# Patient Record
Sex: FEMALE | Race: WHITE | NOT HISPANIC OR LATINO | Employment: OTHER | URBAN - METROPOLITAN AREA
[De-identification: names, ages, dates, MRNs, and addresses within clinical notes are randomized per-mention and may not be internally consistent; named-entity substitution may affect disease eponyms.]

---

## 2021-03-11 PROBLEM — C50.912 INVASIVE DUCTAL CARCINOMA OF LEFT BREAST (HCC): Status: ACTIVE | Noted: 2021-03-11

## 2021-03-12 ENCOUNTER — RADIATION ONCOLOGY CONSULT (OUTPATIENT)
Dept: RADIATION ONCOLOGY | Facility: HOSPITAL | Age: 71
End: 2021-03-12
Attending: RADIOLOGY
Payer: COMMERCIAL

## 2021-03-12 VITALS
DIASTOLIC BLOOD PRESSURE: 90 MMHG | TEMPERATURE: 97.8 F | HEART RATE: 103 BPM | SYSTOLIC BLOOD PRESSURE: 148 MMHG | WEIGHT: 159 LBS | OXYGEN SATURATION: 98 % | RESPIRATION RATE: 18 BRPM

## 2021-03-12 DIAGNOSIS — C50.912 INVASIVE DUCTAL CARCINOMA OF LEFT BREAST (HCC): Primary | ICD-10-CM

## 2021-03-12 PROCEDURE — 99211 OFF/OP EST MAY X REQ PHY/QHP: CPT | Performed by: RADIOLOGY

## 2021-03-12 RX ORDER — ROPINIROLE 0.5 MG/1
TABLET, FILM COATED ORAL
COMMUNITY
Start: 2021-01-25 | End: 2021-07-26

## 2021-03-12 RX ORDER — LOSARTAN POTASSIUM 25 MG/1
50 TABLET ORAL DAILY
COMMUNITY
Start: 2021-03-04 | End: 2022-07-06

## 2021-03-12 RX ORDER — OMEPRAZOLE 40 MG/1
40 CAPSULE, DELAYED RELEASE ORAL DAILY
COMMUNITY
Start: 2020-12-30 | End: 2022-05-11

## 2021-03-12 RX ORDER — MELOXICAM 15 MG/1
TABLET ORAL
COMMUNITY
Start: 2021-02-14 | End: 2021-07-26 | Stop reason: ALTCHOICE

## 2021-03-12 NOTE — PROGRESS NOTES
Blossom Cruz 1950 is a 79 y o  female    Oncology History Overview Note   Patient presents as a self referral to discuss radiation therapy for Stage IB invasive ductal left breast cancer, triple negative  She is s/p left breast lumpectomy and adjuvant chemotherapy at [facility]  79year old female palpated a mass in her left breast  Mammogram on 7/1/20 revealed left breast mass within upper outer quadrant, US showed irregular indistinct border within the left breast, 2:00, 8 cm from nipple measuring 1 8 x 1 5 x 1 5 cm  Benign lymph nodes present within left axilla  She was referred to a surgeon, Dr Danielle Matson in [address] who performed left breast wire localized lumpectomy on 8/5/20 8/5/20  Breast, Left, Left Breast Mass, Wire Localized Lumpectomy ([facility], Dr Adrienne Lao)  - Invasive breast carcinoma of no special type (ductal NST/invasive ductal carcinoma)  * Size: 15 mm   * [place] grade 3 of 3 (total score: 9 of 9)    -- tubule formation, score 3    -- nuclear grade, score 3    -- mitoses, score 3    * Breast tumor prognostic profile (per report)    -- ER: Negative 0%    -- AL: Negative 0%    -- Her2: Negative 0   * Ductal carcinoma in situ (DCIS): Not identified  * Margins: Negative for carcinoma, less than 1 mm from the closest lateral margin  * Pathologic stage (AJCC 8th ed ): pT1c     9/2/20 [facility], [facility], Dr Danielle Matson   Left breast re-excision lumpectomy with SLNB  No residual carcinoma  Two negative lymph nodes    3/1/21 [facility], Dr Yaw Loya  Pt is s/p 4 cycles of AC, now receiving Paclitaxel, cycle 10 today  She has lymphedema in left arm, follows with Physical therapy  She feels well, overall good tolerance       3/15/21 Last cycle of chemo, Taxol cycle 12     Invasive ductal carcinoma of left breast ([address] )   2020 Initial Diagnosis    Invasive ductal carcinoma of left breast ([address] )     8/5/2020 Surgery    Breast, Left, Left Breast Mass, Wire Localized Lumpectomy (1 slides [id number] [facility], collected 8/5/2020):  Dr Chuy Arreguin, Surgeon    - Invasive breast carcinoma of no special type (ductal NST/invasive ductal carcinoma)  * Size: 15 mm   * [place] grade 3 of 3 (total score: 9 of 9)    -- tubule formation, score 3    -- nuclear grade, score 3    -- mitoses, score 3    * Breast tumor prognostic profile (per report)    -- ER: Negative 0%    -- ME: Negative 0%    -- Her2: Negative 0   * Ductal carcinoma in situ (DCIS): Not identified  * Margins: Negative for carcinoma, less than 1 mm from the closest lateral margin  * Pathologic stage (AJCC 8th ed ): pT1c         9/2/2020 Surgery    Left breast re-excision lumpectomy with SLNB  ([address], [facility], Dr Adi Bunch)     A  Left breast lumpectomy:  - benign breast tissue with previous lumpectomy cavity showing marked tissue reaction including foreign body giant cell reaction, granulation tissue and fat necrosis  - No residual carcinoma seen  B  Left axillary lymph node:  - two negative lymph nodes          10/2020 - 3/15/2021 Chemotherapy    Adjuvant chemotherapy [facility])  AC (Adriamycin, Cytoxan) x 4 cycles  Paclitaxel x 12 cycles     Dr Elizabeth Nava, [facility]          Clinical Trial: no      Health Maintenance   Topic Date Due    Hepatitis C Screening  1950    Medicare Annual Wellness Visit (AWV)  1950    MAMMOGRAM  1950    Depression Screening PHQ  08/09/1962    COVID-19 Vaccine (1 of 2) 08/09/1966    BMI: Adult  08/09/1968    DTaP,Tdap,and Td Vaccines (1 - Tdap) 08/09/1971    Colorectal Cancer Screening  08/09/2000    Fall Risk  08/09/2015    Pneumococcal Vaccine: 65+ Years (1 of 1 - PPSV23) 08/09/2015    Influenza Vaccine (1) 09/01/2020    HIB Vaccine  Aged Out    Hepatitis B Vaccine  Aged Out    IPV Vaccine  Aged Out    Hepatitis A Vaccine  Aged Out    Meningococcal ACWY Vaccine Aged Out    HPV Vaccine  Aged Out       Past Medical History:   Diagnosis Date    [facility] cancer [facility] ) 2020    Rheumatoid arthritis ([facility] [address] )        Past Surgical History:   Procedure Laterality Date    ANKLE SURGERY Right 2016    2 plates and screws    BREAST BIOPSY      BREAST LUMPECTOMY Right 2020    ELBOW FRACTURE REPAIR Right 1996    HYSTERECTOMY  2018       Family History   Problem Relation Age of Onset    Breast cancer Mother     Lung cancer Father     Breast cancer Paternal Grandmother        Social History     Tobacco Use    Smoking status: Never Smoker    Smokeless tobacco: Never Used   Substance Use Topics    Alcohol use: Not on file    Drug use: Not on file          Current Outpatient Medications:     fluticasone-salmeterol (ADVAIR, WIXELA) 250-50 mcg/dose inhaler, Inhale 1 puff 2 (two) times a day Rinse mouth after use , Disp: , Rfl:     losartan (COZAAR) 25 mg tablet, , Disp: , Rfl:     meloxicam (MOBIC) 15 mg tablet, , Disp: , Rfl:     omeprazole (PriLOSEC) 40 MG capsule, , Disp: , Rfl:     rOPINIRole (REQUIP) 0 5 mg tablet, , Disp: , Rfl:     Allergies   Allergen Reactions    Avelox [Moxifloxacin] Itching        Review of Systems:  Review of Systems   Constitutional: Positive for fatigue  HENT: Negative  Eyes: Negative  Respiratory: Negative  Cardiovascular: Negative  Gastrointestinal: Negative  Endocrine: Negative  Genitourinary: Negative  Musculoskeletal: Positive for arthralgias (hx rheumatoid arthritis) and neck pain  Skin: Negative  Allergic/Immunologic: Positive for food allergies  Neurological: Positive for numbness (neuropathy in hands)  Hematological: Positive for adenopathy (left arm lymphedema, she was evaluated by PT waiting for compression sleeve)  Psychiatric/Behavioral: Negative          Vitals:    03/12/21 1042   BP: 148/90   BP Location: Right arm   Pulse: 103   Resp: 18   Temp: 97 8 °F (36 6 °C)   TempSrc: Temporal   SpO2: 98% Weight: 72 1 kg (159 lb)            OB/GYN History:  The patient underwent menarche at 8 years  Menopause Status Pre, Louise, Post, Unknown and No Answer  No LMP recorded  Menopause at 62 years  Menopause Reason natural   Hormone replacement therapy: yes  Years used 2 months   9   Para 4   Age at first delivery being 25 years  Nursing: not applicable  Birth control pills: yes  Years used 2-3 years  Pregnancy test needed:  no    PFT n/a    Imaging:No images are attached to the encounter       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

## 2021-03-12 NOTE — PROGRESS NOTES
Consultation - Radiation Oncology      YZX:[id number] : 1950  Encounter: [id number]  Patient Information: Jenae Nj      CHIEF COMPLAINT  Chief Complaint   Patient presents with    Consult     Radiation Oncology      Cancer Staging  Invasive ductal carcinoma of left breast [facility])  Staging form: Breast, AJCC 8th Edition  - Pathologic: Stage IB (pT1c, pN0, cM0, G3, ER-, IA-, HER2-) - Signed by Petra Nj MD on 3/12/2021  Multigene prognostic tests performed: None  Histologic grading system: 3 grade system           History of Present Illness   Jenae Nj is a 79y o  year old female who presents with  Recently diagnosed triple negative invasive mammary carcinoma the left breast status post lumpectomy, re-excision, and sentinel node biopsy with negative margins  She presents today to discuss adjuvant radiation therapy  She is currently nearing completion of adjuvant systemic therapy  All care to date was performed at an outside facility  Workup to date as below:    Patient presents as a self referral to discuss radiation therapy for Stage IB invasive ductal left breast cancer, triple negative  She is s/p left breast lumpectomy and adjuvant chemotherapy at [facility]  79year old female palpated a mass in her left breast  Mammogram on 20 revealed left breast mass within upper outer quadrant, US showed irregular indistinct border within the left breast, 2:00, 8 cm from nipple measuring 1 8 x 1 5 x 1 5 cm  Benign lymph nodes present within left axilla  She was referred to a surgeon, Dr Leigh Ann Barajas in [address] who performed left breast wire localized lumpectomy on 20  Breast, Left, Left Breast Mass, Wire Localized Lumpectomy ([place], Dr Abram Marie)  - Invasive breast carcinoma of no special type (ductal NST/invasive ductal carcinoma)     * Size: 15 mm   * Kar[place] grade 3 of 3 (total score: 9 of 9)    -- tubule formation, score 3    -- nuclear grade, score 3    -- mitoses, score 3    * Breast tumor prognostic profile (per report)    -- ER: Negative 0%    -- IA: Negative 0%    -- Her2: Negative 0   * Ductal carcinoma in situ (DCIS): Not identified  * Margins: Negative for carcinoma, less than 1 mm from the closest lateral margin  * Pathologic stage (AJCC 8th ed ): pT1c     9/2/20 [address] [facility], Dr Tremaine Miles   Left breast re-excision lumpectomy with SLNB  No residual carcinoma  Two negative lymph nodes    3/1/21 [facility], Dr Shruthi Rios  Pt is s/p 4 cycles of AC, now receiving Paclitaxel, cycle 10 today  She has lymphedema in left arm, follows with Physical therapy  She feels well, overall good tolerance  3/15/21 Last cycle of chemo, Taxol cycle 12        Historical Information   Oncology History   Invasive ductal carcinoma of left breast [address] [address] )   2020 Initial Diagnosis    Invasive ductal carcinoma of left breast ([facility] [address] )     8/5/2020 Surgery    Breast, Left, Left Breast Mass, Wire Localized Lumpectomy (1 slides [id number] [facility], collected 8/5/2020):  Dr Danette Cervantes, Surgeon    - Invasive breast carcinoma of no special type (ductal NST/invasive ductal carcinoma)  * Size: 15 mm   * Kar[place] grade 3 of 3 (total score: 9 of 9)    -- tubule formation, score 3    -- nuclear grade, score 3    -- mitoses, score 3    * Breast tumor prognostic profile (per report)    -- ER: Negative 0%    -- IA: Negative 0%    -- Her2: Negative 0   * Ductal carcinoma in situ (DCIS): Not identified  * Margins: Negative for carcinoma, less than 1 mm from the closest lateral margin  * Pathologic stage (AJCC 8th ed ): pT1c         9/2/2020 Surgery    Left breast re-excision lumpectomy with SLNB  ([address] [facility], Dr Tremaine Miles)     A   Left breast lumpectomy:  - benign breast tissue with previous lumpectomy cavity showing marked tissue reaction including foreign body giant cell reaction, granulation tissue and fat necrosis  - No residual carcinoma seen  B  Left axillary lymph node:  - two negative lymph nodes          10/2020 - 3/15/2021 Chemotherapy    Adjuvant chemotherapy [facility])  AC (Adriamycin, Cytoxan) x 4 cycles  Paclitaxel x 12 cycles     Dr Danielle Grewal, [facility] On[facility]      3/12/2021 -  Cancer Staged    Staging form: Breast, AJCC 8th Edition  - Pathologic: Stage IB (pT1c, pN0, cM0, G3, ER-, MO-, HER2-) - Signed by Brian Hess MD on 3/12/2021  Multigene prognostic tests performed: None  Histologic grading system: 3 grade system             Past Medical History:   Diagnosis Date    Breast cancer [facility] [address] ) 2020    Left  breast, triple negative    Rheumatoid arthritis ([facility] [address] )      Past Surgical History:   Procedure Laterality Date    ANKLE SURGERY Right 2016    2 plates and screws    BREAST BIOPSY Left 07/02/2020    BREAST LUMPECTOMY Left 08/12/2020    left breast wire localized lumpectomy    BREAST MASS EXCISION Left 09/02/2020    short superior and long lateral left breast resection, left axillary sentinel lymph node biopsy    ELBOW FRACTURE REPAIR Right 1996    HYSTERECTOMY  2018       Family History   Problem Relation Age of Onset    Breast cancer Mother     Lung cancer Father     Breast cancer Paternal Grandmother        Social History   Social History     Substance and Sexual Activity   Alcohol Use None     Social History     Substance and Sexual Activity   Drug Use Never     Social History     Tobacco Use   Smoking Status Never Smoker   Smokeless Tobacco Never Used         Meds/Allergies     Current Outpatient Medications:     fluticasone-salmeterol (Bennye Situ) 250-50 mcg/dose inhaler, Inhale 1 puff 2 (two) times a day Rinse mouth after use , Disp: , Rfl:     losartan (COZAAR) 25 mg tablet, , Disp: , Rfl:     meloxicam (MOBIC) 15 mg tablet, , Disp: , Rfl:     omeprazole (PriLOSEC) 40 MG capsule, , Disp: , Rfl:     rOPINIRole (REQUIP) 0 5 mg tablet, , Disp: , Rfl: Allergies   Allergen Reactions    Avelox [Moxifloxacin] Itching    Fruit Extracts Itching     Cantelope melon     Nuts Itching         Review of Systems   Review of Systems   Constitutional: Positive for fatigue  HENT: Negative  Eyes: Negative  Respiratory: Negative  Cardiovascular: Negative  Gastrointestinal: Negative  Endocrine: Negative  Genitourinary: Negative  Musculoskeletal: Positive for arthralgias (hx rheumatoid arthritis) and neck pain  Skin: Negative  Allergic/Immunologic: Positive for food allergies  Neurological: Positive for numbness (neuropathy in hands)  Hematological: Positive for adenopathy (left arm lymphedema, she was evaluated by PT waiting for compression sleeve)  Psychiatric/Behavioral: Negative  The patient underwent menarche at 8 years  Menopause Status Pre, Louise, Post, Unknown and No Answer  No LMP recorded  Menopause at 62 years  Menopause Reason natural   Hormone replacement therapy: yes  Years used 2 months   9   Para 4   Age at first delivery being 25 years  Nursing: not applicable  Birth control pills: yes  Years used 2-3 years  Pregnancy test needed:  no        OBJECTIVE:   /90 (BP Location: Right arm)   Pulse 103   Temp 97 8 °F (36 6 °C) (Temporal)   Resp 18   Wt 72 1 kg (159 lb)   SpO2 98%   Pain Assessment:  0  Performance Status: Karnofsky: 90 - Able to carry on normal activity; minor signs or symptoms of disease     Physical Exam     The patient presents today no apparent distress  Sclera anicteric  No palpable cervical or supraclavicular lymphadenopathy  Normal respiratory effort  The right breast within normal limits  The left breast is soft, nontender, with a well-healed upper-outer quadrant lumpectomy scar  No visible or palpable suspicious findings  No axillary lymphadenopathy  Slight lymphedema of the left upper extremity        RESULTS  Lab Results    Chemistry    No results found for: NA, K, CL, CO2, BUN, CREATININE No results found for: CALCIUM, ALKPHOS, AST, ALT, BILITOT         No results found for: WBC, HGB, HCT, MCV, PLT        ASSESSMENT  1  Invasive ductal carcinoma of left breast (HCC)       Cancer Staging  Invasive ductal carcinoma of left breast (HCC)  Staging form: Breast, AJCC 8th Edition  - Pathologic: Stage IB (pT1c, pN0, cM0, G3, ER-, W[place]-, HER2-) - Signed by Lorraine Arreguin MD on 3/12/2021  Multigene prognostic tests performed: None  Histologic grading system: 3 grade system        PLAN/DISCUSSION  No orders of the defined types were placed in this encounter  Helen Land is a 79y o  year old female with   Recently diagnosed triple negative invasive mammary carcinoma of the left breast status post lumpectomy and sentinel node biopsy with negative margins, Stage IB (pT1c, pN0, cM0, G3, ER-, WV-, HER2-)  She is currently receiving adjuvant AC plus T, with her 12th and final cycle of Taxol scheduled for this upcoming Monday  We have recommended a course of adjuvant radiation therapy directed at the entire left breast, in order to reduce her risk of local regional recurrence  Treatment would begin approximately 4 weeks status post completion of chemotherapy  Treatment would likely be delivered over the course of approximately 4 weeks  The associated risks and toxicities of treatment were discussed with the patient in detail, including, but not limited to, fatigue, erythema, hyperpigmentation, desquamation, subcutaneous fibrosis, rib fracture, pneumonitis, lymphedema, and secondary malignancy  She will return to our department in approximately 2 weeks for CT planning  Lorraine Arreguin MD  3/12/2021,12:22 PM      Portions of the record may have been created with voice recognition software  Occasional wrong word or "sound a like" substitutions may have occurred due to the inherent limitations of voice recognition software    Read the chart carefully and recognize, using context, where substitutions have occurred

## 2021-03-15 ENCOUNTER — PATIENT OUTREACH (OUTPATIENT)
Dept: CASE MANAGEMENT | Facility: HOSPITAL | Age: 71
End: 2021-03-15

## 2021-03-15 NOTE — PROGRESS NOTES
LSW received DT and problem list via referral process  PT self scored 5/10 and noted concerns with physical issues, no emotional      LSW contacted pt via telephone to discuss DT results  Pt shared that she was just having a bad day at her appointment and she is currently doing well, no concerns  LSW provided contact information and encouraged pt to reach out if needs arise, pt agreed

## 2021-03-22 ENCOUNTER — APPOINTMENT (OUTPATIENT)
Dept: RADIATION ONCOLOGY | Facility: HOSPITAL | Age: 71
End: 2021-03-22
Attending: RADIOLOGY
Payer: COMMERCIAL

## 2021-03-22 PROCEDURE — 77332 RADIATION TREATMENT AID(S): CPT | Performed by: RADIOLOGY

## 2021-03-22 PROCEDURE — 77290 THER RAD SIMULAJ FIELD CPLX: CPT | Performed by: RADIOLOGY

## 2021-03-24 PROCEDURE — 77295 3-D RADIOTHERAPY PLAN: CPT | Performed by: RADIOLOGY

## 2021-03-24 PROCEDURE — 77334 RADIATION TREATMENT AID(S): CPT | Performed by: RADIOLOGY

## 2021-03-24 PROCEDURE — 77300 RADIATION THERAPY DOSE PLAN: CPT | Performed by: RADIOLOGY

## 2021-03-30 ENCOUNTER — APPOINTMENT (OUTPATIENT)
Dept: RADIATION ONCOLOGY | Facility: HOSPITAL | Age: 71
End: 2021-03-30
Attending: RADIOLOGY
Payer: COMMERCIAL

## 2021-03-30 PROCEDURE — 77280 THER RAD SIMULAJ FIELD SMPL: CPT | Performed by: RADIOLOGY

## 2021-03-31 ENCOUNTER — APPOINTMENT (OUTPATIENT)
Dept: RADIATION ONCOLOGY | Facility: HOSPITAL | Age: 71
End: 2021-03-31
Attending: RADIOLOGY
Payer: COMMERCIAL

## 2021-04-01 ENCOUNTER — APPOINTMENT (OUTPATIENT)
Dept: RADIATION ONCOLOGY | Facility: HOSPITAL | Age: 71
End: 2021-04-01
Attending: RADIOLOGY
Payer: COMMERCIAL

## 2021-04-02 ENCOUNTER — APPOINTMENT (OUTPATIENT)
Dept: RADIATION ONCOLOGY | Facility: HOSPITAL | Age: 71
End: 2021-04-02
Attending: RADIOLOGY
Payer: COMMERCIAL

## 2021-04-05 ENCOUNTER — APPOINTMENT (OUTPATIENT)
Dept: RADIATION ONCOLOGY | Facility: HOSPITAL | Age: 71
End: 2021-04-05
Attending: RADIOLOGY
Payer: COMMERCIAL

## 2021-04-05 ENCOUNTER — APPOINTMENT (OUTPATIENT)
Dept: RADIATION ONCOLOGY | Facility: HOSPITAL | Age: 71
End: 2021-04-05
Payer: COMMERCIAL

## 2021-04-06 ENCOUNTER — APPOINTMENT (OUTPATIENT)
Dept: RADIATION ONCOLOGY | Facility: HOSPITAL | Age: 71
End: 2021-04-06
Attending: RADIOLOGY
Payer: COMMERCIAL

## 2021-04-07 ENCOUNTER — APPOINTMENT (OUTPATIENT)
Dept: RADIATION ONCOLOGY | Facility: HOSPITAL | Age: 71
End: 2021-04-07
Attending: RADIOLOGY
Payer: COMMERCIAL

## 2021-04-08 ENCOUNTER — APPOINTMENT (OUTPATIENT)
Dept: RADIATION ONCOLOGY | Facility: HOSPITAL | Age: 71
End: 2021-04-08
Attending: RADIOLOGY
Payer: COMMERCIAL

## 2021-04-09 ENCOUNTER — APPOINTMENT (OUTPATIENT)
Dept: RADIATION ONCOLOGY | Facility: HOSPITAL | Age: 71
End: 2021-04-09
Attending: RADIOLOGY
Payer: COMMERCIAL

## 2021-04-09 DIAGNOSIS — C50.912 INVASIVE DUCTAL CARCINOMA OF LEFT BREAST (HCC): Primary | ICD-10-CM

## 2021-04-12 ENCOUNTER — APPOINTMENT (OUTPATIENT)
Dept: RADIATION ONCOLOGY | Facility: HOSPITAL | Age: 71
End: 2021-04-12
Attending: RADIOLOGY
Payer: COMMERCIAL

## 2021-04-12 ENCOUNTER — APPOINTMENT (OUTPATIENT)
Dept: RADIATION ONCOLOGY | Facility: HOSPITAL | Age: 71
End: 2021-04-12
Payer: COMMERCIAL

## 2021-04-12 PROCEDURE — 77331 SPECIAL RADIATION DOSIMETRY: CPT | Performed by: RADIOLOGY

## 2021-04-12 PROCEDURE — 77387 GUIDANCE FOR RADJ TX DLVR: CPT | Performed by: RADIOLOGY

## 2021-04-12 PROCEDURE — 77412 RADIATION TX DELIVERY LVL 3: CPT | Performed by: RADIOLOGY

## 2021-04-13 ENCOUNTER — APPOINTMENT (OUTPATIENT)
Dept: RADIATION ONCOLOGY | Facility: HOSPITAL | Age: 71
End: 2021-04-13
Attending: RADIOLOGY
Payer: COMMERCIAL

## 2021-04-13 ENCOUNTER — APPOINTMENT (OUTPATIENT)
Dept: RADIATION ONCOLOGY | Facility: HOSPITAL | Age: 71
End: 2021-04-13
Payer: COMMERCIAL

## 2021-04-13 LAB
BASOPHILS # BLD AUTO: 0.1 X10E3/UL (ref 0–0.2)
BASOPHILS NFR BLD AUTO: 1 %
EOSINOPHIL # BLD AUTO: 0.3 X10E3/UL (ref 0–0.4)
EOSINOPHIL NFR BLD AUTO: 4 %
ERYTHROCYTE [DISTWIDTH] IN BLOOD BY AUTOMATED COUNT: 14 % (ref 11.7–15.4)
HCT VFR BLD AUTO: 37.5 % (ref 34–46.6)
HGB BLD-MCNC: 12.2 G/DL (ref 11.1–15.9)
IMM GRANULOCYTES # BLD: 0 X10E3/UL (ref 0–0.1)
IMM GRANULOCYTES NFR BLD: 0 %
LYMPHOCYTES # BLD AUTO: 1.5 X10E3/UL (ref 0.7–3.1)
LYMPHOCYTES NFR BLD AUTO: 21 %
MCH RBC QN AUTO: 30 PG (ref 26.6–33)
MCHC RBC AUTO-ENTMCNC: 32.5 G/DL (ref 31.5–35.7)
MCV RBC AUTO: 92 FL (ref 79–97)
MONOCYTES # BLD AUTO: 0.8 X10E3/UL (ref 0.1–0.9)
MONOCYTES NFR BLD AUTO: 11 %
NEUTROPHILS # BLD AUTO: 4.5 X10E3/UL (ref 1.4–7)
NEUTROPHILS NFR BLD AUTO: 63 %
PLATELET # BLD AUTO: 249 X10E3/UL (ref 150–450)
RBC # BLD AUTO: 4.07 X10E6/UL (ref 3.77–5.28)
WBC # BLD AUTO: 7.1 X10E3/UL (ref 3.4–10.8)

## 2021-04-13 PROCEDURE — 77387 GUIDANCE FOR RADJ TX DLVR: CPT | Performed by: RADIOLOGY

## 2021-04-13 PROCEDURE — 77412 RADIATION TX DELIVERY LVL 3: CPT | Performed by: RADIOLOGY

## 2021-04-14 ENCOUNTER — APPOINTMENT (OUTPATIENT)
Dept: RADIATION ONCOLOGY | Facility: HOSPITAL | Age: 71
End: 2021-04-14
Attending: RADIOLOGY
Payer: COMMERCIAL

## 2021-04-14 ENCOUNTER — APPOINTMENT (OUTPATIENT)
Dept: RADIATION ONCOLOGY | Facility: HOSPITAL | Age: 71
End: 2021-04-14
Payer: COMMERCIAL

## 2021-04-14 PROCEDURE — 77412 RADIATION TX DELIVERY LVL 3: CPT | Performed by: RADIOLOGY

## 2021-04-14 PROCEDURE — 77387 GUIDANCE FOR RADJ TX DLVR: CPT | Performed by: RADIOLOGY

## 2021-04-15 ENCOUNTER — APPOINTMENT (OUTPATIENT)
Dept: RADIATION ONCOLOGY | Facility: HOSPITAL | Age: 71
End: 2021-04-15
Payer: COMMERCIAL

## 2021-04-15 ENCOUNTER — APPOINTMENT (OUTPATIENT)
Dept: RADIATION ONCOLOGY | Facility: HOSPITAL | Age: 71
End: 2021-04-15
Attending: RADIOLOGY
Payer: COMMERCIAL

## 2021-04-15 PROCEDURE — 77412 RADIATION TX DELIVERY LVL 3: CPT | Performed by: RADIOLOGY

## 2021-04-15 PROCEDURE — 77387 GUIDANCE FOR RADJ TX DLVR: CPT | Performed by: RADIOLOGY

## 2021-04-16 ENCOUNTER — APPOINTMENT (OUTPATIENT)
Dept: RADIATION ONCOLOGY | Facility: HOSPITAL | Age: 71
End: 2021-04-16
Payer: COMMERCIAL

## 2021-04-16 ENCOUNTER — APPOINTMENT (OUTPATIENT)
Dept: RADIATION ONCOLOGY | Facility: HOSPITAL | Age: 71
End: 2021-04-16
Attending: RADIOLOGY
Payer: COMMERCIAL

## 2021-04-16 PROCEDURE — 77412 RADIATION TX DELIVERY LVL 3: CPT | Performed by: RADIOLOGY

## 2021-04-16 PROCEDURE — 77387 GUIDANCE FOR RADJ TX DLVR: CPT | Performed by: RADIOLOGY

## 2021-04-16 PROCEDURE — 77336 RADIATION PHYSICS CONSULT: CPT | Performed by: RADIOLOGY

## 2021-04-19 ENCOUNTER — APPOINTMENT (OUTPATIENT)
Dept: RADIATION ONCOLOGY | Facility: HOSPITAL | Age: 71
End: 2021-04-19
Attending: RADIOLOGY
Payer: COMMERCIAL

## 2021-04-19 ENCOUNTER — APPOINTMENT (OUTPATIENT)
Dept: RADIATION ONCOLOGY | Facility: HOSPITAL | Age: 71
End: 2021-04-19
Payer: COMMERCIAL

## 2021-04-19 PROCEDURE — 77412 RADIATION TX DELIVERY LVL 3: CPT | Performed by: RADIOLOGY

## 2021-04-19 PROCEDURE — 77387 GUIDANCE FOR RADJ TX DLVR: CPT | Performed by: RADIOLOGY

## 2021-04-19 PROCEDURE — 77417 THER RADIOLOGY PORT IMAGE(S): CPT | Performed by: RADIOLOGY

## 2021-04-20 ENCOUNTER — APPOINTMENT (OUTPATIENT)
Dept: RADIATION ONCOLOGY | Facility: HOSPITAL | Age: 71
End: 2021-04-20
Payer: COMMERCIAL

## 2021-04-20 ENCOUNTER — APPOINTMENT (OUTPATIENT)
Dept: RADIATION ONCOLOGY | Facility: HOSPITAL | Age: 71
End: 2021-04-20
Attending: RADIOLOGY
Payer: COMMERCIAL

## 2021-04-20 PROCEDURE — 77387 GUIDANCE FOR RADJ TX DLVR: CPT | Performed by: RADIOLOGY

## 2021-04-20 PROCEDURE — 77412 RADIATION TX DELIVERY LVL 3: CPT | Performed by: RADIOLOGY

## 2021-04-21 ENCOUNTER — APPOINTMENT (OUTPATIENT)
Dept: RADIATION ONCOLOGY | Facility: HOSPITAL | Age: 71
End: 2021-04-21
Payer: COMMERCIAL

## 2021-04-21 ENCOUNTER — APPOINTMENT (OUTPATIENT)
Dept: RADIATION ONCOLOGY | Facility: HOSPITAL | Age: 71
End: 2021-04-21
Attending: RADIOLOGY
Payer: COMMERCIAL

## 2021-04-21 PROCEDURE — 77412 RADIATION TX DELIVERY LVL 3: CPT | Performed by: RADIOLOGY

## 2021-04-21 PROCEDURE — 77387 GUIDANCE FOR RADJ TX DLVR: CPT | Performed by: RADIOLOGY

## 2021-04-22 ENCOUNTER — APPOINTMENT (OUTPATIENT)
Dept: RADIATION ONCOLOGY | Facility: HOSPITAL | Age: 71
End: 2021-04-22
Payer: COMMERCIAL

## 2021-04-22 ENCOUNTER — APPOINTMENT (OUTPATIENT)
Dept: RADIATION ONCOLOGY | Facility: HOSPITAL | Age: 71
End: 2021-04-22
Attending: RADIOLOGY
Payer: COMMERCIAL

## 2021-04-22 PROCEDURE — 77387 GUIDANCE FOR RADJ TX DLVR: CPT | Performed by: RADIOLOGY

## 2021-04-22 PROCEDURE — 77412 RADIATION TX DELIVERY LVL 3: CPT | Performed by: RADIOLOGY

## 2021-04-23 ENCOUNTER — APPOINTMENT (OUTPATIENT)
Dept: RADIATION ONCOLOGY | Facility: HOSPITAL | Age: 71
End: 2021-04-23
Attending: RADIOLOGY
Payer: COMMERCIAL

## 2021-04-23 ENCOUNTER — APPOINTMENT (OUTPATIENT)
Dept: RADIATION ONCOLOGY | Facility: HOSPITAL | Age: 71
End: 2021-04-23
Payer: COMMERCIAL

## 2021-04-23 PROCEDURE — 77387 GUIDANCE FOR RADJ TX DLVR: CPT | Performed by: RADIOLOGY

## 2021-04-23 PROCEDURE — 77336 RADIATION PHYSICS CONSULT: CPT | Performed by: RADIOLOGY

## 2021-04-23 PROCEDURE — 77290 THER RAD SIMULAJ FIELD CPLX: CPT | Performed by: RADIOLOGY

## 2021-04-23 PROCEDURE — 77412 RADIATION TX DELIVERY LVL 3: CPT | Performed by: RADIOLOGY

## 2021-04-23 PROCEDURE — 77334 RADIATION TREATMENT AID(S): CPT | Performed by: RADIOLOGY

## 2021-04-26 ENCOUNTER — APPOINTMENT (OUTPATIENT)
Dept: RADIATION ONCOLOGY | Facility: HOSPITAL | Age: 71
End: 2021-04-26
Attending: RADIOLOGY
Payer: COMMERCIAL

## 2021-04-26 ENCOUNTER — APPOINTMENT (OUTPATIENT)
Dept: RADIATION ONCOLOGY | Facility: HOSPITAL | Age: 71
End: 2021-04-26
Payer: COMMERCIAL

## 2021-04-26 PROCEDURE — 77417 THER RADIOLOGY PORT IMAGE(S): CPT | Performed by: RADIOLOGY

## 2021-04-26 PROCEDURE — 77412 RADIATION TX DELIVERY LVL 3: CPT | Performed by: RADIOLOGY

## 2021-04-26 PROCEDURE — 77387 GUIDANCE FOR RADJ TX DLVR: CPT | Performed by: RADIOLOGY

## 2021-04-27 ENCOUNTER — APPOINTMENT (OUTPATIENT)
Dept: RADIATION ONCOLOGY | Facility: HOSPITAL | Age: 71
End: 2021-04-27
Attending: RADIOLOGY
Payer: COMMERCIAL

## 2021-04-27 ENCOUNTER — APPOINTMENT (OUTPATIENT)
Dept: RADIATION ONCOLOGY | Facility: HOSPITAL | Age: 71
End: 2021-04-27
Payer: COMMERCIAL

## 2021-04-27 PROCEDURE — 77412 RADIATION TX DELIVERY LVL 3: CPT | Performed by: RADIOLOGY

## 2021-04-27 PROCEDURE — 77387 GUIDANCE FOR RADJ TX DLVR: CPT | Performed by: RADIOLOGY

## 2021-04-28 ENCOUNTER — APPOINTMENT (OUTPATIENT)
Dept: RADIATION ONCOLOGY | Facility: HOSPITAL | Age: 71
End: 2021-04-28
Attending: RADIOLOGY
Payer: COMMERCIAL

## 2021-04-28 ENCOUNTER — APPOINTMENT (OUTPATIENT)
Dept: RADIATION ONCOLOGY | Facility: HOSPITAL | Age: 71
End: 2021-04-28
Payer: COMMERCIAL

## 2021-04-28 PROCEDURE — 77300 RADIATION THERAPY DOSE PLAN: CPT | Performed by: RADIOLOGY

## 2021-04-28 PROCEDURE — 77334 RADIATION TREATMENT AID(S): CPT | Performed by: RADIOLOGY

## 2021-04-28 PROCEDURE — 77387 GUIDANCE FOR RADJ TX DLVR: CPT | Performed by: RADIOLOGY

## 2021-04-28 PROCEDURE — 77412 RADIATION TX DELIVERY LVL 3: CPT | Performed by: RADIOLOGY

## 2021-04-28 PROCEDURE — 77295 3-D RADIOTHERAPY PLAN: CPT | Performed by: RADIOLOGY

## 2021-04-29 ENCOUNTER — APPOINTMENT (OUTPATIENT)
Dept: RADIATION ONCOLOGY | Facility: HOSPITAL | Age: 71
End: 2021-04-29
Attending: RADIOLOGY
Payer: COMMERCIAL

## 2021-04-29 PROCEDURE — 77412 RADIATION TX DELIVERY LVL 3: CPT | Performed by: RADIOLOGY

## 2021-04-29 PROCEDURE — 77387 GUIDANCE FOR RADJ TX DLVR: CPT | Performed by: RADIOLOGY

## 2021-04-30 ENCOUNTER — APPOINTMENT (OUTPATIENT)
Dept: RADIATION ONCOLOGY | Facility: HOSPITAL | Age: 71
End: 2021-04-30
Attending: RADIOLOGY
Payer: COMMERCIAL

## 2021-04-30 PROCEDURE — 77387 GUIDANCE FOR RADJ TX DLVR: CPT | Performed by: RADIOLOGY

## 2021-04-30 PROCEDURE — 77336 RADIATION PHYSICS CONSULT: CPT | Performed by: RADIOLOGY

## 2021-04-30 PROCEDURE — 77412 RADIATION TX DELIVERY LVL 3: CPT | Performed by: RADIOLOGY

## 2021-05-03 ENCOUNTER — APPOINTMENT (OUTPATIENT)
Dept: RADIATION ONCOLOGY | Facility: HOSPITAL | Age: 71
End: 2021-05-03
Attending: RADIOLOGY
Payer: COMMERCIAL

## 2021-05-03 PROCEDURE — 77412 RADIATION TX DELIVERY LVL 3: CPT | Performed by: RADIOLOGY

## 2021-05-03 PROCEDURE — 77387 GUIDANCE FOR RADJ TX DLVR: CPT | Performed by: RADIOLOGY

## 2021-05-04 ENCOUNTER — APPOINTMENT (OUTPATIENT)
Dept: RADIATION ONCOLOGY | Facility: HOSPITAL | Age: 71
End: 2021-05-04
Attending: RADIOLOGY
Payer: COMMERCIAL

## 2021-05-04 PROCEDURE — 77331 SPECIAL RADIATION DOSIMETRY: CPT | Performed by: RADIOLOGY

## 2021-05-04 PROCEDURE — 77280 THER RAD SIMULAJ FIELD SMPL: CPT | Performed by: RADIOLOGY

## 2021-05-04 PROCEDURE — 77412 RADIATION TX DELIVERY LVL 3: CPT | Performed by: RADIOLOGY

## 2021-05-05 ENCOUNTER — APPOINTMENT (OUTPATIENT)
Dept: RADIATION ONCOLOGY | Facility: HOSPITAL | Age: 71
End: 2021-05-05
Attending: RADIOLOGY
Payer: COMMERCIAL

## 2021-05-05 PROCEDURE — 77412 RADIATION TX DELIVERY LVL 3: CPT | Performed by: RADIOLOGY

## 2021-05-06 ENCOUNTER — APPOINTMENT (OUTPATIENT)
Dept: RADIATION ONCOLOGY | Facility: HOSPITAL | Age: 71
End: 2021-05-06
Attending: RADIOLOGY
Payer: COMMERCIAL

## 2021-05-06 PROCEDURE — 77412 RADIATION TX DELIVERY LVL 3: CPT | Performed by: RADIOLOGY

## 2021-05-07 ENCOUNTER — APPOINTMENT (OUTPATIENT)
Dept: RADIATION ONCOLOGY | Facility: HOSPITAL | Age: 71
End: 2021-05-07
Attending: RADIOLOGY
Payer: COMMERCIAL

## 2021-05-07 PROCEDURE — 77336 RADIATION PHYSICS CONSULT: CPT | Performed by: RADIOLOGY

## 2021-05-07 PROCEDURE — 77412 RADIATION TX DELIVERY LVL 3: CPT | Performed by: RADIOLOGY

## 2021-05-10 ENCOUNTER — APPOINTMENT (OUTPATIENT)
Dept: RADIATION ONCOLOGY | Facility: HOSPITAL | Age: 71
End: 2021-05-10
Attending: RADIOLOGY
Payer: COMMERCIAL

## 2021-05-10 PROCEDURE — 77412 RADIATION TX DELIVERY LVL 3: CPT | Performed by: RADIOLOGY

## 2021-06-10 ENCOUNTER — TELEMEDICINE (OUTPATIENT)
Dept: RADIATION ONCOLOGY | Facility: CLINIC | Age: 71
End: 2021-06-10
Attending: RADIOLOGY

## 2021-06-10 DIAGNOSIS — C50.912 INVASIVE DUCTAL CARCINOMA OF LEFT BREAST (HCC): Primary | ICD-10-CM

## 2021-06-10 NOTE — PROGRESS NOTES
Virtual Brief Visit    Assessment/Plan: Ms Mauricio Nicole has done well in follow-up and is recovering as expected from her recent course of adjuvant breast radiation  We have encouraged her to continue moisturizing and massaging the breast on a daily basis  She will return to our department 6 months for routine follow-up and we will arrange for her 1st post treatment surveillance mammography around the same time  Problem List Items Addressed This Visit        Other    Invasive ductal carcinoma of left breast ([address] ) - Primary    Relevant Orders    Mammo diagnostic bilateral w cad                Reason for visit is No chief complaint on file  Encounter provider Lori Westfall MD    Provider located at [facility] [address] [address]    Recent Visits  No visits were found meeting these conditions  Showing recent visits within past 7 days and meeting all other requirements     Future Appointments  No visits were found meeting these conditions  Showing future appointments within next 150 days and meeting all other requirements        After connecting through telephone, the patient was identified by name and date of birth  Saúl Wilcox was informed that this is a telemedicine visit and that the visit is being conducted through telephone  My office door was closed  No one else was in the room  She acknowledged consent and understanding of privacy and security of the platform  The patient has agreed to participate and understands she can discontinue the visit at any time  Patient is aware this is a billable service  Subjective    Saúl Wilcox returns today for routine scheduled follow-up visit approximately 1 month status post completion of adjuvant left breast radiation therapy  Overall she feels well  She is essentially without complaints  She states that her skin has essentially returned to baseline    She denies any lymphedema  She is scheduled for a hip replacement next week  HPI   Oncology History   Invasive ductal carcinoma of left breast ([facility] [address] )   2020 Initial Diagnosis    Invasive ductal carcinoma of left breast ([facility] [address] )     8/5/2020 Surgery    Breast, Left, Left Breast Mass, Wire Localized Lumpectomy (1 slides [id number] [facility], collected 8/5/2020):  Dr Mayito Dodson, Surgeon    - Invasive breast carcinoma of no special type (ductal NST/invasive ductal carcinoma)  * Size: 15 mm   * [place] grade 3 of 3 (total score: 9 of 9)    -- tubule formation, score 3    -- nuclear grade, score 3    -- mitoses, score 3    * Breast tumor prognostic profile (per report)    -- ER: Negative 0%    -- MS: Negative 0%    -- Her2: Negative 0   * Ductal carcinoma in situ (DCIS): Not identified  * Margins: Negative for carcinoma, less than 1 mm from the closest lateral margin  * Pathologic stage (AJCC 8th ed ): pT1c         9/2/2020 Surgery    Left breast re-excision lumpectomy with SLNB  ([facility] [address], [facility], Dr Nic Cabrera)     A  Left breast lumpectomy:  - benign breast tissue with previous lumpectomy cavity showing marked tissue reaction including foreign body giant cell reaction, granulation tissue and fat necrosis  - No residual carcinoma seen  B  Left axillary lymph node:  - two negative lymph nodes          10/2020 - 3/15/2021 Chemotherapy    Adjuvant chemotherapy [facility])  AC (Adriamycin, Cytoxan) x 4 cycles  Paclitaxel x 12 cycles     Dr Joann Espinosa, [facility] Oncology      3/12/2021 -  Cancer Staged    Staging form: Breast, AJCC 8th Edition  - Pathologic: Stage IB (pT1c, pN0, cM0, G3, ER-, MS-, HER2-) - Signed by Paul Vail MD on 3/12/2021  Multigene prognostic tests performed: None  Histologic grading system: 3 grade system       4/12/2021 - 5/10/2021 Radiation    4256 cGy in 16 fractions to the entire left breast followed by  additional 1000 cGy in 5 fractions to the left lumpectomy cavity  Priscilla Lowe MD           Past Medical History:   Diagnosis Date    Breast cancer [facility]) 2020    Left  breast, triple negative    Rheumatoid arthritis ([facility] [address] )        Past Surgical History:   Procedure Laterality Date    ANKLE SURGERY Right 2016    2 plates and screws    BREAST BIOPSY Left 07/02/2020    BREAST LUMPECTOMY Left 08/12/2020    left breast wire localized lumpectomy    BREAST MASS EXCISION Left 09/02/2020    short superior and long lateral left breast resection, left axillary sentinel lymph node biopsy    ELBOW FRACTURE REPAIR Right 1996    HYSTERECTOMY  2018       Current Outpatient Medications   Medication Sig Dispense Refill    fluticasone-salmeterol (ADVAIR, WIXELA) 250-50 mcg/dose inhaler Inhale 1 puff 2 (two) times a day Rinse mouth after use   losartan (COZAAR) 25 mg tablet       meloxicam (MOBIC) 15 mg tablet       omeprazole (PriLOSEC) 40 MG capsule       rOPINIRole (REQUIP) 0 5 mg tablet        No current facility-administered medications for this visit  Allergies   Allergen Reactions    Avelox [Moxifloxacin] Itching    Fruit Extracts Itching     Cantelope melon     Nuts - Food Allergy Itching           VIRTUAL VISIT DISCLAIMER    Larry Roger acknowledges that she has consented to an online visit or consultation  She understands that the online visit is based solely on information provided by her, and that, in the absence of a face-to-face physical evaluation by the physician, the diagnosis she receives is both limited and provisional in terms of accuracy and completeness  This is not intended to replace a full medical face-to-face evaluation by the physician  Larry Duran understands and accepts these terms

## 2021-07-08 ENCOUNTER — EVALUATION (OUTPATIENT)
Dept: PHYSICAL THERAPY | Facility: CLINIC | Age: 71
End: 2021-07-08
Payer: COMMERCIAL

## 2021-07-08 DIAGNOSIS — Z96.641 STATUS POST TOTAL HIP REPLACEMENT, RIGHT: ICD-10-CM

## 2021-07-08 DIAGNOSIS — M25.551 RIGHT HIP PAIN: Primary | ICD-10-CM

## 2021-07-08 PROCEDURE — 97161 PT EVAL LOW COMPLEX 20 MIN: CPT

## 2021-07-08 NOTE — PROGRESS NOTES
PT Evaluation     Today's date: 2021  Patient name: Rich Dennison  : 1950  MRN: [id number]  Referring provider: ALEXIS Rutherford  Dx:   Encounter Diagnosis     ICD-10-CM    1  Right hip pain  M25 551    2  Status post total hip replacement, right  Z96 641        Start Time: 0845  Stop Time: 930  Total time in clinic (min): 45 minutes    Assessment  Assessment details: Rich Dennison is a 79y o  year old female reports to physical therapy with symptoms consistent with referring diagnosis of: Right hip pain  (primary encounter diagnosis), Status post total hip replacement, right on 2021  Patient is ambulating with a quad cane at this time  She denies significant pain with functional activities throughout the course of her day  She demonstrates swelling of R LE at this time  Stockings were worn to appointment  Patient demonstrates limitations in R hip ROM, strength, and functional mobility  Patient is limited in the following functional activities: walking and standing greater than 15 minutes, ascending/descending stairs reciprocally, and performing self ADLs  Patient requires skilled physical therapy to restore prior level of function, address functional limitations, and progress towards independence with home exercise program  Patient was educated on HEP as noted on flow sheet, precautions associated with posterior R CARLOS, and DVT/infection prevention  Patient verbalized and demonstrated understanding of HEP and plan of care  Symptom irritability: low  Goals  STGs to be achieved in 4 weeks  -STG 1: Patient will be independent with HEP    -STG 2: Patient will have 0/10 R hip pain at rest    -STG 3: Patient will increase R hip ROM to within normal limits  -STG 4: Patient will report 40% functional improvement  -STG 5: Patient will demonstrate 1/2 grade MMT improvement in R hip  -STG 6: Patient will be able to walk for greater than 15 minutes with no pain       LTGs to be achieved in 8 weeks    -LTG 1: Patient will demonstrate independence with maintenance program    -LTG 2: Patient will perform all functional activities with less than 2/10 R hip pain  -LTG 3: Patient will improve FOTO score by 10 points  -LTG 4: Patient will report 80% functional improvement  -LTG 5: Patient will be able to walk for greater than 30 minutes with no pain  -LTG 6: Patient will demonstrate 1 grade MMT improvement in R hip  Plan  Patient would benefit from: PT eval and skilled physical therapy  Planned modality interventions: TENS, thermotherapy: hydrocollator packs, traction, ultrasound, cryotherapy and electrical stimulation/Russian stimulation  Planned therapy interventions: manual therapy, massage, ADL retraining, ADL training, balance, activity modification, neuromuscular re-education, patient education, postural training, strengthening, stretching, therapeutic activities, therapeutic exercise, flexibility, functional ROM exercises, gait training, graded activity, graded exercise, graded motor and home exercise program  Frequency: 3x week  Duration in weeks: 8  Treatment plan discussed with: patient        Subjective Evaluation    History of Present Illness  Date of surgery: 6/18/2021  Mechanism of injury: surgery  Mechanism of injury: Patient reports to skilled PT intervention s/p R CARLOS on 6/18/2021  She had a posterior hip replacement, with hip precautions for the next several weeks  She had home health 6 days/week for 2 weeks  She is using a reacher for grabbing objects  Functionally, she reports difficulties with ascending/descending the stairs reciprocally, walking and standing greater than 15 minutes, and sleeping through the night due to pain  She is currently wearing a stocking on her R LE  She currently has a tape on the incision, which was glued exteriorly  She is ambulating with a SPC  Patient has a history of R trimalleolar in 2016, and history of breast cancer     Pain  Current pain ratin  At best pain ratin  At worst pain ratin  Quality: dull ache  Relieving factors: relaxation, medications, rest and ice  Aggravating factors: stair climbing, walking and standing  Progression: no change    Social Support  Steps to enter house: yes  Stairs in house: yes   Lives in: multiple-level home  Lives with: spouse    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  )  Treatments  Previous treatment: physical therapy  Current treatment: medication  Discharged from (in last 30 days): home health care  Patient Goals  Patient goals for therapy: independence with ADLs/IADLs, improved balance and decreased pain  Patient goal: Gardening and cleaning the house  Objective     Observations     Right Hip  Positive for incision  Additional Observation Details  Incision covered with mesh dressing  No signs of drainage of infection noted  Palpation   Left   No palpable tenderness to the gluteus chet, gluteus medius, iliopsoas and piriformis  Right   No palpable tenderness to the gluteus chet, gluteus medius, iliopsoas and piriformis  Neurological Testing     Sensation     Hip   Left Hip   Intact: light touch    Right Hip   Intact: light touch    Active Range of Motion   Left Hip   Normal active range of motion    Right Hip   Flexion: 75 degrees   Abduction: 70 degrees     Passive Range of Motion   Left Hip   Normal passive range of motion    Right Hip   Flexion: 75 degrees   Abduction: 70 degrees     Strength/Myotome Testing     Left Hip   Normal muscle strength    Right Hip   Planes of Motion   Flexion: 3+  Abduction: 3+  Adduction: 3+  External rotation: 3+  Internal rotation: 3+    Isolated Muscles   Iliopsoas: 3+    Tests     Additional Tests Details  No special tests performed, as ortho note reviewed prior to initial evaluation                Precautions: S/P R CARLOS 2021 (POSTERIOR HIP PRECAUTIONS), Hx R trimalleolar fx , hx breast cancer       Manuals 7/8/21       R hip PROM                                Neuro Re-Ed        Glute sets  10       Quad sets         Clamshells 20 RTB       Bridging                                 Ther Ex        NuStep warmup  5' L1        Hip flex, abd, ext in standing  10        Side stepping  4x10'        LAQ         SAQ         Heel slides 20 with SOS       Mini squats  10       Step taps  20 total        Pro stretch  5x10s hold                        Gait Training        Stair training                 Modalities        Ice post  5'

## 2021-07-08 NOTE — LETTER
2021    [address]    Patient: Coco Turk   YOB: 1950   Date of Visit: 2021     Encounter Diagnosis     ICD-10-CM    1  Right hip pain  M25 551    2  Status post total hip replacement, right  Z96 [id number]        Dear Dr Kalyn Montano: Thank you for your recent referral of Coco Turk  Please review the attached evaluation summary from [facility] recent visit  Please verify that you agree with the plan of care by signing the attached order  If you have any questions or concerns, please do not hesitate to call  I sincerely appreciate the opportunity to share in the care of one of your patients and hope to have another opportunity to work with you in the near future  Sincerely,    Una Waldron, PT      Referring Provider:      I certify that I have read the below Plan of Care and certify the need for these services furnished under this plan of treatment while under my care  Luisa Alfaro MD  [address] [place] [address]  Via Fax: [phone]          PT Evaluation     Today's date: 2021  Patient name: Coco Turk  : 1950  MRN: [id number]  Referring provider: ALEXIS Knutson  Dx:   Encounter Diagnosis     ICD-10-CM    1  Right hip pain  M25 551    2  Status post total hip replacement, right  Z96 641        Start Time: 0845  Stop Time: 09  Total time in clinic (min): 45 minutes    Assessment  Assessment details: Coco Turk is a 79y o  year old female reports to physical therapy with symptoms consistent with referring diagnosis of: Right hip pain  (primary encounter diagnosis), Status post total hip replacement, right on 2021  Patient is ambulating with a quad cane at this time  She denies significant pain with functional activities throughout the course of her day  She demonstrates swelling of R LE at this time   Stockings were worn to appointment  Patient demonstrates limitations in R hip ROM, strength, and functional mobility  Patient is limited in the following functional activities: walking and standing greater than 15 minutes, ascending/descending stairs reciprocally, and performing self ADLs  Patient requires skilled physical therapy to restore prior level of function, address functional limitations, and progress towards independence with home exercise program  Patient was educated on HEP as noted on flow sheet, precautions associated with posterior R CARLOS, and DVT/infection prevention  Patient verbalized and demonstrated understanding of HEP and plan of care  Symptom irritability: low  Goals  STGs to be achieved in 4 weeks  -STG 1: Patient will be independent with HEP    -STG 2: Patient will have 0/10 R hip pain at rest    -STG 3: Patient will increase R hip ROM to within normal limits  -STG 4: Patient will report 40% functional improvement  -STG 5: Patient will demonstrate 1/2 grade MMT improvement in R hip  -STG 6: Patient will be able to walk for greater than 15 minutes with no pain  LTGs to be achieved in 8 weeks  -LTG 1: Patient will demonstrate independence with maintenance program    -LTG 2: Patient will perform all functional activities with less than 2/10 R hip pain  -LTG 3: Patient will improve FOTO score by 10 points  -LTG 4: Patient will report 80% functional improvement  -LTG 5: Patient will be able to walk for greater than 30 minutes with no pain  -LTG 6: Patient will demonstrate 1 grade MMT improvement in R hip         Plan  Patient would benefit from: PT eval and skilled physical therapy  Planned modality interventions: TENS, thermotherapy: hydrocollator packs, traction, ultrasound, cryotherapy and electrical stimulation/Russian stimulation  Planned therapy interventions: manual therapy, massage, ADL retraining, ADL training, balance, activity modification, neuromuscular re-education, patient education, postural training, strengthening, stretching, therapeutic activities, therapeutic exercise, flexibility, functional ROM exercises, gait training, graded activity, graded exercise, graded motor and home exercise program  Frequency: 3x week  Duration in weeks: 8  Treatment plan discussed with: patient        Subjective Evaluation    History of Present Illness  Date of surgery: 2021  Mechanism of injury: surgery  Mechanism of injury: Patient reports to skilled PT intervention s/p R CARLOS on 2021  She had a posterior hip replacement, with hip precautions for the next several weeks  She had home health 6 days/week for 2 weeks  She is using a reacher for grabbing objects  Functionally, she reports difficulties with ascending/descending the stairs reciprocally, walking and standing greater than 15 minutes, and sleeping through the night due to pain  She is currently wearing a stocking on her R LE  She currently has a tape on the incision, which was glued exteriorly  She is ambulating with a SPC  Patient has a history of R trimalleolar in 2016, and history of breast cancer  Pain  Current pain ratin  At best pain ratin  At worst pain ratin  Quality: dull ache  Relieving factors: relaxation, medications, rest and ice  Aggravating factors: stair climbing, walking and standing  Progression: no change    Social Support  Steps to enter house: yes  Stairs in house: yes   Lives in: multiple-level home  Lives with: spouse    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  )  Treatments  Previous treatment: physical therapy  Current treatment: medication  Discharged from (in last 30 days): home health care  Patient Goals  Patient goals for therapy: independence with ADLs/IADLs, improved balance and decreased pain  Patient goal: Gardening and cleaning the house  Objective     Observations     Right Hip  Positive for incision       Additional Observation Details  Incision covered with mesh dressing  No signs of drainage of infection noted  Palpation   Left   No palpable tenderness to the gluteus chet, gluteus medius, iliopsoas and piriformis  Right   No palpable tenderness to the gluteus chet, gluteus medius, iliopsoas and piriformis  Neurological Testing     Sensation     Hip   Left Hip   Intact: light touch    Right Hip   Intact: light touch    Active Range of Motion   Left Hip   Normal active range of motion    Right Hip   Flexion: 75 degrees   Abduction: 70 degrees     Passive Range of Motion   Left Hip   Normal passive range of motion    Right Hip   Flexion: 75 degrees   Abduction: 70 degrees     Strength/Myotome Testing     Left Hip   Normal muscle strength    Right Hip   Planes of Motion   Flexion: 3+  Abduction: 3+  Adduction: 3+  External rotation: 3+  Internal rotation: 3+    Isolated Muscles   Iliopsoas: 3+    Tests     Additional Tests Details  No special tests performed, as ortho note reviewed prior to initial evaluation                Precautions: S/P R CARLOS 6/18/2021 (POSTERIOR HIP PRECAUTIONS), Hx R trimalleolar fx 2016, hx breast cancer 2020      Manuals 7/8/21       R hip PROM                                Neuro Re-Ed        Glute sets  10       Quad sets         Clamshells 20 RTB       Bridging                                 Ther Ex        NuStep warmup  5' L1        Hip flex, abd, ext in standing  10        Side stepping  4x10'        LAQ         SAQ         Heel slides 20 with SOS       Mini squats  10       Step taps  20 total        Pro stretch  5x10s hold                        Gait Training        Stair training                 Modalities        Ice post  5'

## 2021-07-13 ENCOUNTER — OFFICE VISIT (OUTPATIENT)
Dept: PHYSICAL THERAPY | Facility: CLINIC | Age: 71
End: 2021-07-13
Payer: COMMERCIAL

## 2021-07-13 DIAGNOSIS — Z96.641 STATUS POST TOTAL HIP REPLACEMENT, RIGHT: ICD-10-CM

## 2021-07-13 DIAGNOSIS — M25.551 RIGHT HIP PAIN: Primary | ICD-10-CM

## 2021-07-13 PROCEDURE — 97112 NEUROMUSCULAR REEDUCATION: CPT

## 2021-07-13 PROCEDURE — 97140 MANUAL THERAPY 1/> REGIONS: CPT

## 2021-07-13 PROCEDURE — 97110 THERAPEUTIC EXERCISES: CPT

## 2021-07-13 NOTE — PROGRESS NOTES
Daily Note     Today's date: 2021  Patient name: Caterina Queen  : 1950  MRN: [id number]  Referring provider: No ref  provider found  Dx:   Encounter Diagnosis     ICD-10-CM    1  Right hip pain  M25 551    2  Status post total hip replacement, right  Z96 641        Start Time: 08  Stop Time: 0930  Total time in clinic (min): 45 minutes    Subjective: Pt reports no pain prior to session, stiffness about R quad  Objective: See treatment diary below      Assessment: Tolerated treatment well  Patient exhibited good technique with therapeutic exercises and would benefit from continued PT  Pt demo no gait deviations during ambulation with no AD, she tolerated exercises well and able to progress her exercises  Plan: Continue per plan of care   As per primary PT     Precautions: S/P R CARLOS 2021 (POSTERIOR HIP PRECAUTIONS), Hx R trimalleolar fx , hx breast cancer       Manuals 21      R hip PROM        STM  R quad roller 5'      Mobility  HF/quad str S/L 3x30s ea              Neuro Re-Ed        Glute sets  10 10"10x      Quad sets         Clamshells 20 RTB 20 RTB      Bridging   5"x10                              Ther Ex        NuStep warmup  5' L1  6' L1      Hip flex, abd, ext in standing  10  15x ea      Side stepping  4x10'  6x10'      LAQ   15      SAQ         Heel slides 20 with SOS 20 w/ SOS      Mini squats  10 15      Step taps  20 total  20x      Pro stretch  5x10s hold  5x10s                       Gait Training        Stair training         Gait training  At bar no AD 3 laps      Modalities        Ice post  5' 5' post

## 2021-07-15 ENCOUNTER — OFFICE VISIT (OUTPATIENT)
Dept: PHYSICAL THERAPY | Facility: CLINIC | Age: 71
End: 2021-07-15
Payer: COMMERCIAL

## 2021-07-15 DIAGNOSIS — M25.551 RIGHT HIP PAIN: Primary | ICD-10-CM

## 2021-07-15 DIAGNOSIS — Z96.641 STATUS POST TOTAL HIP REPLACEMENT, RIGHT: ICD-10-CM

## 2021-07-15 PROCEDURE — 97112 NEUROMUSCULAR REEDUCATION: CPT

## 2021-07-15 PROCEDURE — 97110 THERAPEUTIC EXERCISES: CPT

## 2021-07-15 NOTE — PROGRESS NOTES
Daily Note     Today's date: 7/15/2021  Patient name: Bill Geiger  : 1950  MRN: [id number]  Referring provider: ALEXIS Smallwood  Dx:   Encounter Diagnosis     ICD-10-CM    1  Right hip pain  M25 551    2  Status post total hip replacement, right  Z96 641        Start Time: 0840  Stop Time: 925  Total time in clinic (min): 45 minutes    Subjective: Patient denies pain in R hip today  She states that she had extra soreness in her R hip after last treatment session  She denies this soreness today  Anterior R hip tightness present today  Objective: See treatment diary below      Assessment: Tolerated treatment well  Patient able to ambulate without gait deviation or increased pain in R hip today  Step ups initiated today, with no difficulties bearing weight through R LE  Provided patient with R hip flexor stretch, as she complains of increased anterior R hip tightness  Able to see improvements in mobility after stretching and STM provided  No pain noted post tx  Discussed stocking usage  Advised to call MD regarding stockings  Patient would benefit from continued PT to normalize gait and maximize function for independence in community  Plan: Continue per plan of care        Precautions: S/P R CARLOS 2021 (POSTERIOR HIP PRECAUTIONS), Hx R trimalleolar fx , hx breast cancer 2020      Manuals 7/8/21 7/13/21 7/15/21     R hip PROM        STM  R quad roller 5' R quad roller 5'     Mobility  HF/quad str S/L 3x30s ea HF/quad str S/L 3x30s ea             Neuro Re-Ed        Glute sets  10 10"10x 3sx20     Quad sets         Clamshells 20 RTB 20 RTB 2x10 RTB     Bridging   5"x10 10     Hip add squeeze    20                     Ther Ex        NuStep warmup  5' L1  6' L1 5' L1     Hip flex, abd, ext in standing  10  15x ea 15x each      Side stepping  4x10'  6x10' 6x10'      LAQ   15 20     SAQ    2x10     Heel slides 20 with SOS 20 w/ SOS 2x10 with peanut     Mini squats  10 15 15     Step taps 20 total  20x 10 step ups 6"      Pro stretch  5x10s hold  5x10s  5x10s      HR   20     Hip flexor stretch at step   5x10s             Gait Training        Stair training         Gait training  At bar no AD 3 laps At bar no AD 3 laps     Modalities        Ice post  5' 5' post

## 2021-07-20 ENCOUNTER — OFFICE VISIT (OUTPATIENT)
Dept: PHYSICAL THERAPY | Facility: CLINIC | Age: 71
End: 2021-07-20
Payer: COMMERCIAL

## 2021-07-20 DIAGNOSIS — Z96.641 STATUS POST TOTAL HIP REPLACEMENT, RIGHT: ICD-10-CM

## 2021-07-20 DIAGNOSIS — M25.551 RIGHT HIP PAIN: Primary | ICD-10-CM

## 2021-07-20 PROCEDURE — 97112 NEUROMUSCULAR REEDUCATION: CPT

## 2021-07-20 PROCEDURE — 97110 THERAPEUTIC EXERCISES: CPT

## 2021-07-20 NOTE — PROGRESS NOTES
Daily Note     Today's date: 2021  Patient name: Saray Cruz  : 1950  MRN: [id number]  Referring provider: ALEXIS Albarran  Dx:   Encounter Diagnosis     ICD-10-CM    1  Right hip pain  M25 551    2  Status post total hip replacement, right  Z96 641        Start Time: 0845  Stop Time: 930  Total time in clinic (min): 45 minutes    Subjective: Patient states that she walked around her house without the cane on , and notes that she had to use the cane on Monday due to increased anterior R hip tightness and soreness  She has a follow up with MD on 21  She reports difficulties with walking down her driveway and in her yard due to balance issues  She feels "unsteady" when walking around the property  Objective: See treatment diary below      Assessment: Tolerated treatment well  Added balance activities, as patient complains of increased difficulties with SLB at home  Increased sway laterally noted with SLB on uneven surface  Able to ascend/descend stairs reciprocally with no UE support  Patient would benefit from continued PT to normalize gait and maximize function for independence in community  Plan: Continue per plan of care        Precautions: S/P R CARLOS 2021 (POSTERIOR HIP PRECAUTIONS), Hx R trimalleolar fx , hx breast cancer 2020      Manuals 7/8/21 7/13/21 7/15/21 7/20/21    R hip PROM        STM  R quad roller 5' R quad roller 5' R quad roller 5'    Mobility  HF/quad str S/L 3x30s ea HF/quad str S/L 3x30s ea             Neuro Re-Ed        Glute sets  10 10"10x 3sx20     Quad sets         Clamshells 20 RTB 20 RTB 2x10 RTB 2x10 RTB    Bridging   5"x10 10 10    Hip add squeeze    20 20    SLB     On foam 5x10s    Tandem walking on foam    6x10'            Ther Ex        NuStep warmup  5' L1  6' L1 5' L1 10' L1    Hip flex, abd, ext in standing  10  15x ea 15x each  15x each    Side stepping  4x10'  6x10' 6x10'  6x10'     LAQ   15 20 2x10 2#     SAQ    2x10 Heel slides 20 with SOS 20 w/ SOS 2x10 with peanut 2x10 with peanut    Mini squats  10 15 15 15    Step taps  20 total  20x 10 step ups 6"  2x10 step ups 6" for, lat    Pro stretch  5x10s hold  5x10s  5x10s  5x10s     HR   20 20    Hip flexor stretch at step   5x10s 5x10s    SLR flex    2x8            Gait Training        Stair training         Gait training  At bar no AD 3 laps At bar no AD 3 laps At bar no AD 3 laps    Modalities        Ice post  5' 5' post

## 2021-07-22 ENCOUNTER — OFFICE VISIT (OUTPATIENT)
Dept: PHYSICAL THERAPY | Facility: CLINIC | Age: 71
End: 2021-07-22
Payer: COMMERCIAL

## 2021-07-22 DIAGNOSIS — M25.551 RIGHT HIP PAIN: Primary | ICD-10-CM

## 2021-07-22 DIAGNOSIS — Z96.641 STATUS POST TOTAL HIP REPLACEMENT, RIGHT: ICD-10-CM

## 2021-07-22 PROCEDURE — 97110 THERAPEUTIC EXERCISES: CPT

## 2021-07-22 PROCEDURE — 97112 NEUROMUSCULAR REEDUCATION: CPT

## 2021-07-22 NOTE — PROGRESS NOTES
Daily Note     Today's date: 2021  Patient name: Bill Geiger  : 1950  MRN: [id number]  Referring provider: ALEXIS Smallwood  Dx:   Encounter Diagnosis     ICD-10-CM    1  Right hip pain  M25 551    2  Status post total hip replacement, right  Z96 [id number]        Start Time: 0845  Stop Time: 0930  Total time in clinic (min): 45 minutes    Subjective: Patient reports no complaints prior to session  General muscle soreness      Objective: See treatment diary below      Assessment: Tolerated treatment well  Pt able to inc her reps with certain exercises with no complaints, fatigue noted  Patient would benefit from continued PT to normalize gait and maximize function for independence in community  Plan: Continue per plan of care        Precautions: S/P R CARLOS 2021 (POSTERIOR HIP PRECAUTIONS), Hx R trimalleolar fx , hx breast cancer       Manuals 7/8/21 7/13/21 7/15/21 7/20/21 7/22/21   R hip PROM        STM  R quad roller 5' R quad roller 5' R quad roller 5' R quad roller 5'   Mobility  HF/quad str S/L 3x30s ea HF/quad str S/L 3x30s ea             Neuro Re-Ed        Glute sets  10 10"10x 3sx20     Quad sets         Clamshells 20 RTB 20 RTB 2x10 RTB 2x10 RTB 2x10 RTB   Bridging   5"x10 10 10 15   Hip add squeeze    20 20 20   SLB     On foam 5x10s Foam 5x10s   Tandem walking on foam    6x10' 6x           Ther Ex        NuStep warmup  5' L1  6' L1 5' L1 10' L1 10' L1   Hip flex, abd, ext in standing  10  15x ea 15x each  15x each 20x ea   Side stepping  4x10'  6x10' 6x10'  6x10'  6x10'   LAQ   15 20 2x10 2#  2x10 2#   SAQ    2x10     Heel slides 20 with SOS 20 w/ SOS 2x10 with peanut 2x10 with peanut 2x10 peanut   Mini squats  10 15 15 15 20   Step taps  20 total  20x 10 step ups 6"  2x10 step ups 6" for, lat 2x10 step ups 6" fwd/lat   Pro stretch  5x10s hold  5x10s  5x10s  5x10s  5x10s   HR   20 20 30   Hip flexor stretch at step   5x10s 5x10s 5x10s   SLR flex    2x8 2x           Gait Training        Stair training         Gait training  At bar no AD 3 laps At bar no AD 3 laps At bar no AD 3 laps    Modalities        Ice post  5' 5' post

## 2021-07-26 ENCOUNTER — OFFICE VISIT (OUTPATIENT)
Dept: FAMILY MEDICINE CLINIC | Facility: CLINIC | Age: 71
End: 2021-07-26
Payer: COMMERCIAL

## 2021-07-26 VITALS
HEART RATE: 84 BPM | HEIGHT: 63 IN | BODY MASS INDEX: 27.29 KG/M2 | SYSTOLIC BLOOD PRESSURE: 110 MMHG | DIASTOLIC BLOOD PRESSURE: 60 MMHG | TEMPERATURE: 97 F | RESPIRATION RATE: 18 BRPM | WEIGHT: 154 LBS | OXYGEN SATURATION: 99 %

## 2021-07-26 DIAGNOSIS — M06.9 RHEUMATOID ARTHRITIS, INVOLVING UNSPECIFIED SITE, UNSPECIFIED WHETHER RHEUMATOID FACTOR PRESENT (HCC): ICD-10-CM

## 2021-07-26 DIAGNOSIS — Z00.00 MEDICARE ANNUAL WELLNESS VISIT, SUBSEQUENT: Primary | ICD-10-CM

## 2021-07-26 DIAGNOSIS — Z13.83 SCREENING FOR CARDIOVASCULAR, RESPIRATORY, AND GENITOURINARY DISEASES: ICD-10-CM

## 2021-07-26 DIAGNOSIS — Z11.59 ENCOUNTER FOR HEPATITIS C SCREENING TEST FOR LOW RISK PATIENT: ICD-10-CM

## 2021-07-26 DIAGNOSIS — J01.90 ACUTE SINUSITIS, RECURRENCE NOT SPECIFIED, UNSPECIFIED LOCATION: ICD-10-CM

## 2021-07-26 DIAGNOSIS — Z13.89 SCREENING FOR CARDIOVASCULAR, RESPIRATORY, AND GENITOURINARY DISEASES: ICD-10-CM

## 2021-07-26 DIAGNOSIS — Z13.6 SCREENING FOR CARDIOVASCULAR, RESPIRATORY, AND GENITOURINARY DISEASES: ICD-10-CM

## 2021-07-26 PROCEDURE — 1101F PT FALLS ASSESS-DOCD LE1/YR: CPT | Performed by: FAMILY MEDICINE

## 2021-07-26 PROCEDURE — 99203 OFFICE O/P NEW LOW 30 MIN: CPT | Performed by: FAMILY MEDICINE

## 2021-07-26 PROCEDURE — 1125F AMNT PAIN NOTED PAIN PRSNT: CPT | Performed by: FAMILY MEDICINE

## 2021-07-26 PROCEDURE — 3288F FALL RISK ASSESSMENT DOCD: CPT | Performed by: FAMILY MEDICINE

## 2021-07-26 PROCEDURE — 3725F SCREEN DEPRESSION PERFORMED: CPT | Performed by: FAMILY MEDICINE

## 2021-07-26 PROCEDURE — 1170F FXNL STATUS ASSESSED: CPT | Performed by: FAMILY MEDICINE

## 2021-07-26 PROCEDURE — G0439 PPPS, SUBSEQ VISIT: HCPCS | Performed by: FAMILY MEDICINE

## 2021-07-26 RX ORDER — AMOXICILLIN 875 MG/1
875 TABLET, COATED ORAL 2 TIMES DAILY
Qty: 14 TABLET | Refills: 0 | Status: SHIPPED | OUTPATIENT
Start: 2021-07-26 | End: 2021-08-02

## 2021-07-26 NOTE — PATIENT INSTRUCTIONS
Medicare Preventive Visit Patient Instructions  Thank you for completing your Welcome to Medicare Visit or Medicare Annual Wellness Visit today  Your next wellness visit will be due in one year (7/27/2022)  The screening/preventive services that you may require over the next 5-10 years are detailed below  Some tests may not apply to you based off risk factors and/or age  Screening tests ordered at today's visit but not completed yet may show as past due  Also, please note that scanned in results may not display below  Preventive Screenings:  Service Recommendations Previous Testing/Comments   Colorectal Cancer Screening  * Colonoscopy    * Fecal Occult Blood Test (FOBT)/Fecal Immunochemical Test (FIT)  * Fecal DNA/Cologuard Test  * Flexible Sigmoidoscopy Age: 54-65 years old   Colonoscopy: every 10 years (may be performed more frequently if at higher risk)  OR  FOBT/FIT: every 1 year  OR  Cologuard: every 3 years  OR  Sigmoidoscopy: every 5 years  Screening may be recommended earlier than age 48 if at higher risk for colorectal cancer  Also, an individualized decision between you and your healthcare provider will decide whether screening between the ages of 74-80 would be appropriate  Colonoscopy: Not on file  FOBT/FIT: Not on file  Cologuard: Not on file  Sigmoidoscopy: Not on file          Breast Cancer Screening Age: 36 years old  Frequency: every 1-2 years  Not required if history of left and right mastectomy Mammogram: Not on file    History Breast Cancer   Cervical Cancer Screening Between the ages of 21-29, pap smear recommended once every 3 years  Between the ages of 33-67, can perform pap smear with HPV co-testing every 5 years     Recommendations may differ for women with a history of total hysterectomy, cervical cancer, or abnormal pap smears in past  Pap Smear: Not on file    Screening Not Indicated   Hepatitis C Screening Once for adults born between 1945 and 1965  More frequently in patients at high risk for Hepatitis C Hep C Antibody: Not on file        Diabetes Screening 1-2 times per year if you're at risk for diabetes or have pre-diabetes Fasting glucose: No results in last 5 years   A1C: No results in last 5 years        Cholesterol Screening Once every 5 years if you don't have a lipid disorder  May order more often based on risk factors  Lipid panel: Not on file          Other Preventive Screenings Covered by Medicare:  1  Abdominal Aortic Aneurysm (AAA) Screening: covered once if your at risk  You're considered to be at risk if you have a family history of AAA  2  Lung Cancer Screening: covers low dose CT scan once per year if you meet all of the following conditions: (1) Age 50-69; (2) No signs or symptoms of lung cancer; (3) Current smoker or have quit smoking within the last 15 years; (4) You have a tobacco smoking history of at least 30 pack years (packs per day multiplied by number of years you smoked); (5) You get a written order from a healthcare provider  3  Glaucoma Screening: covered annually if you're considered high risk: (1) You have diabetes OR (2) Family history of glaucoma OR (3)  aged 48 and older OR (3)  American aged 72 and older  3  Osteoporosis Screening: covered every 2 years if you meet one of the following conditions: (1) You're estrogen deficient and at risk for osteoporosis based off medical history and other findings; (2) Have a vertebral abnormality; (3) On glucocorticoid therapy for more than 3 months; (4) Have primary hyperparathyroidism; (5) On osteoporosis medications and need to assess response to drug therapy  · Last bone density test (DXA Scan): Not on file  5  HIV Screening: covered annually if you're between the age of 12-76  Also covered annually if you are younger than 13 and older than 72 with risk factors for HIV infection  For pregnant patients, it is covered up to 3 times per pregnancy      Immunizations:  Immunization Recommendations   Influenza Vaccine Annual influenza vaccination during flu season is recommended for all persons aged >= 6 months who do not have contraindications   Pneumococcal Vaccine (Prevnar and Pneumovax)  * Prevnar = PCV13  * Pneumovax = PPSV23   Adults 25-60 years old: 1-3 doses may be recommended based on certain risk factors  Adults 72 years old: Prevnar (PCV13) vaccine recommended followed by Pneumovax (PPSV23) vaccine  If already received PPSV23 since turning 65, then PCV13 recommended at least one year after PPSV23 dose  Hepatitis B Vaccine 3 dose series if at intermediate or high risk (ex: diabetes, end stage renal disease, liver disease)   Tetanus (Td) Vaccine - COST NOT COVERED BY MEDICARE PART B Following completion of primary series, a booster dose should be given every 10 years to maintain immunity against tetanus  Td may also be given as tetanus wound prophylaxis  Tdap Vaccine - COST NOT COVERED BY MEDICARE PART B Recommended at least once for all adults  For pregnant patients, recommended with each pregnancy  Shingles Vaccine (Shingrix) - COST NOT COVERED BY MEDICARE PART B  2 shot series recommended in those aged 48 and above     Health Maintenance Due:      Topic Date Due    Hepatitis C Screening  Never done    Breast Cancer Screening: Mammogram  Never done    Colorectal Cancer Screening  Never done     Immunizations Due:      Topic Date Due    DTaP,Tdap,and Td Vaccines (1 - Tdap) Never done    Pneumococcal Vaccine: 65+ Years (1 of 1 - PPSV23) Never done    Influenza Vaccine (1) 09/01/2021     Advance Directives   What are advance directives? Advance directives are legal documents that state your wishes and plans for medical care  These plans are made ahead of time in case you lose your ability to make decisions for yourself  Advance directives can apply to any medical decision, such as the treatments you want, and if you want to donate organs     What are the types of advance directives? There are many types of advance directives, and each state has rules about how to use them  You may choose a combination of any of the following:  · Living will: This is a written record of the treatment you want  You can also choose which treatments you do not want, which to limit, and which to stop at a certain time  This includes surgery, medicine, IV fluid, and tube feedings  · Durable power of  for healthcare [facility]): This is a written record that states who you want to make healthcare choices for you when you are unable to make them for yourself  This person, called a proxy, is usually a family member or a friend  You may choose more than 1 proxy  · Do not resuscitate (DNR) order:  A DNR order is used in case your heart stops beating or you stop breathing  It is a request not to have certain forms of treatment, such as CPR  A DNR order may be included in other types of advance directives  · Medical directive: This covers the care that you want if you are in a coma, near death, or unable to make decisions for yourself  You can list the treatments you want for each condition  Treatment may include pain medicine, surgery, blood transfusions, dialysis, IV or tube feedings, and a ventilator (breathing machine)  · Values history: This document has questions about your views, beliefs, and how you feel and think about life  This information can help others choose the care that you would choose  Why are advance directives important? An advance directive helps you control your care  Although spoken wishes may be used, it is better to have your wishes written down  Spoken wishes can be misunderstood, or not followed  Treatments may be given even if you do not want them  An advance directive may make it easier for your family to make difficult choices about your care  Urinary Incontinence   Urinary incontinence (UI)  is when you lose control of your bladder   UI develops because your bladder cannot store or empty urine properly  The 3 most common types of UI are stress incontinence, urge incontinence, or both  Medicines:   · May be given to help strengthen your bladder control  Report any side effects of medication to your healthcare provider  Do pelvic muscle exercises often:  Your pelvic muscles help you stop urinating  Squeeze these muscles tight for 5 seconds, then relax for 5 seconds  Gradually work up to squeezing for 10 seconds  Do 3 sets of 15 repetitions a day, or as directed  This will help strengthen your pelvic muscles and improve bladder control  Train your bladder:  Go to the bathroom at set times, such as every 2 hours, even if you do not feel the urge to go  You can also try to hold your urine when you feel the urge to go  For example, hold your urine for 5 minutes when you feel the urge to go  As that becomes easier, hold your urine for 10 minutes  Self-care:   · Keep a UI record  Write down how often you leak urine and how much you leak  Make a note of what you were doing when you leaked urine  · Drink liquids as directed  You may need to limit the amount of liquid you drink to help control your urine leakage  Do not drink any liquid right before you go to bed  Limit or do not have drinks that contain caffeine or alcohol  · Prevent constipation  Eat a variety of high-fiber foods  Good examples are high-fiber cereals, beans, vegetables, and whole-grain breads  Walking is the best way to trigger your intestines to have a bowel movement  · Exercise regularly and maintain a healthy weight  Weight loss and exercise will decrease pressure on your bladder and help you control your leakage  · Use a catheter as directed  to help empty your bladder  A catheter is a tiny, plastic tube that is put into your bladder to drain your urine  · Go to behavior therapy as directed  Behavior therapy may be used to help you learn to control your urge to urinate      Weight Management Why it is important to manage your weight:  Being overweight increases your risk of health conditions such as heart disease, high blood pressure, type 2 diabetes, and certain types of cancer  It can also increase your risk for osteoarthritis, sleep apnea, and other respiratory problems  Aim for a slow, steady weight loss  Even a small amount of weight loss can lower your risk of health problems  How to lose weight safely:  A safe and healthy way to lose weight is to eat fewer calories and get regular exercise  You can lose up about 1 pound a week by decreasing the number of calories you eat by 500 calories each day  Healthy meal plan for weight management:  A healthy meal plan includes a variety of foods, contains fewer calories, and helps you stay healthy  A healthy meal plan includes the following:  · Eat whole-grain foods more often  A healthy meal plan should contain fiber  Fiber is the part of grains, fruits, and vegetables that is not broken down by your body  Whole-grain foods are healthy and provide extra fiber in your diet  Some examples of whole-grain foods are whole-wheat breads and pastas, oatmeal, brown rice, and bulgur  · Eat a variety of vegetables every day  Include dark, leafy greens such as spinach, kale, radha greens, and mustard greens  Eat yellow and orange vegetables such as carrots, sweet potatoes, and winter squash  · Eat a variety of fruits every day  Choose fresh or canned fruit (canned in its own juice or light syrup) instead of juice  Fruit juice has very little or no fiber  · Eat low-fat dairy foods  Drink fat-free (skim) milk or 1% milk  Eat fat-free yogurt and low-fat cottage cheese  Try low-fat cheeses such as mozzarella and other reduced-fat cheeses  · Choose meat and other protein foods that are low in fat  Choose beans or other legumes such as split peas or lentils  Choose fish, skinless poultry (chicken or turkey), or lean cuts of red meat (beef or pork)   Before you cook meat or poultry, cut off any visible fat  · Use less fat and oil  Try baking foods instead of frying them  Add less fat, such as margarine, sour cream, regular salad dressing and mayonnaise to foods  Eat fewer high-fat foods  Some examples of high-fat foods include french fries, doughnuts, ice cream, and cakes  · Eat fewer sweets  Limit foods and drinks that are high in sugar  This includes candy, cookies, regular soda, and sweetened drinks  Exercise:  Exercise at least 30 minutes per day on most days of the week  Some examples of exercise include walking, biking, dancing, and swimming  You can also fit in more physical activity by taking the stairs instead of the elevator or parking farther away from stores  Ask your healthcare provider about the best exercise plan for you  © Copyright [employer] 2018 Information is for End User's use only and may not be sold, redistributed or otherwise used for commercial purposes   All illustrations and images included in CareNotes® are the copyrighted property of [employer] M [employer]  or [address] [place] [address]

## 2021-07-26 NOTE — PROGRESS NOTES
Assessment and Plan:     Problem List Items Addressed This Visit        Musculoskeletal and Integument    Rheumatoid arthritis, involving unspecified site, unspecified whether rheumatoid factor present ([place] [address] )      Other Visit Diagnoses     Medicare annual wellness visit, subsequent    -  Primary    Encounter for hepatitis C screening test for low risk patient        Relevant Orders    Hepatitis C antibody    Screening for cardiovascular, respiratory, and genitourinary diseases        Relevant Orders    Lipid Panel with Direct LDL reflex    Acute sinusitis, recurrence not specified, unspecified location        Relevant Medications    amoxicillin (AMOXIL) 875 mg tablet        BMI Counseling: Body mass index is 27 28 kg/m²  The BMI is above normal  Nutrition recommendations include decreasing portion sizes, encouraging healthy choices of fruits and vegetables, decreasing fast food intake, consuming healthier snacks and limiting drinks that contain sugar  Exercise recommendations include moderate physical activity 150 minutes/week  Preventive health issues were discussed with patient, and age appropriate screening tests were ordered as noted in patient's After Visit Summary  Personalized health advice and appropriate referrals for health education or preventive services given if needed, as noted in patient's After Visit Summary       History of Present Illness:     Patient presents for Medicare Annual Wellness visit    Patient Care Team:  Amira Scott MD (Radiation Oncology)     Problem List:     Patient Active Problem List   Diagnosis    Invasive ductal carcinoma of left breast ([place] [address] )    Rheumatoid arthritis, involving unspecified site, unspecified whether rheumatoid factor present [facility])      Past Medical and Surgical History:     Past Medical History:   Diagnosis Date    Breast cancer ([place] [address] ) 2020    Left  breast, triple negative    Rheumatoid arthritis ([place] [address] )      Past Surgical History:   Procedure Laterality Date    ANKLE SURGERY Right 2016    2 plates and screws    BREAST BIOPSY Left 07/02/2020    BREAST LUMPECTOMY Left 08/12/2020    left breast wire localized lumpectomy    BREAST MASS EXCISION Left 09/02/2020    short superior and long lateral left breast resection, left axillary sentinel lymph node biopsy    ELBOW FRACTURE REPAIR Right 1996    HYSTERECTOMY  2018      Family History:     Family History   Problem Relation Age of Onset    Breast cancer Mother     Lung cancer Father     Breast cancer Paternal Grandmother       Social History:     Social History     Socioeconomic History    Marital status: /Civil Union     Spouse name: None    Number of children: None    Years of education: None    Highest education level: None   Occupational History    None   Tobacco Use    Smoking status: Never Smoker    Smokeless tobacco: Never Used   Vaping Use    Vaping Use: Never used   Substance and Sexual Activity    Alcohol use: Not Currently    Drug use: Never    Sexual activity: None   Other Topics Concern    None   Social History Narrative    None     Social Determinants of Health     Financial Resource Strain:     Difficulty of Paying Living Expenses:    Food Insecurity:     Worried About Running Out of Food in the Last Year:     Ran Out of Food in the Last Year:    Transportation Needs:     Lack of Transportation (Medical):      Lack of Transportation (Non-Medical):    Physical Activity:     Days of Exercise per Week:     Minutes of Exercise per Session:    Stress:     Feeling of Stress :    Social Connections:     Frequency of Communication with Friends and Family:     Frequency of Social Gatherings with Friends and Family:     Attends Worship Services:     Active Member of Clubs or Organizations:     Attends Club or Organization Meetings:     Marital Status:    Intimate Partner Violence:     Fear of Current or Ex-Partner:     Emotionally Abused:     Physically Abused:     Sexually Abused:       Medications and Allergies:     Current Outpatient Medications   Medication Sig Dispense Refill    losartan (COZAAR) 25 mg tablet Take 25 mg by mouth daily       omeprazole (PriLOSEC) 40 MG capsule Take 40 mg by mouth daily       amoxicillin (AMOXIL) 875 mg tablet Take 1 tablet (875 mg total) by mouth 2 (two) times a day for 7 days 14 tablet 0     No current facility-administered medications for this visit  Allergies   Allergen Reactions    Avelox [Moxifloxacin] Itching    Fruit Extracts Itching     Cantelope melon     Nuts - Food Allergy Itching      Immunizations:     Immunization History   Administered Date(s) Administered    SARS-CoV-2 / COVID-19 mRNA IM (Moderna) 02/13/2021, 03/13/2021      Health Maintenance:         Topic Date Due    Hepatitis C Screening  Never done    Colorectal Cancer Screening  Never done    Breast Cancer Screening: Mammogram  10/16/2018         Topic Date Due    DTaP,Tdap,and Td Vaccines (1 - Tdap) Never done    Pneumococcal Vaccine: 65+ Years (1 of 1 - PPSV23) Never done    Influenza Vaccine (1) 09/01/2021      Medicare Health Risk Assessment:     /60   Pulse 84   Temp (!) 97 °F (36 1 °C)   Resp 18   Ht 5' 3" (1 6 m)   Wt 69 9 kg (154 lb)   SpO2 99%   BMI 27 28 kg/m²      Caroline Pitts is here for her Subsequent Wellness visit  Health Risk Assessment:   Patient rates overall health as very good  Patient feels that their physical health rating is slightly better  Patient is very satisfied with their life  Eyesight was rated as same  Hearing was rated as same  Patient feels that their emotional and mental health rating is same  Patients states they are never, rarely angry  Patient states they are sometimes unusually tired/fatigued  Pain experienced in the last 7 days has been none  Patient states that she has experienced no weight loss or gain in last 6 months       Depression Screening:   PHQ-2 Score: 0      Fall Risk Screening: In the past year, patient has experienced: no history of falling in past year      Urinary Incontinence Screening:   Patient has leaked urine accidently in the last six months  Home Safety:  Patient does not have trouble with stairs inside or outside of their home  Patient has working smoke alarms and has working carbon monoxide detector  Home safety hazards include: none  Nutrition:   Current diet is Regular  Medications:   Patient is not currently taking any over-the-counter supplements  Patient is able to manage medications  Activities of Daily Living (ADLs)/Instrumental Activities of Daily Living (IADLs):   Walk and transfer into and out of bed and chair?: Yes  Dress and groom yourself?: Yes    Bathe or shower yourself?: Yes    Feed yourself?  Yes  Do your laundry/housekeeping?: Yes  Manage your money, pay your bills and track your expenses?: Yes  Make your own meals?: Yes    Do your own shopping?: Yes    Previous Hospitalizations:   Any hospitalizations or ED visits within the last 12 months?: Yes    How many hospitalizations have you had in the last year?: 1-2    Advance Care Planning:   Living will: No    Durable POA for healthcare: No    Advanced directive: No      PREVENTIVE SCREENINGS      Cardiovascular Screening:    General: Risks and Benefits Discussed      Diabetes Screening:     General: Screening Current      Colorectal Cancer Screening:     General: Screening Current      Breast Cancer Screening:     General: History Breast Cancer      Cervical Cancer Screening:    General: Screening Not Indicated      Osteoporosis Screening:    General: Screening Current and Risks and Benefits Discussed      Abdominal Aortic Aneurysm (AAA) Screening:        General: Screening Not Indicated      Lung Cancer Screening:     General: Screening Not Indicated      Hepatitis C Screening:    General: Risks and Benefits Discussed    Hep C Screening Accepted: Yes      Screening, Brief Intervention, and Referral to Treatment (SBIRT)    Screening  Typical number of drinks in a day: 0  Typical number of drinks in a week: 0  Interpretation: Low risk drinking behavior  Single Item Drug Screening:  How often have you used an illegal drug (including marijuana) or a prescription medication for non-medical reasons in the past year? never    Single Item Drug Screen Score: 0  Interpretation: Negative screen for possible drug use disorder    Brief Intervention  Alcohol & drug use screenings were reviewed  No concerns regarding substance use disorder identified         Luis Felpie Ruggiero MD

## 2021-07-26 NOTE — PROGRESS NOTES
Assessment/Plan:       Diagnoses and all orders for this visit:    Rheumatoid arthritis, involving unspecified site, unspecified whether rheumatoid factor present ([facility] )  Comments:  managed by rheumatologist    Acute sinusitis, recurrence not specified, unspecified location  Tonsil stone  -     amoxicillin (AMOXIL) 875 mg tablet; Take 1 tablet (875 mg total) by mouth 2 (two) times a day for 7 days  - Advise use of antihistamine    - Follow up with ENT if symptoms worsen or fail to improve  Subjective:      Patient ID: Jazmyn Luis is a 79 y o  female  Sinus Problem  This is a new problem  The current episode started in the past 7 days  The problem has been gradually worsening since onset  There has been no fever  Associated symptoms include chills, coughing, ear pain (left), neck pain, sinus pressure, a sore throat and swollen glands  Pertinent negatives include no congestion, diaphoresis, headaches, hoarse voice, shortness of breath or sneezing  The following portions of the patient's history were reviewed and updated as appropriate: allergies, current medications, past family history, past medical history, past social history, past surgical history and problem list     Review of Systems   Constitutional: Positive for chills  Negative for diaphoresis  HENT: Positive for ear pain (left), sinus pressure and sore throat  Negative for congestion, hoarse voice and sneezing  Eyes: Negative  Respiratory: Positive for cough  Negative for shortness of breath  Cardiovascular: Negative  Gastrointestinal: Negative  Endocrine: Negative  Genitourinary: Negative  Musculoskeletal: Positive for arthralgias and neck pain  Skin: Negative  Allergic/Immunologic: Negative  Neurological: Negative  Negative for headaches  Hematological: Negative  Psychiatric/Behavioral: Negative            Objective:      /60   Pulse 84   Temp (!) 97 °F (36 1 °C)   Resp 18   Ht 5' 3" (1 6 m) Wt 69 9 kg (154 lb)   SpO2 99%   BMI 27 28 kg/m²          Physical Exam  Constitutional:       General: She is not in acute distress  Appearance: She is well-developed  She is not diaphoretic  HENT:      Head: Normocephalic and atraumatic  Right Ear: Tympanic membrane, ear canal and external ear normal       Left Ear: Tympanic membrane, ear canal and external ear normal       Nose: Nose normal       Mouth/Throat:      Mouth: Mucous membranes are moist    Eyes:      General: No scleral icterus  Right eye: No discharge  Left eye: No discharge  Cardiovascular:      Rate and Rhythm: Normal rate and regular rhythm  Heart sounds: Normal heart sounds  No murmur heard  No friction rub  No gallop  Pulmonary:      Effort: Pulmonary effort is normal  No respiratory distress  Breath sounds: Normal breath sounds  No wheezing or rales  Chest:      Chest wall: No tenderness  Musculoskeletal:         General: No deformity  Normal range of motion  Cervical back: Normal range of motion and neck supple  Skin:     General: Skin is warm and dry  Neurological:      Mental Status: She is alert and oriented to person, place, and time  Psychiatric:         Behavior: Behavior normal          Thought Content:  Thought content normal          Judgment: Judgment normal

## 2021-07-27 ENCOUNTER — OFFICE VISIT (OUTPATIENT)
Dept: PHYSICAL THERAPY | Facility: CLINIC | Age: 71
End: 2021-07-27
Payer: COMMERCIAL

## 2021-07-27 DIAGNOSIS — Z96.641 STATUS POST TOTAL HIP REPLACEMENT, RIGHT: ICD-10-CM

## 2021-07-27 DIAGNOSIS — M25.551 RIGHT HIP PAIN: Primary | ICD-10-CM

## 2021-07-27 PROCEDURE — 97112 NEUROMUSCULAR REEDUCATION: CPT

## 2021-07-27 PROCEDURE — 97110 THERAPEUTIC EXERCISES: CPT

## 2021-07-27 NOTE — PROGRESS NOTES
Daily Note     Today's date: 2021  Patient name: Ma Schirmer  : 1950  MRN: [id number]  Referring provider: Valentina Babinski, A*  Dx:   Encounter Diagnosis     ICD-10-CM    1  Right hip pain  M25 551    2  Status post total hip replacement, right  Z96 [id number]        Start Time: 0845  Stop Time: 930  Total time in clinic (min): 45 minutes    Subjective: Patient states that she is having a difficult time picking objects off the floor due to tightness of her R anterior hip musculature  After sleeping last night, she has posterior R hip pain today 4/10  She is able to ambulate around the house without her cane, but notes that when she is tired, she has to use the cane again  Objective: See treatment diary below      Assessment: Tolerated treatment well  Due to lumbar spine pain today, patient provided with lumbar flexion stretch to help decrease pain  Patient denies pain post tx  Fatigues quickly due to R iliopsoas weakness  R quad lag present during SLR  Focus on R quadriceps activation next visit to prevent further quad lag  Patient would benefit from continued PT to normalize gait and maximize function for independence in community  Plan: Continue per plan of care        Precautions: S/P R CARLOS 2021 (POSTERIOR HIP PRECAUTIONS), Hx R trimalleolar fx , hx breast cancer 2020      Manuals 7/13/21 7/15/21 7/20/21 7/22/21 7/27/21   R hip PROM        STM R quad roller 5' R quad roller 5' R quad roller 5' R quad roller 5' R quad roller 5'   Mobility HF/quad str S/L 3x30s ea HF/quad str S/L 3x30s ea              Neuro Re-Ed        Glute sets  10"10x 3sx20      Quad sets         Clamshells 20 RTB 2x10 RTB 2x10 RTB 2x10 RTB 20 GTB   Bridging  5"x10 10 10 15 15   Hip add squeeze   20 20 20 20   SLB    On foam 5x10s Foam 5x10s Foam 5x10s   Tandem walking on foam   6x10' 6x 6x10'           Ther Ex        NuStep warmup  6' L1 5' L1 10' L1 10' L1 10' L1   Hip flex, abd, ext in standing  15x ea 15x each  15x each 20x ea 20x ea   Side stepping  6x10' 6x10'  6x10'  6x10' 6x10'   LAQ  15 20 2x10 2#  2x10 2# 2x10 2#   SAQ   2x10      Heel slides 20 w/ SOS 2x10 with peanut 2x10 with peanut 2x10 peanut 2x10 peanut   Mini squats  15 15 15 20 20   Step taps  20x 10 step ups 6"  2x10 step ups 6" for, lat 2x10 step ups 6" fwd/lat 2x10 step ups 6" fwd/lat   Pro stretch  5x10s  5x10s  5x10s  5x10s 5x10s   HR  20 20 30 30   Hip flexor stretch at step  5x10s 5x10s 5x10s 5x10s   SLR flex   2x8 2x8    Lumbar flexion with pball     3sx10   Gait Training        Stair training         Gait training At bar no AD 3 laps At bar no AD 3 laps At bar no AD 3 laps     Modalities        Ice post  5' post

## 2021-07-29 ENCOUNTER — TELEPHONE (OUTPATIENT)
Dept: ADMINISTRATIVE | Facility: OTHER | Age: 71
End: 2021-07-29

## 2021-07-29 ENCOUNTER — OFFICE VISIT (OUTPATIENT)
Dept: PHYSICAL THERAPY | Facility: CLINIC | Age: 71
End: 2021-07-29
Payer: COMMERCIAL

## 2021-07-29 DIAGNOSIS — Z96.641 STATUS POST TOTAL HIP REPLACEMENT, RIGHT: ICD-10-CM

## 2021-07-29 DIAGNOSIS — M25.551 RIGHT HIP PAIN: Primary | ICD-10-CM

## 2021-07-29 PROCEDURE — 97110 THERAPEUTIC EXERCISES: CPT

## 2021-07-29 NOTE — LETTER
Procedure Request Form: Colonoscopy       Date Requested: 21  Patient: Sosa Sanchez  Patient : 1950   Referring Provider: No primary care provider on file  - performing provider Dr Mariam Melton        Date of Procedure ______________________________       The above patient has informed us that they have completed their   most recent Colonoscopy at your facility  Please complete   this form and attach all corresponding procedure reports/results  Comments __________________________________________________________  ____________________________________________________________________  ____________________________________________________________________  ____________________________________________________________________    Facility Completing Procedure _________________________________________    Form Completed By (print name) _______________________________________      Signature __________________________________________________________      These reports are needed for  compliance    Please fax this completed form and a copy of the procedure report to our office located at [place] [address] as soon as possible to [phone] attention Sherel Cooks: Phone [phone]    We thank you for your assistance in treating our mutual patient

## 2021-07-29 NOTE — LETTER
Procedure Request Form: Colonoscopy      Date Requested: 21  Patient: Rich Dennison  Patient : 1950   Referring Provider: No primary care provider on file  Date of Procedure ______________________________       The above patient has informed us that they have completed their   most recent Colonoscopy at your facility  Please complete   this form and attach all corresponding procedure reports/results  Comments __________________________________________________________  ____________________________________________________________________  ____________________________________________________________________  ____________________________________________________________________    Facility Completing Procedure _________________________________________    Form Completed By (print name) _______________________________________      Signature __________________________________________________________      These reports are needed for  compliance    Please fax this completed form and a copy of the procedure report to our office located at [place] [address] as soon as possible to [phone] attention Darrall Patches: Phone [phone]    We thank you for your assistance in treating our mutual patient

## 2021-07-29 NOTE — TELEPHONE ENCOUNTER
Upon review of the In Basket request and the patient's chart, initial outreach has been made via fax, please see Contacts section for details       Thank you  Amaya Benitez MA

## 2021-07-29 NOTE — TELEPHONE ENCOUNTER
----- Message from Janette Garcia MD sent at 7/26/2021  4:10 PM EDT -----  07/26/21 4:10 PM    Hello, our patient Paul Conde has had CRC: Colonoscopy completed/performed  Please assist in updating the patient chart by making an External outreach to Dr Good Kerr at [facility] located in [place], [place]  The date of service is approx 2018      Thank you,  Janette Garcia MD  [facility]

## 2021-07-29 NOTE — PROGRESS NOTES
Daily Note     Today's date: 2021  Patient name: Caterina Queen  : 1950  MRN: [id number]  Referring provider: ALEXIS Nichole  Dx:   Encounter Diagnosis     ICD-10-CM    1  Right hip pain  M25 551    2  Status post total hip replacement, right  Z96 [id number]        Start Time: 845  Stop Time: 930  Total time in clinic (min): 45 minutes    Subjective: Patient denies pain in her R hip today  She reports her soreness is much improved since starting PT  Objective: See treatment diary below      Assessment: Tolerated treatment well  Patient demonstrates improvements in R hip strength and overall function  Patient is fatigued post tx, but is able to ambulate without AD throughout clinic  Provided R gluteus medius strengthening at home to maximize strength of musculature  Patient to see MD tomorrow for 6 week follow up  Progressing well  Patient would benefit from continued PT to maximize strength for independence in community  Plan: Continue per plan of care        Precautions: S/P R CARLOS 2021 (POSTERIOR HIP PRECAUTIONS), Hx R trimalleolar fx , hx breast cancer       Manuals 7/15/21 7/20/21 7/22/21 7/27/21 7/29/21   R hip PROM        STM R quad roller 5' R quad roller 5' R quad roller 5' R quad roller 5' R quad roller 5'   Mobility HF/quad str S/L 3x30s ea               Neuro Re-Ed        [employer]         Clamshells 2x10 RTB 2x10 RTB 2x10 RTB 20 GTB 20 GTB   Bridging  10 10 15 15 15   Hip add squeeze  20 20 20 20 20   SLB   On foam 5x10s Foam 5x10s Foam 5x10s Foam 5x10s   Tandem walking on foam  6x10' 6x 6x10' 6x10'           Ther Ex        NuStep warmup  5' L1 10' L1 10' L1 10' L1 10' L1   Hip flex, abd, ext in standing  15x each  15x each 20x ea 20x ea 20 each    Side stepping  6x10'  6x10'  6x10' 6x10' 6x10'   LAQ  20 2x10 2#  2x10 2# 2x10 2# 2x10 2 5#   SAQ  2x10    2x10   Heel slides 2x10 with peanut 2x10 with peanut 2x10 peanut 2x10 peanut 2x10 peanut Mini squats  15 15 20 20 20   Step taps  10 step ups 6"  2x10 step ups 6" for, lat 2x10 step ups 6" fwd/lat 2x10 step ups 6" fwd/lat 2x10 step ups 6" fwd/lat   Pro stretch  5x10s  5x10s  5x10s 5x10s 5x10s   HR 20 20 30 30 30   Hip flexor stretch at step 5x10s 5x10s 5x10s 5x10s 5x10s   SLR flex  2x8 2x8     Lumbar flexion with pball    3sx10    Gait Training        Stair training         Gait training At bar no AD 3 laps At bar no AD 3 laps   2x100'    Modalities        Ice post

## 2021-08-03 ENCOUNTER — OFFICE VISIT (OUTPATIENT)
Dept: PHYSICAL THERAPY | Facility: CLINIC | Age: 71
End: 2021-08-03
Payer: COMMERCIAL

## 2021-08-03 DIAGNOSIS — Z96.641 STATUS POST TOTAL HIP REPLACEMENT, RIGHT: ICD-10-CM

## 2021-08-03 DIAGNOSIS — M25.551 RIGHT HIP PAIN: Primary | ICD-10-CM

## 2021-08-03 PROCEDURE — 97110 THERAPEUTIC EXERCISES: CPT

## 2021-08-03 NOTE — PROGRESS NOTES
Daily Note     Today's date: 8/3/2021  Patient name: Phillip Page  : 1950  MRN: [id number]  Referring provider: ALEXIS Herrera  Dx:   Encounter Diagnosis     ICD-10-CM    1  Right hip pain  M25 551    2  Status post total hip replacement, right  Z96 641        Start Time: 1015  Stop Time: 1100  Total time in clinic (min): 45 minutes    Subjective: Patient reports increased soreness of her R hip today [place]  She reports she would be able to walk without the cane if she did not have the dizziness  Objective: See treatment diary below      Assessment: Tolerated treatment well  Anterior R hip pain and weakness noted post tx  She had VOR screen performed by Mary Menjivar, PT, DPT due tp increased dizziness  Provided with VOR exercises to help dissipate dizziness  No pain noted post tx  Patient would benefit from continued PT to normalize gait and maximize function for independence in community  Plan: Continue per plan of care        Precautions: S/P R CARLOS 2021 (POSTERIOR HIP PRECAUTIONS), Hx R trimalleolar fx , hx breast cancer 2020      Manuals 7/20/21 7/22/21 7/27/21 7/29/21 8/3/21   R hip PROM        STM R quad roller 5' R quad roller 5' R quad roller 5' R quad roller 5'    Mobility                Neuro Re-Ed        [employer] sets         Clamshells 2x10 RTB 2x10 RTB 20 GTB 20 GTB 20 GTB   Bridging  10 15 15 15 15   Hip add squeeze  20 20 20 20 20   SLB  On foam 5x10s Foam 5x10s Foam 5x10s Foam 5x10s Foam 5x10s   Tandem walking on foam 6x10' 6x 6x10' 6x10' 6x10'           Ther Ex        NuStep warmup  10' L1 10' L1 10' L1 10' L1 10' L1   Hip flex, abd, ext in standing  15x each 20x ea 20x ea 20 each     Side stepping  6x10'  6x10' 6x10' 6x10' 6x10'   LAQ  2x10 2#  2x10 2# 2x10 2# 2x10 2 5#    SAQ     2x10 2x10   Heel slides 2x10 with peanut 2x10 peanut 2x10 peanut 2x10 peanut 2x10 peanut   Mini squats  15 20 20 20 20   Step taps  2x10 step ups 6" for, lat 2x10 step ups 6" fwd/lat 2x10 step ups 6" fwd/lat 2x10 step ups 6" fwd/lat 2x10 step ups 6" fwd/lat   Pro stretch  5x10s  5x10s 5x10s 5x10s 5x10s   HR 20 30 30 30 30   Hip flexor stretch at step 5x10s 5x10s 5x10s 5x10s 5x10s   SLR flex 2x8 2x8      Lumbar flexion with pball   3sx10     Gait Training        Stair training         Gait training At bar no AD 3 laps   2x100'     Modalities        Ice post

## 2021-08-05 ENCOUNTER — EVALUATION (OUTPATIENT)
Dept: PHYSICAL THERAPY | Facility: CLINIC | Age: 71
End: 2021-08-05
Payer: COMMERCIAL

## 2021-08-05 DIAGNOSIS — M25.551 RIGHT HIP PAIN: Primary | ICD-10-CM

## 2021-08-05 DIAGNOSIS — Z96.641 STATUS POST TOTAL HIP REPLACEMENT, RIGHT: ICD-10-CM

## 2021-08-05 PROCEDURE — 97110 THERAPEUTIC EXERCISES: CPT

## 2021-08-05 PROCEDURE — 97112 NEUROMUSCULAR REEDUCATION: CPT

## 2021-08-05 NOTE — LETTER
2021    Hussein Westfall, [address]    Patient: Laquita Zuulaga   YOB: 1950   Date of Visit: 2021     Encounter Diagnosis     ICD-10-CM    1  Right hip pain  M25 551    2  Status post total hip replacement, right  Z96 641        Dear Dr Drew Jackson: Thank you for your recent referral of Laquita Zuluaga  Please review the attached evaluation summary from [facility] recent visit  Please verify that you agree with the plan of care by signing the attached order  If you have any questions or concerns, please do not hesitate to call  I sincerely appreciate the opportunity to share in the care of one of your patients and hope to have another opportunity to work with you in the near future  Sincerely,    Wang Eastman, PT      Referring Provider:      I certify that I have read the below Plan of Care and certify the need for these services furnished under this plan of treatment while under my care  Hussein Westfall MD  [address] [place] [address]  Via Fax: [phone]          PT Re-Evaluation     Today's date: 2021  Patient name: Laquita Zuluaga  : 1950  MRN: [id number]  Referring provider: ALEXIS Alfaro  Dx:   Encounter Diagnosis     ICD-10-CM    1  Right hip pain  M25 551    2  Status post total hip replacement, right  Z96 641        Start Time: 930  Stop Time: 1015  Total time in clinic (min): 45 minutes    Assessment  Assessment details: Laquita Zuluaga is a 79y o  year old female reports to physical therapy with symptoms consistent with referring diagnosis of: Right hip pain  (primary encounter diagnosis), Status post total hip replacement, right on 2021  Patient demonstrates significant improvements in her R hip ROM, strength, and overall function since starting PT intervention   She continues to ambulate with Trendelenburg gait pattern due to weakness of R gluteus medius musculature  She is ambulating around with a quad cane at this time for balance confidence  Patient demonstrates limitations in R hip ROM, strength, and functional mobility  Patient is limited in the following functional activities: walking and standing greater than 30 minutes, ascending/descending stairs reciprocally, lifting objects off the floor, performing house chores, and performing self ADLs  Patient requires skilled physical therapy to restore prior level of function, address functional limitations, and progress towards independence with home exercise program  Patient was educated on HEP as noted on flow sheet, precautions associated with posterior R CARLOS, and DVT/infection prevention  Patient verbalized and demonstrated understanding of HEP and plan of care  Symptom irritability: low  Goals  STGs to be achieved in 4 weeks  -STG 1: Patient will be independent with HEP  -MET  -STG 2: Patient will have 0/10 R hip pain at rest  -MET  -STG 3: Patient will increase R hip ROM to within normal limits  -MET  -STG 4: Patient will report 40% functional improvement  -MET  -STG 5: Patient will demonstrate 1/2 grade MMT improvement in R hip  -MET   -STG 6: Patient will be able to walk for greater than 15 minutes with no pain  -MET    LTGs to be achieved in 8 weeks  -LTG 1: Patient will demonstrate independence with maintenance program  -NOT MET  -LTG 2: Patient will perform all functional activities with less than 2/10 R hip pain  -NOT MET  -LTG 3: Patient will improve FOTO score by 10 points  -NOT MET  -LTG 4: Patient will report 80% functional improvement  -NOT MET  -LTG 5: Patient will be able to walk for greater than 30 minutes with no pain  -NOT MET  -LTG 6: Patient will demonstrate 1 grade MMT improvement in R hip   -NOT MET      Plan  Patient would benefit from: PT eval and skilled physical therapy  Planned modality interventions: TENS, thermotherapy: hydrocollator packs, traction, ultrasound, cryotherapy and electrical stimulation/Russian stimulation  Planned therapy interventions: manual therapy, massage, ADL retraining, ADL training, balance, activity modification, neuromuscular re-education, patient education, postural training, strengthening, stretching, therapeutic activities, therapeutic exercise, flexibility, functional ROM exercises, gait training, graded activity, graded exercise, graded motor and home exercise program  Frequency: 3x week  Duration in weeks: 8  Treatment plan discussed with: patient        Subjective Evaluation    History of Present Illness  Date of surgery: 2021  Mechanism of injury: surgery  Mechanism of injury: Patient reports to skilled PT intervention s/p R CARLOS on 2021  She had a posterior hip replacement, with hip precautions for the next several weeks  She had home health 6 days/week for 2 weeks  She is using a reacher for grabbing objects  Functionally, she reports difficulties with ascending/descending the stairs reciprocally, walking and standing greater than 15 minutes, and sleeping through the night due to pain  She is currently wearing a stocking on her R LE  She currently has a tape on the incision, which was glued exteriorly  She is ambulating with a SPC  Patient has a history of R trimalleolar in 2016, and history of breast cancer  21: Patient denies pain in her R hip today  She notes increased soreness of the posterior lateral aspect of her R hip [place]  Functionally, she notes difficulties with ambulating greater than 30 minutes, ascending/descending stairs reciprocally, performing car transfers, and standing to meal prep  She reports 75% improvement in function since starting skilled PT intervention     Pain  Current pain ratin  At best pain ratin  At worst pain ratin  Quality: dull ache  Relieving factors: relaxation, medications, rest and ice  Aggravating factors: stair climbing, walking and standing  Progression: no change    Social Support  Steps to enter house: yes  Stairs in house: yes   Lives in: multiple-level home  Lives with: spouse    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  )  Treatments  Previous treatment: physical therapy  Current treatment: medication  Discharged from (in last 30 days): home health care  Patient Goals  Patient goals for therapy: independence with ADLs/IADLs, improved balance and decreased pain  Patient goal: Gardening and cleaning the house  Objective     Observations     Right Hip  Positive for incision  Additional Observation Details  Incision healing well with no signs of drainage or infection  Palpation   Left   No palpable tenderness to the gluteus chet, gluteus medius, iliopsoas and piriformis  Right   No palpable tenderness to the gluteus chet, gluteus medius, iliopsoas and piriformis  Neurological Testing     Sensation     Hip   Left Hip   Intact: light touch    Right Hip   Intact: light touch    Active Range of Motion   Left Hip   Normal active range of motion    Right Hip   Flexion: 102 degrees   Abduction: 40 degrees     Passive Range of Motion   Left Hip   Normal passive range of motion    Right Hip   Flexion: 102 degrees   Abduction: 40 degrees     Strength/Myotome Testing     Left Hip   Normal muscle strength    Right Hip   Planes of Motion   Flexion: 4-  Abduction: 4-  Adduction: 4-  External rotation: 4-  Internal rotation: 4-    Isolated Muscles   Iliopsoas: 4-    Tests     Additional Tests Details  No special tests performed, as ortho note reviewed prior to initial evaluation       FOTO goal: 70%    IE: 49%    7/27: 52%    8/5: 63%   Flowsheet Rows      Most Recent Value   PT/OT G-Codes   Current Score  63   Projected Score  70           Precautions: S/P R CARLOS 6/18/2021 (POSTERIOR HIP PRECAUTIONS), Hx R trimalleolar fx 2016, hx breast cancer 2020      Manuals 7/22/21 7/27/21 7/29/21 8/3/21 8/5/21   R hip PROM        STM R quad roller 5' R quad roller 5' R quad roller 5'     Mobility                Neuro Re-Ed        Clamshells 2x10 RTB 20 GTB 20 GTB 20 GTB 20 GTB   Bridging  15 15 15 15 15   Hip add squeeze  20 20 20 20 20   SLB  Foam 5x10s Foam 5x10s Foam 5x10s Foam 5x10s    Tandem walking on foam 6x 6x10' 6x10' 6x10' 6x10'           Ther Ex        NuStep warmup  10' L1 10' L1 10' L1 10' L1 10' L1   Hip flex, abd, ext in standing  20x ea 20x ea 20 each   20 each    Side stepping  6x10' 6x10' 6x10' 6x10' 6x10'   LAQ  2x10 2# 2x10 2# 2x10 2 5#  2x10 3#   SAQ    2x10 2x10    Heel slides 2x10 peanut 2x10 peanut 2x10 peanut 2x10 peanut 2x10 peanut   Mini squats  20 20 20 20 20   Step taps  2x10 step ups 6" fwd/lat 2x10 step ups 6" fwd/lat 2x10 step ups 6" fwd/lat 2x10 step ups 6" fwd/lat 2x10 step ups 6" fwd/lat   Pro stretch  5x10s 5x10s 5x10s 5x10s 5x10s   HR 30 30 30 30 30   Hip flexor stretch at step 5x10s 5x10s 5x10s 5x10s 5x10s   SLR flex 2x8    2x10   Lumbar flexion with pball  3sx10      Gait Training        Stair training         Gait training   2x100'      Modalities        Ice post

## 2021-08-05 NOTE — PROGRESS NOTES
PT Re-Evaluation     Today's date: 2021  Patient name: Marjie Closs  : 1950  MRN: [id number]  Referring provider: ALEXIS Canela  Dx:   Encounter Diagnosis     ICD-10-CM    1  Right hip pain  M25 551    2  Status post total hip replacement, right  [id number]        Start Time: 930  Stop Time: 1015  Total time in clinic (min): 45 minutes    Assessment  Assessment details: Marjie Closs is a 79y o  year old female reports to physical therapy with symptoms consistent with referring diagnosis of: Right hip pain  (primary encounter diagnosis), Status post total hip replacement, right on 2021  Patient demonstrates significant improvements in her R hip ROM, strength, and overall function since starting PT intervention  She continues to ambulate with Trendelenburg gait pattern due to weakness of R gluteus medius musculature  She is ambulating around with a quad cane at this time for balance confidence  Patient demonstrates limitations in R hip ROM, strength, and functional mobility  Patient is limited in the following functional activities: walking and standing greater than 30 minutes, ascending/descending stairs reciprocally, lifting objects off the floor, performing house chores, and performing self ADLs  Patient requires skilled physical therapy to restore prior level of function, address functional limitations, and progress towards independence with home exercise program  Patient was educated on HEP as noted on flow sheet, precautions associated with posterior R CARLOS, and DVT/infection prevention  Patient verbalized and demonstrated understanding of HEP and plan of care  Symptom irritability: low  Goals  STGs to be achieved in 4 weeks  -STG 1: Patient will be independent with HEP  -MET  -STG 2: Patient will have 0/10 R hip pain at rest  -MET  -STG 3: Patient will increase R hip ROM to within normal limits  -MET  -STG 4: Patient will report 40% functional improvement  -MET  -STG 5: Patient will demonstrate 1/2 grade MMT improvement in R hip  -MET   -STG 6: Patient will be able to walk for greater than 15 minutes with no pain  -MET    LTGs to be achieved in 8 weeks  -LTG 1: Patient will demonstrate independence with maintenance program  -NOT MET  -LTG 2: Patient will perform all functional activities with less than 2/10 R hip pain  -NOT MET  -LTG 3: Patient will improve FOTO score by 10 points  -NOT MET  -LTG 4: Patient will report 80% functional improvement  -NOT MET  -LTG 5: Patient will be able to walk for greater than 30 minutes with no pain  -NOT MET  -LTG 6: Patient will demonstrate 1 grade MMT improvement in R hip  -NOT MET      Plan  Patient would benefit from: PT eval and skilled physical therapy  Planned modality interventions: TENS, thermotherapy: hydrocollator packs, traction, ultrasound, cryotherapy and electrical stimulation/Russian stimulation  Planned therapy interventions: manual therapy, massage, ADL retraining, ADL training, balance, activity modification, neuromuscular re-education, patient education, postural training, strengthening, stretching, therapeutic activities, therapeutic exercise, flexibility, functional ROM exercises, gait training, graded activity, graded exercise, graded motor and home exercise program  Frequency: 3x week  Duration in weeks: 8  Treatment plan discussed with: patient        Subjective Evaluation    History of Present Illness  Date of surgery: 6/18/2021  Mechanism of injury: surgery  Mechanism of injury: Patient reports to skilled PT intervention s/p R CARLOS on 6/18/2021  She had a posterior hip replacement, with hip precautions for the next several weeks  She had home health 6 days/week for 2 weeks  She is using a reacher for grabbing objects  Functionally, she reports difficulties with ascending/descending the stairs reciprocally, walking and standing greater than 15 minutes, and sleeping through the night due to pain   She is currently wearing a stocking on her R LE  She currently has a tape on the incision, which was glued exteriorly  She is ambulating with a SPC  Patient has a history of R trimalleolar in 2016, and history of breast cancer  21: Patient denies pain in her R hip today  She notes increased soreness of the posterior lateral aspect of her R hip [place]  Functionally, she notes difficulties with ambulating greater than 30 minutes, ascending/descending stairs reciprocally, performing car transfers, and standing to meal prep  She reports 75% improvement in function since starting skilled PT intervention  Pain  Current pain ratin  At best pain ratin  At worst pain ratin  Quality: dull ache  Relieving factors: relaxation, medications, rest and ice  Aggravating factors: stair climbing, walking and standing  Progression: no change    Social Support  Steps to enter house: yes  Stairs in house: yes   Lives in: multiple-level home  Lives with: spouse    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  )  Treatments  Previous treatment: physical therapy  Current treatment: medication  Discharged from (in last 30 days): home health care  Patient Goals  Patient goals for therapy: independence with ADLs/IADLs, improved balance and decreased pain  Patient goal: Gardening and cleaning the house  Objective     Observations     Right Hip  Positive for incision  Additional Observation Details  Incision healing well with no signs of drainage or infection  Palpation   Left   No palpable tenderness to the gluteus chet, gluteus medius, iliopsoas and piriformis  Right   No palpable tenderness to the gluteus chet, gluteus medius, iliopsoas and piriformis       Neurological Testing     Sensation     Hip   Left Hip   Intact: light touch    Right Hip   Intact: light touch    Active Range of Motion   Left Hip   Normal active range of motion    Right Hip   Flexion: 102 degrees   Abduction: 40 degrees     Passive Range of Motion   Left Hip   Normal passive range of motion    Right Hip   Flexion: 102 degrees   Abduction: 40 degrees     Strength/Myotome Testing     Left Hip   Normal muscle strength    Right Hip   Planes of Motion   Flexion: 4-  Abduction: 4-  Adduction: 4-  External rotation: 4-  Internal rotation: 4-    Isolated Muscles   Iliopsoas: 4-    Tests     Additional Tests Details  No special tests performed, as ortho note reviewed prior to initial evaluation       FOTO goal: 70%    IE: 49%    7/27: 52%    8/5: 63%   Flowsheet Rows      Most Recent Value   PT/OT G-Codes   Current Score  63   Projected Score  70           Precautions: S/P R CARLOS 6/18/2021 (POSTERIOR HIP PRECAUTIONS), Hx R trimalleolar fx 2016, hx breast cancer 2020      Manuals 7/22/21 7/27/21 7/29/21 8/3/21 8/5/21   R hip PROM        STM R quad roller 5' R quad roller 5' R quad roller 5'     Mobility                Neuro Re-Ed        Clamshells 2x10 RTB 20 GTB 20 GTB 20 GTB 20 GTB   Bridging  15 15 15 15 15   Hip add squeeze  20 20 20 20 20   SLB  Foam 5x10s Foam 5x10s Foam 5x10s Foam 5x10s    Tandem walking on foam 6x 6x10' 6x10' 6x10' 6x10'           Ther Ex        NuStep warmup  10' L1 10' L1 10' L1 10' L1 10' L1   Hip flex, abd, ext in standing  20x ea 20x ea 20 each   20 each    Side stepping  6x10' 6x10' 6x10' 6x10' 6x10'   LAQ  2x10 2# 2x10 2# 2x10 2 5#  2x10 3#   SAQ    2x10 2x10    Heel slides 2x10 peanut 2x10 peanut 2x10 peanut 2x10 peanut 2x10 peanut   Mini squats  20 20 20 20 20   Step taps  2x10 step ups 6" fwd/lat 2x10 step ups 6" fwd/lat 2x10 step ups 6" fwd/lat 2x10 step ups 6" fwd/lat 2x10 step ups 6" fwd/lat   Pro stretch  5x10s 5x10s 5x10s 5x10s 5x10s   HR 30 30 30 30 30   Hip flexor stretch at step 5x10s 5x10s 5x10s 5x10s 5x10s   SLR flex 2x8    2x10   Lumbar flexion with pball  3sx10      Gait Training        Stair training         Gait training   2x100'      Modalities        Ice post

## 2021-08-06 ENCOUNTER — OFFICE VISIT (OUTPATIENT)
Dept: FAMILY MEDICINE CLINIC | Facility: CLINIC | Age: 71
End: 2021-08-06
Payer: COMMERCIAL

## 2021-08-06 VITALS
RESPIRATION RATE: 16 BRPM | DIASTOLIC BLOOD PRESSURE: 84 MMHG | TEMPERATURE: 97.4 F | BODY MASS INDEX: 27.29 KG/M2 | HEIGHT: 63 IN | SYSTOLIC BLOOD PRESSURE: 146 MMHG | OXYGEN SATURATION: 99 % | WEIGHT: 154 LBS | HEART RATE: 84 BPM

## 2021-08-06 DIAGNOSIS — R42 VERTIGO: Primary | ICD-10-CM

## 2021-08-06 PROCEDURE — 1160F RVW MEDS BY RX/DR IN RCRD: CPT | Performed by: FAMILY MEDICINE

## 2021-08-06 PROCEDURE — 3008F BODY MASS INDEX DOCD: CPT | Performed by: FAMILY MEDICINE

## 2021-08-06 PROCEDURE — 99213 OFFICE O/P EST LOW 20 MIN: CPT | Performed by: FAMILY MEDICINE

## 2021-08-06 RX ORDER — MECLIZINE HCL 12.5 MG/1
12.5 TABLET ORAL 3 TIMES DAILY PRN
Qty: 30 TABLET | Refills: 0 | Status: SHIPPED | OUTPATIENT
Start: 2021-08-06 | End: 2022-04-29 | Stop reason: ALTCHOICE

## 2021-08-06 RX ORDER — ALBUTEROL SULFATE 90 UG/1
AEROSOL, METERED RESPIRATORY (INHALATION) AS NEEDED
COMMUNITY
Start: 2021-06-09

## 2021-08-06 NOTE — PROGRESS NOTES
Assessment/Plan:       Diagnoses and all orders for this visit:    Vertigo  -     meclizine (ANTIVERT) 12 5 MG tablet; Take 1 tablet (12 5 mg total) by mouth 3 (three) times a day as needed for dizziness  -     Ambulatory referral to Physical Therapy; Future      Subjective:      Patient ID: Mic Hawkins is a 79 y o  female  HPI   [employer] presents today for evaluation of dizziness  She reports dizziness on position changes  Takes a couple of hours to completely resolve  Worse when she turns her head or lays down  Her right ear feels weird and the right side of her nose feels clogged  Completed antibiotic treatment for sinus infection 3 days ago  Denies nausea, vomiting  She is worried about falling/her balance  She goes to PT for joint issue, but was evaluated by the therapists at [facility] center one day and was told she may have vertigo  The following portions of the patient's history were reviewed and updated as appropriate: allergies, current medications, past family history, past medical history, past social history, past surgical history and problem list     Review of Systems   Constitutional: Negative  HENT: Negative  Eyes: Negative  Respiratory: Negative  Cardiovascular: Negative  Gastrointestinal: Negative  Endocrine: Negative  Genitourinary: Negative  Musculoskeletal: Negative  Skin: Negative  Allergic/Immunologic: Negative  Neurological: Positive for dizziness  Hematological: Negative  Psychiatric/Behavioral: Negative  Objective:      /84   Pulse 84   Temp (!) 97 4 °F (36 3 °C)   Resp 16   Ht 5' 3" (1 6 m)   Wt 69 9 kg (154 lb)   SpO2 99%   BMI 27 28 kg/m²          Physical Exam  Constitutional:       General: She is not in acute distress  Appearance: She is well-developed  She is not diaphoretic  HENT:      Head: Normocephalic and atraumatic        Right Ear: Tympanic membrane, ear canal and external ear normal  There is no impacted cerumen  Left Ear: Tympanic membrane, ear canal and external ear normal  There is no impacted cerumen  Cardiovascular:      Rate and Rhythm: Normal rate and regular rhythm  Heart sounds: Normal heart sounds  No murmur heard  No friction rub  No gallop  Pulmonary:      Effort: Pulmonary effort is normal  No respiratory distress  Breath sounds: Normal breath sounds  No wheezing or rales  Chest:      Chest wall: No tenderness  Musculoskeletal:         General: No deformity  Normal range of motion  Cervical back: Normal range of motion and neck supple  Skin:     General: Skin is warm and dry  Neurological:      Mental Status: She is alert and oriented to person, place, and time  Psychiatric:         Behavior: Behavior normal          Thought Content:  Thought content normal          Judgment: Judgment normal

## 2021-08-09 ENCOUNTER — EVALUATION (OUTPATIENT)
Dept: PHYSICAL THERAPY | Facility: CLINIC | Age: 71
End: 2021-08-09
Payer: COMMERCIAL

## 2021-08-09 DIAGNOSIS — H81.12 BENIGN PAROXYSMAL POSITIONAL VERTIGO OF LEFT EAR: Primary | ICD-10-CM

## 2021-08-09 DIAGNOSIS — R42 VERTIGO: ICD-10-CM

## 2021-08-09 PROCEDURE — 95992 CANALITH REPOSITIONING PROC: CPT

## 2021-08-09 PROCEDURE — 97162 PT EVAL MOD COMPLEX 30 MIN: CPT

## 2021-08-09 NOTE — LETTER
2021    Cherry Duverney, [address]    Patient: Ma Schirmer   YOB: 1950   Date of Visit: 2021     Encounter Diagnosis     ICD-10-CM    1  Benign paroxysmal positional vertigo of left ear  H81 12    2  Vertigo  R42 Ambulatory referral to Physical Therapy       Dear Dr Nghia Garcia:    Thank you for your recent referral of Ma Schirmer  Please review the attached evaluation summary from [facility] recent visit  Please verify that you agree with the plan of care by signing the attached order  If you have any questions or concerns, please do not hesitate to call  I sincerely appreciate the opportunity to share in the care of one of your patients and hope to have another opportunity to work with you in the near future  Sincerely,    Orestes Pacheco Adjutant, PT      Referring Provider:      I certify that I have read the below Plan of Care and certify the need for these services furnished under this plan of treatment while under my care  Cherry Duverney, MD  [address] [place] [address]  [facility] [facility]          PT Evaluation     Today's date: 2021  Patient name: Ma Schirmer  : 1950  MRN: [id number]  Referring provider: Prudy Harada, MD  Dx:   Encounter Diagnosis     ICD-10-CM    1  Vertigo  R42 Ambulatory referral to Physical Therapy                  Assessment  Assessment details: Possible Left posterior canalithiasis BPPV causing episodic vertigo and potential imbalance  Recommend skilled PT to alleviate positional dizziness  Any imbalance will be addressed with orthopedic PT as patient progresses with right THR care  Other impairment: Vertigo    Goals  ST: Independent with HEP    LT  (-) DHP and Roll Test B/L to improve positional dizziness and alleviate BPPV           2   No positional dizziness reported with ADL's and function    Plan  Plan details: As above, anticipate several visits to address BPPV, then orthopedic PT will address balance as patient progresses with THR rehabilitation  Planned therapy interventions: canalith repositioning, neuromuscular re-education, therapeutic exercise and therapeutic activities  Frequency: 2x week  Duration in weeks: 4        Subjective Evaluation    History of Present Illness  Date of onset: 7/26/2021  Mechanism of injury: 72y o  female known to [facility] PT as currently going through orthopedic PT s/p right THR in 6/21  Her current complaint is dizziness starting 2 weeks ago while laying in bed experienced a "drunk like" sensation occurring lasting minutes  She denies numbness, tingling, headache or weakness  She reports undergoing lumpectomy and chemo/ radiation treatment for breast cancer in March  She reports dizziness episodes occurring about 5 times in the past 2 weeks  She is also being treated for a sinus infection via Abx and antihistamines  She denies falls  Her goal of PT is to alleviate her dizziness  Vitals sit /80, HR 76 O2 sats 99%           Recurrent probem    Pain  No pain reported          Objective    Meds: Meclizine prn, Albuterol, Losartan, Prilosec  PMHx: CARLOS right 6/21    Denies tinnitus, aural pain or pressure  Vision- Full field tracking  CN- II-XII intact  Motor- normal tone, no clonus or drift, strength 5/5 except right hip 3+/5, knee 4-/5  Sensation- Denies numbness/ tingling t/o  Vestibular- (-) Head Impulse, (-) HSN, (+) symptoms with left DHP without nystagmus lasting 30 seconds, (-) right DHP, (-) Roll Test B/L  Performed Left CRT x1- increased symptoms with roll to right mimicking primary complaint  Gait- Independent with SPC, guarded gait s/p recent right THR    Balance- deferred due to recent THR       Precautions: Right THR precautions- posterior approach      Manuals                                                                 Neuro Re-Ed             Left CRT Ther Ex                                                                                                                     Ther Activity                                       Gait Training                                       Modalities

## 2021-08-09 NOTE — PROGRESS NOTES
PT Evaluation     Today's date: 2021  Patient name: Ma Schirmer  : 1950  MRN: [id number]  Referring provider: Prudy Harada, MD  Dx:   Encounter Diagnosis     ICD-10-CM    1  Vertigo  R42 Ambulatory referral to Physical Therapy                  Assessment  Assessment details: Possible Left posterior canalithiasis BPPV causing episodic vertigo and potential imbalance  Recommend skilled PT to alleviate positional dizziness  Any imbalance will be addressed with orthopedic PT as patient progresses with right THR care  Other impairment: Vertigo    Goals  ST: Independent with HEP    LT  (-) DHP and Roll Test B/L to improve positional dizziness and alleviate BPPV           2  No positional dizziness reported with ADL's and function    Plan  Plan details: As above, anticipate several visits to address BPPV, then orthopedic PT will address balance as patient progresses with THR rehabilitation  Planned therapy interventions: canalith repositioning, neuromuscular re-education, therapeutic exercise and therapeutic activities  Frequency: 2x week  Duration in weeks: 4        Subjective Evaluation    History of Present Illness  Date of onset: 2021  Mechanism of injury: 72y o  female known to [facility] PT as currently going through orthopedic PT s/p right THR in   Her current complaint is dizziness starting 2 weeks ago while laying in bed experienced a "drunk like" sensation occurring lasting minutes  She denies numbness, tingling, headache or weakness  She reports undergoing lumpectomy and chemo/ radiation treatment for breast cancer in March  She reports dizziness episodes occurring about 5 times in the past 2 weeks  She is also being treated for a sinus infection via Abx and antihistamines  She denies falls  Her goal of PT is to alleviate her dizziness       Vitals sit /80, HR 76 O2 sats 99%           Recurrent probem    Pain  No pain reported          Objective    Meds: Meclizine prn, Albuterol, Losartan, Prilosec  PMHx: CARLOS right 6/21    Denies tinnitus, aural pain or pressure  Vision- Full field tracking  CN- II-XII intact  Motor- normal tone, no clonus or drift, strength 5/5 except right hip 3+/5, knee 4-/5  Sensation- Denies numbness/ tingling t/o  Vestibular- (-) Head Impulse, (-) HSN, (+) symptoms with left DHP without nystagmus lasting 30 seconds, (-) right DHP, (-) Roll Test B/L  Performed Left CRT x1- increased symptoms with roll to right mimicking primary complaint  Gait- Independent with SPC, guarded gait s/p recent right THR    Balance- deferred due to recent THR       Precautions: Right THR precautions- posterior approach      Manuals                                                                 Neuro Re-Ed             Left CRT                                                                                           Ther Ex                                                                                                                     Ther Activity                                       Gait Training                                       Modalities

## 2021-08-10 ENCOUNTER — OFFICE VISIT (OUTPATIENT)
Dept: PHYSICAL THERAPY | Facility: CLINIC | Age: 71
End: 2021-08-10
Payer: COMMERCIAL

## 2021-08-10 DIAGNOSIS — Z96.641 STATUS POST TOTAL HIP REPLACEMENT, RIGHT: ICD-10-CM

## 2021-08-10 DIAGNOSIS — M25.551 RIGHT HIP PAIN: Primary | ICD-10-CM

## 2021-08-10 PROCEDURE — 97112 NEUROMUSCULAR REEDUCATION: CPT

## 2021-08-10 PROCEDURE — 97110 THERAPEUTIC EXERCISES: CPT

## 2021-08-10 NOTE — PROGRESS NOTES
Daily Note     Today's date: 8/10/2021  Patient name: Michelle Echols  : 1950  MRN: [id number]  Referring provider: ALEXIS Kirkland  Dx:   Encounter Diagnosis     ICD-10-CM    1  Right hip pain  M25 551    2  Status post total hip replacement, right  Z96 [id number]                   Subjective: pt reports she is a little sore today after having a very busy last 3 days where she had company over and went to the fair, she was on her feet more than usual      Objective: See treatment diary below      Assessment: Tolerated treatment well  Pt had decreased activity tolerance today due to increased soreness in lateral hip wrapping around the per post hip that was reported at start of session  Decreased WB activity today  Pt educated to take it easy over the next couple days and ice due to her increased soreness most likely from her several days of increased activity  patient would benefit from continued PT      Plan: Continue per plan of care        Precautions: Right THR precautions- posterior approach        Manuals 8/10/21       R hip PROM        STM        Mobility                Neuro Re-Ed        Clamshells 2x10 no TB 3s hold at end range        Bridging  Painful today        Hip add squeeze  3s 20       SLB on foam         Tandem walking on foam                Ther Ex        NuStep warmup  10' L2       Hip flex, abd, ext in standing  20x ea       Side stepping         LAQ  2x10 3#        SAQ         Heel slides 2x10 peanut       Mini squats  20       Step taps  6"        Pro stretch         HR 30       Hip flexor stretch at step Supine over edge of table 20"x3        SLR flex 2x10        Lumbar flexion with pball 3s x10       Gait Training        Stair training         Gait training        Modalities        Ice post  5' in supine

## 2021-08-12 ENCOUNTER — OFFICE VISIT (OUTPATIENT)
Dept: PHYSICAL THERAPY | Facility: CLINIC | Age: 71
End: 2021-08-12
Payer: COMMERCIAL

## 2021-08-12 DIAGNOSIS — R42 VERTIGO: ICD-10-CM

## 2021-08-12 DIAGNOSIS — Z96.641 STATUS POST TOTAL HIP REPLACEMENT, RIGHT: ICD-10-CM

## 2021-08-12 DIAGNOSIS — M25.551 RIGHT HIP PAIN: Primary | ICD-10-CM

## 2021-08-12 DIAGNOSIS — H81.12 BENIGN PAROXYSMAL POSITIONAL VERTIGO OF LEFT EAR: Primary | ICD-10-CM

## 2021-08-12 PROCEDURE — 97110 THERAPEUTIC EXERCISES: CPT

## 2021-08-12 PROCEDURE — 95992 CANALITH REPOSITIONING PROC: CPT

## 2021-08-12 NOTE — PROGRESS NOTES
Daily Note     Today's date: 2021  Patient name: Stephanie Grewal  : 1950  MRN: [id number]  Referring provider: ALEXIS Villa  Dx:   Encounter Diagnosis     ICD-10-CM    1  Right hip pain  M25 551    2  Status post total hip replacement, right  [id number]        Start Time: 930  Stop Time: 1015  Total time in clinic (min): 45 minutes    Subjective: Patient reports increased R posterior hip pain today, wrapping around the anterolateral aspect of her R upper thigh to her knee  Objective: See treatment diary below      Assessment: Tolerated treatment well  Patient provided with heel lift in L shoe, as she demonstrates significant Trendelenburg gait pattern  Patient also provided with repeated lumbar flexion exercises to help dissipate pain  Lateral R hip pain abolished with repeated lumbar flexion in seated  Patient is to be placed on 2 week hold, as she is going on vacation to [place]  She has appointments scheduled when she returns  Advised to perform lumbar flexion exercise at home over the course of the next several weeks to maximize function  Verbalized understanding  Patient would benefit from continued PT      Plan: Continue per plan of care        Precautions: Right THR precautions- posterior approach        Manuals 8/10/21 8/12/21      R hip PROM        STM        Mobility                Neuro Re-Ed        Clamshells 2x10 no TB 3s hold at end range        Bridging  Painful today        Hip add squeeze  3s 20       SLB on foam         Tandem walking on foam                Ther Ex        NuStep warmup  10' L2 10' L2      Hip flex, abd, ext in standing  20x ea 20x      Side stepping         LAQ  2x10 3#  2x10 3#       SAQ         Heel slides 2x10 peanut 2x10 peanut      Mini squats  20 20      Step taps  6"  Step ups 6" x20 for, lat      Pro stretch         HR 30 30      Hip flexor stretch at step Supine over edge of table 20"x3  At Step 5x10s       SLR flex 2x10  2x10       Lumbar flexion with pball 3s x10 In seated x10      Gait Training        Stair training         Gait training        Modalities        Ice post  5' in supine

## 2021-08-12 NOTE — PROGRESS NOTES
Daily Note     Today's date: 2021  Patient name: Michelle Echols  : 1950  MRN: [id number]  Referring provider: Chase Simon MD  Dx:   Encounter Diagnosis     ICD-10-CM    1  Benign paroxysmal positional vertigo of left ear  H81 12    2  Vertigo  R42                   Subjective: Patient seen in PT  Reports feeling "90%" better after last session  Reports slight spin with rolling in bed several times in the past few days, but overall "much better"  Denies dizziness currently  Objective: Demonstrated (-) Right [place]-Hallpike and B/L Roll Test  Slight symptoms with Left [place]ho[place]-Hallpike for 10 seconds, no nystagmus noted  Performed Left CRT x 2  No symptoms on seconds attempt  Patient instructed to wait 15 minutes after session prior to driving  Voiced understanding  Assessment: Tolerated treatment well  Left BPPV appears improved  Responded well to last treatment  Patient going on vacation x 2 weeks  Will reassess upon her return  Patient would benefit from continued PT      Plan: Continue per plan of care        Precautions: Right THR precautions- posterior approach        Manuals 8/10/21 8/12/21      R hip PROM        STM        Mobility                Neuro Re-Ed        Clamshells 2x10 no TB 3s hold at end range        Bridging  Painful today        Hip add squeeze  3s 20       SLB on foam         Tandem walking on foam                Ther Ex        NuStep warmup  10' L2 10' L2      Hip flex, abd, ext in standing  20x ea 20x      Side stepping         LAQ  2x10 3#  2x10 3#       SAQ         Heel slides 2x10 peanut 2x10 peanut      Mini squats  20 20      Step taps  6"  Step ups 6" x20 for, lat      Pro stretch         HR 30 30      Hip flexor stretch at step Supine over edge of table 20"x3  At Step 5x10s       SLR flex 2x10  2x10       Lumbar flexion with pball 3s x10 In seated x10      Gait Training        Stair training         Gait training        Modalities        Ice post  5' in supine

## 2021-08-17 NOTE — TELEPHONE ENCOUNTER
Upon review of the In Basket request and the patient's chart, initial outreach has been made via fax, please see Contacts section for details       Thank you  Jose Locke MA     New fax number

## 2021-08-18 NOTE — TELEPHONE ENCOUNTER
Upon review of the In Basket request we were able to locate, review, and update the patient chart as requested for CRC: Colonoscopy  Any additional questions or concerns should be emailed to the Practice Liaisons via [email or web address] email, please do not reply via In Basket      Thank you  Roz Carlin MA

## 2021-08-31 ENCOUNTER — OFFICE VISIT (OUTPATIENT)
Dept: PHYSICAL THERAPY | Facility: CLINIC | Age: 71
End: 2021-08-31
Payer: COMMERCIAL

## 2021-08-31 DIAGNOSIS — M25.551 RIGHT HIP PAIN: Primary | ICD-10-CM

## 2021-08-31 DIAGNOSIS — Z96.641 STATUS POST TOTAL HIP REPLACEMENT, RIGHT: ICD-10-CM

## 2021-08-31 DIAGNOSIS — R42 VERTIGO: ICD-10-CM

## 2021-08-31 DIAGNOSIS — H81.12 BENIGN PAROXYSMAL POSITIONAL VERTIGO OF LEFT EAR: Primary | ICD-10-CM

## 2021-08-31 PROCEDURE — 97112 NEUROMUSCULAR REEDUCATION: CPT | Performed by: PHYSICAL THERAPIST

## 2021-08-31 PROCEDURE — 97110 THERAPEUTIC EXERCISES: CPT

## 2021-08-31 NOTE — PROGRESS NOTES
Daily Note     Today's date: 2021  Patient name: Caterina Queen  : 1950  MRN: [id number]  Referring provider: Elizabeth Edwards MD  Dx:   Encounter Diagnosis     ICD-10-CM    1  Benign paroxysmal positional vertigo of left ear  H81 12    2  Vertigo  R42                   Subjective: Denies dizziness currently  Objective:   Perry-hallpike:  R: negative x 2   L: negative x 2    Roll test  R: negative x 2  L: negative x 2         Assessment: Patient has denied dizziness since last treatment session  Patient had negative finding for all positions this date         Plan: Discharged      Precautions: Right THR precautions- posterior approach

## 2021-08-31 NOTE — PROGRESS NOTES
Daily Note     Today's date: 2021  Patient name: Bert Newton  : 1950  MRN: [id number]  Referring provider: ALEXIS Hernandez  Dx:   Encounter Diagnosis     ICD-10-CM    1  Right hip pain  M25 551    2  Status post total hip replacement, right  [id number]        Start Time: 1100  Stop Time: 1130  Total time in clinic (min): 30 minutes    Subjective: Patient reports R hip and R LE pain today at 3/10  She notes that she was having a difficult time with performing stairs when she was on vacation  Objective: See treatment diary below      Assessment: Tolerated treatment well  Patient responded well to lateral shift correction and repeated R side glides at the wall  Visible lateral deformity present  Provided patient with lateral flexion at the wall to decrease pain  Patient would benefit from continued PT to decrease pain and maximize function for independence with community activities  Plan: Continue per plan of care        Precautions: Right THR precautions- posterior approach        Manuals 8/10/21 8/12/21 8/31/21     R hip PROM        STM        Mobility   Manual lateral shift correction, overcorrect to the L - AR             Neuro Re-Ed        Clamshells 2x10 no TB 3s hold at end range        Bridging  Painful today        Hip add squeeze  3s 20       SLB on foam         Tandem walking on foam                Ther Ex        NuStep warmup  10' L2 10' L2      Hip flex, abd, ext in standing  20x ea 20x      Side stepping         LAQ  2x10 3#  2x10 3#       SAQ         Heel slides 2x10 peanut 2x10 peanut      Mini squats  20 20      Step taps  6"  Step ups 6" x20 for, lat      Pro stretch         HR 30 30      Hip flexor stretch at step Supine over edge of table 20"x3  At Step 5x10s       SLR flex 2x10  2x10       Lumbar flexion with pball 3s x10 In seated x10 In seated for, right 3sx10 each      Gait Training        Stair training    Sleep training with pillow under R hip x2'     Gait training        Modalities        Ice post  5' in supine

## 2021-09-07 ENCOUNTER — OFFICE VISIT (OUTPATIENT)
Dept: PHYSICAL THERAPY | Facility: CLINIC | Age: 71
End: 2021-09-07
Payer: COMMERCIAL

## 2021-09-07 DIAGNOSIS — M25.551 RIGHT HIP PAIN: Primary | ICD-10-CM

## 2021-09-07 DIAGNOSIS — Z96.641 STATUS POST TOTAL HIP REPLACEMENT, RIGHT: ICD-10-CM

## 2021-09-07 PROCEDURE — 97110 THERAPEUTIC EXERCISES: CPT

## 2021-09-07 PROCEDURE — 97112 NEUROMUSCULAR REEDUCATION: CPT

## 2021-09-07 NOTE — PROGRESS NOTES
Daily Note     Today's date: 2021  Patient name: Rebecca Stevens  : 1950  MRN: [id number]  Referring provider: ALEXIS Aguilera  Dx:   Encounter Diagnosis     ICD-10-CM    1  Right hip pain  M25 551    2  Status post total hip replacement, right  [id number]        Start Time: 930  Stop Time: 1015  Total time in clinic (min): 45 minutes    Subjective: Patient reports R sided lumbar spine and R knee pain today  She states that after she does the exercises, she has some relief of symptoms in her R LE and lumbar spine  She likes the heel lift in her L shoe is slipping  She is conscious of her posture, attempting to fix it throughout the course of her day  Objective: See treatment diary below      Assessment: Tolerated treatment well  Patient able to self correct posture with mirror therapy and lat pull down stretch for paraspinals  Encouraged to attempt stretching at home for improved posture and limited pain levels  Heel lift provided to patient's L shoe to help correct posture  Self reporting improvements with gait with heel lift  Leukotape applied to R foot to help improved R hip and knee pain  Patient states she normally has inserts in her shoes, but lost her inserts in a recent move  She wishes to see podiatrist for inserts for ease with gait  Patient exhibited good technique with therapeutic exercises and would benefit from continued PT to maximize function for independence with community activities  Plan: Continue per plan of care        Precautions: Right THR precautions- posterior approach      Manuals 8/10/21 8/12/21 8/31/21 9/7    R hip PROM        STM        Mobility   Manual lateral shift correction, overcorrect to the L - AR Manual lateral shift correction, overcorrect to the L - AR        Leukotape navicular sling R foot only -AR    Neuro Re-Ed        Clamshells 2x10 no TB 3s hold at end range        Bridging  Painful today        Hip add squeeze  3s 20       SLB on foam Tandem walking on foam                Ther Ex        NuStep warmup  10' L2 10' L2  10' L2    Hip flex, abd, ext in standing  20x ea 20x      Lat pull down hang for paraspinal stretch     3x3' hang with manual overcorrection from PT    LAQ  2x10 3#  2x10 3#       SAQ         Heel slides 2x10 peanut 2x10 peanut      Mini squats  20 20      Step taps  6"  Step ups 6" x20 for, lat      HR 30 30      Hip flexor stretch at step Supine over edge of table 20"x3  At Step 5x10s       SLR flex 2x10  2x10       Lumbar flexion with pball 3s x10 In seated x10 In seated for, right 3sx10 each  R SGIS at wall 2x10    Gait Training        Stair training    Sleep training with pillow under R hip x2'     Gait training        Modalities        Ice post  5' in supine

## 2021-09-09 ENCOUNTER — APPOINTMENT (OUTPATIENT)
Dept: PHYSICAL THERAPY | Facility: CLINIC | Age: 71
End: 2021-09-09
Payer: COMMERCIAL

## 2021-09-17 ENCOUNTER — OFFICE VISIT (OUTPATIENT)
Dept: PHYSICAL THERAPY | Facility: CLINIC | Age: 71
End: 2021-09-17
Payer: COMMERCIAL

## 2021-09-17 ENCOUNTER — OFFICE VISIT (OUTPATIENT)
Dept: PODIATRY | Facility: CLINIC | Age: 71
End: 2021-09-17
Payer: COMMERCIAL

## 2021-09-17 VITALS
SYSTOLIC BLOOD PRESSURE: 149 MMHG | HEART RATE: 67 BPM | HEIGHT: 63 IN | DIASTOLIC BLOOD PRESSURE: 77 MMHG | BODY MASS INDEX: 27.29 KG/M2 | WEIGHT: 154 LBS

## 2021-09-17 DIAGNOSIS — M21.961 ACQUIRED DEFORMITY OF RIGHT FOOT: Primary | ICD-10-CM

## 2021-09-17 DIAGNOSIS — M21.962 ACQUIRED DEFORMITY OF LEFT FOOT: ICD-10-CM

## 2021-09-17 DIAGNOSIS — M21.70 ACQUIRED UNEQUAL LIMB LENGTH: ICD-10-CM

## 2021-09-17 DIAGNOSIS — M79.672 PAIN IN BOTH FEET: ICD-10-CM

## 2021-09-17 DIAGNOSIS — M79.671 PAIN IN BOTH FEET: ICD-10-CM

## 2021-09-17 DIAGNOSIS — Z96.641 STATUS POST TOTAL HIP REPLACEMENT, RIGHT: ICD-10-CM

## 2021-09-17 DIAGNOSIS — M25.551 RIGHT HIP PAIN: Primary | ICD-10-CM

## 2021-09-17 PROCEDURE — 99202 OFFICE O/P NEW SF 15 MIN: CPT | Performed by: PODIATRIST

## 2021-09-17 PROCEDURE — 97112 NEUROMUSCULAR REEDUCATION: CPT | Performed by: PHYSICAL THERAPIST

## 2021-09-17 PROCEDURE — 97110 THERAPEUTIC EXERCISES: CPT | Performed by: PHYSICAL THERAPIST

## 2021-09-17 NOTE — PROGRESS NOTES
Assessment/Plan: pain upon ambulation secondary to limb length discrepancy as well as acquired deformity of foot  Acquired pes planus  Plan  Foot exam performed  Patient educated on condition  We will order scanogram to rule out exact difference between limb length  Patient will benefit from heel lift  This will be incorporated into orthotic  Patient advised on this  There are no diagnoses linked to this encounter  Subjective:   Patient is seen on referral from physical therapy  Patient has history of hip replacement  She has gait instability  She knows 1 leg is longer than the other  She has back pain  No history of trauma    Allergies   Allergen Reactions    Avelox [Moxifloxacin] Itching    Fruit Extracts Itching     Cantelope melon     Nuts - Food Allergy Itching         Current Outpatient Medications:     albuterol (PROVENTIL HFA,VENTOLIN HFA) 90 mcg/act inhaler, , Disp: , Rfl:     losartan (COZAAR) 25 mg tablet, Take 25 mg by mouth daily , Disp: , Rfl:     meclizine (ANTIVERT) 12 5 MG tablet, Take 1 tablet (12 5 mg total) by mouth 3 (three) times a day as needed for dizziness, Disp: 30 tablet, Rfl: 0    omeprazole (PriLOSEC) 40 MG capsule, Take 40 mg by mouth daily , Disp: , Rfl:     Patient Active Problem List   Diagnosis    Invasive ductal carcinoma of left breast (HCC)    Rheumatoid arthritis, involving unspecified site, unspecified whether rheumatoid factor present [facility])          Patient ID: Joselito Vail is a 70 y o  female  HPI    The following portions of the patient's history were reviewed and updated as appropriate:     family history includes Breast cancer in her mother and paternal grandmother; Lung cancer in her father  reports that she has never smoked  She has never used smokeless tobacco  She reports previous alcohol use  She reports that she does not use drugs      Vitals:    09/17/21 0904   BP: 149/77   Pulse: 67       Review of Systems Objective:  Patient's shoes and socks removed  Foot Exam    General  General Appearance: appears stated age and healthy   Orientation: alert and oriented to person, place, and time   Affect: appropriate   Gait: antalgic       Right Foot/Ankle     Inspection and Palpation  Tenderness: metatarsals   Swelling: dorsum   Arch: pes planus  Hallux limitus: yes    Neurovascular  Dorsalis pedis: 3+  Posterior tibial: 3+      Left Foot/Ankle      Inspection and Palpation  Tenderness: metatarsals   Swelling: dorsum   Arch: pes planus  Hallux limitus: yes    Neurovascular  Dorsalis pedis: 3+  Posterior tibial: 3+        Physical Exam  Vitals and nursing note reviewed  Constitutional:       Appearance: Normal appearance  Cardiovascular:      Rate and Rhythm: Normal rate and regular rhythm  Pulses:           Dorsalis pedis pulses are 3+ on the right side and 3+ on the left side  Posterior tibial pulses are 3+ on the right side and 3+ on the left side  Musculoskeletal:      Comments:  Patient has a pelvic tilt in both stance and gait  Left leg appears shorter than right  This will be checked with scanogram   She is maximally pronated in stance and gait  She has collapse of the medial arch bilateral   There is  Range of motion of subtalar joint   Skin:     Capillary Refill: Capillary refill takes less than 2 seconds  Neurological:      Mental Status: She is alert  Psychiatric:         Mood and Affect: Mood normal          Thought Content:  Thought content normal          Judgment: Judgment normal

## 2021-09-17 NOTE — PROGRESS NOTES
Daily Note     Today's date: 2021  Patient name: Phillip Page  : 1950  MRN: [id number]  Referring provider: ALEXIS Herrera  Dx:   Encounter Diagnosis     ICD-10-CM    1  Right hip pain  M25 551    2  Status post total hip replacement, right  [id number] [id number]        Start Time: 1015  Stop Time: 1100  Total time in clinic (min): 45 minutes    Subjective: Patient reports 2/10 R sided lumbar spine and R knee"  today  "it is better today" Pt ambulating without AD into PT clinic with less lateral shift noted as per primary PT as compared to last visit  She states that after she does the exercises at home, she has relief of symptoms in her R LE and lumbar spine  She likes the heel lift in her L shoe, "it is really helpful"  Pt to have xray for leg length discrepancy as ordered by Dr Dan  Pt does not have date for xray yet as this was just ordered this morning by MD     Objective: See treatment diary below    Assessment: Tolerated treatment well  Re-incorporated LE strengthening therex and core stab therex  Emphasis on core stab with all mobility of LEs  Manual and VCs for abd bracing with all therex  Patient exhibited good technique with therapeutic exercises and would benefit from continued PT to maximize function for independence with community activities  Primary PT performed manual lateral shift correction with pt  Plan: Continue per plan of care  Progress as per primary PT   Pt to f/u with PT in 2 weeks for re-eval       Precautions: Right THR precautions- posterior approach      Manuals 8/10/21 8/12/21 8/31/21 9/7 9/17   R hip PROM        STM        Mobility   Manual lateral shift correction, overcorrect to the L - AR Manual lateral shift correction, overcorrect to the L - AR Manual lateral shift correction, overcorrect the L - AR       Leukotape navicular sling R foot only -AR -   Neuro Re-Ed        Clamshells 2x10 no TB 3s hold at end range        Bridging  Painful today        Hip add squeeze 3s 20       SLB on foam         Tandem walking on foam                Ther Ex        NuStep warmup  10' L2 10' L2  10' L2 10' L2  42miles   Hip flex, abd, ext in standing  20x ea 20x   x10 ea ext, abd,    Lat pull down hang for paraspinal stretch     3x3' hang with manual overcorrection from PT 5x10 sec   LAQ  2x10 3#  2x10 3#    Hip add iso with bridge 2x10 hold 5   SAQ         Heel slides 2x10 peanut 2x10 peanut   x10 with peanut   Mini squats  20 20      Step taps  6"  Step ups 6" x20 for, lat      HR 30 30      Hip flexor stretch at step Supine over edge of table 20"x3  At Step 5x10s       SLR flex 2x10  2x10       Lumbar flexion with pball 3s x10 In seated x10 In seated for, right 3sx10 each  R SGIS at wall 2x10    Gait Training        Stair training    Sleep training with pillow under R hip x2'     Gait training        Modalities        Ice post  5' in supine

## 2021-09-19 ENCOUNTER — OFFICE VISIT (OUTPATIENT)
Dept: URGENT CARE | Facility: CLINIC | Age: 71
End: 2021-09-19
Payer: COMMERCIAL

## 2021-09-19 VITALS
SYSTOLIC BLOOD PRESSURE: 174 MMHG | WEIGHT: 153 LBS | BODY MASS INDEX: 27.11 KG/M2 | DIASTOLIC BLOOD PRESSURE: 82 MMHG | HEART RATE: 80 BPM | RESPIRATION RATE: 18 BRPM | HEIGHT: 63 IN | OXYGEN SATURATION: 95 % | TEMPERATURE: 97.8 F

## 2021-09-19 DIAGNOSIS — J01.00 ACUTE NON-RECURRENT MAXILLARY SINUSITIS: Primary | ICD-10-CM

## 2021-09-19 PROCEDURE — 3008F BODY MASS INDEX DOCD: CPT | Performed by: FAMILY MEDICINE

## 2021-09-19 PROCEDURE — 99213 OFFICE O/P EST LOW 20 MIN: CPT | Performed by: PHYSICIAN ASSISTANT

## 2021-09-19 RX ORDER — AZITHROMYCIN 250 MG/1
TABLET, FILM COATED ORAL
Qty: 6 TABLET | Refills: 0 | Status: SHIPPED | OUTPATIENT
Start: 2021-09-19 | End: 2021-09-23

## 2021-09-19 RX ORDER — TOCILIZUMAB 180 MG/ML
INJECTION, SOLUTION SUBCUTANEOUS WEEKLY
COMMUNITY
Start: 2021-08-31

## 2021-09-19 RX ORDER — TOCILIZUMAB 180 MG/ML
INJECTION, SOLUTION SUBCUTANEOUS
COMMUNITY
Start: 2021-08-12 | End: 2021-10-25 | Stop reason: ALTCHOICE

## 2021-09-19 RX ORDER — PROCHLORPERAZINE MALEATE 10 MG
10 TABLET ORAL EVERY 6 HOURS PRN
COMMUNITY
End: 2021-10-25 | Stop reason: ALTCHOICE

## 2021-09-19 NOTE — PATIENT INSTRUCTIONS
Sinusitis   WHAT YOU NEED TO KNOW:   Sinusitis is inflammation or infection of your sinuses  Sinusitis is most often caused by a virus  Acute sinusitis may last up to 12 weeks  Chronic sinusitis lasts longer than 12 weeks  Recurrent sinusitis means you have 4 or more infections in 1 year  DISCHARGE INSTRUCTIONS:   Return to the emergency department if:   · You have trouble breathing or wheezing that is getting worse  · You have a stiff neck, a fever, or a bad headache  · You cannot open your eye  · Your eyeball bulges out or you cannot move your eye  · You are more sleepy than normal, or you notice changes in your ability to think, move, or talk  · You have swelling of your forehead or scalp  Call your doctor if:   · You have vision changes, such as double vision  · Your eye and eyelid are red, swollen, and painful  · Your symptoms do not improve or go away after 10 days  · You have nausea and are vomiting  · Your nose is bleeding  · You have questions or concerns about your condition or care  Medicines: Your symptoms may go away on their own  Your healthcare provider may recommend watchful waiting for up to 10 days before starting antibiotics  You may need any of the following:  · Acetaminophen  decreases pain and fever  It is available without a doctor's order  Ask how much to take and how often to take it  Follow directions  Read the labels of all other medicines you are using to see if they also contain acetaminophen, or ask your doctor or pharmacist  Acetaminophen can cause liver damage if not taken correctly  Do not use more than 4 grams (4,000 milligrams) total of acetaminophen in one day  · NSAIDs , such as ibuprofen, help decrease swelling, pain, and fever  This medicine is available with or without a doctor's order  NSAIDs can cause stomach bleeding or kidney problems in certain people   If you take blood thinner medicine, always ask your healthcare provider if NSAIDs are safe for you  Always read the medicine label and follow directions  · Nasal steroid sprays  may help decrease inflammation in your nose and sinuses  · Decongestants  help reduce swelling and drain mucus in the nose and sinuses  They may help you breathe easier  · Antihistamines  help dry mucus in the nose and relieve sneezing  · Antibiotics  help treat or prevent a bacterial infection  · Take your medicine as directed  Contact your healthcare provider if you think your medicine is not helping or if you have side effects  Tell him or her if you are allergic to any medicine  Keep a list of the medicines, vitamins, and herbs you take  Include the amounts, and when and why you take them  Bring the list or the pill bottles to follow-up visits  Carry your medicine list with you in case of an emergency  Self-care:   · Rinse your sinuses as directed  Use a sinus rinse device to rinse your nasal passages with a saline (salt water) solution or distilled water  Do not use tap water  This will help thin the mucus in your nose and rinse away pollen and dirt  It will also help reduce swelling so you can breathe normally  · Use a humidifier  to increase air moisture in your home  This may make it easier for you to breathe and help decrease your cough  · Sleep with your head elevated  Place an extra pillow under your head before you go to sleep to help your sinuses drain  · Drink liquids as directed  Ask your healthcare provider how much liquid to drink each day and which liquids are best for you  Liquids will thin the mucus in your nose and help it drain  Avoid drinks that contain alcohol or caffeine  · Do not smoke, and avoid secondhand smoke  Nicotine and other chemicals in cigarettes and cigars can make your symptoms worse  Ask your healthcare provider for information if you currently smoke and need help to quit  E-cigarettes or smokeless tobacco still contain nicotine   Talk to your healthcare provider before you use these products  Prevent the spread of germs:   · Wash your hands often with soap and water  Wash your hands after you use the bathroom, change a child's diaper, or sneeze  Wash your hands before you prepare or eat food  · Stay away from people who are sick  Some germs spread easily and quickly through contact  Follow up with your doctor as directed: You may be referred to an ear, nose, and throat specialist  Write down your questions so you remember to ask them during your visits  © Copyright [employer] 2021 Information is for End User's use only and may not be sold, redistributed or otherwise used for commercial purposes  All illustrations and images included in CareNotes® are the copyrighted property of [employer]  or Tristan Jiménez  The above information is an  only  It is not intended as medical advice for individual conditions or treatments  Talk to your doctor, nurse or pharmacist before following any medical regimen to see if it is safe and effective for you

## 2021-09-24 ENCOUNTER — HOSPITAL ENCOUNTER (OUTPATIENT)
Dept: RADIOLOGY | Facility: HOSPITAL | Age: 71
Discharge: HOME | End: 2021-09-24
Attending: PODIATRIST
Payer: COMMERCIAL

## 2021-09-24 DIAGNOSIS — M21.70 ACQUIRED UNEQUAL LIMB LENGTH: ICD-10-CM

## 2021-09-24 PROCEDURE — 77073 BONE LENGTH STUDIES: CPT

## 2021-09-24 NOTE — PROGRESS NOTES
[facility]        NAME: Maci Barrett is a 70 y o  female  : 1950    MRN: [id number]  DATE: 2021  TIME: 9:03 AM    Assessment and Plan   Acute non-recurrent maxillary sinusitis [J01 00]  1  Acute non-recurrent maxillary sinusitis  azithromycin (ZITHROMAX) 250 mg tablet         Patient Instructions     Patient Instructions     Sinusitis   WHAT YOU NEED TO KNOW:   Sinusitis is inflammation or infection of your sinuses  Sinusitis is most often caused by a virus  Acute sinusitis may last up to 12 weeks  Chronic sinusitis lasts longer than 12 weeks  Recurrent sinusitis means you have 4 or more infections in 1 year  DISCHARGE INSTRUCTIONS:   Return to the emergency department if:   · You have trouble breathing or wheezing that is getting worse  · You have a stiff neck, a fever, or a bad headache  · You cannot open your eye  · Your eyeball bulges out or you cannot move your eye  · You are more sleepy than normal, or you notice changes in your ability to think, move, or talk  · You have swelling of your forehead or scalp  Call your doctor if:   · You have vision changes, such as double vision  · Your eye and eyelid are red, swollen, and painful  · Your symptoms do not improve or go away after 10 days  · You have nausea and are vomiting  · Your nose is bleeding  · You have questions or concerns about your condition or care  Medicines: Your symptoms may go away on their own  Your healthcare provider may recommend watchful waiting for up to 10 days before starting antibiotics  You may need any of the following:  · Acetaminophen  decreases pain and fever  It is available without a doctor's order  Ask how much to take and how often to take it  Follow directions  Read the labels of all other medicines you are using to see if they also contain acetaminophen, or ask your doctor or pharmacist  Acetaminophen can cause liver damage if not taken correctly   Do not use more than 4 grams (4,000 milligrams) total of acetaminophen in one day  · NSAIDs , such as ibuprofen, help decrease swelling, pain, and fever  This medicine is available with or without a doctor's order  NSAIDs can cause stomach bleeding or kidney problems in certain people  If you take blood thinner medicine, always ask your healthcare provider if NSAIDs are safe for you  Always read the medicine label and follow directions  · Nasal steroid sprays  may help decrease inflammation in your nose and sinuses  · Decongestants  help reduce swelling and drain mucus in the nose and sinuses  They may help you breathe easier  · Antihistamines  help dry mucus in the nose and relieve sneezing  · Antibiotics  help treat or prevent a bacterial infection  · Take your medicine as directed  Contact your healthcare provider if you think your medicine is not helping or if you have side effects  Tell him or her if you are allergic to any medicine  Keep a list of the medicines, vitamins, and herbs you take  Include the amounts, and when and why you take them  Bring the list or the pill bottles to follow-up visits  Carry your medicine list with you in case of an emergency  Self-care:   · Rinse your sinuses as directed  Use a sinus rinse device to rinse your nasal passages with a saline (salt water) solution or distilled water  Do not use tap water  This will help thin the mucus in your nose and rinse away pollen and dirt  It will also help reduce swelling so you can breathe normally  · Use a humidifier  to increase air moisture in your home  This may make it easier for you to breathe and help decrease your cough  · Sleep with your head elevated  Place an extra pillow under your head before you go to sleep to help your sinuses drain  · Drink liquids as directed  Ask your healthcare provider how much liquid to drink each day and which liquids are best for you   Liquids will thin the mucus in your nose and help it drain  Avoid drinks that contain alcohol or caffeine  · Do not smoke, and avoid secondhand smoke  Nicotine and other chemicals in cigarettes and cigars can make your symptoms worse  Ask your healthcare provider for information if you currently smoke and need help to quit  E-cigarettes or smokeless tobacco still contain nicotine  Talk to your healthcare provider before you use these products  Prevent the spread of germs:   · Wash your hands often with soap and water  Wash your hands after you use the bathroom, change a child's diaper, or sneeze  Wash your hands before you prepare or eat food  · Stay away from people who are sick  Some germs spread easily and quickly through contact  Follow up with your doctor as directed: You may be referred to an ear, nose, and throat specialist  Write down your questions so you remember to ask them during your visits  © Copyright [employer] 2021 Information is for End User's use only and may not be sold, redistributed or otherwise used for commercial purposes  All illustrations and images included in CareNotes® are the copyrighted property of A D A M , Inc  or [address] [place] [address]  The above information is an  only  It is not intended as medical advice for individual conditions or treatments  Talk to your doctor, nurse or pharmacist before following any medical regimen to see if it is safe and effective for you  Follow up with PCP in 3-5 days  Proceed to  ER if symptoms worsen  Chief Complaint     Chief Complaint   Patient presents with    Cough     Pt reports worsening cough, congestion with sinus pain and pressure  History of Present Illness       69 y/o female presents with a cough, sore throat, headache, facial pain and burning with the cough  Patient notes she typically gets a "bronchitis" worse sinus infection during the change of seasons    She was placed on amoxicillin about 2-3 weeks ago she completed the does but notes her symptoms never completely cleared  Pt takes advair daily for asthma and xyzol for seasonal allergies but has not been taking it yet  Denies any fever, body aches, SOB, CP or N/V/D  No know sick contacts or COVID contacts  Review of Systems   Review of Systems   Constitutional: Negative for chills and fever  HENT: Positive for congestion, sinus pressure and sore throat  Negative for ear pain  Respiratory: Positive for chest tightness  Negative for shortness of breath  Cardiovascular: Negative for chest pain and palpitations  Gastrointestinal: Negative for abdominal pain, diarrhea, nausea and vomiting  Musculoskeletal: Negative for arthralgias, back pain and myalgias  Neurological: Positive for headaches  Negative for weakness  All other systems reviewed and are negative          Current Medications       Current Outpatient Medications:     Tocilizumab (Actemra) 162 MG/0 9ML SOSY, , Disp: , Rfl:     Actemra 162 MG/0 9ML SOSY, , Disp: , Rfl:     albuterol (PROVENTIL HFA,VENTOLIN HFA) 90 mcg/act inhaler, , Disp: , Rfl:     Cholecalciferol 25 MCG (1000 UT) capsule, Take 1,000 Units by mouth daily, Disp: , Rfl:     losartan (COZAAR) 25 mg tablet, Take 25 mg by mouth daily , Disp: , Rfl:     meclizine (ANTIVERT) 12 5 MG tablet, Take 1 tablet (12 5 mg total) by mouth 3 (three) times a day as needed for dizziness, Disp: 30 tablet, Rfl: 0    omeprazole (PriLOSEC) 40 MG capsule, Take 40 mg by mouth daily , Disp: , Rfl:     prochlorperazine (COMPAZINE) 10 mg tablet, Take 10 mg by mouth every 6 (six) hours as needed, Disp: , Rfl:     Current Allergies     Allergies as of 09/19/2021 - Reviewed 09/19/2021   Allergen Reaction Noted    Avelox [moxifloxacin] Itching 03/12/2021    Fruit extracts Itching 03/12/2021    Nuts - food allergy Itching 03/12/2021            The following portions of the patient's history were reviewed and updated as appropriate: allergies, current medications, past family history, past medical history, past social history, past surgical history and problem list      Past Medical History:   Diagnosis Date    Breast cancer ([facility] ) 2020    Left  breast, triple negative    Rheumatoid arthritis ([facility] )        Past Surgical History:   Procedure Laterality Date    ANKLE SURGERY Right 2016    2 plates and screws    BREAST BIOPSY Left 07/02/2020    BREAST LUMPECTOMY Left 08/12/2020    left breast wire localized lumpectomy    BREAST MASS EXCISION Left 09/02/2020    short superior and long lateral left breast resection, left axillary sentinel lymph node biopsy    ELBOW FRACTURE REPAIR Right 1996    HYSTERECTOMY  2018       Family History   Problem Relation Age of Onset    Breast cancer Mother     Lung cancer Father     Breast cancer Paternal Grandmother          Medications have been verified  Objective   BP (!) 174/82   Pulse 80   Temp 97 8 °F (36 6 °C)   Resp 18   Ht 5' 3" (1 6 m)   Wt 69 4 kg (153 lb)   SpO2 95%   BMI 27 10 kg/m²        Physical Exam     Physical Exam  Constitutional:       Appearance: Normal appearance  HENT:      Head: Normocephalic and atraumatic  Right Ear: Tympanic membrane normal       Left Ear: Tympanic membrane normal       Nose: No congestion or rhinorrhea  Right Turbinates: Swollen  Left Turbinates: Swollen  Right Sinus: Maxillary sinus tenderness present  No frontal sinus tenderness  Left Sinus: Maxillary sinus tenderness present  No frontal sinus tenderness  Mouth/Throat:      Mouth: Mucous membranes are moist       Pharynx: Oropharynx is clear  No oropharyngeal exudate or posterior oropharyngeal erythema  Eyes:      Extraocular Movements: Extraocular movements intact  Conjunctiva/sclera: Conjunctivae normal       Pupils: Pupils are equal, round, and reactive to light  Cardiovascular:      Rate and Rhythm: Normal rate and regular rhythm  Pulses: Normal pulses  Heart sounds: Normal heart sounds  No murmur heard  Pulmonary:      Effort: Pulmonary effort is normal  No respiratory distress  Breath sounds: Normal breath sounds  No wheezing  Abdominal:      Palpations: Abdomen is soft  Tenderness: There is no abdominal tenderness  Musculoskeletal:         General: Normal range of motion  Cervical back: Normal range of motion  No rigidity  Skin:     General: Skin is warm and dry  Neurological:      Mental Status: She is alert and oriented to person, place, and time     Psychiatric:         Behavior: Behavior normal

## 2021-09-28 ENCOUNTER — OFFICE VISIT (OUTPATIENT)
Dept: PHYSICAL THERAPY | Facility: CLINIC | Age: 71
End: 2021-09-28
Payer: COMMERCIAL

## 2021-09-28 DIAGNOSIS — Z96.641 STATUS POST TOTAL HIP REPLACEMENT, RIGHT: ICD-10-CM

## 2021-09-28 DIAGNOSIS — M25.551 RIGHT HIP PAIN: Primary | ICD-10-CM

## 2021-09-28 PROCEDURE — 97110 THERAPEUTIC EXERCISES: CPT

## 2021-09-28 NOTE — PROGRESS NOTES
PT Discharge    Today's date: 2021  Patient name: Dex Posey  : 1950  MRN: [id number]  Referring provider: ALEXIS Loyola  Dx:   Encounter Diagnosis     ICD-10-CM    1  Right hip pain  M25 551    2  Status post total hip replacement, right  [id number]        Start Time: 0845  Stop Time: 0915  Total time in clinic (min): 30 minutes    Assessment  Assessment details: Dex Posey is a 79y o  year old female reports to physical therapy with symptoms consistent with referring diagnosis of: Right hip pain  (primary encounter diagnosis), Status post total hip replacement, right on 2021  Patient demonstrates significant improvements in her R hip ROM, strength, and overall function since starting PT intervention  After correction of lumbar spine lateral shift, patient is able to ambulate with ease and perform household tasks without increased pain  Podiatrist discovered slight LLD, with correction to be performed with orthotics in the near future  Patient has met all goals at this time  She is to be discharged from formal PT intervention with comprehensive home exercise program  Patient verbalized and demonstrated understanding of HEP and plan of care  Symptom irritability: low  Goals  STGs to be achieved in 4 weeks  -STG 1: Patient will be independent with HEP  -MET  -STG 2: Patient will have 0/10 R hip pain at rest  -MET  -STG 3: Patient will increase R hip ROM to within normal limits  -MET  -STG 4: Patient will report 40% functional improvement  -MET  -STG 5: Patient will demonstrate 1/2 grade MMT improvement in R hip  -MET   -STG 6: Patient will be able to walk for greater than 15 minutes with no pain  -MET    LTGs to be achieved in 8 weeks  -LTG 1: Patient will demonstrate independence with maintenance program  - MET  -LTG 2: Patient will perform all functional activities with less than 2/10 R hip pain  - MET  -LTG 3: Patient will improve FOTO score by 10 points   - MET  -LTG 4: Patient will report 80% functional improvement  -MET  -LTG 5: Patient will be able to walk for greater than 30 minutes with no pain  - MET  -LTG 6: Patient will demonstrate 1 grade MMT improvement in R hip  - MET      Plan  Patient would benefit from: PT eval and skilled physical therapy  Planned modality interventions: TENS, thermotherapy: hydrocollator packs, traction, ultrasound, cryotherapy and electrical stimulation/Russian stimulation  Planned therapy interventions: manual therapy, massage, ADL retraining, ADL training, balance, activity modification, neuromuscular re-education, patient education, postural training, strengthening, stretching, therapeutic activities, therapeutic exercise, flexibility, functional ROM exercises, gait training, graded activity, graded exercise, graded motor and home exercise program  Frequency: 3x week  Duration in weeks: 8  Treatment plan discussed with: patient        Subjective Evaluation    History of Present Illness  Date of surgery: 6/18/2021  Mechanism of injury: surgery  Mechanism of injury: Patient reports to skilled PT intervention s/p R CARLOS on 6/18/2021  She had a posterior hip replacement, with hip precautions for the next several weeks  She had home health 6 days/week for 2 weeks  She is using a reacher for grabbing objects  Functionally, she reports difficulties with ascending/descending the stairs reciprocally, walking and standing greater than 15 minutes, and sleeping through the night due to pain  She is currently wearing a stocking on her R LE  She currently has a tape on the incision, which was glued exteriorly  She is ambulating with a SPC  Patient has a history of R trimalleolar in 2016, and history of breast cancer  8/5/21: Patient denies pain in her R hip today  She notes increased soreness of the posterior lateral aspect of her R hip [place] her [place]   Functionally, she notes difficulties with ambulating greater than 30 minutes, ascending/descending stairs reciprocally, performing car transfers, and standing to meal prep  She reports 75% improvement in function since starting skilled PT intervention  21: Patient reports slight increased R hip pain today  She had follow up with podiatrist, who believes she has a slight LLD  She is to have orthotics fitted for correction of LLD  Patient feels as though she is able to perform all her exercises at home  She wishes to be discharged at this time  Functionally, she reports difficulties with performing floor transfers, lifting objects of weight, and walking greater than a mile  Pain  Current pain ratin  At best pain ratin  At worst pain ratin  Quality: dull ache  Relieving factors: relaxation, medications, rest and ice  Aggravating factors: stair climbing, walking and standing  Progression: no change    Social Support  Steps to enter house: yes  Stairs in house: yes   Lives in: multiple-level home  Lives with: spouse    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  )  Treatments  Previous treatment: physical therapy  Current treatment: medication  Discharged from (in last 30 days): home health care  Patient Goals  Patient goals for therapy: independence with ADLs/IADLs, improved balance and decreased pain  Patient goal: Gardening and cleaning the house  Objective     Observations     Right Hip  Positive for incision  Additional Observation Details  Incision healing well with no signs of drainage or infection  Palpation   Left   No palpable tenderness to the gluteus chet, gluteus medius, iliopsoas and piriformis  Right   No palpable tenderness to the gluteus chet, gluteus medius, iliopsoas and piriformis       Neurological Testing     Sensation     Hip   Left Hip   Intact: light touch    Right Hip   Intact: light touch    Active Range of Motion   Left Hip   Normal active range of motion    Right Hip   Flexion: 110 degrees   Abduction: 50 degrees     Passive Range of Motion   Left Hip   Normal passive range of motion    Right Hip   Flexion: 110 degrees   Abduction: 50 degrees     Strength/Myotome Testing     Left Hip   Normal muscle strength    Right Hip   Planes of Motion   Flexion: 4  Abduction: 4  Adduction: 4  External rotation: 4  Internal rotation: 4    Isolated Muscles   Iliopsoas: 4-    Tests     Additional Tests Details  No special tests performed, as ortho note reviewed prior to initial evaluation       FOTO goal: 70%    IE: 49%    7/27: 52%    8/5: 63%        Precautions: S/P R CARLOS 6/18/2021 (POSTERIOR HIP PRECAUTIONS), Hx R trimalleolar fx 2016, hx breast cancer 2020      Manuals 9/28/21 8/12/21 8/31/21 9/7 9/17   R hip PROM        STM        Mobility   Manual lateral shift correction, overcorrect to the L - AR Manual lateral shift correction, overcorrect to the L - AR Manual lateral shift correction, overcorrect the L - AR       Leukotape navicular sling R foot only -AR -   Neuro Re-Ed        Clamshells 2x10 no TB 3s hold at end range        Bridging  Painful today        Hip add squeeze  3s 20       SLB on foam         Tandem walking on foam                Ther Ex        NuStep warmup   10' L2  10' L2 10' L2  42miles   Hip flex, abd, ext in standing  20x ea RTB 20x   x10 ea ext, abd,    Lat pull down hang for paraspinal stretch     3x3' hang with manual overcorrection from PT 5x10 sec   LAQ   2x10 3#    Hip add iso with bridge 2x10 hold 5   SAQ         Heel slides  2x10 peanut   x10 with peanut   Mini squats  TG with TB 20 20      Step taps   Step ups 6" x20 for, lat      HR  30      Hip flexor stretch at step Supine over edge of table 20"x3  At Step 5x10s       SLR flex 2x10  2x10       Lumbar flexion with pball 3s x10 In seated x10 In seated for, right 3sx10 each  R SGIS at wall 2x10    Gait Training        Stair training  Sleep training with pillow under R hip REV, HEP rev  Sleep training with pillow under R hip x2'     Gait training        Modalities        Ice post

## 2021-10-25 ENCOUNTER — OFFICE VISIT (OUTPATIENT)
Dept: OTOLARYNGOLOGY | Facility: CLINIC | Age: 71
End: 2021-10-25
Payer: COMMERCIAL

## 2021-10-25 VITALS — BODY MASS INDEX: 27.46 KG/M2 | TEMPERATURE: 97.2 F | WEIGHT: 155 LBS | HEIGHT: 63 IN

## 2021-10-25 DIAGNOSIS — T17.208A FOREIGN BODY IN PHARYNX, INITIAL ENCOUNTER: Primary | ICD-10-CM

## 2021-10-25 DIAGNOSIS — K21.9 GASTROESOPHAGEAL REFLUX DISEASE WITHOUT ESOPHAGITIS: ICD-10-CM

## 2021-10-25 DIAGNOSIS — R49.0 DYSPHONIA: ICD-10-CM

## 2021-10-25 DIAGNOSIS — R09.82 POST-NASAL DRIP: ICD-10-CM

## 2021-10-25 DIAGNOSIS — J37.0 CHRONIC LARYNGITIS: ICD-10-CM

## 2021-10-25 PROCEDURE — 99204 OFFICE O/P NEW MOD 45 MIN: CPT | Performed by: STUDENT IN AN ORGANIZED HEALTH CARE EDUCATION/TRAINING PROGRAM

## 2021-10-25 PROCEDURE — 3008F BODY MASS INDEX DOCD: CPT | Performed by: STUDENT IN AN ORGANIZED HEALTH CARE EDUCATION/TRAINING PROGRAM

## 2021-10-25 PROCEDURE — 31575 DIAGNOSTIC LARYNGOSCOPY: CPT | Performed by: STUDENT IN AN ORGANIZED HEALTH CARE EDUCATION/TRAINING PROGRAM

## 2021-10-25 PROCEDURE — 40805 REMOVAL FOREIGN BODY MOUTH: CPT | Performed by: STUDENT IN AN ORGANIZED HEALTH CARE EDUCATION/TRAINING PROGRAM

## 2021-10-25 PROCEDURE — 1036F TOBACCO NON-USER: CPT | Performed by: STUDENT IN AN ORGANIZED HEALTH CARE EDUCATION/TRAINING PROGRAM

## 2021-10-25 RX ORDER — CELECOXIB 200 MG/1
200 CAPSULE ORAL 2 TIMES DAILY
COMMUNITY
Start: 2021-10-21 | End: 2022-07-06

## 2021-10-25 RX ORDER — MELOXICAM 15 MG/1
TABLET ORAL
COMMUNITY
Start: 2021-08-12 | End: 2022-04-29 | Stop reason: ALTCHOICE

## 2021-10-25 RX ORDER — DENOSUMAB 60 MG/ML
INJECTION SUBCUTANEOUS
COMMUNITY
Start: 2021-10-22 | End: 2022-04-29 | Stop reason: ALTCHOICE

## 2021-10-25 RX ORDER — METRONIDAZOLE 250 MG/1
500 TABLET ORAL
COMMUNITY
End: 2022-04-29 | Stop reason: ALTCHOICE

## 2021-10-25 RX ORDER — GABAPENTIN 300 MG/1
CAPSULE ORAL
COMMUNITY
Start: 2021-10-22 | End: 2022-04-29 | Stop reason: ALTCHOICE

## 2021-10-25 RX ORDER — LOSARTAN POTASSIUM 25 MG/1
25 TABLET ORAL DAILY
COMMUNITY
End: 2022-04-29 | Stop reason: ALTCHOICE

## 2021-10-25 RX ORDER — AMOXICILLIN 500 MG/1
CAPSULE ORAL
COMMUNITY
Start: 2021-10-18 | End: 2022-04-29 | Stop reason: ALTCHOICE

## 2021-11-10 ENCOUNTER — HOSPITAL ENCOUNTER (OUTPATIENT)
Dept: RADIOLOGY | Facility: HOSPITAL | Age: 71
Discharge: HOME | End: 2021-11-10
Payer: COMMERCIAL

## 2021-11-10 VITALS — BODY MASS INDEX: 27.46 KG/M2 | WEIGHT: 155 LBS | HEIGHT: 63 IN

## 2021-11-10 DIAGNOSIS — C50.912 INVASIVE DUCTAL CARCINOMA OF LEFT BREAST (HCC): ICD-10-CM

## 2021-11-10 PROCEDURE — 77066 DX MAMMO INCL CAD BI: CPT

## 2021-11-10 PROCEDURE — G0279 TOMOSYNTHESIS, MAMMO: HCPCS

## 2021-12-10 ENCOUNTER — RADIATION ONCOLOGY FOLLOW-UP (OUTPATIENT)
Dept: RADIATION ONCOLOGY | Facility: HOSPITAL | Age: 71
End: 2021-12-10
Attending: RADIOLOGY
Payer: COMMERCIAL

## 2021-12-10 VITALS
TEMPERATURE: 96.8 F | HEART RATE: 82 BPM | OXYGEN SATURATION: 97 % | RESPIRATION RATE: 16 BRPM | WEIGHT: 161.2 LBS | DIASTOLIC BLOOD PRESSURE: 80 MMHG | SYSTOLIC BLOOD PRESSURE: 138 MMHG | BODY MASS INDEX: 28.56 KG/M2

## 2021-12-10 DIAGNOSIS — C50.912 INVASIVE DUCTAL CARCINOMA OF LEFT BREAST (HCC): Primary | ICD-10-CM

## 2021-12-10 PROCEDURE — 99211 OFF/OP EST MAY X REQ PHY/QHP: CPT | Performed by: RADIOLOGY

## 2021-12-10 PROCEDURE — 99213 OFFICE O/P EST LOW 20 MIN: CPT | Performed by: RADIOLOGY

## 2022-04-28 ENCOUNTER — RA CDI HCC (OUTPATIENT)
Dept: OTHER | Facility: HOSPITAL | Age: 72
End: 2022-04-28

## 2022-04-28 NOTE — PROGRESS NOTES
[facility]  coding opportunities     I11 0     Chart Reviewed number of suggestions sent to Provider: 1     Patients Insurance     Medicare Insurance: [address] [place] [address]

## 2022-04-29 ENCOUNTER — OFFICE VISIT (OUTPATIENT)
Dept: FAMILY MEDICINE CLINIC | Facility: CLINIC | Age: 72
End: 2022-04-29
Payer: COMMERCIAL

## 2022-04-29 VITALS
DIASTOLIC BLOOD PRESSURE: 76 MMHG | HEART RATE: 81 BPM | RESPIRATION RATE: 18 BRPM | WEIGHT: 165 LBS | TEMPERATURE: 97.3 F | SYSTOLIC BLOOD PRESSURE: 122 MMHG | OXYGEN SATURATION: 97 % | HEIGHT: 63 IN | BODY MASS INDEX: 29.23 KG/M2

## 2022-04-29 DIAGNOSIS — G89.29 CHRONIC LOW BACK PAIN WITH SCIATICA, SCIATICA LATERALITY UNSPECIFIED, UNSPECIFIED BACK PAIN LATERALITY: ICD-10-CM

## 2022-04-29 DIAGNOSIS — M06.9 RHEUMATOID ARTHRITIS, INVOLVING UNSPECIFIED SITE, UNSPECIFIED WHETHER RHEUMATOID FACTOR PRESENT (HCC): ICD-10-CM

## 2022-04-29 DIAGNOSIS — M17.11 OSTEOARTHRITIS OF RIGHT KNEE, UNSPECIFIED OSTEOARTHRITIS TYPE: ICD-10-CM

## 2022-04-29 DIAGNOSIS — C50.912 INVASIVE DUCTAL CARCINOMA OF LEFT BREAST (HCC): Primary | ICD-10-CM

## 2022-04-29 DIAGNOSIS — K21.9 GASTROESOPHAGEAL REFLUX DISEASE WITHOUT ESOPHAGITIS: ICD-10-CM

## 2022-04-29 DIAGNOSIS — M54.40 CHRONIC LOW BACK PAIN WITH SCIATICA, SCIATICA LATERALITY UNSPECIFIED, UNSPECIFIED BACK PAIN LATERALITY: ICD-10-CM

## 2022-04-29 PROCEDURE — 1101F PT FALLS ASSESS-DOCD LE1/YR: CPT | Performed by: FAMILY MEDICINE

## 2022-04-29 PROCEDURE — 1160F RVW MEDS BY RX/DR IN RCRD: CPT | Performed by: FAMILY MEDICINE

## 2022-04-29 PROCEDURE — 1090F PRES/ABSN URINE INCON ASSESS: CPT | Performed by: FAMILY MEDICINE

## 2022-04-29 PROCEDURE — 99214 OFFICE O/P EST MOD 30 MIN: CPT | Performed by: FAMILY MEDICINE

## 2022-04-29 PROCEDURE — 3725F SCREEN DEPRESSION PERFORMED: CPT | Performed by: FAMILY MEDICINE

## 2022-04-29 PROCEDURE — 3288F FALL RISK ASSESSMENT DOCD: CPT | Performed by: FAMILY MEDICINE

## 2022-04-29 PROCEDURE — 1036F TOBACCO NON-USER: CPT | Performed by: FAMILY MEDICINE

## 2022-04-29 PROCEDURE — 3008F BODY MASS INDEX DOCD: CPT | Performed by: FAMILY MEDICINE

## 2022-04-29 NOTE — PROGRESS NOTES
Assessment/Plan:       Diagnoses and all orders for this visit:    Invasive ductal carcinoma of left breast [facility])  Comments:  Has been seeing heme-onc at [facility], however her doctor (Dr Mitali Gómez) recently left the practice  She would like to establish with a new oncologist in [place] [id number]  Orders:  -     Ambulatory Referral to Hematology / Oncology; Future    Rheumatoid arthritis, involving unspecified site, unspecified whether rheumatoid factor present ([facility] )  Comments:  Managed by rheumatologist      Gastroesophageal reflux disease without esophagitis  Comments:  States omeprazole 40mg daily has not been helping  She will trial pepcid  Orders:  -     Ambulatory referral to Gastroenterology; Future    Chronic low back pain with sciatica, sciatica laterality unspecified, unspecified back pain laterality  -     Ambulatory Referral to Physical Therapy; Future    Osteoarthritis of right knee, unspecified osteoarthritis type  -     Ambulatory Referral to Physical Therapy; Future        Subjective:      Patient ID: Samantha Recinos is a 70 y o  female  HPI     Zakia Aldridge presents today for abdominal issues  She has history of GERD and possible early Haq's esophagus  She has been on omeprazole 40mg daily with no relief of her acid reflux symptoms  She reports acid feeling in her chest improved by an antacid, nausea and chronic cough  Also reports chronic low back pain and arthritis of her right knee  Has been seeing an orthopedic surgeon  Has done PT in the past and would like to do this again  Follows heme-onc at [facility] for breast cancer and radiation oncologist at [place] [id number]  Her heme-onc at [facility] recently left the practice  She would like to establish with a [facility] heme-onc         The following portions of the patient's history were reviewed and updated as appropriate: allergies, current medications, past family history, past medical history, past social history, past surgical history and problem list     Review of Systems   Constitutional: Negative  HENT: Negative  Eyes: Negative  Respiratory: Negative  Cardiovascular: Negative  Gastrointestinal: Negative  Acid reflux   Endocrine: Negative  Genitourinary: Negative  Musculoskeletal: Positive for arthralgias and back pain  Skin: Negative  Allergic/Immunologic: Negative  Neurological: Negative  Hematological: Negative  Psychiatric/Behavioral: Negative  Objective:      /76   Pulse 81   Temp (!) 97 3 °F (36 3 °C)   Resp 18   Ht 5' 3" (1 6 m)   Wt 74 8 kg (165 lb)   SpO2 97%   BMI 29 23 kg/m²          Physical Exam  Constitutional:       General: She is not in acute distress  Appearance: She is well-developed  She is not diaphoretic  HENT:      Head: Normocephalic and atraumatic  Cardiovascular:      Rate and Rhythm: Normal rate and regular rhythm  Heart sounds: Normal heart sounds  No murmur heard  No friction rub  No gallop  Pulmonary:      Effort: Pulmonary effort is normal  No respiratory distress  Breath sounds: Normal breath sounds  No wheezing or rales  Chest:      Chest wall: No tenderness  Musculoskeletal:         General: No deformity  Normal range of motion  Cervical back: Normal range of motion and neck supple  Skin:     General: Skin is warm and dry  Neurological:      Mental Status: She is alert and oriented to person, place, and time  Psychiatric:         Behavior: Behavior normal          Thought Content:  Thought content normal          Judgment: Judgment normal

## 2022-05-02 ENCOUNTER — TELEPHONE (OUTPATIENT)
Dept: HEMATOLOGY ONCOLOGY | Facility: CLINIC | Age: 72
End: 2022-05-02

## 2022-05-02 NOTE — TELEPHONE ENCOUNTER
Forwarding [facility] Call Center    Phone Message: Ria  Phone: [phone]    May a detailed message be left on voicemail: yes    Reason for Call: Patient has infusion scheduled at 8am tomorrow.  Patient has a cold, looking for ok to still do infusion.  Congestion is the only symptom.  Occasional cough with no production.     Action Taken: Message routed to:  Adult Clinics: Rheumatology p [id number]

## 2022-05-02 NOTE — TELEPHONE ENCOUNTER
Soft Intake Form   Patient Details   Erick Callejas     1950     Reason For Appointment   Who is Calling? Patient   If not patient, Name? self    DID YOU CONFIRM INSURANCE WITH PATIENT? Yes   Who is the Referring Doctor? Chris Cevallos MD   What is the diagnosis? Invasive ductal carcinoma of left breast ([address] )   Has this diagnosis been confirmed by a biopsy/surgery? If yes, what is the date it was done? Yes, [address]   Biopsy done at [place] [address]? If not, where was it done? Yes, at [facility]   Was imaging done, and was it done at [facility]? If not, where was it done? Both [facility] and [facility]   Have you been seen by another Oncologist?  If so, who and where (name of facility, city and state) Yes, Dr Ren Kirkpatrick at [facility]   For 2nd Opinions Only: Are you currently undergoing treatment, or are you scheduled to start treatment? If yes, name of facility, [facility] and state No, follow up    For "History Of" only: Have you completed treatment? Yes, completed May 2021   Have you had Genetic Testing done in the past?  If so, advise to bring test results to their visit Yes at [address]   Did you advise to have records faxed to [phone]? Yes   Did you advise to have disks sent to the proper address with imaging? ("History of" Patients)  5 years of imaging for breast patients-Mammos, US etc Yes   Scheduling Information   Did you send new patient paperwork? Email or mail? Yes sent to email address    What is the best call back number?    (If the [facility] is calling, please use their number) [phone]   Miscellaneous Information     Scheduled appmt with Dr Cm Gonzalez 6/1 9:40Am at [facility]

## 2022-05-03 ENCOUNTER — DOCUMENTATION (OUTPATIENT)
Dept: HEMATOLOGY ONCOLOGY | Facility: CLINIC | Age: 72
End: 2022-05-03

## 2022-05-03 NOTE — TELEPHONE ENCOUNTER
Intake received- chart reviewed    Patient is a history of and no further action needed from my role

## 2022-05-11 ENCOUNTER — TELEPHONE (OUTPATIENT)
Dept: GASTROENTEROLOGY | Facility: AMBULARY SURGERY CENTER | Age: 72
End: 2022-05-11

## 2022-05-11 ENCOUNTER — CONSULT (OUTPATIENT)
Dept: GASTROENTEROLOGY | Facility: CLINIC | Age: 72
End: 2022-05-11
Payer: COMMERCIAL

## 2022-05-11 VITALS
TEMPERATURE: 97.2 F | SYSTOLIC BLOOD PRESSURE: 163 MMHG | WEIGHT: 165.2 LBS | DIASTOLIC BLOOD PRESSURE: 88 MMHG | BODY MASS INDEX: 29.27 KG/M2 | RESPIRATION RATE: 18 BRPM | HEART RATE: 66 BPM | HEIGHT: 63 IN

## 2022-05-11 DIAGNOSIS — R14.0 ABDOMINAL BLOATING: ICD-10-CM

## 2022-05-11 DIAGNOSIS — R10.32 LLQ PAIN: ICD-10-CM

## 2022-05-11 DIAGNOSIS — K57.90 DIVERTICULAR DISEASE: ICD-10-CM

## 2022-05-11 DIAGNOSIS — K21.9 GASTROESOPHAGEAL REFLUX DISEASE WITHOUT ESOPHAGITIS: ICD-10-CM

## 2022-05-11 DIAGNOSIS — Z12.11 SCREENING FOR COLON CANCER: Primary | ICD-10-CM

## 2022-05-11 PROCEDURE — 99204 OFFICE O/P NEW MOD 45 MIN: CPT | Performed by: INTERNAL MEDICINE

## 2022-05-11 PROCEDURE — 3008F BODY MASS INDEX DOCD: CPT | Performed by: INTERNAL MEDICINE

## 2022-05-11 PROCEDURE — 1036F TOBACCO NON-USER: CPT | Performed by: INTERNAL MEDICINE

## 2022-05-11 RX ORDER — LOSARTAN POTASSIUM 50 MG/1
50 TABLET ORAL DAILY
COMMUNITY
Start: 2022-04-06

## 2022-05-11 RX ORDER — FAMOTIDINE 20 MG/1
20 TABLET, FILM COATED ORAL 2 TIMES DAILY
COMMUNITY

## 2022-05-11 RX ORDER — SODIUM, POTASSIUM,MAG SULFATES 17.5-3.13G
177 SOLUTION, RECONSTITUTED, ORAL ORAL ONCE
Qty: 177 ML | Refills: 0 | OUTPATIENT
Start: 2022-05-11 | End: 2022-07-12

## 2022-05-11 RX ORDER — OMEPRAZOLE 20 MG/1
40 CAPSULE, DELAYED RELEASE ORAL DAILY
Qty: 90 CAPSULE | Refills: 3 | Status: SHIPPED | OUTPATIENT
Start: 2022-05-11 | End: 2022-07-12 | Stop reason: SDUPTHER

## 2022-05-11 NOTE — PATIENT INSTRUCTIONS
If you start omeprazole,  Please hold it 2-4 weeks prior to the procedure  (Taking pepcid is OK)    For your bowel habits, as we discussed during today's visit, DO THIS FOR TWO WEEKS,   - I would recommend starting psyllium, fiber supplementation  This can be done with use of Konsyl, Citrucil, Metamucil or Benefiber fiber, which can be purchased over-the-counter  I would recommend starting slow with 1 tsp mixed into a large glass of water, once a day, and increasing to goal of 1 tbsp mixed into a large glass of water per day  - this helps with both diarrhea and constipation, helping to bulk the stool, and should not cause constipation or diarrhea, but treat both  - the only side effect of this can be bloating, if this occurs, cut down on the dose, and increase your water intake or alternatively, consider switching to an alternative as listed above    IF NO BENEFIT-  You have been prescribed miralax (polyethylene glycol) for treatment of your CONSTIPATION  Start 1 capful daily  Take this dose x 3 days  If your symptoms are improved, great! Continue this dose daily  If you have diarrhea (stools are loose, associated with accidents, or urgency) with this dose, cut down to 1/2 capful daily  Continue to titrate down until you find the dose for you  This might be 1 tbsp daily  Wait 3 days before making a change  If you have continued constipation with 1 capful daily, you may need a higher dose  Start with 1 5 capfuls daily and titrate upward  Eg might need 1 capful twice daily  There is no maximum dose, whatever works for you  Again, wait 3 days before making a change      Scheduled date of EGD/colonoscopy (as of today): 7/12/2022  Physician performing EGD/colonoscopy: Dr Kenyatta Elena  Location of EGD/colonoscopy: [facility]  Desired bowel prep reviewed with patient: Suprep  Instructions reviewed with patient by: [place]  Clearances:    None

## 2022-05-11 NOTE — TELEPHONE ENCOUNTER
Patient is scheduled for colonoscopy and EGD on July 12 , 2022 at [address] [place] [address] with Ben Velazco MD  Patient is aware of pre-procedure prep of Suprep and they will be called the day prior between 2 and 6 pm for time to report for procedure  Pre-procedure prep has been given to the patient  in person  on May 11, 2022

## 2022-05-11 NOTE — PROGRESS NOTES
[facility] - Outpatient Consultation  Giulia Moore 70 y o  female MRN: [id number]  Encounter: [id number]      PCP: Chantal Razo MD  Referring: Chantal Razo MD  [address] [place] [address]  [address]  [place],  [place] [address]      ASSESSMENT AND PLAN:      1  Gastroesophageal reflux disease without esophagitis  Discussed anti-reflux measures, and offered handout detailing, including weight loss, avoidance of lying down after meals, recognize/avoid trigger foods, and elevating the head of bed  Counseled on benefits of PPI therapy -  She will take lowest effective dose of omeprazole  - Ambulatory referral to Gastroenterology  - EGD; Future, evaluate for objective evidence of GERD; recommend hold PPI for 2-4 weeks prior to procedure, biopsies to r/o Haq's  - omeprazole (PriLOSEC) 20 mg delayed release capsule; Take 2 capsules (40 mg total) by mouth in the morning  Dispense: 90 capsule; Refill: 3    2  Diverticular disease  3  LLQ pain  4  Abdominal bloating  Symptomatic diverticular disease vs constipation  Recommend psyllium fiber with progression to miralax if fiber ineffective  Recommend increased water intake, exercise  - US right upper quadrant; Future, r/o obstructing lesions    5  Screening for colon cancer  Last colonoscopy in 2018  - Colonoscopy; Future  - PAT Covid Screening; Future  - Na Sulfate-K Sulfate-Mg Sulf (Suprep Bowel Prep Kit) 17 5-3 13-1 6 GM/177ML SOLN; Take 177 mL by mouth once for 1 dose Take 177 mL by mouth once for 1 dose  Dispense: 177 mL; Refill: 0      ______________________________________________________________________    CC:  Chief Complaint   Patient presents with    Diarrhea    GERD    Constipation       HPI:      Patient is a 45-year-old female referred for diarrhea, GERD, constipation  She has RA, invasive ductal carcinoma of the left breast s/p radiation and lumpectomy, chronic low back pain, osteoarthritis   Her last EGD/colonoscopy were performed in 2018-she diverticular disease on colonoscopy, was recommended for repeat in five years  Her endoscopy demonstrated erosive gastritis, with 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, and hiatal hernia  Pathology results are not available  She has been back and forth between omeprazole 20 mg and 40 mg daily  She does find improved control of her reflux, hoarseness, regurgitation symptoms at 40 mg daily  She is worse recently been worried about brain fog of the side effects from the medication, and has switched to Pepcid, with some relief of her symptoms  She continues to find them bothersome, but not debilitating  She does note worsening of her symptoms in the evenings after dinner, at least most days of the week  Onions, peanut butter, eggs also exacerbate her symptoms  She has a history of diverticulitis remotely complicated by abscess  She reports occasional straining and constipation, which is related to changes in anti-inflammatory medications for treatment of her rheumatoid arthritis  She has occasional left lower quadrant discomfort  She has used MiraLax with benefit  REVIEW OF SYSTEMS:    CONSTITUTIONAL: Denies any fever, chills, rigors, and weight loss  HEENT: No earache or tinnitus  Denies hearing loss or visual disturbances  CARDIOVASCULAR: No chest pain or palpitations  RESPIRATORY: Denies any cough, hemoptysis, shortness of breath or dyspnea on exertion  GASTROINTESTINAL: As noted in the History of Present Illness  GENITOURINARY: No problems with urination  Denies any hematuria or dysuria  NEUROLOGIC: No dizziness or vertigo, denies headaches  MUSCULOSKELETAL: Denies any muscle or joint pain  SKIN: Denies skin rashes or itching  ENDOCRINE: Denies excessive thirst  Denies intolerance to heat or cold  PSYCHOSOCIAL: Denies depression or anxiety  Denies any recent memory loss         Historical Information   Past Medical History:   Diagnosis Date    BRCA1 negative     BRCA2 negative  Breast cancer ([address] ) 2020    Left  breast, triple negative    Rheumatoid arthritis ([address] )      Past Surgical History:   Procedure Laterality Date    ANKLE SURGERY Right 2016    2 plates and screws    BREAST BIOPSY Left 07/02/2020    BREAST LUMPECTOMY Left 08/12/2020    left breast wire localized lumpectomy    BREAST MASS EXCISION Left 09/02/2020    short superior and long lateral left breast resection, left axillary sentinel lymph node biopsy    COLONOSCOPY      ELBOW FRACTURE REPAIR Right 1996    HYSTERECTOMY  2018     Social History   Social History     Substance and Sexual Activity   Alcohol Use Not Currently     Social History     Substance and Sexual Activity   Drug Use Never     Social History     Tobacco Use   Smoking Status Never Smoker   Smokeless Tobacco Never Used     Family History   Problem Relation Age of Onset    Breast cancer Mother     Lung cancer Father     Breast cancer Paternal Grandmother        Meds/Allergies       Current Outpatient Medications:     Actemra 162 MG/0 9ML SOSY    albuterol (PROVENTIL HFA,VENTOLIN HFA) 90 mcg/act inhaler    Cholecalciferol 25 MCG (1000 UT) capsule    denosumab (PROLIA) 60 mg/mL    famotidine (PEPCID) 20 mg tablet    fluticasone-salmeterol (Advair) 250-50 mcg/dose inhaler    losartan (COZAAR) 25 mg tablet    Multiple Vitamin (MULTIVITAMIN ADULT PO)    Na Sulfate-K Sulfate-Mg Sulf (Suprep Bowel Prep Kit) 17 5-3 13-1 6 GM/177ML SOLN    omeprazole (PriLOSEC) 20 mg delayed release capsule    celecoxib (CeleBREX) 200 mg capsule    losartan (COZAAR) 50 mg tablet    Allergies   Allergen Reactions    Avelox [Moxifloxacin] Itching    Cantaloupe Extract Allergy Skin Test - Food Allergy Other (See Comments)    Fruit Extracts Itching     Cantelope melon     Nuts - Food Allergy Itching           Objective     Blood pressure 163/88, pulse 66, temperature (!) 97 2 °F (36 2 °C), resp  rate 18, height 5' 3" (1 6 m), weight 74 9 kg (165 lb 3 2 oz)   Body mass index is 29 26 kg/m²  PHYSICAL EXAM:      General Appearance:   Alert, cooperative, no distress   HEENT:   Normocephalic, atraumatic, anicteric  Neck:  Supple, symmetrical, trachea midline   Lungs:   Clear to auscultation bilaterally; no rales, rhonchi or wheezing; respirations unlabored    Heart[de-identified]   Regular rate and rhythm; no murmur, rub, or gallop  Abdomen:   Soft, +LLQ tenderness to palpation and epigastrium, non-distended; normal bowel sounds; no masses, no organomegaly    Genitalia:   Deferred    Rectal:   Deferred    Extremities:  No cyanosis, clubbing or edema    Pulses:  2+ and symmetric    Skin:  No jaundice, rashes, or lesions    Lymph nodes:  No palpable cervical lymphadenopathy        Lab Results:     Lab Results   Component Value Date    WBC 7 1 04/13/2021    HGB 12 2 04/13/2021    HCT 37 5 04/13/2021    MCV 92 04/13/2021     04/13/2021       No results found for: NA, K, CL, CO2, ANIONGAP, BUN, CREATININE, GLUCOSE, GLUF, CALCIUM, CORRECTEDCA, AST, ALT, ALKPHOS, PROT, BILITOT, EGFR    No results found for: INR, PROTIME      Radiology Results:   No results found  Portions of the record may have been created with voice recognition software  Occasional wrong word or "sound a like" substitutions may have occurred due to the inherent limitations of voice recognition software  Read the chart carefully and recognize, using context, where substitutions have occurred

## 2022-05-17 ENCOUNTER — TELEPHONE (OUTPATIENT)
Dept: FAMILY MEDICINE CLINIC | Facility: CLINIC | Age: 72
End: 2022-05-17

## 2022-05-17 DIAGNOSIS — J45.20 MILD INTERMITTENT ASTHMA WITHOUT COMPLICATION: Primary | ICD-10-CM

## 2022-05-17 NOTE — TELEPHONE ENCOUNTER
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   Jonathan Brizuela MA

## 2022-05-17 NOTE — TELEPHONE ENCOUNTER
Intake received  Pt has an active [employer] med repl plan that runs on a cal year     No deduct   Out of pocket $400 met $50  Called pt to go over the benefits  & she sd that she should be fine & she has no questions or concerns at this time  If she does she will call me back

## 2022-05-17 NOTE — TELEPHONE ENCOUNTER
Can we find out what she takes this for? I don't have any history of her having any lung diseases and I did not discuss this with her at our visit

## 2022-05-17 NOTE — TELEPHONE ENCOUNTER
Pt left message on hotline asking for refill on advair wixella inhaler 250-50   I dont see on medlist   Please advise  Thank you  Adrienne Mcgregor MA

## 2022-05-18 NOTE — TELEPHONE ENCOUNTER
Spoke to patient and informed her  She asked us to send a medical release form to her old Pulmonologists office so we can get her records  Filled out form and placed up front for the patient to come and sign it  Called patient and informed her that she has to come into the office and sign the form     Please fax form to [phone]  Judith Castro LPN

## 2022-05-25 ENCOUNTER — HOSPITAL ENCOUNTER (OUTPATIENT)
Dept: RADIOLOGY | Facility: HOSPITAL | Age: 72
Discharge: HOME | End: 2022-05-25
Attending: INTERNAL MEDICINE
Payer: COMMERCIAL

## 2022-05-25 DIAGNOSIS — R14.0 ABDOMINAL BLOATING: ICD-10-CM

## 2022-05-25 PROCEDURE — 76705 ECHO EXAM OF ABDOMEN: CPT

## 2022-05-31 DIAGNOSIS — R14.0 ABDOMINAL BLOATING: Primary | ICD-10-CM

## 2022-05-31 DIAGNOSIS — K86.2 PANCREATIC CYST: ICD-10-CM

## 2022-06-01 ENCOUNTER — CONSULT (OUTPATIENT)
Dept: HEMATOLOGY ONCOLOGY | Facility: MEDICAL CENTER | Age: 72
End: 2022-06-01
Payer: COMMERCIAL

## 2022-06-01 VITALS
OXYGEN SATURATION: 97 % | HEIGHT: 63 IN | RESPIRATION RATE: 18 BRPM | TEMPERATURE: 97.6 F | BODY MASS INDEX: 28.7 KG/M2 | DIASTOLIC BLOOD PRESSURE: 80 MMHG | SYSTOLIC BLOOD PRESSURE: 148 MMHG | WEIGHT: 162 LBS | HEART RATE: 73 BPM

## 2022-06-01 DIAGNOSIS — M06.9 RHEUMATOID ARTHRITIS, INVOLVING UNSPECIFIED SITE, UNSPECIFIED WHETHER RHEUMATOID FACTOR PRESENT (HCC): Primary | ICD-10-CM

## 2022-06-01 DIAGNOSIS — C50.912 INVASIVE DUCTAL CARCINOMA OF LEFT BREAST (HCC): ICD-10-CM

## 2022-06-01 PROCEDURE — 3008F BODY MASS INDEX DOCD: CPT | Performed by: INTERNAL MEDICINE

## 2022-06-01 PROCEDURE — 1036F TOBACCO NON-USER: CPT | Performed by: INTERNAL MEDICINE

## 2022-06-01 PROCEDURE — 99204 OFFICE O/P NEW MOD 45 MIN: CPT | Performed by: INTERNAL MEDICINE

## 2022-06-01 RX ORDER — MELOXICAM 15 MG/1
TABLET ORAL DAILY
COMMUNITY
Start: 2022-05-16

## 2022-06-01 NOTE — PROGRESS NOTES
Margot Johnson  1950  [facility] [place] [address] [facility] [address]  HEMATOLOGY/ONCOLOGY CONSULTATION REPORT    DISCUSSION/SUMMARY:    79-year-old female with history of pT1c pN0 M0 TNBC s/p BCT/SLN sampling, radiation and adjuvant chemotherapy (AC x 4, paclitaxel weekly x 12)  Presently Mrs Nury Honeycutt feels well and clinically there are no concerning findings  Patient will follow-up with Radiation Oncology as directed  The plan is to continue with self-breast exams and yearly mammograms  NCCN guidelines 2 2022 hereditary cancer testing criteria states that genetic testing should be considered in a number scenarios  Specifically any patients of any age with triple negative breast cancer should be evaluated (CRIT-2)  As above, patient's mother had breast cancer  This was discussed with the patient - Mrs Nury Honeycutt will make a decision on whether or not she will see the genetic counselor  Patient states that her daughter is aware of her potential increased risk for breast cancer  Recent right upper quadrant ultrasound demonstrated a 7 mm kidney cyst   Patient has had no  issues  The plan is to continue with surveillance  Patient also with a hypoechoic area by the pancreatic tail (recently seen on the ultrasound)  As above, patient is being followed by GI; a CT of the abdomen and pelvis has been requested  Mrs Nury Honeycutt has a questionable history of Haq esophagus  Patient has a pending routine colonoscopy as well as an upper endoscopy  The last CBC available through epic is from June 2021  Patient was not symptomatic from an anemia standpoint; color was pretty good  The CBC results were seen after patient left the office - hemoglobin level was 9 5 g/dL     Nursing staff will call the patient and send her for repeat CBC  Patient is to return in 6 months  Mrs Nury Honeycutt knows to call the hematology/oncology office if there are any other questions or concerns  Carefully review your medication list and verify that the list is accurate and up-to-date  Please call the hematology/oncology office if there are medications missing from the list, medications on the list that you are not currently taking or if there is a dosage or instruction that is different from how you're taking that medication  Patient goals and areas of care:  Breast cancer surveillance  Barriers to care:  None  Patient is able to self-care   ______________________________________________________________________________________    Chief Complaint   Patient presents with    Consult    FHx Breast Cancer     Oncology History   Invasive ductal carcinoma of left breast ([facility] [address] )   2020 Initial Diagnosis    Invasive ductal carcinoma of left breast ([facility] [address] )     8/5/2020 Surgery    Breast, Left, Left Breast Mass, Wire Localized Lumpectomy (1 slides [id number] [facility], collected 8/5/2020):  Dr Collene Essex, Surgeon    - Invasive breast carcinoma of no special type (ductal NST/invasive ductal carcinoma)  * Size: 15 mm   * Karine grade 3 of 3 (total score: 9 of 9)    -- tubule formation, score 3    -- nuclear grade, score 3    -- mitoses, score 3    * Breast tumor prognostic profile (per report)    -- ER: Negative 0%    -- AK: Negative 0%    -- Her2: Negative 0   * Ductal carcinoma in situ (DCIS): Not identified  * Margins: Negative for carcinoma, less than 1 mm from the closest lateral margin  * Pathologic stage (AJCC 8th ed ): pT1c         9/2/2020 Surgery    Left breast re-excision lumpectomy with SLNB  ([address], [facility], Dr Savanna Carrero)     A  Left breast lumpectomy:  - benign breast tissue with previous lumpectomy cavity showing marked tissue reaction including foreign body giant cell reaction, granulation tissue and fat necrosis  - No residual carcinoma seen      B  Left axillary lymph node:  - two negative lymph nodes  10/2020 - 3/15/2021 Chemotherapy    Adjuvant chemotherapy [facility])  AC (Adriamycin, Cytoxan) x 4 cycles  Paclitaxel x 12 cycles     Dr Jaime Alanis, [facility]      3/12/2021 -  Cancer Staged    Staging form: Breast, AJCC 8th Edition  - Pathologic: Stage IB (pT1c, pN0, cM0, G3, ER-, AR-, HER2-) - Signed by Samual Gilford, MD on 3/12/2021  Multigene prognostic tests performed: None  Histologic grading system: 3 grade system       4/12/2021 - 5/10/2021 Radiation    4256 cGy in 16 fractions to the entire left breast followed by  additional 1000 cGy in 5 fractions to the left lumpectomy cavity  Samual Gilford, MD     4/12/2021 - 5/10/2021 Radiation    BH L BreaEZ 6X 16 / 16 266 0 4,256 21   L Breast e 9E 5 / 5 200 0 1,000 6      Treatment Dates:  4/12/2021 - 5/10/2021  History of Present Illness:  49-year-old female previously diagnosed with breast cancer  Mrs Ricardo Bobo has moved to the [facility] and is in need of a new oncologist     In the late summer of 2020 patient felt a left-sided mass  Patient was due for a mammogram at that time  Results demonstrated an abnormality (that specific mammogram is not available)  Patient was sent for biopsy - positive  Patient was subsequently referred to a surgeon and underwent left-sided BCT/SLN excisions  Patient was found to have early stage triple negative breast cancer and received AC x 4 and then paclitaxel x 12 weeks  Mrs Miller then began surveillance  Patient states feeling well, baseline  No breast related issues  No pain control problems (history of RA)  No headaches, blurred vision or dizziness  No trouble breathing, no other respiratory problems  No GI,  or gyn issues  Appetite is good, weight is stable  Patient has received her COVID vaccine and booster  Routine health maintenance and medical care is for the most part up-to-date  Patient has a routine pending colonoscopy  Mrs Ricardo Bobo has also undergone upper endoscopies in the past, questionable Haq's esophagus  Follow-up upper endoscopy is also scheduled  Review of Systems   Constitutional: Negative  HENT: Negative  Eyes: Negative  Respiratory: Negative  Cardiovascular: Negative  Gastrointestinal: Negative  Endocrine: Negative  Genitourinary: Negative  Musculoskeletal: Positive for arthralgias  Skin: Negative  Allergic/Immunologic: Negative  Neurological: Negative  Hematological: Negative  Psychiatric/Behavioral: Negative  All other systems reviewed and are negative      Patient Active Problem List   Diagnosis    Invasive ductal carcinoma of left breast (HCC)    Rheumatoid arthritis, involving unspecified site, unspecified whether rheumatoid factor present [facility])     Past Medical History:   Diagnosis Date    BRCA1 negative     BRCA2 negative     Breast cancer ([facility] [address] )     Left  breast, triple negative    Rheumatoid arthritis ([facility] [address] )      Past Surgical History:   Procedure Laterality Date    ANKLE SURGERY Right 2016    2 plates and screws    BREAST BIOPSY Left 2020    BREAST LUMPECTOMY Left 2020    left breast wire localized lumpectomy    BREAST MASS EXCISION Left 2020    short superior and long lateral left breast resection, left axillary sentinel lymph node biopsy    COLONOSCOPY      ELBOW FRACTURE REPAIR Right 1996    HYSTERECTOMY  2018     Family History   Problem Relation Age of Onset    Breast cancer Mother     Lung cancer Father     Breast cancer Paternal Grandmother    Family history:  mother diagnosed with breast cancer at the age of 72 and  of metastatic breast cancer at the age of 79, paternal grandmother with breast cancer (NOS), 4 children in good general health, 1 son recently diagnosed with thyroid cancer (NOS)    Social History     Socioeconomic History    Marital status: /Civil Union     Spouse name: Not on file    Number of children: Not on file    Years of education: Not on file    Highest education level: Not on file   Occupational History    Not on file   Tobacco Use    Smoking status: Never Smoker    Smokeless tobacco: Never Used   Vaping Use    Vaping Use: Never used   Substance and Sexual Activity    Alcohol use: Not Currently    Drug use: Never    Sexual activity: Not on file   Other Topics Concern    Not on file   Social History Narrative    Not on file     Social Determinants of Health     Financial Resource Strain: Not on file   Food Insecurity: Not on file   Transportation Needs: Not on file   Physical Activity: Not on file   Stress: Not on file   Social Connections: Not on file   Intimate Partner Violence: Not on file   Housing Stability: Not on file   Social history:  No tobacco, alcohol or drug abuse, no toxic exposure    Current Outpatient Medications:     Actemra 162 MG/0 9ML SOSY, , Disp: , Rfl:     albuterol (PROVENTIL HFA,VENTOLIN HFA) 90 mcg/act inhaler, as needed, Disp: , Rfl:     Cholecalciferol 25 MCG (1000 UT) capsule, Take 1,000 Units by mouth daily, Disp: , Rfl:     denosumab (PROLIA) 60 mg/mL, Inject 60 mg under the skin, Disp: , Rfl:     fluticasone-salmeterol (Advair) 250-50 mcg/dose inhaler, Inhale 1 puff  in the morning and 1 puff in the evening  Rinse mouth after use   , Disp: 60 blister, Rfl: 1    losartan (COZAAR) 50 mg tablet, , Disp: , Rfl:     meloxicam (MOBIC) 15 mg tablet, , Disp: , Rfl:     Multiple Vitamin (MULTIVITAMIN ADULT PO), Take 1 tablet by mouth daily, Disp: , Rfl:     omeprazole (PriLOSEC) 20 mg delayed release capsule, Take 2 capsules (40 mg total) by mouth in the morning , Disp: 90 capsule, Rfl: 3    celecoxib (CeleBREX) 200 mg capsule, Take 200 mg by mouth 2 (two) times a day PRN  (Patient not taking: Reported on 6/1/2022), Disp: , Rfl:     famotidine (PEPCID) 20 mg tablet, Take 20 mg by mouth in the morning and 20 mg in the evening   (Patient not taking: Reported on 6/1/2022), Disp: , Rfl:    losartan (COZAAR) 25 mg tablet, Take 50 mg by mouth daily  , Disp: , Rfl:     Na Sulfate-K Sulfate-Mg Sulf (Suprep Bowel Prep Kit) 17 5-3 13-1 6 GM/177ML SOLN, Take 177 mL by mouth once for 1 dose Take 177 mL by mouth once for 1 dose (Patient not taking: Reported on 6/1/2022), Disp: 177 mL, Rfl: 0    Allergies   Allergen Reactions    Avelox [Moxifloxacin] Itching    Cantaloupe Extract Allergy Skin Test - Food Allergy Other (See Comments)    Fruit Extracts Itching     Cantelope melon     Nuts - Food Allergy Itching       Vitals:    06/01/22 0934   BP: 148/80   Pulse: 73   Resp: 18   Temp: 97 6 °F (36 4 °C)   SpO2: 97%     Physical Exam  Constitutional:       Appearance: She is well-developed  Comments: Well-nourished female, no respiratory distress, no signs of pain   HENT:      Head: Normocephalic and atraumatic  Right Ear: External ear normal       Left Ear: External ear normal    Eyes:      Conjunctiva/sclera: Conjunctivae normal       Pupils: Pupils are equal, round, and reactive to light  Cardiovascular:      Rate and Rhythm: Normal rate and regular rhythm  Heart sounds: Normal heart sounds  Pulmonary:      Effort: Pulmonary effort is normal       Breath sounds: Normal breath sounds  Comments: Clear bilaterally  Abdominal:      General: Bowel sounds are normal       Palpations: Abdomen is soft  Comments: +bowel sounds, nontender, soft, cannot palpate liver or spleen, no guarding, no rigidity or rebound   Musculoskeletal:         General: Normal range of motion  Cervical back: Normal range of motion and neck supple  Skin:     General: Skin is warm  Comments: Good color, warm, moist, no petechiae or ecchymoses   Neurological:      Mental Status: She is alert and oriented to person, place, and time  Deep Tendon Reflexes: Reflexes are normal and symmetric  Psychiatric:         Behavior: Behavior normal          Thought Content:  Thought content normal  Judgment: Judgment normal      Extremities:  No lower extremity edema bilaterally, no cords, pulses are 1+  Lymphatics:  No adenopathy in the neck, supraclavicular region, axilla bilaterally    Labs    06/19/2021 WBC = 8 6 hemoglobin = 9 5 hematocrit = 29 5 MCV = 92 platelet = 430 neutrophil = 85% lymphocytes = 7% monocyte = 9%    Imaging    05/25/2022 right upper quadrant ultrasound    KIDNEY:   Right kidney measures 11 1 x 4 3 x 4 4 cm  Volume 109 6 mL  Small simple cyst measures 7 mm  ASCITES:   None  IMPRESSION:  Probable fatty liver  No gallstones  Normal ducts  Hypoechoic area near the pancreatic tail may be related to bowel gas artifact although a shadowing structure is possible  Follow-up with MRI or CT with contrast in approximately one month's time may be useful assuming availability to the national shortage  11/10/2021 mammogram diagnostic bilateral with 3D and CAD  Impression stated therapeutic changes left breast, no evidence for malignancy, category 2 both breast, recommend diagnostic mammogram 1 year both breasts  Pathology    Case Report   Surgical Pathology Report                         Case: [id number]                                    Authorizing Provider: Tyra Martinez MD         Collected:           03/09/2021 [facility]               Ordering Location:     [facility]      Received:            03/09/2021 [facility]                                      Hospital Specialty                                                                                   Laboratory                                                                    Pathologist:           Mami Jay MD                                                                   Specimen:    Breast, Left, Left Breast Mass, Wire Localized Lumpectomy (1 [id number]                     [facility], collected 8/5/2020)                                                    Final Diagnosis   A   Breast, Left, Left Breast Mass, Wire Localized Lumpectomy (1 slides [id number] [facility], collected 8/5/2020):  - Invasive breast carcinoma of no special type (ductal NST/invasive ductal carcinoma)  * Size: 15 mm   * Boynton grade 3 of 3 (total score: 9 of 9)    -- tubule formation, score 3    -- nuclear grade, score 3    -- mitoses, score 3    * Breast tumor prognostic profile (per report)    -- ER: Negative 0%    -- WY: Negative 0%    -- Her2: Negative 0   * Ductal carcinoma in situ (DCIS): Not identified  * Margins: Negative for carcinoma, less than 1 mm from the closest lateral margin  * Pathologic stage (AJCC 8th ed ): pT1c      Comment: Only 1 slide is available for review      Electronically signed by Silvia Castillo MD on 3/9/2021 at 12:26 PM       08/06/2020 Department of pathology [facility]: Left breast mass, wire localized lumpectomy: Poorly differentiated carcinoma consistent with invasive breast ductal carcinoma, grade 3, margins negative, left side, tumor size = 15 mm, single focus, no DCIS, skin not present, no skeletal muscle present, 2 neg lymph nodes, ER = 0%, WY = 0% HER2/selina = 0 = negative

## 2022-06-06 ENCOUNTER — TELEPHONE (OUTPATIENT)
Dept: HEMATOLOGY ONCOLOGY | Facility: MEDICAL CENTER | Age: 72
End: 2022-06-06

## 2022-06-06 DIAGNOSIS — C50.912 INVASIVE DUCTAL CARCINOMA OF LEFT BREAST (HCC): Primary | ICD-10-CM

## 2022-06-06 NOTE — TELEPHONE ENCOUNTER
Per OV note:  NCCN guidelines 2 2022 hereditary cancer testing criteria states that genetic testing should be considered in a number scenarios  Specifically any patients of any age with triple negative breast cancer should be evaluated (CRIT-2)  As above, patient's mother had breast cancer  This was discussed with the patient - Mrs Jayashree Martin will make a decision on whether or not she will see the genetic counselor  Patient states that her daughter is aware of her potential increased risk for breast cancer      The last CBC available through epic is from June 2021  Patient was not symptomatic from an anemia standpoint; color was pretty good  The CBC results were seen after patient left the office - hemoglobin level was 9 5 g/dL     Nursing staff will call the patient and send her for repeat CBC  Patient declines a genetic counselor at this time and patient will go for CBC this week

## 2022-06-08 ENCOUNTER — APPOINTMENT (OUTPATIENT)
Dept: LAB | Facility: HOSPITAL | Age: 72
End: 2022-06-08
Payer: COMMERCIAL

## 2022-06-08 DIAGNOSIS — C50.912 INVASIVE DUCTAL CARCINOMA OF LEFT BREAST (HCC): ICD-10-CM

## 2022-06-08 LAB
BASOPHILS # BLD AUTO: 0.04 THOUSANDS/ΜL (ref 0–0.1)
BASOPHILS NFR BLD AUTO: 1 % (ref 0–1)
EOSINOPHIL # BLD AUTO: 0.48 THOUSAND/ΜL (ref 0–0.61)
EOSINOPHIL NFR BLD AUTO: 10 % (ref 0–6)
ERYTHROCYTE [DISTWIDTH] IN BLOOD BY AUTOMATED COUNT: 12.4 % (ref 11.6–15.1)
HCT VFR BLD AUTO: 41.7 % (ref 34.8–46.1)
HGB BLD-MCNC: 13.8 G/DL (ref 11.5–15.4)
IMM GRANULOCYTES # BLD AUTO: 0.01 THOUSAND/UL (ref 0–0.2)
IMM GRANULOCYTES NFR BLD AUTO: 0 % (ref 0–2)
LYMPHOCYTES # BLD AUTO: 1.79 THOUSANDS/ΜL (ref 0.6–4.47)
LYMPHOCYTES NFR BLD AUTO: 38 % (ref 14–44)
MCH RBC QN AUTO: 31 PG (ref 26.8–34.3)
MCHC RBC AUTO-ENTMCNC: 33.1 G/DL (ref 31.4–37.4)
MCV RBC AUTO: 94 FL (ref 82–98)
MONOCYTES # BLD AUTO: 0.72 THOUSAND/ΜL (ref 0.17–1.22)
MONOCYTES NFR BLD AUTO: 15 % (ref 4–12)
NEUTROPHILS # BLD AUTO: 1.71 THOUSANDS/ΜL (ref 1.85–7.62)
NEUTS SEG NFR BLD AUTO: 36 % (ref 43–75)
NRBC BLD AUTO-RTO: 0 /100 WBCS
PLATELET # BLD AUTO: 185 THOUSANDS/UL (ref 149–390)
PMV BLD AUTO: 9.6 FL (ref 8.9–12.7)
RBC # BLD AUTO: 4.45 MILLION/UL (ref 3.81–5.12)
WBC # BLD AUTO: 4.75 THOUSAND/UL (ref 4.31–10.16)

## 2022-06-08 PROCEDURE — 36415 COLL VENOUS BLD VENIPUNCTURE: CPT

## 2022-06-08 PROCEDURE — 85025 COMPLETE CBC W/AUTO DIFF WBC: CPT

## 2022-06-17 ENCOUNTER — HOSPITAL ENCOUNTER (OUTPATIENT)
Dept: RADIOLOGY | Facility: HOSPITAL | Age: 72
Discharge: HOME | End: 2022-06-17
Attending: INTERNAL MEDICINE
Payer: COMMERCIAL

## 2022-06-17 DIAGNOSIS — K86.2 PANCREATIC CYST: ICD-10-CM

## 2022-06-17 DIAGNOSIS — R14.0 ABDOMINAL BLOATING: ICD-10-CM

## 2022-06-17 PROCEDURE — G1004 CDSM NDSC: HCPCS

## 2022-06-17 PROCEDURE — 74177 CT ABD & PELVIS W/CONTRAST: CPT

## 2022-06-17 RX ADMIN — IOHEXOL 65 ML: 350 INJECTION, SOLUTION INTRAVENOUS at 10:45

## 2022-06-21 ENCOUNTER — TELEPHONE (OUTPATIENT)
Dept: GASTROENTEROLOGY | Facility: AMBULARY SURGERY CENTER | Age: 72
End: 2022-06-21

## 2022-06-21 DIAGNOSIS — E27.8 ADRENAL NODULE (HCC): Primary | ICD-10-CM

## 2022-06-21 NOTE — TELEPHONE ENCOUNTER
Thanks for letting me know  I ordered follow up imaging for her and will discuss it at upcoming visit

## 2022-06-21 NOTE — TELEPHONE ENCOUNTER
Called patient without answer  Left voicemail for her to call us back  CT scan with normal appearing pancreas  There was an indeterminate left adrenal nodule with repeat imaging recommended in 12 months  I will send message to patient's family doctor for follow-up regarding this

## 2022-06-21 NOTE — TELEPHONE ENCOUNTER
Patients GI provider:  Dr Tam Hughes    Number to return call: ( [phone]    Reason for call: Pt has significant findings on cat scan abdomen / pelvis done 6-17-22, per Rose Hernandez in radiology, will send tiger text as well    Scheduled procedure/appointment date if applicable: procedure  6-62-24

## 2022-07-12 ENCOUNTER — ANESTHESIA EVENT (OUTPATIENT)
Dept: GASTROENTEROLOGY | Facility: AMBULARY SURGERY CENTER | Age: 72
End: 2022-07-12

## 2022-07-12 ENCOUNTER — HOSPITAL ENCOUNTER (OUTPATIENT)
Dept: GASTROENTEROLOGY | Facility: AMBULARY SURGERY CENTER | Age: 72
Setting detail: OUTPATIENT SURGERY
Discharge: HOME | End: 2022-07-12
Attending: INTERNAL MEDICINE | Admitting: INTERNAL MEDICINE
Payer: COMMERCIAL

## 2022-07-12 ENCOUNTER — ANESTHESIA (OUTPATIENT)
Dept: GASTROENTEROLOGY | Facility: AMBULARY SURGERY CENTER | Age: 72
End: 2022-07-12

## 2022-07-12 VITALS
SYSTOLIC BLOOD PRESSURE: 155 MMHG | DIASTOLIC BLOOD PRESSURE: 74 MMHG | BODY MASS INDEX: 28.7 KG/M2 | TEMPERATURE: 97.2 F | OXYGEN SATURATION: 99 % | RESPIRATION RATE: 16 BRPM | WEIGHT: 162 LBS | HEART RATE: 66 BPM

## 2022-07-12 DIAGNOSIS — K21.9 GASTROESOPHAGEAL REFLUX DISEASE WITHOUT ESOPHAGITIS: ICD-10-CM

## 2022-07-12 DIAGNOSIS — Z12.11 SCREENING FOR COLON CANCER: ICD-10-CM

## 2022-07-12 PROBLEM — I10 HTN (HYPERTENSION): Status: ACTIVE | Noted: 2022-07-12

## 2022-07-12 PROCEDURE — G0121 COLON CA SCRN NOT HI RSK IND: HCPCS | Performed by: INTERNAL MEDICINE

## 2022-07-12 PROCEDURE — 43239 EGD BIOPSY SINGLE/MULTIPLE: CPT | Performed by: INTERNAL MEDICINE

## 2022-07-12 PROCEDURE — 88312 SPECIAL STAINS GROUP 1: CPT | Performed by: PATHOLOGY

## 2022-07-12 PROCEDURE — 88305 TISSUE EXAM BY PATHOLOGIST: CPT | Performed by: PATHOLOGY

## 2022-07-12 RX ORDER — LIDOCAINE HYDROCHLORIDE 10 MG/ML
INJECTION, SOLUTION EPIDURAL; INFILTRATION; INTRACAUDAL; PERINEURAL AS NEEDED
Status: DISCONTINUED | OUTPATIENT
Start: 2022-07-12 | End: 2022-07-12

## 2022-07-12 RX ORDER — SODIUM CHLORIDE, SODIUM LACTATE, POTASSIUM CHLORIDE, CALCIUM CHLORIDE 600; 310; 30; 20 MG/100ML; MG/100ML; MG/100ML; MG/100ML
100 INJECTION, SOLUTION INTRAVENOUS CONTINUOUS
Status: DISCONTINUED | OUTPATIENT
Start: 2022-07-12 | End: 2022-07-16 | Stop reason: HOSPADM

## 2022-07-12 RX ORDER — SODIUM CHLORIDE, SODIUM LACTATE, POTASSIUM CHLORIDE, CALCIUM CHLORIDE 600; 310; 30; 20 MG/100ML; MG/100ML; MG/100ML; MG/100ML
100 INJECTION, SOLUTION INTRAVENOUS CONTINUOUS
Status: CANCELLED | OUTPATIENT
Start: 2022-07-12

## 2022-07-12 RX ORDER — OMEPRAZOLE 20 MG/1
40 CAPSULE, DELAYED RELEASE ORAL DAILY
Qty: 90 CAPSULE | Refills: 3 | Status: SHIPPED | OUTPATIENT
Start: 2022-07-12 | End: 2022-10-28

## 2022-07-12 RX ORDER — PROPOFOL 10 MG/ML
INJECTION, EMULSION INTRAVENOUS AS NEEDED
Status: DISCONTINUED | OUTPATIENT
Start: 2022-07-12 | End: 2022-07-12

## 2022-07-12 RX ORDER — PROPOFOL 10 MG/ML
INJECTION, EMULSION INTRAVENOUS CONTINUOUS PRN
Status: DISCONTINUED | OUTPATIENT
Start: 2022-07-12 | End: 2022-07-12

## 2022-07-12 RX ADMIN — PROPOFOL 140 MCG/KG/MIN: 10 INJECTION, EMULSION INTRAVENOUS at 10:37

## 2022-07-12 RX ADMIN — LIDOCAINE HYDROCHLORIDE 50 MG: 10 INJECTION, SOLUTION EPIDURAL; INFILTRATION; INTRACAUDAL at 10:37

## 2022-07-12 RX ADMIN — SODIUM CHLORIDE, SODIUM LACTATE, POTASSIUM CHLORIDE, AND CALCIUM CHLORIDE 100 ML/HR: .6; .31; .03; .02 INJECTION, SOLUTION INTRAVENOUS at 09:54

## 2022-07-12 RX ADMIN — PROPOFOL 130 MG: 10 INJECTION, EMULSION INTRAVENOUS at 10:37

## 2022-07-12 NOTE — ANESTHESIA PREPROCEDURE EVALUATION
Procedure:  COLONOSCOPY  EGD    Relevant Problems   CARDIO   (+) HTN (hypertension)      GYN   (+) Invasive ductal carcinoma of left breast (HCC)      MUSCULOSKELETAL   (+) Rheumatoid arthritis, involving unspecified site, unspecified whether rheumatoid factor present [facility])        Physical Exam    Airway    Mallampati score: II  TM Distance: >3 FB  Neck ROM: full     Dental       Cardiovascular  Cardiovascular exam normal    Pulmonary  Pulmonary exam normal     Other Findings        Anesthesia Plan  ASA Score- 2     Anesthesia Type- IV sedation with anesthesia with ASA Monitors  Additional Monitors:   Airway Plan:           Plan Factors-Exercise tolerance (METS): >4 METS  Chart reviewed  Patient summary reviewed  Induction- intravenous  Postoperative Plan-     Informed Consent- Anesthetic plan and risks discussed with patient  I personally reviewed this patient with the CRNA  Discussed and agreed on the Anesthesia Plan with the CRNA  Tori Tubbs

## 2022-07-12 NOTE — ANESTHESIA POSTPROCEDURE EVALUATION
Post-Op Assessment Note    CV Status:  Stable  Pain Score: 0    Pain management: adequate     Mental Status:  Alert and awake   Hydration Status:  Euvolemic and stable   PONV Controlled:  Controlled   Airway Patency:  Patent      Post Op Vitals Reviewed: Yes      Staff: CRNA, Anesthesiologist   Comments: Report given to recovering RN, VSS  pt resting comfortably        No complications documented      BP 91/53 (07/12/22 1106)    Temp     Pulse 72 (07/12/22 1106)   Resp 16 (07/12/22 1106)    SpO2 93 % (07/12/22 1106)

## 2022-07-12 NOTE — H&P
History and Physical - [facility]  Cristhian Hyatt 70 y o  female MRN: [id number]                  HPI: Cristhian Hyatt is a 70y o  year old female who presents for colon cancer screening, diverticular disease, GERD      REVIEW OF SYSTEMS: Per the HPI, and otherwise unremarkable      Historical Information   Past Medical History:   Diagnosis Date    BRCA1 negative     BRCA2 negative     Breast cancer ([facility] [address] ) 2020    Left  breast, triple negative    Colon polyp     Rheumatoid arthritis ([facility] [address] )      Past Surgical History:   Procedure Laterality Date    ANKLE SURGERY Right 2016    2 plates and screws    BREAST BIOPSY Left 07/02/2020    BREAST LUMPECTOMY Left 08/12/2020    left breast wire localized lumpectomy    BREAST MASS EXCISION Left 09/02/2020    short superior and long lateral left breast resection, left axillary sentinel lymph node biopsy    COLONOSCOPY      ELBOW FRACTURE REPAIR Right 1996    HYSTERECTOMY  2018     Social History   Social History     Substance and Sexual Activity   Alcohol Use Not Currently     Social History     Substance and Sexual Activity   Drug Use Never     Social History     Tobacco Use   Smoking Status Never Smoker   Smokeless Tobacco Never Used     Family History   Problem Relation Age of Onset    Breast cancer Mother     Lung cancer Father     Breast cancer Paternal Grandmother        Meds/Allergies       Current Outpatient Medications:     Cholecalciferol 25 MCG (1000 UT) capsule    fluticasone-salmeterol (Advair) 250-50 mcg/dose inhaler    losartan (COZAAR) 50 mg tablet    Multiple Vitamin (MULTIVITAMIN ADULT PO)    Actemra 162 MG/0 9ML SOSY    albuterol (PROVENTIL HFA,VENTOLIN HFA) 90 mcg/act inhaler    denosumab (PROLIA) 60 mg/mL    famotidine (PEPCID) 20 mg tablet    meloxicam (MOBIC) 15 mg tablet    Na Sulfate-K Sulfate-Mg Sulf (Suprep Bowel Prep Kit) 17 5-3 13-1 6 GM/177ML SOLN    omeprazole (PriLOSEC) 20 mg delayed release capsule    Current Facility-Administered Medications:     lactated ringers infusion, 100 mL/hr, Intravenous, Continuous    Allergies   Allergen Reactions    Fruit Extracts Itching     Cantelope melon peaches    Nuts - Food Allergy GI Intolerance    Avelox [Moxifloxacin] Itching       Objective     /72   Pulse 70   Temp (!) 97 2 °F (36 2 °C) (Temporal)   Resp 18   Wt 73 5 kg (162 lb)   SpO2 98%   BMI 28 70 kg/m²       PHYSICAL EXAM    Gen: NAD  Head: NCAT  CV: RRR  CHEST: Clear  ABD: soft, NT/ND  EXT: no edema      ASSESSMENT/PLAN:  This is a 70y o  year old female here for EGD and colonoscopy, and she is stable and optimized for her procedure

## 2022-07-27 ENCOUNTER — TELEPHONE (OUTPATIENT)
Dept: GASTROENTEROLOGY | Facility: CLINIC | Age: 72
End: 2022-07-27

## 2022-07-27 NOTE — TELEPHONE ENCOUNTER
Called and left message to inform her, the appointment scheduled for today with Dr Jose Borges has been canceled    I explained I would call her back later today with a new appointment

## 2022-07-29 ENCOUNTER — OFFICE VISIT (OUTPATIENT)
Dept: FAMILY MEDICINE CLINIC | Facility: CLINIC | Age: 72
End: 2022-07-29
Payer: COMMERCIAL

## 2022-07-29 VITALS
BODY MASS INDEX: 29.26 KG/M2 | TEMPERATURE: 97.4 F | OXYGEN SATURATION: 98 % | HEIGHT: 62 IN | DIASTOLIC BLOOD PRESSURE: 72 MMHG | HEART RATE: 65 BPM | RESPIRATION RATE: 16 BRPM | WEIGHT: 159 LBS | SYSTOLIC BLOOD PRESSURE: 122 MMHG

## 2022-07-29 DIAGNOSIS — Z78.0 POST-MENOPAUSAL: ICD-10-CM

## 2022-07-29 DIAGNOSIS — G62.0 DRUG-INDUCED POLYNEUROPATHY (HCC): ICD-10-CM

## 2022-07-29 DIAGNOSIS — Z13.89 SCREENING FOR CARDIOVASCULAR, RESPIRATORY, AND GENITOURINARY DISEASES: ICD-10-CM

## 2022-07-29 DIAGNOSIS — Z00.00 MEDICARE ANNUAL WELLNESS VISIT, SUBSEQUENT: Primary | ICD-10-CM

## 2022-07-29 DIAGNOSIS — I50.31 DIASTOLIC CHF, ACUTE (HCC): ICD-10-CM

## 2022-07-29 DIAGNOSIS — Z13.83 SCREENING FOR CARDIOVASCULAR, RESPIRATORY, AND GENITOURINARY DISEASES: ICD-10-CM

## 2022-07-29 DIAGNOSIS — Z11.59 ENCOUNTER FOR HEPATITIS C SCREENING TEST FOR LOW RISK PATIENT: ICD-10-CM

## 2022-07-29 DIAGNOSIS — Z13.6 SCREENING FOR CARDIOVASCULAR, RESPIRATORY, AND GENITOURINARY DISEASES: ICD-10-CM

## 2022-07-29 DIAGNOSIS — R20.0 NUMBNESS OF RIGHT LOWER EXTREMITY: ICD-10-CM

## 2022-07-29 PROCEDURE — 1170F FXNL STATUS ASSESSED: CPT | Performed by: FAMILY MEDICINE

## 2022-07-29 PROCEDURE — 1090F PRES/ABSN URINE INCON ASSESS: CPT | Performed by: FAMILY MEDICINE

## 2022-07-29 PROCEDURE — 3288F FALL RISK ASSESSMENT DOCD: CPT | Performed by: FAMILY MEDICINE

## 2022-07-29 PROCEDURE — G0439 PPPS, SUBSEQ VISIT: HCPCS | Performed by: FAMILY MEDICINE

## 2022-07-29 PROCEDURE — 3725F SCREEN DEPRESSION PERFORMED: CPT | Performed by: FAMILY MEDICINE

## 2022-07-29 PROCEDURE — 1003F LEVEL OF ACTIVITY ASSESS: CPT | Performed by: FAMILY MEDICINE

## 2022-07-29 PROCEDURE — 1125F AMNT PAIN NOTED PAIN PRSNT: CPT | Performed by: FAMILY MEDICINE

## 2022-07-29 PROCEDURE — 1160F RVW MEDS BY RX/DR IN RCRD: CPT | Performed by: FAMILY MEDICINE

## 2022-07-29 NOTE — PROGRESS NOTES
Assessment and Plan:     Problem List Items Addressed This Visit        Cardiovascular and Mediastinum    Diastolic CHF, acute ([facility] )       Nervous and Auditory    Drug-induced polyneuropathy ([facility] )      Other Visit Diagnoses     Medicare annual wellness visit, subsequent    -  Primary    Screening for cardiovascular, respiratory, and genitourinary diseases        Relevant Orders    Comprehensive metabolic panel    Lipid Panel with Direct LDL reflex    Encounter for hepatitis C screening test for low risk patient        Relevant Orders    Hepatitis C antibody    Post-menopausal        Numbness of right lower extremity        Relevant Orders    Ambulatory Referral to Neurology        BMI Counseling: Body mass index is 29 08 kg/m²  The BMI is above normal  Nutrition recommendations include decreasing portion sizes, encouraging healthy choices of fruits and vegetables, decreasing fast food intake, consuming healthier snacks and limiting drinks that contain sugar  Exercise recommendations include vigorous physical activity 75 minutes/week  Rationale for BMI follow-up plan is due to patient being overweight or obese  Depression Screening and Follow-up Plan: Patient was screened for depression during today's encounter  They screened negative with a PHQ-2 score of 0  Preventive health issues were discussed with patient, and age appropriate screening tests were ordered as noted in patient's After Visit Summary  Personalized health advice and appropriate referrals for health education or preventive services given if needed, as noted in patient's After Visit Summary  History of Present Illness:     Patient presents for a Medicare Wellness Visit    HPI   Patient Care Team:  Ranjana Diez MD as PCP - General (Family Medicine)  Marley Sanchez MD (Radiation Oncology)     Review of Systems:     Review of Systems   Constitutional: Negative  HENT: Negative  Eyes: Negative  Respiratory: Negative  Cardiovascular: Negative  Gastrointestinal: Negative  Endocrine: Negative  Genitourinary: Negative  Musculoskeletal: Negative  Skin: Negative  Allergic/Immunologic: Negative  Neurological: Negative  Hematological: Negative  Psychiatric/Behavioral: Negative           Problem List:     Patient Active Problem List   Diagnosis    Invasive ductal carcinoma of left breast (HCC)    Rheumatoid arthritis, involving unspecified site, unspecified whether rheumatoid factor present ([place] [address] )    HTN (hypertension)    Drug-induced polyneuropathy ([place] [address] )    Diastolic CHF, acute ([place] [address] )      Past Medical and Surgical History:     Past Medical History:   Diagnosis Date    BRCA1 negative     BRCA2 negative     Breast cancer ([facility] ) 2020    Left  breast, triple negative    Colon polyp     Rheumatoid arthritis ([place] [address] )      Past Surgical History:   Procedure Laterality Date    ANKLE SURGERY Right 2016    2 plates and screws    BREAST BIOPSY Left 07/02/2020    BREAST LUMPECTOMY Left 08/12/2020    left breast wire localized lumpectomy    BREAST MASS EXCISION Left 09/02/2020    short superior and long lateral left breast resection, left axillary sentinel lymph node biopsy    COLONOSCOPY      ELBOW FRACTURE REPAIR Right 1996    HYSTERECTOMY  2018      Family History:     Family History   Problem Relation Age of Onset    Breast cancer Mother     Lung cancer Father     Breast cancer Paternal Grandmother       Social History:     Social History     Socioeconomic History    Marital status: /Civil Union     Spouse name: None    Number of children: None    Years of education: None    Highest education level: None   Occupational History    None   Tobacco Use    Smoking status: Never Smoker    Smokeless tobacco: Never Used   Vaping Use    Vaping Use: Never used   Substance and Sexual Activity    Alcohol use: Not Currently    Drug use: Never    Sexual activity: None   Other Topics Concern    None Social History Narrative    None     Social Determinants of Health     Financial Resource Strain: Not on file   Food Insecurity: Not on file   Transportation Needs: Not on file   Physical Activity: Not on file   Stress: Not on file   Social Connections: Not on file   Intimate Partner Violence: Not on file   Housing Stability: Not on file      Medications and Allergies:     Current Outpatient Medications   Medication Sig Dispense Refill    Actemra 162 MG/0 9ML SOSY once a week      albuterol (PROVENTIL HFA,VENTOLIN HFA) 90 mcg/act inhaler as needed      Calcium Carbonate-Vitamin D3 (Calcium 600-D) 600-400 MG-UNIT TABS Take by mouth      Cholecalciferol 25 MCG (1000 UT) capsule Take 1,000 Units by mouth daily      denosumab (PROLIA) 60 mg/mL Inject 60 mg under the skin every 6 (six) months      famotidine (PEPCID) 20 mg tablet Take 20 mg by mouth 2 (two) times a day      fluticasone-salmeterol (Advair) 250-50 mcg/dose inhaler Inhale 1 puff  in the morning and 1 puff in the evening  Rinse mouth after use    60 blister 1    losartan (COZAAR) 50 mg tablet 50 mg daily      meloxicam (MOBIC) 15 mg tablet daily      Multiple Vitamin (MULTIVITAMIN ADULT PO) Take 1 tablet by mouth daily      omeprazole (PriLOSEC) 20 mg delayed release capsule Take 2 capsules (40 mg total) by mouth daily 90 capsule 3     No current facility-administered medications for this visit       Allergies   Allergen Reactions    Fruit Extracts Itching     Cantelope melon peaches    Nuts - Food Allergy GI Intolerance    Avelox [Moxifloxacin] Itching      Immunizations:     Immunization History   Administered Date(s) Administered    COVID-19 MODERNA VACC 0 5 ML IM 02/13/2021, 03/13/2021, 11/19/2021, 06/01/2022      Health Maintenance:         Topic Date Due    Hepatitis C Screening  Never done    Breast Cancer Screening: Mammogram  11/10/2022    Colorectal Cancer Screening  07/09/2032         Topic Date Due    Pneumococcal Vaccine: 65+ Years (1 - PCV) Never done    Influenza Vaccine (1) 09/01/2022      Medicare Screening Tests and Risk Assessments: Lonnie Barrios is here for her Subsequent Wellness visit  Health Risk Assessment:   Patient rates overall health as very good  Patient feels that their physical health rating is much better  Patient is very satisfied with their life  Eyesight was rated as same  Hearing was rated as same  Patient feels that their emotional and mental health rating is same  Patients states they are never, rarely angry  Patient states they are sometimes unusually tired/fatigued  Pain experienced in the last 7 days has been some  Patient's pain rating has been 3/10  Patient states that she has experienced no weight loss or gain in last 6 months  Vision is fine  Right eye has medical issue that does not affect vision  Depression Screening:   PHQ-2 Score: 0      Fall Risk Screening: In the past year, patient has experienced: no history of falling in past year      Urinary Incontinence Screening:   Patient has leaked urine accidently in the last six months  Home Safety:  Patient does not have trouble with stairs inside or outside of their home  Patient has working smoke alarms and has working carbon monoxide detector  Home safety hazards include: none  Nutrition:   Current diet is Regular  Medications:   Patient is currently taking over-the-counter supplements  OTC medications include: See list  Patient is able to manage medications  Activities of Daily Living (ADLs)/Instrumental Activities of Daily Living (IADLs):   Walk and transfer into and out of bed and chair?: Yes  Dress and groom yourself?: Yes    Bathe or shower yourself?: Yes    Feed yourself?  Yes  Do your laundry/housekeeping?: Yes  Manage your money, pay your bills and track your expenses?: Yes  Make your own meals?: Yes    Do your own shopping?: Yes    Durable Medical Equipment Suppliers  None    Previous Hospitalizations:   Any hospitalizations or ED visits within the last 12 months?: No      Advance Care Planning:   Living will: No    Durable POA for healthcare: No    Advanced directive: No      PREVENTIVE SCREENINGS      Cardiovascular Screening:    General: Risks and Benefits Discussed    Due for: Lipid Panel      Diabetes Screening:     General: Risks and Benefits Discussed    Due for: Blood Glucose      Colorectal Cancer Screening:     General: Screening Current      Breast Cancer Screening:     General: History Breast Cancer      Cervical Cancer Screening:    General: Screening Not Indicated      Osteoporosis Screening:    General: Screening Current      Abdominal Aortic Aneurysm (AAA) Screening:        General: Screening Not Indicated      Lung Cancer Screening:     General: Screening Not Indicated      Hepatitis C Screening:    General: Risks and Benefits Discussed    Hep C Screening Accepted: Yes      Screening, Brief Intervention, and Referral to Treatment (SBIRT)    Screening  Typical number of drinks in a day: 0  Typical number of drinks in a week: 0  Interpretation: Low risk drinking behavior  AUDIT-C Screenin) How often did you have a drink containing alcohol in the past year? monthly or less  2) How many drinks did you have on a typical day when you were drinking in the past year? 1 to 2  3) How often did you have 6 or more drinks on one occasion in the past year? never    AUDIT-C Score: 1  Interpretation: Score 0-2 (female): Negative screen for alcohol misuse    Single Item Drug Screening:  How often have you used an illegal drug (including marijuana) or a prescription medication for non-medical reasons in the past year? never    Single Item Drug Screen Score: 0  Interpretation: Negative screen for possible drug use disorder    Brief Intervention  Alcohol & drug use screenings were reviewed  No concerns regarding substance use disorder identified       No exam data present     Physical Exam:     /72   Pulse 65 Temp (!) 97 4 °F (36 3 °C)   Resp 16   Ht 5' 2" (1 575 m)   Wt 72 1 kg (159 lb)   SpO2 98%   BMI 29 08 kg/m²     Physical Exam  Vitals and nursing note reviewed  Constitutional:       General: She is not in acute distress  Appearance: She is well-developed  HENT:      Head: Normocephalic and atraumatic  Eyes:      Conjunctiva/sclera: Conjunctivae normal    Cardiovascular:      Rate and Rhythm: Normal rate and regular rhythm  Heart sounds: No murmur heard  Pulmonary:      Effort: Pulmonary effort is normal  No respiratory distress  Breath sounds: Normal breath sounds  Abdominal:      Palpations: Abdomen is soft  Tenderness: There is no abdominal tenderness  Musculoskeletal:      Cervical back: Neck supple  Skin:     General: Skin is warm and dry  Neurological:      Mental Status: She is alert            Lizeth Cervantes MD

## 2022-07-29 NOTE — PATIENT INSTRUCTIONS
Medicare Preventive Visit Patient Instructions  Thank you for completing your Welcome to Medicare Visit or Medicare Annual Wellness Visit today  Your next wellness visit will be due in one year (7/30/2023)  The screening/preventive services that you may require over the next 5-10 years are detailed below  Some tests may not apply to you based off risk factors and/or age  Screening tests ordered at today's visit but not completed yet may show as past due  Also, please note that scanned in results may not display below  Preventive Screenings:  Service Recommendations Previous Testing/Comments   Colorectal Cancer Screening  * Colonoscopy    * Fecal Occult Blood Test (FOBT)/Fecal Immunochemical Test (FIT)  * Fecal DNA/Cologuard Test  * Flexible Sigmoidoscopy Age: 54-65 years old   Colonoscopy: every 10 years (may be performed more frequently if at higher risk)  OR  FOBT/FIT: every 1 year  OR  Cologuard: every 3 years  OR  Sigmoidoscopy: every 5 years  Screening may be recommended earlier than age 48 if at higher risk for colorectal cancer  Also, an individualized decision between you and your healthcare provider will decide whether screening between the ages of 74-80 would be appropriate  Colonoscopy: 07/12/2022  FOBT/FIT: Not on file  Cologuard: Not on file  Sigmoidoscopy: Not on file    Screening Current     Breast Cancer Screening Age: 36 years old  Frequency: every 1-2 years  Not required if history of left and right mastectomy Mammogram: 11/10/2021    History Breast Cancer   Cervical Cancer Screening Between the ages of 21-29, pap smear recommended once every 3 years  Between the ages of 33-67, can perform pap smear with HPV co-testing every 5 years     Recommendations may differ for women with a history of total hysterectomy, cervical cancer, or abnormal pap smears in past  Pap Smear: Not on file    Screening Not Indicated   Hepatitis C Screening Once for adults born between 1945 and 1965  More frequently in patients at high risk for Hepatitis C Hep C Antibody: Not on file        Diabetes Screening 1-2 times per year if you're at risk for diabetes or have pre-diabetes Fasting glucose: No results in last 5 years   A1C: No results in last 5 years        Cholesterol Screening Once every 5 years if you don't have a lipid disorder  May order more often based on risk factors  Lipid panel: Not on file          Other Preventive Screenings Covered by Medicare:  1  Abdominal Aortic Aneurysm (AAA) Screening: covered once if your at risk  You're considered to be at risk if you have a family history of AAA  2  Lung Cancer Screening: covers low dose CT scan once per year if you meet all of the following conditions: (1) Age 50-69; (2) No signs or symptoms of lung cancer; (3) Current smoker or have quit smoking within the last 15 years; (4) You have a tobacco smoking history of at least 30 pack years (packs per day multiplied by number of years you smoked); (5) You get a written order from a healthcare provider  3  Glaucoma Screening: covered annually if you're considered high risk: (1) You have diabetes OR (2) Family history of glaucoma OR (3)  aged 48 and older OR (3)  American aged 72 and older  3  Osteoporosis Screening: covered every 2 years if you meet one of the following conditions: (1) You're estrogen deficient and at risk for osteoporosis based off medical history and other findings; (2) Have a vertebral abnormality; (3) On glucocorticoid therapy for more than 3 months; (4) Have primary hyperparathyroidism; (5) On osteoporosis medications and need to assess response to drug therapy  · Last bone density test (DXA Scan): Not on file  5  HIV Screening: covered annually if you're between the age of 12-76  Also covered annually if you are younger than 13 and older than 72 with risk factors for HIV infection   For pregnant patients, it is covered up to 3 times per pregnancy  Immunizations:  Immunization Recommendations   Influenza Vaccine Annual influenza vaccination during flu season is recommended for all persons aged >= 6 months who do not have contraindications   Pneumococcal Vaccine (Prevnar and Pneumovax)  * Prevnar = PCV13  * Pneumovax = PPSV23   Adults 25-60 years old: 1-3 doses may be recommended based on certain risk factors  Adults 72 years old: Prevnar (PCV13) vaccine recommended followed by Pneumovax (PPSV23) vaccine  If already received PPSV23 since turning 65, then PCV13 recommended at least one year after PPSV23 dose  Hepatitis B Vaccine 3 dose series if at intermediate or high risk (ex: diabetes, end stage renal disease, liver disease)   Tetanus (Td) Vaccine - COST NOT COVERED BY MEDICARE PART B Following completion of primary series, a booster dose should be given every 10 years to maintain immunity against tetanus  Td may also be given as tetanus wound prophylaxis  Tdap Vaccine - COST NOT COVERED BY MEDICARE PART B Recommended at least once for all adults  For pregnant patients, recommended with each pregnancy  Shingles Vaccine (Shingrix) - COST NOT COVERED BY MEDICARE PART B  2 shot series recommended in those aged 48 and above     Health Maintenance Due:      Topic Date Due    Hepatitis C Screening  Never done    Breast Cancer Screening: Mammogram  11/10/2022    Colorectal Cancer Screening  07/09/2032     Immunizations Due:      Topic Date Due    Pneumococcal Vaccine: 65+ Years (1 - PCV) Never done    Influenza Vaccine (1) 09/01/2022     Advance Directives   What are advance directives? Advance directives are legal documents that state your wishes and plans for medical care  These plans are made ahead of time in case you lose your ability to make decisions for yourself  Advance directives can apply to any medical decision, such as the treatments you want, and if you want to donate organs  What are the types of advance directives?   There are many types of advance directives, and each state has rules about how to use them  You may choose a combination of any of the following:  · Living will: This is a written record of the treatment you want  You can also choose which treatments you do not want, which to limit, and which to stop at a certain time  This includes surgery, medicine, IV fluid, and tube feedings  · Durable power of  for healthcare [facility] [place]): This is a written record that states who you want to make healthcare choices for you when you are unable to make them for yourself  This person, called a proxy, is usually a family member or a friend  You may choose more than 1 proxy  · Do not resuscitate (DNR) order:  A DNR order is used in case your heart stops beating or you stop breathing  It is a request not to have certain forms of treatment, such as CPR  A DNR order may be included in other types of advance directives  · Medical directive: This covers the care that you want if you are in a coma, near death, or unable to make decisions for yourself  You can list the treatments you want for each condition  Treatment may include pain medicine, surgery, blood transfusions, dialysis, IV or tube feedings, and a ventilator (breathing machine)  · Values history: This document has questions about your views, beliefs, and how you feel and think about life  This information can help others choose the care that you would choose  Why are advance directives important? An advance directive helps you control your care  Although spoken wishes may be used, it is better to have your wishes written down  Spoken wishes can be misunderstood, or not followed  Treatments may be given even if you do not want them  An advance directive may make it easier for your family to make difficult choices about your care  Urinary Incontinence   Urinary incontinence (UI)  is when you lose control of your bladder   UI develops because your bladder cannot store or empty urine properly  The 3 most common types of UI are stress incontinence, urge incontinence, or both  Medicines:   · May be given to help strengthen your bladder control  Report any side effects of medication to your healthcare provider  Do pelvic muscle exercises often:  Your pelvic muscles help you stop urinating  Squeeze these muscles tight for 5 seconds, then relax for 5 seconds  Gradually work up to squeezing for 10 seconds  Do 3 sets of 15 repetitions a day, or as directed  This will help strengthen your pelvic muscles and improve bladder control  Train your bladder:  Go to the bathroom at set times, such as every 2 hours, even if you do not feel the urge to go  You can also try to hold your urine when you feel the urge to go  For example, hold your urine for 5 minutes when you feel the urge to go  As that becomes easier, hold your urine for 10 minutes  Self-care:   · Keep a UI record  Write down how often you leak urine and how much you leak  Make a note of what you were doing when you leaked urine  · Drink liquids as directed  You may need to limit the amount of liquid you drink to help control your urine leakage  Do not drink any liquid right before you go to bed  Limit or do not have drinks that contain caffeine or alcohol  · Prevent constipation  Eat a variety of high-fiber foods  Good examples are high-fiber cereals, beans, vegetables, and whole-grain breads  Walking is the best way to trigger your intestines to have a bowel movement  · Exercise regularly and maintain a healthy weight  Weight loss and exercise will decrease pressure on your bladder and help you control your leakage  · Use a catheter as directed  to help empty your bladder  A catheter is a tiny, plastic tube that is put into your bladder to drain your urine  · Go to behavior therapy as directed  Behavior therapy may be used to help you learn to control your urge to urinate      Weight Management   Why it is important to manage your weight:  Being overweight increases your risk of health conditions such as heart disease, high blood pressure, type 2 diabetes, and certain types of cancer  It can also increase your risk for osteoarthritis, sleep apnea, and other respiratory problems  Aim for a slow, steady weight loss  Even a small amount of weight loss can lower your risk of health problems  How to lose weight safely:  A safe and healthy way to lose weight is to eat fewer calories and get regular exercise  You can lose up about 1 pound a week by decreasing the number of calories you eat by 500 calories each day  Healthy meal plan for weight management:  A healthy meal plan includes a variety of foods, contains fewer calories, and helps you stay healthy  A healthy meal plan includes the following:  · Eat whole-grain foods more often  A healthy meal plan should contain fiber  Fiber is the part of grains, fruits, and vegetables that is not broken down by your body  Whole-grain foods are healthy and provide extra fiber in your diet  Some examples of whole-grain foods are whole-wheat breads and pastas, oatmeal, brown rice, and bulgur  · Eat a variety of vegetables every day  Include dark, leafy greens such as spinach, kale, radha greens, and mustard greens  Eat yellow and orange vegetables such as carrots, sweet potatoes, and winter squash  · Eat a variety of fruits every day  Choose fresh or canned fruit (canned in its own juice or light syrup) instead of juice  Fruit juice has very little or no fiber  · Eat low-fat dairy foods  Drink fat-free (skim) milk or 1% milk  Eat fat-free yogurt and low-fat cottage cheese  Try low-fat cheeses such as mozzarella and other reduced-fat cheeses  · Choose meat and other protein foods that are low in fat  Choose beans or other legumes such as split peas or lentils  Choose fish, skinless poultry (chicken or turkey), or lean cuts of red meat (beef or pork)   Before you cook meat or poultry, cut off any visible fat  · Use less fat and oil  Try baking foods instead of frying them  Add less fat, such as margarine, sour cream, regular salad dressing and mayonnaise to foods  Eat fewer high-fat foods  Some examples of high-fat foods include french fries, doughnuts, ice cream, and cakes  · Eat fewer sweets  Limit foods and drinks that are high in sugar  This includes candy, cookies, regular soda, and sweetened drinks  Exercise:  Exercise at least 30 minutes per day on most days of the week  Some examples of exercise include walking, biking, dancing, and swimming  You can also fit in more physical activity by taking the stairs instead of the elevator or parking farther away from stores  Ask your healthcare provider about the best exercise plan for you  © Copyright [employer] 2018 Information is for End User's use only and may not be sold, redistributed or otherwise used for commercial purposes   All illustrations and images included in CareNotes® are the copyrighted property of [employer]  or [address] [place] [address]

## 2022-08-01 ENCOUNTER — TELEPHONE (OUTPATIENT)
Dept: ADMINISTRATIVE | Facility: OTHER | Age: 72
End: 2022-08-01

## 2022-08-01 NOTE — TELEPHONE ENCOUNTER
----- Message from Bailey Velazquez MD sent at 7/29/2022  9:18 AM EDT -----  07/29/22 9:18 AM    Hello, our patient Lilian Lu has had DEXA Scan completed/performed  Please assist in updating the patient chart by pulling the Care Everywhere (CE) document  The date of service is 11/2021       Thank you,  Bailey Velazquez MD  [facility]

## 2022-08-01 NOTE — TELEPHONE ENCOUNTER
Upon review of the In Basket request we were able to locate, review, and update the patient chart as requested for DEXA Scan  Any additional questions or concerns should be emailed to the Practice Liaisons via [email or web address] email, please do not reply via In Basket      Thank you  Josesito Malave

## 2022-08-08 ENCOUNTER — APPOINTMENT (OUTPATIENT)
Dept: LAB | Facility: HOSPITAL | Age: 72
End: 2022-08-08
Payer: COMMERCIAL

## 2022-08-08 DIAGNOSIS — Z13.6 SCREENING FOR CARDIOVASCULAR, RESPIRATORY, AND GENITOURINARY DISEASES: ICD-10-CM

## 2022-08-08 DIAGNOSIS — Z13.89 SCREENING FOR CARDIOVASCULAR, RESPIRATORY, AND GENITOURINARY DISEASES: ICD-10-CM

## 2022-08-08 DIAGNOSIS — Z13.83 SCREENING FOR CARDIOVASCULAR, RESPIRATORY, AND GENITOURINARY DISEASES: ICD-10-CM

## 2022-08-08 DIAGNOSIS — Z11.59 ENCOUNTER FOR HEPATITIS C SCREENING TEST FOR LOW RISK PATIENT: ICD-10-CM

## 2022-08-08 LAB
ALBUMIN SERPL BCP-MCNC: 3.8 G/DL (ref 3.5–5)
ALP SERPL-CCNC: 29 U/L (ref 46–116)
ALT SERPL W P-5'-P-CCNC: 40 U/L (ref 12–78)
ANION GAP SERPL CALCULATED.3IONS-SCNC: 6 MMOL/L (ref 4–13)
AST SERPL W P-5'-P-CCNC: 28 U/L (ref 5–45)
BILIRUB SERPL-MCNC: 0.63 MG/DL (ref 0.2–1)
BUN SERPL-MCNC: 17 MG/DL (ref 5–25)
CALCIUM SERPL-MCNC: 8.7 MG/DL (ref 8.3–10.1)
CHLORIDE SERPL-SCNC: 105 MMOL/L (ref 96–108)
CHOLEST SERPL-MCNC: 249 MG/DL
CO2 SERPL-SCNC: 31 MMOL/L (ref 21–32)
CREAT SERPL-MCNC: 0.86 MG/DL (ref 0.6–1.3)
GFR SERPL CREATININE-BSD FRML MDRD: 68 ML/MIN/1.73SQ M
GLUCOSE P FAST SERPL-MCNC: 102 MG/DL (ref 65–99)
HCV AB SER QL: NORMAL
HDLC SERPL-MCNC: 57 MG/DL
LDLC SERPL CALC-MCNC: 164 MG/DL (ref 0–100)
POTASSIUM SERPL-SCNC: 4.6 MMOL/L (ref 3.5–5.3)
PROT SERPL-MCNC: 7.1 G/DL (ref 6.4–8.4)
SODIUM SERPL-SCNC: 142 MMOL/L (ref 135–147)
TRIGL SERPL-MCNC: 138 MG/DL

## 2022-08-08 PROCEDURE — 36415 COLL VENOUS BLD VENIPUNCTURE: CPT

## 2022-08-08 PROCEDURE — 86803 HEPATITIS C AB TEST: CPT

## 2022-08-08 PROCEDURE — 80053 COMPREHEN METABOLIC PANEL: CPT

## 2022-08-08 PROCEDURE — 80061 LIPID PANEL: CPT

## 2022-08-11 ENCOUNTER — TELEPHONE (OUTPATIENT)
Dept: NEUROLOGY | Facility: CLINIC | Age: 72
End: 2022-08-11

## 2022-08-11 NOTE — TELEPHONE ENCOUNTER
Patient called to schedule new patient appointment for neuropathy of right leg  No testing done  Triage intake sent

## 2022-09-09 ENCOUNTER — CLINICAL SUPPORT (OUTPATIENT)
Dept: RADIATION ONCOLOGY | Facility: HOSPITAL | Age: 72
End: 2022-09-09
Attending: RADIOLOGY
Payer: COMMERCIAL

## 2022-09-09 VITALS
SYSTOLIC BLOOD PRESSURE: 144 MMHG | BODY MASS INDEX: 29.11 KG/M2 | DIASTOLIC BLOOD PRESSURE: 90 MMHG | WEIGHT: 158.2 LBS | HEART RATE: 77 BPM | HEIGHT: 62 IN | RESPIRATION RATE: 18 BRPM | OXYGEN SATURATION: 95 % | TEMPERATURE: 98.1 F

## 2022-09-09 DIAGNOSIS — C50.912 INVASIVE DUCTAL CARCINOMA OF LEFT BREAST (HCC): Primary | ICD-10-CM

## 2022-09-09 PROCEDURE — 99211 OFF/OP EST MAY X REQ PHY/QHP: CPT | Performed by: RADIOLOGY

## 2022-09-09 PROCEDURE — 99213 OFFICE O/P EST LOW 20 MIN: CPT | Performed by: RADIOLOGY

## 2022-09-09 RX ORDER — TOCILIZUMAB 180 MG/ML
INJECTION, SOLUTION SUBCUTANEOUS
COMMUNITY
Start: 2022-08-31

## 2022-09-09 NOTE — PROGRESS NOTES
Follow-up - Radiation Oncology   Radha Bailon 1950 67 y o  female [id number]      History of Present Illness   Cancer Staging  Invasive ductal carcinoma of left breast ([facility] [address] )  Staging form: Breast, AJCC 8th Edition  - Pathologic: Stage IB (pT1c, pN0, cM0, G3, ER-, OH-, HER2-) - Signed by Arely Montgomery MD on 3/12/2021  Multigene prognostic tests performed: None  Histologic grading system: 3 grade system      Radha Bailon is a 67 y o  female with a history of stage IB [id number] grade 3 triple negative breast cancer status post breast conservation surgery, adjuvant chemotherapy and radiation (completed 5/10/21) who presents for routine follow up  Interval History:  11/10/21 - Mammo diagnostic bilateral w 3d & cad  IMPRESSION:   Therapeutic changes left breast   No evidence for malignancy  ASSESSMENT/BI-RADS CATEGORY:  Left: 2 - Benign  Right: 2 - Benign  Overall: 2 - Benign    22 - Jamey Adame  No concerning findings  Follow up in 6 months      Upcomin22- Jamey Adame    Upon interview, she endorses the above history  She denies new breast masses, nipple discharge or skin changes  She denies breast pain  She is without additional acute concerns  Historical Information   Oncology History   Invasive ductal carcinoma of left breast ([facility] [address] )    Initial Diagnosis    Invasive ductal carcinoma of left breast ([facility] [address] )     2020 Surgery    Breast, Left, Left Breast Mass, Wire Localized Lumpectomy (1 slides [id number] [facility], collected 2020):  Dr Bailey Service, Surgeon    - Invasive breast carcinoma of no special type (ductal NST/invasive ductal carcinoma)     * Size: 15 mm   * Morris grade 3 of 3 (total score: 9 of 9)    -- tubule formation, score 3    -- nuclear grade, score 3    -- mitoses, score 3    * Breast tumor prognostic profile (per report)    -- ER: Negative 0%    -- OH: Negative 0%    -- Her2: Negative 0   * Ductal carcinoma in situ (DCIS): Not identified  * Margins: Negative for carcinoma, less than 1 mm from the closest lateral margin  * Pathologic stage (AJCC 8th ed ): pT1c         9/2/2020 Surgery    Left breast re-excision lumpectomy with SLNB  ([address], [facility], Dr Nikkie Grant)     A  Left breast lumpectomy:  - benign breast tissue with previous lumpectomy cavity showing marked tissue reaction including foreign body giant cell reaction, granulation tissue and fat necrosis  - No residual carcinoma seen  B  Left axillary lymph node:  - two negative lymph nodes  10/2020 - 3/15/2021 Chemotherapy    Adjuvant chemotherapy [facility])  AC (Adriamycin, Cytoxan) x 4 cycles  Paclitaxel x 12 cycles     Dr Criselda Urbano, [facility]      3/12/2021 -  Cancer Staged    Staging form: Breast, AJCC 8th Edition  - Pathologic: Stage IB (pT1c, pN0, cM0, G3, ER-, GA-, HER2-) - Signed by Franklin Rothman MD on 3/12/2021  Multigene prognostic tests performed: None  Histologic grading system: 3 grade system       4/12/2021 - 5/10/2021 Radiation    4256 cGy in 16 fractions to the entire left breast followed by  additional 1000 cGy in 5 fractions to the left lumpectomy cavity  Franklin Rothman MD     4/12/2021 - 5/10/2021 Radiation    BH L BreaEZ 6X 16 / 16 266 0 4,256 21   L Breast e 9E 5 / 5 200 0 1,000 6      Treatment Dates:  4/12/2021 - 5/10/2021            Past Medical History:   Diagnosis Date    Asthma     BRCA1 negative     BRCA2 negative     Breast cancer ([facility] [address] ) 2020    Left  breast, triple negative    Colon polyp     Hypertension     OA (osteoarthritis)     Rheumatoid arthritis ([facility] [address] )      Past Surgical History:   Procedure Laterality Date    ANKLE SURGERY Right 2016    2 plates and screws    BREAST BIOPSY Left 07/02/2020    BREAST LUMPECTOMY Left 08/12/2020    left breast wire localized lumpectomy    BREAST MASS EXCISION Left 09/02/2020    short superior and long lateral left breast resection, left axillary sentinel lymph node biopsy    COLONOSCOPY      ELBOW FRACTURE REPAIR Right 1996    HYSTERECTOMY  2018    REPLACEMENT TOTAL HIP W/  RESURFACING IMPLANTS  06/18/2021       Social History   Social History     Substance and Sexual Activity   Alcohol Use Not Currently    Comment: occasional     Social History     Substance and Sexual Activity   Drug Use Never     Social History     Tobacco Use   Smoking Status Never Smoker   Smokeless Tobacco Never Used         Meds/Allergies     Current Outpatient Medications:     Actemra ACTPen 162 MG/0 9ML SOAJ, , Disp: , Rfl:     albuterol (PROVENTIL HFA,VENTOLIN HFA) 90 mcg/act inhaler, as needed, Disp: , Rfl:     Calcium Carbonate-Vitamin D3 600-400 MG-UNIT TABS, Take by mouth, Disp: , Rfl:     Cholecalciferol 25 MCG (1000 UT) capsule, Take 1,000 Units by mouth daily, Disp: , Rfl:     denosumab (PROLIA) 60 mg/mL, Inject 60 mg under the skin every 6 (six) months, Disp: , Rfl:     fluticasone-salmeterol (Advair) 250-50 mcg/dose inhaler, Inhale 1 puff  in the morning and 1 puff in the evening  Rinse mouth after use    , Disp: 60 blister, Rfl: 1    losartan (COZAAR) 50 mg tablet, 50 mg daily, Disp: , Rfl:     Multiple Vitamin (MULTIVITAMIN ADULT PO), Take 1 tablet by mouth daily, Disp: , Rfl:     omeprazole (PriLOSEC) 20 mg delayed release capsule, Take 2 capsules (40 mg total) by mouth daily, Disp: 90 capsule, Rfl: 3    Actemra 162 MG/0 9ML SOSY, once a week (Patient not taking: Reported on 9/9/2022), Disp: , Rfl:     famotidine (PEPCID) 20 mg tablet, Take 20 mg by mouth 2 (two) times a day (Patient not taking: Reported on 9/9/2022), Disp: , Rfl:     meloxicam (MOBIC) 15 mg tablet, daily (Patient not taking: Reported on 9/9/2022), Disp: , Rfl:   Allergies   Allergen Reactions    Fruit Extracts Itching     Cantelope melon peaches    Nuts - Food Allergy GI Intolerance    Avelox [Moxifloxacin] Itching         Review of Systems   Constitutional: Positive for fatigue (continues to improve)  HENT: Negative  Eyes: Negative  Respiratory: Negative  Cardiovascular: Negative  Gastrointestinal: Negative  Endocrine: Negative  Genitourinary: Negative  Musculoskeletal: Positive for arthralgias (left hip, bilateral hands) and back pain  Mild left arm stiffness, feels lymphedema improved, has a sleeve but not wearing it often   Skin: Negative  Skin feels "thick" at scar site, well healed   Allergic/Immunologic: Negative  Neurological: Negative  Hematological: Negative  Psychiatric/Behavioral: Positive for sleep disturbance (due to pain/positional)  OBJECTIVE:   /90 (BP Location: Right arm, Patient Position: Sitting, Cuff Size: Standard)   Pulse 77   Temp 98 1 °F (36 7 °C) (Temporal)   Resp 18   Ht 5' 2" (1 575 m)   Wt 71 8 kg (158 lb 3 2 oz)   SpO2 95%   BMI 28 94 kg/m²   Pain Assessment:  0  Karnofsky: 90 - Able to carry on normal activity; minor signs or symptoms of disease     Physical Exam   Constitutional:       Appearance: Normal appearance  HENT:      Head: Normocephalic and atraumatic  Eyes:      General: No scleral icterus  Conjunctiva/sclera: Conjunctivae normal    Cardiovascular:      Rate and Rhythm: Normal rate   Pulmonary:      Effort: Pulmonary effort is normal    Chest:      Comments: Performed with chaperone  The left breast has mild fibrosis and volume loss without discrete mass  The right breast is within normal limits  No supraclavicular, cervical or axillary lymphadenopathy  Bilateral nipples everted  Abdominal:      General: Abdomen is flat  There is no distension  Musculoskeletal:         General: Normal range of motion  Skin:     General: Skin is warm and dry  Neurological:      General: No focal deficit present  Psychiatric:         Mood and Affect: Mood normal          RESULTS    Lab Results: No results found for this or any previous visit (from the past 672 hour(s))      Imaging Studies:No results found  Assessment/Plan:  Orders Placed This Encounter   Procedures    Mammo diagnostic bilateral w derek Bolden is a 67 y o  female with a history of stage IB [id number] grade 3 triple negative breast cancer status post breast conservation surgery, adjuvant chemotherapy and radiation (completed 5/10/21) who presents for routine follow up  She remains without significant treatment related toxicity  She is due for mammogram in 11/2022 and this has been ordered today  She will continue medical oncology follow up and will return in 6 months  She was encouraged to call with questions or concerns in the interim  Amara Kaufman MD  9/4/4600,69:36 AM    Portions of the record may have been created with voice recognition software   Occasional wrong word or "sound a like" substitutions may have occurred due to the inherent limitations of voice recognition software   Read the chart carefully and recognize, using context, where substitutions have occurred

## 2022-09-09 NOTE — PROGRESS NOTES
Jossue Forde 1950 is a 67 y o  female with history of Stage 1B triple negative invasive ductal carcinoma of left breast  Completed a course of radiation on 05/10/21  She was last seen in radiation on 12/10/21  She returns today for her follow up      22 - Jamey Adame  No concerning findings  Follow up in 6 months     Upcomin22- Hem Kyung Leslie      Oncology History   Invasive ductal carcinoma of left breast ([facility] [address] )    Initial Diagnosis    Invasive ductal carcinoma of left breast ([facility] [address] )     2020 Surgery    Breast, Left, Left Breast Mass, Wire Localized Lumpectomy (1 slides [id number] [facility], collected 2020):  Dr Ana Little, Surgeon    - Invasive breast carcinoma of no special type (ductal NST/invasive ductal carcinoma)  * Size: 15 mm   * [place] grade 3 of 3 (total score: 9 of 9)    -- tubule formation, score 3    -- nuclear grade, score 3    -- mitoses, score 3    * Breast tumor prognostic profile (per report)    -- ER: Negative 0%    -- NV: Negative 0%    -- Her2: Negative 0   * Ductal carcinoma in situ (DCIS): Not identified  * Margins: Negative for carcinoma, less than 1 mm from the closest lateral margin  * Pathologic stage (AJCC 8th ed ): pT1c         2020 Surgery    Left breast re-excision lumpectomy with SLNB  ([facility] [address], [facility], Dr Zaire Knowles)     A  Left breast lumpectomy:  - benign breast tissue with previous lumpectomy cavity showing marked tissue reaction including foreign body giant cell reaction, granulation tissue and fat necrosis  - No residual carcinoma seen  B  Left axillary lymph node:  - two negative lymph nodes          10/2020 - 3/15/2021 Chemotherapy    Adjuvant chemotherapy [facility])  AC (Adriamycin, Cytoxan) x 4 cycles  Paclitaxel x 12 cycles     Dr Kamari Espinoza, [facility]      3/12/2021 -  Cancer Staged    Staging form: Breast, AJCC 8th Edition  - Pathologic: Stage IB (pT1c, pN0, cM0, G3, ER-, AR-, HER2-) - Signed by Vilma Crooks MD on 3/12/2021  Multigene prognostic tests performed: None  Histologic grading system: 3 grade system       4/12/2021 - 5/10/2021 Radiation    4256 cGy in 16 fractions to the entire left breast followed by  additional 1000 cGy in 5 fractions to the left lumpectomy cavity  Vilma Crooks MD     4/12/2021 - 5/10/2021 Radiation    BH L BreaEZ 6X 16 / 16 266 0 4,256 21   L Breast e 9E 5 / 5 200 0 1,000 6      Treatment Dates:  4/12/2021 - 5/10/2021  Review of Systems:  Review of Systems   Constitutional: Positive for fatigue (continues to improve)  HENT: Negative  Eyes: Negative  Respiratory: Negative  Cardiovascular: Negative  Gastrointestinal: Negative  Endocrine: Negative  Genitourinary: Negative  Musculoskeletal: Positive for arthralgias (left hip, bilateral hands) and back pain  Mild left arm stiffness, feels lymphedema improved, has a sleeve but not wearing it often   Skin: Negative  Skin feels "thick" at scar site, well healed   Allergic/Immunologic: Negative  Neurological: Negative  Hematological: Negative  Psychiatric/Behavioral: Positive for sleep disturbance (due to pain/positional)         Clinical Trial: no      Covid Vaccine Status: vaccinated and boosted    Health Maintenance   Topic Date Due    Pneumococcal Vaccine: 65+ Years (1 - PCV) Never done    PT PLAN OF CARE  09/10/2021    Influenza Vaccine (1) 09/01/2022    Breast Cancer Screening: Mammogram  11/10/2022    Fall Risk  07/29/2023    Urinary Incontinence Screening  07/29/2023    Medicare Annual Wellness Visit (AWV)  07/29/2023    BMI: Followup Plan  07/29/2023    Depression Screening  09/09/2023    BMI: Adult  09/09/2023    DXA SCAN  11/09/2026    Colorectal Cancer Screening  07/09/2032    Hepatitis C Screening  Completed    Osteoporosis Screening  Completed    COVID-19 Vaccine  Completed    HIB Vaccine  Aged Out    Hepatitis B Vaccine  Aged Out    IPV Vaccine  Aged Out    Hepatitis A Vaccine  Aged Out    Meningococcal ACWY Vaccine  Aged Out    HPV Vaccine  Aged Out     Patient Active Problem List   Diagnosis    Invasive ductal carcinoma of left breast ([place] [address] )    Rheumatoid arthritis, involving unspecified site, unspecified whether rheumatoid factor present ([place] [address] )    HTN (hypertension)    Drug-induced polyneuropathy ([place] [address] )    Diastolic CHF, acute ([place] [address] )     Past Medical History:   Diagnosis Date    Asthma     BRCA1 negative     BRCA2 negative     Breast cancer ([place] [address] ) 2020    Left  breast, triple negative    Colon polyp     Hypertension     OA (osteoarthritis)     Rheumatoid arthritis ([place] [address] )      Past Surgical History:   Procedure Laterality Date    ANKLE SURGERY Right 2016    2 plates and screws    BREAST BIOPSY Left 07/02/2020    BREAST LUMPECTOMY Left 08/12/2020    left breast wire localized lumpectomy    BREAST MASS EXCISION Left 09/02/2020    short superior and long lateral left breast resection, left axillary sentinel lymph node biopsy    COLONOSCOPY      ELBOW FRACTURE REPAIR Right 1996    HYSTERECTOMY  2018    REPLACEMENT TOTAL HIP W/  RESURFACING IMPLANTS  06/18/2021     Family History   Problem Relation Age of Onset    Breast cancer Mother     Lung cancer Father     Thyroid cancer Son     Breast cancer Paternal Grandmother      Social History     Socioeconomic History    Marital status: /Civil Union     Spouse name: Not on file    Number of children: Not on file    Years of education: Not on file    Highest education level: Not on file   Occupational History    Not on file   Tobacco Use    Smoking status: Never Smoker    Smokeless tobacco: Never Used   Vaping Use    Vaping Use: Never used   Substance and Sexual Activity    Alcohol use: Not Currently     Comment: occasional    Drug use: Never    Sexual activity: Not on file   Other Topics Concern    Not on file   Social History Narrative    Not on file     Social Determinants of Health     Financial Resource Strain: Not on file   Food Insecurity: Not on file   Transportation Needs: Not on file   Physical Activity: Not on file   Stress: Not on file   Social Connections: Not on file   Intimate Partner Violence: Not on file   Housing Stability: Not on file       Current Outpatient Medications:     Actemra ACTPen 162 MG/0 9ML SOAJ, , Disp: , Rfl:     albuterol (PROVENTIL HFA,VENTOLIN HFA) 90 mcg/act inhaler, as needed, Disp: , Rfl:     Calcium Carbonate-Vitamin D3 600-400 MG-UNIT TABS, Take by mouth, Disp: , Rfl:     Cholecalciferol 25 MCG (1000 UT) capsule, Take 1,000 Units by mouth daily, Disp: , Rfl:     denosumab (PROLIA) 60 mg/mL, Inject 60 mg under the skin every 6 (six) months, Disp: , Rfl:     fluticasone-salmeterol (Advair) 250-50 mcg/dose inhaler, Inhale 1 puff  in the morning and 1 puff in the evening  Rinse mouth after use    , Disp: 60 blister, Rfl: 1    losartan (COZAAR) 50 mg tablet, 50 mg daily, Disp: , Rfl:     Multiple Vitamin (MULTIVITAMIN ADULT PO), Take 1 tablet by mouth daily, Disp: , Rfl:     omeprazole (PriLOSEC) 20 mg delayed release capsule, Take 2 capsules (40 mg total) by mouth daily, Disp: 90 capsule, Rfl: 3    Actemra 162 MG/0 9ML SOSY, once a week (Patient not taking: Reported on 9/9/2022), Disp: , Rfl:     famotidine (PEPCID) 20 mg tablet, Take 20 mg by mouth 2 (two) times a day (Patient not taking: Reported on 9/9/2022), Disp: , Rfl:     meloxicam (MOBIC) 15 mg tablet, daily (Patient not taking: Reported on 9/9/2022), Disp: , Rfl:   Allergies   Allergen Reactions    Fruit Extracts Itching     Cantelope melon peaches    Nuts - Food Allergy GI Intolerance    Avelox [Moxifloxacin] Itching     Vitals:    09/09/22 0905   BP: 144/90   BP Location: Right arm   Patient Position: Sitting   Cuff Size: Standard   Pulse: 77   Resp: 18   Temp: 98 1 °F (36 7 °C)   TempSrc: Temporal   SpO2: 95%   Weight: 71 8 kg (158 lb 3 2 oz)   Height: 5' 2" (1 575 m)      Pain Score:   5

## 2022-10-28 ENCOUNTER — OFFICE VISIT (OUTPATIENT)
Dept: FAMILY MEDICINE CLINIC | Facility: CLINIC | Age: 72
End: 2022-10-28
Payer: COMMERCIAL

## 2022-10-28 VITALS
OXYGEN SATURATION: 97 % | HEART RATE: 80 BPM | BODY MASS INDEX: 29.45 KG/M2 | DIASTOLIC BLOOD PRESSURE: 84 MMHG | TEMPERATURE: 98.7 F | WEIGHT: 161 LBS | RESPIRATION RATE: 18 BRPM | SYSTOLIC BLOOD PRESSURE: 126 MMHG

## 2022-10-28 DIAGNOSIS — J20.9 ACUTE BRONCHITIS, UNSPECIFIED ORGANISM: Primary | ICD-10-CM

## 2022-10-28 DIAGNOSIS — I20.8 ANGINA AT REST (HCC): ICD-10-CM

## 2022-10-28 PROBLEM — I20.89 ANGINA AT REST: Status: ACTIVE | Noted: 2022-10-28

## 2022-10-28 PROCEDURE — 99213 OFFICE O/P EST LOW 20 MIN: CPT | Performed by: NURSE PRACTITIONER

## 2022-10-28 RX ORDER — AZITHROMYCIN 250 MG/1
TABLET, FILM COATED ORAL
Qty: 6 TABLET | Refills: 0 | Status: SHIPPED | OUTPATIENT
Start: 2022-10-28 | End: 2022-11-02

## 2022-10-28 RX ORDER — ATORVASTATIN CALCIUM 10 MG/1
TABLET, FILM COATED ORAL
COMMUNITY
Start: 2022-09-27

## 2022-10-28 RX ORDER — AMOXICILLIN 500 MG/1
CAPSULE ORAL
COMMUNITY
Start: 2022-10-17

## 2022-10-28 NOTE — PROGRESS NOTES
Assessment/Plan:    Recommended Robitussin DM  Cont inhaler, push fluids  F/u for any persistent/worsening symptoms    1  Acute bronchitis, unspecified organism  -     azithromycin (ZITHROMAX) 250 mg tablet; 2 tabs PO day 1, then 1 tab PO days 2-5    2  Angina at rest [facility])          There are no Patient Instructions on file for this visit  Return if symptoms worsen or fail to improve  Subjective:      Patient ID: Sana Gannon is a 67 y o  female  Chief Complaint   Patient presents with   • URI     Sas/cma   • Cough   • Nasal Congestion   • Asthma   • Sore Throat     SX Monday, COVID neg Wednesday           sas/cma       Has had a cough, post nasal drip, and headache for the past several days  Covid testing negative  Using Advair and has been taking Advil cold and sinus          URI   Associated symptoms include coughing, headaches and a sore throat  Pertinent negatives include no abdominal pain, chest pain, congestion, diarrhea, ear pain, nausea, rash, rhinorrhea, vomiting or wheezing  Cough  Associated symptoms include headaches and a sore throat  Pertinent negatives include no chest pain, chills, ear pain, fever, postnasal drip, rash, rhinorrhea, shortness of breath or wheezing  Her past medical history is significant for asthma  Asthma  She complains of cough  There is no shortness of breath or wheezing  Associated symptoms include headaches and a sore throat  Pertinent negatives include no chest pain, ear pain, fever, postnasal drip or rhinorrhea  Her past medical history is significant for asthma  Sore Throat   Associated symptoms include coughing and headaches  Pertinent negatives include no abdominal pain, congestion, diarrhea, ear pain, shortness of breath or vomiting         The following portions of the patient's history were reviewed and updated as appropriate: allergies, current medications, past family history, past medical history, past social history, past surgical history and problem list     Review of Systems   Constitutional: Positive for fatigue  Negative for chills and fever  HENT: Positive for sore throat  Negative for congestion, ear pain, postnasal drip, rhinorrhea and sinus pressure  Respiratory: Positive for cough  Negative for shortness of breath and wheezing  Cardiovascular: Negative for chest pain  Gastrointestinal: Negative for abdominal pain, diarrhea, nausea and vomiting  Musculoskeletal: Negative for arthralgias  Skin: Negative for rash  Neurological: Positive for headaches  Current Outpatient Medications   Medication Sig Dispense Refill   • Actemra ACTPen 162 MG/0 9ML SOAJ      • albuterol (PROVENTIL HFA,VENTOLIN HFA) 90 mcg/act inhaler as needed     • amoxicillin (AMOXIL) 500 mg capsule For dental appt     • atorvastatin (LIPITOR) 10 mg tablet      • azithromycin (ZITHROMAX) 250 mg tablet 2 tabs PO day 1, then 1 tab PO days 2-5 6 tablet 0   • Calcium Carbonate-Vitamin D3 600-400 MG-UNIT TABS Take by mouth     • CELECOXIB PO Take by mouth     • Cholecalciferol 25 MCG (1000 UT) capsule Take 1,000 Units by mouth daily     • denosumab (PROLIA) 60 mg/mL Inject 60 mg under the skin every 6 (six) months     • fluticasone-salmeterol (Advair) 250-50 mcg/dose inhaler Inhale 1 puff  in the morning and 1 puff in the evening  Rinse mouth after use    60 blister 1   • losartan (COZAAR) 50 mg tablet 50 mg daily     • Multiple Vitamin (MULTIVITAMIN ADULT PO) Take 1 tablet by mouth daily     • omeprazole (PriLOSEC) 20 mg delayed release capsule Take 2 capsules (40 mg total) by mouth daily 90 capsule 3     No current facility-administered medications for this visit  Objective:    /84   Pulse 80   Temp 98 7 °F (37 1 °C)   Resp 18   Wt 73 kg (161 lb)   SpO2 97%   BMI 29 45 kg/m²        Physical Exam  Vitals and nursing note reviewed  Constitutional:       Appearance: Normal appearance  She is well-developed     HENT:      Head: Normocephalic and atraumatic  Right Ear: Tympanic membrane, ear canal and external ear normal       Left Ear: Tympanic membrane, ear canal and external ear normal       Nose: No mucosal edema or rhinorrhea  Mouth/Throat:      Pharynx: Oropharynx is clear  Uvula midline  Eyes:      Conjunctiva/sclera: Conjunctivae normal    Neck:      Thyroid: No thyromegaly  Cardiovascular:      Rate and Rhythm: Normal rate and regular rhythm  Pulses: Normal pulses  Heart sounds: Normal heart sounds  No murmur heard  Pulmonary:      Effort: Pulmonary effort is normal       Breath sounds: Normal breath sounds  Abdominal:      General: Bowel sounds are normal  There is no distension  Palpations: There is no hepatomegaly or splenomegaly  Tenderness: There is no abdominal tenderness  Musculoskeletal:      Cervical back: Neck supple  No edema  Lymphadenopathy:      Cervical:      Right cervical: No superficial cervical adenopathy  Left cervical: No superficial cervical adenopathy  Skin:     General: Skin is warm and dry  Findings: No rash  Neurological:      Mental Status: She is alert     Psychiatric:         Mood and Affect: Mood normal          Behavior: Behavior normal                 Indiana Munoz

## 2022-11-11 ENCOUNTER — HOSPITAL ENCOUNTER (OUTPATIENT)
Dept: RADIOLOGY | Facility: HOSPITAL | Age: 72
Discharge: HOME | End: 2022-11-11

## 2022-11-11 DIAGNOSIS — C50.912 INVASIVE DUCTAL CARCINOMA OF LEFT BREAST (HCC): ICD-10-CM

## 2022-12-07 ENCOUNTER — OFFICE VISIT (OUTPATIENT)
Dept: HEMATOLOGY ONCOLOGY | Facility: MEDICAL CENTER | Age: 72
End: 2022-12-07

## 2022-12-07 VITALS
OXYGEN SATURATION: 96 % | WEIGHT: 164 LBS | HEART RATE: 79 BPM | TEMPERATURE: 97.7 F | SYSTOLIC BLOOD PRESSURE: 122 MMHG | BODY MASS INDEX: 30.18 KG/M2 | HEIGHT: 62 IN | RESPIRATION RATE: 18 BRPM | DIASTOLIC BLOOD PRESSURE: 80 MMHG

## 2022-12-07 DIAGNOSIS — C50.912 INVASIVE DUCTAL CARCINOMA OF LEFT BREAST (HCC): Primary | ICD-10-CM

## 2022-12-07 NOTE — PROGRESS NOTES
oNel Paul  1950  [facility] [address] [address]    DISCUSSION/SUMMARY:    51-year-old female with history of pT1c pN0 M0 TNBC s/p BCT/SLN sampling, radiation and adjuvant chemotherapy (AC x 4, paclitaxel weekly x 12)  Presently Mrs Digna Henao feels well and clinically there are no concerning findings  Patient will follow-up with Radiation Oncology as directed  The plan is to continue with self-breast exams and yearly mammograms -the last mammogram in November 2022 did not demonstrate any concerning abnormalities  Patient with a history of Haq's esophagus, previously with a pancreatic tail hypoechoic lesion  Patient is being followed by GI  Recent CAT scan demonstrated no abnormalities in the pancreas but a questionable left adrenal lesion (indeterminate)  Follow-up CAT scan scheduled for approximately 1 year  Patient is to return in 8 months  Mrs Digna Henao knows to call the hematology/oncology office if there are any other questions or concerns  Carefully review your medication list and verify that the list is accurate and up-to-date  Please call the hematology/oncology office if there are medications missing from the list, medications on the list that you are not currently taking or if there is a dosage or instruction that is different from how you're taking that medication      Patient goals and areas of care:  Breast cancer surveillance  Barriers to care:  None  Patient is able to self-care   ______________________________________________________________________________________    Chief Complaint   Patient presents with   • Follow-up     Oncology History   Invasive ductal carcinoma of left breast ([facility] [address] )   2020 Initial Diagnosis    Invasive ductal carcinoma of left breast ([facility] [address] )     8/5/2020 Surgery    Breast, Left, Left Breast Mass, Wire Localized Lumpectomy (1 slides [id number] [facility], collected 8/5/2020):  Dr Virginia Romero, Surgeon    - Invasive breast carcinoma of no special type (ductal NST/invasive ductal carcinoma)  * Size: 15 mm   * Karine grade 3 of 3 (total score: 9 of 9)    -- tubule formation, score 3    -- nuclear grade, score 3    -- mitoses, score 3    * Breast tumor prognostic profile (per report)    -- ER: Negative 0%    -- HI: Negative 0%    -- Her2: Negative 0   * Ductal carcinoma in situ (DCIS): Not identified  * Margins: Negative for carcinoma, less than 1 mm from the closest lateral margin  * Pathologic stage (AJCC 8th ed ): pT1c         9/2/2020 Surgery    Left breast re-excision lumpectomy with SLNB  ([facility], [facility], Dr Preston)     A  Left breast lumpectomy:  - benign breast tissue with previous lumpectomy cavity showing marked tissue reaction including foreign body giant cell reaction, granulation tissue and fat necrosis  - No residual carcinoma seen  B  Left axillary lymph node:  - two negative lymph nodes  10/2020 - 3/15/2021 Chemotherapy    Adjuvant chemotherapy [facility])  AC (Adriamycin, Cytoxan) x 4 cycles  Paclitaxel x 12 cycles     Dr Luisa Botello, [facility]      3/12/2021 -  Cancer Staged    Staging form: Breast, AJCC 8th Edition  - Pathologic: Stage IB (pT1c, pN0, cM0, G3, ER-, HI-, HER2-) - Signed by Gretchen Fletcher MD on 3/12/2021  Multigene prognostic tests performed: None  Histologic grading system: 3 grade system       4/12/2021 - 5/10/2021 Radiation    4256 cGy in 16 fractions to the entire left breast followed by  additional 1000 cGy in 5 fractions to the left lumpectomy cavity  Gretchen Fletcher MD     4/12/2021 - 5/10/2021 Radiation    BH L BreaEZ 6X 16 / 16 266 0 4,256 21   L Breast e 9E 5 / 5 200 0 1,000 6      Treatment Dates:  4/12/2021 - 5/10/2021  History of Present Illness:  29-year-old female previously diagnosed with breast cancer  Mrs Niraj Gonzalez has moved to the [place] area new oncologist     In the late summer of 2020 patient felt a left-sided mass  Patient was due for a mammogram at that time  Results demonstrated an abnormality (that specific mammogram is not available)  Patient was sent for biopsy - positive  Patient was subsequently referred to a surgeon and underwent left-sided BCT/SLN excisions  Patient was found to have early stage triple negative breast cancer and received AC x 4 and then paclitaxel x 12 weeks  Mrs iMller then began surveillance  Patient states feeling well, baseline  No breast related issues  No pain control problems (history of RA)  No headaches, blurred vision or dizziness  No trouble breathing, no other respiratory problems  No GI,  or gyn issues  Appetite is good, weight is stable  Patient has received her COVID vaccine and booster  Routine health maintenance and medical care is for the most part up-to-date  Review of Systems   Constitutional: Negative  HENT: Negative  Eyes: Negative  Respiratory: Negative  Cardiovascular: Negative  Gastrointestinal: Negative  Endocrine: Negative  Genitourinary: Negative  Musculoskeletal: Positive for arthralgias  Skin: Negative  Allergic/Immunologic: Negative  Neurological: Negative  Hematological: Negative  Psychiatric/Behavioral: Negative  All other systems reviewed and are negative      Patient Active Problem List   Diagnosis   • Invasive ductal carcinoma of left breast (HCC)   • Rheumatoid arthritis, involving unspecified site, unspecified whether rheumatoid factor present ([facility] [address] )   • HTN (hypertension)   • Drug-induced polyneuropathy (HCC)   • Diastolic CHF, acute ([facility] [address] )   • Angina at rest [facility])     Past Medical History:   Diagnosis Date   • Asthma    • BRCA1 negative    • BRCA2 negative    • Breast cancer ([facility] [address] ) 2020    Left  breast, triple negative   • Colon polyp    • History of chemotherapy 10/01/2020    breast cancer - left   • History of radiation therapy 2021    left breast cancer   • Hypertension    • OA (osteoarthritis)    • Rheumatoid arthritis [facility])      Past Surgical History:   Procedure Laterality Date   • ANKLE SURGERY Right 2016    2 plates and screws   • BREAST BIOPSY Left 2020   • BREAST LUMPECTOMY Left 2020    left breast wire localized lumpectomy   • BREAST MASS EXCISION Left 2020    short superior and long lateral left breast resection, left axillary sentinel lymph node biopsy   • COLONOSCOPY     • ELBOW FRACTURE REPAIR Right    • HYSTERECTOMY  2018   • REPLACEMENT TOTAL HIP W/  RESURFACING IMPLANTS  2021     Family History   Problem Relation Age of Onset   • Breast cancer Mother    • Lung cancer Father    • Thyroid cancer Son    • Breast cancer Paternal Grandmother    Family history:  mother diagnosed with breast cancer at the age of 72 and  of metastatic breast cancer at the age of 79, paternal grandmother with breast cancer (NOS), 4 children in good general health, 1 son recently diagnosed with thyroid cancer (NOS)    Social History     Socioeconomic History   • Marital status: /Civil Union     Spouse name: Not on file   • Number of children: Not on file   • Years of education: Not on file   • Highest education level: Not on file   Occupational History   • Not on file   Tobacco Use   • Smoking status: Never   • Smokeless tobacco: Never   Vaping Use   • Vaping Use: Never used   Substance and Sexual Activity   • Alcohol use: Not Currently     Comment: occasional   • Drug use: Never   • Sexual activity: Not on file   Other Topics Concern   • Not on file   Social History Narrative   • Not on file     Social Determinants of Health     Financial Resource Strain: Not on file   Food Insecurity: Not on file   Transportation Needs: Not on file   Physical Activity: Not on file   Stress: Not on file   Social Connections: Not on file   Intimate Partner Violence: Not on file   Housing Stability: Not on file   Social history:  No tobacco, alcohol or drug abuse, no toxic exposure    Current Outpatient Medications:   •  Actemra ACTPen 162 MG/0 9ML SOAJ, , Disp: , Rfl:   •  albuterol (PROVENTIL HFA,VENTOLIN HFA) 90 mcg/act inhaler, as needed, Disp: , Rfl:   •  atorvastatin (LIPITOR) 10 mg tablet, , Disp: , Rfl:   •  Calcium Carbonate-Vitamin D3 600-400 MG-UNIT TABS, Take by mouth, Disp: , Rfl:   •  CELECOXIB PO, Take by mouth, Disp: , Rfl:   •  Cholecalciferol 25 MCG (1000 UT) capsule, Take 1,000 Units by mouth daily, Disp: , Rfl:   •  denosumab (PROLIA) 60 mg/mL, Inject 60 mg under the skin every 6 (six) months, Disp: , Rfl:   •  losartan (COZAAR) 50 mg tablet, 50 mg daily, Disp: , Rfl:   •  Multiple Vitamin (MULTIVITAMIN ADULT PO), Take 1 tablet by mouth daily, Disp: , Rfl:   •  amoxicillin (AMOXIL) 500 mg capsule, For dental appt (Patient not taking: Reported on 12/7/2022), Disp: , Rfl:   •  fluticasone-salmeterol (Advair) 250-50 mcg/dose inhaler, Inhale 1 puff  in the morning and 1 puff in the evening  Rinse mouth after use    (Patient not taking: Reported on 12/7/2022), Disp: 60 blister, Rfl: 1  •  omeprazole (PriLOSEC) 20 mg delayed release capsule, Take 2 capsules (40 mg total) by mouth daily, Disp: 90 capsule, Rfl: 3    Allergies   Allergen Reactions   • Fruit Extracts Itching     Cantelope melon peaches   • Nuts - Food Allergy GI Intolerance   • Avelox [Moxifloxacin] Itching       Vitals:    12/07/22 0907   Resp: 18   Temp: 97 7 °F (36 5 °C)     Physical Exam  Constitutional:       Appearance: She is well-developed  Comments: Well-nourished female, no respiratory distress, no signs of pain   HENT:      Head: Normocephalic and atraumatic  Right Ear: External ear normal       Left Ear: External ear normal    Eyes:      Conjunctiva/sclera: Conjunctivae normal       Pupils: Pupils are equal, round, and reactive to light  Cardiovascular:      Rate and Rhythm: Normal rate and regular rhythm        Heart sounds: Normal heart sounds  Pulmonary:      Effort: Pulmonary effort is normal       Breath sounds: Normal breath sounds  Comments: Clear bilaterally  Abdominal:      General: Bowel sounds are normal       Palpations: Abdomen is soft  Comments: +bowel sounds, nontender, soft, cannot palpate liver or spleen, no guarding, no rigidity or rebound   Musculoskeletal:         General: Normal range of motion  Cervical back: Normal range of motion and neck supple  Skin:     General: Skin is warm  Comments: Good color, warm, moist, no petechiae or ecchymoses   Neurological:      Mental Status: She is alert and oriented to person, place, and time  Deep Tendon Reflexes: Reflexes are normal and symmetric  Psychiatric:         Behavior: Behavior normal          Thought Content: Thought content normal          Judgment: Judgment normal      Breast: Deferred, no axillary adenopathy bilaterally  Extremities:  No lower extremity edema bilaterally, no cords, pulses are 1+  Lymphatics:  No adenopathy in the neck, supraclavicular region, axilla bilaterally    Labs    8/8/2022 BUN = 17 creatinine = 0 86 calcium = 8 7 AST = 28 ALT = 40 alkaline phosphatase = 29 total protein = 7 1 total bilirubin = 0 63    6/8/2022 BBC = 4 75 hemoglobin = 13 8 hematocrit = 41 7 platelet = 174 neutrophil = 36% lymphocyte = 30% monocyte = 15% eosinophil = 10%    Imaging    11/11/2022 1 mammogram diagnostic bilateral 3D and CAD  Impression stated stable exam, overall category 2, posttreatment changes visible in the left breast, recommend diagnostic mammogram 1 year follow-up breast     6/17/2022 CAT scan abdomen pelvis    IMPRESSION:     1  Normal pancreas  2   Hepatic steatosis  3   Indeterminate left adrenal nodule   Although its imaging features are indeterminate, it does not have suspicious imaging features (heterogeneity, necrosis, irregular margins), therefore this is likely benign, and can be followed by non-contrast abdomen CT or MRI in 12 months  If patient has history of adrenal hyperfunction, consider biochemical evaluation         05/25/2022 right upper quadrant ultrasound    KIDNEY:   Right kidney measures 11 1 x 4 3 x 4 4 cm  Volume 109 6 mL  Small simple cyst measures 7 mm  ASCITES:   None  IMPRESSION:  Probable fatty liver  No gallstones  Normal ducts  Hypoechoic area near the pancreatic tail may be related to bowel gas artifact although a shadowing structure is possible  Follow-up with MRI or CT with contrast in approximately one month's time may be useful assuming availability to the national shortage  11/10/2021 mammogram diagnostic bilateral with 3D and CAD  Impression stated therapeutic changes left breast, no evidence for malignancy, category 2 both breast, recommend diagnostic mammogram 1 year both breasts  Pathology    Case Report   Surgical Pathology Report                         Case: [id number]                                    Authorizing Provider: Georgette Glass MD         Collected:           03/09/2021 [facility]               Ordering Location:     [facility]      Received:            03/09/2021 [facility]                                      Hospital Specialty                                                                                   Laboratory                                                                    Pathologist:           Lizz Torre MD                                                                   Specimen:    Breast, Left, Left Breast Mass, Wire Localized Lumpectomy (1 [id number]                     [facility], collected 8/5/2020)                                                    Final Diagnosis   A  Breast, Left, Left Breast Mass, Wire Localized Lumpectomy (1 slides [id number] [facility], collected 8/5/2020):  - Invasive breast carcinoma of no special type (ductal NST/invasive ductal carcinoma)     * Size: 15 mm   * Frierson grade 3 of 3 (total score: 9 of 9)    -- tubule formation, score 3    -- nuclear grade, score 3    -- mitoses, score 3    * Breast tumor prognostic profile (per report)    -- ER: Negative 0%    -- VT: Negative 0%    -- Her2: Negative 0   * Ductal carcinoma in situ (DCIS): Not identified  * Margins: Negative for carcinoma, less than 1 mm from the closest lateral margin  * Pathologic stage (AJCC 8th ed ): pT1c      Comment: Only 1 slide is available for review      Electronically signed by Madelin Devlin MD on 3/9/2021 at 12:26 PM       08/06/2020 Department of pathology [facility]: Left breast mass, wire localized lumpectomy: Poorly differentiated carcinoma consistent with invasive breast ductal carcinoma, grade 3, margins negative, left side, tumor size = 15 mm, single focus, no DCIS, skin not present, no skeletal muscle present, 2 neg lymph nodes, ER = 0%, VT = 0% HER2/selina = 0 = negative

## 2023-01-09 ENCOUNTER — CONSULT (OUTPATIENT)
Dept: NEUROLOGY | Facility: CLINIC | Age: 73
End: 2023-01-09

## 2023-01-09 VITALS
BODY MASS INDEX: 29.63 KG/M2 | HEART RATE: 75 BPM | WEIGHT: 161 LBS | SYSTOLIC BLOOD PRESSURE: 104 MMHG | HEIGHT: 62 IN | TEMPERATURE: 96.8 F | OXYGEN SATURATION: 97 % | DIASTOLIC BLOOD PRESSURE: 68 MMHG

## 2023-01-09 DIAGNOSIS — G62.0 DRUG-INDUCED POLYNEUROPATHY (HCC): ICD-10-CM

## 2023-01-09 DIAGNOSIS — M54.50 LOWER BACK PAIN: Primary | ICD-10-CM

## 2023-01-09 DIAGNOSIS — R20.0 NUMBNESS OF RIGHT LOWER EXTREMITY: ICD-10-CM

## 2023-01-09 DIAGNOSIS — M54.16 RADICULOPATHY, LUMBAR REGION: ICD-10-CM

## 2023-01-09 NOTE — PROGRESS NOTES
Outpatient Neurology History and Physical  Aly Rawls  [id number]  48 y o   1950          Consult: Yes    Marylee Shuck, MD      Chief Complaint   Patient presents with   • Numbness   • Peripheral Neuropathy           History Obtained from: patient     HPI:     Aly Rawls is a 66 yo F with PMH of RA, h/o breast cancer presents to discuss her paresthesia  She states that from RA, she's had numbness at her finger tips  She then had chemo taxel between 2020 and 2021 which did increase her numbness and tingling  Since August of 2022, patient has started getting lower back pain after a trip that required an 8 hour drive to [place]  She now gets left lower back pain, into gluteal region, down posterior thigh and leg and into toes  She tried chiropractor sessions which have helped  Patient is much better but still gets lower back pain that radiates to gluteal region  On right side, she has hip and ankle chronic problems from prior surgery  Her LDL is 164 and was recently placed on lipitor 10mg  She thinks it could also be from increase in dose of Actemra         Past Medical History:   Diagnosis Date   • Asthma    • BRCA1 negative    • BRCA2 negative    • Breast cancer ([facility] ) 2020    Left  breast, triple negative   • Colon polyp    • History of chemotherapy 10/01/2020    breast cancer - left   • History of radiation therapy 04/01/2021    left breast cancer   • Hypertension    • OA (osteoarthritis)    • Rheumatoid arthritis ([facility] )                Current Outpatient Medications on File Prior to Visit   Medication Sig Dispense Refill   • Actemra ACTPen 162 MG/0 9ML SOAJ      • albuterol (PROVENTIL HFA,VENTOLIN HFA) 90 mcg/act inhaler as needed     • amoxicillin (AMOXIL) 500 mg capsule For dental appt     • atorvastatin (LIPITOR) 10 mg tablet      • Calcium Carbonate-Vitamin D3 600-400 MG-UNIT TABS Take by mouth     • CELECOXIB PO Take by mouth     • Cholecalciferol 25 MCG (1000 UT) capsule Take 1,000 Units by mouth daily     • denosumab (PROLIA) 60 mg/mL Inject 60 mg under the skin every 6 (six) months     • fluticasone-salmeterol (Advair) 250-50 mcg/dose inhaler Inhale 1 puff  in the morning and 1 puff in the evening  Rinse mouth after use    (Patient taking differently: Inhale 1 puff if needed Rinse mouth after use ) 60 blister 1   • losartan (COZAAR) 50 mg tablet 50 mg daily     • Multiple Vitamin (MULTIVITAMIN ADULT PO) Take 1 tablet by mouth daily     • omeprazole (PriLOSEC) 20 mg delayed release capsule Take 2 capsules (40 mg total) by mouth daily 90 capsule 3   • [DISCONTINUED] Na Sulfate-K Sulfate-Mg Sulf (Suprep Bowel Prep Kit) 17 5-3 13-1 6 GM/177ML SOLN Take 177 mL by mouth once for 1 dose Take 177 mL by mouth once for 1 dose (Patient not taking: Reported on 6/1/2022) 177 mL 0     No current facility-administered medications on file prior to visit         Allergies   Allergen Reactions   • Fruit Extracts Itching     Cantelope melon peaches   • Nuts - Food Allergy GI Intolerance   • Avelox [Moxifloxacin] Itching         Family History   Problem Relation Age of Onset   • Breast cancer Mother    • Lung cancer Father    • Thyroid cancer Son    • Breast cancer Paternal Grandmother                 Past Surgical History:   Procedure Laterality Date   • ANKLE SURGERY Right 2016    2 plates and screws   • BREAST BIOPSY Left 07/02/2020   • BREAST LUMPECTOMY Left 08/12/2020    left breast wire localized lumpectomy   • BREAST MASS EXCISION Left 09/02/2020    short superior and long lateral left breast resection, left axillary sentinel lymph node biopsy   • COLONOSCOPY     • ELBOW FRACTURE REPAIR Right 1996   • HYSTERECTOMY  2018   • REPLACEMENT TOTAL HIP W/  RESURFACING IMPLANTS  06/18/2021           Social History     Socioeconomic History   • Marital status: /Civil Union     Spouse name: Not on file   • Number of children: Not on file   • Years of education: Not on file   • Highest education level: Not on file Occupational History   • Not on file   Tobacco Use   • Smoking status: Never   • Smokeless tobacco: Never   Vaping Use   • Vaping Use: Never used   Substance and Sexual Activity   • Alcohol use: Not Currently     Comment: occasional   • Drug use: Never   • Sexual activity: Not on file   Other Topics Concern   • Not on file   Social History Narrative   • Not on file     Social Determinants of Health     Financial Resource Strain: Not on file   Food Insecurity: Not on file   Transportation Needs: Not on file   Physical Activity: Not on file   Stress: Not on file   Social Connections: Not on file   Intimate Partner Violence: Not on file   Housing Stability: Not on file       Review of Systems  Refer to positive review of systems in HPI  Constitutional- No fever  Eyes- No visual change  ENT- Hearing normal  CV- No chest pain  Resp- No Shortness of breath  GI- No diarrhea  - Bladder normal  MS- No Arthritis   Skin- No rash  Psych- No depression  Endo- No DM  Heme- No nodes    PHYSICAL EXAM:    Vitals:    01/09/23 0958   BP: 104/68   BP Location: Right arm   Patient Position: Sitting   Cuff Size: Standard   Pulse: 75   Temp: (!) 96 8 °F (36 °C)   TempSrc: Tympanic   SpO2: 97%   Weight: 73 kg (161 lb)   Height: 5' 2" (1 575 m)         Appearance: No Acute Distress  Ophthalmoscopic: Disc Flat, Normal fundus  Carotid/Heart/Peripheral Vascular: No Bruits, RRR  Orientation: Awake, Alert, and Oriented x 3  Mental status:  Memory: Registation 3/3 Recall 3/3  Attention: Normal  Knowledge: Appropriate  Language: No aphasia  Speech: No dysarthria  Cranial Nerves:  2 No Visual Defect on Confrontation; Pupils round, equal, reactive to light  3,4,6 Extraocular Movements Intact; no nystagmus  5 Facial Sensation Intact  7 No facial asymmetry  8 Intact hearing  9,10 Palate symmetric, normal gag  11 Good shoulder shrug  12 Tongue Midline  Gait: Stable, No ataxia, can perform tandem walking  Coordination: No ataxia with finger to nose testing and heel to shin testing  Sensory: Intact, Symmetric to Pinprick, Light Touch, decreased vibratory sense at feet and upto knees  and Joint Position  Muscle Tone: Normal  Muscle exam  Arm Right Left Leg Right Left   Deltoid 5/5 5/5 Iliopsoas 5/5 5/5   Biceps 5/5 5/5 Quads 5/5 5/5   Triceps 5/5 5/5 Hamstrings 5/5 5/5   Wrist Extension 5/5 5/5 Ankle Dorsi Flexion 5/5 5/5   Wrist Flexion 5/5 5/5 Ankle Plantar Flexion 5/5 5/5   Interossei 5/5 5/5 Ankle Eversion 5/5 5/5   APB 5/5 5/5 Ankle Inversion 5/5 5/5     Positive left leg raise test      Reflexes   RJ BJ TJ KJ AJ Plantars Lyons's   Right 2+ 2+ 2+ 1+ 0 Downgoing Not present   Left 2+ 2+ 2+ 2+ 0 Downgoing Not present       Personal review of          None relevant     Assessment/Plan:     1  Lower back pain  MRI lumbar spine without contrast    Ambulatory Referral to Physical Therapy    EMG 2 limb lower extremity      2  Numbness of right lower extremity  Ambulatory Referral to Neurology    MRI lumbar spine without contrast    RPR    Vitamin B12    Protein electrophoresis, serum    Protein electrophoresis, urine    Folate    Ambulatory Referral to Physical Therapy    EMG 2 limb lower extremity      3  Radiculopathy, lumbar region  MRI lumbar spine without contrast      4  Drug-induced polyneuropathy ([facility] [address] )            Patient's exam does show changes of length dependent peripheral neuropathy  Will check for some reversible causes  Will obtain EMG of LE for further evaluation  At present, she suffers more from lower back radiculopathy, sciatica  Will get MRI LS spine for further evaluation and try PT in meantime  Asked her to try neurontin for symptomatic relief that has been ordered by her rheumatologist                Counseling Documentation:  The patient and/or patient's family were  counseled regarding diagnostic results  Instructions for management,risk factor reductions,prognosis of disease were discussed   Patient and family were educated regarding impressions,risks and benefits of treatment options,importance of compliance with treatment  Total time of encounter: 60 min   More than 50% of time was spent in counseling and coordination of care of patient  REJI St[facility]  [address]  Angela Rodgers

## 2023-01-13 ENCOUNTER — EVALUATION (OUTPATIENT)
Dept: PHYSICAL THERAPY | Facility: CLINIC | Age: 73
End: 2023-01-13

## 2023-01-13 DIAGNOSIS — R20.0 NUMBNESS OF RIGHT LOWER EXTREMITY: ICD-10-CM

## 2023-01-13 DIAGNOSIS — M54.50 BILATERAL LOW BACK PAIN, UNSPECIFIED CHRONICITY, UNSPECIFIED WHETHER SCIATICA PRESENT: Primary | ICD-10-CM

## 2023-01-13 NOTE — PROGRESS NOTES
PT Evaluation     Today's date: 2023  Patient name: Cristhian Hyatt  : 1950  MRN: [id number]  Referring provider: Medardo Hogan MD  Dx:   Encounter Diagnosis     ICD-10-CM    1  Bilateral low back pain, unspecified chronicity, unspecified whether sciatica present  M54 50 Ambulatory Referral to Physical Therapy     PT plan of care cert/re-cert      2  Numbness of right lower extremity  R20 0 Ambulatory Referral to Physical Therapy     PT plan of care cert/re-cert          Start Time: 804  Stop Time: 845  Total time in clinic (min): 41 minutes    Assessment  Assessment details: Pt is a pleasant 67 y o  female who presents to [place] [id number] PT with low back pain, worse in the last few weeks  Today, she presents with decreased and painful thoracolumbar ROM and joint mobility, decreased B LE and core strength, high self reports of pain, and decreased tolerance to activity  Functionally, she is limited in her ability to stand and ambulate, sleep through the night, perform age appropriate recreation and exercise, perform normal household duties, and perform normal self care  She is motivated to improve  Pt will benefit from skilled PT to address the aforementioned deficits and limitations in an effort to maximize pain free functional mobility and overall quality of life  Progress as able with these goals in mind  Impairments: abnormal coordination, abnormal gait, abnormal muscle firing, abnormal muscle tone, abnormal or restricted ROM, abnormal movement, activity intolerance, impaired physical strength and pain with function  Understanding of Dx/Px/POC: good   Prognosis: good    Goals  Short term goals (3 weeks):  1) Pt will improve thoracolumbar mobility deficits by 25% pain free  2) Pt will improve B LE and core strength deficits by 1/3 grade MMT  3) Pt will improve pain at worse to <4/10     4) Pt will initiate and progress HEP w/ special emphasis on functional core control and thoracolumbar mobility  Long term goals (6 weeks)  1) Pt will improve FOTO to at least 57    2) Pt will sleep through the night in positioning of choosing 6/7 nights a week w/o waking due to pain  3) Pt will stand and ambulate for community distances w/ normalized gait mechanics and pain <3/10 throughout  4) Pt will be independent and compliant w/ HEP in order to maximize functional benefit of skilled PT following d/c          Plan  Plan details: POC to include: 2x/week to start    HEP to start: TrA isos and march, bridge or glute sq, LTR, FRIDA     Patient would benefit from: skilled PT  Planned modality interventions: cryotherapy and thermotherapy: hydrocollator packs  Planned therapy interventions: abdominal trunk stabilization, activity modification, ADL retraining, manual therapy, massage, motor coordination training, neuromuscular re-education, patient education, therapeutic training, therapeutic exercise, therapeutic activities, stretching, strengthening, home exercise program, functional ROM exercises, gait training, balance, balance/weight bearing training and body mechanics training  Frequency: 2x week  Duration in visits: 12  Duration in weeks: 6  Treatment plan discussed with: patient        Subjective Evaluation    History of Present Illness  Mechanism of injury: Pt is here for evaluation of low back pain  Had R CARLOS replaced in June 2021, notes that hip has healed but that her low back has hurt since  Pain was originally down R side low back, now down L side  Was given lift by podiatrist after PT, this made pain worse  Drove 8 hours to [place] over summer, pain got very bad after this  Could barely work  Saw neuro recently, was referred for PT  Jimmie Cloud has helped  Has RA, notes numbness in fingers and toes  Had David Engel for breast cancer treatment, [id number]  Numbness got worse in both fingers and hands during this time  Feet are worst than hands  Pain in R ankle and shin after walking all day   Trimalleolar fracture in R ankle in 2016, occurred while washing dog  Dislocated in ER again that night  Notes that she has had ankle pain in this area since  Sleeping at night is difficult, hard to get comfortable and hard to stay asleep s/t pain  Main goal is to move w/ less pain  Functional status below:   Quality of life: good    Pain  Current pain ratin  At best pain ratin  At worst pain ratin  Location: midline low back, along L posterior hip to L lateral thigh to lateral calf, L foot  Quality: dull ache and radiating  Relieving factors: rest, change in position and medications  Aggravating factors: walking, standing, lifting, overhead activity, running and stair climbing  Progression: improved    Social Support  Steps to enter house: yes  Stairs in house: yes   Lives in: multiple-level home (bedroom on first floor )  Lives with: spouse (daughter is a couple blocks away)    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         Objective     Concurrent Complaints  Negative for bladder dysfunction, bowel dysfunction and saddle (S4) numbness    Postural Observations  Seated posture: poor  Standing posture: poor  Correction of posture: makes symptoms better        Palpation     Additional Palpation Details  Pt is TTP and has increased tissue density through distal thoracolumbar PS and QL/piri     Neurological Testing     Sensation     Lumbar   Left   Intact: light touch    Right   Intact: light touch    Active Range of Motion     Additional Active Range of Motion Details  Flex: 50-75%  Ext: 25%  SB R: 25-33%*  SB L: 25-33%*  Rot R: 50%  Rot L: 50%    *indicates pain    Guarded throughout     Strength/Myotome Testing     Additional Strength Details  LE Strength (R/L)    Hip  Flex 4/5 , 4/5  Ext 4/5 , 4/5  Abd 4/5 , 4/5  Add 4/5 , 4/5    Knee   Flex 4/5 , 4/5  Ext 4/5 , 4/5    Core Strength: 1+/5     *Indicates pain    Bridging: half range w/ cueing needed for glute drive     Tests     Additional Tests Details  Manual lumbar traction: good relief both single and double     Thoracolumbar joint mobility: grossly hypo w/ min pain on L4/5      Functional Testing:   Sit<>stand: UE support on LE w/ fair glute drive     BW squat: not past 25% before compensation     Stairs: TBA     SLS: unable w/o UE support B     Ambulation     Ambulation: Level Surfaces     Additional Level Surfaces Ambulation Details  Decreased step length and lumbar rotation, min decreased pace, not specifically antalgic       Flowsheet Rows    Flowsheet Row Most Recent Value   PT/OT G-Codes    Current Score 42   Projected Score 57   FOTO information reviewed Yes              POC expires Auth Status Total   Visits  Start date  Expiration date PT/OT + Visit Limit?  Co-Insurance         No                                             Visit/Unit Tracking  AUTH Status:  Date               Visits  Authed:  Used                Remaining                      Dummy Auth Tracking  1 2 3 4 5 6   7 8 9 10 11 12   13 14 15 16 17 18   19 20 21 22 23 24   25 26 27 28 29 30   31 32 33 34 35 36         Precautions: hx of R ankle fracture 2016, R CARLOS June 2020, hx of breast cancer, hx of RA,       Date (Visit #) IE 1/13 (1)  (2) (3)  (4)  (5)   Manual              DTM and TPR             Lumbar mobs  tested            Lumbar traction   tested                                          Exercise Diary              Ther Ex        Active w/u             HS/glute/piri/HF stretching  2x30 sec B HS and glute/piri            Mobility (LTR, open books, cat/cow) LTR x10-15 total       Rows/ext        Hip stability         FRIDA x 2-3 mins total, half range PPU x8 total                                       Neuro Re Ed        TrA progressions  isos and march x10-15            Bridge progressions intro x10-15           Squat progressions             Hip abd progressions             Balance                 HEP and POC review x8 mins                                 Ther Act             Stairs             Functional Transfers             Functional Lifting                                                                                                                                                           Modalities             heat             Ice              Mechanical Traction

## 2023-01-16 ENCOUNTER — OFFICE VISIT (OUTPATIENT)
Dept: PHYSICAL THERAPY | Facility: CLINIC | Age: 73
End: 2023-01-16

## 2023-01-16 DIAGNOSIS — M54.50 BILATERAL LOW BACK PAIN, UNSPECIFIED CHRONICITY, UNSPECIFIED WHETHER SCIATICA PRESENT: Primary | ICD-10-CM

## 2023-01-16 DIAGNOSIS — R20.0 NUMBNESS OF RIGHT LOWER EXTREMITY: ICD-10-CM

## 2023-01-16 NOTE — PROGRESS NOTES
Daily Note     Today's date: 2023  Patient name: Salbador Alvarenga  : 1950  MRN: [id number]  Referring provider: Racquel Feliciano MD  Dx: No diagnosis found  Subjective: ***      Objective: See treatment diary below      Assessment: Tolerated treatment {Tolerated treatment :[id number]}  Patient {assessment:[id number]}      Plan: {PLAN:}          POC expires Auth Status Total   Visits  Start date  Expiration date PT/OT + Visit Limit?  Co-Insurance    no auth required  $10 co-pay     No                                           Dummy Auth Tracking  1 2 3 4 5 6   7 8 9 10 11 12   13 14 15 16 17 18   19 20 21 22 23 24   25 26 27 28 29 30   31 32 33 34 35 36         Precautions: hx of R ankle fracture , R CARLOS 2020, hx of breast cancer, hx of RA,       Date (Visit #) IE  (1)   (2) (3)  (4)  (5)   Manual              DTM and TPR             Lumbar mobs  tested            Lumbar traction   tested                                          Exercise Diary              Ther Ex        Active w/u             HS/glute/piri/HF stretching  2x30 sec B HS and glute/piri            Mobility (LTR, open books, cat/cow) LTR x10-15 total       Rows/ext        Hip stability         FRIDA x 2-3 mins total, half range PPU x8 total                                       Neuro Re Ed        TrA progressions  isos and march x10-15            Bridge progressions intro x10-15           Squat progressions             Hip abd progressions             Balance                 HEP and POC review x8 mins                                 Ther Act             Stairs             Functional Transfers             Functional Lifting                                                                                                                                                           Modalities             heat             Ice              Mechanical Traction

## 2023-01-16 NOTE — PROGRESS NOTES
Daily Note     Today's date: 2023  Patient name: Surya Dale  : 1950  MRN: [id number]  Referring provider: Judith Wyatt MD  Dx:   Encounter Diagnosis     ICD-10-CM    1  Bilateral low back pain, unspecified chronicity, unspecified whether sciatica present  M54 50       2  Numbness of right lower extremity  R20 0                      Subjective: Pt reports no new complaints  Was very stuff this morning, pain in L side posterior hip is better but still present  Reports that she did ext work over weekend w/ good success  Objective: requires cueing throughout to promote proper glute drive vs trunk or HS compensation - corrects but HS does cramp during bridge which demonstrates point  Assessment: Tolerated treatment well  Patient demonstrated fatigue post treatment and would benefit from continued PT  Does well w/ exercise progressions, fatigue but overall improvement in mobility post  Shows good tolerance to exercise progressions today  Reviewed sciatic nerve flossing work for home and will continue w/ this between now and Friday  Plan to progress to more WB exercises at next visit barring setback  Plan: Continue per plan of care  TrA isos w/ band in standing, pball press, hip hinge, pallof press        POC expires Auth Status Total   Visits  Start date  Expiration date PT/OT + Visit Limit?  Co-Insurance    no auth required  $10 co-pay     No                                           Dummy Auth Tracking  1 2 3 4 5 6   7 8 9 10 11 12   13 14 15 16 17 18   19 20 21 22 23 24   25 26 27 28 29 30   31 32 33 34 35 36         Precautions: hx of R ankle fracture , R CARLOS 2020, hx of breast cancer, hx of RA,       Date (Visit #) IE  (1)   (2) (3)  (4)  (5)   Manual              DTM and TPR             Lumbar mobs  tested            Lumbar traction   tested                                          Exercise Diary              Ther Ex        Active w/u    NU step UE and LE pre lvl 4 x 6 mins         HS/glute/piri/HF stretching  2x30 sec B HS and glute/piri   B HS and glute/piri w/ intermittent LAD and sciatic nerve floss x 12 mins          Mobility (LTR, open books, cat/cow) LTR x10-15 total LTR x20 total       Rows/ext        Hip stability         FRIDA x 2-3 mins total, half range PPU x8 total FRIDA 2x2 mins    PPU half range 2x8-10        Self sciatic nerve floss demo and practice in sitting x 5 mins                               Neuro Re Ed        TrA progressions  isos and march x10-15   isos x 10, march x10         Bridge progressions intro x10-15  ad sq x20, w/ bridge 2x10          Squat progressions    prone glute sq x20, w/ single leg ext lift 2x10          Hip abd progressions               Sit<>stand w/ glute drive 2H72 total                HEP and POC review x8 mins   Rev  x2-3 mins                               Ther Act             Stairs             Functional Transfers             Functional Lifting                                                                                                                                                           Modalities             heat             Ice              Mechanical Traction

## 2023-01-20 ENCOUNTER — APPOINTMENT (OUTPATIENT)
Dept: LAB | Facility: HOSPITAL | Age: 73
End: 2023-01-20

## 2023-01-20 ENCOUNTER — OFFICE VISIT (OUTPATIENT)
Dept: PHYSICAL THERAPY | Facility: CLINIC | Age: 73
End: 2023-01-20

## 2023-01-20 DIAGNOSIS — R20.0 NUMBNESS OF RIGHT LOWER EXTREMITY: ICD-10-CM

## 2023-01-20 DIAGNOSIS — M54.50 BILATERAL LOW BACK PAIN, UNSPECIFIED CHRONICITY, UNSPECIFIED WHETHER SCIATICA PRESENT: Primary | ICD-10-CM

## 2023-01-20 LAB
FOLATE SERPL-MCNC: >20 NG/ML (ref 3.1–17.5)
RPR SER QL: NORMAL
VIT B12 SERPL-MCNC: 678 PG/ML (ref 100–900)

## 2023-01-20 NOTE — PROGRESS NOTES
Daily Note     Today's date: 2023  Patient name: Ranjana Ortega  : 1950  MRN: [id number]  Referring provider: Jai Olsen MD  Dx:   Encounter Diagnosis     ICD-10-CM    1  Bilateral low back pain, unspecified chronicity, unspecified whether sciatica present  M54 50       2  Numbness of right lower extremity  R20 0                      Subjective: Pt reports that she felt good after last session, reports that she did exercises and walked on TM for 6 mins yesterday and had soreness last night  Feels that soreness in more in posterior/lateral L hip as compared to low back  Objective: requires significant cueing for proper glute med activation throughout, mod correction but fatigues  Quickly       Assessment: Tolerated treatment well  Patient demonstrated fatigue post treatment and would benefit from continued PT  Changed emphasis to focus more on hip abd and glute med strength today  Responds well to gentle MWM's for L hip combined w/ stab work for this area  Given HEP to reflect below, will trial this over weekend prior to next visit  Asked to stop exercises should they cause significant increase in pain  Plan: Continue per plan of care  check lateral hip work, add WB progressions as able       POC expires Auth Status Total   Visits  Start date  Expiration date PT/OT + Visit Limit?  Co-Insurance    no auth required  $10 co-pay     No                                           Dummy Auth Tracking  1 2 3 4 5 6   7 8 9 10 11 12   13 14 15 16 17 18   19 20 21 22 23 24   25 26 27 28 29 30   31 32 33 34 35 36         Precautions: hx of R ankle fracture , R CARLOS 2020, hx of breast cancer, hx of RA,       Date (Visit #) IE  (1)   (2)  (3)  (4)  (5)   Manual              DTM and TPR             Lumbar mobs  tested            Lumbar traction   tested                                          Exercise Diary              Ther Ex        Active w/u    NU step UE and LE pre lvl 4 x 6 mins pre 6 min lvl 2-3 UE and LE       HS/glute/piri/HF stretching  2x30 sec B HS and glute/piri   B HS and glute/piri w/ intermittent LAD and sciatic nerve floss x 12 mins  LAD 2x2 on L LE, x 2 on R    B HS and glute/piri stretch man and demo of self x 10 min       Mobility (LTR, open books, cat/cow) LTR x10-15 total LTR x20 total  No      Rows/ext        Hip stability         FRIDA x 2-3 mins total, half range PPU x8 total FRIDA 2x2 mins    PPU half range 2x8-10 No        Self sciatic nerve floss demo and practice in sitting x 5 mins  no                             Neuro Re Ed        TrA progressions  isos and march x10-15   isos x 10, march x10  isos x10 w/ ad sq       Bridge progressions intro x10-15  ad sq x20, w/ bridge 2x10   2x10 ad sq, yellow tband 2x10       Squat progressions    prone glute sq x20, w/ single leg ext lift 2x10   no       Hip abd progressions      s/l yellow clams x20, s/l hip abd w/ tactile cueing 2x10         Sit<>stand w/ glute drive 3J82 total          Yellow tband hip abd x20, side step 10'x2 B      HEP and POC review x8 mins   Rev  x2-3 mins  Rev and update of new HEP x 2-3 mins                             Ther Act             Stairs             Functional Transfers             Functional Lifting                                                                                                                                                           Modalities             heat             Ice              Mechanical Traction

## 2023-01-23 ENCOUNTER — OFFICE VISIT (OUTPATIENT)
Dept: PHYSICAL THERAPY | Facility: CLINIC | Age: 73
End: 2023-01-23

## 2023-01-23 DIAGNOSIS — M54.50 BILATERAL LOW BACK PAIN, UNSPECIFIED CHRONICITY, UNSPECIFIED WHETHER SCIATICA PRESENT: Primary | ICD-10-CM

## 2023-01-23 DIAGNOSIS — R20.0 NUMBNESS OF RIGHT LOWER EXTREMITY: ICD-10-CM

## 2023-01-23 NOTE — PROGRESS NOTES
Daily Note     Today's date: 2023  Patient name: Tammie Payne  : 1950  MRN: [id number]  Referring provider: Jessy Carrasco MD  Dx:   Encounter Diagnosis     ICD-10-CM    1  Bilateral low back pain, unspecified chronicity, unspecified whether sciatica present  M54 50       2  Numbness of right lower extremity  R20 0                      Subjective: Pt reports no new complaints  Reports that hips were sore after last session, felt like they had been worked  Reports that still has some deeper pain into L side posterior hip and low back  Objective: performs clams in L s/l to half range only (R hip), significant difficulty activating R glute med during SL burners in SLS - corrects but fatigues quickly  Assessment: Tolerated treatment well  Patient demonstrated fatigue post treatment and would benefit from continued PT  Does well w/ exercise progressions today  Shows good tolerance to WB progressions for hip stab and will continue w/ below listed nerve flossing and mobility work on own  Pt gets discomfort when lying on L side but does show difficulty activating R hip stabilizers functionally and was asked to continue w/ clams in this position on own so long as they do not increase pain  Good understanding  Progress as able  Plan: Continue per plan of care  SL burner progressions, glute med taps, leg press, CC work     POC expires Auth Status Total   Visits  Start date  Expiration date PT/OT + Visit Limit?  Co-Insurance    no auth required  $10 co-pay     No                                           Dummy Auth Tracking  1 2 3 4 5 6   7 8 9 10 11 12   13 14 15 16 17 18   19 20 21 22 23 24   25 26 27 28 29 30   31 32 33 34 35 36         Precautions: hx of R ankle fracture , R CARLOS 2020, hx of breast cancer, hx of RA,       Date (Visit #) IE  (1)   (2)  (3)   (4)  (5)   Manual              DTM and TPR             Lumbar mobs  tested            Lumbar traction   tested Exercise Diary              Ther Ex        Active w/u    NU step UE and LE pre lvl 4 x 6 mins  pre 6 min lvl 2-3 UE and LE  7 min pre lvl 3     HS/glute/piri/HF stretching  2x30 sec B HS and glute/piri   B HS and glute/piri w/ intermittent LAD and sciatic nerve floss x 12 mins  LAD 2x2 on L LE, x 2 on R    B HS and glute/piri stretch man and demo of self x 10 min  LAD on Lx2 min hold, MWM for L hip flex and IR 2x10    B HS and glute/piri stretching x5-6 min     Mobility (LTR, open books, cat/cow) LTR x10-15 total LTR x20 total  No      Rows/ext        Hip stability         FRIDA x 2-3 mins total, half range PPU x8 total FRIDA 2x2 mins    PPU half range 2x8-10 No  FRIDA x 2 mins, PPU 2x8      Self sciatic nerve floss demo and practice in sitting x 5 mins  no Rev and practice x 2-3 mins                             Neuro Re Ed        TrA progressions  isos and march x10-15   isos x 10, march x10  isos x10 w/ ad sq  isos x 10     Bridge progressions intro x10-15  ad sq x20, w/ bridge 2x10   2x10 ad sq, yellow tband 2x10  w/ yellow band 3x10      Squat progressions    prone glute sq x20, w/ single leg ext lift 2x10   no       Hip abd progressions      s/l yellow clams x20, s/l hip abd w/ tactile cueing 2x10  yellow B clams x 20    SL burners x 20 B       Sit<>stand w/ glute drive 3W79 total   T81        Yellow tband hip abd x20, side step 10'x2 B      HEP and POC review x8 mins   Rev  x2-3 mins  Rev and update of new HEP x 2-3 mins Rev x 2-3 mins                             Ther Act             Stairs             Functional Transfers             Functional Lifting                                                                                                                                                           Modalities             heat             Ice              Mechanical Traction

## 2023-01-24 LAB
ALBUMIN SERPL ELPH-MCNC: 4.2 G/DL (ref 3.5–5)
ALBUMIN SERPL ELPH-MCNC: 60.8 % (ref 52–65)
ALBUMIN UR ELPH-MCNC: 100 %
ALPHA1 GLOB MFR UR ELPH: 0 %
ALPHA1 GLOB SERPL ELPH-MCNC: 0.25 G/DL (ref 0.1–0.4)
ALPHA1 GLOB SERPL ELPH-MCNC: 3.6 % (ref 2.5–5)
ALPHA2 GLOB MFR UR ELPH: 0 %
ALPHA2 GLOB SERPL ELPH-MCNC: 0.68 G/DL (ref 0.4–1.2)
ALPHA2 GLOB SERPL ELPH-MCNC: 9.9 % (ref 7–13)
B-GLOBULIN MFR UR ELPH: 0 %
BETA GLOB ABNORMAL SERPL ELPH-MCNC: 0.45 G/DL (ref 0.4–0.8)
BETA1 GLOB SERPL ELPH-MCNC: 6.5 % (ref 5–13)
BETA2 GLOB SERPL ELPH-MCNC: 4.7 % (ref 2–8)
BETA2+GAMMA GLOB SERPL ELPH-MCNC: 0.32 G/DL (ref 0.2–0.5)
GAMMA GLOB ABNORMAL SERPL ELPH-MCNC: 1 G/DL (ref 0.5–1.6)
GAMMA GLOB MFR UR ELPH: 0 %
GAMMA GLOB SERPL ELPH-MCNC: 14.5 % (ref 12–22)
IGG/ALB SER: 1.55 {RATIO} (ref 1.1–1.8)
INTERPRETATION UR IFE-IMP: NORMAL
PROT PATTERN SERPL ELPH-IMP: NORMAL
PROT PATTERN UR ELPH-IMP: NORMAL
PROT SERPL-MCNC: 6.9 G/DL (ref 6.4–8.2)
PROT UR-MCNC: 6 MG/DL

## 2023-01-25 ENCOUNTER — OFFICE VISIT (OUTPATIENT)
Dept: PHYSICAL THERAPY | Facility: CLINIC | Age: 73
End: 2023-01-25

## 2023-01-25 DIAGNOSIS — R20.0 NUMBNESS OF RIGHT LOWER EXTREMITY: ICD-10-CM

## 2023-01-25 DIAGNOSIS — M54.50 BILATERAL LOW BACK PAIN, UNSPECIFIED CHRONICITY, UNSPECIFIED WHETHER SCIATICA PRESENT: Primary | ICD-10-CM

## 2023-01-25 NOTE — PROGRESS NOTES
Daily Note     Today's date: 2023  Patient name: Jose Luis Little  : 1950  MRN: [id number]  Referring provider: Byron Baker MD  Dx:   Encounter Diagnosis     ICD-10-CM    1  Bilateral low back pain, unspecified chronicity, unspecified whether sciatica present  M54 50       2  Numbness of right lower extremity  R20 0                      Subjective: Pt reports hips are sore from exercises, reports that she is still getting numbness in L lateral distal LE  Reports that exercises cause fatigue in muscles but do not increase pain  Objective: central gliders have good effect on distal LE sx in non WB positions, does not hold in standing  R SGIS helps to alleviate sx in distal L LE moreso than any other technique      Assessment: Tolerated treatment well  Patient demonstrated fatigue post treatment and would benefit from continued PT  Fatigue but no increase in pain by end of session  Added more mobility work to [employer] to good effect  Will trial R SGIS in future sessions, good effect in session and will be performed in conjunction w/ nerve flossing  Add higher level strength work at next session as able  Plan: Continue per plan of care  hip abd progressions, check mobility work      POC expires Auth Status Total   Visits  Start date  Expiration date PT/OT + Visit Limit?  Co-Insurance    no auth required  $10 co-pay     No                                           Dummy Auth Tracking  1 2 3 4 5 6   7 8 9 10 11 12   13 14 15 16 17 18   19 20 21 22 23 24   25 26 27 28 29 30   31 32 33 34 35 36         Precautions: hx of R ankle fracture , R CARLOS 2020, hx of breast cancer, hx of RA,       Date (Visit #) IE  (1)   (2)  (3)   (4)   (5)   Manual              DTM and TPR             Lumbar mobs  tested            Lumbar traction   tested                                          Exercise Diary              Ther Ex        Active w/u    NU step UE and LE pre lvl 4 x 6 mins  pre 6 min lvl 2-3 UE and LE  7 min pre lvl 3  pre 5 min lvl 4-5    HS/glute/piri/HF stretching  2x30 sec B HS and glute/piri   B HS and glute/piri w/ intermittent LAD and sciatic nerve floss x 12 mins  LAD 2x2 on L LE, x 2 on R    B HS and glute/piri stretch man and demo of self x 10 min  LAD on Lx2 min hold, MWM for L hip flex and IR 2x10    B HS and glute/piri stretching x5-6 min  LAD on Lx2 min, R s/l central gliders 2x45 sec L4/5 and    B HS and glute/piri stretching x 18 min total    Mobility (LTR, open books, cat/cow) LTR x10-15 total LTR x20 total  No   LTR x20    Rows/ext     Rev of self nerve floss x 2-3 mins   Hip stability         FRIDA x 2-3 mins total, half range PPU x8 total FRIDA 2x2 mins    PPU half range 2x8-10 No  FRIDA x 2 mins, PPU 2x8 Rev    R SGIS 2x10 total     Self sciatic nerve floss demo and practice in sitting x 5 mins  no Rev and practice x 2-3 mins  Rev x2-3 mins                            Neuro Re Ed        TrA progressions  isos and march x10-15   isos x 10, march x10  isos x10 w/ ad sq  isos x 10  isos and march x10-15    Bridge progressions intro x10-15  ad sq x20, w/ bridge 2x10   2x10 ad sq, yellow tband 2x10  w/ yellow band 3x10   2x10    Squat progressions    prone glute sq x20, w/ single leg ext lift 2x10   no    rev   Hip abd progressions      s/l yellow clams x20, s/l hip abd w/ tactile cueing 2x10  yellow B clams x 20    SL burners x 20 B  rev      SL burners x 20 B      Sit<>stand w/ glute drive 6S32 total   Q11        Yellow tband hip abd x20, side step 10'x2 B  rev    HEP and POC review x8 mins   Rev  x2-3 mins  Rev and update of new HEP x 2-3 mins Rev x 2-3 mins  rev                           Ther Act             Stairs             Functional Transfers             Functional Lifting                                                                                                                                                           Modalities             heat             Ice Mechanical Traction

## 2023-01-31 ENCOUNTER — OFFICE VISIT (OUTPATIENT)
Dept: PHYSICAL THERAPY | Facility: CLINIC | Age: 73
End: 2023-01-31

## 2023-01-31 DIAGNOSIS — M54.50 BILATERAL LOW BACK PAIN, UNSPECIFIED CHRONICITY, UNSPECIFIED WHETHER SCIATICA PRESENT: Primary | ICD-10-CM

## 2023-01-31 DIAGNOSIS — R20.0 NUMBNESS OF RIGHT LOWER EXTREMITY: ICD-10-CM

## 2023-01-31 NOTE — PROGRESS NOTES
Daily Note     Today's date: 2023  Patient name: Emy Melo  : 1950  MRN: [id number]  Referring provider: Hemal Ledezma MD  Dx:   Encounter Diagnosis     ICD-10-CM    1  Bilateral low back pain, unspecified chronicity, unspecified whether sciatica present  M54 50       2  Numbness of right lower extremity  R20 0                      Subjective: Pt reports that L side low back and L lateral leg is sore today, 5-/10 to start  Was bad last night but R SGIS helped  [place] good after last session  Relief lasts about a half a day  Objective: requires cueing w/ R SGIS to add ext component to allow for greater total motion/relief - corrects and is able to maintain  Assessment: Tolerated treatment well  Patient demonstrated fatigue post treatment and would benefit from continued PT  Pt does well w/ added mobility work today, added forward flexion w/ self nerve gliding through knee bends to good effect  She was asked to not perform this through pain  Hip strength is improving, able to perform banded stab work nearly equally which is better than previous sessions  Plan: Continue per plan of care  check forward bends for knee      POC expires Auth Status Total   Visits  Start date  Expiration date PT/OT + Visit Limit?  Co-Insurance    no auth required  $10 co-pay     No                                           Dummy Auth Tracking  1 2 3 4 5 6   7 8 9 10 11 12   13 14 15 16 17 18   19 20 21 22 23 24   25 26 27 28 29 30   31 32 33 34 35 36         Precautions: hx of R ankle fracture , R CARLOS 2020, hx of breast cancer, hx of RA,       Date (Visit #)  (6)    (3)   (4)   (5)   Manual              DTM and TPR             Lumbar mobs  tested            Lumbar traction   tested                                          Exercise Diary              Ther Ex        Active w/u  no  NU step UE and LE pre lvl 4 x 6 mins  pre 6 min lvl 2-3 UE and LE  7 min pre lvl 3  pre 5 min lvl 4-5 HS/glute/piri/HF stretching B HS and glute/piri w/ emphasis on L for HS and glute/piri, LAD, nerve flossing x 8 mins  B HS and glute/piri w/ intermittent LAD and sciatic nerve floss x 12 mins  LAD 2x2 on L LE, x 2 on R    B HS and glute/piri stretch man and demo of self x 10 min  LAD on Lx2 min hold, MWM for L hip flex and IR 2x10    B HS and glute/piri stretching x5-6 min  LAD on Lx2 min, R s/l central gliders 2x45 sec L4/5 and    B HS and glute/piri stretching x 18 min total    Mobility (LTR, open books, cat/cow) LTR x10-15 total    S/l open books x 15-20 B LTR x20 total  No   LTR x20    Rows/ext     Rev of self nerve floss x 2-3 mins   Hip stability Forward bent knee bends x 20 total throughout, hands on 12" step        R SGIS x 10, w/ ext x8-10 total  FRIDA 2x2 mins    PPU half range 2x8-10 No  FRIDA x 2 mins, PPU 2x8 Rev    R SGIS 2x10 total    Rev of self nerve flossing x 2 mins Self sciatic nerve floss demo and practice in sitting x 5 mins  no Rev and practice x 2-3 mins  Rev x2-3 mins                            Neuro Re Ed        TrA progressions  isos x 10  isos x 10, march x10  isos x10 w/ ad sq  isos x 10  isos and march x10-15    Bridge progressions W/ yellow tband x 20  ad sq x20, w/ bridge 2x10   2x10 ad sq, yellow tband 2x10  w/ yellow band 3x10   2x10    Squat progressions   prone glute sq x20, w/ single leg ext lift 2x10   no    rev   Hip abd progressions   s/l clams yellow x20 for L hip, w/ hip IR x10-15 B    s/l yellow clams x20, s/l hip abd w/ tactile cueing 2x10  yellow B clams x 20    SL burners x 20 B  rev      SL burners x 20 B      Sit<>stand w/ glute drive 9F10 total   A04        Yellow tband hip abd x20, side step 10'x2 B  rev    HEP and POC review x2-3 mins   Rev  x2-3 mins  Rev and update of new HEP x 2-3 mins Rev x 2-3 mins  rev    Blue tband rows and ext w/ scap sq x20 each                       Ther Act             Stairs             Functional Transfers             Functional Lifting Modalities             heat             Ice              Mechanical Traction

## 2023-02-03 ENCOUNTER — OFFICE VISIT (OUTPATIENT)
Dept: PHYSICAL THERAPY | Facility: CLINIC | Age: 73
End: 2023-02-03

## 2023-02-03 DIAGNOSIS — M54.50 BILATERAL LOW BACK PAIN, UNSPECIFIED CHRONICITY, UNSPECIFIED WHETHER SCIATICA PRESENT: Primary | ICD-10-CM

## 2023-02-03 DIAGNOSIS — R20.0 NUMBNESS OF RIGHT LOWER EXTREMITY: ICD-10-CM

## 2023-02-03 NOTE — PROGRESS NOTES
Daily Note     Today's date: 2/3/2023  Patient name: Seamus Meehan  : 1950  MRN: [id number]  Referring provider: Dalia Madrid MD  Dx:   Encounter Diagnosis     ICD-10-CM    1  Bilateral low back pain, unspecified chronicity, unspecified whether sciatica present  M54 50       2  Numbness of right lower extremity  R20 0                      Subjective: Pt reports that she feels pretty good today  Deep pain is not there this morning, was able to sleep comfortably last night  Encouraged by progress  Objective: requires cueing for progression to single leg lumbar hang w/ knee flexion to bias specific nerve tracks, responds well to toe in on L side  Good form throughout  Assessment: Tolerated treatment well  Patient demonstrated fatigue post treatment and would benefit from continued PT  Does well w/ mobility progressions paired w/ gentle foot intrinsic and ankle stab work  Will benefit from continued lumbar mobility work paired w/ higher level WB progressions as sx allow  Pt in agreement w/ plan  Plan: Continue per plan of care  progress note upcoming  Increase intensity of WB program if able      POC expires Auth Status Total   Visits  Start date  Expiration date PT/OT + Visit Limit?  Co-Insurance    no auth required  $10 co-pay     No                                           Dummy Auth Tracking  1 2 3 4 5 6   7 8 9 10 11 12   13 14 15 16 17 18   19 20 21 22 23 24   25 26 27 28 29 30   31 32 33 34 35 36         Precautions: hx of R ankle fracture , R CARLOS 2020, hx of breast cancer, hx of RA,       Date (Visit #)  (6)  2/3 (7)   (4)   (5)   Manual              DTM and TPR             Lumbar mobs  tested            Lumbar traction   tested                                          Exercise Diary              Ther Ex        Active w/u  no  NU step UE and LE pre lvl 3 x 5 mins  pre 6 min lvl 2-3 UE and LE  7 min pre lvl 3  pre 5 min lvl 4-5    HS/glute/piri/HF stretching B HS and glute/piri w/ emphasis on L for HS and glute/piri, LAD, nerve flossing x 8 mins  B HS and glute/piri w/ intermittent LAD and sciatic nerve floss x 8 mins  LAD 2x2 on L LE, x 2 on R    B HS and glute/piri stretch man and demo of self x 10 min  LAD on Lx2 min hold, MWM for L hip flex and IR 2x10    B HS and glute/piri stretching x5-6 min  LAD on Lx2 min, R s/l central gliders 2x45 sec L4/5 and    B HS and glute/piri stretching x 18 min total    Mobility (LTR, open books, cat/cow) LTR x10-15 total    S/l open books x 15-20 B LTR x20  No   LTR x20    Rows/ext     Rev of self nerve floss x 2-3 mins   Hip stability Forward bent knee bends x 20 total throughout, hands on 12" step Same x10 reg, single legs 2x8 w/ toe in/out       R SGIS x 10, w/ ext x8-10 total  Same x 10 total No  FRIDA x 2 mins, PPU 2x8 Rev    R SGIS 2x10 total    Rev of self nerve flossing x 2 mins rev no Rev and practice x 2-3 mins  Rev x2-3 mins      Seated foot intrinsics x15 on towel, w/ heel raise x15                       Neuro Re Ed        TrA progressions  isos x 10  isos x 10, single leg ext 2x10   isos x10 w/ ad sq  isos x 10  isos and march x10-15    Bridge progressions W/ yellow tband x 20  ad sq x20  2x10 ad sq, yellow tband 2x10  w/ yellow band 3x10   2x10    Squat progressions  Sit<>stand yellow tband 2x10 from chair height, x10 elevated EOB  no    rev   Hip abd progressions   s/l clams yellow x20 for L hip, w/ hip IR x10-15 B   s/l yellow clams x20, s/l hip abd w/ tactile cueing 2x10  yellow B clams x 20    SL burners x 20 B  rev      SL burners x 20 B        x10        Yellow tband hip abd x20, side step 10'x2 B  rev    HEP and POC review x2-3 mins   Rev  x2-3 mins  Rev and update of new HEP x 2-3 mins Rev x 2-3 mins  rev    Blue tband rows and ext w/ scap sq x20 each         Side step in standing 40'x4              Ther Act             Stairs             Functional Transfers             Functional Lifting Modalities             heat             Ice              Mechanical Traction

## 2023-02-06 ENCOUNTER — OFFICE VISIT (OUTPATIENT)
Dept: PHYSICAL THERAPY | Facility: CLINIC | Age: 73
End: 2023-02-06

## 2023-02-06 DIAGNOSIS — R20.0 NUMBNESS OF RIGHT LOWER EXTREMITY: ICD-10-CM

## 2023-02-06 DIAGNOSIS — M54.50 BILATERAL LOW BACK PAIN, UNSPECIFIED CHRONICITY, UNSPECIFIED WHETHER SCIATICA PRESENT: Primary | ICD-10-CM

## 2023-02-06 NOTE — PROGRESS NOTES
Daily Note     Today's date: 2023  Patient name: Ranjana Ortega  : 1950  MRN: [id number]  Referring provider: Jai Olsen MD  Dx:   Encounter Diagnosis     ICD-10-CM    1  Bilateral low back pain, unspecified chronicity, unspecified whether sciatica present  M54 50       2  Numbness of right lower extremity  R20 0                      Subjective: Pt reports that she had to help 80 lb dog get onto couch over weekend, strained back and has had some soreness since  Felt good after last session  Feels improvement overall  Soreness is mostly on L side low back       Objective: fatigues quickly w/ hip abd w/ IR on L, no pain noted during exercise  Assessment: Tolerated treatment well  Patient demonstrated fatigue post treatment and would benefit from continued PT  Fatigue but no increase in pain by end of session  Does well w/ exercise progressions today, kept session lighter so as not to irritate sx after they were partially eased w/ manual and mobility work  Will increase intensity at next visit if able  Plan: Continue per plan of care  standing stab work if able, progress note, carries, hip hinge vs squat if able      POC expires Auth Status Total   Visits  Start date  Expiration date PT/OT + Visit Limit?  Co-Insurance    no auth required  $10 co-pay     No                                           Dummy Auth Tracking  1 2 3 4 5 6   7 8 9 10 11 12   13 14 15 16 17 18   19 20 21 22 23 24   25 26 27 28 29 30   31 32 33 34 35 36         Precautions: hx of R ankle fracture , R CARLOS 2020, hx of breast cancer, hx of RA,       Date (Visit #)  (6)  2/3 (7)  (8)    (5)   Manual              DTM and TPR             Lumbar mobs  tested            Lumbar traction   tested                                          Exercise Diary              Ther Ex        Active w/u  no  NU step UE and LE pre lvl 3 x 5 mins  pre 7 min lvl 3 UE and LE  7 min pre lvl 3  pre 5 min lvl 4-5    HS/glute/piri/HF stretching B HS and glute/piri w/ emphasis on L for HS and glute/piri, LAD, nerve flossing x 8 mins  B HS and glute/piri w/ intermittent LAD and sciatic nerve floss x 8 mins  B HS w/ slight traction and glute/piri stretching, intermittent LAD, sciatic nerve flossing x 12 mins   LAD on Lx2 min hold, MWM for L hip flex and IR 2x10    B HS and glute/piri stretching x5-6 min  LAD on Lx2 min, R s/l central gliders 2x45 sec L4/5 and    B HS and glute/piri stretching x 18 min total    Mobility (LTR, open books, cat/cow) LTR x10-15 total    S/l open books x 15-20 B LTR x20  x20 total   LTR x20    Rows/ext     Rev of self nerve floss x 2-3 mins   Hip stability Forward bent knee bends x 20 total throughout, hands on 12" step Same x10 reg, single legs 2x8 w/ toe in/out Same w/ bias on L leg x 15 total       R SGIS x 10, w/ ext x8-10 total  Same x 10 total Same x 8, w/ ext x 6  FRIDA x 2 mins, PPU 2x8 Rev    R SGIS 2x10 total    Rev of self nerve flossing x 2 mins rev  Rev and practice x 2-3 mins  Rev x2-3 mins      Seated foot intrinsics x15 on towel, w/ heel raise x15                       Neuro Re Ed        TrA progressions  isos x 10  isos x 10, single leg ext 2x10  isos x 10  isos x 10  isos and march x10-15    Bridge progressions W/ yellow tband x 20  ad sq x20   w/ yellow band 3x10   2x10    Squat progressions  Sit<>stand yellow tband 2x10 from chair height, x10 elevated EOB Reg x 15 from chair height     rev   Hip abd progressions   s/l clams yellow x20 for L hip, w/ hip IR x10-15 B  R s/l L hip abd w/ IR x20 total   yellow B clams x 20    SL burners x 20 B  rev      SL burners x 20 B        x10          rev    HEP and POC review x2-3 mins   Rev  x2-3 mins  Rev of HEP and POC w/ progressions x 4 mins  Rev x 2-3 mins  rev    Blue tband rows and ext w/ scap sq x20 each         Side step in standing 40'x4              Ther Act             Stairs             Functional Transfers             Functional Lifting Modalities             heat             Ice              Mechanical Traction

## 2023-02-07 ENCOUNTER — HOSPITAL ENCOUNTER (OUTPATIENT)
Dept: RADIOLOGY | Facility: HOSPITAL | Age: 73
Discharge: HOME | End: 2023-02-07
Attending: PSYCHIATRY & NEUROLOGY

## 2023-02-07 DIAGNOSIS — M54.16 RADICULOPATHY, LUMBAR REGION: ICD-10-CM

## 2023-02-07 DIAGNOSIS — M54.50 LOWER BACK PAIN: ICD-10-CM

## 2023-02-07 DIAGNOSIS — R20.0 NUMBNESS OF RIGHT LOWER EXTREMITY: ICD-10-CM

## 2023-02-09 ENCOUNTER — EVALUATION (OUTPATIENT)
Dept: PHYSICAL THERAPY | Facility: CLINIC | Age: 73
End: 2023-02-09

## 2023-02-09 DIAGNOSIS — R20.0 NUMBNESS OF RIGHT LOWER EXTREMITY: ICD-10-CM

## 2023-02-09 DIAGNOSIS — M54.50 BILATERAL LOW BACK PAIN, UNSPECIFIED CHRONICITY, UNSPECIFIED WHETHER SCIATICA PRESENT: Primary | ICD-10-CM

## 2023-02-09 NOTE — PROGRESS NOTES
Progress Note     Today's date: 2023  Patient name: Ranjana Ortega  : 1950  MRN: [id number]  Referring provider: Jai Olsen MD  Dx:   Encounter Diagnosis     ICD-10-CM    1  Bilateral low back pain, unspecified chronicity, unspecified whether sciatica present  M54 50       2  Numbness of right lower extremity  R20 0                      Subjective: Pt reports that she feels pretty good today, some numbness down the leg  Pain in post L hip to start  Feels that side glides really help at night but that she still gets some soreness  Feels that PT is helping overall, progress is slow  Wants to continue for the next 3-4 weeks  Functional status below:     Goals  Short term goals (3 weeks):  1) Pt will improve thoracolumbar mobility deficits by 25% pain free  - achieved   2) Pt will improve B LE and core strength deficits by 1/3 grade MMT  - progressed/achieved   3) Pt will improve pain at worse to <4/10  - progressed   4) Pt will initiate and progress HEP w/ special emphasis on functional core control and thoracolumbar mobility  - achieved     Long term goals (6 weeks)  1) Pt will improve FOTO to at least 57  - achieved   2) Pt will sleep through the night in positioning of choosing 6/7 nights a week w/o waking due to pain  - progressed   3) Pt will stand and ambulate for community distances w/ normalized gait mechanics and pain <3/10 throughout  - progressed   4) Pt will be independent and compliant w/ HEP in order to maximize functional benefit of skilled PT following d/c  - progressed     New Goals 23:  Short term goals (2 weeks):  1) Pt will further improve thoracolumbar mobility deficits by 25% pain free  2) Pt will further improve B LE and core strength deficits by 1/3 grade MMT  3) Pt will improve pain at worse to <4/10  4) Pt will initiate and progress HEP w/ special emphasis on functional core control and thoracolumbar mobility      Long term goals (4 weeks)  1) Pt will improve FOTO to at least 75   2) Pt will sleep through the night in positioning of choosing 6/7 nights a week w/o waking due to pain  3) Pt will stand and ambulate for community distances w/ normalized gait mechanics and pain <3/10 throughout  4) Pt will be independent and compliant w/ HEP in order to maximize functional benefit of skilled PT following d/c  Pain  Current pain ratin-4  At best pain ratin  At worst pain ratin-5 at night, 8 (only in MRI machine recently)  Location: midline low back, along L posterior hip to L lateral thigh to lateral calf, L foot  Quality: dull ache and radiating  Relieving factors: rest, change in position and medications  Aggravating factors: walking, standing, lifting, overhead activity, running and stair climbing  Progression: improved since IE    Social Support  Steps to enter house: yes  Stairs in house: yes   Lives in: multiple-level home (bedroom on first floor )  Lives with: spouse (daughter is a couple blocks away)    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         Objective     Concurrent Complaints  Negative for bladder dysfunction, bowel dysfunction and saddle (S4) numbness    Postural Observations  Seated posture: poor  Standing posture: poor - progressing to fair    Correction of posture: makes symptoms better        Palpation     Additional Palpation Details  Pt is TTP and has increased tissue density through distal thoracolumbar PS and QL/piri    -23: similar to lesser extent     Neurological Testing     Sensation     Lumbar   Left   Intact: light touch    Right   Intact: light touch    Active Range of Motion     Additional Active Range of Motion Details  Flex: 75-80%  Ext: 50%  SB R: 50%  SB L: 50%  Rot R: 50%  Rot L: 50%    *indicates pain    Guarded throughout     Strength/Myotome Testing     Additional Strength Details  LE Strength (R/L)    Hip  Flex 4/5 , 4/5  Ext 4/5 , 4/5  Abd 4/5 , 4/5  Add 4/5 , 4/5    Knee   Flex 4/5 , 4/5  Ext 4/5 , 4/5    Core Strength: 2-2+/5     *Indicates pain    Bridging: half range w/ cueing needed for glute drive    -6/0/86: full range w/o pain when glutes activated properly     Tests     Additional Tests Details  Manual lumbar traction: good relief both single and double    -2/9/23: Continued     Thoracolumbar joint mobility: grossly hypo w/ min pain on L4/5     -2/9/23: grossly hypo w/o pain     Functional Testing:   Sit<>stand: UE support on LE w/ fair glute drive    -9/6/23:  Can perform through full range, from reg chair, w/o pain through limited reps     BW squat: not past 25% before compensation    -2/9/23: at least 50% before compensation     Stairs: TBA    -2/9/23: has not been point of emphasis thus far  SLS: unable w/o UE support B    -2/9/23: at least 5-6 sec w/ min UE support B    Ambulation     Ambulation: Level Surfaces     Additional Level Surfaces Ambulation Details  Decreased step length and lumbar rotation, min decreased pace, not specifically antalgic    -2/9/23: not antalgic, improved upright posture throughout, min forward tspine flexion, improving step length and speed       Assessment: Tolerated treatment well  Patient demonstrated fatigue post treatment and would benefit from continued PT  Pt has made solid progress towards all goals set at al  She has improved in her pain free functional ROM and strength, gait capacity, average self reports of pain, and overall quality of motion  She will continue on 2x/week basis for next 4 weeks w/ re-assessment and determination on future therapy services at that point  She remains motivated to improve and is an excellent candidate for continued therapy services  Pt in agreement w/ plan  Plan: Continue per plan of care   loaded squats, lunges, stairs, retro walking      POC expires Auth Status Total   Visits  Start date  Expiration date PT/OT + Visit Limit?  Co-Insurance    no auth required  $10 co-pay     No                                           Dummy Auth Tracking  1 2 3 4 5 6   7 8 9 10 11 12   13 14 15 16 17 18   19 20 21 22 23 24   25 26 27 28 29 30   31 32 33 34 35 36         Precautions: hx of R ankle fracture 2016, R CARLOS June 2020, hx of breast cancer, hx of RA,       Date (Visit #) 1/31 (6)  2/3 (7) 2/6 (8)  2/9 (9) PN    Manual              DTM and TPR             Lumbar mobs  tested            Lumbar traction   tested                                          Exercise Diary              Ther Ex        Active w/u  no  NU step UE and LE pre lvl 3 x 5 mins  pre 7 min lvl 3 UE and LE 6 min pre lvl 3 UE and LE   pre 5 min lvl 4-5    HS/glute/piri/HF stretching B HS and glute/piri w/ emphasis on L for HS and glute/piri, LAD, nerve flossing x 8 mins  B HS and glute/piri w/ intermittent LAD and sciatic nerve floss x 8 mins  B HS w/ slight traction and glute/piri stretching, intermittent LAD, sciatic nerve flossing x 12 mins   LAD on R x 2 min holds     B HS and glute/piri stretching x7 min  LAD on Lx2 min, R s/l central gliders 2x45 sec L4/5 and    B HS and glute/piri stretching x 18 min total    Mobility (LTR, open books, cat/cow) LTR x10-15 total    S/l open books x 15-20 B LTR x20  x20 total  x20     Seated tspine ext x15 total    Standing thoracic rotation against wall x15-20 total  LTR x20    Rows/ext     Rev of self nerve floss x 2-3 mins   Hip stability Forward bent knee bends x 20 total throughout, hands on 12" step Same x10 reg, single legs 2x8 w/ toe in/out Same w/ bias on L leg x 15 total  L leg bias, toes in x10-12      R SGIS x 10, w/ ext x8-10 total  Same x 10 total Same x 8, w/ ext x 6  x10 w/ ext total  Rev    R SGIS 2x10 total    Rev of self nerve flossing x 2 mins rev   Rev x2-3 mins      Seated foot intrinsics x15 on towel, w/ heel raise x15                       Neuro Re Ed        TrA progressions  isos x 10  isos x 10, single leg ext 2x10  isos x 10  isos x 10  isos and march x10-15    Bridge progressions W/ yellow tband x 20  ad sq x20   w/ yellow band 3x10   2x10    Squat progressions  Sit<>stand yellow tband 2x10 from chair height, x10 elevated EOB Reg x 15 from chair height     rev   Hip abd progressions   s/l clams yellow x20 for L hip, w/ hip IR x10-15 B  R s/l L hip abd w/ IR x20 total  TrA set w/ yellow BKFO x20 on R, no band on L x 20  rev      SL burners x 20 B                 rev    HEP and POC review x2-3 mins   Rev  x2-3 mins  Rev of HEP and POC w/ progressions x 4 mins  Rev of HEP, POC, functional assessment x 6 mins rev    Blue tband rows and ext w/ scap sq x20 each         Side step in standing 40'x4              Ther Act             Stairs             Functional Transfers             Functional Lifting                                                                                                                                                           Modalities             heat             Ice              Mechanical Traction

## 2023-02-10 DIAGNOSIS — M71.38 SYNOVIAL CYST OF LUMBAR SPINE: Primary | ICD-10-CM

## 2023-02-13 ENCOUNTER — TELEPHONE (OUTPATIENT)
Dept: NEUROLOGY | Facility: CLINIC | Age: 73
End: 2023-02-13

## 2023-02-13 NOTE — TELEPHONE ENCOUNTER
Spoke with patient and provided information to [facility] office  Patient will call and schedule appointment

## 2023-02-13 NOTE — TELEPHONE ENCOUNTER
-Contacted the patient and informed her that    ---- Message from Janel Fall MD sent at 2/10/2023  3:48 PM EST -----  Please call the patient regarding her abnormal result  Her MRI of lower spine shows arthritis but more importantly a cyst that needs to be evaluated by neurosurgeon  I placed referral  Please ask patient to make that appointment

## 2023-02-15 ENCOUNTER — OFFICE VISIT (OUTPATIENT)
Dept: PHYSICAL THERAPY | Facility: CLINIC | Age: 73
End: 2023-02-15

## 2023-02-15 DIAGNOSIS — M54.50 BILATERAL LOW BACK PAIN, UNSPECIFIED CHRONICITY, UNSPECIFIED WHETHER SCIATICA PRESENT: Primary | ICD-10-CM

## 2023-02-15 DIAGNOSIS — R20.0 NUMBNESS OF RIGHT LOWER EXTREMITY: ICD-10-CM

## 2023-02-15 NOTE — PROGRESS NOTES
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 which is putting pressure on cord  Reports that she had some discomfort last night, will be calling neurosurgery referral later today  Objective: requires CG throughout all standing pball activities - one minor LOB that was self corrected  Assessment: Tolerated treatment well  Patient demonstrated fatigue post treatment and would benefit from continued PT  Does well w/ exercise progressions, less discomfort to finish session  She does well w/ gentle balance work, will increase if able at next session  Shows good tolerance to mobility work and continues to benefit from this  Will check in w/ neurosurgery to scheduled visit later today  Plan: Continue per plan of care  standing progressions, pball work, mobility progressions as able      POC expires Auth Status Total   Visits  Start date  Expiration date PT/OT + Visit Limit?  Co-Insurance    no auth required  $10 co-pay     No                                           Dummy Auth Tracking  1 2 3 4 5 6   7 8 9 10 11 12   13 14 15 16 17 18   19 20 21 22 23 24   25 26 27 28 29 30   31 32 33 34 35 36         Precautions: hx of R ankle fracture , R CARLOS 2020, hx of breast cancer, hx of RA,       Date (Visit #)  (6)  2/3 (7)  (8)   (9) PN 2/15 (10)   Manual              DTM and TPR             Lumbar mobs  tested            Lumbar traction   tested                                          Exercise Diary              Ther Ex        Active w/u  no  NU step UE and LE pre lvl 3 x 5 mins  pre 7 min lvl 3 UE and LE 6 min pre lvl 3 UE and LE   pre 7 min lvl 4-5    HS/glute/piri/HF stretching B HS and glute/piri w/ emphasis on L for HS and glute/piri, LAD, nerve flossing x 8 mins  B HS and glute/piri w/ intermittent LAD and sciatic nerve floss x 8 mins  B HS w/ slight traction and glute/piri stretching, intermittent LAD, sciatic nerve flossing x 12 mins   LAD on R x 2 min holds     B HS and glute/piri stretching x7 min LAD x2 min B     B HS and glute/piri stretching x 12   Mobility (LTR, open books, cat/cow) LTR x10-15 total    S/l open books x 15-20 B LTR x20  x20 total  x20     Seated tspine ext x15 total    Standing thoracic rotation against wall x15-20 total  LTR x20    Rows/ext        Hip stability Forward bent knee bends x 20 total throughout, hands on 12" step Same x10 reg, single legs 2x8 w/ toe in/out Same w/ bias on L leg x 15 total  L leg bias, toes in x10-12  rev    R SGIS x 10, w/ ext x8-10 total  Same x 10 total Same x 8, w/ ext x 6  x10 w/ ext total  R SGIS x10 total w/ ext     Rev of self nerve flossing x 2 mins rev   Rev x2-3 mins      Seated foot intrinsics x15 on towel, w/ heel raise x15            W/l clams yellow 2x10, w/ IR no band on R only x20            Neuro Re Ed        TrA progressions  isos x 10  isos x 10, single leg ext 2x10  isos x 10  isos x 10  isos and march x10-15    Bridge progressions W/ yellow tband x 20  ad sq x20   w/ yellow band 3x10  Yellow tband 2x10    Squat progressions  Sit<>stand yellow tband 2x10 from chair height, x10 elevated EOB Reg x 15 from chair height    Sit<>stand reg x10, w/ 10# DB 2x8    Hip abd progressions   s/l clams yellow x20 for L hip, w/ hip IR x10-15 B  R s/l L hip abd w/ IR x20 total  TrA set w/ yellow BKFO x20 on R, no band on L x 20 rev                    HEP and POC review x2-3 mins   Rev  x2-3 mins  Rev of HEP and POC w/ progressions x 4 mins  Rev of HEP, POC, functional assessment x 6 mins Rev and update x 2-3 mins     Blue tband rows and ext w/ scap sq x20 each    pball iso press x10, w/ march x10-15, w/ hip ext x10-15 B     Side step in standing 40'x4              Ther Act             Stairs             Functional Transfers             Functional Lifting                                                                                                                                                           Modalities             heat             Ice              Mechanical Traction

## 2023-02-17 ENCOUNTER — OFFICE VISIT (OUTPATIENT)
Dept: PHYSICAL THERAPY | Facility: CLINIC | Age: 73
End: 2023-02-17

## 2023-02-17 DIAGNOSIS — R20.0 NUMBNESS OF RIGHT LOWER EXTREMITY: ICD-10-CM

## 2023-02-17 DIAGNOSIS — M54.50 BILATERAL LOW BACK PAIN, UNSPECIFIED CHRONICITY, UNSPECIFIED WHETHER SCIATICA PRESENT: Primary | ICD-10-CM

## 2023-02-17 NOTE — PROGRESS NOTES
Daily Note     Today's date: 2023  Patient name: Gaspar Goodell  : 1950  MRN: [id number]  Referring provider: Briana Chandra MD  Dx:   Encounter Diagnosis     ICD-10-CM    1  Bilateral low back pain, unspecified chronicity, unspecified whether sciatica present  M54 50       2  Numbness of right lower extremity  R20 0                      Subjective: Pt reports some soreness this morning, woke up that way  Not sure why  [place] good after last session, legs feel stronger  Feels she is able to walk a little further  Objective: pain improves w/ SGIS w/ ext and bent over self sciatic nerve flossing in WB - well maintained post  Requires CG but not LOB during peanut core iso work w/ LE progressions       Assessment: Tolerated treatment well  Patient demonstrated fatigue post treatment and would benefit from continued PT  Does well w/ exercise progressions, fatigue but no increase in pain by end of session  Shows good form w/ exercise progressions after pain relieving work today  Spoke about how to structure work for home, attempt pain relieving measures if able before moving into strength work  Good understanding  Pt will continue w/ sit<>stand w/ weight at home barring setback  Not done in clinic this morning  Plan: Continue per plan of care  sit<>stand progressions, TRX, leg press hip abd, rot work      POC expires Auth Status Total   Visits  Start date  Expiration date PT/OT + Visit Limit?  Co-Insurance    no auth required  $10 co-pay     No                                           Dummy Auth Tracking  1 2 3 4 5 6   7 8 9 10 11 12   13 14 15 16 17 18   19 20 21 22 23 24   25 26 27 28 29 30   31 32 33 34 35 36         Precautions: hx of R ankle fracture , R CARLOS 2020, hx of breast cancer, hx of RA,       Date (Visit #)  (11)  2 (8)   (9) PN 2/15 (10)   Manual              DTM and TPR             Lumbar mobs  tested            Lumbar traction   tested Exercise Diary              Ther Ex        Active w/u Pre 7 min lvl 3-4 UE and LE   NU step UE and LE pre lvl 3 x 5 mins  pre 7 min lvl 3 UE and LE 6 min pre lvl 3 UE and LE   pre 7 min lvl 4-5    HS/glute/piri/HF stretching B HS and glute/piri w/ emphasis on L for HS and glute/piri, LAD, nerve flossing x 7 mins  B HS and glute/piri w/ intermittent LAD and sciatic nerve floss x 8 mins  B HS w/ slight traction and glute/piri stretching, intermittent LAD, sciatic nerve flossing x 12 mins   LAD on R x 2 min holds     B HS and glute/piri stretching x7 min LAD x2 min B     B HS and glute/piri stretching x 12   Mobility (LTR, open books, cat/cow) LTR 2x10-15 total   LTR x20  x20 total  x20     Seated tspine ext x15 total    Standing thoracic rotation against wall x15-20 total  LTR x20    Rows/ext        Hip stability Forward bent knee bend, focus on L LE 2x10 Same x10 reg, single legs 2x8 w/ toe in/out Same w/ bias on L leg x 15 total  L leg bias, toes in x10-12  rev    R SGIS 2x10 w/ ext  Same x 10 total Same x 8, w/ ext x 6  x10 w/ ext total  R SGIS x10 total w/ ext      rev   Rev x2-3 mins      Seated foot intrinsics x15 on towel, w/ heel raise x15            W/l clams yellow 2x10, w/ IR no band on R only x20            Neuro Re Ed        TrA progressions  isos x 10, single leg ext 2x10 B   isos x 10, single leg ext 2x10  isos x 10  isos x 10  isos and march x10-15    Bridge progressions Ad sq 2x10   ad sq x20   w/ yellow band 3x10  Yellow tband 2x10    Squat progressions Sit<>stand rev  Sit<>stand yellow tband 2x10 from chair height, x10 elevated EOB Reg x 15 from chair height    Sit<>stand reg x10, w/ 10# DB 2x8    Hip abd progressions   R s/l L hip abd w/ IR x20 total  TrA set w/ yellow BKFO x20 on R, no band on L x 20 rev                    HEP and POC review x2-3 mins   Rev  x2-3 mins  Rev of HEP and POC w/ progressions x 4 mins  Rev of HEP, POC, functional assessment x 6 mins Rev and update x 2-3 mins Peanut iso press 2x10, w/ march, hip abd, ext 2x10 each     pball iso press x10, w/ march x10-15, w/ hip ext x10-15 B     Side step in standing 40'x4              Ther Act             Stairs             Functional Transfers             Functional Lifting                                                                                                                                                           Modalities             heat             Ice              Mechanical Traction

## 2023-02-20 ENCOUNTER — APPOINTMENT (OUTPATIENT)
Dept: PHYSICAL THERAPY | Facility: CLINIC | Age: 73
End: 2023-02-20

## 2023-02-22 ENCOUNTER — OFFICE VISIT (OUTPATIENT)
Dept: PHYSICAL THERAPY | Facility: CLINIC | Age: 73
End: 2023-02-22

## 2023-02-22 DIAGNOSIS — M54.50 BILATERAL LOW BACK PAIN, UNSPECIFIED CHRONICITY, UNSPECIFIED WHETHER SCIATICA PRESENT: Primary | ICD-10-CM

## 2023-02-22 DIAGNOSIS — R20.0 NUMBNESS OF RIGHT LOWER EXTREMITY: ICD-10-CM

## 2023-02-22 NOTE — PROGRESS NOTES
Daily Note     Today's date: 2023  Patient name: Frankey Cousin  : 1950  MRN: [id number]  Referring provider: Dea Lema MD  Dx:   Encounter Diagnosis     ICD-10-CM    1  Bilateral low back pain, unspecified chronicity, unspecified whether sciatica present  M54 50       2  Numbness of right lower extremity  R20 0                      Subjective: Pt reports that she has some low back soreness to start  Reports she woke up like this, felt good after last session  Not sure what caused soreness  Objective: continues to respond to R SGIS, unweighted lumbar ext - stnading lumbar sag refers some pain to R lateral ankle, unclear if this is directly referred from back as calf stretching helps to alleviate this  Assessment: Tolerated treatment well  Patient demonstrated fatigue post treatment and would benefit from continued PT  Less pain to end session, shows good form w/ supine and gentle standing progressions  Will increase intensity as sx allow, at next visit  Held increases today so as not to exacerbate soreness following positive pain relief from below listed work  Reviewed HEP for home, good understanding  Plan: Continue per plan of care  standing stab progressions as sx allow  POC expires Auth Status Total   Visits  Start date  Expiration date PT/OT + Visit Limit?  Co-Insurance    no auth required  $10 co-pay     No                                           Dummy Auth Tracking  1 2 3 4 5 6   7 8 9 10 11 12   13 14 15 16 17 18   19 20 21 22 23 24   25 26 27 28 29 30   31 32 33 34 35 36         Precautions: hx of R ankle fracture , R CARLOS 2020, hx of breast cancer, hx of RA,       Date (Visit #)  (11)  (12)    (9) PN 2/15 (10)   Manual              DTM and TPR             Lumbar mobs  tested            Lumbar traction   tested                                          Exercise Diary              Ther Ex        Active w/u Pre 7 min lvl 3-4 UE and LE   NU step UE and LE pre lvl 3 x 8 mins   pre 7 min lvl 3 UE and LE 6 min pre lvl 3 UE and LE   pre 7 min lvl 4-5    HS/glute/piri/HF stretching B HS and glute/piri w/ emphasis on L for HS and glute/piri, LAD, nerve flossing x 7 mins  B HS and glute/piri w/ intermittent LAD and sciatic nerve floss x 14 mins  B HS w/ slight traction and glute/piri stretching, intermittent LAD, sciatic nerve flossing x 12 mins   LAD on R x 2 min holds     B HS and glute/piri stretching x7 min LAD x2 min B     B HS and glute/piri stretching x 12   Mobility (LTR, open books, cat/cow) LTR 2x10-15 total   LTR x20  x20 total  x20     Seated tspine ext x15 total    Standing thoracic rotation against wall x15-20 total  LTR x20    Rows/ext        Hip stability Forward bent knee bend, focus on L LE 2x10  Same w/ bias on L leg x 15 total  L leg bias, toes in x10-12  rev    R SGIS 2x10 w/ ext  2x10 reg    Standing lumbar ext sag x 10 only  Same x 8, w/ ext x 6  x10 w/ ext total  R SGIS x10 total w/ ext      Runners stretch on wall x2-3 mins    Rev x2-3 mins                 W/l clams yellow 2x10, w/ IR no band on R only x20            Neuro Re Ed        TrA progressions  isos x 10, single leg ext 2x10 B  isos x10, w/ ad sq x10, w/ BKFO x20, w/ single leg ext x20 isos x 10  isos x 10  isos and march x10-15    Bridge progressions Ad sq 2x10  2x10 w/ ad sq    w/ yellow band 3x10  Yellow tband 2x10    Squat progressions Sit<>stand rev   Reg x 15 from chair height    Sit<>stand reg x10, w/ 10# DB 2x8    Hip abd progressions   R s/l L hip abd w/ IR x20 total  TrA set w/ yellow BKFO x20 on R, no band on L x 20 rev                    HEP and POC review x2-3 mins   Rev  x2-3 mins w/ review of possible sources of pain x 2 mins   Rev of HEP and POC w/ progressions x 4 mins  Rev of HEP, POC, functional assessment x 6 mins Rev and update x 2-3 mins     Peanut iso press 2x10, w/ march, hip abd, ext 2x10 each     pball iso press x10, w/ march x10-15, w/ hip ext x10-15 B Ther Act             Stairs             Functional Transfers             Functional Lifting                                                                                                                                                           Modalities             heat             Ice              Mechanical Traction

## 2023-02-24 ENCOUNTER — APPOINTMENT (OUTPATIENT)
Dept: PHYSICAL THERAPY | Facility: CLINIC | Age: 73
End: 2023-02-24

## 2023-02-27 ENCOUNTER — OFFICE VISIT (OUTPATIENT)
Dept: PHYSICAL THERAPY | Facility: CLINIC | Age: 73
End: 2023-02-27

## 2023-02-27 DIAGNOSIS — M54.50 BILATERAL LOW BACK PAIN, UNSPECIFIED CHRONICITY, UNSPECIFIED WHETHER SCIATICA PRESENT: Primary | ICD-10-CM

## 2023-02-27 DIAGNOSIS — R20.0 NUMBNESS OF RIGHT LOWER EXTREMITY: ICD-10-CM

## 2023-02-27 NOTE — PROGRESS NOTES
Daily Note     Today's date: 2023  Patient name: Emy Melo  : 1950  MRN: [id number]  Referring provider: Hemal Ledezma MD  Dx:   Encounter Diagnosis     ICD-10-CM    1  Bilateral low back pain, unspecified chronicity, unspecified whether sciatica present  M54 50       2  Numbness of right lower extremity  R20 0                      Subjective: Pt reports some soreness this morning, woke up like this  Butler good after last session  No significant new complaints  Objective: requires cueing for half side plank on L for hip height and pacing, unable to perform in R s/l  Requires cueing for pacing and form w/ tband hip abd progressions, corrects but fatigues quickly  Assessment: Tolerated treatment well  Patient demonstrated fatigue post treatment, exhibited good technique with therapeutic exercises and would benefit from continued PT  Does well w/ exercise progressions today, very little discomfort noted to end session  Responds well to nerve flossing work combined w/ hip abd progressions  Will benefit from continued stab work as sx allow, given GTB loop for home and will continue w/ below  Increase intensity barring setback  Plan: Continue per plan of care  side step, SL burner w/ mini squat, DL or resisted squat work     POC expires Auth Status Total   Visits  Start date  Expiration date PT/OT + Visit Limit?  Co-Insurance    no auth required  $10 co-pay     No                                           Dummy Auth Tracking  1 2 3 4 5 6   7 8 9 10 11 12   13 14 15 16 17 18   19 20 21 22 23 24   25 26 27 28 29 30   31 32 33 34 35 36         Precautions: hx of R ankle fracture , R CARLOS 2020, hx of breast cancer, hx of RA,       Date (Visit #)  (11)  (12)   (13)   2/15 (10)   Manual              DTM and TPR             Lumbar mobs  tested            Lumbar traction   tested                                          Exercise Diary              Ther Ex        Active w/u Pre 7 min lvl 3-4 UE and LE   NU step UE and LE pre lvl 3 x 8 mins  pre 10 min lvl 3-4 UE and LE 6 min pre lvl 3 UE and LE   pre 7 min lvl 4-5    HS/glute/piri/HF stretching B HS and glute/piri w/ emphasis on L for HS and glute/piri, LAD, nerve flossing x 7 mins  B HS and glute/piri w/ intermittent LAD and sciatic nerve floss x 14 mins  B HS, glute/piri stretching x 5 mins     Man sciatic nerve flossing x 20   LAD on R x 2 min holds     B HS and glute/piri stretching x7 min LAD x2 min B     B HS and glute/piri stretching x 12   Mobility (LTR, open books, cat/cow) LTR 2x10-15 total   LTR x20  x20 total  x20     Seated tspine ext x15 total    Standing thoracic rotation against wall x15-20 total  LTR x20    Rows/ext        Hip stability Forward bent knee bend, focus on L LE 2x10  Forward bend w/ loaded sciatic nerve floss x10-15 B  L leg bias, toes in x10-12  rev    R SGIS 2x10 w/ ext  2x10 reg    Standing lumbar ext sag x 10 only  x10 reg  x10 w/ ext total  R SGIS x10 total w/ ext      Runners stretch on wall x2-3 mins    Rev x2-3 mins                 W/l clams yellow 2x10, w/ IR no band on R only x20            Neuro Re Ed        TrA progressions  isos x 10, single leg ext 2x10 B  isos x10, w/ ad sq x10, w/ BKFO x20, w/ single leg ext x20 isos x10, single leg ext 2x10  isos x 10  isos and march x10-15    Bridge progressions Ad sq 2x10  2x10 w/ ad sq  2x10 w/ ad sq   w/ yellow band 3x10  Yellow tband 2x10    Squat progressions Sit<>stand rev   Reg x10, YTB around knees 2x10    Sit<>stand reg x10, w/ 10# DB 2x8    Hip abd progressions    TrA set w/ yellow BKFO x20 on R, no band on L x 20 rev      SL burners x10 B        Standing YTB hip abd x10 B       HEP and POC review x2-3 mins   Rev  x2-3 mins w/ review of possible sources of pain x 2 mins   Rev of HEP and update x 2-3 mins  Rev of HEP, POC, functional assessment x 6 mins Rev and update x 2-3 mins     Peanut iso press 2x10, w/ march, hip abd, ext 2x10 each     pball iso press x10, w/ march x10-15, w/ hip ext x10-15 B                   Ther Act             Stairs             Functional Transfers             Functional Lifting                                                                                                                                                           Modalities             heat             Ice              Mechanical Traction

## 2023-03-01 ENCOUNTER — OFFICE VISIT (OUTPATIENT)
Dept: PHYSICAL THERAPY | Facility: CLINIC | Age: 73
End: 2023-03-01

## 2023-03-01 ENCOUNTER — APPOINTMENT (OUTPATIENT)
Dept: PHYSICAL THERAPY | Facility: CLINIC | Age: 73
End: 2023-03-01

## 2023-03-01 DIAGNOSIS — R20.0 NUMBNESS OF RIGHT LOWER EXTREMITY: ICD-10-CM

## 2023-03-01 DIAGNOSIS — M54.50 BILATERAL LOW BACK PAIN, UNSPECIFIED CHRONICITY, UNSPECIFIED WHETHER SCIATICA PRESENT: Primary | ICD-10-CM

## 2023-03-01 PROBLEM — M54.10 BACK PAIN WITH RADICULOPATHY: Status: ACTIVE | Noted: 2023-03-01

## 2023-03-01 NOTE — PROGRESS NOTES
Daily Note     Today's date: 3/1/2023  Patient name: Surya Dale  : 1950  MRN: [id number]  Referring provider: Judith Wyatt MD  Dx:   Encounter Diagnosis     ICD-10-CM    1  Bilateral low back pain, unspecified chronicity, unspecified whether sciatica present  M54 50       2  Numbness of right lower extremity  R20 0                      Subjective: Pt reports no new complaints today, feels pretty good overall  Had trouble w/ car doors being frozen and had to move appt time to later  Objective: requires cueing w/ resisted side step to promote hip abd motion vs trunk compensation  Corrects but fatigues quickly  Assessment: Tolerated treatment well  Patient demonstrated fatigue post treatment and would benefit from continued PT  Does well w/ exercise progressions today, shows good form w/ hip abd progressions  Will continue to increase intensity in response to sx progression  No complaints to finish  Plan: Progress note during next visit  side step, DL, hip hinge progressions      POC expires Auth Status Total   Visits  Start date  Expiration date PT/OT + Visit Limit?  Co-Insurance    no auth required  $10 co-pay     No                                           Dummy Auth Tracking  1 2 3 4 5 6   7 8 9 10 11 12   13 14 15 16 17 18   19 20 21 22 23 24   25 26 27 28 29 30   31 32 33 34 35 36         Precautions: hx of R ankle fracture , R CARLOS 2020, hx of breast cancer, hx of RA,       Date (Visit #)  (11)  (12)   (13)  3/1 (14)    Manual              DTM and TPR             Lumbar mobs  tested            Lumbar traction   tested                                          Exercise Diary              Ther Ex        Active w/u Pre 7 min lvl 3-4 UE and LE   NU step UE and LE pre lvl 3 x 8 mins  pre 10 min lvl 3-4 UE and LE 10 min pre lvl 3 UE and LE   pre 7 min lvl 4-5    HS/glute/piri/HF stretching B HS and glute/piri w/ emphasis on L for HS and glute/piri, LAD, nerve flossing x 7 mins  B HS and glute/piri w/ intermittent LAD and sciatic nerve floss x 14 mins  B HS, glute/piri stretching x 5 mins     Man sciatic nerve flossing x 20   LAD on R x 2 min holds     B HS and glute/piri stretching x5 min LAD x2 min B     B HS and glute/piri stretching x 12   Mobility (LTR, open books, cat/cow) LTR 2x10-15 total   LTR x20  x20 total  2x20 total  LTR x20    Rows/ext        Hip stability Forward bent knee bend, focus on L LE 2x10  Forward bend w/ loaded sciatic nerve floss x10-15 B  L leg bias, toes in x10-12  rev    R SGIS 2x10 w/ ext  2x10 reg    Standing lumbar ext sag x 10 only  x10 reg  x10 w/ total reg  R SGIS x10 total w/ ext      Runners stretch on wall x2-3 mins    Rev x2-3 mins                 W/l clams yellow 2x10, w/ IR no band on R only x20            Neuro Re Ed        TrA progressions  isos x 10, single leg ext 2x10 B  isos x10, w/ ad sq x10, w/ BKFO x20, w/ single leg ext x20 isos x10, single leg ext 2x10  isos x 10  isos and march x10-15    Bridge progressions Ad sq 2x10  2x10 w/ ad sq  2x10 w/ ad sq  Ad sq x20 Yellow tband 2x10    Squat progressions Sit<>stand rev   Reg x10, YTB around knees 2x10   Sit<>stand reg x10, w/ 10# DB 2x8    Hip abd progressions     rev      SL burners x10 B 2x10 B, w/ mini squat 2x10        Standing YTB hip abd x10 B  YTB side step 20'x8     HEP and POC review x2-3 mins   Rev  x2-3 mins w/ review of possible sources of pain x 2 mins   Rev of HEP and update x 2-3 mins  Rev of HEP, POC, functional assessment x 2-3 mins Rev and update x 2-3 mins     Peanut iso press 2x10, w/ march, hip abd, ext 2x10 each     pball iso press x10, w/ march x10-15, w/ hip ext x10-15 B                   Ther Act             Stairs             Functional Transfers             Functional Lifting                                                                                                                                                           Modalities             heat Ice              Mechanical Traction

## 2023-03-01 NOTE — PROGRESS NOTES
Daily Note     Today's date: 3/1/2023  Patient name: Carmela Elizondo  : 1950  MRN: [id number]  Referring provider: Ashish Kahn MD  Dx: No diagnosis found  Subjective: Pt reports       Objective: See treatment diary below      Assessment: Tolerated treatment well  Patient demonstrated fatigue post treatment and would benefit from continued PT      Plan: Continue per plan of care  POC expires Auth Status Total   Visits  Start date  Expiration date PT/OT + Visit Limit?  Co-Insurance    no auth required  $10 co-pay     No                                           Dummy Auth Tracking  1 2 3 4 5 6   7 8 9 10 11 12   13 14 15 16 17 18   19 20 21 22 23 24   25 26 27 28 29 30   31 32 33 34 35 36         Precautions: hx of R ankle fracture , R CARLOS 2020, hx of breast cancer, hx of RA,       Date (Visit #)  (11)  (12)   (13)  3/1 (14)    Manual              DTM and TPR             Lumbar mobs  tested            Lumbar traction   tested                                          Exercise Diary              Ther Ex        Active w/u Pre 7 min lvl 3-4 UE and LE   NU step UE and LE pre lvl 3 x 8 mins  pre 10 min lvl 3-4 UE and LE 6 min pre lvl 3 UE and LE   pre 7 min lvl 4-5    HS/glute/piri/HF stretching B HS and glute/piri w/ emphasis on L for HS and glute/piri, LAD, nerve flossing x 7 mins  B HS and glute/piri w/ intermittent LAD and sciatic nerve floss x 14 mins  B HS, glute/piri stretching x 5 mins     Man sciatic nerve flossing x 20   LAD on R x 2 min holds     B HS and glute/piri stretching x7 min LAD x2 min B     B HS and glute/piri stretching x 12   Mobility (LTR, open books, cat/cow) LTR 2x10-15 total   LTR x20  x20 total  x20     Seated tspine ext x15 total    Standing thoracic rotation against wall x15-20 total  LTR x20    Rows/ext        Hip stability Forward bent knee bend, focus on L LE 2x10  Forward bend w/ loaded sciatic nerve floss x10-15 B  L leg bias, toes in x10-12 rev    R SGIS 2x10 w/ ext  2x10 reg    Standing lumbar ext sag x 10 only  x10 reg  x10 w/ ext total  R SGIS x10 total w/ ext      Runners stretch on wall x2-3 mins    Rev x2-3 mins                 W/l clams yellow 2x10, w/ IR no band on R only x20            Neuro Re Ed        TrA progressions  isos x 10, single leg ext 2x10 B  isos x10, w/ ad sq x10, w/ BKFO x20, w/ single leg ext x20 isos x10, single leg ext 2x10  isos x 10  isos and march x10-15    Bridge progressions Ad sq 2x10  2x10 w/ ad sq  2x10 w/ ad sq   w/ yellow band 3x10  Yellow tband 2x10    Squat progressions Sit<>stand rev   Reg x10, YTB around knees 2x10    Sit<>stand reg x10, w/ 10# DB 2x8    Hip abd progressions    TrA set w/ yellow BKFO x20 on R, no band on L x 20 rev      SL burners x10 B        Standing YTB hip abd x10 B       HEP and POC review x2-3 mins   Rev  x2-3 mins w/ review of possible sources of pain x 2 mins   Rev of HEP and update x 2-3 mins  Rev of HEP, POC, functional assessment x 6 mins Rev and update x 2-3 mins     Peanut iso press 2x10, w/ march, hip abd, ext 2x10 each     pball iso press x10, w/ march x10-15, w/ hip ext x10-15 B                   Ther Act             Stairs             Functional Transfers             Functional Lifting                                                                                                                                                           Modalities             heat             Ice              Mechanical Traction

## 2023-03-02 ENCOUNTER — OFFICE VISIT (OUTPATIENT)
Dept: NEUROSURGERY | Facility: CLINIC | Age: 73
End: 2023-03-02

## 2023-03-02 VITALS
HEART RATE: 78 BPM | BODY MASS INDEX: 30.18 KG/M2 | HEIGHT: 62 IN | TEMPERATURE: 97 F | DIASTOLIC BLOOD PRESSURE: 92 MMHG | WEIGHT: 164 LBS | SYSTOLIC BLOOD PRESSURE: 156 MMHG

## 2023-03-02 DIAGNOSIS — M71.38 SYNOVIAL CYST OF LUMBAR SPINE: ICD-10-CM

## 2023-03-02 NOTE — PROGRESS NOTES
Neurosurgery Office Note  Connie Katz 67 y o  female MRN: [id number]      Assessment/Plan     Back pain with radiculopathy  Evaluation of LBP  · Started in June 2022 after painting her house  · Initially pain was across low back and into right hip, then changed to left buttock/hip  Intermittently has pain down the left lateral leg, laying down in bed is the worst and she can't lay on her left side  Pain is also worse with standing for long periods of time  Rarely has pain in the right leg  Urge incontinence  Ambulates independently  · Hx of peripheral neuropathy from chemo after breast CA and RA  · PT and the chiropractor are helping  The pain is now intermittent instead of constant in the back and left hip/buttock since starting these  · Exam: no midline spinal TTP, BLE - HF/KF/KE 4+/5, DF/PF 5/5, 2+ DTRs, no clonus    Imaging:  · MRI lumbar spine 2/7/23: Multilevel spondylotic changes of the lumbar spine including multifactorial severe L4-5 central canal encroachment  Additionally, there is an intracanalicular synovial cyst identified at the L3-4 level resulting in moderate central canal encroachment at this level  Recommend surgical evaluation  Plan:  · Reviewed imaging with patient and Dr Fracisco Swartz  She has spinal stenosis at L4-5 as well as a synovial cyst at L3-4  · However, she states that since starting conservative measures her pain has improved  She has not tried injections yet, but has done well with PT and chiropractic manipulation  · Can consider gabapentin or another neuropathic medication as well as injections if she would like  · Recommend continuing conservative measures at this time given the relief she has sustained with that  Advised to let us know if her symptoms change or worsen  · Reviewed red flag signs and symptoms  · Follow-up as needed  Call with any questions or concerns         Diagnoses and all orders for this visit:    Synovial cyst of lumbar spine  -     Ambulatory Referral to Neurosurgery          I have spent a total time of 45 minutes on 03/02/23 in caring for this patient including Diagnostic results, Patient and family education, Impressions, Counseling / Coordination of care, Documenting in the medical record, Reviewing / ordering tests, medicine, procedures  , Obtaining or reviewing history   and Communicating with other healthcare professionals   CHIEF COMPLAINT    Chief Complaint   Patient presents with   • Consult       HISTORY    History of Present Illness     67y o  year old female     67year old female seen for new evaluation of LBP since June after painting her house  Pain was across the low back and into the right hip  After a drive to [place], she started having pain going down the left buttock/hip and the right hip wasn't as bad  Intermittently she will have pain down the left lateral leg, laying down in bed is the worst and she can't lay on her left side  Pain is also worse with standing for long periods of time  Rarely has pain in the right leg  She has numbness and tingling from toes to mid calves, also not constant  PT is helping  Chiropractor is helping, the pain is no longer constant like it was in the beginning  Currently, pain is 2-4/10, at it's worst is 8/10  Both the back and leg pain comes and go  Has gabapentin but it wasn't really helping so she isn't taking it  She has not tried injections  Ambulates independently  Notes urge incontinence  Hx of breast CA s/p chemo and radiation and hx of RA  Prior right hip replacement  See Discussion    REVIEW OF SYSTEMS    Review of Systems   HENT: Negative for tinnitus  Eyes: Positive for visual disturbance (retinal issue )  Respiratory: Negative for shortness of breath and wheezing  Cardiovascular: Negative for chest pain  Gastrointestinal: Negative  Genitourinary: Negative      Musculoskeletal: Positive for back pain (lower back pain , radiates into her hips and legs started in her right side but now mainly on the left side ), gait problem (no recent falls , going to PT , balance isnt great ) and myalgias (muscle tightness in her lower back and muscle pain in her left buttock down into her left leg )  Currently doing PT for her back   See's a chiropractor at least 1-2 times a week with relief   No injection for her back pain     Back pain started 06/2022 worse in 08/2022      4/10 pain scale   Worse when she applies pressure on her leg   Neurological: Positive for weakness (right leg weaker due to right hip replacement ) and numbness (sometimes in her whole left leg and frequently in her bilateral calf into her toes )  ROS obtained by MA  Reviewed  See HPI  Meds/Allergies     Current Outpatient Medications   Medication Sig Dispense Refill   • Actemra ACTPen 162 MG/0 9ML SOAJ Once every 10 days     • albuterol (PROVENTIL HFA,VENTOLIN HFA) 90 mcg/act inhaler as needed     • atorvastatin (LIPITOR) 10 mg tablet Take 10 mg by mouth daily     • Calcium Carbonate-Vitamin D3 600-400 MG-UNIT TABS Take by mouth     • CELECOXIB PO Take by mouth 2 (two) times a day as needed     • Cholecalciferol 25 MCG (1000 UT) capsule Take 1,000 Units by mouth daily     • denosumab (PROLIA) 60 mg/mL Inject 60 mg under the skin every 6 (six) months     • fluticasone-salmeterol (Advair) 250-50 mcg/dose inhaler Inhale 1 puff  in the morning and 1 puff in the evening  Rinse mouth after use    (Patient taking differently: Inhale 1 puff if needed Rinse mouth after use ) 60 blister 1   • losartan (COZAAR) 50 mg tablet 50 mg daily     • Multiple Vitamin (MULTIVITAMIN ADULT PO) Take 1 tablet by mouth daily     • omeprazole (PriLOSEC) 20 mg delayed release capsule Take 2 capsules (40 mg total) by mouth daily 90 capsule 3   • amoxicillin (AMOXIL) 500 mg capsule For dental appt (Patient not taking: Reported on 3/2/2023)       No current facility-administered medications for this visit         Allergies   Allergen Reactions   • Fruit Extracts Itching     Cantelope melon peaches   • Nuts - Food Allergy GI Intolerance   • Avelox [Moxifloxacin] Itching       PAST HISTORY    Past Medical History:   Diagnosis Date   • Asthma    • Back pain with radiculopathy 3/1/2023   • BRCA1 negative    • BRCA2 negative    • Breast cancer ([facility] ) 2020    Left  breast, triple negative   • Colon polyp    • History of chemotherapy 10/01/2020    breast cancer - left   • History of radiation therapy 04/01/2021    left breast cancer   • Hypertension    • OA (osteoarthritis)    • Rheumatoid arthritis ([facility] )        Past Surgical History:   Procedure Laterality Date   • ANKLE SURGERY Right 2016    2 plates and screws   • BREAST BIOPSY Left 07/02/2020   • BREAST LUMPECTOMY Left 08/12/2020    left breast wire localized lumpectomy   • BREAST MASS EXCISION Left 09/02/2020    short superior and long lateral left breast resection, left axillary sentinel lymph node biopsy   • COLONOSCOPY     • ELBOW FRACTURE REPAIR Right 1996   • HYSTERECTOMY  2018   • REPLACEMENT TOTAL HIP W/  RESURFACING IMPLANTS  06/18/2021       Social History     Tobacco Use   • Smoking status: Never   • Smokeless tobacco: Never   Vaping Use   • Vaping Use: Never used   Substance Use Topics   • Alcohol use: Not Currently     Comment: occasional   • Drug use: Never       Family History   Problem Relation Age of Onset   • Breast cancer Mother    • Lung cancer Father    • Thyroid cancer Son    • Breast cancer Paternal Grandmother          Above history personally reviewed  EXAM    Vitals:Blood pressure 156/92, pulse 78, temperature (!) 97 °F (36 1 °C), temperature source Temporal, height 5' 2" (1 575 m), weight 74 4 kg (164 lb)  ,Body mass index is 30 kg/m²  Physical Exam  Vitals reviewed  Constitutional:       General: She is awake  Appearance: Normal appearance  HENT:      Head: Normocephalic and atraumatic     Eyes:      Conjunctiva/sclera: Conjunctivae normal  Cardiovascular:      Rate and Rhythm: Normal rate  Pulmonary:      Effort: Pulmonary effort is normal    Musculoskeletal:      Comments: No midline spinal TTP  No pain, just tightness in back with ROM   Skin:     General: Skin is warm and dry  Neurological:      Mental Status: She is alert and oriented to person, place, and time  Coordination: Finger-Nose-Finger Test and Heel to [facility] Test normal       Deep Tendon Reflexes:      Reflex Scores:       Bicep reflexes are 2+ on the right side and 2+ on the left side  Brachioradialis reflexes are 2+ on the right side and 2+ on the left side  Patellar reflexes are 2+ on the right side and 2+ on the left side  Achilles reflexes are 1+ on the right side and 1+ on the left side  Psychiatric:         Attention and Perception: Attention and perception normal          Mood and Affect: Mood and affect normal          Speech: Speech normal          Behavior: Behavior normal  Behavior is cooperative  Thought Content: Thought content normal          Cognition and Memory: Cognition and memory normal          Judgment: Judgment normal          Neurologic Exam     Mental Status   Oriented to person, place, and time  Follows 2 step commands  Attention: normal  Concentration: normal    Speech: speech is normal   Level of consciousness: alert  Knowledge: good  Normal comprehension  Motor Exam   Muscle bulk: normal  Overall muscle tone: normal  Right arm pronator drift: absent  Left arm pronator drift: absent  BUE - deltoid/biceps/triceps/ 4+/5  BLE - HF/KF/KE 4+/5, DF/PF 5/5     Sensory Exam   Right arm light touch: normal  Left arm light touch: normal  Right leg light touch: normal  Left leg light touch: diminished in calf circumferentially  Right leg vibration: normal to ankle  Left leg vibration: diminished at ankle      Gait, Coordination, and Reflexes     Coordination   Finger to nose coordination: normal  Heel to shin coordination: normal    Tremor   Resting tremor: absent  Intention tremor: absent  Action tremor: absent    Reflexes   Right brachioradialis: 2+  Left brachioradialis: 2+  Right biceps: 2+  Left biceps: 2+  Right patellar: 2+  Left patellar: 2+  Right achilles: 1+  Left achilles: 1+  Right Lyons: absent  Left Lyons: absent  Right ankle clonus: absent  Left ankle clonus: absent  Antalgic gait         MEDICAL DECISION MAKING    Imaging Studies:     MRI lumbar spine without contrast    Result Date: 2/9/2023  Narrative: MRI LUMBAR SPINE WITHOUT CONTRAST INDICATION: R20 0: Anesthesia of skin M54 50: Low back pain, unspecified M54 16: Radiculopathy, lumbar region  COMPARISON:  None  TECHNIQUE:  Multiplanar, multisequence imaging of the lumbar spine was performed     IMAGE QUALITY:  Diagnostic FINDINGS: VERTEBRAL BODIES:  There are 5 lumbar type vertebral bodies  Normal alignment of the lumbar spine  No spondylolysis or spondylolisthesis  No scoliosis  No compression fracture  Normal marrow signal is identified within the visualized bony structures  No discrete marrow lesion  SACRUM:  Normal signal within the sacrum  No evidence of insufficiency or stress fracture  DISTAL CORD AND CONUS:  Normal size and signal within the distal cord and conus  PARASPINAL SOFT TISSUES:  Paraspinal soft tissues are unremarkable  LOWER THORACIC DISC SPACES:  Normal disc height and signal   No disc herniation, canal stenosis or foraminal narrowing  LUMBAR DISC SPACES: L1-L2:  Normal  L2-L3:  Diffuse disc bulge with superimposed right paracentral disc extrusion exhibiting mild cranial and caudal migration with prominent facet arthrosis bilaterally  There is moderate central canal encroachment with narrowing of the right subarticular recess  Correlate clinically for descending right L3 nerve root impingement  There is also mild right foraminal stenosis at this level   L3-L4:  Prominent facet arthrosis with an intracanalicular synovial cyst measuring up to 7 x 6 x 9 mm best seen on series 6 image 14 and resulting in moderate central canal stenosis at this level  The foramina remain patent  L4-L5:  Diffuse disc bulge with facet hypertrophy and ligamentum flavum infolding resulting in moderate to severe trefoil stenosis  Superimposed left foraminal disc extrusion with slight cranial migration results in mild to moderate left foraminal stenosis  Correlate clinically for left L4 radicular symptoms  L5-S1:  Minor bulge with facet arthrosis and ligamentum flavum infolding but no significant central canal stenosis  OTHER FINDINGS:  Diverticular disease without diverticulitis  Right hip arthroplasty noted  Impression: Multilevel spondylotic changes of the lumbar spine including multifactorial severe L4-5 central canal encroachment  Additionally, there is an intracanalicular synovial cyst identified at the L3-4 level resulting in moderate central canal encroachment at this level  Recommend surgical evaluation  The study was marked in EPIC for significant notification  Workstation performed: [id number]       I have personally reviewed pertinent reports     and I have personally reviewed pertinent films in PACS

## 2023-03-06 ENCOUNTER — EVALUATION (OUTPATIENT)
Dept: PHYSICAL THERAPY | Facility: CLINIC | Age: 73
End: 2023-03-06

## 2023-03-06 DIAGNOSIS — R20.0 NUMBNESS OF RIGHT LOWER EXTREMITY: ICD-10-CM

## 2023-03-06 DIAGNOSIS — M54.50 BILATERAL LOW BACK PAIN, UNSPECIFIED CHRONICITY, UNSPECIFIED WHETHER SCIATICA PRESENT: Primary | ICD-10-CM

## 2023-03-06 NOTE — PROGRESS NOTES
Progress Note     Today's date: 3/6/2023  Patient name: Jose Ace  : 1950  MRN: [id number]  Referring provider: Jamia Hastings MD  Dx:   Encounter Diagnosis     ICD-10-CM    1  Bilateral low back pain, unspecified chronicity, unspecified whether sciatica present  M54 50       2  Numbness of right lower extremity  R20 0                      Subjective: Pt reports some soreness to start today's session  Overall feels better  Pain has been much more manageable in both low back and legs  Neuro told her that she is not a surgical candidate, reports that she was told to return should pain increase in future  Wants to continue w/ PT for the time being as she feels there is definite benefit to this  Functional status below:         Goals  Short term goals (3 weeks):  1) Pt will improve thoracolumbar mobility deficits by 25% pain free  - achieved   2) Pt will improve B LE and core strength deficits by 1/3 grade MMT  - progressed/achieved   3) Pt will improve pain at worse to <4/10  - progressed   4) Pt will initiate and progress HEP w/ special emphasis on functional core control and thoracolumbar mobility  - achieved     Long term goals (6 weeks)  1) Pt will improve FOTO to at least 57  - achieved   2) Pt will sleep through the night in positioning of choosing 6/7 nights a week w/o waking due to pain  - progressed   3) Pt will stand and ambulate for community distances w/ normalized gait mechanics and pain <3/10 throughout  - progressed   4) Pt will be independent and compliant w/ HEP in order to maximize functional benefit of skilled PT following d/c  - progressed     New Goals 23:  Short term goals (2 weeks): ALL PROGRESSED   1) Pt will further improve thoracolumbar mobility deficits by 25% pain free  2) Pt will further improve B LE and core strength deficits by 1/3 grade MMT  3) Pt will improve pain at worse to <4/10     4) Pt will initiate and progress HEP w/ special emphasis on functional core control and thoracolumbar mobility  Long term goals (4 weeks) ALL PROGRESSED   1) Pt will improve FOTO to at least 75   2) Pt will sleep through the night in positioning of choosing 6/7 nights a week w/o waking due to pain  3) Pt will stand and ambulate for community distances w/ normalized gait mechanics and pain <3/10 throughout  4) Pt will be independent and compliant w/ HEP in order to maximize functional benefit of skilled PT following d/c      *goals to be extended by 2 and 4 weeks, respectively     Pain  Current pain rating: 3  At best pain ratin  At worst pain ratin  Location: midline low back, along L posterior hip to L lateral thigh to lateral calf, L foot  Quality: dull ache and radiating  Relieving factors: rest, change in position and medications  Aggravating factors: walking, standing, lifting, overhead activity, running and stair climbing  Progression: small improvement since last PN     Social Support  Steps to enter house: yes  Stairs in house: yes   Lives in: multiple-level home (bedroom on first floor )  Lives with: spouse (daughter is a couple blocks away)    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         Objective     Concurrent Complaints  Negative for bladder dysfunction, bowel dysfunction and saddle (S4) numbness    Postural Observations  Seated posture: poor  Standing posture: poor - progressing to fair    Correction of posture: makes symptoms better        Palpation     Additional Palpation Details  Pt is TTP and has increased tissue density through distal thoracolumbar PS and QL/piri    -23: similar to lesser extent    -3/6/23: no significant today     Neurological Testing     Sensation     Lumbar   Left   Intact: light touch    Right   Intact: light touch    Active Range of Motion     Additional Active Range of Motion Details  Flex: 75-80%  Ext: 60-75%  SB R: 60-75%  SB L: 60-75%  Rot R: 60-75%  Rot L: 60-75%    *indicates pain    Guarded throughout     Strength/Myotome Testing     Additional Strength Details  LE Strength (R/L)    Hip  Flex 4/5 , 4/5  Ext 4/5 , 4/5  Abd 4/5 , 4/5  Add 4/5 , 4/5    Knee   Flex 4/5 , 4/5  Ext 4/5 , 4/5    Core Strength: 2+/5     *Indicates pain    Bridging: half range w/ cueing needed for glute drive    -6/5/95: full range w/o pain when glutes activated properly    -3/6/23: full range w/o pain     Tests     Additional Tests Details  Manual lumbar traction: good relief both single and double    -2/9/23: Continued    -3/6/23: continued     Thoracolumbar joint mobility: grossly hypo w/ min pain on L4/5     -2/9/23: grossly hypo w/o pain    -3/6/23: not tested today     Functional Testing:   Sit<>stand: UE support on LE w/ fair glute drive    -8/8/94:  Can perform through full range, from reg chair, w/o pain through limited reps    -3/6/23: requires min momentum but improved glute drive, no pain     BW squat: not past 25% before compensation    -2/9/23: at least 50% before compensation    -3/6/23: at least 50% w/ good glute drive     Stairs: TBA     -2/9/23: has not been point of emphasis thus far     -3/6/23:    SLS: unable w/o UE support B    -2/9/23: at least 5-6 sec w/ min UE support B   -3/6/23: similar     Ambulation     Ambulation: Level Surfaces     Additional Level Surfaces Ambulation Details  Decreased step length and lumbar rotation, min decreased pace, not specifically antalgic    -2/9/23: not antalgic, improved upright posture throughout, min forward tspine flexion, improving step length and speed    -3/6/23: min decreased step length on R LE, improves w/ cueing for scap stability and posture        Assessment: Tolerated treatment well  Patient demonstrated fatigue post treatment and would benefit from continued PT   Pt has made slow and steady progress to date  Strength, ROM, quality of motion, and self reports of pain are all improving  She shows good tolerance to exercise progressions and is doing a better job managing sx in loaded positions  Excellent carryover to HEP  Will continue to increase as sx allow on 2x/week basis w/ eye towards HEP and d/c in next 3-4 weeks  Plan: Continue per plan of care  loaded progressions as able      POC expires Auth Status Total   Visits  Start date  Expiration date PT/OT + Visit Limit?  Co-Insurance    no auth required  $10 co-pay     No                                           Dummy Auth Tracking  1 2 3 4 5 6   7 8 9 10 11 12   13 14 15 16 17 18   19 20 21 22 23 24   25 26 27 28 29 30   31 32 33 34 35 36         Precautions: hx of R ankle fracture 2016, R CARLOS June 2020, hx of breast cancer, hx of RA,       Date (Visit #) 2/17 (11) 2/22 (12)  2/27 (13)  3/1 (14) 3/6 (15) PN   Manual              DTM and TPR             Lumbar mobs  tested            Lumbar traction   tested                                          Exercise Diary              Ther Ex        Active w/u Pre 7 min lvl 3-4 UE and LE   NU step UE and LE pre lvl 3 x 8 mins  pre 10 min lvl 3-4 UE and LE 10 min pre lvl 3 UE and LE   pre 7 min lvl 4-5    HS/glute/piri/HF stretching B HS and glute/piri w/ emphasis on L for HS and glute/piri, LAD, nerve flossing x 7 mins  B HS and glute/piri w/ intermittent LAD and sciatic nerve floss x 14 mins  B HS, glute/piri stretching x 5 mins     Man sciatic nerve flossing x 20   LAD on R x 2 min holds     B HS and glute/piri stretching x5 min LAD x2 min B     B HS and glute/piri stretching x 12   Mobility (LTR, open books, cat/cow) LTR 2x10-15 total   LTR x20  x20 total  2x20 total  LTR x20    Rows/ext        Hip stability Forward bent knee bend, focus on L LE 2x10  Forward bend w/ loaded sciatic nerve floss x10-15 B  L leg bias, toes in x10-12  rev    R SGIS 2x10 w/ ext  2x10 reg    Standing lumbar ext sag x 10 only  x10 reg  x10 w/ total reg  rev     Runners stretch on wall x2-3 mins                               Neuro Re Ed        TrA progressions  isos x 10, single leg ext 2x10 B  isos x10, w/ ad sq x10, w/ BKFO x20, w/ single leg ext x20 isos x10, single leg ext 2x10  isos x 10  isos and march x10-15    Bridge progressions Ad sq 2x10  2x10 w/ ad sq  2x10 w/ ad sq  Ad sq x20 Yellow tband 2x10    Squat progressions Sit<>stand rev   Reg x10, YTB around knees 2x10   x10 reg   Hip abd progressions           SL burners x10 B 2x10 B, w/ mini squat 2x10  x20 reg       Standing YTB hip abd x10 B  YTB side step 20'x8 rev    HEP and POC review x2-3 mins   Rev  x2-3 mins w/ review of possible sources of pain x 2 mins   Rev of HEP and update x 2-3 mins  Rev of HEP, POC, functional assessment x 2-3 mins Rev and update x 5 mins     Peanut iso press 2x10, w/ march, hip abd, ext 2x10 each     Wall slides for posture, green loop w/ lift off x10 each                   Ther Act             Stairs             Functional Transfers             Functional Lifting                                                                                                                                                           Modalities             heat             Ice              Mechanical Traction

## 2023-03-08 ENCOUNTER — OFFICE VISIT (OUTPATIENT)
Dept: FAMILY MEDICINE CLINIC | Facility: CLINIC | Age: 73
End: 2023-03-08

## 2023-03-08 VITALS
DIASTOLIC BLOOD PRESSURE: 70 MMHG | HEIGHT: 62 IN | HEART RATE: 65 BPM | WEIGHT: 163 LBS | TEMPERATURE: 97.9 F | SYSTOLIC BLOOD PRESSURE: 148 MMHG | OXYGEN SATURATION: 100 % | RESPIRATION RATE: 16 BRPM | BODY MASS INDEX: 30 KG/M2

## 2023-03-08 DIAGNOSIS — C50.912 INVASIVE DUCTAL CARCINOMA OF LEFT BREAST (HCC): ICD-10-CM

## 2023-03-08 DIAGNOSIS — M06.9 RHEUMATOID ARTHRITIS, INVOLVING UNSPECIFIED SITE, UNSPECIFIED WHETHER RHEUMATOID FACTOR PRESENT (HCC): ICD-10-CM

## 2023-03-08 DIAGNOSIS — J45.20 MILD INTERMITTENT ASTHMA WITHOUT COMPLICATION: ICD-10-CM

## 2023-03-08 DIAGNOSIS — I20.8 ANGINA AT REST (HCC): ICD-10-CM

## 2023-03-08 DIAGNOSIS — I50.31 DIASTOLIC CHF, ACUTE (HCC): ICD-10-CM

## 2023-03-08 DIAGNOSIS — J06.9 ACUTE URI: Primary | ICD-10-CM

## 2023-03-08 RX ORDER — FLUTICASONE PROPIONATE AND SALMETEROL 250; 50 UG/1; UG/1
1 POWDER RESPIRATORY (INHALATION) 2 TIMES DAILY
Qty: 60 BLISTER | Refills: 0 | Status: SHIPPED | OUTPATIENT
Start: 2023-03-08

## 2023-03-08 RX ORDER — AZITHROMYCIN 250 MG/1
TABLET, FILM COATED ORAL
Qty: 6 TABLET | Refills: 0 | Status: SHIPPED | OUTPATIENT
Start: 2023-03-08 | End: 2023-03-13

## 2023-03-08 RX ORDER — SARILUMAB 200 MG/1.14ML
200 INJECTION, SOLUTION SUBCUTANEOUS
COMMUNITY
Start: 2023-02-24 | End: 2023-05-19

## 2023-03-08 NOTE — PROGRESS NOTES
Name: Farrah Brooks      : 1950      MRN: [id number]  Encounter Provider: Derek Espinoza MD  Encounter Date: 3/8/2023   Encounter department: [address]     1  Acute URI  -     azithromycin (Zithromax) 250 mg tablet; Take 2 tablets (500 mg total) by mouth daily for 1 day, THEN 1 tablet (250 mg total) daily for 4 days  2  Rheumatoid arthritis, involving unspecified site, unspecified whether rheumatoid factor present ([facility] )  Comments: Follows rheumatologist  She was recently on [address]  3  Diastolic CHF, acute ([facility] )  Comments: Follows cardiolgist Dr Deedee Veronica in [place], [place]      4  Invasive ductal carcinoma of left breast [facility])  Comments: Follows oncologist Dr John Lundy  5  Angina at rest [facility])  Comments:  Managed by cardiologist      6  Mild intermittent asthma without complication  -     Fluticasone-Salmeterol (Advair Diskus) 250-50 mcg/dose inhaler; Inhale 1 puff 2 (two) times a day Rinse mouth after use  Subjective      URI   This is a new problem  The current episode started yesterday  The problem has been gradually worsening  There has been no fever  Associated symptoms include chest pain, congestion, coughing, ear pain, headaches, nausea, sinus pain, sneezing, a sore throat and wheezing  Pertinent negatives include no abdominal pain, diarrhea, dysuria, joint pain, joint swelling, neck pain, plugged ear sensation, rash, rhinorrhea, swollen glands or vomiting  She has tried NSAIDs for the symptoms  The treatment provided no relief  Review of Systems   HENT: Positive for congestion, ear pain, sinus pain, sneezing and sore throat  Negative for rhinorrhea  Respiratory: Positive for cough and wheezing  Cardiovascular: Positive for chest pain  Gastrointestinal: Positive for nausea  Negative for abdominal pain, diarrhea and vomiting  Genitourinary: Negative for dysuria  Musculoskeletal: Negative for joint pain and neck pain     Skin: Negative for rash  Neurological: Positive for headaches  Current Outpatient Medications on File Prior to Visit   Medication Sig   • Actemra ACTPen 162 MG/0 9ML SOAJ Once every 10 days   • albuterol (PROVENTIL HFA,VENTOLIN HFA) 90 mcg/act inhaler as needed   • amoxicillin (AMOXIL) 500 mg capsule For dental appt   • atorvastatin (LIPITOR) 10 mg tablet Take 10 mg by mouth daily   • Calcium Carbonate-Vitamin D3 600-400 MG-UNIT TABS Take by mouth   • CELECOXIB PO Take by mouth 2 (two) times a day as needed   • Cholecalciferol 25 MCG (1000 UT) capsule Take 1,000 Units by mouth daily   • denosumab (PROLIA) 60 mg/mL Inject 60 mg under the skin every 6 (six) months   • losartan (COZAAR) 50 mg tablet 50 mg daily   • Multiple Vitamin (MULTIVITAMIN ADULT PO) Take 1 tablet by mouth daily   • Sarilumab (Kevzara) 200 MG/1  14ML SOAJ Inject 200 mg under the skin every 14 (fourteen) days   • [DISCONTINUED] fluticasone-salmeterol (Advair) 250-50 mcg/dose inhaler Inhale 1 puff  in the morning and 1 puff in the evening  Rinse mouth after use    (Patient taking differently: Inhale 1 puff if needed Rinse mouth after use )   • omeprazole (PriLOSEC) 20 mg delayed release capsule Take 2 capsules (40 mg total) by mouth daily   • [DISCONTINUED] Na Sulfate-K Sulfate-Mg Sulf (Suprep Bowel Prep Kit) 17 5-3 13-1 6 GM/177ML SOLN Take 177 mL by mouth once for 1 dose Take 177 mL by mouth once for 1 dose (Patient not taking: Reported on 6/1/2022)       Objective     /70   Pulse 65   Temp 97 9 °F (36 6 °C)   Resp 16   Ht 5' 2" (1 575 m)   Wt 73 9 kg (163 lb)   SpO2 100%   BMI 29 81 kg/m²     Physical Exam  Constitutional:       General: She is not in acute distress  Appearance: She is well-developed  She is not diaphoretic  HENT:      Head: Normocephalic and atraumatic  Right Ear: Tympanic membrane, ear canal and external ear normal  There is no impacted cerumen        Left Ear: Tympanic membrane, ear canal and external ear normal  There is no impacted cerumen  Nose: Nose normal  No congestion or rhinorrhea  Mouth/Throat:      Mouth: Mucous membranes are moist       Pharynx: Oropharynx is clear  No oropharyngeal exudate or posterior oropharyngeal erythema  Eyes:      General: No scleral icterus  Right eye: No discharge  Left eye: No discharge  Conjunctiva/sclera: Conjunctivae normal    Cardiovascular:      Rate and Rhythm: Normal rate and regular rhythm  Heart sounds: Normal heart sounds  No murmur heard  No friction rub  No gallop  Pulmonary:      Effort: Pulmonary effort is normal  No respiratory distress  Breath sounds: Normal breath sounds  No wheezing or rales  Chest:      Chest wall: No tenderness  Musculoskeletal:         General: No deformity  Normal range of motion  Cervical back: Normal range of motion and neck supple  Skin:     General: Skin is warm and dry  Neurological:      Mental Status: She is alert and oriented to person, place, and time  Psychiatric:         Behavior: Behavior normal          Thought Content:  Thought content normal          Judgment: Judgment normal        Deangelo Fabian MD

## 2023-03-10 ENCOUNTER — RADIATION ONCOLOGY FOLLOW-UP (OUTPATIENT)
Dept: RADIATION ONCOLOGY | Facility: HOSPITAL | Age: 73
End: 2023-03-10
Attending: RADIOLOGY

## 2023-03-10 ENCOUNTER — CLINICAL SUPPORT (OUTPATIENT)
Dept: RADIATION ONCOLOGY | Facility: HOSPITAL | Age: 73
End: 2023-03-10
Attending: RADIOLOGY

## 2023-03-10 VITALS
DIASTOLIC BLOOD PRESSURE: 78 MMHG | OXYGEN SATURATION: 98 % | RESPIRATION RATE: 18 BRPM | HEART RATE: 80 BPM | TEMPERATURE: 97.8 F | SYSTOLIC BLOOD PRESSURE: 128 MMHG | HEIGHT: 62 IN | WEIGHT: 160.9 LBS | BODY MASS INDEX: 29.61 KG/M2

## 2023-03-10 DIAGNOSIS — C50.912 INVASIVE DUCTAL CARCINOMA OF LEFT BREAST (HCC): Primary | ICD-10-CM

## 2023-03-10 NOTE — PROGRESS NOTES
Follow-up - Radiation Oncology   Cristina Hopkins 1950 67 y o  female [id number]      History of Present Illness   Cancer Staging   Invasive ductal carcinoma of left breast ([facility] )  Staging form: Breast, AJCC 8th Edition  - Pathologic: Stage IB (pT1c, pN0, cM0, G3, ER-, LA-, HER2-) - Signed by Al Stafford MD on 3/12/2021  Multigene prognostic tests performed: None  Histologic grading system: 3 grade system      Cristina Hopkins is a 67 y o  female with a history of stage IB [id number] grade 3 triple negative breast cancer status post breast conservation surgery, adjuvant chemotherapy and radiation (completed 5/10/21) who presents for routine follow up  Interval History:  22 - Mammo diagnostic bilateral w 3d & cad  FINDINGS:   Bilateral  There are no suspicious masses, grouped microcalcifications or areas of unexplained architectural distortion   Stable appearing post treatment changes on the left with mild uniform skin thickening   Scattered benign calcifications present bilaterally  IMPRESSION:   Stable exam  ASSESSMENT/BI-RADS CATEGORY:  Overall: 2 - Benign        22 Med Onc, Dr Gladys Finn in 2022 did not demonstrate any concerning abnormalities  Plan is to continue with self-breast exams and yearly mammograms  Follow-up in 8 months        23 Started PT for low back pain     (Breast surgeon Dr Zuhair Albert at [address] [place] [address] - no recent follow-up)        Upcomin23 Med Onc follow-up    Upon interview, she has mild left shoulder stiffness but continues ROM exercises  She is also seeing PT for lower back pain  She denies new breast masses, nipple discharge or skin changes  She is without additional acute concerns          Historical Information   Oncology History   Invasive ductal carcinoma of left breast ([facility] )    Initial Diagnosis    Invasive ductal carcinoma of left breast ([address] )     2020 Surgery    Breast, Left, Left Breast Mass, Wire Localized Lumpectomy (1 slides [id number] [facility], collected 8/5/2020):  Dr Tin Cary, Surgeon    - Invasive breast carcinoma of no special type (ductal NST/invasive ductal carcinoma)  * Size: 15 mm   * [place] grade 3 of 3 (total score: 9 of 9)    -- tubule formation, score 3    -- nuclear grade, score 3    -- mitoses, score 3    * Breast tumor prognostic profile (per report)    -- ER: Negative 0%    -- MS: Negative 0%    -- Her2: Negative 0   * Ductal carcinoma in situ (DCIS): Not identified  * Margins: Negative for carcinoma, less than 1 mm from the closest lateral margin  * Pathologic stage (AJCC 8th ed ): pT1c         9/2/2020 Surgery    Left breast re-excision lumpectomy with SLNB  ([facility] [address], [facility], Dr Lopez Hernandez)     A  Left breast lumpectomy:  - benign breast tissue with previous lumpectomy cavity showing marked tissue reaction including foreign body giant cell reaction, granulation tissue and fat necrosis  - No residual carcinoma seen  B  Left axillary lymph node:  - two negative lymph nodes  10/2020 - 3/15/2021 Chemotherapy    Adjuvant chemotherapy [facility])  AC (Adriamycin, Cytoxan) x 4 cycles  Paclitaxel x 12 cycles     Dr Mihai Hankins, [facility]      3/12/2021 -  Cancer Staged    Staging form: Breast, AJCC 8th Edition  - Pathologic: Stage IB (pT1c, pN0, cM0, G3, ER-, MS-, HER2-) - Signed by Jayla Werner MD on 3/12/2021  Multigene prognostic tests performed: None  Histologic grading system: 3 grade system       4/12/2021 - 5/10/2021 Radiation    4256 cGy in 16 fractions to the entire left breast followed by  additional 1000 cGy in 5 fractions to the left lumpectomy cavity  Jayla Werner MD     4/12/2021 - 5/10/2021 Radiation    BH L BreaEZ 6X 16 / 16 266 0 4,256 21   L Breast e 9E 5 / 5 200 0 1,000 6      Treatment Dates:  4/12/2021 - 5/10/2021            Past Medical History:   Diagnosis Date   • Asthma    • Back pain with radiculopathy 3/1/2023   • BRCA1 negative    • BRCA2 negative    • Breast cancer ([facility] ) 2020    Left  breast, triple negative   • Colon polyp    • History of chemotherapy 10/01/2020    breast cancer - left   • History of radiation therapy 04/01/2021    left breast cancer   • Hypertension    • OA (osteoarthritis)    • Rheumatoid arthritis ([facility] )      Past Surgical History:   Procedure Laterality Date   • ANKLE SURGERY Right 2016    2 plates and screws   • BREAST BIOPSY Left 07/02/2020   • BREAST LUMPECTOMY Left 08/12/2020    left breast wire localized lumpectomy   • BREAST MASS EXCISION Left 09/02/2020    short superior and long lateral left breast resection, left axillary sentinel lymph node biopsy   • COLONOSCOPY     • ELBOW FRACTURE REPAIR Right 1996   • HYSTERECTOMY  2018   • REPLACEMENT TOTAL HIP W/  RESURFACING IMPLANTS  06/18/2021       Social History   Social History     Substance and Sexual Activity   Alcohol Use Not Currently    Comment: occasional     Social History     Substance and Sexual Activity   Drug Use Never     Social History     Tobacco Use   Smoking Status Never   Smokeless Tobacco Never         Meds/Allergies     Current Outpatient Medications:   •  Actemra ACTPen 162 MG/0 9ML SOAJ, Once every 10 days, Disp: , Rfl:   •  albuterol (PROVENTIL HFA,VENTOLIN HFA) 90 mcg/act inhaler, as needed, Disp: , Rfl:   •  amoxicillin (AMOXIL) 500 mg capsule, For dental appt, Disp: , Rfl:   •  atorvastatin (LIPITOR) 10 mg tablet, Take 10 mg by mouth daily, Disp: , Rfl:   •  azithromycin (Zithromax) 250 mg tablet, Take 2 tablets (500 mg total) by mouth daily for 1 day, THEN 1 tablet (250 mg total) daily for 4 days  , Disp: 6 tablet, Rfl: 0  •  Calcium Carbonate-Vitamin D3 600-400 MG-UNIT TABS, Take by mouth, Disp: , Rfl:   •  CELECOXIB PO, Take by mouth 2 (two) times a day as needed, Disp: , Rfl:   •  Cholecalciferol 25 MCG (1000 UT) capsule, Take 1,000 Units by mouth daily, Disp: , Rfl:   •  denosumab (PROLIA) 60 mg/mL, Inject 60 mg under the skin every 6 (six) months, Disp: , Rfl:   •  Fluticasone-Salmeterol (Advair Diskus) 250-50 mcg/dose inhaler, Inhale 1 puff 2 (two) times a day Rinse mouth after use , Disp: 60 blister, Rfl: 0  •  losartan (COZAAR) 50 mg tablet, 50 mg daily, Disp: , Rfl:   •  Multiple Vitamin (MULTIVITAMIN ADULT PO), Take 1 tablet by mouth daily, Disp: , Rfl:   •  Sarilumab (Kevzara) 200 MG/1  14ML SOAJ, Inject 200 mg under the skin every 14 (fourteen) days, Disp: , Rfl:   •  omeprazole (PriLOSEC) 20 mg delayed release capsule, Take 2 capsules (40 mg total) by mouth daily, Disp: 90 capsule, Rfl: 3  Allergies   Allergen Reactions   • Fruit Extracts Itching     Cantelope melon peaches   • Nuts - Food Allergy GI Intolerance   • Avelox [Moxifloxacin] Itching         Review of Systems   Constitutional: Negative  HENT: Negative  Eyes: Negative  Wears glasses    Respiratory: Positive for cough  Has URI   Cardiovascular: Negative  Gastrointestinal: Negative  Endocrine: Negative  Genitourinary: Negative  Musculoskeletal: Positive for back pain  Skin: Negative  Allergic/Immunologic: Positive for environmental allergies  Neurological: Negative  Hematological: Negative  Psychiatric/Behavioral: Negative  OBJECTIVE:   /78 (BP Location: Left arm, Patient Position: Sitting, Cuff Size: Standard)   Pulse 80   Temp 97 8 °F (36 6 °C) (Temporal)   Resp 18   Ht 5' 2" (1 575 m)   Wt 73 kg (160 lb 14 4 oz)   SpO2 98%   BMI 29 43 kg/m²   Pain Assessment:  0  Karnofsky: 90 - Able to carry on normal activity; minor signs or symptoms of disease     Physical Exam  HENT:      Head: Normocephalic and atraumatic  Mouth/Throat:      Mouth: Mucous membranes are moist    Eyes:      General: No scleral icterus  Extraocular Movements: Extraocular movements intact  Cardiovascular:      Rate and Rhythm: Normal rate     Pulmonary:      Effort: Pulmonary effort is normal    Chest:      Comments: Performed in the supine and seated positions with chaperone present  There is mild volume loss of the left breast   Bilateral breasts without discrete nodules or masses  No palpable cervical, supraclavicular or axillary adenopathy  Abdominal:      General: Abdomen is flat  There is no distension  Musculoskeletal:      Cervical back: Normal range of motion  Lymphadenopathy:      Cervical: No cervical adenopathy  Skin:     General: Skin is warm and dry  Neurological:      General: No focal deficit present  Mental Status: She is alert  Psychiatric:         Mood and Affect: Mood normal                 RESULTS    Lab Results: No results found for this or any previous visit (from the past 672 hour(s))  Imaging Studies:No results found  Assessment/Plan:  No orders of the defined types were placed in this encounter  Donal Auguste is a 67 y o  female  with a history of stage IB [id number] grade 3 triple negative breast cancer status post breast conservation surgery, adjuvant chemotherapy and radiation (completed 5/10/21) who presents for routine follow up      She remains without significant treatment related toxicity  She is due for mammogram in 12/2023 and this will be ordered by her medical oncologist   She will return in 1 year  She was encouraged to call with questions or concerns in the interim  Pedro Luis Narvaez MD  4/17/6097,7:77 AM    Portions of the record may have been created with voice recognition software   Occasional wrong word or "sound a like" substitutions may have occurred due to the inherent limitations of voice recognition software   Read the chart carefully and recognize, using context, where substitutions have occurred

## 2023-03-10 NOTE — PROGRESS NOTES
Miguel Kaplan 1950 is a 67 y o  female  with a history of stage IB [id number] grade 3 triple negative left breast cancer status post breast conservation surgery, adjuvant chemotherapy and radiation (completed 5/10/21) who presents for routine follow up       22 - Mammo diagnostic bilateral w 3d & cad  FINDINGS:   Bilateral  There are no suspicious masses, grouped microcalcifications or areas of unexplained architectural distortion  Stable appearing post treatment changes on the left with mild uniform skin thickening  Scattered benign calcifications present bilaterally  IMPRESSION:   Stable exam  ASSESSMENT/BI-RADS CATEGORY:  Overall: 2 - Benign      22 Med Onc, Dr Karen Navarro in 2022 did not demonstrate any concerning abnormalities  Plan is to continue with self-breast exams and yearly mammograms  Follow-up in 8 months  23 Started PT for low back pain    (Breast surgeon Dr Radha Castro at [address] [place] [address] - no recent follow-up)      Upcomin23 Med Onc follow-up      Follow up visit     Oncology History   Invasive ductal carcinoma of left breast ([facility] )    Initial Diagnosis    Invasive ductal carcinoma of left breast ([facility] [address] )     2020 Surgery    Breast, Left, Left Breast Mass, Wire Localized Lumpectomy (1 slides [id number] [facility], collected 2020):  Dr Darshan Aburto, Surgeon    - Invasive breast carcinoma of no special type (ductal NST/invasive ductal carcinoma)  * Size: 15 mm   * [place] grade 3 of 3 (total score: 9 of 9)    -- tubule formation, score 3    -- nuclear grade, score 3    -- mitoses, score 3    * Breast tumor prognostic profile (per report)    -- ER: Negative 0%    -- [place]: Negative 0%    -- Her2: Negative 0   * Ductal carcinoma in situ (DCIS): Not identified  * Margins: Negative for carcinoma, less than 1 mm from the closest lateral margin     * Pathologic stage (AJCC 8th ed ): pT1c         2020 Surgery    Left breast re-excision lumpectomy with SLNB  ([address], [facility], Dr Lopez Hernandez)     A  Left breast lumpectomy:  - benign breast tissue with previous lumpectomy cavity showing marked tissue reaction including foreign body giant cell reaction, granulation tissue and fat necrosis  - No residual carcinoma seen  B  Left axillary lymph node:  - two negative lymph nodes  10/2020 - 3/15/2021 Chemotherapy    Adjuvant chemotherapy [facility])  AC (Adriamycin, Cytoxan) x 4 cycles  Paclitaxel x 12 cycles     Dr Mihai Hankins, [facility]      3/12/2021 -  Cancer Staged    Staging form: Breast, AJCC 8th Edition  - Pathologic: Stage IB (pT1c, pN0, cM0, G3, ER-, MS-, HER2-) - Signed by Jayla Werner MD on 3/12/2021  Multigene prognostic tests performed: None  Histologic grading system: 3 grade system       4/12/2021 - 5/10/2021 Radiation    4256 cGy in 16 fractions to the entire left breast followed by  additional 1000 cGy in 5 fractions to the left lumpectomy cavity  Jayla Werner MD     4/12/2021 - 5/10/2021 Radiation    BH L BreaEZ 6X 16 / 16 266 0 4,256 21   L Breast e 9E 5 / 5 200 0 1,000 6      Treatment Dates:  4/12/2021 - 5/10/2021  Review of Systems:  Review of Systems   Constitutional: Negative  HENT: Negative  Eyes: Negative  Wears glasses    Respiratory: Positive for cough  Has URI   Cardiovascular: Negative  Gastrointestinal: Negative  Endocrine: Negative  Genitourinary: Negative  Musculoskeletal: Positive for back pain  Skin: Negative  Allergic/Immunologic: Positive for environmental allergies  Neurological: Negative  Hematological: Negative  Psychiatric/Behavioral: Negative          Clinical Trial: no    Teaching no     Health Maintenance   Topic Date Due   • Pneumococcal Vaccine: 65+ Years (1 - PCV) Never done   • Influenza Vaccine (1) Never done   • BMI: Followup Plan  07/29/2023   • COVID-19 Vaccine (5 - Booster for [place] series) 06/07/2023 (Originally 7/27/2022)   • PT PLAN OF CARE  04/05/2023   • Urinary Incontinence Screening  07/29/2023   • Medicare Annual Wellness Visit (AWV)  07/29/2023   • Breast Cancer Screening: Mammogram  11/11/2023   • Fall Risk  03/06/2024   • Depression Screening  03/08/2024   • BMI: Adult  03/08/2024   • DXA SCAN  11/09/2026   • Colorectal Cancer Screening  07/09/2032   • Hepatitis C Screening  Completed   • Osteoporosis Screening  Completed   • HIB Vaccine  Aged Out   • IPV Vaccine  Aged Out   • Hepatitis A Vaccine  Aged Out   • Meningococcal ACWY Vaccine  Aged Out   • HPV Vaccine  Aged Out     Patient Active Problem List   Diagnosis   • Invasive ductal carcinoma of left breast ([facility] )   • Rheumatoid arthritis, involving unspecified site, unspecified whether rheumatoid factor present ([facility] )   • HTN (hypertension)   • Drug-induced polyneuropathy ([facility] )   • Diastolic CHF, acute ([facility] )   • Angina at rest [facility])   • Back pain with radiculopathy     Past Medical History:   Diagnosis Date   • Asthma    • Back pain with radiculopathy 3/1/2023   • BRCA1 negative    • BRCA2 negative    • Breast cancer ([facility] ) 2020    Left  breast, triple negative   • Colon polyp    • History of chemotherapy 10/01/2020    breast cancer - left   • History of radiation therapy 04/01/2021    left breast cancer   • Hypertension    • OA (osteoarthritis)    • Rheumatoid arthritis ([facility] )      Past Surgical History:   Procedure Laterality Date   • ANKLE SURGERY Right 2016    2 plates and screws   • BREAST BIOPSY Left 07/02/2020   • BREAST LUMPECTOMY Left 08/12/2020    left breast wire localized lumpectomy   • BREAST MASS EXCISION Left 09/02/2020    short superior and long lateral left breast resection, left axillary sentinel lymph node biopsy   • COLONOSCOPY     • ELBOW FRACTURE REPAIR Right 1996   • HYSTERECTOMY  2018   • REPLACEMENT TOTAL HIP W/  RESURFACING IMPLANTS  06/18/2021     Family History   Problem Relation Age of Onset   • Breast cancer Mother    • Lung cancer Father    • Thyroid cancer Son    • Breast cancer Paternal Grandmother      Social History     Socioeconomic History   • Marital status: /Civil Union     Spouse name: Not on file   • Number of children: Not on file   • Years of education: Not on file   • Highest education level: Not on file   Occupational History   • Not on file   Tobacco Use   • Smoking status: Never   • Smokeless tobacco: Never   Vaping Use   • Vaping Use: Never used   Substance and Sexual Activity   • Alcohol use: Not Currently     Comment: occasional   • Drug use: Never   • Sexual activity: Not on file   Other Topics Concern   • Not on file   Social History Narrative   • Not on file     Social Determinants of Health     Financial Resource Strain: Not on file   Food Insecurity: Not on file   Transportation Needs: Not on file   Physical Activity: Not on file   Stress: Not on file   Social Connections: Not on file   Intimate Partner Violence: Not on file   Housing Stability: Not on file       Current Outpatient Medications:   •  Actemra ACTPen 162 MG/0 9ML SOAJ, Once every 10 days, Disp: , Rfl:   •  albuterol (PROVENTIL HFA,VENTOLIN HFA) 90 mcg/act inhaler, as needed, Disp: , Rfl:   •  amoxicillin (AMOXIL) 500 mg capsule, For dental appt, Disp: , Rfl:   •  atorvastatin (LIPITOR) 10 mg tablet, Take 10 mg by mouth daily, Disp: , Rfl:   •  azithromycin (Zithromax) 250 mg tablet, Take 2 tablets (500 mg total) by mouth daily for 1 day, THEN 1 tablet (250 mg total) daily for 4 days  , Disp: 6 tablet, Rfl: 0  •  Calcium Carbonate-Vitamin D3 600-400 MG-UNIT TABS, Take by mouth, Disp: , Rfl:   •  CELECOXIB PO, Take by mouth 2 (two) times a day as needed, Disp: , Rfl:   •  Cholecalciferol 25 MCG (1000 UT) capsule, Take 1,000 Units by mouth daily, Disp: , Rfl:   •  denosumab (PROLIA) 60 mg/mL, Inject 60 mg under the skin every 6 (six) months, Disp: , Rfl:   •  Fluticasone-Salmeterol (Advair Diskus) 250-50 mcg/dose inhaler, Inhale 1 puff 2 (two) times a day Rinse mouth after use , Disp: 60 blister, Rfl: 0  •  losartan (COZAAR) 50 mg tablet, 50 mg daily, Disp: , Rfl:   •  Multiple Vitamin (MULTIVITAMIN ADULT PO), Take 1 tablet by mouth daily, Disp: , Rfl:   •  omeprazole (PriLOSEC) 20 mg delayed release capsule, Take 2 capsules (40 mg total) by mouth daily, Disp: 90 capsule, Rfl: 3  •  Sarilumab (Kevzara) 200 MG/1  14ML SOAJ, Inject 200 mg under the skin every 14 (fourteen) days, Disp: , Rfl:   Allergies   Allergen Reactions   • Fruit Extracts Itching     Cantelope melon peaches   • Nuts - Food Allergy GI Intolerance   • Avelox [Moxifloxacin] Itching     There were no vitals filed for this visit

## 2023-03-13 ENCOUNTER — OFFICE VISIT (OUTPATIENT)
Dept: PHYSICAL THERAPY | Facility: CLINIC | Age: 73
End: 2023-03-13

## 2023-03-13 DIAGNOSIS — M54.50 BILATERAL LOW BACK PAIN, UNSPECIFIED CHRONICITY, UNSPECIFIED WHETHER SCIATICA PRESENT: Primary | ICD-10-CM

## 2023-03-13 DIAGNOSIS — R20.0 NUMBNESS OF RIGHT LOWER EXTREMITY: ICD-10-CM

## 2023-03-13 NOTE — PROGRESS NOTES
Daily Note      Today's date: 3/13/2023  Patient name: Jose Luis Little  : 1950  MRN: [id number]  Referring provider: Byron Baker MD  Dx:   Encounter Diagnosis     ICD-10-CM    1  Bilateral low back pain, unspecified chronicity, unspecified whether sciatica present  M54 50       2  Numbness of right lower extremity  R20 0           Start Time: 0930  Stop Time: 1015  Total time in clinic (min): 45 minutes    Subjective: Pt reports that her R side hip is a bit stiff today  Felt good after last visit  Objective: requires cueing for proper form w/ YTB side step, corrects and is able to maintain  Assessment: Tolerated treatment well  Patient demonstrated fatigue post treatment and would benefit from continued PT  Does well w/ exercise progressions today, fatigue but no increase in pain by end  Does well w/ mobility work and targeted exercise  Will benefit from more aggressive challenges barring setback  Pt to continue 2x/week for next two weeks, possible two week check in thereafter  Plan: Continue per plan of care  hip hinge vs squat, gait progressions, single leg work if able      POC expires Auth Status Total   Visits  Start date  Expiration date PT/OT + Visit Limit?  Co-Insurance    no auth required  $10 co-pay     No                                           Dummy Auth Tracking  1 2 3 4 5 6   7 8 9 10 11 12   13 14 15 16 17 18   19 20 21 22 23 24   25 26 27 28 29 30   31 32 33 34 35 36         Precautions: hx of R ankle fracture , R CARLOS 2020, hx of breast cancer, hx of RA,       Date (Visit #) 3/13 (16)    (13)  3/1 (14) 3/6 (15) PN   Manual              DTM and TPR             Lumbar mobs            Lumbar traction                                           Exercise Diary              Ther Ex        Active w/u Recumbent pre 6 min lvl 4-5  NU step UE and LE pre lvl 3 x 8 mins  pre 10 min lvl 3-4 UE and LE 10 min pre lvl 3 UE and LE   pre 7 min lvl 4-5    HS/glute/piri/HF stretching B HS and glute/piri stretching w/ intermittent LAD x 10 min  B HS and glute/piri w/ intermittent LAD and sciatic nerve floss x 14 mins  B HS, glute/piri stretching x 5 mins     Man sciatic nerve flossing x 20   LAD on R x 2 min holds     B HS and glute/piri stretching x5 min LAD x2 min B     B HS and glute/piri stretching x 12   Mobility (LTR, open books, cat/cow) LTR 2x10-15 total   LTR x20  x20 total  2x20 total  LTR x20    Rows/ext        Hip stability Rev of forward flexion, knee bends   Forward bend w/ loaded sciatic nerve floss x10-15 B  L leg bias, toes in x10-12  rev    Rev of SGIS  2x10 reg    Standing lumbar ext sag x 10 only  x10 reg  x10 w/ total reg  rev     Runners stretch on wall x2-3 mins                Standing overhead lateral stretch x 2-3 mins, focus on L               Neuro Re Ed        TrA progressions  isos x 10, march 2x10  isos x10, w/ ad sq x10, w/ BKFO x20, w/ single leg ext x20 isos x10, single leg ext 2x10  isos x 10  isos and march x10-15    Bridge progressions Ad sq 2x10  2x10 w/ ad sq  2x10 w/ ad sq  Ad sq x20 Yellow tband 2x10    Squat progressions Sit<>stand yellow tband from airex 2x10  Reg x10, YTB around knees 2x10   x10 reg   Hip abd progressions         YTB hip abd 20'x6  SL burners x10 B 2x10 B, w/ mini squat 2x10  x20 reg       Standing YTB hip abd x10 B  YTB side step 20'x8 rev    HEP and POC review x2-3 mins   Rev  x2-3 mins w/ review of possible sources of pain x 2 mins   Rev of HEP and update x 2-3 mins  Rev of HEP, POC, functional assessment x 2-3 mins Rev and update x 5 mins         Wall slides for posture, green loop w/ lift off x10 each                   Ther Act             Stairs             Functional Transfers             Functional Lifting                                                                                                                                                           Modalities             heat             Ice              Mechanical Traction

## 2023-03-15 ENCOUNTER — APPOINTMENT (OUTPATIENT)
Dept: PHYSICAL THERAPY | Facility: CLINIC | Age: 73
End: 2023-03-15

## 2023-03-17 ENCOUNTER — OFFICE VISIT (OUTPATIENT)
Dept: PHYSICAL THERAPY | Facility: CLINIC | Age: 73
End: 2023-03-17

## 2023-03-17 DIAGNOSIS — R20.0 NUMBNESS OF RIGHT LOWER EXTREMITY: ICD-10-CM

## 2023-03-17 DIAGNOSIS — M54.50 BILATERAL LOW BACK PAIN, UNSPECIFIED CHRONICITY, UNSPECIFIED WHETHER SCIATICA PRESENT: Primary | ICD-10-CM

## 2023-03-17 NOTE — PROGRESS NOTES
Daily Note     Today's date: 3/17/2023  Patient name: Connie Katz  : 1950  MRN: [id number]  Referring provider: Bud Kulkarni MD  Dx:   Encounter Diagnosis     ICD-10-CM    1  Bilateral low back pain, unspecified chronicity, unspecified whether sciatica present  M54 50       2  Numbness of right lower extremity  R20 0                      Subjective: Pt reports some midline low back pain today, not too bad overall  Getting some numbness into feet but overall feels much improved  Objective: requires cueing w/ foot intrinsic work for full contraction, corrects and is able to maintain  Assessment: Tolerated treatment well  Patient demonstrated fatigue post treatment and would benefit from continued PT  Does well w/ progressions to tspine mobility work, addition of PF progressions and review of lumbar mobility work  Increase intensity as sx allow at next visit  Look at tspine mobility and foot intrinsic work  Plan: Continue per plan of care  foot intrinsics, standing progressions, tspine mobility      POC expires Auth Status Total   Visits  Start date  Expiration date PT/OT + Visit Limit?  Co-Insurance    no auth required  $10 co-pay     No                                           Dummy Auth Tracking  1 2 3 4 5 6   7 8 9 10 11 12   13 14 15 16 17 18   19 20 21 22 23 24   25 26 27 28 29 30   31 32 33 34 35 36         Precautions: hx of R ankle fracture , R CARLOS 2020, hx of breast cancer, hx of RA,       Date (Visit #) 3/13 (16)  3/17 (17)  3/1 (14) 3/6 (15) PN   Manual              DTM and TPR             Lumbar mobs            Lumbar traction                                           Exercise Diary              Ther Ex        Active w/u Recumbent pre 6 min lvl 4-5  NU step UE and LE pre lvl 3 x 10 mins  pre 10 min lvl 3-4 UE and LE 10 min pre lvl 3 UE and LE   pre 7 min lvl 4-5    HS/glute/piri/HF stretching B HS and glute/piri stretching w/ intermittent LAD x 10 min  B HS and glute/piri w/ intermittent LAD and sciatic nerve floss x 10 min B HS, glute/piri stretching x 5 mins     Man sciatic nerve flossing x 20   LAD on R x 2 min holds     B HS and glute/piri stretching x5 min LAD x2 min B     B HS and glute/piri stretching x 12   Mobility (LTR, open books, cat/cow) LTR 2x10-15 total   LTR x20  x20 total  2x20 total  LTR x20    Rows/ext        Hip stability Rev of forward flexion, knee bends  Rev  Forward bend w/ loaded sciatic nerve floss x10-15 B  L leg bias, toes in x10-12  rev    Rev of SGIS  Rev    FRIDA x2 mins    PPU 2x8, lumbar mobs grade III x2 mins x10 reg  x10 w/ total reg  rev             Seated tspine ext 2x10    Standing rot x15       Standing overhead lateral stretch x 2-3 mins, focus on L               Neuro Re Ed        TrA progressions  isos x 10, march 2x10  isos and march x20 isos x10, single leg ext 2x10  isos x 10  isos and march x10-15    Bridge progressions Ad sq 2x10  Ad sq x20 2x10 w/ ad sq  Ad sq x20 Yellow tband 2x10    Squat progressions Sit<>stand yellow tband from airex 2x10  Reg x10, YTB around knees 2x10   x10 reg   Hip abd progressions  Foot intrinsics x 20, w/ heel raise x20       YTB hip abd 20'x6 rev SL burners x10 B 2x10 B, w/ mini squat 2x10  x20 reg       Standing YTB hip abd x10 B  YTB side step 20'x8 rev    HEP and POC review x2-3 mins   Rev  x2-3 mins    Rev of HEP and update x 2-3 mins  Rev of HEP, POC, functional assessment x 2-3 mins Rev and update x 5 mins         Wall slides for posture, green loop w/ lift off x10 each                   Ther Act             Stairs             Functional Transfers             Functional Lifting                                                                                                                                                           Modalities             heat             Ice              Mechanical Traction

## 2023-03-20 ENCOUNTER — OFFICE VISIT (OUTPATIENT)
Dept: PHYSICAL THERAPY | Facility: CLINIC | Age: 73
End: 2023-03-20

## 2023-03-20 DIAGNOSIS — M54.50 BILATERAL LOW BACK PAIN, UNSPECIFIED CHRONICITY, UNSPECIFIED WHETHER SCIATICA PRESENT: Primary | ICD-10-CM

## 2023-03-20 DIAGNOSIS — R20.0 NUMBNESS OF RIGHT LOWER EXTREMITY: ICD-10-CM

## 2023-03-20 NOTE — PROGRESS NOTES
Daily Note     Today's date: 3/20/2023  Patient name: Frankey Cousin  : 1950  MRN: [id number]  Referring provider: Dea Lema MD  Dx:   Encounter Diagnosis     ICD-10-CM    1  Bilateral low back pain, unspecified chronicity, unspecified whether sciatica present  M54 50       2  Numbness of right lower extremity  R20 0                      Subjective: Pt reports that low back is bit sore today, not too bad  Has some soreness in R arm from press ups  Notes that she has torn RC and has previously fractured that arm  Notes that this likely contributes to discomfort  Objective: requires cueing for overhead side bending stretch, able to correct  Significant and quick fatigue w/ s/l hip abd work, able to perform through proper range but soreness persists post        Assessment: Tolerated treatment well  Patient demonstrated fatigue post treatment and would benefit from continued PT  Does well w/ exercise progressions today  Focused on deep hip stabilizer strength and continued mobility work  Good response to these, fatigue but no increase in pain by end  Pt will perform HEP over the course of the next week, we will review this program at her next visit and then she will practice this over a long duration before the following check in  Pt in agreement w/ plan  Plan: Continue per plan of care  review full HEP, what she is doing on own, tweak and finalize plan for next check in     POC expires Auth Status Total   Visits  Start date  Expiration date PT/OT + Visit Limit?  Co-Insurance    no auth required  $10 co-pay     No                                           Dummy Auth Tracking  1 2 3 4 5 6   7 8 9 10 11 12   13 14 15 16 17 18   19 20 21 22 23 24   25 26 27 28 29 30   31 32 33 34 35 36         Precautions: hx of R ankle fracture , R CARLOS 2020, hx of breast cancer, hx of RA,       Date (Visit #) 3/13 (16)  3/17 (17) 3/20 (18)  3/ (15) PN   Manual              DTM and TPR             Lumbar mobs Lumbar traction                                           Exercise Diary              Ther Ex        Active w/u Recumbent pre 6 min lvl 4-5  NU step UE and LE pre lvl 3 x 10 mins  pre 10 min lvl 4-5 UE and LE 10 min pre lvl 3 UE and LE   pre 7 min lvl 4-5    HS/glute/piri/HF stretching B HS and glute/piri stretching w/ intermittent LAD x 10 min  B HS and glute/piri w/ intermittent LAD and sciatic nerve floss x 10 min B HS, glute/piri stretching x 6 mins   LAD on R x 2 min holds     B HS and glute/piri stretching x5 min LAD x2 min B     B HS and glute/piri stretching x 12   Mobility (LTR, open books, cat/cow) LTR 2x10-15 total   LTR x20  x20 total  2x20 total  LTR x20    Rows/ext        Hip stability Rev of forward flexion, knee bends  Rev  rev L leg bias, toes in x10-12  rev    Rev of SGIS  Rev    FRIDA x2 mins    PPU 2x8, lumbar mobs grade III x2 mins PPU 2x8 - discussion on modifications x 2-3 mins x10 w/ total reg  rev      Self piri stretch rev x2 mins       Seated tspine ext 2x10    Standing rot x15 Rev    Standing overhead stretch w/ leg cross x4-5 mins      Standing overhead lateral stretch x 2-3 mins, focus on L  above             Neuro Re Ed        TrA progressions  isos x 10, march 2x10  isos and march x20 isos x10, single leg ext 2x10  isos x 10  isos and march x10-15    Bridge progressions Ad sq 2x10  Ad sq x20 2x10 w/ ad sq  Ad sq x20 Yellow tband 2x10    Squat progressions Sit<>stand yellow tband from airex 2x10  S/l hip abd, 3 sec ecc x20   x10 reg   Hip abd progressions  Foot intrinsics x 20, w/ heel raise x20 Rev for home       YTB hip abd 20'x6 rev rev 2x10 B, w/ mini squat 2x10  x20 reg        YTB side step 20'x8 rev    HEP and POC review x2-3 mins   Rev  x2-3 mins    Rev of HEP and update x 2-3 mins  Rev of HEP, POC, functional assessment x 2-3 mins Rev and update x 5 mins         Wall slides for posture, green loop w/ lift off x10 each                   Ther Act             Stairs Functional Transfers             Functional Lifting                                                                                                                                                           Modalities             heat             Ice              Mechanical Traction

## 2023-03-24 ENCOUNTER — OFFICE VISIT (OUTPATIENT)
Dept: PHYSICAL THERAPY | Facility: CLINIC | Age: 73
End: 2023-03-24

## 2023-03-24 DIAGNOSIS — M54.50 BILATERAL LOW BACK PAIN, UNSPECIFIED CHRONICITY, UNSPECIFIED WHETHER SCIATICA PRESENT: Primary | ICD-10-CM

## 2023-03-24 DIAGNOSIS — R20.0 NUMBNESS OF RIGHT LOWER EXTREMITY: ICD-10-CM

## 2023-03-24 NOTE — PROGRESS NOTES
Daily Note     Today's date: 3/24/2023  Patient name: Mili Dawn  : 1950  MRN: [id number]  Referring provider: Miesha Layton MD  Dx:   Encounter Diagnosis     ICD-10-CM    1  Bilateral low back pain, unspecified chronicity, unspecified whether sciatica present  M54 50       2  Numbness of right lower extremity  R20 0                      Subjective: Pt reports that she feels good  Felt good after last visit, feels exercises are helping  Encouraged by progress  Objective: safe to perform all of the below exercises  Assessment: Tolerated treatment well  Patient demonstrated fatigue post treatment and would benefit from continued PT  Excellent form and tolerance to progressions today  Will increase and adjust intensity of exercise program at next visit, pt to continue w/ exercises on own until then  Will call before then should status change  Pt in agreement w/ plan  Plan: Continue per plan of care  check in w/ progress review      POC expires Auth Status Total   Visits  Start date  Expiration date PT/OT + Visit Limit?  Co-Insurance    no auth required  $10 co-pay     No                                           Dummy Auth Tracking  1 2 3 4 5 6   7 8 9 10 11 12   13 14 15 16 17 18   19 20 21 22 23 24   25 26 27 28 29 30   31 32 33 34 35 36         Precautions: hx of R ankle fracture , R CARLOS 2020, hx of breast cancer, hx of RA,       Date (Visit #) 3/13 (16)  3/17 (17) 3/20 (18) 3/23 (19)     Manual              DTM and TPR             Lumbar mobs            Lumbar traction                                           Exercise Diary              Ther Ex        Active w/u Recumbent pre 6 min lvl 4-5  NU step UE and LE pre lvl 3 x 10 mins  pre 10 min lvl 4-5 UE and LE 10 min pre lvl 3 UE and LE   pre 7 min lvl 4-5    HS/glute/piri/HF stretching B HS and glute/piri stretching w/ intermittent LAD x 10 min  B HS and glute/piri w/ intermittent LAD and sciatic nerve floss x 10 min B HS, glute/piri stretching x 6 mins   LAD on R x 2 min holds     B HS and glute/piri stretching x6 min LAD x2 min B     B HS and glute/piri stretching x 12   Mobility (LTR, open books, cat/cow) LTR 2x10-15 total   LTR x20  x20 total  2x20 total  LTR x20    Rows/ext        Hip stability Rev of forward flexion, knee bends  Rev  rev rev rev    Rev of SGIS  Rev    FRIDA x2 mins    PPU 2x8, lumbar mobs grade III x2 mins PPU 2x8 - discussion on modifications x 2-3 mins 2x8 PPU  rev      Self piri stretch rev x2 mins       Seated tspine ext 2x10    Standing rot x15 Rev    Standing overhead stretch w/ leg cross x4-5 mins      Standing overhead lateral stretch x 2-3 mins, focus on L  above rev            Neuro Re Ed        TrA progressions  isos x 10, march 2x10  isos and march x20 isos x10, single leg ext 2x10  isos x 10  isos and march x10-15    Bridge progressions Ad sq 2x10  Ad sq x20 2x10 w/ ad sq  Ad sq x20 Yellow tband 2x10    Squat progressions Sit<>stand yellow tband from airex 2x10  S/l hip abd, 3 sec ecc x20  Same w/ progressions x 5 min    Clams rev  x10 reg   Hip abd progressions  Foot intrinsics x 20, w/ heel raise x20 Rev for home       YTB hip abd 20'x6 rev rev  x20 reg         rev    HEP and POC review x2-3 mins   Rev  x2-3 mins    Rev of HEP and update x 2-3 mins  Rev and update x 5 mins  Rev and update x 5 mins        B ER w/ scap sq x20 GTB horiz abd x 20  Wall slides for posture, green loop w/ lift off x10 each                   Ther Act             Stairs             Functional Transfers             Functional Lifting                                                                                                                                                           Modalities             heat             Ice              Mechanical Traction

## 2023-03-27 ENCOUNTER — PROCEDURE VISIT (OUTPATIENT)
Dept: NEUROLOGY | Facility: CLINIC | Age: 73
End: 2023-03-27

## 2023-03-27 DIAGNOSIS — M54.50 LOWER BACK PAIN: ICD-10-CM

## 2023-03-27 DIAGNOSIS — R20.0 NUMBNESS OF RIGHT LOWER EXTREMITY: ICD-10-CM

## 2023-03-27 NOTE — PROGRESS NOTES
EMG 2 limb lower extremity     Date/Time 3/27/2023 10:39 AM     Performed by  Dolores Ariza MD     Authorized by Desiree Guerin MD            EMG bilateral lower extremity completed today

## 2023-04-03 ENCOUNTER — EVALUATION (OUTPATIENT)
Dept: PHYSICAL THERAPY | Facility: CLINIC | Age: 73
End: 2023-04-03

## 2023-04-03 DIAGNOSIS — R20.0 NUMBNESS OF RIGHT LOWER EXTREMITY: ICD-10-CM

## 2023-04-03 DIAGNOSIS — M54.50 BILATERAL LOW BACK PAIN, UNSPECIFIED CHRONICITY, UNSPECIFIED WHETHER SCIATICA PRESENT: Primary | ICD-10-CM

## 2023-04-03 NOTE — PROGRESS NOTES
D/C Note     Today's date: 4/3/2023  Patient name: Oleg Luis  : 1950  MRN: [id number]  Referring provider: Yoselyn Auguste MD  Dx:   Encounter Diagnosis     ICD-10-CM    1  Bilateral low back pain, unspecified chronicity, unspecified whether sciatica present  M54 50       2  Numbness of right lower extremity  R20 0                      Subjective: Pt reports 95% improvement since starting skilled PT  Reports good days and bad days since last visit, but mostly good  Notes that her strength is improving, pain feels more manageable, encouraged w/ progress  L hip bothers her, thinks that this contributes to low back discomfort  Notes that she feels she can manage remaining sx on own, comfortable making today her last session  Functional status below:       Goals  Short term goals (3 weeks):  1) Pt will improve thoracolumbar mobility deficits by 25% pain free  - achieved   2) Pt will improve B LE and core strength deficits by 1/3 grade MMT  - progressed/achieved   3) Pt will improve pain at worse to <4/10  - progressed   4) Pt will initiate and progress HEP w/ special emphasis on functional core control and thoracolumbar mobility  - achieved     Long term goals (6 weeks)  1) Pt will improve FOTO to at least 57  - achieved   2) Pt will sleep through the night in positioning of choosing 6/7 nights a week w/o waking due to pain  - progressed   3) Pt will stand and ambulate for community distances w/ normalized gait mechanics and pain <3/10 throughout  - progressed   4) Pt will be independent and compliant w/ HEP in order to maximize functional benefit of skilled PT following d/c  - progressed     New Goals 23:  Short term goals (2 weeks): ALL PROGRESSED   1) Pt will further improve thoracolumbar mobility deficits by 25% pain free  2) Pt will further improve B LE and core strength deficits by 1/3 grade MMT  3) Pt will improve pain at worse to <4/10     4) Pt will initiate and progress HEP w/ special emphasis on functional core control and thoracolumbar mobility  Long term goals (4 weeks) ALL PROGRESSED   1) Pt will improve FOTO to at least 75   2) Pt will sleep through the night in positioning of choosing 6/7 nights a week w/o waking due to pain  3) Pt will stand and ambulate for community distances w/ normalized gait mechanics and pain <3/10 throughout  4) Pt will be independent and compliant w/ HEP in order to maximize functional benefit of skilled PT following d/c      *goals to be extended by 2 and 4 weeks, respectively     ALL GOALS SIGNIFICANTLY PROGRESSED OR ACHIEVED AT TIME OF D/C     Pain  Current pain ratin-3  At best pain ratin  At worst pain ratin-5   Location: midline low back, along L posterior hip to L lateral thigh to lateral calf, L foot  Quality: dull ache and radiating  Relieving factors: rest, change in position and medications  Aggravating factors: walking, standing, lifting, overhead activity, running and stair climbing  Progression: improved since IE     Social Support  Steps to enter house: yes  Stairs in house: yes   Lives in: multiple-level home (bedroom on first floor )  Lives with: spouse (daughter is a couple blocks away)    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         Objective     Concurrent Complaints  Negative for bladder dysfunction, bowel dysfunction and saddle (S4) numbness    Postural Observations  Seated posture: poor  Standing posture: poor - progressing to fair    Correction of posture: makes symptoms better        Palpation     Additional Palpation Details  Pt is TTP and has increased tissue density through distal thoracolumbar PS and QL/piri    -23: similar to lesser extent    -3/6/23: no significant today    -4/3/23: no significant today    Neurological Testing     Sensation     Lumbar   Left   Intact: light touch    Right   Intact: light touch    Active Range of Motion     Additional Active Range of Motion Details  Flex: 75-80%  Ext: 60-75%  SB R: 60-75%  SB L: 60-75%  Rot R: 60-75%  Rot L: 60-75%    *indicates pain    Guarded throughout     4/3/23: grossly 75% throughout w/o pain     Strength/Myotome Testing     Additional Strength Details  LE Strength (R/L)    Hip  Flex 4/5 , 4/5  Ext 4/5 , 4/5  Abd 4/5 , 4/5  Add 4/5 , 4/5    Knee   Flex 4/5 , 4/5  Ext 4/5 , 4/5    Core Strength: 2+/5     *Indicates pain    Bridging: half range w/ cueing needed for glute drive    -0/1/68: full range w/o pain when glutes activated properly    -3/6/23: full range w/o pain    -4/3/23: full range, no pain     Tests     Additional Tests Details  Manual lumbar traction: good relief both single and double    -2/9/23: Continued    -3/6/23: continued    -4/3/23: good relief B     Thoracolumbar joint mobility: grossly hypo w/ min pain on L4/5     -2/9/23: grossly hypo w/o pain    -3/6/23: not tested today    -4/3/23: not tested    Functional Testing:   Sit<>stand: UE support on LE w/ fair glute drive    -7/2/54:  Can perform through full range, from reg chair, w/o pain through limited reps    -3/6/23: requires min momentum but improved glute drive, no pain    -1/3/65: no deficit     BW squat: not past 25% before compensation    -2/9/23: at least 50% before compensation    -3/6/23: at least 50% w/ good glute drive    -6/8/01: at least 60% w/ good glute drive     Stairs: TBA     -2/9/23: has not been point of emphasis thus far     -3/6/23: not tested   -4/3/23: not tested     SLS: unable w/o UE support B    -2/9/23: at least 5-6 sec w/ min UE support B   -3/6/23: similar    -4/3/23: not tested     Ambulation     Ambulation: Level Surfaces     Additional Level Surfaces Ambulation Details  Decreased step length and lumbar rotation, min decreased pace, not specifically antalgic    -2/9/23: not antalgic, improved upright posture throughout, min forward tspine flexion, improving step length and speed    -3/6/23: min decreased step length on R LE, improves w/ cueing for scap stability and posture    -4/3/23: min decreased step length on R LE, not antalgic in clinic, good posture, improved pacing overall            Assessment: Tolerated treatment well  Patient demonstrated fatigue post treatment and would benefit from continued PT  Pt has made excellent progress in her time here, has achieved most of the goals set at IE and has excellent understanding of HEP  She is appropriate for d/c to home program, which was reviewed today  She will call or email w/ any future issues  She has been a pleasure to work with and is welcome back any time  Plan: D/C to HEP     POC expires Auth Status Total   Visits  Start date  Expiration date PT/OT + Visit Limit?  Co-Insurance    no auth required  $10 co-pay     No                                           Dummy Auth Tracking  1 2 3 4 5 6   7 8 9 10 11 12   13 14 15 16 17 18   19 20 21 22 23 24   25 26 27 28 29 30   31 32 33 34 35 36         Precautions: hx of R ankle fracture 2016, R CARLOS June 2020, hx of breast cancer, hx of RA,       Date (Visit #) 3/13 (16)  3/17 (17) 3/20 (18) 3/23 (19)  4/3 (20) D/C   Manual              DTM and TPR             Lumbar mobs            Lumbar traction                                           Exercise Diary              Ther Ex        Active w/u Recumbent pre 6 min lvl 4-5  NU step UE and LE pre lvl 3 x 10 mins  pre 10 min lvl 4-5 UE and LE 10 min pre lvl 3 UE and LE   pre 7 min lvl 4-5    HS/glute/piri/HF stretching B HS and glute/piri stretching w/ intermittent LAD x 10 min  B HS and glute/piri w/ intermittent LAD and sciatic nerve floss x 10 min B HS, glute/piri stretching x 6 mins   LAD on R x 2 min holds     B HS and glute/piri stretching x6 min LAD x2 min B     B HS and glute/piri stretching x 8 Mobility (LTR, open books, cat/cow) LTR 2x10-15 total   LTR x20  x20 total  2x20 total  LTR x20    Rows/ext        Hip stability Rev of forward flexion, knee bends  Rev  rev rev rev    Rev of SGIS  Rev    FRIDA x2 mins    PPU 2x8, lumbar mobs grade III x2 mins PPU 2x8 - discussion on modifications x 2-3 mins 2x8 PPU  x6-8 total      Self piri stretch rev x2 mins  rev     Seated tspine ext 2x10    Standing rot x15 Rev    Standing overhead stretch w/ leg cross x4-5 mins      Standing overhead lateral stretch x 2-3 mins, focus on L  above rev Rev x 2-3 mins            Neuro Re Ed        TrA progressions  isos x 10, march 2x10  isos and march x20 isos x10, single leg ext 2x10  isos x 10  isos and march x10-15    Bridge progressions Ad sq 2x10  Ad sq x20 2x10 w/ ad sq  Ad sq x20 Ad sq x20    Squat progressions Sit<>stand yellow tband from airex 2x10  S/l hip abd, 3 sec ecc x20  Same w/ progressions x 5 min    Clams rev  Rev of s/l hip abd, clams YTB, s/l hip abd circles x10-15 each B    Hip abd progressions  Foot intrinsics x 20, w/ heel raise x20 Rev for home       YTB hip abd 20'x6 rev rev  rev            HEP and POC review x2-3 mins   Rev  x2-3 mins    Rev of HEP and update x 2-3 mins  Rev and update x 5 mins  Final HEP rev and update x 3-4 mins        B ER w/ scap sq x20 GTB horiz abd x 20          S/l open books, standing open books, tspine ex w/ slight rot x 5 mins           Ther Act             Stairs             Functional Transfers             Functional Lifting                                                                                                                                                           Modalities             heat             Ice              Mechanical Traction

## 2023-05-09 ENCOUNTER — TELEPHONE (OUTPATIENT)
Dept: NEUROLOGY | Facility: CLINIC | Age: 73
End: 2023-05-09

## 2023-05-19 ENCOUNTER — TELEPHONE (OUTPATIENT)
Dept: FAMILY MEDICINE CLINIC | Facility: CLINIC | Age: 73
End: 2023-05-19

## 2023-05-19 NOTE — TELEPHONE ENCOUNTER
Pt got a call from scheduling she doesn't understand the reason for CT  Please call to let her know reason

## 2023-05-22 NOTE — TELEPHONE ENCOUNTER
Patient was told to not do so much radiation because of the chemo and radiation that she already had   Do you still recommend?   Natalie Hunt

## 2023-05-22 NOTE — TELEPHONE ENCOUNTER
She had a CT abdomen done by her gastroenterologist in June 2022 which showed a nodule on her adrenal gland, it did look benign at the time, but they said to repeat CT scan in 1 year to make sure it is stable

## 2023-05-22 NOTE — TELEPHONE ENCOUNTER
If she wants to wait a few more months prior to doing this she can, but I wouldn't wait more than 6 months

## 2023-05-24 ENCOUNTER — OFFICE VISIT (OUTPATIENT)
Dept: NEUROLOGY | Facility: CLINIC | Age: 73
End: 2023-05-24

## 2023-05-24 VITALS
TEMPERATURE: 96.9 F | DIASTOLIC BLOOD PRESSURE: 64 MMHG | SYSTOLIC BLOOD PRESSURE: 127 MMHG | OXYGEN SATURATION: 98 % | WEIGHT: 163 LBS | HEART RATE: 67 BPM | HEIGHT: 62 IN | BODY MASS INDEX: 30 KG/M2

## 2023-05-24 DIAGNOSIS — M48.061 LUMBAR SPINAL STENOSIS: Primary | ICD-10-CM

## 2023-05-24 DIAGNOSIS — E78.5 HLD (HYPERLIPIDEMIA): ICD-10-CM

## 2023-05-24 DIAGNOSIS — G89.29 CHRONIC LOWER BACK PAIN: ICD-10-CM

## 2023-05-24 DIAGNOSIS — M54.50 CHRONIC LOWER BACK PAIN: ICD-10-CM

## 2023-05-24 DIAGNOSIS — M71.38 SYNOVIAL CYST OF LUMBAR SPINE: ICD-10-CM

## 2023-05-24 RX ORDER — SODIUM FLUORIDE 6 MG/ML
PASTE, DENTIFRICE DENTAL
COMMUNITY
Start: 2023-05-01

## 2023-05-24 RX ORDER — SARILUMAB 150 MG/1.14ML
150 INJECTION, SOLUTION SUBCUTANEOUS
COMMUNITY

## 2023-05-24 RX ORDER — GABAPENTIN 300 MG/1
300 CAPSULE ORAL ONCE AS NEEDED
COMMUNITY

## 2023-05-24 NOTE — PROGRESS NOTES
Return NeuroOutpatient Note        James Tavera  [id number]  29 y o   1950        Drug-induced polyneuropathy ; Radiculopathy, lumbar region;  Numbness of right lower extremity; and Back Pain        History obtained from:  Patient     HPI/Subjective: James Tavera is a 68 yo F with PMH of lower back pain, paresthesia presents as f/u  Per my previous history, she's had numbness at her finger tips from RA  She then had chemo taxel between 2020 and 2021 which did increase her numbness and tingling  Since August of 2022, patient has started getting lower back pain after a trip that required an 8 hour drive to [place]  She had reported left lower back pain, into gluteal region, down posterior thigh and leg and into toes  She tried chiropractor sessions which have helped  We had ordered MRI LS spine which had revealed multilevel spondylotic changes of the lumbar spine including multifactorial severe L4-5 central canal encroachment; an intracanalicular synovial cyst identified at the L3-4 level resulting in moderate central canal encroachment at this level  We had referred her to neurosurgery and she did see PA of Dr Latasha Malik  Since patient's pain was doing better with PT, they recommended continuing and trying pain management if needed and if still symptomatic then consider surgery  After PT, she has felt 85% better  All of her neuropathy labs were wnl  She has script for gabapentin 300mg but she doesn't use it much  Patient is much better but still gets lower back pain that radiates to gluteal region  On right side, she has hip and ankle chronic problems from prior surgery       Her LDL is 164 and was recently placed on lipitor 10mg  She believes it was from Actemra  She gets morning stiffness in all joints from RA        Past Medical History:   Diagnosis Date   • Asthma    • Back pain with radiculopathy 3/1/2023   • BRCA1 negative    • BRCA2 negative    • Breast cancer ([facility] [address] ) 2020    Left  breast, triple negative   • Colon polyp    • History of chemotherapy 10/01/2020    breast cancer - left   • History of radiation therapy 04/01/2021    left breast cancer   • Hypertension    • OA (osteoarthritis)    • Rheumatoid arthritis ([facility] [address] )      Social History     Socioeconomic History   • Marital status: /Civil Union     Spouse name: Not on file   • Number of children: Not on file   • Years of education: Not on file   • Highest education level: Not on file   Occupational History   • Not on file   Tobacco Use   • Smoking status: Never   • Smokeless tobacco: Never   Vaping Use   • Vaping Use: Never used   Substance and Sexual Activity   • Alcohol use: Not Currently     Comment: occasional   • Drug use: Never   • Sexual activity: Not on file   Other Topics Concern   • Not on file   Social History Narrative   • Not on file     Social Determinants of Health     Financial Resource Strain: Not on file   Food Insecurity: Not on file   Transportation Needs: Not on file   Physical Activity: Not on file   Stress: Not on file   Social Connections: Not on file   Intimate Partner Violence: Not on file   Housing Stability: Not on file     Family History   Problem Relation Age of Onset   • Breast cancer Mother    • Lung cancer Father    • Thyroid cancer Son    • Breast cancer Paternal Grandmother      Allergies   Allergen Reactions   • Fruit Extracts Itching     Cantelope melon peaches   • Nuts - Food Allergy GI Intolerance   • Pollen Extract Other (See Comments)     Pollen , Trees And Flowers   • Avelox [Moxifloxacin] Itching     Current Outpatient Medications on File Prior to Visit   Medication Sig Dispense Refill   • albuterol (PROVENTIL HFA,VENTOLIN HFA) 90 mcg/act inhaler as needed     • amoxicillin (AMOXIL) 500 mg capsule For dental appt     • atorvastatin (LIPITOR) 10 mg tablet Take 10 mg by mouth daily     • Calcium Carbonate-Vitamin D3 600-400 MG-UNIT TABS Take by mouth     • CELECOXIB PO Take by mouth 2 (two) times a "day as needed     • Cholecalciferol 25 MCG (1000 UT) capsule Take 1,000 Units by mouth daily     • denosumab (PROLIA) 60 mg/mL Inject 60 mg under the skin every 6 (six) months     • Fluticasone-Salmeterol (Advair Diskus) 250-50 mcg/dose inhaler Inhale 1 puff 2 (two) times a day Rinse mouth after use  (Patient taking differently: Inhale 1 puff 2 (two) times a day as needed Rinse mouth after use ) 60 blister 0   • gabapentin (NEURONTIN) 300 mg capsule Take 300 mg by mouth once as needed (pain)     • losartan (COZAAR) 50 mg tablet 50 mg daily     • Multiple Vitamin (MULTIVITAMIN ADULT PO) Take 1 tablet by mouth daily     • omeprazole (PriLOSEC) 20 mg delayed release capsule TAKE TWO CAPSULES BY MOUTH EVERY DAY IN THE MORNING (GENERIC FOR PRILOSEC) 90 capsule 3   • PreviDent 5000 Booster Plus 1 1 % PSTE      • Sarilumab (Kevzara) 150 MG/1  14ML SOAJ Inject 150 mL under the skin Once every 2 weeks     • Actemra ACTPen 162 MG/0 9ML SOAJ Once every 10 days     • [DISCONTINUED] Na Sulfate-K Sulfate-Mg Sulf (Suprep Bowel Prep Kit) 17 5-3 13-1 6 GM/177ML SOLN Take 177 mL by mouth once for 1 dose Take 177 mL by mouth once for 1 dose (Patient not taking: Reported on 6/1/2022) 177 mL 0     No current facility-administered medications on file prior to visit  Review of Systems   Refer to positive review of systems in HPI     Review of Systems    Constitutional- No fever  Eyes- No visual change  ENT- Hearing normal  CV- No chest pain  Resp- No Shortness of breath  GI- No diarrhea  - Bladder normal  MS- No Arthritis   Skin- No rash  Psych- No depression  Endo- No DM  Heme- No nodes    Vitals:    05/24/23 0954   BP: 127/64   BP Location: Left arm   Patient Position: Sitting   Cuff Size: Standard   Pulse: 67   Temp: (!) 96 9 °F (36 1 °C)   TempSrc: Tympanic   SpO2: 98%   Weight: 73 9 kg (163 lb)   Height: 5' 2\" (1 575 m)       PHYSICAL EXAM:  Appearance: No Acute Distress  Ophthalmoscopic: Disc Flat, Normal fundus  Mental " status:  Orientation: Awake, Alert, and Orientedx3  Memory: Registation 3/3 Recall 3/3  Attention: normal  Knowledge: good  Language: No aphasia  Speech: No dysarthria  Cranial Nerves:  2 No Visual Defect on Confrontation, Pupils round, equal, reactive to light  3,4,6 Extraocular Movements Intact, no nystagmus  5 Facial Sensation Intact  7 No facial asymmetry  8 Intact hearing  9,10 Palate symmetric, normal gag  11 Good shoulder shrug  12 Tongue Midline  Gait: Stable  Coordination: No ataxia with finger to nose testing, and heel to shin  Sensory: Intact, Symmetric to pinprick, light touch, vibration, and joint position  Muscle Tone: Normal              Muscle exam:  Arm Right Left Leg Right Left   Deltoid 5/5 5/5 Iliopsoas 5/5 5/5   Biceps 5/5 5/5 Quads 5/5 5/5   Triceps 5/5 5/5 Hamstrings 5/5 5/5   Wrist Extension 5/5 5/5 Ankle Dorsi Flexion 5/5 5/5   Wrist Flexion 5/5 5/5 Ankle Plantar Flexion 5/5 5/5   Interossei 5/5 5/5 Ankle Eversion 5/5 5/5   APB 5/5 5/5 Ankle Inversion 5/5 5/5       Reflexes   RJ BJ TJ KJ AJ Plantars Lyons's   Right 2+ 2+ 2+ 1+ 0 Downgoing Not present   Left 2+ 2+ 2+ 2+ 0 Downgoing Not present     Personal review of  Labs:                  Diagnoses and all orders for this visit:        1  Lumbar spinal stenosis  Ambulatory Referral to Physical Therapy      2  HLD (hyperlipidemia)  Lipid panel      3  Chronic lower back pain  Ambulatory Referral to Physical Therapy      4  Synovial cyst of lumbar spine              Patient is doing better since PT  Would like her to continue longer  If symptoms get worse in future, will refer her to pain management  She may use gabapentin 300mg as needed  She needs recheck on her lipid panel and LDL should be <100               Total time of encounter:  30 min  More than 50% of the time was used in counseling and/or coordination of care  Extent of counseling and/or coordination of care        MD [facility]  [id number] [facility]  [address]ela [address]  [phone]

## 2023-05-26 ENCOUNTER — TELEPHONE (OUTPATIENT)
Dept: FAMILY MEDICINE CLINIC | Facility: CLINIC | Age: 73
End: 2023-05-26

## 2023-05-26 DIAGNOSIS — Z78.0 POST-MENOPAUSAL: Primary | ICD-10-CM

## 2023-05-26 DIAGNOSIS — E27.8 ADRENAL NODULE (HCC): ICD-10-CM

## 2023-05-26 NOTE — TELEPHONE ENCOUNTER
Patient called and stated that the CT scan of the Abdomen and Pelvis was denied  If you want her to have it done, it will need to be appealed  Please call the patient back with next steps

## 2023-06-09 DIAGNOSIS — E27.8 ADRENAL NODULE (HCC): Primary | ICD-10-CM

## 2023-06-09 NOTE — TELEPHONE ENCOUNTER
I did a peer to peer today  The doctor agreed to a CT abdomen wo contrast  She said she will change the order on her end and let me know when it is approved

## 2023-06-13 ENCOUNTER — HOSPITAL ENCOUNTER (OUTPATIENT)
Dept: RADIOLOGY | Facility: HOSPITAL | Age: 73
Discharge: HOME | End: 2023-06-13
Attending: FAMILY MEDICINE
Payer: COMMERCIAL

## 2023-06-13 DIAGNOSIS — E27.8 ADRENAL NODULE (HCC): ICD-10-CM

## 2023-06-13 PROCEDURE — G1004 CDSM NDSC: HCPCS

## 2023-06-13 PROCEDURE — 74150 CT ABDOMEN W/O CONTRAST: CPT

## 2023-07-07 ENCOUNTER — DOCUMENTATION (OUTPATIENT)
Dept: HEMATOLOGY ONCOLOGY | Facility: MEDICAL CENTER | Age: 73
End: 2023-07-07

## 2023-07-07 NOTE — PROGRESS NOTES
Left voicemail for patient that her follow up with Dr. Freddy Soto on Wed. 8/9/23 at 2pm has been rescheduled to [facility]. 10/2/23 at 8:20am.  I asked Agustin Jonas to call back to confirm.

## 2023-07-26 ENCOUNTER — OFFICE VISIT (OUTPATIENT)
Dept: FAMILY MEDICINE CLINIC | Facility: CLINIC | Age: 73
End: 2023-07-26
Payer: COMMERCIAL

## 2023-07-26 ENCOUNTER — TELEPHONE (OUTPATIENT)
Dept: HEMATOLOGY ONCOLOGY | Facility: CLINIC | Age: 73
End: 2023-07-26

## 2023-07-26 VITALS
WEIGHT: 163 LBS | HEART RATE: 67 BPM | BODY MASS INDEX: 30 KG/M2 | TEMPERATURE: 97.2 F | DIASTOLIC BLOOD PRESSURE: 60 MMHG | HEIGHT: 62 IN | OXYGEN SATURATION: 98 % | RESPIRATION RATE: 18 BRPM | SYSTOLIC BLOOD PRESSURE: 130 MMHG

## 2023-07-26 DIAGNOSIS — D69.6 THROMBOCYTOPENIA (HCC): ICD-10-CM

## 2023-07-26 DIAGNOSIS — M06.9 RHEUMATOID ARTHRITIS, INVOLVING UNSPECIFIED SITE, UNSPECIFIED WHETHER RHEUMATOID FACTOR PRESENT (HCC): ICD-10-CM

## 2023-07-26 DIAGNOSIS — R60.0 BILATERAL LOWER EXTREMITY EDEMA: Primary | ICD-10-CM

## 2023-07-26 DIAGNOSIS — R10.9 ABDOMINAL PAIN, UNSPECIFIED ABDOMINAL LOCATION: ICD-10-CM

## 2023-07-26 DIAGNOSIS — D72.819 LEUKOPENIA, UNSPECIFIED TYPE: ICD-10-CM

## 2023-07-26 LAB
CHOLEST SERPL-MCNC: 94 MG/DL (ref 100–199)
HDLC SERPL-MCNC: 12 MG/DL
LDLC SERPL CALC-MCNC: 36 MG/DL (ref 0–99)
SL AMB VLDL CHOLESTEROL CALC: 46 MG/DL (ref 5–40)
TRIGL SERPL-MCNC: 305 MG/DL (ref 0–149)

## 2023-07-26 PROCEDURE — 99214 OFFICE O/P EST MOD 30 MIN: CPT | Performed by: FAMILY MEDICINE

## 2023-07-26 NOTE — ASSESSMENT & PLAN NOTE
Likely related to constipation given that she took Miralax and pain began to improve. She was recently traveling to [facility] and her diet was different while on vacation. Counseled on continuing Miralax and high fiber diet. If symptoms persist, I will get imaging. She has no diarrhea, nausea, vomiting, blood in the stool.

## 2023-07-26 NOTE — ASSESSMENT & PLAN NOTE
Managed by rheumatologist in [facility]. Was started on Kevzara 6 months ago, but feels like it hasn't been helping much.

## 2023-07-26 NOTE — PROGRESS NOTES
Name: Alessia Barrios      : 1950      MRN: [id number]  Encounter Provider: Tapan Easton MD  Encounter Date: 2023   Encounter department: [address]     1. Bilateral lower extremity edema  Comments:  Likely related to recent travel and less likely DVT. Counseled on compression socks and leg elevation. If she develops pain or swelling worsens, can get US. Orders:  -     VAS lower limb venous duplex study, complete bilateral; Future; Expected date: 2023    2. Thrombocytopenia ([address])  Assessment & Plan:  Platelets low on blood work done yesterday by her rheumatologist at 39 which is new for her. Possibly related to Cali Alfonso which is a medication taht was started by rheumatologist 6 months ago for rheumatoid arthritis. She does have some petechiae on her lower extremities and feels tired. She will discuss blood work with her rheumatologist today and consider stopping Kevzara. She states even while on the medication, it has not really made any difference. 3. Leukopenia, unspecified type  Assessment & Plan:  Low WBC count seen on blood work ordered by her rheumatologist yesterday. Likely related to medication Cali Alfonso for her RA. She will discuss this with rheumatology. 4. Rheumatoid arthritis, involving unspecified site, unspecified whether rheumatoid factor present [facility])  Assessment & Plan:  Managed by rheumatologist in [facility]. Was started on Kevzara 6 months ago, but feels like it hasn't been helping much. 5. Abdominal pain, unspecified abdominal location  Assessment & Plan:  Likely related to constipation given that she took Miralax and pain began to improve. She was recently traveling to [facility] and her diet was different while on vacation. Counseled on continuing Miralax and high fiber diet. If symptoms persist, I will get imaging. She has no diarrhea, nausea, vomiting, blood in the stool.               Subjective      HPI   Narcisa Draft is here today for multiple issues. She was recently in [facility] and returned last week. Since the trip has noticed some swelling in her lower legs bilaterally. There is no pain, redness, warmth in her legs. She has been elevating them. Denies fevers, chills, SOB, chest tightness. She also noticed left sided abdominal pain. She had diverticulitis many years ago, but she currently does not have any diarrhea, blood in the stool, fevers/chills. She is a bit constipated. Diet changed while she was traveling. She took a miralax which helped with the pain. She also had blood work done yesterday which was ordered by her rheumatologist which showed low WBC and platelet counts. Review of Systems   Constitutional: Positive for fatigue. HENT: Negative. Eyes: Negative. Respiratory: Negative. Cardiovascular: Positive for leg swelling. Gastrointestinal: Positive for abdominal pain. Endocrine: Negative. Genitourinary: Negative. Musculoskeletal: Negative. Skin: Positive for rash. Allergic/Immunologic: Negative. Neurological: Negative. Hematological: Negative. Psychiatric/Behavioral: Negative. Current Outpatient Medications on File Prior to Visit   Medication Sig   • albuterol (PROVENTIL HFA,VENTOLIN HFA) 90 mcg/act inhaler as needed   • atorvastatin (LIPITOR) 10 mg tablet Take 10 mg by mouth daily   • Calcium Carbonate-Vitamin D3 600-400 MG-UNIT TABS Take by mouth   • CELECOXIB PO Take by mouth 2 (two) times a day as needed   • Cholecalciferol 25 MCG (1000 UT) capsule Take 1,000 Units by mouth daily   • denosumab (PROLIA) 60 mg/mL Inject 60 mg under the skin every 6 (six) months   • Fluticasone-Salmeterol (Advair Diskus) 250-50 mcg/dose inhaler Inhale 1 puff 2 (two) times a day Rinse mouth after use.  (Patient taking differently: Inhale 1 puff 2 (two) times a day as needed Rinse mouth after use.)   • gabapentin (NEURONTIN) 300 mg capsule Take 300 mg by mouth once as needed (pain)   • losartan (COZAAR) 50 mg tablet 50 mg daily   • Multiple Vitamin (MULTIVITAMIN ADULT PO) Take 1 tablet by mouth daily   • omeprazole (PriLOSEC) 20 mg delayed release capsule TAKE TWO CAPSULES BY MOUTH EVERY DAY IN THE MORNING (GENERIC FOR PRILOSEC)   • PreviDent 5000 Booster Plus 1.1 % PSTE    • Sarilumab (Kevzara) 150 MG/1. 14ML SOAJ Inject 150 mL under the skin Once every 2 weeks   • Actemra ACTPen 162 MG/0.9ML SOAJ Once every 10 days   • amoxicillin (AMOXIL) 500 mg capsule For dental appt (Patient not taking: Reported on 7/26/2023)   • [DISCONTINUED] Na Sulfate-K Sulfate-Mg Sulf (Suprep Bowel Prep Kit) 17.5-3.13-1.6 GM/177ML SOLN Take 177 mL by mouth once for 1 dose Take 177 mL by mouth once for 1 dose (Patient not taking: Reported on 6/1/2022)       Objective     /60   Pulse 67   Temp (!) 97.2 °F (36.2 °C)   Resp 18   Ht 5' 2" (1.575 m)   Wt 73.9 kg (163 lb)   SpO2 98%   BMI 29.81 kg/m²     Physical Exam  Constitutional:       General: She is not in acute distress. Appearance: She is well-developed. She is not diaphoretic. HENT:      Head: Normocephalic and atraumatic. Cardiovascular:      Rate and Rhythm: Normal rate and regular rhythm. Heart sounds: Normal heart sounds. No murmur heard. No friction rub. No gallop. Pulmonary:      Effort: Pulmonary effort is normal. No respiratory distress. Breath sounds: Normal breath sounds. No wheezing or rales. Chest:      Chest wall: No tenderness. Abdominal:      General: Abdomen is flat. There is no distension. Palpations: Abdomen is soft. There is no mass. Tenderness: There is no abdominal tenderness. There is no guarding or rebound. Hernia: No hernia is present. Musculoskeletal:         General: No deformity. Normal range of motion. Cervical back: Normal range of motion and neck supple. Right lower leg: Edema present. Left lower leg: Edema present. Skin:     General: Skin is warm and dry.       Findings: Rash (petechiae on lower legs bilaterally) present. Neurological:      Mental Status: She is alert and oriented to person, place, and time. Psychiatric:         Behavior: Behavior normal.         Thought Content:  Thought content normal.         Judgment: Judgment normal.       Tripp Thomas MD

## 2023-07-26 NOTE — TELEPHONE ENCOUNTER
Patient Call    Who are you speaking with? self   If it is not the patient, are they listed on an active communication consent form? self   What is the reason for this call? Big changes in lab work   Does this require a call back? yes   If a call back is required, please list best call back number [phone]   If a call back is required, advise that a message will be forwarded to their care team and someone will return their call as soon as possible. Did you relay this information to the patient?  yes

## 2023-07-26 NOTE — TELEPHONE ENCOUNTER
Spoke with patient.  Patient was seen by PCP today  Thrombocytopenia and leukopenia Possibly related to Laura Favorite which is a medication that was started by rheumatologist 6 months ago for rheumatoid arthritis.      Patient will stop kevzara per rheumatologist and repeat CBC in 10 days  Patient will call after CBC done for Dr Pako Cloud to review results

## 2023-07-26 NOTE — ASSESSMENT & PLAN NOTE
Platelets low on blood work done yesterday by her rheumatologist at 39 which is new for her. Possibly related to Eulogio Renita which is a medication taht was started by rheumatologist 6 months ago for rheumatoid arthritis. She does have some petechiae on her lower extremities and feels tired. She will discuss blood work with her rheumatologist today and consider stopping Kevzara. She states even while on the medication, it has not really made any difference.

## 2023-07-26 NOTE — ASSESSMENT & PLAN NOTE
Low WBC count seen on blood work ordered by her rheumatologist yesterday. Likely related to medication Link Malady for her RA. She will discuss this with rheumatology.

## 2023-07-27 ENCOUNTER — TELEPHONE (OUTPATIENT)
Dept: NEUROLOGY | Facility: CLINIC | Age: 73
End: 2023-07-27

## 2023-07-27 ENCOUNTER — TELEPHONE (OUTPATIENT)
Dept: FAMILY MEDICINE CLINIC | Facility: CLINIC | Age: 73
End: 2023-07-27

## 2023-07-27 ENCOUNTER — TELEPHONE (OUTPATIENT)
Dept: INFECTIOUS DISEASES | Facility: CLINIC | Age: 73
End: 2023-07-27

## 2023-07-27 NOTE — TELEPHONE ENCOUNTER
[facility] requesting the patient contact the office to discuss her MW results. ----- Message from Lily Alvarado MD sent at 7/26/2023  5:23 PM EDT -----  Please call the patient regarding her abnormal result. Patient's LDL is fine. Her triglycerides are high for which she can monitor her diet and avoid processed, baked foods and eat more vegetables, whole grains.

## 2023-07-27 NOTE — TELEPHONE ENCOUNTER
[facility] infectious disease in [facility].  Dr. Papito Spence, or any of the other providers there are good

## 2023-07-27 NOTE — TELEPHONE ENCOUNTER
Patient calls the office to schedule appointment. Patient states her Rheumatologist ordered lab work to be done and that came back showing she had a "Tick born illness". Patient states lab results are in [facility]. Informed patient a referral would need to be placed. Informed patient once placed it will be reviewed by AP in the office.  Patient states she will have provider place a referral.

## 2023-07-27 NOTE — TELEPHONE ENCOUNTER
Patient left a voicemail stating that she had blood work done and her Rheumatologist would like her to see an infectious disease doctor. She stated it came back as a "tick borne illness". She is wondering if there is anyone that you recommend she go see?      Nancy Avalos LPN

## 2023-07-31 ENCOUNTER — TELEPHONE (OUTPATIENT)
Dept: NEUROLOGY | Facility: CLINIC | Age: 73
End: 2023-07-31

## 2023-07-31 NOTE — TELEPHONE ENCOUNTER
[facility] requesting the patient contact the office to discuss her BW results. ----- Message from Linda Donaldson MD sent at 7/26/2023  5:23 PM EDT -----  Please call the patient regarding her abnormal result. Patient's LDL is fine. Her triglycerides are high for which she can monitor her diet and avoid processed, baked foods and eat more vegetables, whole grains.

## 2023-07-31 NOTE — TELEPHONE ENCOUNTER
Received VM transcription from 9:46, it is now 1:06 PM:    Hi, this is Farooq Momin. Date of birth August 9, 1950. Someone called me regarding blood work. My phone number is [phone]. And I'll hopefully talk to you soon. Thank you. Mallory.  -------------------------------------------------------    Spoke with pt and advised her of Dr. Carine Nugent review of results and recommendations. Pt says there is a lot more going on with that. Says all her blood work is "out of whack" due to Rheumatologist Laura) ordered labs which indicates 2 tick born illnesses. Pt says she's not sure if this is skewing all of her lab results. Dr. Luisana Putnam. Please advise. Best cb [phone], ok to leave detailed message.

## 2023-08-02 NOTE — TELEPHONE ENCOUNTER
Called and advised pt of the below. She verbalized clear understanding. Pt states that she was not asking for Dr Shabbir Xie to review the results of her other labs. The ordering physician did review those labs w/ her. She is being treated w/ antibiotics. She just want to let Dr Shabbir Xie know. lauren Boykin MD  to Noe Key RN        7/31/23  4:12 PM  I can't comment on labs ordered by [facility]. Ordering provider can call her to discuss. We were just reaching out about cholesterol.

## 2023-08-03 ENCOUNTER — TELEPHONE (OUTPATIENT)
Dept: FAMILY MEDICINE CLINIC | Facility: CLINIC | Age: 73
End: 2023-08-03

## 2023-09-05 ENCOUNTER — RA CDI HCC (OUTPATIENT)
Dept: OTHER | Facility: HOSPITAL | Age: 73
End: 2023-09-05

## 2023-09-05 NOTE — PROGRESS NOTES
[facility] coding opportunities       Chart reviewed, no opportunity found: CHART REVIEWED, NO OPPORTUNITY FOUND   I11.0    Chart Reviewed number of suggestions sent to Provider: 1     Patients Insurance     Medicare Insurance: [employer] Advantage

## 2023-09-11 ENCOUNTER — OFFICE VISIT (OUTPATIENT)
Dept: FAMILY MEDICINE CLINIC | Facility: CLINIC | Age: 73
End: 2023-09-11
Payer: COMMERCIAL

## 2023-09-11 VITALS
RESPIRATION RATE: 18 BRPM | SYSTOLIC BLOOD PRESSURE: 126 MMHG | BODY MASS INDEX: 29.48 KG/M2 | HEIGHT: 62 IN | TEMPERATURE: 97.5 F | DIASTOLIC BLOOD PRESSURE: 68 MMHG | WEIGHT: 160.2 LBS | HEART RATE: 73 BPM

## 2023-09-11 DIAGNOSIS — Z12.31 ENCOUNTER FOR SCREENING MAMMOGRAM FOR MALIGNANT NEOPLASM OF BREAST: ICD-10-CM

## 2023-09-11 DIAGNOSIS — Z00.00 MEDICARE ANNUAL WELLNESS VISIT, SUBSEQUENT: ICD-10-CM

## 2023-09-11 DIAGNOSIS — R07.89 INTERMITTENT LEFT-SIDED CHEST PAIN: ICD-10-CM

## 2023-09-11 DIAGNOSIS — E78.00 ELEVATED CHOLESTEROL: Primary | ICD-10-CM

## 2023-09-11 PROCEDURE — G0439 PPPS, SUBSEQ VISIT: HCPCS | Performed by: FAMILY MEDICINE

## 2023-09-11 RX ORDER — CYCLOBENZAPRINE HCL 5 MG
TABLET ORAL
COMMUNITY
Start: 2023-08-20

## 2023-09-11 RX ORDER — HYDROXYCHLOROQUINE SULFATE 200 MG/1
TABLET, FILM COATED ORAL
COMMUNITY
Start: 2023-07-26

## 2023-09-11 RX ORDER — PREDNISONE 20 MG/1
TABLET ORAL
COMMUNITY
Start: 2023-07-24

## 2023-09-11 NOTE — PROGRESS NOTES
Assessment and Plan:     Problem List Items Addressed This Visit    None  Visit Diagnoses     Elevated cholesterol    -  Primary    Relevant Orders    Lipid panel    Encounter for screening mammogram for malignant neoplasm of breast        Relevant Orders    Mammo screening bilateral w 3d & cad    Medicare annual wellness visit, subsequent        Intermittent left-sided chest pain        She did discuss with cardiology who stated it is not cardiac. Possible muscular. If pain persists, will get chest XR/rib XR. BMI Counseling: Body mass index is 29.3 kg/m². The BMI is above normal. Nutrition recommendations include decreasing portion sizes, encouraging healthy choices of fruits and vegetables, decreasing fast food intake, consuming healthier snacks, limiting drinks that contain sugar and moderation in carbohydrate intake. Exercise recommendations include moderate physical activity 150 minutes/week. Rationale for BMI follow-up plan is due to patient being overweight or obese. Depression Screening and Follow-up Plan: Patient was screened for depression during today's encounter. They screened negative with a PHQ-2 score of 0. Preventive health issues were discussed with patient, and age appropriate screening tests were ordered as noted in patient's After Visit Summary. Personalized health advice and appropriate referrals for health education or preventive services given if needed, as noted in patient's After Visit Summary. History of Present Illness:     Patient presents for a Medicare Wellness Visit    HPI   Patient Care Team:  Luzmaria Grande MD as PCP - General (Family Medicine)  Chery Kennedy MD (Radiation Oncology)     Review of Systems:     Review of Systems   Constitutional: Negative. HENT: Negative. Eyes: Negative. Respiratory: Negative. Cardiovascular: Negative. Gastrointestinal: Negative. Endocrine: Negative. Genitourinary: Negative. Musculoskeletal: Negative. Skin: Negative. Allergic/Immunologic: Negative. Neurological: Negative. Hematological: Negative. Psychiatric/Behavioral: Negative.          Problem List:     Patient Active Problem List   Diagnosis   • Invasive ductal carcinoma of left breast (HCC)   • Rheumatoid arthritis, involving unspecified site, unspecified whether rheumatoid factor present ([address])   • HTN (hypertension)   • Drug-induced polyneuropathy (HCC)   • Diastolic CHF, acute ([address])   • Angina at rest [facility])   • Back pain with radiculopathy   • Thrombocytopenia ([address])   • Leukopenia   • Abdominal pain      Past Medical and Surgical History:     Past Medical History:   Diagnosis Date   • Asthma    • Back pain with radiculopathy 3/1/2023   • BRCA1 negative    • BRCA2 negative    • Breast cancer ([address]) 2020    Left  breast, triple negative   • Colon polyp    • History of chemotherapy 10/01/2020    breast cancer - left   • History of radiation therapy 04/01/2021    left breast cancer   • Hypertension    • OA (osteoarthritis)    • Rheumatoid arthritis ([address])      Past Surgical History:   Procedure Laterality Date   • ANKLE SURGERY Right 2016    2 plates and screws   • BREAST BIOPSY Left 07/02/2020   • BREAST LUMPECTOMY Left 08/12/2020    left breast wire localized lumpectomy   • BREAST MASS EXCISION Left 09/02/2020    short superior and long lateral left breast resection, left axillary sentinel lymph node biopsy   • COLONOSCOPY     • ELBOW FRACTURE REPAIR Right 1996   • GUM SURGERY     • HYSTERECTOMY  2018   • REPLACEMENT TOTAL HIP W/  RESURFACING IMPLANTS  06/18/2021      Family History:     Family History   Problem Relation Age of Onset   • Breast cancer Mother    • Lung cancer Father    • Thyroid cancer Son    • Breast cancer Paternal Grandmother       Social History:     Social History     Socioeconomic History   • Marital status: /Civil Union     Spouse name: None   • Number of children: None   • Years of education: None   • Highest education level: None   Occupational History   • None   Tobacco Use   • Smoking status: Never   • Smokeless tobacco: Never   Vaping Use   • Vaping Use: Never used   Substance and Sexual Activity   • Alcohol use: Not Currently     Comment: occasional   • Drug use: Never   • Sexual activity: None   Other Topics Concern   • None   Social History Narrative   • None     Social Determinants of Health     Financial Resource Strain: Low Risk  (9/10/2023)    Overall Financial Resource Strain (CARDIA)    • Difficulty of Paying Living Expenses: Not hard at all   Food Insecurity: Not on file   Transportation Needs: No Transportation Needs (9/10/2023)    PRAPARE - Transportation    • Lack of Transportation (Medical): No    • Lack of Transportation (Non-Medical): No   Physical Activity: Not on file   Stress: Not on file   Social Connections: Not on file   Intimate Partner Violence: Not on file   Housing Stability: Not on file      Medications and Allergies:     Current Outpatient Medications   Medication Sig Dispense Refill   • albuterol (PROVENTIL HFA,VENTOLIN HFA) 90 mcg/act inhaler as needed     • atorvastatin (LIPITOR) 10 mg tablet Take 10 mg by mouth daily     • Calcium Carbonate-Vitamin D3 600-400 MG-UNIT TABS Take by mouth     • CELECOXIB PO Take by mouth 2 (two) times a day as needed     • Cholecalciferol 25 MCG (1000 UT) capsule Take 1,000 Units by mouth daily     • cyclobenzaprine (FLEXERIL) 5 mg tablet Per patient taking as needed     • denosumab (PROLIA) 60 mg/mL Inject 60 mg under the skin every 6 (six) months     • Fluticasone-Salmeterol (Advair Diskus) 250-50 mcg/dose inhaler Inhale 1 puff 2 (two) times a day Rinse mouth after use.  (Patient taking differently: Inhale 1 puff 2 (two) times a day as needed Rinse mouth after use.) 60 blister 0   • losartan (COZAAR) 50 mg tablet 50 mg daily     • Multiple Vitamin (MULTIVITAMIN ADULT PO) Take 1 tablet by mouth daily     • omeprazole (PriLOSEC) 20 mg delayed release capsule TAKE TWO CAPSULES BY MOUTH EVERY DAY IN THE MORNING (GENERIC FOR PRILOSEC) 90 capsule 3   • PreviDent 5000 Booster Plus 1.1 % PSTE      • Sarilumab (Kevzara) 150 MG/1. 14ML SOAJ Inject 150 mL under the skin Once every 2 weeks     • Actemra ACTPen 162 MG/0.9ML SOAJ Once every 10 days     • amoxicillin (AMOXIL) 500 mg capsule For dental appt (Patient not taking: Reported on 7/26/2023)     • gabapentin (NEURONTIN) 300 mg capsule Take 300 mg by mouth once as needed (pain) (Patient not taking: Reported on 9/11/2023)     • hydroxychloroquine (PLAQUENIL) 200 mg tablet      • predniSONE 20 mg tablet        No current facility-administered medications for this visit. Allergies   Allergen Reactions   • Fruit Extracts Itching     Cantelope melon peaches   • Nuts - Food Allergy GI Intolerance   • Pollen Extract Other (See Comments)     Pollen , Trees And Flowers   • Avelox [Moxifloxacin] Itching      Immunizations:     Immunization History   Administered Date(s) Administered   • COVID-19 MODERNA VACC 0.5 ML IM 02/13/2021, 03/13/2021, 11/19/2021, 06/01/2022      Health Maintenance:         Topic Date Due   • Breast Cancer Screening: Mammogram  11/11/2023   • DXA SCAN  11/09/2026   • Colorectal Cancer Screening  07/09/2032   • Hepatitis C Screening  Completed         Topic Date Due   • Pneumococcal Vaccine: 65+ Years (1 - PCV) Never done   • COVID-19 Vaccine (5 - Booster for Moderna series) 07/27/2022   • Influenza Vaccine (1) 09/01/2023      Medicare Screening Tests and Risk Assessments: [facility] is here for her Subsequent Wellness visit. Health Risk Assessment:   Patient rates overall health as good. Patient feels that their physical health rating is same. Patient is satisfied with their life. Eyesight was rated as slightly worse. Hearing was rated as same. Patient feels that their emotional and mental health rating is same. Patients states they are never, rarely angry.  Patient states they are sometimes unusually tired/fatigued. Pain experienced in the last 7 days has been some. Patient's pain rating has been 3/10. Patient states that she has experienced no weight loss or gain in last 6 months. Depression Screening:   PHQ-2 Score: 0      Fall Risk Screening: In the past year, patient has experienced: no history of falling in past year      Urinary Incontinence Screening:   Patient has leaked urine accidently in the last six months. Home Safety:  Patient does not have trouble with stairs inside or outside of their home. Patient has working smoke alarms and has working carbon monoxide detector. Home safety hazards include: none. Nutrition:   Current diet is Regular. Medications:   Patient is currently taking over-the-counter supplements. OTC medications include: Calcium, vitamin d3, mutivitamin. Patient is able to manage medications. Activities of Daily Living (ADLs)/Instrumental Activities of Daily Living (IADLs):   Walk and transfer into and out of bed and chair?: Yes  Dress and groom yourself?: Yes    Bathe or shower yourself?: Yes    Feed yourself?  Yes  Do your laundry/housekeeping?: Yes  Manage your money, pay your bills and track your expenses?: Yes  Make your own meals?: Yes    Do your own shopping?: Yes    Previous Hospitalizations:   Any hospitalizations or ED visits within the last 12 months?: No      Advance Care Planning:   Living will: No    Durable POA for healthcare: No    Advanced directive: No      PREVENTIVE SCREENINGS      Cardiovascular Screening:    General: Screening Not Indicated and History Lipid Disorder      Diabetes Screening:     General: Screening Current      Colorectal Cancer Screening:     General: Screening Current      Breast Cancer Screening:     General: History Breast Cancer      Cervical Cancer Screening:    General: Screening Not Indicated      Osteoporosis Screening:    General: Screening Current      Abdominal Aortic Aneurysm (AAA) Screening:        General: Screening Not Indicated      Lung Cancer Screening:     General: Screening Not Indicated      Hepatitis C Screening:    General: Screening Current    Screening, Brief Intervention, and Referral to Treatment (SBIRT)    Screening  Typical number of drinks in a day: 0  Typical number of drinks in a week: 0  Interpretation: Low risk drinking behavior. AUDIT-C Screenin) How often did you have a drink containing alcohol in the past year? monthly or less  2) How many drinks did you have on a typical day when you were drinking in the past year? 0  3) How often did you have 6 or more drinks on one occasion in the past year? never    AUDIT-C Score: 1  Interpretation: Score 0-2 (female): Negative screen for alcohol misuse    Single Item Drug Screening:  How often have you used an illegal drug (including marijuana) or a prescription medication for non-medical reasons in the past year? never    Single Item Drug Screen Score: 0  Interpretation: Negative screen for possible drug use disorder    Brief Intervention  Alcohol & drug use screenings were reviewed. No concerns regarding substance use disorder identified. No results found. Physical Exam:     /68   Pulse 73   Temp 97.5 °F (36.4 °C)   Resp 18   Ht 5' 2" (1.575 m)   Wt 72.7 kg (160 lb 3.2 oz)   BMI 29.30 kg/m²     Physical Exam  Vitals and nursing note reviewed. Constitutional:       General: She is not in acute distress. Appearance: She is well-developed. HENT:      Head: Normocephalic and atraumatic. Right Ear: Tympanic membrane, ear canal and external ear normal. There is no impacted cerumen. Left Ear: Tympanic membrane, ear canal and external ear normal. There is no impacted cerumen. Nose: Nose normal.      Mouth/Throat:      Mouth: Mucous membranes are moist.   Eyes:      Conjunctiva/sclera: Conjunctivae normal.   Cardiovascular:      Rate and Rhythm: Normal rate and regular rhythm.       Heart sounds: No murmur heard.  Pulmonary:      Effort: Pulmonary effort is normal. No respiratory distress. Breath sounds: Normal breath sounds. Abdominal:      General: Abdomen is flat. There is no distension. Palpations: Abdomen is soft. There is no mass. Tenderness: There is no abdominal tenderness. There is no guarding or rebound. Hernia: No hernia is present. Musculoskeletal:         General: Normal range of motion. Cervical back: Neck supple. Skin:     General: Skin is warm and dry. Neurological:      General: No focal deficit present. Mental Status: She is alert and oriented to person, place, and time.           Annelise Orozco MD

## 2023-09-11 NOTE — PATIENT INSTRUCTIONS
Medicare Preventive Visit Patient Instructions  Thank you for completing your Welcome to Medicare Visit or Medicare Annual Wellness Visit today. Your next wellness visit will be due in one year (9/11/2024). The screening/preventive services that you may require over the next 5-10 years are detailed below. Some tests may not apply to you based off risk factors and/or age. Screening tests ordered at today's visit but not completed yet may show as past due. Also, please note that scanned in results may not display below. Preventive Screenings:  Service Recommendations Previous Testing/Comments   Colorectal Cancer Screening  * Colonoscopy    * Fecal Occult Blood Test (FOBT)/Fecal Immunochemical Test (FIT)  * Fecal DNA/Cologuard Test  * Flexible Sigmoidoscopy Age: 43-73 years old   Colonoscopy: every 10 years (may be performed more frequently if at higher risk)  OR  FOBT/FIT: every 1 year  OR  Cologuard: every 3 years  OR  Sigmoidoscopy: every 5 years  Screening may be recommended earlier than age 39 if at higher risk for colorectal cancer. Also, an individualized decision between you and your healthcare provider will decide whether screening between the ages of 77-80 would be appropriate. Colonoscopy: 07/12/2022  FOBT/FIT: Not on file  Cologuard: Not on file  Sigmoidoscopy: Not on file    Screening Current     Breast Cancer Screening Age: 36 years old  Frequency: every 1-2 years  Not required if history of left and right mastectomy Mammogram: 11/11/2022    History Breast Cancer   Cervical Cancer Screening Between the ages of 21-29, pap smear recommended once every 3 years. Between the ages of 32-69, can perform pap smear with HPV co-testing every 5 years.    Recommendations may differ for women with a history of total hysterectomy, cervical cancer, or abnormal pap smears in past. Pap Smear: Not on file    Screening Not Indicated   Hepatitis C Screening Once for adults born between 1945 and 1965  More frequently in patients at high risk for Hepatitis C Hep C Antibody: 08/08/2022    Screening Current   Diabetes Screening 1-2 times per year if you're at risk for diabetes or have pre-diabetes Fasting glucose: 102 mg/dL (8/8/2022)  A1C: No results in last 5 years (No results in last 5 years)      Cholesterol Screening Once every 5 years if you don't have a lipid disorder. May order more often based on risk factors. Lipid panel: 07/25/2023    Screening Not Indicated  History Lipid Disorder     Other Preventive Screenings Covered by Medicare:  1. Abdominal Aortic Aneurysm (AAA) Screening: covered once if your at risk. You're considered to be at risk if you have a family history of AAA. 2. Lung Cancer Screening: covers low dose CT scan once per year if you meet all of the following conditions: (1) Age 48-67; (2) No signs or symptoms of lung cancer; (3) Current smoker or have quit smoking within the last 15 years; (4) You have a tobacco smoking history of at least 20 pack years (packs per day multiplied by number of years you smoked); (5) You get a written order from a healthcare provider. 3. Glaucoma Screening: covered annually if you're considered high risk: (1) You have diabetes OR (2) Family history of glaucoma OR (3)  aged 48 and older OR (3)  American aged 72 and older  3. Osteoporosis Screening: covered every 2 years if you meet one of the following conditions: (1) You're estrogen deficient and at risk for osteoporosis based off medical history and other findings; (2) Have a vertebral abnormality; (3) On glucocorticoid therapy for more than 3 months; (4) Have primary hyperparathyroidism; (5) On osteoporosis medications and need to assess response to drug therapy. · Last bone density test (DXA Scan): 11/09/2021.  5. HIV Screening: covered annually if you're between the age of 15-65. Also covered annually if you are younger than 13 and older than 72 with risk factors for HIV infection.  For pregnant patients, it is covered up to 3 times per pregnancy. Immunizations:  Immunization Recommendations   Influenza Vaccine Annual influenza vaccination during flu season is recommended for all persons aged >= 6 months who do not have contraindications   Pneumococcal Vaccine   * Pneumococcal conjugate vaccine = PCV13 (Prevnar 13), PCV15 (Vaxneuvance), PCV20 (Prevnar 20)  * Pneumococcal polysaccharide vaccine = PPSV23 (Pneumovax) Adults 20-63 years old: 1-3 doses may be recommended based on certain risk factors  Adults 72 years old: 1-2 doses may be recommended based off what pneumonia vaccine you previously received   Hepatitis B Vaccine 3 dose series if at intermediate or high risk (ex: diabetes, end stage renal disease, liver disease)   Tetanus (Td) Vaccine - COST NOT COVERED BY MEDICARE PART B Following completion of primary series, a booster dose should be given every 10 years to maintain immunity against tetanus. Td may also be given as tetanus wound prophylaxis. Tdap Vaccine - COST NOT COVERED BY MEDICARE PART B Recommended at least once for all adults. For pregnant patients, recommended with each pregnancy. Shingles Vaccine (Shingrix) - COST NOT COVERED BY MEDICARE PART B  2 shot series recommended in those aged 48 and above     Health Maintenance Due:      Topic Date Due   • Breast Cancer Screening: Mammogram  11/11/2023   • DXA SCAN  11/09/2026   • Colorectal Cancer Screening  07/09/2032   • Hepatitis C Screening  Completed     Immunizations Due:      Topic Date Due   • Pneumococcal Vaccine: 65+ Years (1 - PCV) Never done   • COVID-19 Vaccine (5 - Booster for Moderna series) 07/27/2022   • Influenza Vaccine (1) 09/01/2023     Advance Directives   What are advance directives? Advance directives are legal documents that state your wishes and plans for medical care. These plans are made ahead of time in case you lose your ability to make decisions for yourself.  Advance directives can apply to any medical decision, such as the treatments you want, and if you want to donate organs. What are the types of advance directives? There are many types of advance directives, and each state has rules about how to use them. You may choose a combination of any of the following:  · Living will: This is a written record of the treatment you want. You can also choose which treatments you do not want, which to limit, and which to stop at a certain time. This includes surgery, medicine, IV fluid, and tube feedings. · Durable power of  for healthcare [facility]): This is a written record that states who you want to make healthcare choices for you when you are unable to make them for yourself. This person, called a proxy, is usually a family member or a friend. You may choose more than 1 proxy. · Do not resuscitate (DNR) order:  A DNR order is used in case your heart stops beating or you stop breathing. It is a request not to have certain forms of treatment, such as CPR. A DNR order may be included in other types of advance directives. · Medical directive: This covers the care that you want if you are in a coma, near death, or unable to make decisions for yourself. You can list the treatments you want for each condition. Treatment may include pain medicine, surgery, blood transfusions, dialysis, IV or tube feedings, and a ventilator (breathing machine). · Values history: This document has questions about your views, beliefs, and how you feel and think about life. This information can help others choose the care that you would choose. Why are advance directives important? An advance directive helps you control your care. Although spoken wishes may be used, it is better to have your wishes written down. Spoken wishes can be misunderstood, or not followed. Treatments may be given even if you do not want them. An advance directive may make it easier for your family to make difficult choices about your care.    Urinary Incontinence   Urinary incontinence (UI)  is when you lose control of your bladder. UI develops because your bladder cannot store or empty urine properly. The 3 most common types of UI are stress incontinence, urge incontinence, or both. Medicines:   · May be given to help strengthen your bladder control. Report any side effects of medication to your healthcare provider. Do pelvic muscle exercises often:  Your pelvic muscles help you stop urinating. Squeeze these muscles tight for 5 seconds, then relax for 5 seconds. Gradually work up to squeezing for 10 seconds. Do 3 sets of 15 repetitions a day, or as directed. This will help strengthen your pelvic muscles and improve bladder control. Train your bladder:  Go to the bathroom at set times, such as every 2 hours, even if you do not feel the urge to go. You can also try to hold your urine when you feel the urge to go. For example, hold your urine for 5 minutes when you feel the urge to go. As that becomes easier, hold your urine for 10 minutes. Self-care:   · Keep a UI record. Write down how often you leak urine and how much you leak. Make a note of what you were doing when you leaked urine. · Drink liquids as directed. You may need to limit the amount of liquid you drink to help control your urine leakage. Do not drink any liquid right before you go to bed. Limit or do not have drinks that contain caffeine or alcohol. · Prevent constipation. Eat a variety of high-fiber foods. Good examples are high-fiber cereals, beans, vegetables, and whole-grain breads. Walking is the best way to trigger your intestines to have a bowel movement. · Exercise regularly and maintain a healthy weight. Weight loss and exercise will decrease pressure on your bladder and help you control your leakage. · Use a catheter as directed  to help empty your bladder. A catheter is a tiny, plastic tube that is put into your bladder to drain your urine.    · Go to behavior therapy as directed. Behavior therapy may be used to help you learn to control your urge to urinate. Weight Management   Why it is important to manage your weight:  Being overweight increases your risk of health conditions such as heart disease, high blood pressure, type 2 diabetes, and certain types of cancer. It can also increase your risk for osteoarthritis, sleep apnea, and other respiratory problems. Aim for a slow, steady weight loss. Even a small amount of weight loss can lower your risk of health problems. How to lose weight safely:  A safe and healthy way to lose weight is to eat fewer calories and get regular exercise. You can lose up about 1 pound a week by decreasing the number of calories you eat by 500 calories each day. Healthy meal plan for weight management:  A healthy meal plan includes a variety of foods, contains fewer calories, and helps you stay healthy. A healthy meal plan includes the following:  · Eat whole-grain foods more often. A healthy meal plan should contain fiber. Fiber is the part of grains, fruits, and vegetables that is not broken down by your body. Whole-grain foods are healthy and provide extra fiber in your diet. Some examples of whole-grain foods are whole-wheat breads and pastas, oatmeal, brown rice, and bulgur. · Eat a variety of vegetables every day. Include dark, leafy greens such as spinach, kale, radha greens, and mustard greens. Eat yellow and orange vegetables such as carrots, sweet potatoes, and winter squash. · Eat a variety of fruits every day. Choose fresh or canned fruit (canned in its own juice or light syrup) instead of juice. Fruit juice has very little or no fiber. · Eat low-fat dairy foods. Drink fat-free (skim) milk or 1% milk. Eat fat-free yogurt and low-fat cottage cheese. Try low-fat cheeses such as mozzarella and other reduced-fat cheeses. · Choose meat and other protein foods that are low in fat.   Choose beans or other legumes such as split peas or lentils. Choose fish, skinless poultry (chicken or turkey), or lean cuts of red meat (beef or pork). Before you cook meat or poultry, cut off any visible fat. · Use less fat and oil. Try baking foods instead of frying them. Add less fat, such as margarine, sour cream, regular salad dressing and mayonnaise to foods. Eat fewer high-fat foods. Some examples of high-fat foods include french fries, doughnuts, ice cream, and cakes. · Eat fewer sweets. Limit foods and drinks that are high in sugar. This includes candy, cookies, regular soda, and sweetened drinks. Exercise:  Exercise at least 30 minutes per day on most days of the week. Some examples of exercise include walking, biking, dancing, and swimming. You can also fit in more physical activity by taking the stairs instead of the elevator or parking farther away from stores. Ask your healthcare provider about the best exercise plan for you. © Copyright [employer] 2018 Information is for End User's use only and may not be sold, redistributed or otherwise used for commercial purposes.  All illustrations and images included in CareNotes® are the copyrighted property of [employer] or [address] [place] [address]

## 2023-10-02 ENCOUNTER — OFFICE VISIT (OUTPATIENT)
Dept: HEMATOLOGY ONCOLOGY | Facility: MEDICAL CENTER | Age: 73
End: 2023-10-02
Payer: COMMERCIAL

## 2023-10-02 VITALS
WEIGHT: 162.6 LBS | RESPIRATION RATE: 17 BRPM | DIASTOLIC BLOOD PRESSURE: 86 MMHG | SYSTOLIC BLOOD PRESSURE: 138 MMHG | HEART RATE: 78 BPM | BODY MASS INDEX: 29.74 KG/M2 | OXYGEN SATURATION: 96 % | TEMPERATURE: 97.3 F

## 2023-10-02 DIAGNOSIS — C50.912 INVASIVE DUCTAL CARCINOMA OF LEFT BREAST (HCC): Primary | ICD-10-CM

## 2023-10-02 PROCEDURE — 99214 OFFICE O/P EST MOD 30 MIN: CPT | Performed by: INTERNAL MEDICINE

## 2023-10-02 NOTE — PROGRESS NOTES
Ezra Bush  1950  [facility] [address]. [facility] [address]    DISCUSSION/SUMMARY:    72-year-old female with history of pT1c pN0 M0 TNBC s/p BCT/SLN sampling, radiation and adjuvant chemotherapy (AC x 4, paclitaxel weekly x 12). Presently Mrs. Fanny Wren feels well and clinically there are no concerning breast cancer associated findings. Patient will follow-up with Radiation Oncology as directed. The plan is to continue with self-breast exams and yearly mammograms. Next mammogram is in November 2023. Patient with a history of Haq's esophagus, previously with a pancreatic tail hypoechoic lesion. Patient is being followed by GI. Recent CAT scan demonstrated no abnormalities in the pancreas but a questionable left adrenal lesion (indeterminate). Etiology for the left-sided rib cage pain is not clear. Patient stated it is infrequent and lasts for only a few seconds. Patient will continue to monitor for progression. Patient is to return in 12 months. Mrs. Fanny Wren knows to call the hematology/oncology office if there are any other questions or concerns. Carefully review your medication list and verify that the list is accurate and up-to-date. Please call the hematology/oncology office if there are medications missing from the list, medications on the list that you are not currently taking or if there is a dosage or instruction that is different from how you're taking that medication.     Patient goals and areas of care:  Breast cancer surveillance  Barriers to care:  None  Patient is able to self-care.  ______________________________________________________________________________________    Chief Complaint   Patient presents with   • Follow-up   • FHx Breast Cancer     Oncology History   Invasive ductal carcinoma of left breast ([address])   2020 Initial Diagnosis    Invasive ductal carcinoma of left breast ([address]) 8/5/2020 Surgery    Breast, Left, Left Breast Mass, Wire Localized Lumpectomy (1 slides [id number] [facility], collected 8/5/2020):  Dr. Armani Mejia, Surgeon    - Invasive breast carcinoma of no special type (ductal NST/invasive ductal carcinoma). * Size: 15 mm   * Varina grade 3 of 3 (total score: 9 of 9)    -- tubule formation, score 3    -- nuclear grade, score 3    -- mitoses, score 3.   * Breast tumor prognostic profile (per report)    -- ER: Negative 0%    -- NH: Negative 0%    -- Her2: Negative 0   * Ductal carcinoma in situ (DCIS): Not identified. * Margins: Negative for carcinoma, less than 1 mm from the closest lateral margin. * Pathologic stage (AJCC 8th ed.): pT1c.        9/2/2020 Surgery    Left breast re-excision lumpectomy with SLNB  ([facility], Dr. Lambert Tobias)     A. Left breast lumpectomy:  - benign breast tissue with previous lumpectomy cavity showing marked tissue reaction including foreign body giant cell reaction, granulation tissue and fat necrosis. - No residual carcinoma seen. B. Left axillary lymph node:  - two negative lymph nodes. 10/2020 - 3/15/2021 Chemotherapy    Adjuvant chemotherapy [facility])  AC (Adriamycin, Cytoxan) x 4 cycles  Paclitaxel x 12 cycles     Dr. Evin Mckeon, [facility]      3/12/2021 -  Cancer Staged    Staging form: Breast, AJCC 8th Edition  - Pathologic: Stage IB (pT1c, pN0, cM0, G3, ER-, NH-, HER2-) - Signed by Andre Saldivar MD on 3/12/2021  Multigene prognostic tests performed: None  Histologic grading system: 3 grade system       4/12/2021 - 5/10/2021 Radiation    4256 cGy in 16 fractions to the entire left breast followed by  additional 1000 cGy in 5 fractions to the left lumpectomy cavity  Andre Saldivar MD     4/12/2021 - 5/10/2021 Radiation    BH L BreaEZ 6X 16 / 16 266 0 4,256 21   L Breast e 9E 5 / 5 200 0 1,000 6      Treatment Dates:  4/12/2021 - 5/10/2021.         History of Present Illness:  77-year-old female previously diagnosed with breast cancer. Mrs. Jn Fajardo recently moved to the [facility] and was in need of a new oncologist.    In the late summer of 2020 patient felt a left-sided mass. Patient was due for a mammogram at that time. Results demonstrated an abnormality (that specific mammogram is not available). Patient was sent for biopsy - positive. Patient was subsequently referred to a surgeon and underwent left-sided BCS/SLN excisions. Patient was found to have early stage triple negative breast cancer and received AC x 4 and then paclitaxel x 12 weeks. Mrs. Miller then began surveillance. Patient states feeling okay for the most part. No breast related issues. Appetite is good, weight is stable. No respiratory problems. No other GI,  or GYN issues. Patient states that she has occasional pain in her left lateral rib cage. No specific association with activities. The pain lasts momentarily and then goes away. 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 Patient states that routine health maintenance and medical care is otherwise up-to-date. Review of Systems   Constitutional: Negative. HENT: Negative. Eyes: Negative. Respiratory: Negative. Cardiovascular: Negative. Gastrointestinal: Negative. Endocrine: Negative. Genitourinary: Negative. Musculoskeletal: Positive for arthralgias. Skin: Negative. Allergic/Immunologic: Negative. Neurological: Negative. Hematological: Negative. Psychiatric/Behavioral: Negative. All other systems reviewed and are negative.     Patient Active Problem List   Diagnosis   • Invasive ductal carcinoma of left breast (HCC)   • Rheumatoid arthritis, involving unspecified site, unspecified whether rheumatoid factor present ([address])   • HTN (hypertension)   • Drug-induced polyneuropathy ([address])   • Diastolic CHF, acute ([address])   • Angina at rest   • Back pain with radiculopathy   • Thrombocytopenia (HCC)   • Leukopenia   • Abdominal pain     Past Medical History:   Diagnosis Date   • Asthma    • Back pain with radiculopathy 3/1/2023   • BRCA1 negative    • BRCA2 negative    • Breast cancer ([address])     Left  breast, triple negative   • Colon polyp    • History of chemotherapy 10/01/2020    breast cancer - left   • History of radiation therapy 2021    left breast cancer   • Hypertension    • OA (osteoarthritis)    • Rheumatoid arthritis ([address])      Past Surgical History:   Procedure Laterality Date   • ANKLE SURGERY Right 2016    2 plates and screws   • BREAST BIOPSY Left 2020   • BREAST LUMPECTOMY Left 2020    left breast wire localized lumpectomy   • BREAST MASS EXCISION Left 2020    short superior and long lateral left breast resection, left axillary sentinel lymph node biopsy   • COLONOSCOPY     • ELBOW FRACTURE REPAIR Right    • GUM SURGERY     • HYSTERECTOMY     • REPLACEMENT TOTAL HIP W/  RESURFACING IMPLANTS  2021     Family History   Problem Relation Age of Onset   • Breast cancer Mother    • Lung cancer Father    • Thyroid cancer Son    • Breast cancer Paternal Grandmother    Family history:  mother diagnosed with breast cancer at the age of 72 and  of metastatic breast cancer at the age of 79, paternal grandmother with breast cancer (NOS), 4 children in good general health, 1 son recently diagnosed with thyroid cancer (NOS)    Social History     Socioeconomic History   • Marital status: /Civil Union     Spouse name: Not on file   • Number of children: Not on file   • Years of education: Not on file   • Highest education level: Not on file   Occupational History   • Not on file   Tobacco Use   • Smoking status: Never   • Smokeless tobacco: Never   Vaping Use   • Vaping Use: Never used   Substance and Sexual Activity   • Alcohol use: Not Currently     Comment: occasional   • Drug use: Never   • Sexual activity: Not on file   Other Topics Concern   • Not on file   Social History Narrative   • Not on file     Social Determinants of Health     Financial Resource Strain: Low Risk  (9/10/2023)    Overall Financial Resource Strain (CARDIA)    • Difficulty of Paying Living Expenses: Not hard at all   Food Insecurity: Not on file   Transportation Needs: No Transportation Needs (9/10/2023)    PRAPARE - Transportation    • Lack of Transportation (Medical): No    • Lack of Transportation (Non-Medical): No   Physical Activity: Not on file   Stress: Not on file   Social Connections: Not on file   Intimate Partner Violence: Not on file   Housing Stability: Not on file   Social history:  No tobacco, alcohol or drug abuse, no toxic exposure    Current Outpatient Medications:   •  Actemra ACTPen 162 MG/0.9ML SOAJ, Once every 10 days, Disp: , Rfl:   •  albuterol (PROVENTIL HFA,VENTOLIN HFA) 90 mcg/act inhaler, as needed, Disp: , Rfl:   •  amoxicillin (AMOXIL) 500 mg capsule, For dental appt (Patient not taking: Reported on 7/26/2023), Disp: , Rfl:   •  atorvastatin (LIPITOR) 10 mg tablet, Take 10 mg by mouth daily, Disp: , Rfl:   •  Calcium Carbonate-Vitamin D3 600-400 MG-UNIT TABS, Take by mouth, Disp: , Rfl:   •  CELECOXIB PO, Take by mouth 2 (two) times a day as needed, Disp: , Rfl:   •  Cholecalciferol 25 MCG (1000 UT) capsule, Take 1,000 Units by mouth daily, Disp: , Rfl:   •  cyclobenzaprine (FLEXERIL) 5 mg tablet, Per patient taking as needed, Disp: , Rfl:   •  denosumab (PROLIA) 60 mg/mL, Inject 60 mg under the skin every 6 (six) months, Disp: , Rfl:   •  Fluticasone-Salmeterol (Advair Diskus) 250-50 mcg/dose inhaler, Inhale 1 puff 2 (two) times a day Rinse mouth after use.  (Patient taking differently: Inhale 1 puff 2 (two) times a day as needed Rinse mouth after use.), Disp: 60 blister, Rfl: 0  •  gabapentin (NEURONTIN) 300 mg capsule, Take 300 mg by mouth once as needed (pain) (Patient not taking: Reported on 9/11/2023), Disp: , Rfl:   •  hydroxychloroquine (PLAQUENIL) 200 mg tablet, , Disp: , Rfl:   •  losartan (COZAAR) 50 mg tablet, 50 mg daily, Disp: , Rfl:   •  Multiple Vitamin (MULTIVITAMIN ADULT PO), Take 1 tablet by mouth daily, Disp: , Rfl:   •  omeprazole (PriLOSEC) 20 mg delayed release capsule, TAKE TWO CAPSULES BY MOUTH EVERY DAY IN THE MORNING (GENERIC FOR PRILOSEC), Disp: 90 capsule, Rfl: 3  •  predniSONE 20 mg tablet, , Disp: , Rfl:   •  PreviDent 5000 Booster Plus 1.1 % PSTE, , Disp: , Rfl:   •  Sarilumab (Kevzara) 150 MG/1. 14ML SOAJ, Inject 150 mL under the skin Once every 2 weeks, Disp: , Rfl:     Allergies   Allergen Reactions   • Fruit Extracts Itching     Cantelope melon peaches   • Nuts - Food Allergy GI Intolerance   • Pollen Extract Other (See Comments)     Pollen , Trees And Flowers   • Avelox [Moxifloxacin] Itching       Vitals:    10/02/23 0818   BP: 138/86   Pulse: 78   Resp: 17   Temp: (!) 97.3 °F (36.3 °C)   SpO2: 96%     Physical Exam  Constitutional:       Appearance: She is well-developed. Comments: Well-nourished female, no respiratory distress, no signs of pain   HENT:      Head: Normocephalic and atraumatic. Right Ear: External ear normal.      Left Ear: External ear normal.   Eyes:      Conjunctiva/sclera: Conjunctivae normal.      Pupils: Pupils are equal, round, and reactive to light. Cardiovascular:      Rate and Rhythm: Normal rate and regular rhythm. Heart sounds: Normal heart sounds. Pulmonary:      Effort: Pulmonary effort is normal.      Breath sounds: Normal breath sounds. Comments: Clear bilaterally  Abdominal:      General: Bowel sounds are normal.      Palpations: Abdomen is soft. Comments: +bowel sounds, nontender, soft, cannot palpate liver or spleen, no guarding, no rigidity or rebound   Musculoskeletal:         General: Normal range of motion. Cervical back: Normal range of motion and neck supple.       Comments: No rashes or tenderness with palpation of the left anterior and lateral rib cage   Skin:     General: Skin is warm. Comments: Good color, warm, moist, no petechiae or ecchymoses   Neurological:      Mental Status: She is alert and oriented to person, place, and time. Deep Tendon Reflexes: Reflexes are normal and symmetric. Psychiatric:         Behavior: Behavior normal.         Thought Content: Thought content normal.         Judgment: Judgment normal.     Breast: Deferred, no axillary adenopathy bilaterally  Extremities:  No lower extremity edema bilaterally, no cords, pulses are 1+  Lymphatics:  No adenopathy in the neck, supraclavicular region, axilla bilaterally    Labs    8/26/2023 ([facility]) WBC = 4.1 hemoglobin = 12.7 hematocrit = 38.4 platelet = 320 neutrophil = 51% BUN = 19 creatinine = 0.69 calcium = 9.6 LFTs WNL    1/20/2023 immunofixation did not demonstrate any monoclonal gammopathy (SPEP demonstrated a faint possible band in the gamma)    8/8/2022 BUN = 17 creatinine = 0.86 calcium = 8.7 AST = 28 ALT = 40 alkaline phosphatase = 29 total protein = 7.1 total bilirubin = 0.63    Imaging    11/11/2022 1 mammogram diagnostic bilateral 3D and CAD. Impression stated stable exam, overall category 2, posttreatment changes visible in the left breast, recommend diagnostic mammogram 1 year follow-up breast.    6/17/2022 CAT scan abdomen pelvis    IMPRESSION:     1. Normal pancreas. 2.  Hepatic steatosis. 3.  Indeterminate left adrenal nodule. Although its imaging features are indeterminate, it does not have suspicious imaging features (heterogeneity, necrosis, irregular margins), therefore this is likely benign, and can be followed by non-contrast   abdomen CT or MRI in 12 months. If patient has history of adrenal hyperfunction, consider biochemical evaluation.        05/25/2022 right upper quadrant ultrasound    KIDNEY:   Right kidney measures 11.1 x 4.3 x 4.4 cm.  Volume 109.6 mL  Small simple cyst measures 7 mm. ASCITES:   None. IMPRESSION:  Probable fatty liver. No gallstones. Normal ducts. Hypoechoic area near the pancreatic tail may be related to bowel gas artifact although a shadowing structure is possible. Follow-up with MRI or CT with contrast in approximately one month's time may be useful assuming availability to the national shortage. 11/10/2021 mammogram diagnostic bilateral with 3D and CAD. Impression stated therapeutic changes left breast, no evidence for malignancy, category 2 both breast, recommend diagnostic mammogram 1 year both breasts. Pathology    Case Report   Surgical Pathology Report                         Case: [id number]                                    Authorizing Provider: Monica Rodgers MD         Collected:           03/09/2021 06               Ordering Location:     [facility]      Received:            03/09/2021 [facility]                                      Hospital Specialty                                                                                   Laboratory                                                                    Pathologist:           Briseyda Cagle MD                                                                   Specimen:    Breast, Left, Left Breast Mass, Wire Localized Lumpectomy (1 [id number]                     [facility], collected 8/5/2020)                                                    Final Diagnosis   A. Breast, Left, Left Breast Mass, Wire Localized Lumpectomy (1 slides [id number] [facility], collected 8/5/2020):  - Invasive breast carcinoma of no special type (ductal NST/invasive ductal carcinoma).    * Size: 15 mm   * Amenia grade 3 of 3 (total score: 9 of 9)    -- tubule formation, score 3    -- nuclear grade, score 3    -- mitoses, score 3.   * Breast tumor prognostic profile (per report)    -- ER: Negative 0%    -- SC: Negative 0%    -- Her2: Negative 0   * Ductal carcinoma in situ (DCIS): Not identified. * Margins: Negative for carcinoma, less than 1 mm from the closest lateral margin. * Pathologic stage (AJCC 8th ed.): pT1c.     Comment: Only 1 slide is available for review.     Electronically signed by Supriya Shepherd MD on 3/9/2021 at 12:26 PM       08/06/2020 Department of pathology [facility]: Left breast mass, wire localized lumpectomy: Poorly differentiated carcinoma consistent with invasive breast ductal carcinoma, grade 3, margins negative, left side, tumor size = 15 mm, single focus, no DCIS, skin not present, no skeletal muscle present, 2 neg lymph nodes, ER = 0%, AR = 0% HER2/selina = 0 = negative

## 2023-10-16 ENCOUNTER — TELEPHONE (OUTPATIENT)
Dept: NEUROLOGY | Facility: CLINIC | Age: 73
End: 2023-10-16

## 2023-10-24 ENCOUNTER — TELEPHONE (OUTPATIENT)
Dept: GASTROENTEROLOGY | Facility: CLINIC | Age: 73
End: 2023-10-24

## 2023-10-24 NOTE — TELEPHONE ENCOUNTER
Scheduled date of EGD(as of today): 12/4/23  Physician performing EGD: Dr. Carry Ormond  Location of EGD: [facility]  Instructions reviewed with patient by: Juan David MESSER   Clearances: n/a

## 2023-10-30 ENCOUNTER — OFFICE VISIT (OUTPATIENT)
Dept: NEUROLOGY | Facility: CLINIC | Age: 73
End: 2023-10-30
Payer: COMMERCIAL

## 2023-10-30 VITALS
HEIGHT: 62 IN | OXYGEN SATURATION: 99 % | DIASTOLIC BLOOD PRESSURE: 78 MMHG | SYSTOLIC BLOOD PRESSURE: 138 MMHG | BODY MASS INDEX: 29.44 KG/M2 | TEMPERATURE: 96.5 F | HEART RATE: 73 BPM | WEIGHT: 160 LBS

## 2023-10-30 DIAGNOSIS — M54.16 RADICULOPATHY, LUMBAR REGION: Primary | ICD-10-CM

## 2023-10-30 DIAGNOSIS — M48.061 LUMBAR SPINAL STENOSIS: ICD-10-CM

## 2023-10-30 PROCEDURE — 1160F RVW MEDS BY RX/DR IN RCRD: CPT | Performed by: PSYCHIATRY & NEUROLOGY

## 2023-10-30 PROCEDURE — 1159F MED LIST DOCD IN RCRD: CPT | Performed by: PSYCHIATRY & NEUROLOGY

## 2023-10-30 PROCEDURE — 99214 OFFICE O/P EST MOD 30 MIN: CPT | Performed by: PSYCHIATRY & NEUROLOGY

## 2023-10-30 RX ORDER — RALOXIFENE HYDROCHLORIDE 60 MG/1
60 TABLET, FILM COATED ORAL DAILY
COMMUNITY
Start: 2023-10-12 | End: 2024-10-11

## 2023-10-30 NOTE — PROGRESS NOTES
Return NeuroOutpatient Note        Ashvin Leigh  [id number]  68 y.o.  1950       Lumbar spinal stenosis        History obtained from:  Patient     HPI/Subjective: Ashvin Leigh is a 67 yo F with PMH of lumbar spinal stenosis presents as f/u. Per my previous history, she's had numbness at her finger tips from RA. She then had chemo taxel between 2020 and 2021 which did increase her numbness and tingling. Since August of 2022, patient has started getting lower back pain after a trip that required an 8 hour drive to [place]. She had reported left lower back pain, into gluteal region, down posterior thigh and leg and into toes. She tried chiropractor sessions which have helped. We had ordered MRI LS spine which had revealed multilevel spondylotic changes of the lumbar spine including multifactorial severe L4-5 central canal encroachment; an intracanalicular synovial cyst identified at the L3-4 level resulting in moderate central canal encroachment at this level. We had referred her to neurosurgery and she did see PA of Dr. Dayna Cuba. Since patient's pain was doing better with PT, they recommended continuing and trying pain management if needed and if still symptomatic then consider surgery. If pain was not controlled, they would offer fusion. Patient has been feeling ok since. Now she's 75% better compared to 85% prior. She was off of arthritic medication due to babesiosis, and only restarted them last week. All of her neuropathy labs were wnl. She has script for gabapentin 300mg but she doesn't use it much. Patient is much better but still gets lower back pain that radiates to gluteal region. On right side, she has hip and ankle chronic problems from prior surgery. Her LDL is 164 and was recently placed on lipitor 10mg. her repeat LDL was 46 but TG were elevated. She believes it was from Actemra. She gets morning stiffness in all joints from RA.       Patient has h/o lyme disease and babesiosis. It was a long course to heal from it. Past Medical History:   Diagnosis Date   • Asthma    • Back pain with radiculopathy 03/01/2023   • BRCA1 negative    • BRCA2 negative    • Breast cancer ([address]) 2020    Left  breast, triple negative   • Colon polyp    • History of chemotherapy 10/01/2020    breast cancer - left   • History of radiation therapy 04/01/2021    left breast cancer   • Hypertension    • Lyme disease    • OA (osteoarthritis)    • Rheumatoid arthritis ([address])      Social History     Socioeconomic History   • Marital status: /Civil Union     Spouse name: Not on file   • Number of children: Not on file   • Years of education: Not on file   • Highest education level: Not on file   Occupational History   • Not on file   Tobacco Use   • Smoking status: Never   • Smokeless tobacco: Never   Vaping Use   • Vaping Use: Never used   Substance and Sexual Activity   • Alcohol use: Not Currently     Comment: occasional   • Drug use: Never   • Sexual activity: Not on file   Other Topics Concern   • Not on file   Social History Narrative   • Not on file     Social Determinants of Health     Financial Resource Strain: Low Risk  (9/10/2023)    Overall Financial Resource Strain (CARDIA)    • Difficulty of Paying Living Expenses: Not hard at all   Food Insecurity: Not on file   Transportation Needs: No Transportation Needs (9/10/2023)    PRAPARE - Transportation    • Lack of Transportation (Medical): No    • Lack of Transportation (Non-Medical):  No   Physical Activity: Not on file   Stress: Not on file   Social Connections: Not on file   Intimate Partner Violence: Not on file   Housing Stability: Not on file     Family History   Problem Relation Age of Onset   • Breast cancer Mother    • Lung cancer Father    • Thyroid cancer Son    • Breast cancer Paternal Grandmother      Allergies   Allergen Reactions   • Fruit Extracts Itching     Cantelope melon peaches   • Nuts - Food Allergy GI Intolerance   • Pollen Extract Other (See Comments)     Pollen , Trees And Flowers   • Avelox [Moxifloxacin] Itching     Current Outpatient Medications on File Prior to Visit   Medication Sig Dispense Refill   • Actemra ACTPen 162 MG/0.9ML SOAJ Once every 10 days     • albuterol (PROVENTIL HFA,VENTOLIN HFA) 90 mcg/act inhaler as needed     • atorvastatin (LIPITOR) 10 mg tablet Take 10 mg by mouth daily     • Calcium Carbonate-Vitamin D3 600-400 MG-UNIT TABS Take by mouth     • CELECOXIB PO Take by mouth 2 (two) times a day as needed     • Cholecalciferol 25 MCG (1000 UT) capsule Take 1,000 Units by mouth daily     • cyclobenzaprine (FLEXERIL) 5 mg tablet Per patient taking as needed     • Fluticasone-Salmeterol (Advair Diskus) 250-50 mcg/dose inhaler Inhale 1 puff 2 (two) times a day Rinse mouth after use. (Patient taking differently: Inhale 1 puff 2 (two) times a day as needed Rinse mouth after use.) 60 blister 0   • gabapentin (NEURONTIN) 300 mg capsule Take 300 mg by mouth once as needed (pain)     • losartan (COZAAR) 50 mg tablet 50 mg daily     • Multiple Vitamin (MULTIVITAMIN ADULT PO) Take 1 tablet by mouth daily     • omeprazole (PriLOSEC) 20 mg delayed release capsule TAKE TWO CAPSULES BY MOUTH EVERY DAY IN THE MORNING (GENERIC FOR PRILOSEC) 90 capsule 3   • predniSONE 20 mg tablet Only while taking the Kevzara     • PreviDent 5000 Booster Plus 1.1 % PSTE      • raloxifene (EVISTA) 60 mg tablet Take 60 mg by mouth daily     • Sarilumab (Kevzara) 150 MG/1. 14ML SOAJ Inject 150 mL under the skin Once every 2 weeks     • amoxicillin (AMOXIL) 500 mg capsule For dental appt (Patient not taking: Reported on 7/26/2023)     • denosumab (PROLIA) 60 mg/mL Inject 60 mg under the skin every 6 (six) months (Patient not taking: Reported on 10/30/2023)     • hydroxychloroquine (PLAQUENIL) 200 mg tablet  (Patient not taking: Reported on 10/30/2023)     • [DISCONTINUED] Na Sulfate-K Sulfate-Mg Sulf (Suprep Bowel Prep Kit) 17.5-3.13-1.6 GM/177ML SOLN Take 177 mL by mouth once for 1 dose Take 177 mL by mouth once for 1 dose (Patient not taking: Reported on 6/1/2022) 177 mL 0     No current facility-administered medications on file prior to visit. Review of Systems   Refer to positive review of systems in HPI.    Review of Systems    Constitutional- No fever  Eyes- No visual change  ENT- Hearing normal  CV- No chest pain  Resp- No Shortness of breath  GI- No diarrhea  - Bladder normal  MS- No Arthritis   Skin- No rash  Psych- No depression  Endo- No DM  Heme- No nodes    Vitals:    10/30/23 1629   BP: 138/78   BP Location: Left arm   Patient Position: Sitting   Cuff Size: Standard   Pulse: 73   Temp: (!) 96.5 °F (35.8 °C)   TempSrc: Tympanic   SpO2: 99%   Weight: 72.6 kg (160 lb)   Height: 5' 2" (1.575 m)       PHYSICAL EXAM:  Appearance: No Acute Distress  Ophthalmoscopic: Disc Flat, Normal fundus  Mental status:  Orientation: Awake, Alert, and Orientedx3  Memory: Registation 3/3 Recall 3/3  Attention: normal  Knowledge: good  Language: No aphasia  Speech: No dysarthria  Cranial Nerves:  2 No Visual Defect on Confrontation, Pupils round, equal, reactive to light  3,4,6 Extraocular Movements Intact, no nystagmus  5 Facial Sensation Intact  7 No facial asymmetry  8 Intact hearing  9,10 Palate symmetric, normal gag  11 Good shoulder shrug  12 Tongue Midline  Gait: Stable  Coordination: No ataxia with finger to nose testing, and heel to shin  Sensory: Intact, Symmetric to pinprick, light touch, vibration, and joint position  Muscle Tone: Normal              Muscle exam:  Arm Right Left Leg Right Left   Deltoid 5/5 5/5 Iliopsoas 5/5 5/5   Biceps 5/5 5/5 Quads 5/5 5/5   Triceps 5/5 5/5 Hamstrings 5/5 5/5   Wrist Extension 5/5 5/5 Ankle Dorsi Flexion 5/5 5/5   Wrist Flexion 5/5 5/5 Ankle Plantar Flexion 5/5 5/5   Interossei 5/5 5/5 Ankle Eversion 5/5 5/5   APB 5/5 5/5 Ankle Inversion 5/5 5/5       Reflexes   RJ BJ TJ KJ AJ Plantars Lyons's Right 2+ 2+ 2+ 1+ 0 Downgoing Not present   Left 2+ 2+ 2+ 2+ 0 Downgoing Not present     Personal review of  Labs:                    Diagnoses and all orders for this visit:      1. Radiculopathy, lumbar region        2. Lumbar spinal stenosis            Patient's pain is relatively well controlled. She needs to continue stretches. Asked her to try aqua therapy at [facility]. Offered pain management referral. If sxs get worse, then she'll think about it. She is resume gabapentin 300mg prn.                Total time of encounter:  30 min  More than 50% of the time was used in counseling and/or coordination of care  Extent of counseling and/or coordination of care        MD Yesenia Morrow [facility]  [address]  [phone]

## 2023-11-20 ENCOUNTER — ANESTHESIA EVENT (OUTPATIENT)
Dept: ANESTHESIOLOGY | Facility: HOSPITAL | Age: 73
End: 2023-11-20

## 2023-11-20 ENCOUNTER — ANESTHESIA (OUTPATIENT)
Dept: ANESTHESIOLOGY | Facility: HOSPITAL | Age: 73
End: 2023-11-20

## 2023-12-03 RX ORDER — SODIUM CHLORIDE, SODIUM LACTATE, POTASSIUM CHLORIDE, CALCIUM CHLORIDE 600; 310; 30; 20 MG/100ML; MG/100ML; MG/100ML; MG/100ML
75 INJECTION, SOLUTION INTRAVENOUS CONTINUOUS
Status: CANCELLED | OUTPATIENT
Start: 2023-12-03

## 2023-12-04 ENCOUNTER — ANESTHESIA (OUTPATIENT)
Dept: GASTROENTEROLOGY | Facility: AMBULARY SURGERY CENTER | Age: 73
End: 2023-12-04

## 2023-12-04 ENCOUNTER — HOSPITAL ENCOUNTER (OUTPATIENT)
Dept: GASTROENTEROLOGY | Facility: AMBULARY SURGERY CENTER | Age: 73
Setting detail: OUTPATIENT SURGERY
Discharge: HOME | End: 2023-12-04
Attending: INTERNAL MEDICINE | Admitting: INTERNAL MEDICINE
Payer: COMMERCIAL

## 2023-12-04 ENCOUNTER — ANESTHESIA EVENT (OUTPATIENT)
Dept: GASTROENTEROLOGY | Facility: AMBULARY SURGERY CENTER | Age: 73
End: 2023-12-04

## 2023-12-04 VITALS
BODY MASS INDEX: 29.44 KG/M2 | TEMPERATURE: 97.4 F | WEIGHT: 160 LBS | OXYGEN SATURATION: 100 % | DIASTOLIC BLOOD PRESSURE: 65 MMHG | RESPIRATION RATE: 18 BRPM | SYSTOLIC BLOOD PRESSURE: 128 MMHG | HEART RATE: 60 BPM | HEIGHT: 62 IN

## 2023-12-04 DIAGNOSIS — K21.9 GASTROESOPHAGEAL REFLUX DISEASE WITHOUT ESOPHAGITIS: ICD-10-CM

## 2023-12-04 PROBLEM — E78.5 HYPERLIPIDEMIA: Status: ACTIVE | Noted: 2023-12-04

## 2023-12-04 PROBLEM — J45.909 MILD ASTHMA: Status: ACTIVE | Noted: 2023-12-04

## 2023-12-04 PROCEDURE — 88305 TISSUE EXAM BY PATHOLOGIST: CPT | Performed by: PATHOLOGY

## 2023-12-04 PROCEDURE — 43239 EGD BIOPSY SINGLE/MULTIPLE: CPT | Performed by: INTERNAL MEDICINE

## 2023-12-04 RX ORDER — LIDOCAINE HYDROCHLORIDE 10 MG/ML
INJECTION, SOLUTION EPIDURAL; INFILTRATION; INTRACAUDAL; PERINEURAL AS NEEDED
Status: DISCONTINUED | OUTPATIENT
Start: 2023-12-04 | End: 2023-12-04

## 2023-12-04 RX ORDER — PROPOFOL 10 MG/ML
INJECTION, EMULSION INTRAVENOUS AS NEEDED
Status: DISCONTINUED | OUTPATIENT
Start: 2023-12-04 | End: 2023-12-04

## 2023-12-04 RX ORDER — SODIUM CHLORIDE, SODIUM LACTATE, POTASSIUM CHLORIDE, CALCIUM CHLORIDE 600; 310; 30; 20 MG/100ML; MG/100ML; MG/100ML; MG/100ML
75 INJECTION, SOLUTION INTRAVENOUS CONTINUOUS
Status: DISCONTINUED | OUTPATIENT
Start: 2023-12-04 | End: 2023-12-08 | Stop reason: HOSPADM

## 2023-12-04 RX ORDER — OMEPRAZOLE 20 MG/1
20 CAPSULE, DELAYED RELEASE ORAL DAILY
Qty: 90 CAPSULE | Refills: 3 | Status: SHIPPED | OUTPATIENT
Start: 2023-12-04

## 2023-12-04 RX ADMIN — SODIUM CHLORIDE, SODIUM LACTATE, POTASSIUM CHLORIDE, AND CALCIUM CHLORIDE 75 ML/HR: .6; .31; .03; .02 INJECTION, SOLUTION INTRAVENOUS at 10:19

## 2023-12-04 RX ADMIN — LIDOCAINE HYDROCHLORIDE 50 MG: 10 INJECTION, SOLUTION EPIDURAL; INFILTRATION; INTRACAUDAL; PERINEURAL at 11:19

## 2023-12-04 RX ADMIN — PROPOFOL 50 MG: 10 INJECTION, EMULSION INTRAVENOUS at 11:23

## 2023-12-04 RX ADMIN — PROPOFOL 50 MG: 10 INJECTION, EMULSION INTRAVENOUS at 11:20

## 2023-12-04 RX ADMIN — PROPOFOL 100 MG: 10 INJECTION, EMULSION INTRAVENOUS at 11:19

## 2023-12-04 RX ADMIN — PROPOFOL 50 MG: 10 INJECTION, EMULSION INTRAVENOUS at 11:21

## 2023-12-04 NOTE — ANESTHESIA PREPROCEDURE EVALUATION
Procedure:  EGD    Relevant Problems   CARDIO   (+) HTN (hypertension)   (+) Hyperlipidemia      GYN   (+) Invasive ductal carcinoma of left breast (HCC) s/p lumpectomy, Chemo & XRT      HEMATOLOGY   (+) Thrombocytopenia (HCC)      MUSCULOSKELETAL   (+) Back pain with radiculopathy   (+) Rheumatoid arthritis, involving unspecified site, unspecified whether rheumatoid factor present (HCC)      PULMONARY   (+) Mild asthma        Physical Exam    Airway    Mallampati score: II  TM Distance: >3 FB  Neck ROM: full     Dental       Cardiovascular  Rhythm: regular, Rate: normal    Pulmonary   Breath sounds clear to auscultation    Other Findings  post-pubertal.      Anesthesia Plan  ASA Score- 2     Anesthesia Type- IV sedation with anesthesia with ASA Monitors. Additional Monitors:     Airway Plan:            Plan Factors-    Chart reviewed. Patient is not a current smoker. Induction- intravenous. Postoperative Plan-     Informed Consent- Anesthetic plan and risks discussed with patient. I personally reviewed this patient with the CRNA. Discussed and agreed on the Anesthesia Plan with the CRNA. Abbey Prado

## 2023-12-04 NOTE — ANESTHESIA POSTPROCEDURE EVALUATION
Post-Op Assessment Note    CV Status:  Stable  Pain Score: 0    Pain management: adequate       Mental Status:  Sleepy   Hydration Status:  Euvolemic   PONV Controlled:  Controlled   Airway Patency:  Patent     Post Op Vitals Reviewed: Yes    No anethesia notable event occurred.     Staff: Anesthesiologist, CRNA               /60 (12/04/23 1132)    Temp     Pulse 78 (12/04/23 1132)   Resp 16 (12/04/23 1132)    SpO2 95 % (12/04/23 1132)

## 2023-12-04 NOTE — H&P
History and Physical -  [facility]  Norma Tay 68 y.o. female MRN: [id number]                  HPI: Norma Tay is a 68y.o. year old female who presents for esophagitis follow up. REVIEW OF SYSTEMS: Per the HPI, and otherwise unremarkable.     Historical Information   Past Medical History:   Diagnosis Date    Asthma     Back pain with radiculopathy 03/01/2023    BRCA1 negative     BRCA2 negative     Breast cancer ([address]) 2020    Left  breast, triple negative    Colon polyp     History of chemotherapy 10/01/2020    breast cancer - left    History of radiation therapy 04/01/2021    left breast cancer    Hypertension     Lyme disease     OA (osteoarthritis)     Rheumatoid arthritis ([address])      Past Surgical History:   Procedure Laterality Date    ANKLE SURGERY Right 2016    2 plates and screws    BREAST BIOPSY Left 07/02/2020    BREAST LUMPECTOMY Left 08/12/2020    left breast wire localized lumpectomy    BREAST MASS EXCISION Left 09/02/2020    short superior and long lateral left breast resection, left axillary sentinel lymph node biopsy    COLONOSCOPY      ELBOW FRACTURE REPAIR Right 1996    GUM SURGERY      HYSTERECTOMY  2018    REPLACEMENT TOTAL HIP W/  RESURFACING IMPLANTS  06/18/2021     Social History   Social History     Substance and Sexual Activity   Alcohol Use Not Currently    Comment: occasional     Social History     Substance and Sexual Activity   Drug Use Never     Social History     Tobacco Use   Smoking Status Never   Smokeless Tobacco Never     Family History   Problem Relation Age of Onset    Breast cancer Mother     Lung cancer Father     Thyroid cancer Son     Breast cancer Paternal Grandmother        Meds/Allergies       Current Outpatient Medications:     atorvastatin (LIPITOR) 10 mg tablet    Calcium Carbonate-Vitamin D3 600-400 MG-UNIT TABS    CELECOXIB PO    Cholecalciferol 25 MCG (1000 UT) capsule    losartan (COZAAR) 50 mg tablet    Multiple Vitamin (MULTIVITAMIN ADULT PO)    omeprazole (PriLOSEC) 20 mg delayed release capsule    PreviDent 5000 Booster Plus 1.1 % PSTE    raloxifene (EVISTA) 60 mg tablet    Actemra ACTPen 162 MG/0.9ML SOAJ    albuterol (PROVENTIL HFA,VENTOLIN HFA) 90 mcg/act inhaler    amoxicillin (AMOXIL) 500 mg capsule    cyclobenzaprine (FLEXERIL) 5 mg tablet    denosumab (PROLIA) 60 mg/mL    Fluticasone-Salmeterol (Advair Diskus) 250-50 mcg/dose inhaler    gabapentin (NEURONTIN) 300 mg capsule    hydroxychloroquine (PLAQUENIL) 200 mg tablet    predniSONE 20 mg tablet    Sarilumab (Kevzara) 150 MG/1. 14ML SOAJ    Current Facility-Administered Medications:     lactated ringers infusion, 75 mL/hr, Intravenous, Continuous, 75 mL/hr at 12/04/23 1019    Allergies   Allergen Reactions    Fruit Extracts Itching     Cantelope melon peaches    Nuts - Food Allergy GI Intolerance    Pollen Extract Other (See Comments)     Pollen , Trees And Flowers    Avelox [Moxifloxacin] Itching       Objective     /73   Pulse 79   Temp (!) 97.4 °F (36.3 °C) (Temporal)   Resp 18   Ht 5' 2" (1.575 m)   Wt 72.6 kg (160 lb)   SpO2 95%   BMI 29.26 kg/m²       PHYSICAL EXAM    Gen: NAD  Head: NCAT  CV: RRR  CHEST: Clear  ABD: soft, NT/ND  EXT: no edema      ASSESSMENT/PLAN:  This is a 68y.o. year old female here for EGD, and she is stable and optimized for her procedure.

## 2023-12-07 ENCOUNTER — HOSPITAL ENCOUNTER (OUTPATIENT)
Dept: RADIOLOGY | Facility: HOSPITAL | Age: 73
Discharge: HOME | End: 2023-12-07
Attending: FAMILY MEDICINE
Payer: COMMERCIAL

## 2023-12-07 VITALS — WEIGHT: 157 LBS | BODY MASS INDEX: 28.89 KG/M2 | HEIGHT: 62 IN

## 2023-12-07 DIAGNOSIS — Z85.3 HISTORY OF BILATERAL BREAST CANCER: ICD-10-CM

## 2023-12-07 PROCEDURE — 88305 TISSUE EXAM BY PATHOLOGIST: CPT | Performed by: PATHOLOGY

## 2023-12-07 PROCEDURE — 77066 DX MAMMO INCL CAD BI: CPT

## 2023-12-07 PROCEDURE — G0279 TOMOSYNTHESIS, MAMMO: HCPCS

## 2024-01-02 ENCOUNTER — APPOINTMENT (OUTPATIENT)
Dept: LAB | Facility: HOSPITAL | Age: 74
End: 2024-01-02
Attending: PSYCHIATRY & NEUROLOGY
Payer: COMMERCIAL

## 2024-01-02 DIAGNOSIS — E78.5 HLD (HYPERLIPIDEMIA): ICD-10-CM

## 2024-01-02 DIAGNOSIS — E78.00 ELEVATED CHOLESTEROL: ICD-10-CM

## 2024-01-02 LAB
CHOLEST SERPL-MCNC: 166 MG/DL
HDLC SERPL-MCNC: 52 MG/DL
LDLC SERPL CALC-MCNC: 83 MG/DL (ref 0–100)
NONHDLC SERPL-MCNC: 114 MG/DL
TRIGL SERPL-MCNC: 153 MG/DL

## 2024-01-02 PROCEDURE — 36415 COLL VENOUS BLD VENIPUNCTURE: CPT

## 2024-01-02 PROCEDURE — 80061 LIPID PANEL: CPT

## 2024-01-17 ENCOUNTER — TELEPHONE (OUTPATIENT)
Age: 74
End: 2024-01-17

## 2024-01-17 NOTE — TELEPHONE ENCOUNTER
Caller: Shelia Miller    Doctor: Dr. Dan    Reason for call: appt/checked chart for date last seen/2021/scheduled    Call back#: NA

## 2024-01-23 ENCOUNTER — OFFICE VISIT (OUTPATIENT)
Age: 74
End: 2024-01-23
Payer: COMMERCIAL

## 2024-01-23 VITALS
SYSTOLIC BLOOD PRESSURE: 140 MMHG | DIASTOLIC BLOOD PRESSURE: 77 MMHG | HEART RATE: 103 BPM | WEIGHT: 157 LBS | BODY MASS INDEX: 28.89 KG/M2 | HEIGHT: 62 IN | RESPIRATION RATE: 18 BRPM

## 2024-01-23 DIAGNOSIS — M25.572 ARTHRALGIA OF LEFT FOOT: ICD-10-CM

## 2024-01-23 DIAGNOSIS — M21.962 ACQUIRED DEFORMITY OF LEFT FOOT: Primary | ICD-10-CM

## 2024-01-23 DIAGNOSIS — M21.42 ACQUIRED FLAT FOOT, LEFT: ICD-10-CM

## 2024-01-23 DIAGNOSIS — M54.16 RADICULOPATHY OF LUMBAR REGION: ICD-10-CM

## 2024-01-23 DIAGNOSIS — M76.822 POSTERIOR TIBIAL TENDINITIS OF LEFT LOWER EXTREMITY: ICD-10-CM

## 2024-01-23 PROCEDURE — 99070 SPECIAL SUPPLIES PHYS/QHP: CPT | Performed by: PODIATRIST

## 2024-01-23 PROCEDURE — 99214 OFFICE O/P EST MOD 30 MIN: CPT | Performed by: PODIATRIST

## 2024-01-23 RX ORDER — MELOXICAM 7.5 MG/1
7.5 TABLET ORAL DAILY
Qty: 20 TABLET | Refills: 0 | Status: SHIPPED | OUTPATIENT
Start: 2024-01-23 | End: 2024-02-12

## 2024-01-23 NOTE — PROGRESS NOTES
Assessment/Plan: Posterior tibial tendinitis left foot.  Arthralgia left foot.  Acquired deformity foot.  Acquired pes planus.  This is all secondary to muscular low skeletal imbalance of radiculopathy.  Pain.    Plan.  Chart reviewed.  Primary care notes reviewed.  Neurology notes reviewed.  Radiographs reviewed by physician.  Patient examined.  Patient advised on condition.  At this time we will treat for posterior tibial tendinitis induced by pes planus of musculoskeletal imbalance of radiculopathy.  Patient been immobilized with semicustom molded ankle-foot orthotic.  Foot x-rays ordered.  Patient be placed on Mobic.  In addition patient be referred to pain management for workup and treatment.  Patient will restart gabapentin.  She will take 3 mg twice daily.  Lab work reviewed prior to recommending this.  Aftercare instruction given.  Return for follow-up.         Diagnoses and all orders for this visit:    Acquired deformity of left foot  -     X-ray foot left 3+ views; Future    Acquired flat foot, left  -     X-ray foot left 3+ views; Future    Arthralgia of left foot  -     X-ray foot left 3+ views; Future  -     meloxicam (MOBIC) 7.5 mg tablet; Take 1 tablet (7.5 mg total) by mouth daily for 20 days    Posterior tibial tendinitis of left lower extremity  -     X-ray foot left 3+ views; Future  -     meloxicam (MOBIC) 7.5 mg tablet; Take 1 tablet (7.5 mg total) by mouth daily for 20 days    Radiculopathy of lumbar region  -     Ambulatory referral to Spine & Pain Management; Future          Subjective: Patient has pain and swelling of her left foot.  This has been insidious.  It has been bothersome for the last several months.  No history of trauma.  Patient suffers from chronic low back pain.    Allergies   Allergen Reactions    Fruit Extracts Itching     Cantelope melon peaches    Nuts - Food Allergy GI Intolerance    Pollen Extract Other (See Comments)     Pollen , Trees And Flowers    Avelox  [Moxifloxacin] Itching         Current Outpatient Medications:     meloxicam (MOBIC) 7.5 mg tablet, Take 1 tablet (7.5 mg total) by mouth daily for 20 days, Disp: 20 tablet, Rfl: 0    Actemra ACTPen 162 MG/0.9ML SOAJ, Once every 10 days (Patient not taking: Reported on 12/4/2023), Disp: , Rfl:     albuterol (PROVENTIL HFA,VENTOLIN HFA) 90 mcg/act inhaler, as needed, Disp: , Rfl:     amoxicillin (AMOXIL) 500 mg capsule, For dental appt (Patient not taking: Reported on 7/26/2023), Disp: , Rfl:     atorvastatin (LIPITOR) 10 mg tablet, Take 10 mg by mouth daily, Disp: , Rfl:     Calcium Carbonate-Vitamin D3 600-400 MG-UNIT TABS, Take by mouth, Disp: , Rfl:     CELECOXIB PO, Take by mouth 2 (two) times a day as needed, Disp: , Rfl:     Cholecalciferol 25 MCG (1000 UT) capsule, Take 1,000 Units by mouth daily, Disp: , Rfl:     cyclobenzaprine (FLEXERIL) 5 mg tablet, Per patient taking as needed, Disp: , Rfl:     denosumab (PROLIA) 60 mg/mL, Inject 60 mg under the skin every 6 (six) months (Patient not taking: Reported on 10/30/2023), Disp: , Rfl:     Fluticasone-Salmeterol (Advair Diskus) 250-50 mcg/dose inhaler, Inhale 1 puff 2 (two) times a day Rinse mouth after use. (Patient taking differently: Inhale 1 puff 2 (two) times a day as needed Rinse mouth after use.), Disp: 60 blister, Rfl: 0    gabapentin (NEURONTIN) 300 mg capsule, Take 300 mg by mouth once as needed (pain) (Patient not taking: Reported on 12/4/2023), Disp: , Rfl:     hydroxychloroquine (PLAQUENIL) 200 mg tablet, , Disp: , Rfl:     losartan (COZAAR) 50 mg tablet, 50 mg daily, Disp: , Rfl:     Multiple Vitamin (MULTIVITAMIN ADULT PO), Take 1 tablet by mouth daily, Disp: , Rfl:     omeprazole (PriLOSEC) 20 mg delayed release capsule, Take 1 capsule (20 mg total) by mouth daily, Disp: 90 capsule, Rfl: 3    predniSONE 20 mg tablet, Only while taking the Kevzara, Disp: , Rfl:     PreviDent 5000 Booster Plus 1.1 % PSTE, , Disp: , Rfl:     raloxifene (EVISTA) 60  mg tablet, Take 60 mg by mouth daily, Disp: , Rfl:     Sarilumab (Kevzara) 150 MG/1.14ML SOAJ, Inject 150 mL under the skin Once every 2 weeks, Disp: , Rfl:     Patient Active Problem List   Diagnosis    Invasive ductal carcinoma of left breast (HCC) s/p lumpectomy, Chemo & XRT    Rheumatoid arthritis, involving unspecified site, unspecified whether rheumatoid factor present (HCC)    HTN (hypertension)    Drug-induced polyneuropathy (HCC)    Diastolic CHF, acute (HCC)    Back pain with radiculopathy    Thrombocytopenia (HCC)    Leukopenia    Abdominal pain    Hyperlipidemia    Mild asthma          Patient ID: Shelia Miller is a 73 y.o. female.    HPI    The following portions of the patient's history were reviewed and updated as appropriate:     family history includes Breast cancer in her paternal grandmother; Breast cancer (age of onset: 59) in her mother; Lung cancer in her father; Thyroid cancer in her son.      reports that she has never smoked. She has never used smokeless tobacco. She reports that she does not currently use alcohol. She reports that she does not use drugs.    Vitals:    01/23/24 1337   BP: 140/77   Pulse: 103   Resp: 18       Review of Systems      Objective:  Patient's shoes and socks removed.   Foot ExamPhysical Exam      Foot Exam     General  General Appearance: appears stated age and healthy   Orientation: alert and oriented to person, place, and time   Affect: appropriate   Gait: antalgic         Right Foot/Ankle      Inspection and Palpation  Tenderness: metatarsals   Swelling: dorsum   Arch: pes planus  Hallux limitus: yes     Neurovascular  Dorsalis pedis: 3+  Posterior tibial: 3+        Left Foot/Ankle       Inspection and Palpation  Tenderness: metatarsals   Swelling: dorsum   Arch: pes planus  Hallux limitus: yes     Neurovascular  Dorsalis pedis: 3+  Posterior tibial: 3+           Physical Exam  Vitals and nursing note reviewed.   Constitutional:       Appearance: Normal  appearance.   Cardiovascular:      Rate and Rhythm: Normal rate and regular rhythm.      Pulses:           Dorsalis pedis pulses are 3+ on the right side and 3+ on the left side.        Posterior tibial pulses are 3+ on the right side and 3+ on the left side.   Musculoskeletal:      Comments:  Patient has a pelvic tilt in both stance and gait.  Left leg appears shorter than right.  This will be checked with scanogram.  She is maximally pronated in stance and gait.  She has collapse of the medial arch bilateral.  There is  Range of motion of subtalar joint.  Pain with palpation left posterior tibial tendon course.  Skin:     Capillary Refill: Capillary refill takes less than 2 seconds.   Neurological:      Mental Status: She is alert.  Patient demonstrates 4/5 L5 testing of the left lower extremity.  Decreased vibratory sensation.  Psychiatric:         Mood and Affect: Mood normal.         Thought Content: Thought content normal.         Judgment: Judgment normal.

## 2024-01-26 ENCOUNTER — HOSPITAL ENCOUNTER (OUTPATIENT)
Dept: RADIOLOGY | Facility: HOSPITAL | Age: 74
Discharge: HOME | End: 2024-01-26
Payer: COMMERCIAL

## 2024-01-26 ENCOUNTER — OFFICE VISIT (OUTPATIENT)
Dept: URGENT CARE | Facility: CLINIC | Age: 74
End: 2024-01-26
Payer: COMMERCIAL

## 2024-01-26 VITALS
TEMPERATURE: 97.3 F | OXYGEN SATURATION: 98 % | WEIGHT: 160 LBS | RESPIRATION RATE: 18 BRPM | HEART RATE: 80 BPM | DIASTOLIC BLOOD PRESSURE: 75 MMHG | BODY MASS INDEX: 29.26 KG/M2 | SYSTOLIC BLOOD PRESSURE: 155 MMHG

## 2024-01-26 DIAGNOSIS — M21.42 ACQUIRED FLAT FOOT, LEFT: ICD-10-CM

## 2024-01-26 DIAGNOSIS — R05.1 ACUTE COUGH: ICD-10-CM

## 2024-01-26 DIAGNOSIS — J45.20 MILD INTERMITTENT ASTHMA WITHOUT COMPLICATION: Primary | ICD-10-CM

## 2024-01-26 DIAGNOSIS — M21.962 ACQUIRED DEFORMITY OF LEFT FOOT: ICD-10-CM

## 2024-01-26 DIAGNOSIS — M76.822 POSTERIOR TIBIAL TENDINITIS OF LEFT LOWER EXTREMITY: ICD-10-CM

## 2024-01-26 DIAGNOSIS — M25.572 ARTHRALGIA OF LEFT FOOT: ICD-10-CM

## 2024-01-26 PROCEDURE — 73630 X-RAY EXAM OF FOOT: CPT

## 2024-01-26 PROCEDURE — 99213 OFFICE O/P EST LOW 20 MIN: CPT | Performed by: PREVENTIVE MEDICINE

## 2024-01-26 RX ORDER — FLUTICASONE PROPIONATE AND SALMETEROL 250; 50 UG/1; UG/1
1 POWDER RESPIRATORY (INHALATION) 2 TIMES DAILY
Qty: 60 BLISTER | Refills: 0 | Status: SHIPPED | OUTPATIENT
Start: 2024-01-26

## 2024-01-26 RX ORDER — AZITHROMYCIN 250 MG/1
TABLET, FILM COATED ORAL
Qty: 6 TABLET | Refills: 0 | Status: SHIPPED | OUTPATIENT
Start: 2024-01-26 | End: 2024-01-30

## 2024-01-26 NOTE — PROGRESS NOTES
[facility]        NAME: Shelia Miller is a 73 y.o. female  : 1950    MRN: [id number]  DATE: 2024  TIME: 3:18 PM    Assessment and Plan   Acute cough [R05.1]  1. Acute cough        2. Mild intermittent asthma without complication              Patient Instructions       Follow up with PCP in 3-5 days.  Proceed to  ER if symptoms worsen.    Chief Complaint     Chief Complaint   Patient presents with    Cough     Pt is complaining of cough, itchy throat and losing voice. Symptoms started 2 weeks ago and started to go away but then came back 2 days ago. Pt did a covid test a week ago and it was negative.         History of Present Illness       2 weeks of head congestion and coughing which started to go away and now back again.  She has increased cough though no fever or shortness of breath    Cough  Pertinent negatives include no fever or shortness of breath.       Review of Systems   Review of Systems   Constitutional:  Negative for fever.   Respiratory:  Positive for cough. Negative for shortness of breath.          Current Medications       Current Outpatient Medications:     Actemra ACTPen 162 MG/0.9ML SOAJ, Once every 10 days (Patient not taking: Reported on 2023), Disp: , Rfl:     albuterol (PROVENTIL HFA,VENTOLIN HFA) 90 mcg/act inhaler, as needed, Disp: , Rfl:     amoxicillin (AMOXIL) 500 mg capsule, For dental appt (Patient not taking: Reported on 2023), Disp: , Rfl:     atorvastatin (LIPITOR) 10 mg tablet, Take 10 mg by mouth daily, Disp: , Rfl:     Calcium Carbonate-Vitamin D3 600-400 MG-UNIT TABS, Take by mouth, Disp: , Rfl:     CELECOXIB PO, Take by mouth 2 (two) times a day as needed, Disp: , Rfl:     Cholecalciferol 25 MCG (1000 UT) capsule, Take 1,000 Units by mouth daily, Disp: , Rfl:     cyclobenzaprine (FLEXERIL) 5 mg tablet, Per patient taking as needed, Disp: , Rfl:     denosumab (PROLIA) 60 mg/mL, Inject 60 mg under the skin every 6 (six) months (Patient not  taking: Reported on 10/30/2023), Disp: , Rfl:     Fluticasone-Salmeterol (Advair Diskus) 250-50 mcg/dose inhaler, Inhale 1 puff 2 (two) times a day Rinse mouth after use. (Patient taking differently: Inhale 1 puff 2 (two) times a day as needed Rinse mouth after use.), Disp: 60 blister, Rfl: 0    gabapentin (NEURONTIN) 300 mg capsule, Take 300 mg by mouth once as needed (pain) (Patient not taking: Reported on 12/4/2023), Disp: , Rfl:     hydroxychloroquine (PLAQUENIL) 200 mg tablet, , Disp: , Rfl:     losartan (COZAAR) 50 mg tablet, 50 mg daily, Disp: , Rfl:     meloxicam (MOBIC) 7.5 mg tablet, Take 1 tablet (7.5 mg total) by mouth daily for 20 days, Disp: 20 tablet, Rfl: 0    Multiple Vitamin (MULTIVITAMIN ADULT PO), Take 1 tablet by mouth daily, Disp: , Rfl:     omeprazole (PriLOSEC) 20 mg delayed release capsule, Take 1 capsule (20 mg total) by mouth daily, Disp: 90 capsule, Rfl: 3    predniSONE 20 mg tablet, Only while taking the Kevzara, Disp: , Rfl:     PreviDent 5000 Booster Plus 1.1 % PSTE, , Disp: , Rfl:     raloxifene (EVISTA) 60 mg tablet, Take 60 mg by mouth daily, Disp: , Rfl:     Sarilumab (Kevzara) 150 MG/1.14ML SOAJ, Inject 150 mL under the skin Once every 2 weeks, Disp: , Rfl:     Current Allergies     Allergies as of 01/26/2024 - Reviewed 01/26/2024   Allergen Reaction Noted    Fruit extracts Itching 03/12/2021    Nuts - food allergy GI Intolerance 03/12/2021    Pollen extract Other (See Comments) 05/24/2023    Avelox [moxifloxacin] Itching 03/12/2021            The following portions of the patient's history were reviewed and updated as appropriate: allergies, current medications, past family history, past medical history, past social history, past surgical history and problem list.     Past Medical History:   Diagnosis Date    Asthma     Back pain with radiculopathy 03/01/2023    BRCA1 negative     BRCA2 negative     Breast cancer (HCC) 2020    Left  breast, triple negative    Colon polyp      History of chemotherapy 10/01/2020    breast cancer - left    History of radiation therapy 04/01/2021    left breast cancer    Hypertension     Lyme disease     OA (osteoarthritis)     Rheumatoid arthritis (HCC)        Past Surgical History:   Procedure Laterality Date    ANKLE SURGERY Right 2016    2 plates and screws    BREAST BIOPSY Left 07/02/2020    BREAST LUMPECTOMY Left 08/12/2020    left breast wire localized lumpectomy    BREAST MASS EXCISION Left 09/02/2020    short superior and long lateral left breast resection, left axillary sentinel lymph node biopsy    COLONOSCOPY      ELBOW FRACTURE REPAIR Right 1996    GUM SURGERY      HYSTERECTOMY  2018    REPLACEMENT TOTAL HIP W/  RESURFACING IMPLANTS  06/18/2021       Family History   Problem Relation Age of Onset    Breast cancer Mother 59    Lung cancer Father     Breast cancer Paternal Grandmother     Thyroid cancer Son          Medications have been verified.        Objective   /75   Pulse 80   Temp (!) 97.3 °F (36.3 °C)   Resp 18   Wt 72.6 kg (160 lb)   SpO2 98%   BMI 29.26 kg/m²   No LMP recorded. Patient has had a hysterectomy.       Physical Exam     Physical Exam  Pulmonary:      Breath sounds: Normal breath sounds. No wheezing, rhonchi or rales.

## 2024-01-26 NOTE — PATIENT INSTRUCTIONS
Because you are on immunosuppressants we will try you on a short course of antibiotic.  If not improving in several days recheck

## 2024-01-29 ENCOUNTER — CONSULT (OUTPATIENT)
Dept: PAIN MEDICINE | Facility: CLINIC | Age: 74
End: 2024-01-29
Payer: COMMERCIAL

## 2024-01-29 ENCOUNTER — TELEPHONE (OUTPATIENT)
Age: 74
End: 2024-01-29

## 2024-01-29 VITALS
SYSTOLIC BLOOD PRESSURE: 127 MMHG | HEART RATE: 75 BPM | WEIGHT: 160 LBS | DIASTOLIC BLOOD PRESSURE: 68 MMHG | BODY MASS INDEX: 29.26 KG/M2

## 2024-01-29 DIAGNOSIS — I15.9 SECONDARY HYPERTENSION: ICD-10-CM

## 2024-01-29 DIAGNOSIS — M54.10 BACK PAIN WITH RADICULOPATHY: ICD-10-CM

## 2024-01-29 DIAGNOSIS — M54.16 RADICULOPATHY OF LUMBAR REGION: Primary | ICD-10-CM

## 2024-01-29 DIAGNOSIS — M48.062 SPINAL STENOSIS OF LUMBAR REGION WITH NEUROGENIC CLAUDICATION: ICD-10-CM

## 2024-01-29 DIAGNOSIS — M21.962 ACQUIRED DEFORMITY OF LEFT FOOT: ICD-10-CM

## 2024-01-29 DIAGNOSIS — I50.31 DIASTOLIC CHF, ACUTE (HCC): ICD-10-CM

## 2024-01-29 DIAGNOSIS — D69.6 THROMBOCYTOPENIA (HCC): ICD-10-CM

## 2024-01-29 DIAGNOSIS — M06.9 RHEUMATOID ARTHRITIS, INVOLVING UNSPECIFIED SITE, UNSPECIFIED WHETHER RHEUMATOID FACTOR PRESENT (HCC): ICD-10-CM

## 2024-01-29 DIAGNOSIS — G89.4 CHRONIC PAIN SYNDROME: ICD-10-CM

## 2024-01-29 PROCEDURE — 99204 OFFICE O/P NEW MOD 45 MIN: CPT | Performed by: STUDENT IN AN ORGANIZED HEALTH CARE EDUCATION/TRAINING PROGRAM

## 2024-01-29 PROCEDURE — 1160F RVW MEDS BY RX/DR IN RCRD: CPT | Performed by: STUDENT IN AN ORGANIZED HEALTH CARE EDUCATION/TRAINING PROGRAM

## 2024-01-29 PROCEDURE — 1159F MED LIST DOCD IN RCRD: CPT | Performed by: STUDENT IN AN ORGANIZED HEALTH CARE EDUCATION/TRAINING PROGRAM

## 2024-01-29 RX ORDER — PREGABALIN 25 MG/1
25 CAPSULE ORAL 2 TIMES DAILY
Qty: 60 CAPSULE | Refills: 0 | Status: SHIPPED | OUTPATIENT
Start: 2024-01-29 | End: 2024-02-28

## 2024-01-29 NOTE — TELEPHONE ENCOUNTER
Spoke with patient she is advise of her results , she stated she is still wearing the brace , and taking the  mobic , she was on her way to pain management she stated if she has any problems she will call back and come in sooner than scheduled appt

## 2024-01-29 NOTE — TELEPHONE ENCOUNTER
----- Message from Shin Dan DPM sent at 1/29/2024  8:50 AM EST -----  Please advise patient x-ray demonstrates arthritis.  If she is having problems I want to see her.  ----- Message -----  From: Mara, Radiology Results In  Sent: 1/27/2024  12:24 PM EST  To: Shin Dan DPM

## 2024-01-29 NOTE — PROGRESS NOTES
Assessment:  1. Radiculopathy of lumbar region    2. Secondary hypertension    3. Diastolic CHF, acute ([facility])    4. Rheumatoid arthritis, involving unspecified site, unspecified whether rheumatoid factor present ([facility])    5. Acquired deformity of left foot    6. Back pain with radiculopathy    7. Thrombocytopenia (HCC)    8. Chronic pain syndrome    9. Spinal stenosis of lumbar region with neurogenic claudication    Patient is a pleasant 73-year-old woman referred by Dr. Dan for symptoms of lumbar radiculopathy.  Patient seen on 1/23/2024 by Dr. Dan due to deformity of the left foot and patient was continued with the immobilizer and a custom orthotic.  Additionally patient was prescribed meloxicam and told to restart gabapentin 300 mg twice daily.  Patient also seen by neurosurgery last year but at that time patient was doing better with physical therapy alone.  Patient with MRI of the lumbar spine from 2/7/2023 with multilevel spondylosis with central herniation at L4-L5 with a synovial cyst at L3-L4.  Patient does have a history of Lyme disease and babesiosis.  Patient's pain has been ongoing for the past 5 years and over the past month the intensity pain has been moderate to severe.  She rates the pain currently as a 9 out of 10 on numeric rating scale and the pain is occurring constantly especially when walking.  There is no typical pattern regards to the time of day where symptoms are better or worse and she describes the pain as cramping, numbing, pressure-like, throbbing, dull and aching with some associated weakness in the lower extremities.  Patient does not use any assistive devices.  Activities that increase her pain include pressure, lying down, standing, bending, sitting, walking, exercise.  Past surgical history significant for right ankle fixation and right hip replacement.  Prior medical history significant for reflux, high cholesterol, hypertension, RA, breast cancer.  Prior pain treatments  include physical therapy which provided moderate relief, heat and ice which provides moderate relief, TENS unit and chiropractor manipulation with provides moderate relief.  In regards to medication she has been trialed on tramadol in the past which did help, currently uses Voltaren gel.  She also currently prescribed Flexeril in the past has tried gabapentin which did not help.  She has not been trialed on Lyrica.    On further discussion with patient in regards to treatment options decision made to continue with medication management and to hold off on any interventional therapy.  Given patient reporting side effects with gabapentin we will discontinue and rotate to Lyrica at the lowest effective dose.  Will start 25 mg twice a day and patient counseled to continue with meloxicam as prescribed by Dr. Dan.  Patient to follow-up in 1 month for further medication management. MRI images independently reviewed and discussed with patient.     Plan:  Discontinue gabapentin  Start Lyrica 25 mg BID prn. Titration schedule provided  Continue mobic as prescribed by Dr. Dan.   Follow up in one month for further medication management. Patient counseled to continue with PT and HEP  No orders of the defined types were placed in this encounter.      New Medications Ordered This Visit   Medications   • pregabalin (LYRICA) 25 mg capsule     Sig: Take 1 capsule (25 mg total) by mouth 2 (two) times a day Start with one tablet nightly for 7 days and then increase to twice daily from there.     Dispense:  60 capsule     Refill:  0       My impressions and treatment recommendations were discussed in detail with the patient, who verbalized understanding and had no further questions.      Follow-up is planned in four weeks time or sooner as warranted.  Discharge instructions were provided. I personally saw and examined the patient and I agree with the above discussed plan of care.    History of Present Illness:    Shelia Miller is a  73 y.o. female who presents to [facility] for initial evaluation of the above stated pain complaints. The patient has a past medical and chronic pain history as outlined in the assessment section. She was referred by Shin Dan DPM  [address] [place] [id number].    Patient is a pleasant 73-year-old woman referred by Dr. Dan for symptoms of lumbar radiculopathy.  Patient seen on 1/23/2024 by Dr. Dan due to deformity of the left foot and patient was continued with the immobilizer and a custom orthotic.  Additionally patient was prescribed meloxicam and told to restart gabapentin 300 mg twice daily.  Patient also seen by neurosurgery last year but at that time patient was doing better with physical therapy alone.  Patient with MRI of the lumbar spine from 2/7/2023 with multilevel spondylosis with central herniation at L4-L5 with a synovial cyst at L3-L4.  Patient does have a history of Lyme disease and babesiosis.  Patient's pain has been ongoing for the past 5 years and over the past month the intensity pain has been moderate to severe.  She rates the pain currently as a 9 out of 10 on numeric rating scale and the pain is occurring constantly especially when walking.  There is no typical pattern regards to the time of day where symptoms are better or worse and she describes the pain as cramping, numbing, pressure-like, throbbing, dull and aching with some associated weakness in the lower extremities.  Patient does not use any assistive devices.  Activities that increase her pain include pressure, lying down, standing, bending, sitting, walking, exercise.  Past surgical history significant for right ankle fixation and right hip replacement.  Prior medical history significant for reflux, high cholesterol, hypertension, RA, breast cancer.  Prior pain treatments include physical therapy which provided moderate relief, heat and ice which provides moderate relief, TENS unit  and chiropractor manipulation with provides moderate relief.  In regards to medication she has been trialed on tramadol in the past which did help, currently uses Voltaren gel.  She also currently prescribed Flexeril in the past has tried gabapentin which did not help.  She has not been trialed on Lyrica.    Review of Systems:    Review of Systems   Constitutional:  Negative for chills and fatigue.   HENT:  Negative for ear pain, mouth sores and sinus pressure.    Eyes:  Positive for visual disturbance. Negative for pain and redness.   Respiratory:  Positive for cough and shortness of breath. Negative for wheezing.    Cardiovascular:  Negative for chest pain and palpitations.   Gastrointestinal:  Negative for abdominal pain and nausea.   Endocrine: Negative for polyphagia.   Musculoskeletal:  Positive for back pain, gait problem, neck pain and neck stiffness. Negative for arthralgias.        Decreased ROM, muscle weakness and joint pain    Skin:  Negative for wound.   Neurological:  Positive for weakness, light-headedness, numbness and headaches. Negative for seizures.   Psychiatric/Behavioral:  Positive for sleep disturbance. Negative for dysphoric mood.            Past Medical History:   Diagnosis Date   • Asthma    • Back pain with radiculopathy 03/01/2023   • BRCA1 negative    • BRCA2 negative    • Breast cancer (HCC) 2020    Left  breast, triple negative   • Colon polyp    • History of chemotherapy 10/01/2020    breast cancer - left   • History of radiation therapy 04/01/2021    left breast cancer   • Hypertension    • Lyme disease    • OA (osteoarthritis)    • Rheumatoid arthritis (HCC)        Past Surgical History:   Procedure Laterality Date   • ANKLE SURGERY Right 2016    2 plates and screws   • BREAST BIOPSY Left 07/02/2020   • BREAST LUMPECTOMY Left 08/12/2020    left breast wire localized lumpectomy   • BREAST MASS EXCISION Left 09/02/2020    short superior and long lateral left breast resection, left  axillary sentinel lymph node biopsy   • COLONOSCOPY     • ELBOW FRACTURE REPAIR Right 1996   • GUM SURGERY     • HYSTERECTOMY  2018   • REPLACEMENT TOTAL HIP W/  RESURFACING IMPLANTS  06/18/2021       Family History   Problem Relation Age of Onset   • Breast cancer Mother 59   • Lung cancer Father    • Breast cancer Paternal Grandmother    • Thyroid cancer Son        Social History     Occupational History   • Not on file   Tobacco Use   • Smoking status: Never     Passive exposure: Past   • Smokeless tobacco: Never   Vaping Use   • Vaping status: Never Used   Substance and Sexual Activity   • Alcohol use: Yes     Comment: occasional   • Drug use: Never   • Sexual activity: Not on file         Current Outpatient Medications:   •  Actemra ACTPen 162 MG/0.9ML SOAJ, Once every 10 days, Disp: , Rfl:   •  albuterol (PROVENTIL HFA,VENTOLIN HFA) 90 mcg/act inhaler, as needed, Disp: , Rfl:   •  atorvastatin (LIPITOR) 10 mg tablet, Take 10 mg by mouth daily, Disp: , Rfl:   •  azithromycin (ZITHROMAX) 250 mg tablet, Take 2 tablets today then 1 tablet daily x 4 days, Disp: 6 tablet, Rfl: 0  •  Calcium Carbonate-Vitamin D3 600-400 MG-UNIT TABS, Take by mouth, Disp: , Rfl:   •  CELECOXIB PO, Take by mouth 2 (two) times a day as needed On hold, Disp: , Rfl:   •  Cholecalciferol 25 MCG (1000 UT) capsule, Take 1,000 Units by mouth daily, Disp: , Rfl:   •  cyclobenzaprine (FLEXERIL) 5 mg tablet, Per patient taking as needed, Disp: , Rfl:   •  Fluticasone-Salmeterol (Advair Diskus) 250-50 mcg/dose inhaler, Inhale 1 puff 2 (two) times a day Rinse mouth after use., Disp: 60 blister, Rfl: 0  •  losartan (COZAAR) 50 mg tablet, 50 mg daily, Disp: , Rfl:   •  meloxicam (MOBIC) 7.5 mg tablet, Take 1 tablet (7.5 mg total) by mouth daily for 20 days, Disp: 20 tablet, Rfl: 0  •  Multiple Vitamin (MULTIVITAMIN ADULT PO), Take 1 tablet by mouth daily, Disp: , Rfl:   •  omeprazole (PriLOSEC) 20 mg delayed release capsule, Take 1 capsule (20 mg  total) by mouth daily, Disp: 90 capsule, Rfl: 3  •  pregabalin (LYRICA) 25 mg capsule, Take 1 capsule (25 mg total) by mouth 2 (two) times a day Start with one tablet nightly for 7 days and then increase to twice daily from there., Disp: 60 capsule, Rfl: 0  •  PreviDent 5000 Booster Plus 1.1 % PSTE, , Disp: , Rfl:   •  raloxifene (EVISTA) 60 mg tablet, Take 60 mg by mouth daily, Disp: , Rfl:   •  amoxicillin (AMOXIL) 500 mg capsule, For dental appt (Patient not taking: Reported on 7/26/2023), Disp: , Rfl:   •  denosumab (PROLIA) 60 mg/mL, Inject 60 mg under the skin every 6 (six) months (Patient not taking: Reported on 10/30/2023), Disp: , Rfl:   •  hydroxychloroquine (PLAQUENIL) 200 mg tablet, , Disp: , Rfl:   •  predniSONE 20 mg tablet, Only while taking the Kevzara, Disp: , Rfl:   •  Sarilumab (Kevzara) 150 MG/1.14ML SOAJ, Inject 150 mL under the skin Once every 2 weeks, Disp: , Rfl:     Allergies   Allergen Reactions   • Fruit Extracts Itching     Cantelope melon peaches   • Nuts - Food Allergy GI Intolerance   • Pollen Extract Other (See Comments)     Pollen , Trees And Flowers   • Avelox [Moxifloxacin] Itching       Physical Exam:    /68   Pulse 75   Wt 72.6 kg (160 lb)   BMI 29.26 kg/m²     Constitutional: normal, well developed, well nourished, alert, in no distress and non-toxic and no overt pain behavior.  Eyes: anicteric  HEENT: grossly intact  Neck: supple, symmetric, trachea midline and no masses   Pulmonary:even and unlabored  Cardiovascular:No edema or pitting edema present  Skin:Normal without rashes or lesions and well hydrated  Psychiatric:Mood and affect appropriate  Neurologic:Cranial Nerves II-XII grossly intact  Musculoskeletal:normal gait.     Imaging  MRI lumbar spine without contrast  Status: Final result     PACS Images     Show images for MRI lumbar spine without contrast  Study Result    Narrative & Impression   MRI LUMBAR SPINE WITHOUT CONTRAST     INDICATION: R20.0: Anesthesia  of skin  M54.50: Low back pain, unspecified  M54.16: Radiculopathy, lumbar region.     COMPARISON:  None.     TECHNIQUE:  Multiplanar, multisequence imaging of the lumbar spine was performed. .          IMAGE QUALITY:  Diagnostic     FINDINGS:     VERTEBRAL BODIES:  There are 5 lumbar type vertebral bodies.  Normal alignment of the lumbar spine.  No spondylolysis or spondylolisthesis. No scoliosis.  No compression fracture.    Normal marrow signal is identified within the visualized bony   structures.  No discrete marrow lesion.     SACRUM:  Normal signal within the sacrum. No evidence of insufficiency or stress fracture.     DISTAL CORD AND CONUS:  Normal size and signal within the distal cord and conus.     PARASPINAL SOFT TISSUES:  Paraspinal soft tissues are unremarkable.     LOWER THORACIC DISC SPACES:  Normal disc height and signal.  No disc herniation, canal stenosis or foraminal narrowing.     LUMBAR DISC SPACES:     L1-L2:  Normal.     L2-L3:  Diffuse disc bulge with superimposed right paracentral disc extrusion exhibiting mild cranial and caudal migration with prominent facet arthrosis bilaterally.  There is moderate central canal encroachment with narrowing of the right subarticular   recess.  Correlate clinically for descending right L3 nerve root impingement.  There is also mild right foraminal stenosis at this level.     L3-L4:  Prominent facet arthrosis with an intracanalicular synovial cyst measuring up to 7 x 6 x 9 mm best seen on series 6 image 14 and resulting in moderate central canal stenosis at this level.  The foramina remain patent.     L4-L5:  Diffuse disc bulge with facet hypertrophy and ligamentum flavum infolding resulting in moderate to severe trefoil stenosis.  Superimposed left foraminal disc extrusion with slight cranial migration results in mild to moderate left foraminal   stenosis.  Correlate clinically for left L4 radicular symptoms.     L5-S1:  Minor bulge with facet arthrosis  and ligamentum flavum infolding but no significant central canal stenosis.     OTHER FINDINGS:  Diverticular disease without diverticulitis.  Right hip arthroplasty noted.     IMPRESSION:  Multilevel spondylotic changes of the lumbar spine including multifactorial severe L4-5 central canal encroachment.  Additionally, there is an intracanalicular synovial cyst identified at the L3-4 level resulting in moderate central canal   encroachment at this level.  Recommend surgical evaluation.     The study was marked in EPIC for significant notification.     Workstation performed: [id number]        Imaging    MRI lumbar spine without contrast (Order: [id number]) - 2/7/2023    Result History    MRI lumbar spine without contrast (Order #[id number]) on 2/9/2023 - Order Result History Report    Order Report     Order Details  Order Questions    Question Answer   What is the patient's sedation requirement? No Sedation   Metallic implants? No   Note: Answer Yes or No   Does this procedure require the 3T MRI at [place] or [place]? No   Release to patient through Mychart Immediate   Is order priority selected as STAT? No   Exam reason radiating lower back pain   Note: Enter reason for exam   Test performed in 1 month            Result Information    Status Priority Source   Final result (2/9/2023 12:14 PM) Routine      Reason for Exam    radiating lower back pain; radiating lower back pain   Dx: Numbness of right lower extremity [R20.0 (ICD-10-CM)]; Lower back pain [M54.50 (ICD-10-CM)]; Radiculopathy, lumbar region [M54.16 (ICD-10-CM)]       All Reviewers List    Tia Ramirez on 2/13/2023  9:29 AM   Jean Pierre Jon MD on 2/10/2023  3:48 PM         MRI lumbar spine without contrast: Patient Communication     Add Comments   Seen       Signed by    Signed Date/Time  Phone Pager   ROSSYNICOLE GRACE 2/09/2023 12:14 [phone]        Exam Information    Status Exam Begun  Exam Ended  Performing Tech   Final [99] 2/07/2023 10:23 2/07/2023 10:42  "Mine Bowen       Questionnaire          Question Answer Comment   1. What is the patient's sedation requirement? No Sedation    2. Does the patient have metallic implants? No    3. Does this procedure require the 3T MRI at [place] or [place]? No    4. Release to patient through [employer] Immediate    5. Is order priority selected as STAT? No    6. Reason for Exam (FREE TEXT) radiating lower back pain    7. When should the test be performed? in 1 month          Begin Exam      IMAGING BEGIN MRI    Question Answer Comment   1. Have you reviewed the MRI Screening Form? Yes    2. Have you performed a Full Stop/Final Check before entering Zone 4? Yes         End Exam      PATIENT EDUCATION    Question Answer Comment   1. Was the patient educated? Yes    2. Why was the patient not educated?           IMAGING END MRI TECH NOTES    Question Answer Comment   1. Script info as it appears: MRI lumbar spine without contrast    2. Patient History: radiating lower back pain, Numbness of right lower extremity,    3. Has the patient had prior surgery on this body part? n    4. Any history of cancer? breast    5. Add'l Imaging Notes: authorized without contrast    6. Outside images/report information:      7. Implant clearance information:      8. LMP and ORAL contraceptives:      9. Was jewelry removed from the patient? No    10. Jewelry removed:            Screening Form Questions     important suggestion  Questionnaires are displayed in the order they were answered.      Answer Comment   Has patient changed into the appropriate hospital gown?     What are your symptoms? Low back pain, numbness and tingling and pain left leg    Do you have a cardiac pacemaker or internal defibrillator? NA    Please list /make/model:     Have you had any HEART surgery? None    Please list make/model:     Heart surgery: If \"other\", please describe:     Have you had any BRAIN surgery? None    Please list  or describe " "implant:     Brain surgery: If \"other\", please describe:     Have you had any EYE surgery? None    Eye surgery: If \"other\", please describe:     Have you ever injured your eyes with metal or metal fragments (grinding,metallic slivers)? No    Eye injury: Please describe:     Have you had any EAR surgery? None    Ear surgery: If \"other\", please describe:     Do you have an electronic \"stimulation\" device? None    Please provide  information:     Have you had any abdominal or pelvic surgery? Yes    Have you had any of the following abdominal or pelvic surgeries: Recent endoscopy/camera    Abdominal/pelvic surgery: If \"other\", please describe:     Do you have any ORTHOPEDIC implants/devices? Joint replacement     Screws/pins/anchors    Do you have the following: Body piercings    Do you have liver disease? None    Do you have kidney disease? None    Have you had a problem with a previous MRI? No    Does the patient require pre-medication?     Do you have a personal history of cancer? Radiation therapy     Chemotherapy    If \"other\", please specify:       Are you wearing medication patches?   No    Are you wearing any of the following: None    Date of last menstrual cycle: 2007    Postmenopausal? Yes    Pregnant?     Breastfeeding?     Breast tissue expander?     Do any of the following apply to you:     Please specify:     Did the patient provide this information? Yes    Who provided this information (if not the patient)?     Relationship to the patient:     Contact number:     MRI STAFF ONLY- Prior imaging reviewed in PACS (initials): jessica    MRI STAFF ONLY- Epic chart reviewed (initials): jessica    MRI STAFF ONLY: The patient was scheduled to receive MRI contrast and has received the Medication Guide. No contrast given        External Results Report    Open External Results Report      Encounter    View Encounter                     Patient Care Timeline    No data selected in time range    Pre-op " Summary    Pre-op             Recovery Summary    Recovery                 Routing History    None   g      No orders to display       No orders of the defined types were placed in this encounter.

## 2024-02-20 ENCOUNTER — OFFICE VISIT (OUTPATIENT)
Dept: PULMONOLOGY | Facility: MEDICAL CENTER | Age: 74
End: 2024-02-20
Payer: COMMERCIAL

## 2024-02-20 VITALS
OXYGEN SATURATION: 97 % | BODY MASS INDEX: 28.88 KG/M2 | TEMPERATURE: 98.4 F | WEIGHT: 163 LBS | SYSTOLIC BLOOD PRESSURE: 120 MMHG | DIASTOLIC BLOOD PRESSURE: 80 MMHG | HEIGHT: 63 IN | HEART RATE: 84 BPM | RESPIRATION RATE: 16 BRPM

## 2024-02-20 DIAGNOSIS — M21.962 ACQUIRED DEFORMITY OF LEFT FOOT: ICD-10-CM

## 2024-02-20 DIAGNOSIS — J45.20 MILD INTERMITTENT ASTHMA WITHOUT COMPLICATION: ICD-10-CM

## 2024-02-20 DIAGNOSIS — M06.9 RHEUMATOID ARTHRITIS, INVOLVING UNSPECIFIED SITE, UNSPECIFIED WHETHER RHEUMATOID FACTOR PRESENT (HCC): ICD-10-CM

## 2024-02-20 DIAGNOSIS — J45.30 MILD PERSISTENT ASTHMA WITHOUT COMPLICATION: Primary | ICD-10-CM

## 2024-02-20 PROCEDURE — 99205 OFFICE O/P NEW HI 60 MIN: CPT | Performed by: STUDENT IN AN ORGANIZED HEALTH CARE EDUCATION/TRAINING PROGRAM

## 2024-02-20 RX ORDER — FLUTICASONE PROPIONATE AND SALMETEROL 250; 50 UG/1; UG/1
1 POWDER RESPIRATORY (INHALATION) 2 TIMES DAILY
Qty: 60 BLISTER | Refills: 11 | Status: SHIPPED | OUTPATIENT
Start: 2024-02-20

## 2024-02-20 NOTE — LETTER
February 20, 2024     Nicky Gale MD  [address] [facility]  [address] [place] [id number]    Patient: Shelia Miller   YOB: 1950   Date of Visit: 2/20/2024       Dear Dr. Gale:    Thank you for referring Shelia Miller to me for evaluation. Below are my notes for this consultation.    If you have questions, please do not hesitate to call me. I look forward to following your patient along with you.         Sincerely,        Jim Orellana MD        CC: No Recipients    Jim Orellana MD  2/20/2024  2:11 PM  Signed      Consultation - Pulmonary Medicine   Shelia Miller 73 y.o. female MRN: [id number]      Reason for Consult: Asthma    Shelia Miller is a 73 y.o. female  with a PMH of Asthma, HTN, Left Breast Cx(Trip Neg Stage I - Lumpectomy/Chemo/Rad), Osteoporosis, RA(Actemra) and recent history of Lyme disease/Babesiosis  who present to establish care.     Asthma - Mild Persistent - Likely Th2 mediated - Eos 480, +allergies - symptoms relatively well controlled with inhaler therapy but is exposed to a dog.  - Continue Advair  - Albuterol PRN     RA - Previous CT read as possible ILD, recent CT abd shows minimal ground glass with no significant evidence of ILD. Of note she previously was treated with MTX   - PFT with BD  - Patient to get old CT Chest, PFTs, CXR  - Follow up 6 months    Cough - likely UAS - has asthma, GERD and PND. Asthma appears well controlled but patient will start Flonase and omeprazole    Jim Orellana MD  [facility]    _____________________________________________________________________    HPI:    Shelia Miller is a 73 y.o. female with a PMH of Asthma, HTN, Left Breast Cx(Trip Neg Stage I - Lumpectomy/Chemo/Rad), Osteoporosis, RA(Actemra) and recent history of Lyme disease/Babesiosis  who present to establish care.     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  None    Imaging:  I personally reviewed the images on the PAC system pertinent to today's visit  9/2017  IMPRESSION Areas of mild fibrosis mild emphysema and bronchiectasis as   described above.     Other studies:  TTE 10/2014  Normal LV EF - Grade II diastolic dysfunction    PhysicalExamination:  Vitals:   /80 (BP Location: Left arm, Patient Position: Sitting, Cuff Size: Standard)   Pulse 84   Temp 98.4 °F (36.9 °C) (Temporal)   Resp 16   Ht 5' 3\" (1.6 m)   Wt 73.9 kg (163 lb)   SpO2 97%   BMI 28.87 kg/m²     Appearance -- NAD, speaking full sentences  Neuro -- A&Ox3  Neck -- no JVD  Heart -- RRR, no murmurs  Lungs -- CTAB  Abdomen -- soft, NTND  Extremities -- WWP, no LE edema  Skin -- no rash    Review of Systems:  Aside from what is mentioned in the HPI, the review of systems otherwise negative.    Immunization History   Administered Date(s) Administered   • COVID-19 MODERNA VACC 0.5 ML IM 02/13/2021, 03/13/2021, 11/19/2021, 06/01/2022        Current Medications:    Current Outpatient Medications:   •  Actemra ACTPen 162 MG/0.9ML SOAJ, Once every 10 days, Disp: , Rfl:   •  albuterol (PROVENTIL HFA,VENTOLIN HFA) 90 mcg/act inhaler, as needed, Disp: , Rfl:   •  atorvastatin (LIPITOR) 10 mg tablet, Take 10 mg by mouth daily, Disp: , Rfl:   •  Calcium Carbonate-Vitamin D3 600-400 MG-UNIT TABS, Take by mouth, Disp: , Rfl:   •  CELECOXIB PO, " Take by mouth 2 (two) times a day as needed On hold, Disp: , Rfl:   •  Cholecalciferol 25 MCG (1000 UT) capsule, Take 1,000 Units by mouth daily, Disp: , Rfl:   •  cyclobenzaprine (FLEXERIL) 5 mg tablet, Per patient taking as needed, Disp: , Rfl:   •  Fluticasone-Salmeterol (Advair Diskus) 250-50 mcg/dose inhaler, Inhale 1 puff 2 (two) times a day Rinse mouth after use., Disp: 60 blister, Rfl: 0  •  losartan (COZAAR) 50 mg tablet, 50 mg daily, Disp: , Rfl:   •  Multiple Vitamin (MULTIVITAMIN ADULT PO), Take 1 tablet by mouth daily, Disp: , Rfl:   •  omeprazole (PriLOSEC) 20 mg delayed release capsule, Take 1 capsule (20 mg total) by mouth daily, Disp: 90 capsule, Rfl: 3  •  pregabalin (LYRICA) 25 mg capsule, Take 1 capsule (25 mg total) by mouth 2 (two) times a day Start with one tablet nightly for 7 days and then increase to twice daily from there., Disp: 60 capsule, Rfl: 0  •  PreviDent 5000 Booster Plus 1.1 % PSTE, , Disp: , Rfl:   •  raloxifene (EVISTA) 60 mg tablet, Take 60 mg by mouth daily, Disp: , Rfl:   •  amoxicillin (AMOXIL) 500 mg capsule, For dental appt (Patient not taking: Reported on 7/26/2023), Disp: , Rfl:   •  denosumab (PROLIA) 60 mg/mL, Inject 60 mg under the skin every 6 (six) months (Patient not taking: Reported on 10/30/2023), Disp: , Rfl:   •  hydroxychloroquine (PLAQUENIL) 200 mg tablet, , Disp: , Rfl:   •  meloxicam (MOBIC) 7.5 mg tablet, Take 1 tablet (7.5 mg total) by mouth daily for 20 days, Disp: 20 tablet, Rfl: 0  •  predniSONE 20 mg tablet, Only while taking the Kevzara, Disp: , Rfl:   •  Sarilumab (Kevzara) 150 MG/1.14ML SOAJ, Inject 150 mL under the skin Once every 2 weeks, Disp: , Rfl:     Historical Information  Past Medical History:   Diagnosis Date   • Asthma    • Back pain with radiculopathy 03/01/2023   • BRCA1 negative    • BRCA2 negative    • Breast cancer (HCC) 2020    Left  breast, triple negative   • Colon polyp    • History of chemotherapy 10/01/2020    breast cancer -  "left   • History of radiation therapy 04/01/2021    left breast cancer   • Hypertension    • Lyme disease    • OA (osteoarthritis)    • Rheumatoid arthritis (HCC)      Past Surgical History:   Procedure Laterality Date   • ANKLE SURGERY Right 2016    2 plates and screws   • BREAST BIOPSY Left 07/02/2020   • BREAST LUMPECTOMY Left 08/12/2020    left breast wire localized lumpectomy   • BREAST MASS EXCISION Left 09/02/2020    short superior and long lateral left breast resection, left axillary sentinel lymph node biopsy   • COLONOSCOPY     • ELBOW FRACTURE REPAIR Right 1996   • GUM SURGERY     • HYSTERECTOMY  2018   • REPLACEMENT TOTAL HIP W/  RESURFACING IMPLANTS  06/18/2021     Social History  Social History     Tobacco Use   Smoking Status Never   • Passive exposure: Past   Smokeless Tobacco Never       Family History:   Family History   Problem Relation Age of Onset   • Breast cancer Mother 59   • Lung cancer Father    • Breast cancer Paternal Grandmother    • Thyroid cancer Son                  Diagnostic Data:  Labs:  I personally reviewed the most recent laboratory data pertinent to today's visit    Lab Results   Component Value Date    WBC 4.75 06/08/2022    HGB 13.8 06/08/2022    HCT 41.7 06/08/2022    MCV 94 06/08/2022     06/08/2022     Lab Results   Component Value Date    CALCIUM 8.7 08/08/2022    K 4.6 08/08/2022    CO2 31 08/08/2022     08/08/2022    BUN 17 08/08/2022    CREATININE 0.86 08/08/2022     No results found for: \"IGE\"  Lab Results   Component Value Date    ALT 40 08/08/2022    AST 28 08/08/2022    ALKPHOS 29 (L) 08/08/2022           I have spent a total time of 55 minutes on 02/20/24 in caring for this patient including Diagnostic results, Prognosis, Risks and benefits of tx options, Instructions for management, Patient and family education, Importance of tx compliance, Risk factor reductions, Impressions, Counseling / Coordination of care, Documenting in the medical record, " Reviewing / ordering tests, medicine, procedures  , Obtaining or reviewing history  , and Communicating with other healthcare professionals .   _

## 2024-02-20 NOTE — PROGRESS NOTES
Consultation - Pulmonary Medicine   Shelia Miller 73 y.o. female MRN: [id number]      Reason for Consult: Asthma    Shelia Miller is a 73 y.o. female  with a PMH of Asthma, HTN, Left Breast Cx(Trip Neg Stage I - Lumpectomy/Chemo/Rad), Osteoporosis, RA(Actemra) and recent history of Lyme disease/Babesiosis  who present to establish care.     Asthma - Mild Persistent - Likely Th2 mediated - Eos 480, +allergies - symptoms relatively well controlled with inhaler therapy but is exposed to a dog.  - Continue Advair  - Albuterol PRN     RA - Previous CT read as possible ILD, recent CT abd shows minimal ground glass with no significant evidence of ILD. Of note she previously was treated with MTX   - PFT with BD  - Patient to get old CT Chest, PFTs, CXR  - Follow up 6 months    Cough - likely UAS - has asthma, GERD and PND. Asthma appears well controlled but patient will start Flonase and omeprazole    Jim Orellana MD  [facility]    _____________________________________________________________________    HPI:    Shelia Miller is a 73 y.o. female with a PMH of Asthma, HTN, Left Breast Cx(Trip Neg Stage I - Lumpectomy/Chemo/Rad), Osteoporosis, RA(Actemra) and recent history of Lyme disease/Babesiosis  who present to establish care.     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  None    Imaging:  I personally reviewed the images on the PAC system pertinent to today's visit  9/2017  IMPRESSION Areas of mild fibrosis mild emphysema and bronchiectasis as   described above.     Other studies:  TTE 10/2014  Normal LV EF - Grade II diastolic dysfunction    PhysicalExamination:  Vitals:   /80 (BP Location: Left arm, Patient Position: Sitting, Cuff Size: Standard)   Pulse 84   Temp 98.4 °F (36.9 °C) (Temporal)   Resp 16   Ht 5' 3\" (1.6 m)   Wt 73.9 kg (163 lb)   SpO2 97%   BMI 28.87 kg/m²     Appearance -- NAD, speaking full sentences  Neuro -- A&Ox3  Neck -- no JVD  Heart -- RRR, no murmurs  Lungs -- CTAB  Abdomen -- soft, NTND  Extremities -- WWP, no LE edema  Skin -- no rash    Review of Systems:  Aside from what is mentioned in the HPI, the review of systems otherwise negative.    Immunization History   Administered Date(s) Administered    COVID-19 MODERNA VACC 0.5 ML IM 02/13/2021, 03/13/2021, 11/19/2021, 06/01/2022        Current Medications:    Current Outpatient Medications:     Actemra ACTPen 162 MG/0.9ML SOAJ, Once every 10 days, Disp: , Rfl:     albuterol (PROVENTIL HFA,VENTOLIN HFA) 90 mcg/act inhaler, as needed, Disp: , Rfl:     atorvastatin (LIPITOR) 10 mg tablet, Take 10 mg by mouth daily, Disp: , Rfl:     Calcium Carbonate-Vitamin D3 600-400 MG-UNIT TABS, Take by mouth, Disp: , Rfl:     CELECOXIB PO, Take by mouth 2 (two) times a day as needed On hold, Disp: , Rfl:     Cholecalciferol 25 MCG (1000 UT) capsule, Take 1,000 Units by mouth daily, Disp: , Rfl:     cyclobenzaprine (FLEXERIL) 5 mg tablet, Per patient taking as needed, Disp: , Rfl:     Fluticasone-Salmeterol (Advair Diskus) 250-50 mcg/dose inhaler, Inhale 1 puff 2 (two) times a day Rinse mouth after use., Disp: 60 blister, Rfl: 0    losartan (COZAAR) 50 mg tablet, 50 mg daily, Disp: , Rfl:     Multiple Vitamin (MULTIVITAMIN ADULT PO), Take 1 tablet by mouth daily, Disp: " , Rfl:     omeprazole (PriLOSEC) 20 mg delayed release capsule, Take 1 capsule (20 mg total) by mouth daily, Disp: 90 capsule, Rfl: 3    pregabalin (LYRICA) 25 mg capsule, Take 1 capsule (25 mg total) by mouth 2 (two) times a day Start with one tablet nightly for 7 days and then increase to twice daily from there., Disp: 60 capsule, Rfl: 0    PreviDent 5000 Booster Plus 1.1 % PSTE, , Disp: , Rfl:     raloxifene (EVISTA) 60 mg tablet, Take 60 mg by mouth daily, Disp: , Rfl:     amoxicillin (AMOXIL) 500 mg capsule, For dental appt (Patient not taking: Reported on 7/26/2023), Disp: , Rfl:     denosumab (PROLIA) 60 mg/mL, Inject 60 mg under the skin every 6 (six) months (Patient not taking: Reported on 10/30/2023), Disp: , Rfl:     hydroxychloroquine (PLAQUENIL) 200 mg tablet, , Disp: , Rfl:     meloxicam (MOBIC) 7.5 mg tablet, Take 1 tablet (7.5 mg total) by mouth daily for 20 days, Disp: 20 tablet, Rfl: 0    predniSONE 20 mg tablet, Only while taking the Kevzara, Disp: , Rfl:     Sarilumab (Kevzara) 150 MG/1.14ML SOAJ, Inject 150 mL under the skin Once every 2 weeks, Disp: , Rfl:     Historical Information   Past Medical History:   Diagnosis Date    Asthma     Back pain with radiculopathy 03/01/2023    BRCA1 negative     BRCA2 negative     Breast cancer (HCC) 2020    Left  breast, triple negative    Colon polyp     History of chemotherapy 10/01/2020    breast cancer - left    History of radiation therapy 04/01/2021    left breast cancer    Hypertension     Lyme disease     OA (osteoarthritis)     Rheumatoid arthritis (HCC)      Past Surgical History:   Procedure Laterality Date    ANKLE SURGERY Right 2016    2 plates and screws    BREAST BIOPSY Left 07/02/2020    BREAST LUMPECTOMY Left 08/12/2020    left breast wire localized lumpectomy    BREAST MASS EXCISION Left 09/02/2020    short superior and long lateral left breast resection, left axillary sentinel lymph node biopsy    COLONOSCOPY      ELBOW FRACTURE REPAIR  "Right 1996    GUM SURGERY      HYSTERECTOMY  2018    REPLACEMENT TOTAL HIP W/  RESURFACING IMPLANTS  06/18/2021     Social History   Social History     Tobacco Use   Smoking Status Never    Passive exposure: Past   Smokeless Tobacco Never       Family History:   Family History   Problem Relation Age of Onset    Breast cancer Mother 59    Lung cancer Father     Breast cancer Paternal Grandmother     Thyroid cancer Son                  Diagnostic Data:  Labs:  I personally reviewed the most recent laboratory data pertinent to today's visit    Lab Results   Component Value Date    WBC 4.75 06/08/2022    HGB 13.8 06/08/2022    HCT 41.7 06/08/2022    MCV 94 06/08/2022     06/08/2022     Lab Results   Component Value Date    CALCIUM 8.7 08/08/2022    K 4.6 08/08/2022    CO2 31 08/08/2022     08/08/2022    BUN 17 08/08/2022    CREATININE 0.86 08/08/2022     No results found for: \"IGE\"  Lab Results   Component Value Date    ALT 40 08/08/2022    AST 28 08/08/2022    ALKPHOS 29 (L) 08/08/2022           I have spent a total time of 55 minutes on 02/20/24 in caring for this patient including Diagnostic results, Prognosis, Risks and benefits of tx options, Instructions for management, Patient and family education, Importance of tx compliance, Risk factor reductions, Impressions, Counseling / Coordination of care, Documenting in the medical record, Reviewing / ordering tests, medicine, procedures  , Obtaining or reviewing history  , and Communicating with other healthcare professionals .   _        "

## 2024-02-27 ENCOUNTER — HOSPITAL ENCOUNTER (OUTPATIENT)
Dept: PULMONOLOGY | Facility: HOSPITAL | Age: 74
Discharge: HOME | End: 2024-02-27
Attending: STUDENT IN AN ORGANIZED HEALTH CARE EDUCATION/TRAINING PROGRAM
Payer: COMMERCIAL

## 2024-02-27 DIAGNOSIS — M06.9 RHEUMATOID ARTHRITIS, INVOLVING UNSPECIFIED SITE, UNSPECIFIED WHETHER RHEUMATOID FACTOR PRESENT (HCC): ICD-10-CM

## 2024-02-27 DIAGNOSIS — J45.30 MILD PERSISTENT ASTHMA WITHOUT COMPLICATION: ICD-10-CM

## 2024-02-27 PROCEDURE — 94729 DIFFUSING CAPACITY: CPT

## 2024-02-27 PROCEDURE — 94729 DIFFUSING CAPACITY: CPT | Performed by: INTERNAL MEDICINE

## 2024-02-27 PROCEDURE — 94060 EVALUATION OF WHEEZING: CPT | Performed by: INTERNAL MEDICINE

## 2024-02-27 PROCEDURE — 94726 PLETHYSMOGRAPHY LUNG VOLUMES: CPT

## 2024-02-27 PROCEDURE — 94760 N-INVAS EAR/PLS OXIMETRY 1: CPT

## 2024-02-27 PROCEDURE — 94060 EVALUATION OF WHEEZING: CPT

## 2024-02-27 PROCEDURE — 94726 PLETHYSMOGRAPHY LUNG VOLUMES: CPT | Performed by: INTERNAL MEDICINE

## 2024-02-27 RX ORDER — ALBUTEROL SULFATE 2.5 MG/3ML
2.5 SOLUTION RESPIRATORY (INHALATION) ONCE
Status: COMPLETED | OUTPATIENT
Start: 2024-02-27 | End: 2024-02-27

## 2024-02-27 RX ADMIN — ALBUTEROL SULFATE 2.5 MG: 2.5 SOLUTION RESPIRATORY (INHALATION) at 10:05

## 2024-03-04 ENCOUNTER — OFFICE VISIT (OUTPATIENT)
Dept: PAIN MEDICINE | Facility: CLINIC | Age: 74
End: 2024-03-04
Payer: COMMERCIAL

## 2024-03-04 VITALS
BODY MASS INDEX: 28.88 KG/M2 | SYSTOLIC BLOOD PRESSURE: 129 MMHG | DIASTOLIC BLOOD PRESSURE: 75 MMHG | HEIGHT: 63 IN | HEART RATE: 69 BPM | WEIGHT: 163 LBS

## 2024-03-04 DIAGNOSIS — M54.16 LUMBAR RADICULOPATHY: ICD-10-CM

## 2024-03-04 DIAGNOSIS — M48.062 SPINAL STENOSIS OF LUMBAR REGION WITH NEUROGENIC CLAUDICATION: ICD-10-CM

## 2024-03-04 DIAGNOSIS — G89.4 CHRONIC PAIN SYNDROME: Primary | ICD-10-CM

## 2024-03-04 DIAGNOSIS — G62.9 NEUROPATHY: ICD-10-CM

## 2024-03-04 PROCEDURE — 99214 OFFICE O/P EST MOD 30 MIN: CPT

## 2024-03-04 RX ORDER — DULOXETIN HYDROCHLORIDE 20 MG/1
20 CAPSULE, DELAYED RELEASE ORAL DAILY
Qty: 30 CAPSULE | Refills: 2 | Status: SHIPPED | OUTPATIENT
Start: 2024-03-04

## 2024-03-04 NOTE — PROGRESS NOTES
Pain Medicine Follow-Up Note    Assessment:  1. Chronic pain syndrome    2. Lumbar radiculopathy    3. Spinal stenosis of lumbar region with neurogenic claudication    4. Neuropathy        Plan:    New Medications Ordered This Visit   Medications    DULoxetine (CYMBALTA) 20 mg capsule     Sig: Take 1 capsule (20 mg total) by mouth daily     Dispense:  30 capsule     Refill:  2       My impressions and treatment recommendations were discussed in detail with the patient who verbalized understanding and had no further questions.      The patient returns the office after initiating Lyrica 25 mg.  Patient did feel the medication helps with pain, unfortunately the patient was unable to tolerate using this medication she felt it made her gait is ataxic as well as caused her to have lightheadedness.    Expressed to the patient that duloxetine may provide her some pain relief.  Patient educated that duloxetine is an SSRI needs to be taking consistently in order for her to provide pain relief.  Since the patient had side effects from low-dose pregabalin I will start duloxetine 20 mg daily.  Patient encouraged to use the medication at bedtime initially in case drowsiness or dizziness occur.  Patient verbalized understanding.    Patient does state that she gets occasional sciatic type pain, I provided the patient Margaret maneuver handout.  Patient does continue to do home exercise program and exercises she learned from physical therapy.    Follow-up is planned in 8 weeks time or sooner as warranted.  Discharge instructions were provided. I personally saw and examined the patient and I agree with the above discussed plan of care.    History of Present Illness:    Shelia Miller is a 73 y.o. female who presents to [facility] for interval re-evaluation of the above stated pain complaints. The patient has a past medical and chronic pain history as outlined in the assessment section. She was last seen on  1/29/2024.    At today's visit patient states that their pain symptoms are the same with a pain score of 5/10 on the verbal numeric pain scale.  The patient's pain is worse in the evening.  The patient's pain is intermittent in nature.  And the quality of the patient's pain is described as dull-aching, cramping, numbness, and pins-and-needles.  The patient's pain is located in the bilateral low back/bilateral buttocks, right lower leg, and bilateral feet.  Patient stated the amount of pain relief she was obtaining from pregabalin was enough to make a difference in her life however had to discontinue it due to side effects.    Other than as stated above, the patient denies any interval changes in medications, medical condition, mental condition, symptoms, or allergies since the last office visit.         Review of Systems:    Review of Systems   Respiratory:  Negative for shortness of breath.    Cardiovascular:  Negative for chest pain.   Gastrointestinal:  Negative for constipation, diarrhea, nausea and vomiting.   Musculoskeletal:  Positive for back pain, gait problem (at times) and joint swelling (pain in bilat ankles). Negative for arthralgias and myalgias.   Skin:  Negative for rash.   Neurological:  Positive for weakness. Negative for dizziness and seizures.   All other systems reviewed and are negative.        Past Medical History:   Diagnosis Date    Arthritis 2015    Asthma     Back pain with radiculopathy 03/01/2023    BRCA1 negative     BRCA2 negative     Breast cancer (HCC) 2020    Left  breast, triple negative    Cancer (HCC) 7/2019    Colon polyp     GERD (gastroesophageal reflux disease) 2016    History of chemotherapy 10/01/2020    breast cancer - left    History of radiation therapy 04/01/2021    left breast cancer    Hypertension     Lyme disease     OA (osteoarthritis)     Rheumatoid arthritis (HCC)        Past Surgical History:   Procedure Laterality Date    ANKLE SURGERY Right 2016    2 plates  and screws    BREAST BIOPSY Left 07/02/2020    BREAST LUMPECTOMY Left 08/12/2020    left breast wire localized lumpectomy    BREAST MASS EXCISION Left 09/02/2020    short superior and long lateral left breast resection, left axillary sentinel lymph node biopsy    COLONOSCOPY      ELBOW FRACTURE REPAIR Right 1996    FRACTURE SURGERY      GUM SURGERY      HYSTERECTOMY  2018    JOINT REPLACEMENT  6/2021    REPLACEMENT TOTAL HIP W/  RESURFACING IMPLANTS  06/18/2021       Family History   Problem Relation Age of Onset    Breast cancer Mother 59    Cancer Mother         Breast Cancer    Lung cancer Father     Cancer Father         Lung cancer    COPD Father     Hypertension Father     Breast cancer Paternal Grandmother     Thyroid cancer Son     Cancer Son         Thyroid Cancer       Social History     Occupational History    Not on file   Tobacco Use    Smoking status: Never     Passive exposure: Past    Smokeless tobacco: Never   Vaping Use    Vaping status: Never Used   Substance and Sexual Activity    Alcohol use: Not Currently     Comment: occasional    Drug use: Never    Sexual activity: Not on file         Current Outpatient Medications:     Actemra ACTPen 162 MG/0.9ML SOAJ, Once every 10 days, Disp: , Rfl:     albuterol (PROVENTIL HFA,VENTOLIN HFA) 90 mcg/act inhaler, as needed, Disp: , Rfl:     atorvastatin (LIPITOR) 10 mg tablet, Take 10 mg by mouth daily, Disp: , Rfl:     Calcium Carbonate-Vitamin D3 600-400 MG-UNIT TABS, Take by mouth, Disp: , Rfl:     Cholecalciferol 25 MCG (1000 UT) capsule, Take 1,000 Units by mouth daily, Disp: , Rfl:     cyclobenzaprine (FLEXERIL) 5 mg tablet, Per patient taking as needed, Disp: , Rfl:     DULoxetine (CYMBALTA) 20 mg capsule, Take 1 capsule (20 mg total) by mouth daily, Disp: 30 capsule, Rfl: 2    Fluticasone-Salmeterol (Advair Diskus) 250-50 mcg/dose inhaler, Inhale 1 puff 2 (two) times a day Rinse mouth after use., Disp: 60 blister, Rfl: 11    losartan (COZAAR) 50 mg  "tablet, 50 mg daily, Disp: , Rfl:     Multiple Vitamin (MULTIVITAMIN ADULT PO), Take 1 tablet by mouth daily, Disp: , Rfl:     omeprazole (PriLOSEC) 20 mg delayed release capsule, Take 1 capsule (20 mg total) by mouth daily, Disp: 90 capsule, Rfl: 3    PreviDent 5000 Booster Plus 1.1 % PSTE, , Disp: , Rfl:     raloxifene (EVISTA) 60 mg tablet, Take 60 mg by mouth daily, Disp: , Rfl:     amoxicillin (AMOXIL) 500 mg capsule, For dental appt (Patient not taking: Reported on 7/26/2023), Disp: , Rfl:     CELECOXIB PO, Take by mouth 2 (two) times a day as needed On hold, Disp: , Rfl:     denosumab (PROLIA) 60 mg/mL, Inject 60 mg under the skin every 6 (six) months (Patient not taking: Reported on 10/30/2023), Disp: , Rfl:     hydroxychloroquine (PLAQUENIL) 200 mg tablet, , Disp: , Rfl:     meloxicam (MOBIC) 7.5 mg tablet, Take 1 tablet (7.5 mg total) by mouth daily for 20 days, Disp: 20 tablet, Rfl: 0    predniSONE 20 mg tablet, Only while taking the Kevzara, Disp: , Rfl:     pregabalin (LYRICA) 25 mg capsule, Take 1 capsule (25 mg total) by mouth 2 (two) times a day Start with one tablet nightly for 7 days and then increase to twice daily from there., Disp: 60 capsule, Rfl: 0    Sarilumab (Kevzara) 150 MG/1.14ML SOAJ, Inject 150 mL under the skin Once every 2 weeks, Disp: , Rfl:     Allergies   Allergen Reactions    Fruit Extracts Itching     Cantelope melon peaches    Nuts - Food Allergy GI Intolerance    Pollen Extract Other (See Comments)     Pollen , Trees And Flowers    Avelox [Moxifloxacin] Itching       Physical Exam:    /75   Pulse 69   Ht 5' 3\" (1.6 m)   Wt 73.9 kg (163 lb)   BMI 28.87 kg/m²     Constitutional:normal, well developed, well nourished, alert, in no distress and non-toxic and no overt pain behavior.  Eyes:anicteric  HEENT:grossly intact  Neck:supple, symmetric, trachea midline and no masses   Pulmonary:even and unlabored  Cardiovascular:No edema or pitting edema present  Skin:Normal " without rashes or lesions and well hydrated  Psychiatric:Mood and affect appropriate  Neurologic:Cranial Nerves II-XII grossly intact  Musculoskeletal:antalgic      This document was created using speech voice recognition software.   Grammatical errors, random word insertions, pronoun errors, and incomplete sentences are an occasional consequence of this system due to software limitations, ambient noise, and hardware issues.   Any formal questions or concerns about content, text, or information contained within the body of this dictation should be directly addressed to the provider for clarification.

## 2024-03-04 NOTE — PATIENT INSTRUCTIONS
Duloxetine (By mouth)   Duloxetine (doo-LOX-[id number]-teen)  Treats depression, anxiety, diabetic peripheral neuropathy, fibromyalgia, and chronic muscle or bone pain. This medicine is an SSNRI.   Brand Name(s): Cymbalta, Drizalma Sprinkle, Irenka   There may be other brand names for this medicine.  When This Medicine Should Not Be Used:   This medicine is not right for everyone. Do not use it if you had an allergic reaction to duloxetine.  How to Use This Medicine:   Capsule, Delayed Release Capsule  Take your medicine as directed. Your dose may need to be changed several times to find what works best for you.  Delayed-release capsule: Swallow the capsule whole. Do not crush, chew, break, or open it. Do not open the Cymbalta® delayed-release capsule and sprinkle the contents on food or in liquids.  If you have trouble swallowing the Drizalma Sprinkle™ delayed release capsule:   You may open the capsule and sprinkle the contents over one tablespoon (15 mL) of applesauce. Swallow the mixture right away and do not save any of the mixture to use later.  You may open the capsule and pour the contents to an all plastic catheter tip syringe and add 50 mL of water. Do not use other liquids. Gently shake it for 10 seconds, and then use it through a nasogastric tube. Rinse with additional water (about 15 mL) if needed.  This medicine should come with a Medication Guide. Ask your pharmacist for a copy if you do not have one.  Missed dose: Take a dose as soon as you remember. If it is almost time for your next dose, wait until then and take a regular dose. Do not take extra medicine to make up for a missed dose.  Store the medicine in a closed container at room temperature, away from heat, moisture, and direct light.  Drugs and Foods to Avoid:   Ask your doctor or pharmacist before using any other medicine, including over-the-counter medicines, vitamins, and herbal products.  Do not take duloxetine if you have used an MAO inhibitor  (MAOI) within the past 14 days. Do not start taking an MAO inhibitor within 5 days of stopping duloxetine. Ask your doctor if you are not sure if you take an MAOI, including linezolid or methylene blue injection.  Some medicines can affect how duloxetine works. Tell your doctor if you are using any of the following:  Buspirone, cimetidine, ciprofloxacin, enoxacin, fentanyl, fluvoxamine, lithium, Jairo's wort, theophylline, tramadol, tryptophan, warfarin  Amphetamines  Blood pressure medicine  Diuretic (water pill)  Medicine for heart rhythm problems (including flecainide, propafenone, quinidine)  Medicine to treat migraine headaches (including triptans)  NSAID pain or arthritis medicine (including aspirin, celecoxib, diclofenac, ibuprofen, naproxen)  Other medicine to treat depression or mood disorders (including amitriptyline, desipramine, fluoxetine, imipramine, nortriptyline, paroxetine)  Phenothiazine medicine (including thioridazine)  Stomach medicine (including famotidine, antacids containing aluminum or magnesium, PPIs)  Tell your doctor if you use anything else that makes you sleepy. Some examples are allergy medicine, narcotic pain medicine, and alcohol.  Do not drink alcohol while you are using this medicine.  Warnings While Using This Medicine:   Tell your doctor if you are pregnant or breastfeeding, or if you have kidney disease, liver disease, bleeding problems, diabetes, digestion problems, glaucoma, heart disease, high or low blood pressure, or problems with urination. Tell your doctor if you smoke or you have a history of seizures, mental health problems (including bipolar disorder, gissell), or drug or alcohol addiction.  This medicine may cause the following problems:   Serious liver problems  Serotonin syndrome, when used with certain medicines  Increased risk of bleeding problems  Serious skin reactions, including erythema multiforme, Gardner-Juan Antonio syndrome  Low sodium levels in the  blood  Sexual problems  This medicine can increase thoughts of suicide. Tell your doctor right away if you start to feel depressed and have thoughts about hurting yourself.  This medicine may cause blurred vision, dizziness, drowsiness, trouble with thinking, or trouble with controlling body movements, which may lead to falls, fractures, or other injuries. Do not drive or do anything that could be dangerous until you know how this medicine affects you. Stand up slowly to avoid falls.  Do not stop using this medicine suddenly. Your doctor will need to slowly decrease your dose before you stop it completely.  Your doctor will check your progress and the effects of this medicine at regular visits. Keep all appointments.  Keep all medicine out of the reach of children. Never share your medicine with anyone.  Possible Side Effects While Using This Medicine:   Call your doctor right away if you notice any of these side effects:  Allergic reaction: Itching or hives, swelling in your face or hands, swelling or tingling in your mouth or throat, chest tightness, trouble breathing  Anxiety, restlessness, fever, fast heartbeat, sweating, muscle spasms, diarrhea, seeing or hearing things that are not there  Blistering, peeling, red skin rash  Confusion, weakness, muscle twitching  Dark urine or pale stools, nausea, vomiting, loss of appetite, stomach pain, yellow skin or eyes  Decrease in how much or how often you urinate  Decrease in sexual ability, desire, drive, or performance  Eye pain, vision changes, seeing halos around lights  Feeling more energetic than usual  Lightheadedness, dizziness, fainting  Unusual moods or behaviors, worsening depression, thoughts about hurting yourself, trouble sleeping  Unusual bleeding or bruising  If you notice these less serious side effects, talk with your doctor:   Decrease in appetite or weight  Dry mouth, constipation, mild nausea  Headache  Unusual drowsiness, sleepiness, or  tiredness  If you notice other side effects that you think are caused by this medicine, tell your doctor.   Call your doctor for medical advice about side effects. You may report side effects to FDA at 0-044-BZN-4410    © Copyright [employer] 2022 Information is for End User's use only and may not be sold, redistributed or otherwise used for commercial purposes.  The above information is an  only. It is not intended as medical advice for individual conditions or treatments. Talk to your doctor, nurse or pharmacist before following any medical regimen to see if it is safe and effective for you. Core Strengthening Exercises   WHAT YOU NEED TO KNOW:   What do I need to know about core strengthening exercises?  Core strengthening exercises help heal and strengthen muscles of your hips, back, and abdomen to prevent reinjury. They are beginning exercises to help support your spine. Ask your healthcare provider if you need to see a physical therapist for more advanced exercises.  Do the exercises on a mat or firm surface  (not on a bed) to support your spine and avoid low back pain.     Do the exercises in the same order every time  to train your muscles to work together. Your healthcare provider will show you how to perform these exercises. Do them every day, or as directed by your healthcare provider.     Move slowly and smoothly.  Avoid fast or jerky motions.     Stop if you feel pain.  It is normal to feel some discomfort at first. Regular exercise will help decrease your discomfort over time.  How do I perform core strengthening exercises safely?  Hold each exercise for 5 seconds. When you can do the exercise without pain for 5 seconds, increase your hold to 10 to 15 seconds. When you can do the exercise without pain for 10 to 15 seconds, add the next exercise. Increase the time you hold each exercise, or repeat the exercises as directed. As you do each exercise, breathe normally. Do not hold your  breath.  Abdominal bracing:  Lie on your back with your knees bent and feet flat on the floor. Place your arms in a relaxed position beside your body. Pull your belly button in toward your spine. Do not flatten or arch your back. Tighten the abdominal muscles below your belly button. Hold for 5 seconds. Begin all of your exercises with abdominal bracing. You can also practice abdominal bracing throughout the day while you are sitting or standing.           Bridging:  Lie on your back with your knees bent and feet flat on the floor. Rest your arms at your side. Tighten your buttocks, and then lift your hips 1 inch off the floor. Hold for 5 seconds. When you can do this exercise without pain for 10 seconds, increase the distance you lift your hips. A good goal is to be able to lift your hips so that your shoulders, hips, and knees are in a straight line.           Curl up:  Lie on your back with your knees bent and feet flat on the floor. Place your hands, palms down, underneath the curve in your lower back. Next, with your elbows on the floor, lift your shoulders and chest 2 to 3 inches. Keep your head in line with your shoulders. Hold this position for 5 seconds. When you can do this exercise without pain for 10 to 15 seconds, you may add a rotation. While your shoulders and chest are lifted off the ground, turn slightly to the left and hold. Repeat on the other side.           Dead bug:  Lie on your back with your knees bent and feet flat on the floor. Place your arms in a relaxed position beside your body. Begin with abdominal bracing. Next, raise one leg, keeping your knee bent. Hold for 5 seconds. Repeat with the other leg. When you can do this exercise without pain for 10 to 15 seconds, you may raise one straight leg and hold. Repeat with the other leg.           Quadruped:  Place your hands and knees on the floor. Keep your wrists directly below your shoulders and your knees directly below your hips. Pull your  belly button in toward your spine. Do not flatten or arch your back. Tighten your abdominal muscles below your belly button. Hold for 5 seconds. When you can do this exercise without pain for 10 to 15 seconds, you may extend one arm and hold. Repeat on the other side.           Side Bridge:      Standing side bridge:  Stand next to a wall and extend one arm toward the wall. Place your palm flat on the wall with your fingers pointing upward. Begin with abdominal bracing. Next, without moving your feet, slowly bend your arm to 90 degrees. Hold for 5 seconds. Repeat on the other side. When you can do this exercise without pain for 10 to 15 seconds, you may do the bent leg side bridge on the floor.           Bent leg side bridge:  Lie on one side with your legs, hips, and shoulders in a straight line. Prop yourself up onto your forearm so your elbow is directly below your shoulder. Bend your knees back to 90 degrees. Begin with abdominal bracing. Next, lift your hips and balance yourself on your forearm and knees. Hold for 5 seconds. Repeat on the other side. When you can do this exercise without pain for 10 to 15 seconds, you may do the straight leg side bridge on the floor.           Straight leg side bridge:  Lie on one side with your legs, hips, and shoulders in a straight line. Prop yourself up onto your forearm so your elbow is directly below your shoulder. Begin with abdominal bracing. Lift your hips off the floor and balance yourself on your forearm and the outside of your flexed foot. Do not let your ankle bend sideways. Hold for 5 seconds. Repeat on the other side. When you can do this exercise without pain for 10 to 15 seconds, ask your healthcare provider for more advanced exercises.       When should I contact my healthcare provider?   Your pain becomes worse.    You have new pain.    You have questions or concerns about your condition, care, or exercise program.  CARE AGREEMENT:   You have the right to  help plan your care. Learn about your health condition and how it may be treated. Discuss treatment options with your caregivers to decide what care you want to receive. You always have the right to refuse treatment. The above information is an  only. It is not intended as medical advice for individual conditions or treatments. Talk to your doctor, nurse or pharmacist before following any medical regimen to see if it is safe and effective for you.  © 2016 [employer]. Information is for End User's use only and may not be sold, redistributed or otherwise used for commercial purposes. All illustrations and images included in CareNotes® are the copyrighted property of [employer] or [employer].

## 2024-03-07 ENCOUNTER — RADIATION ONCOLOGY FOLLOW-UP (OUTPATIENT)
Dept: RADIATION ONCOLOGY | Facility: HOSPITAL | Age: 74
End: 2024-03-07
Attending: RADIOLOGY
Payer: COMMERCIAL

## 2024-03-07 VITALS
TEMPERATURE: 96.7 F | DIASTOLIC BLOOD PRESSURE: 82 MMHG | RESPIRATION RATE: 18 BRPM | OXYGEN SATURATION: 98 % | SYSTOLIC BLOOD PRESSURE: 120 MMHG | HEART RATE: 65 BPM

## 2024-03-07 DIAGNOSIS — C50.912 INVASIVE DUCTAL CARCINOMA OF LEFT BREAST (HCC): Primary | ICD-10-CM

## 2024-03-07 PROCEDURE — 99213 OFFICE O/P EST LOW 20 MIN: CPT | Performed by: RADIOLOGY

## 2024-03-07 PROCEDURE — 99211 OFF/OP EST MAY X REQ PHY/QHP: CPT | Performed by: RADIOLOGY

## 2024-03-07 NOTE — PROGRESS NOTES
Shelia Miller 1950 is a 73 y.o. female with a history of stage IB [id number] grade 3 triple negative breast cancer status post breast conservation surgery, adjuvant chemotherapy and radiation (completed 5/10/21). She was last seen 3/10/23 and presents today for follow up.      10/2/23 Dr. THEODORE Concepcion   clinically there are no concerning breast cancer associated findings.   plan is to continue with self-breast exams and yearly mammograms.     12/7/23 B/L diagnostic mammogram  LEFT  1) POST-SURGICAL FINDING [A]: There are post-surgical findings from a previous lumpectomy with radiation seen in the upper outer quadrant of the left breast. Compared to the previous study, there are no significant changes.    Right  There are no suspicious masses, grouped microcalcifications or areas of unexplained architectural distortion. The skin and nipple areolar complex are unremarkable.   RECOMMENDATION:       - Diagnostic mammogram in 1 year for both breasts.    (Breast surgeon Dr. Ji at [facility] - no recent follow-up)       Upcoming:  10/4/24 Med Onc  Mammogram not scheduled       Oncology History   Invasive ductal carcinoma of left breast (HCC) s/p lumpectomy, Chemo & XRT   2020 Initial Diagnosis    Invasive ductal carcinoma of left breast (HCC)     8/5/2020 Surgery    Breast, Left, Left Breast Mass, Wire Localized Lumpectomy (1 slides [id number] [facility], collected 8/5/2020):  Dr. Dorie Ji, Surgeon    - Invasive breast carcinoma of no special type (ductal NST/invasive ductal carcinoma).   * Size: 15 mm   * [place] grade 3 of 3 (total score: 9 of 9)    -- tubule formation, score 3    -- nuclear grade, score 3    -- mitoses, score 3.   * Breast tumor prognostic profile (per report)    -- ER: Negative 0%    -- FL: Negative 0%    -- Her2: Negative 0   * Ductal carcinoma in situ (DCIS): Not identified.    * Margins: Negative for carcinoma, less than 1 mm from the closest lateral margin.   *  Pathologic stage (AJCC 8th ed.): pT1c.        9/2/2020 Surgery    Left breast re-excision lumpectomy with SLNB  ([facility], Dr. Ji)     A. Left breast lumpectomy:  - benign breast tissue with previous lumpectomy cavity showing marked tissue reaction including foreign body giant cell reaction, granulation tissue and fat necrosis.   - No residual carcinoma seen.    B. Left axillary lymph node:  - two negative lymph nodes.        10/2020 - 3/15/2021 Chemotherapy    Adjuvant chemotherapy ([facility])  AC (Adriamycin, Cytoxan) x 4 cycles  Paclitaxel x 12 cycles     Dr. Conchis Faye, [facility]      3/12/2021 -  Cancer Staged    Staging form: Breast, AJCC 8th Edition  - Pathologic: Stage IB (pT1c, pN0, cM0, G3, ER-, MD-, HER2-) - Signed by Robin Velasquez MD on 3/12/2021  Multigene prognostic tests performed: None  Histologic grading system: 3 grade system       4/12/2021 - 5/10/2021 Radiation    4256 cGy in 16 fractions to the entire left breast followed by  additional 1000 cGy in 5 fractions to the left lumpectomy cavity  Robin Velasquez MD     4/12/2021 - 5/10/2021 Radiation    BH L BreaEZ 6X 16 / 16 266 0 4,256 21   L Breast e 9E 5 / 5 200 0 1,000 6      Treatment Dates:  4/12/2021 - 5/10/2021.          Review of Systems:  Review of Systems   Constitutional:  Positive for fatigue.   HENT: Negative.     Eyes: Negative.    Respiratory: Negative.     Cardiovascular: Negative.    Gastrointestinal: Negative.    Endocrine: Negative.    Genitourinary: Negative.    Musculoskeletal:  Positive for arthralgias (hands and feet (hx of RA)) and back pain (radiating from back to chest, left sided).   Skin: Negative.    Allergic/Immunologic: Negative.    Neurological:  Positive for weakness and numbness (neuropathy).   Hematological: Negative.    Psychiatric/Behavioral: Negative.         Clinical Trial: no    Health Maintenance   Topic Date Due    Pneumococcal Vaccine: 65+ Years (1 of 2 - PCV)  Never done    Zoster Vaccine (1 of 2) Never done    PT PLAN OF CARE  04/05/2023    Influenza Vaccine (1) Never done    COVID-19 Vaccine (5 - 2023-24 season) 09/01/2023    Fall Risk  09/11/2024    Depression Screening  09/11/2024    Urinary Incontinence Screening  09/11/2024    Medicare Annual Wellness Visit (AWV)  09/11/2024    BMI: Followup Plan  09/11/2024    Breast Cancer Screening: Mammogram  12/07/2024    BMI: Adult  03/04/2025    DXA SCAN  11/09/2026    Colorectal Cancer Screening  07/09/2032    Hepatitis C Screening  Completed    Osteoporosis Screening  Completed    HIB Vaccine  Aged Out    IPV Vaccine  Aged Out    Hepatitis A Vaccine  Aged Out    Meningococcal ACWY Vaccine  Aged Out    HPV Vaccine  Aged Out     Patient Active Problem List   Diagnosis    Invasive ductal carcinoma of left breast (HCC) s/p lumpectomy, Chemo & XRT    Rheumatoid arthritis, involving unspecified site, unspecified whether rheumatoid factor present (HCC)    HTN (hypertension)    Drug-induced polyneuropathy (HCC)    Diastolic CHF, acute (HCC)    Back pain with radiculopathy    Thrombocytopenia (HCC)    Leukopenia    Abdominal pain    Hyperlipidemia    Mild asthma    Acquired deformity of left foot     Past Medical History:   Diagnosis Date    Arthritis 2015    Asthma     Back pain with radiculopathy 03/01/2023    BRCA1 negative     BRCA2 negative     Breast cancer (HCC) 2020    Left  breast, triple negative    Cancer (HCC) 7/2019    Colon polyp     GERD (gastroesophageal reflux disease) 2016    History of chemotherapy 10/01/2020    breast cancer - left    History of radiation therapy 04/01/2021    left breast cancer    Hypertension     Lyme disease     OA (osteoarthritis)     Rheumatoid arthritis (HCC)      Past Surgical History:   Procedure Laterality Date    ANKLE SURGERY Right 2016    2 plates and screws    BREAST BIOPSY Left 07/02/2020    BREAST LUMPECTOMY Left 08/12/2020    left breast wire localized lumpectomy    BREAST MASS  EXCISION Left 09/02/2020    short superior and long lateral left breast resection, left axillary sentinel lymph node biopsy    COLONOSCOPY      ELBOW FRACTURE REPAIR Right 1996    FRACTURE SURGERY      GUM SURGERY      HYSTERECTOMY  2018    JOINT REPLACEMENT  6/2021    REPLACEMENT TOTAL HIP W/  RESURFACING IMPLANTS  06/18/2021     Family History   Problem Relation Age of Onset    Breast cancer Mother 59    Cancer Mother         Breast Cancer    Lung cancer Father     Cancer Father         Lung cancer    COPD Father     Hypertension Father     Breast cancer Paternal Grandmother     Thyroid cancer Son     Cancer Son         Thyroid Cancer     Social History     Socioeconomic History    Marital status: /Civil Union     Spouse name: Not on file    Number of children: Not on file    Years of education: Not on file    Highest education level: Not on file   Occupational History    Not on file   Tobacco Use    Smoking status: Never     Passive exposure: Past    Smokeless tobacco: Never   Vaping Use    Vaping status: Never Used   Substance and Sexual Activity    Alcohol use: Not Currently     Comment: occasional    Drug use: Never    Sexual activity: Not on file   Other Topics Concern    Not on file   Social History Narrative    Not on file     Social Determinants of Health     Financial Resource Strain: Low Risk  (9/10/2023)    Overall Financial Resource Strain (CARDIA)     Difficulty of Paying Living Expenses: Not hard at all   Food Insecurity: Not on file   Transportation Needs: No Transportation Needs (9/10/2023)    PRAPARE - Transportation     Lack of Transportation (Medical): No     Lack of Transportation (Non-Medical): No   Physical Activity: Not on file   Stress: Not on file   Social Connections: Not on file   Intimate Partner Violence: Not on file   Housing Stability: Not on file       Current Outpatient Medications:     Actemra ACTPen 162 MG/0.9ML SOAJ, Once every 10 days, Disp: , Rfl:     albuterol  (PROVENTIL HFA,VENTOLIN HFA) 90 mcg/act inhaler, as needed, Disp: , Rfl:     atorvastatin (LIPITOR) 10 mg tablet, Take 10 mg by mouth daily, Disp: , Rfl:     Calcium Carbonate-Vitamin D3 600-400 MG-UNIT TABS, Take by mouth, Disp: , Rfl:     CELECOXIB PO, Take by mouth 2 (two) times a day as needed On hold, Disp: , Rfl:     Cholecalciferol 25 MCG (1000 UT) capsule, Take 1,000 Units by mouth daily, Disp: , Rfl:     cyclobenzaprine (FLEXERIL) 5 mg tablet, Per patient taking as needed, Disp: , Rfl:     DULoxetine (CYMBALTA) 20 mg capsule, Take 1 capsule (20 mg total) by mouth daily, Disp: 30 capsule, Rfl: 2    Fluticasone-Salmeterol (Advair Diskus) 250-50 mcg/dose inhaler, Inhale 1 puff 2 (two) times a day Rinse mouth after use., Disp: 60 blister, Rfl: 11    losartan (COZAAR) 50 mg tablet, 50 mg daily, Disp: , Rfl:     Multiple Vitamin (MULTIVITAMIN ADULT PO), Take 1 tablet by mouth daily, Disp: , Rfl:     omeprazole (PriLOSEC) 20 mg delayed release capsule, Take 1 capsule (20 mg total) by mouth daily, Disp: 90 capsule, Rfl: 3    PreviDent 5000 Booster Plus 1.1 % PSTE, , Disp: , Rfl:     raloxifene (EVISTA) 60 mg tablet, Take 60 mg by mouth daily, Disp: , Rfl:     denosumab (PROLIA) 60 mg/mL, Inject 60 mg under the skin every 6 (six) months (Patient not taking: Reported on 10/30/2023), Disp: , Rfl:     meloxicam (MOBIC) 7.5 mg tablet, Take 1 tablet (7.5 mg total) by mouth daily for 20 days, Disp: 20 tablet, Rfl: 0    predniSONE 20 mg tablet, Only while taking the Kevzara, Disp: , Rfl:   Allergies   Allergen Reactions    Fruit Extracts Itching     Cantelope melon peaches    Nuts - Food Allergy GI Intolerance    Pollen Extract Other (See Comments)     Pollen , Trees And Flowers    Avelox [Moxifloxacin] Itching     Vitals:    03/07/24 0818   BP: 120/82   BP Location: Right arm   Pulse: 65   Resp: 18   Temp: (!) 96.7 °F (35.9 °C)   TempSrc: Temporal   SpO2: 98%

## 2024-03-07 NOTE — PROGRESS NOTES
Follow-up - Radiation Oncology   Shelia Miller 1950 73 y.o. female [id number]      History of Present Illness   Cancer Staging   Invasive ductal carcinoma of left breast (HCC) s/p lumpectomy, Chemo & XRT  Staging form: Breast, AJCC 8th Edition  - Pathologic: Stage IB (pT1c, pN0, cM0, G3, ER-, ND-, HER2-) - Signed by Robin Velasquez MD on 3/12/2021  Multigene prognostic tests performed: None  Histologic grading system: 3 grade system      Shelia Miller is a 73 y.o. female with a history of stage IB [id number] grade 3 triple negative breast cancer status post breast conservation surgery, adjuvant chemotherapy and radiation (completed 5/10/21). She was last seen 3/10/23 and presents today for follow up.     Interval History:  10/2/23 Dr. THEODORE Concepcion   clinically there are no concerning breast cancer associated findings.   plan is to continue with self-breast exams and yearly mammograms.      12/7/23 B/L diagnostic mammogram  LEFT  1) POST-SURGICAL FINDING [A]: There are post-surgical findings from a previous lumpectomy with radiation seen in the upper outer quadrant of the left breast. Compared to the previous study, there are no significant changes.    Right  There are no suspicious masses, grouped microcalcifications or areas of unexplained architectural distortion. The skin and nipple areolar complex are unremarkable.   RECOMMENDATION:       - Diagnostic mammogram in 1 year for both breasts.     (Breast surgeon Dr. Ji at [facility] - no recent follow-up)         Upcoming:  10/4/24 Med Onc  Mammogram not scheduled     Upon interview, she has an occasional pulling sensation across the left lateral chest wall and inframammary region.  This started a few weeks ago and is sporadic.  The discomfort is related to arm motion.  She denies new breast masses, nipple discharge or skin changes.  She has occasional swelling of the left arm.  She is without additional acute concerns.        Historical Information    Oncology History   Invasive ductal carcinoma of left breast (HCC) s/p lumpectomy, Chemo & XRT   2020 Initial Diagnosis    Invasive ductal carcinoma of left breast (HCC)     8/5/2020 Surgery    Breast, Left, Left Breast Mass, Wire Localized Lumpectomy (1 slides [id number] [facility], collected 8/5/2020):  Dr. Dorie Ji, Surgeon    - Invasive breast carcinoma of no special type (ductal NST/invasive ductal carcinoma).   * Size: 15 mm   * Karine grade 3 of 3 (total score: 9 of 9)    -- tubule formation, score 3    -- nuclear grade, score 3    -- mitoses, score 3.   * Breast tumor prognostic profile (per report)    -- ER: Negative 0%    -- MD: Negative 0%    -- Her2: Negative 0   * Ductal carcinoma in situ (DCIS): Not identified.    * Margins: Negative for carcinoma, less than 1 mm from the closest lateral margin.   * Pathologic stage (AJCC 8th ed.): pT1c.        9/2/2020 Surgery    Left breast re-excision lumpectomy with SLNB  ([facility], Dr. Ji)     A. Left breast lumpectomy:  - benign breast tissue with previous lumpectomy cavity showing marked tissue reaction including foreign body giant cell reaction, granulation tissue and fat necrosis.   - No residual carcinoma seen.    B. Left axillary lymph node:  - two negative lymph nodes.        10/2020 - 3/15/2021 Chemotherapy    Adjuvant chemotherapy ([facility])  AC (Adriamycin, Cytoxan) x 4 cycles  Paclitaxel x 12 cycles     Dr. Conchis Faye, [facility]      3/12/2021 -  Cancer Staged    Staging form: Breast, AJCC 8th Edition  - Pathologic: Stage IB (pT1c, pN0, cM0, G3, ER-, MD-, HER2-) - Signed by Robin Velasquez MD on 3/12/2021  Multigene prognostic tests performed: None  Histologic grading system: 3 grade system       4/12/2021 - 5/10/2021 Radiation    4256 cGy in 16 fractions to the entire left breast followed by  additional 1000 cGy in 5 fractions to the left lumpectomy cavity  Robin Velasquez MD      4/12/2021 - 5/10/2021 Radiation    BH L BreaEZ 6X 16 / 16 266 0 4,256 21   L Breast e 9E 5 / 5 200 0 1,000 6      Treatment Dates:  4/12/2021 - 5/10/2021.          Past Medical History:   Diagnosis Date    Arthritis 2015    Asthma     Back pain with radiculopathy 03/01/2023    BRCA1 negative     BRCA2 negative     Breast cancer (HCC) 2020    Left  breast, triple negative    Cancer (HCC) 7/2019    Colon polyp     GERD (gastroesophageal reflux disease) 2016    History of chemotherapy 10/01/2020    breast cancer - left    History of radiation therapy 04/01/2021    left breast cancer    Hypertension     Lyme disease     OA (osteoarthritis)     Rheumatoid arthritis (HCC)      Past Surgical History:   Procedure Laterality Date    ANKLE SURGERY Right 2016    2 plates and screws    BREAST BIOPSY Left 07/02/2020    BREAST LUMPECTOMY Left 08/12/2020    left breast wire localized lumpectomy    BREAST MASS EXCISION Left 09/02/2020    short superior and long lateral left breast resection, left axillary sentinel lymph node biopsy    COLONOSCOPY      ELBOW FRACTURE REPAIR Right 1996    FRACTURE SURGERY      GUM SURGERY      HYSTERECTOMY  2018    JOINT REPLACEMENT  6/2021    REPLACEMENT TOTAL HIP W/  RESURFACING IMPLANTS  06/18/2021       Social History   Social History     Substance and Sexual Activity   Alcohol Use Not Currently    Comment: occasional     Social History     Substance and Sexual Activity   Drug Use Never     Social History     Tobacco Use   Smoking Status Never    Passive exposure: Past   Smokeless Tobacco Never         Meds/Allergies     Current Outpatient Medications:     Actemra ACTPen 162 MG/0.9ML SOAJ, Once every 10 days, Disp: , Rfl:     albuterol (PROVENTIL HFA,VENTOLIN HFA) 90 mcg/act inhaler, as needed, Disp: , Rfl:     atorvastatin (LIPITOR) 10 mg tablet, Take 10 mg by mouth daily, Disp: , Rfl:     Calcium Carbonate-Vitamin D3 600-400 MG-UNIT TABS, Take by mouth, Disp: , Rfl:     CELECOXIB PO, Take by  mouth 2 (two) times a day as needed On hold, Disp: , Rfl:     Cholecalciferol 25 MCG (1000 UT) capsule, Take 1,000 Units by mouth daily, Disp: , Rfl:     cyclobenzaprine (FLEXERIL) 5 mg tablet, Per patient taking as needed, Disp: , Rfl:     DULoxetine (CYMBALTA) 20 mg capsule, Take 1 capsule (20 mg total) by mouth daily, Disp: 30 capsule, Rfl: 2    Fluticasone-Salmeterol (Advair Diskus) 250-50 mcg/dose inhaler, Inhale 1 puff 2 (two) times a day Rinse mouth after use., Disp: 60 blister, Rfl: 11    losartan (COZAAR) 50 mg tablet, 50 mg daily, Disp: , Rfl:     Multiple Vitamin (MULTIVITAMIN ADULT PO), Take 1 tablet by mouth daily, Disp: , Rfl:     omeprazole (PriLOSEC) 20 mg delayed release capsule, Take 1 capsule (20 mg total) by mouth daily, Disp: 90 capsule, Rfl: 3    PreviDent 5000 Booster Plus 1.1 % PSTE, , Disp: , Rfl:     raloxifene (EVISTA) 60 mg tablet, Take 60 mg by mouth daily, Disp: , Rfl:     denosumab (PROLIA) 60 mg/mL, Inject 60 mg under the skin every 6 (six) months (Patient not taking: Reported on 10/30/2023), Disp: , Rfl:     meloxicam (MOBIC) 7.5 mg tablet, Take 1 tablet (7.5 mg total) by mouth daily for 20 days, Disp: 20 tablet, Rfl: 0    predniSONE 20 mg tablet, Only while taking the Kevzara, Disp: , Rfl:   Allergies   Allergen Reactions    Fruit Extracts Itching     Cantelope melon peaches    Nuts - Food Allergy GI Intolerance    Pollen Extract Other (See Comments)     Pollen , Trees And Flowers    Avelox [Moxifloxacin] Itching         Review of Systems  Constitutional:  Positive for fatigue.   HENT: Negative.     Eyes: Negative.    Respiratory: Negative.     Cardiovascular: Negative.    Gastrointestinal: Negative.    Endocrine: Negative.    Genitourinary: Negative.    Musculoskeletal:  Positive for arthralgias (hands and feet (hx of RA)) and back pain (radiating from back to chest, left sided).   Skin: Negative.    Allergic/Immunologic: Negative.    Neurological:  Positive for weakness and  numbness (neuropathy).   Hematological: Negative.    Psychiatric/Behavioral: Negative.         OBJECTIVE:   /82 (BP Location: Right arm)   Pulse 65   Temp (!) 96.7 °F (35.9 °C) (Temporal)   Resp 18   SpO2 98%   Pain Assessment:  0  Karnofsky: 90 - Able to carry on normal activity; minor signs or symptoms of disease     Physical Exam   Constitutional:       Appearance: Normal appearance.   HENT:      Head: Normocephalic and atraumatic.   Eyes:      General: No scleral icterus.     Conjunctiva/sclera: Conjunctivae normal.   Cardiovascular:      Rate and Rhythm: Normal rate   Pulmonary:      Effort: Pulmonary effort is normal.   Chest:      Comments: Performed with chaperone.   The left breast has stable mild fibrosis and volume loss at the lumpectomy cavity site without discrete mass.  The right breast is within normal limits.  No supraclavicular, cervical or axillary lymphadenopathy.  Bilateral nipples everted.   Abdominal:      General: Abdomen is flat. There is no distension.   Musculoskeletal:         General: Normal range of motion.   Skin:     General: Skin is warm and dry.   Neurological:      General: No focal deficit present.   Psychiatric:         Mood and Affect: Mood normal.         RESULTS    Lab Results: No results found for this or any previous visit (from the past 672 hour(s)).    Imaging Studies:Complete PFT with post bronchodilator    Result Date: 3/2/2024  Narrative: Requesting provider: Jim Orellana Diagnosis: Rheumatoid arthritis, asthma Results: Patient gave good effort and cooperation. The testing met ATS Standards for Acceptability and Repeatability.  Spirometry: FEV1/FVC Ratio is 79%. FEV1 is 1.63 L/79% of predicted. FVC is 2.07 L/77% of predicted. No significant bronchodilator response Lung volumes: RV 2.39 L/10% of predicted, TLC 4.55 L/92% of predicted, RV/TLC ratio 52% Diffusing capacity: 54% of predicted     Impression: Mild restrictive airflow imitation spirometry without  "obstruction No significant bronchodilator response Normal lung volumes Moderately impaired diffusion capacity Mildly restrictive flow-volume loop Kendy Peterson MD Portions of the record may have been created with voice recognition software. Occasional wrong word or \"sound a like\" substitutions may have occurred due to the inherent limitations of voice recognition software. Read the chart carefully and recognize, using context, where substitutions have occurred.          Assessment/Plan:  No orders of the defined types were placed in this encounter.       Shelia Miller is a 73 y.o. female with a history of stage IB [id number] grade 3 triple negative breast cancer status post breast conservation surgery, adjuvant chemotherapy and radiation (completed 5/10/21) who presents for routine follow up.     She remains without significant treatment related toxicity.  She will resume arm PT to address the pulling sensation she is experiencing.  If it persists, she will notify this office.  She will otherwise continue medical oncology follow up and will return in one year.  She was encouraged to call with questions or concerns in the interim.     Yaw Torres MD  3/7/2024,8:48 AM    Portions of the record may have been created with voice recognition software.  Occasional wrong word or \"sound a like\" substitutions may have occurred due to the inherent limitations of voice recognition software.  Read the chart carefully and recognize, using context, where substitutions have occurred.        "

## 2024-03-19 ENCOUNTER — OFFICE VISIT (OUTPATIENT)
Age: 74
End: 2024-03-19
Payer: COMMERCIAL

## 2024-03-19 VITALS — BODY MASS INDEX: 28.88 KG/M2 | HEIGHT: 63 IN | WEIGHT: 163 LBS | RESPIRATION RATE: 16 BRPM

## 2024-03-19 DIAGNOSIS — M77.8 EXTENSOR TENDINITIS OF FOOT: ICD-10-CM

## 2024-03-19 DIAGNOSIS — M54.16 RADICULOPATHY OF LUMBAR REGION: ICD-10-CM

## 2024-03-19 DIAGNOSIS — M25.571 ACUTE RIGHT ANKLE PAIN: Primary | ICD-10-CM

## 2024-03-19 PROCEDURE — 20605 DRAIN/INJ JOINT/BURSA W/O US: CPT | Performed by: PODIATRIST

## 2024-03-19 PROCEDURE — 99213 OFFICE O/P EST LOW 20 MIN: CPT | Performed by: PODIATRIST

## 2024-03-19 NOTE — PROGRESS NOTES
"Assessment/Plan: Acute pain right ankle.  History of fracture.  Posttraumatic arthritis.  Radiculopathy.  Extensor tendinitis right lower extremity.    Plan.  Chart reviewed.  PCP notes reviewed.  X-rays reviewed.  Lab work reviewed.  Patient examined.  At this time we will treat for acute pain right ankle.  Arthrocentesis done.  Patient will take Tylenol as needed.  Radiculopathy and tendinitis of foot physical therapy ordered.  Aftercare instruction given.  Return as needed.    Medium joint arthrocentesis  Universal Protocol:  Consent: Verbal consent obtained.  Risks and benefits: risks, benefits and alternatives were discussed  Consent given by: patient  Time out: Immediately prior to procedure a \"time out\" was called to verify the correct patient, procedure, equipment, support staff and site/side marked as required.  Timeout called at: 3/19/2024 4:23 PM.  Patient understanding: patient states understanding of the procedure being performed  Patient identity confirmed: verbally with patient  Supporting Documentation  Indications: pain and joint swelling   Procedure Details  Location: ankle -   Preparation: Patient was prepped and draped in the usual sterile fashion  Needle size: 25 G  Ultrasound guidance: no  Approach: anterolateral  Medications administered: 10 mg triamcinolone acetonide 10 mg/mL    Patient tolerance: patient tolerated the procedure well with no immediate complications  Dressing:  Sterile dressing applied                 Diagnoses and all orders for this visit:    Acute right ankle pain  -     Ambulatory referral to Physical Therapy; Future    Extensor tendinitis of foot  -     Ambulatory referral to Physical Therapy; Future    Radiculopathy of lumbar region  -     Ambulatory referral to Physical Therapy; Future          Subjective: Patient did well after last injection.  She was unable to tolerate gabapentin as well as Lyrica.  She has occasional back pain.  She now has pain in her right ankle.  " She has history of fracture many years prior.    Allergies   Allergen Reactions    Fruit Extracts Itching     Cantelope melon peaches    Nuts - Food Allergy GI Intolerance    Pollen Extract Other (See Comments)     Pollen , Trees And Flowers    Avelox [Moxifloxacin] Itching         Current Outpatient Medications:     Actemra ACTPen 162 MG/0.9ML SOAJ, Once every 10 days, Disp: , Rfl:     albuterol (PROVENTIL HFA,VENTOLIN HFA) 90 mcg/act inhaler, as needed, Disp: , Rfl:     atorvastatin (LIPITOR) 10 mg tablet, Take 10 mg by mouth daily, Disp: , Rfl:     Calcium Carbonate-Vitamin D3 600-400 MG-UNIT TABS, Take by mouth, Disp: , Rfl:     CELECOXIB PO, Take by mouth 2 (two) times a day as needed On hold, Disp: , Rfl:     Cholecalciferol 25 MCG (1000 UT) capsule, Take 1,000 Units by mouth daily, Disp: , Rfl:     cyclobenzaprine (FLEXERIL) 5 mg tablet, Per patient taking as needed, Disp: , Rfl:     denosumab (PROLIA) 60 mg/mL, Inject 60 mg under the skin every 6 (six) months (Patient not taking: Reported on 10/30/2023), Disp: , Rfl:     DULoxetine (CYMBALTA) 20 mg capsule, Take 1 capsule (20 mg total) by mouth daily, Disp: 30 capsule, Rfl: 2    Fluticasone-Salmeterol (Advair Diskus) 250-50 mcg/dose inhaler, Inhale 1 puff 2 (two) times a day Rinse mouth after use., Disp: 60 blister, Rfl: 11    losartan (COZAAR) 50 mg tablet, 50 mg daily, Disp: , Rfl:     meloxicam (MOBIC) 7.5 mg tablet, Take 1 tablet (7.5 mg total) by mouth daily for 20 days, Disp: 20 tablet, Rfl: 0    Multiple Vitamin (MULTIVITAMIN ADULT PO), Take 1 tablet by mouth daily, Disp: , Rfl:     omeprazole (PriLOSEC) 20 mg delayed release capsule, Take 1 capsule (20 mg total) by mouth daily, Disp: 90 capsule, Rfl: 3    predniSONE 20 mg tablet, Only while taking the Kevzara, Disp: , Rfl:     PreviDent 5000 Booster Plus 1.1 % PSTE, , Disp: , Rfl:     raloxifene (EVISTA) 60 mg tablet, Take 60 mg by mouth daily, Disp: , Rfl:     Patient Active Problem List   Diagnosis     Invasive ductal carcinoma of left breast (HCC) s/p lumpectomy, Chemo & XRT    Rheumatoid arthritis, involving unspecified site, unspecified whether rheumatoid factor present (HCC)    HTN (hypertension)    Drug-induced polyneuropathy (HCC)    Diastolic CHF, acute (HCC)    Back pain with radiculopathy    Thrombocytopenia (HCC)    Leukopenia    Abdominal pain    Hyperlipidemia    Mild asthma    Acquired deformity of left foot          Patient ID: Shelia Miller is a 73 y.o. female.    HPI    The following portions of the patient's history were reviewed and updated as appropriate:     family history includes Breast cancer in her paternal grandmother; Breast cancer (age of onset: 59) in her mother; COPD in her father; Cancer in her father, mother, and son; Hypertension in her father; Lung cancer in her father; Thyroid cancer in her son.      reports that she has never smoked. She has been exposed to tobacco smoke. She has never used smokeless tobacco. She reports that she does not currently use alcohol. She reports that she does not use drugs.    Vitals:    03/19/24 1611   Resp: 16       Review of Systems      Objective:  Patient's shoes and socks removed.   Foot ExamPhysical Exam      Assessment/Plan: Posterior tibial tendinitis left foot.  Arthralgia left foot.  Acquired deformity foot.  Acquired pes planus.  This is all secondary to muscular low skeletal imbalance of radiculopathy.  Pain.     Plan.  Chart reviewed.  Primary care notes reviewed.  Neurology notes reviewed.  Radiographs reviewed by physician.  Patient examined.  Patient advised on condition.  At this time we will treat for posterior tibial tendinitis induced by pes planus of musculoskeletal imbalance of radiculopathy.  Patient been immobilized with semicustom molded ankle-foot orthotic.  Foot x-rays ordered.  Patient be placed on Mobic.  In addition patient be referred to pain management for workup and treatment.  Patient will restart gabapentin.  She  will take 3 mg twice daily.  Lab work reviewed prior to recommending this.  Aftercare instruction given.  Return for follow-up.            Diagnoses and all orders for this visit:     Acquired deformity of left foot  -     X-ray foot left 3+ views; Future     Acquired flat foot, left  -     X-ray foot left 3+ views; Future     Arthralgia of left foot  -     X-ray foot left 3+ views; Future  -     meloxicam (MOBIC) 7.5 mg tablet; Take 1 tablet (7.5 mg total) by mouth daily for 20 days     Posterior tibial tendinitis of left lower extremity  -     X-ray foot left 3+ views; Future  -     meloxicam (MOBIC) 7.5 mg tablet; Take 1 tablet (7.5 mg total) by mouth daily for 20 days     Radiculopathy of lumbar region  -     Ambulatory referral to Spine & Pain Management; Future            Subjective: Patient has pain and swelling of her left foot.  This has been insidious.  It has been bothersome for the last several months.  No history of trauma.  Patient suffers from chronic low back pain.           Allergies   Allergen Reactions    Fruit Extracts Itching       Cantelope melon peaches    Nuts - Food Allergy GI Intolerance    Pollen Extract Other (See Comments)       Pollen , Trees And Flowers    Avelox [Moxifloxacin] Itching            Current Outpatient Medications:     meloxicam (MOBIC) 7.5 mg tablet, Take 1 tablet (7.5 mg total) by mouth daily for 20 days, Disp: 20 tablet, Rfl: 0    Actemra ACTPen 162 MG/0.9ML SOAJ, Once every 10 days (Patient not taking: Reported on 12/4/2023), Disp: , Rfl:     albuterol (PROVENTIL HFA,VENTOLIN HFA) 90 mcg/act inhaler, as needed, Disp: , Rfl:     amoxicillin (AMOXIL) 500 mg capsule, For dental appt (Patient not taking: Reported on 7/26/2023), Disp: , Rfl:     atorvastatin (LIPITOR) 10 mg tablet, Take 10 mg by mouth daily, Disp: , Rfl:     Calcium Carbonate-Vitamin D3 600-400 MG-UNIT TABS, Take by mouth, Disp: , Rfl:     CELECOXIB PO, Take by mouth 2 (two) times a day as needed, Disp: ,  Rfl:     Cholecalciferol 25 MCG (1000 UT) capsule, Take 1,000 Units by mouth daily, Disp: , Rfl:     cyclobenzaprine (FLEXERIL) 5 mg tablet, Per patient taking as needed, Disp: , Rfl:     denosumab (PROLIA) 60 mg/mL, Inject 60 mg under the skin every 6 (six) months (Patient not taking: Reported on 10/30/2023), Disp: , Rfl:     Fluticasone-Salmeterol (Advair Diskus) 250-50 mcg/dose inhaler, Inhale 1 puff 2 (two) times a day Rinse mouth after use. (Patient taking differently: Inhale 1 puff 2 (two) times a day as needed Rinse mouth after use.), Disp: 60 blister, Rfl: 0    gabapentin (NEURONTIN) 300 mg capsule, Take 300 mg by mouth once as needed (pain) (Patient not taking: Reported on 12/4/2023), Disp: , Rfl:     hydroxychloroquine (PLAQUENIL) 200 mg tablet, , Disp: , Rfl:     losartan (COZAAR) 50 mg tablet, 50 mg daily, Disp: , Rfl:     Multiple Vitamin (MULTIVITAMIN ADULT PO), Take 1 tablet by mouth daily, Disp: , Rfl:     omeprazole (PriLOSEC) 20 mg delayed release capsule, Take 1 capsule (20 mg total) by mouth daily, Disp: 90 capsule, Rfl: 3    predniSONE 20 mg tablet, Only while taking the Kevzara, Disp: , Rfl:     PreviDent 5000 Booster Plus 1.1 % PSTE, , Disp: , Rfl:     raloxifene (EVISTA) 60 mg tablet, Take 60 mg by mouth daily, Disp: , Rfl:     Sarilumab (Kevzara) 150 MG/1.14ML SOAJ, Inject 150 mL under the skin Once every 2 weeks, Disp: , Rfl:          Patient Active Problem List   Diagnosis    Invasive ductal carcinoma of left breast (HCC) s/p lumpectomy, Chemo & XRT    Rheumatoid arthritis, involving unspecified site, unspecified whether rheumatoid factor present (HCC)    HTN (hypertension)    Drug-induced polyneuropathy (HCC)    Diastolic CHF, acute (HCC)    Back pain with radiculopathy    Thrombocytopenia (HCC)    Leukopenia    Abdominal pain    Hyperlipidemia    Mild asthma             Patient ID: Shelia Miller is a 73 y.o. female.     HPI     The following portions of the patient's history were reviewed  and updated as appropriate:      family history includes Breast cancer in her paternal grandmother; Breast cancer (age of onset: 59) in her mother; Lung cancer in her father; Thyroid cancer in her son.       reports that she has never smoked. She has never used smokeless tobacco. She reports that she does not currently use alcohol. She reports that she does not use drugs.         Vitals:     01/23/24 1337   BP: 140/77   Pulse: 103   Resp: 18         Review of Systems       Objective:  Patient's shoes and socks removed.   Foot ExamPhysical Exam       Foot Exam     General  General Appearance: appears stated age and healthy   Orientation: alert and oriented to person, place, and time   Affect: appropriate   Gait: antalgic         Right Foot/Ankle      Inspection and Palpation  Tenderness: metatarsals   Swelling: dorsum   Arch: pes planus  Hallux limitus: yes     Neurovascular  Dorsalis pedis: 3+  Posterior tibial: 3+        Left Foot/Ankle       Inspection and Palpation  Tenderness: metatarsals   Swelling: dorsum   Arch: pes planus  Hallux limitus: yes     Neurovascular  Dorsalis pedis: 3+  Posterior tibial: 3+           Physical Exam  Vitals and nursing note reviewed.   Constitutional:       Appearance: Normal appearance.   Cardiovascular:      Rate and Rhythm: Normal rate and regular rhythm.      Pulses:           Dorsalis pedis pulses are 3+ on the right side and 3+ on the left side.        Posterior tibial pulses are 3+ on the right side and 3+ on the left side.   Musculoskeletal:      Comments:  Patient has a pelvic tilt in both stance and gait.  Left leg appears shorter than right.  This will be checked with scanogram.  She is maximally pronated in stance and gait.  She has collapse of the medial arch bilateral.  There is  Range of motion of subtalar joint.  Pain with palpation left posterior tibial tendon course.  There is now pain with palpation right ankle mortise.  Decreased range of motion noted.  Pain with  palpation extensor tendons right ankle.  Skin:     Capillary Refill: Capillary refill takes less than 2 seconds.   Neurological:      Mental Status: She is alert.  Patient demonstrates 4/5 L5 testing of the left lower extremity.  Decreased vibratory sensation.  Psychiatric:         Mood and Affect: Mood normal.         Thought Content: Thought content normal.         Judgment: Judgment normal.

## 2024-03-28 ENCOUNTER — EVALUATION (OUTPATIENT)
Dept: PHYSICAL THERAPY | Facility: CLINIC | Age: 74
End: 2024-03-28
Payer: COMMERCIAL

## 2024-03-28 DIAGNOSIS — M77.8 EXTENSOR TENDINITIS OF FOOT: ICD-10-CM

## 2024-03-28 DIAGNOSIS — M25.571 ACUTE RIGHT ANKLE PAIN: Primary | ICD-10-CM

## 2024-03-28 DIAGNOSIS — M54.16 RADICULOPATHY OF LUMBAR REGION: ICD-10-CM

## 2024-03-28 PROCEDURE — 97112 NEUROMUSCULAR REEDUCATION: CPT

## 2024-03-28 PROCEDURE — 97161 PT EVAL LOW COMPLEX 20 MIN: CPT

## 2024-03-28 NOTE — PROGRESS NOTES
PT Evaluation     Today's date: 3/28/2024  Patient name: Shelia Miller  : 1950  MRN: [id number]  Referring provider: Shin Dan DPM  Dx:   Encounter Diagnosis     ICD-10-CM    1. Acute right ankle pain  M25.571 Ambulatory referral to Physical Therapy      2. Extensor tendinitis of foot  M77.8 Ambulatory referral to Physical Therapy      3. Radiculopathy of lumbar region  M54.16 Ambulatory referral to Physical Therapy                     Assessment  Assessment details: Pt is a pleasant 73 y.o. female who presents to [facility] PT with B ankle pain R>L, though L has greater dysfunction today. Today, she presents with weakness, decreased ROM, impaired balance, decreased coordination, new onset of impairment of functional mobility, decreased activity tolerance, and decreased strength. Functionally, she is limited in her ability to stand and ambulate, negotiate stairs, perform age appropriate recreation and exercise, perform normal home activities, and perform normal stair negotiation. She is motivated to improve. Pt will benefit from skilled PT to address the aforementioned deficits and limitations in an effort to maximize pain free functional mobility and overall quality of life. Progress as able with these goals in mind.       Impairments: abnormal gait, abnormal muscle firing, abnormal muscle tone, abnormal or restricted ROM, abnormal movement, activity intolerance, impaired physical strength, lacks appropriate home exercise program and pain with function  Understanding of Dx/Px/POC: good   Prognosis: good    Goals  Short term goals (3 weeks):  1) Pt will improve B ankle AROM to WNL pain free.  2) Pt will improve B LE and core strength deficits by 1/3 grade MMT.   3) Pt will reports pain at worse <5/10.  4) Pt will initiate and progress HEP w/ special emphasis on functional ankle mobility and strength.     Long term goals (6 weeks)  1) Pt will improve FOTO to at least 61.  2) Pt will stand and ambulate  "community distances w/o deficit related to ankles.   3) Pt will sleep through the night in positioning of choosing 6/7 nights a week w/o waking due to pain.  4) Pt will be independent and compliant w/ HEP in order to maximize functional benefit of skilled PT following d/c.       Plan  Plan details: HEP to start: 4 way ankle tband, ankle ABC, intrinsic foot grabs    Patient would benefit from: skilled PT  Planned modality interventions: cryotherapy and thermotherapy: hydrocollator packs  Planned therapy interventions: abdominal trunk stabilization, activity modification, joint mobilization, manual therapy, massage, motor coordination training, neuromuscular re-education, patient education, postural training, stretching, strengthening, therapeutic activities, therapeutic exercise, therapeutic training, transfer training, home exercise program, gait training, graded activity, functional ROM exercises, graded exercise, flexibility, body mechanics training, balance and balance/weight bearing training  Frequency: 2x week  Duration in visits: 12  Duration in weeks: 6  Treatment plan discussed with: patient        Subjective Evaluation    History of Present Illness  Mechanism of injury: Pt has had B ankle pain for years, most recently went to get L ankle checked about two months ago. Was given brace on L ankle, given Mobic, felt good. Was walking differently and putting a lot of pressure on R ankle. R ankle and knee are now painful. Was given cortisone injection two weeks ago (R ankle), notes some small improvement since. Still \"doesn't feel right.\" Broke R ankle in 2016 while giving dog bath. Pt notes no real increase in activity around the time of increase in ankle pain, podiatrist felt that L ankle/foot pain was due to back. Saw pain management for low back pain. Was given gabapentin, which didn't help. Lyrica did not help, not taking either now. Was given anti-depressant, didn't think this helped either. 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 Was given three heavy antibiotics. Notes that this knocked her down but helped w/ sx. Notes that this may have impacted ankle pain as well.     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 Followed up w/ surgeon's office. Did not opt for surgery.    Main goal is to be able to move with less pain. Has been seen here for low back in past. Functional status below:          Recurrent probem    Quality of life: good    Patient Goals  Patient goals for therapy: increased strength, decreased pain, increased motion and return to sport/leisure activities  Patient goal: I want to be able to keep on moving!  Pain  Current pain ratin  At best pain ratin  At worst pain ratin  Location: points to TC joint, up into tibia towards knee  Quality: dull ache and sharp  Relieving factors: rest and change in position (elevation, ankle brace)  Aggravating factors: standing, stair climbing, running and lifting (standing <1 hour, walking <3-4 blocks, stairs are step to)  Progression: worsening    Social Support  Steps to enter house: yes  Stairs in house: yes   Lives in: multiple-level home  Lives with: spouse    Employment status: not working        Objective     Palpation     Additional Palpation Details  TTP through R ATFL and CFL, through L midfoot (plantar aspect)    Neurological Testing     Sensation     Ankle/Foot   Left Ankle/Foot   Intact: light touch    Right Ankle/Foot   Intact: light touch     Active Range of Motion     Additional Active Range of Motion Details  L ankle DF and PF grossly decreased by 50%, inv/ev <25% normal limits  R ankle grossly 75% of normal limits for all    Passive Range of Motion     Right Ankle/Foot  Normal passive range of motion    Additional Passive Range of Motion Details  L ankle DF and PF decrease to 75% of normal limits on L, inv/ev to 50%    R grossly WNL     Joint Play   Left  "Ankle/Foot  Hypomobile in the subtalar joint and midfoot.     Right Ankle/Foot  Hypomobile in the distal tibiofibular joint, talocrural joint, subtalar joint and midfoot.     Strength/Myotome Testing     Left Ankle/Foot   Dorsiflexion: 4-  Plantar flexion: 4-  Inversion: 4-  Eversion: 4-  Great toe flexion: 4-  Great toe extension: 4-    Right Ankle/Foot   Dorsiflexion: 3+  Plantar flexion: 3+  Inversion: 3+  Eversion: 3  Great toe flexion: 3+  Great toe extension: 3    Tests     Additional Tests Details  LAD through L TC joint \"feels good\"    Low back to be more thoroughly assessed in future    Ambulation     Ambulation: Level Surfaces     Additional Level Surfaces Ambulation Details  Min decreased step length and WB on L LE, min decreased pace, not specifically antalgic today.    Functional Assessment        Comments  Sit<>stand: UE support on LE w/ R side WS     BW squat: not past 25% before compensation     Stairs: TBA    SLS: painful B         General Comments:      Knee Comments  Sit<>stand:    BW squat:    Stairs:    SLS:     TUG:       Flowsheet Rows      Flowsheet Row Most Recent Value   PT/OT G-Codes    Current Score 49   Projected Score 61   FOTO information reviewed Yes               Precautions: standard         POC expires Auth Status Total   Visits  Start date  Expiration date PT/OT + Visit Limit? Co-Insurance    MEDICARE                                                  Visit/Unit Tracking  AUTH Status: Date               Visits  Authed:  Used                Remaining                      Dummy Auth Tracking  1 2 3 4 5 6   7 8 9 10 11 12   13 14 15 16 17 18   19 20 21 22 23 24   25 26 27 28 29 30   31 32 33 34 35 36         Date (Visit #) IE 3/28 (1) (2) (3) (4) (5)   Manuals        DTM and TPR        Ankle mobs Tested B ankles       MWM                Exercise  Diary        Ther Ex             Active w/u             PROM  tested  ankles           Ankle isometrics   tested            Ankle 4 way tband " Red on R only x 10                                               Neuro Re Ed             WS training  x20           Heel raise progressions  intro           Hip progressions             Balance progressions             Airex sq X20            HEP and POC review  X8 mins                                               Ther Act        Gait             Stairs             Functional lifting/transfers                                                                      Modalities               Heat              Ice

## 2024-04-02 ENCOUNTER — OFFICE VISIT (OUTPATIENT)
Dept: PHYSICAL THERAPY | Facility: CLINIC | Age: 74
End: 2024-04-02
Payer: COMMERCIAL

## 2024-04-02 DIAGNOSIS — M54.16 RADICULOPATHY OF LUMBAR REGION: ICD-10-CM

## 2024-04-02 DIAGNOSIS — M77.8 EXTENSOR TENDINITIS OF FOOT: ICD-10-CM

## 2024-04-02 DIAGNOSIS — M25.571 ACUTE RIGHT ANKLE PAIN: Primary | ICD-10-CM

## 2024-04-02 PROCEDURE — 97110 THERAPEUTIC EXERCISES: CPT

## 2024-04-02 PROCEDURE — 97112 NEUROMUSCULAR REEDUCATION: CPT

## 2024-04-02 NOTE — PROGRESS NOTES
Daily Note     Today's date: 2024  Patient name: Shelia Miller  : 1950  MRN: [id number]  Referring provider: Shin Dan DPM  Dx:   Encounter Diagnosis     ICD-10-CM    1. Acute right ankle pain  M25.571       2. Extensor tendinitis of foot  M77.8       3. Radiculopathy of lumbar region  M54.16                      Subjective: Pt reports no new complaints. Reports that she felt good after eval. Has started w/ exercise program.       Objective: requires cueing w/ split squat for toe drive and proper great toe ext during push up - translates well to push off during gait progressions.      Assessment: Tolerated treatment well. Patient demonstrated fatigue post treatment and would benefit from continued PT. Pt notes fatigue but no increase in pain by end of session. Will benefit from more aggressive challenges barring setback. Shows good control during initial loaded work, in addition to gait techniques. HEP reviewed and well understood.       Plan: Continue per plan of care. Push off during gait, unstable surfaces, single leg strength          Precautions: standard         POC expires Auth Status Total   Visits  Start date  Expiration date PT/OT + Visit Limit? Co-Insurance    MEDICARE                                                  Visit/Unit Tracking  AUTH Status: Date               Visits  Authed:  Used                Remaining                      Dummy Auth Tracking  1 2 3 4 5 6   7 8 9 10 11 12   13 14 15 16 17 18   19 20 21 22 23 24   25 26 27 28 29 30   31 32 33 34 35 36         Date (Visit #) IE 3/28 (1)  (2) (3) (4) (5)   Manuals        DTM and TPR        Ankle mobs Tested B ankles       MWM                Exercise  Diary        Ther Ex             Active w/u             PROM  tested  ankles  x5-6 mins CP w/ midfoot and calc mobs B         Ankle isometrics   tested   man isos x20 each          Ankle 4 way tband Red on R only x 10 Green loop B ER x 30                                               Neuro Re Ed             WS training  x20  rev         Heel raise progressions  intro           Hip progressions    gait progressions, exag and quick x 5 mins         Balance progressions    split squat w/ great toe ext 2x10 B         Airex sq X20   x20 sitting  X20 standing  X20 w/ heel raise  3x10 w/ squat         HEP and POC review  X8 mins X5 mins                                              Ther Act        Gait             Stairs             Functional lifting/transfers                                                                      Modalities               Heat              Ice

## 2024-04-10 ENCOUNTER — OFFICE VISIT (OUTPATIENT)
Dept: PHYSICAL THERAPY | Facility: CLINIC | Age: 74
End: 2024-04-10
Payer: COMMERCIAL

## 2024-04-10 DIAGNOSIS — M25.571 ACUTE RIGHT ANKLE PAIN: Primary | ICD-10-CM

## 2024-04-10 DIAGNOSIS — M54.16 RADICULOPATHY OF LUMBAR REGION: ICD-10-CM

## 2024-04-10 DIAGNOSIS — M77.8 EXTENSOR TENDINITIS OF FOOT: ICD-10-CM

## 2024-04-10 PROCEDURE — 97110 THERAPEUTIC EXERCISES: CPT

## 2024-04-10 PROCEDURE — 97112 NEUROMUSCULAR REEDUCATION: CPT

## 2024-04-10 NOTE — PROGRESS NOTES
Daily Note     Today's date: 4/10/2024  Patient name: Shelia Miller  : 1950  MRN: [id number]  Referring provider: Shin Dan DPM  Dx:   Encounter Diagnosis     ICD-10-CM    1. Acute right ankle pain  M25.571       2. Extensor tendinitis of foot  M77.8       3. Radiculopathy of lumbar region  M54.16                      Subjective: Pt reports some soreness today, through anterior/dorsal part of R ankle up into shin, along w/ R knee. Worse at end of day. Felt good after last visit.      Objective: PPU and R hip side glide towards wall help to alleviate hip and R leg sx - fairly well maintained post.       Assessment: Tolerated treatment well. Patient demonstrated fatigue post treatment and would benefit from continued PT. Does well w/ gentle exercise progressions paired w/ mobility work. Does seem to have some component of low back radic that contributes to lower leg sx. Added below to HEP, will be seen tomorrow. Progress as able.       Plan: Continue per plan of care.  Check side glides, ext check, STARR work     Precautions: standard         POC expires Auth Status Total   Visits  Start date  Expiration date PT/OT + Visit Limit? Co-Insurance    MEDICARE                                                  Visit/Unit Tracking  AUTH Status: Date               Visits  Authed:  Used                Remaining                      Dummy Auth Tracking  1 2 3 4 5 6   7 8 9 10 11 12   13 14 15 16 17 18   19 20 21 22 23 24   25 26 27 28 29 30   31 32 33 34 35 36         Date (Visit #) IE 3/28 (1)  (2) 4/10 (3) (4) (5)   Manuals        DTM and TPR        Ankle mobs Tested B ankles       MWM                Exercise  Diary        Ther Ex             Active w/u             PROM  tested  ankles  x5-6 mins CP w/ midfoot and calc mobs B  x6-7 mins CP same        Ankle isometrics   tested   man isos x20 each   tested       Ankle 4 way tband Red on R only x 10 Green loop B ER x 30 B loop WE x20        R hip side glide towards  "all x10        PPU 2x6        FRIDA 2x1-2 min        Lumbar  assessed x 2 mins        Forward knee flex w/ heel flat on 12\" step x20 B     Neuro Re Ed             WS training  x20  rev         Heel raise progressions  intro           Hip progressions    gait progressions, exag and quick x 5 mins  reg w/ push off x 2-3 mins       Balance progressions    split squat w/ great toe ext 2x10 B        Airex sq X20   x20 sitting  X20 standing  X20 w/ heel raise  3x10 w/ squat  x20 sitting, x 20 standing       HEP and POC review  X8 mins X5 mins X2-3 mins        Green loop side step 15'x4 around knees, 15'x2 around forefoot        Squat green band around knees 2x10                             Ther Act        Gait             Stairs             Functional lifting/transfers                                                                      Modalities               Heat              Ice                                                                            "

## 2024-04-11 ENCOUNTER — OFFICE VISIT (OUTPATIENT)
Dept: PHYSICAL THERAPY | Facility: CLINIC | Age: 74
End: 2024-04-11
Payer: COMMERCIAL

## 2024-04-11 DIAGNOSIS — M54.16 RADICULOPATHY OF LUMBAR REGION: ICD-10-CM

## 2024-04-11 DIAGNOSIS — M25.571 ACUTE RIGHT ANKLE PAIN: Primary | ICD-10-CM

## 2024-04-11 DIAGNOSIS — M77.8 EXTENSOR TENDINITIS OF FOOT: ICD-10-CM

## 2024-04-11 PROCEDURE — 97110 THERAPEUTIC EXERCISES: CPT

## 2024-04-11 PROCEDURE — 97112 NEUROMUSCULAR REEDUCATION: CPT

## 2024-04-11 NOTE — PROGRESS NOTES
Daily Note     Today's date: 2024  Patient name: Shelia Miller  : 1950  MRN: [id number]  Referring provider: Shin Dan DPM  Dx:   Encounter Diagnosis     ICD-10-CM    1. Acute right ankle pain  M25.571       2. Extensor tendinitis of foot  M77.8       3. Radiculopathy of lumbar region  M54.16                      Subjective: Pt reports some discomfort in anterior ankle to end the day yesterday, felt good after PT. Low back feels better after exercises.      Objective: significant fatigue early on w/ all HF and ant tib exercises. Good form despite fatigue.      Assessment: Tolerated treatment well. Patient demonstrated fatigue post treatment and would benefit from continued PT. Does well w/ exercise progressions, good tolerance to loaded work today. Low back mobility work improves sx. Will continue to increase challenge barring setback. Added post TC glides for home, along w/ ant tib strength work      Plan: Continue per plan of care. Check HF and ant tib work, marching progressions     Precautions: standard         POC expires Auth Status Total   Visits  Start date  Expiration date PT/OT + Visit Limit? Co-Insurance    MEDICARE                                                  Visit/Unit Tracking  AUTH Status: Date               Visits  Authed:  Used                Remaining                      Dummy Auth Tracking  1 2 3 4 5 6   7 8 9 10 11 12   13 14 15 16 17 18   19 20 21 22 23 24   25 26 27 28 29 30   31 32 33 34 35 36         Date (Visit #) IE 3/28 (1)  (2) 4/10 (3)  (4) (5)   Manuals        DTM and TPR        Ankle mobs Tested B ankles       MWM                Exercise  Diary        Ther Ex             Active w/u             PROM  tested  ankles  x5-6 mins CP w/ midfoot and calc mobs B  x6-7 mins CP same   x5-6 mins w/ LAD pulses      Ankle isometrics   tested   man isos x20 each   tested  x10 each     Ankle 4 way tband Red on R only x 10 Green loop B ER x 30 B loop WE x20 rev       R hip  "side glide towards all x10 2x10       PPU 2x6 2x6        FRIDA 2x1-2 min 2x2 min       Lumbar  assessed x 2 mins        Forward knee flex w/ heel flat on 12\" step x20 B W/ post TC glide x10, w/o x10    Neuro Re Ed             WS training  x20  rev         Heel raise progressions  intro           Hip progressions    gait progressions, exag and quick x 5 mins  reg w/ push off x 2-3 mins X2-3 mins      Balance progressions    split squat w/ great toe ext 2x10 B        Airex sq X20   x20 sitting  X20 standing  X20 w/ heel raise  3x10 w/ squat  x20 sitting, x 20 standing  rev     HEP and POC review  X8 mins X5 mins X2-3 mins X2 mins        Green loop side step 15'x4 around knees, 15'x2 around forefoot        Squat green band around knees 2x10         HF/ant tib march green loop 2x10        Ant tib raises 2x10            Ther Act        Gait             Stairs             Functional lifting/transfers                                                                      Modalities               Heat              Ice                                                                              "

## 2024-04-15 ENCOUNTER — OFFICE VISIT (OUTPATIENT)
Dept: PHYSICAL THERAPY | Facility: CLINIC | Age: 74
End: 2024-04-15
Payer: COMMERCIAL

## 2024-04-15 DIAGNOSIS — M54.16 RADICULOPATHY OF LUMBAR REGION: ICD-10-CM

## 2024-04-15 DIAGNOSIS — M77.8 EXTENSOR TENDINITIS OF FOOT: ICD-10-CM

## 2024-04-15 DIAGNOSIS — M25.571 ACUTE RIGHT ANKLE PAIN: Primary | ICD-10-CM

## 2024-04-15 PROCEDURE — 97110 THERAPEUTIC EXERCISES: CPT

## 2024-04-15 PROCEDURE — 97112 NEUROMUSCULAR REEDUCATION: CPT

## 2024-04-15 NOTE — PROGRESS NOTES
Daily Note     Today's date: 4/15/2024  Patient name: Shelia Miller  : 1950  MRN: [id number]  Referring provider: Shin Dan DPM  Dx:   Encounter Diagnosis     ICD-10-CM    1. Acute right ankle pain  M25.571       2. Extensor tendinitis of foot  M77.8       3. Radiculopathy of lumbar region  M54.16                      Subjective: Pt reports that she had long weekend, lots of standing and cooking. Sore today as a result, feels it in low back/hip and L foot/ankle      Objective: PPU and sciatic nerve mobility work improve sx in hip and low back, down into L ankle and foot. Well maintained post.       Assessment: Tolerated treatment well. Patient demonstrated fatigue post treatment and would benefit from continued PT. Please see below for updated HEP. Was asked to perform PPU 6x daily (6-10 reps each). Does well w/ gentle core stab work and foot intrinsic training after this. Will attempt to increase at next visit.       Plan: Continue per plan of care. Strength progressions in loaded positions, HE step up, review neural mobility work     Precautions: standard         POC expires Auth Status Total   Visits  Start date  Expiration date PT/OT + Visit Limit? Co-Insurance    MEDICARE                                                  Visit/Unit Tracking  AUTH Status: Date               Visits  Authed:  Used                Remaining                      Dummy Auth Tracking  1 2 3 4 5 6   7 8 9 10 11 12   13 14 15 16 17 18   19 20 21 22 23 24   25 26 27 28 29 30   31 32 33 34 35 36         Date (Visit #) IE 3/28 (1)  (2) 4/10 (3)  (4) 4/15 (5)   Manuals        DTM and TPR        Ankle mobs Tested B ankles       MWM                Exercise  Diary        Ther Ex             Active w/u             PROM  tested  ankles  x5-6 mins CP w/ midfoot and calc mobs B  x6-7 mins CP same   x5-6 mins w/ LAD pulses   x3 mins on L    Ankle isometrics   tested   man isos x20 each   tested  x10 each  x20    Ankle 4 way tband  "Red on R only x 10 Green loop B ER x 30 B loop WE x20 rev No       R hip side glide towards all x10 2x10 rev      PPU 2x6 2x6  2x6-10      FRIDA 2x1-2 min 2x2 min 2x2 mins       Lumbar  assessed x 2 mins        Forward knee flex w/ heel flat on 12\" step x20 B W/ post TC glide x10, w/o x10         Sciatic nerve flossing man and self x 5 mins        Calf stretching x 2-3 mins   Neuro Re Ed             WS training  x20  rev         Heel raise progressions  intro        TrA isos, single leg ext x10    TrA 90/90 taps 2x5-6 total    Bridging 2x10-12   Hip progressions    gait progressions, exag and quick x 5 mins  reg w/ push off x 2-3 mins X2-3 mins      Balance progressions    split squat w/ great toe ext 2x10 B        Airex sq X20   x20 sitting  X20 standing  X20 w/ heel raise  3x10 w/ squat  x20 sitting, x 20 standing  rev  x20 standing, w/ mini squat 2x10   HEP and POC review  X8 mins X5 mins X2-3 mins X2 mins  X6 mins w/ HEP update      Green loop side step 15'x4 around knees, 15'x2 around forefoot        Squat green band around knees 2x10         HF/ant tib march green loop 2x10 rev       Ant tib raises 2x10 rev           Ther Act        Gait             Stairs             Functional lifting/transfers                                                                      Modalities               Heat              Ice                             From 4/15:  Access Code: [email or web address]  URL: [email or web address]  Date: 04/15/2024  Prepared by: Damian Preston    Exercises  - Prone Press Up On Elbows  - 1 x daily - 7 x weekly - 3 sets - 10 reps  - Prone Press Up  - 1 x daily - 7 x weekly - 3 sets - 10 reps  - Supine Sciatic Nerve Glide  - 1 x daily - 7 x weekly - 3 sets - 10 reps  - Supine Lower Trunk Rotation  - 1 x daily - 7 x weekly - 3 sets - 10 reps  - Supine Posterior Pelvic Tilt  - 1 x daily - 7 x weekly - 3 sets - 10 reps  - Supine 90/90 Alternating Toe Touch  - 1 x daily - 7 x weekly - 3 sets - 10 " reps  - Supine Bridge  - 1 x daily - 7 x weekly - 3 sets - 10 reps

## 2024-04-16 ENCOUNTER — TELEPHONE (OUTPATIENT)
Dept: NEUROLOGY | Facility: CLINIC | Age: 74
End: 2024-04-16

## 2024-04-16 ENCOUNTER — APPOINTMENT (OUTPATIENT)
Dept: PHYSICAL THERAPY | Facility: CLINIC | Age: 74
End: 2024-04-16
Payer: COMMERCIAL

## 2024-04-17 ENCOUNTER — OFFICE VISIT (OUTPATIENT)
Dept: PHYSICAL THERAPY | Facility: CLINIC | Age: 74
End: 2024-04-17
Payer: COMMERCIAL

## 2024-04-17 DIAGNOSIS — M77.8 EXTENSOR TENDINITIS OF FOOT: ICD-10-CM

## 2024-04-17 DIAGNOSIS — M25.571 ACUTE RIGHT ANKLE PAIN: Primary | ICD-10-CM

## 2024-04-17 DIAGNOSIS — M54.16 RADICULOPATHY OF LUMBAR REGION: ICD-10-CM

## 2024-04-17 PROCEDURE — 97110 THERAPEUTIC EXERCISES: CPT

## 2024-04-17 PROCEDURE — 97112 NEUROMUSCULAR REEDUCATION: CPT

## 2024-04-17 NOTE — PROGRESS NOTES
Daily Note     Today's date: 2024  Patient name: Shelia Miller  : 1950  MRN: [id number]  Referring provider: Shin Dan DPM  Dx:   Encounter Diagnosis     ICD-10-CM    1. Acute right ankle pain  M25.571       2. Extensor tendinitis of foot  M77.8       3. Radiculopathy of lumbar region  M54.16                      Subjective: Pt reports that legs were very sore the night she did PPU and sciatic nerve work 4-5x in that day. Notes that this feels better now but that L ankle is sore. Feels that exercises overall were helpful, just might have done too much overall.       Objective: requires cueing w/ PPU for form and pacing, corrects and is able to maintain.       Assessment: Tolerated treatment well. Patient demonstrated fatigue post treatment and would benefit from continued PT. Does well w/ exercise progressions. Does well w/ below listed work, will back off on low back and nerve flossing but still complete 2-3x/day. Increase intensity next visit if able. Added leukotape (jin sling) on L side, pt notes good relief. Will instruct on self care in future if useful.       Plan: Continue per plan of care. Check low back, check taping     Precautions: standard         POC expires Auth Status Total   Visits  Start date  Expiration date PT/OT + Visit Limit? Co-Insurance    MEDICARE                                                  Visit/Unit Tracking  AUTH Status: Date               Visits  Authed:  Used                Remaining                      Dummy Auth Tracking  1 2 3 4 5 6   7 8 9 10 11 12   13 14 15 16 17 18   19 20 21 22 23 24   25 26 27 28 29 30   31 32 33 34 35 36         Date (Visit #) IE 3/28 (1)  (2) 4/10 (3)  (4) 4/15 (5)  (6)   Manuals         DTM and TPR         Ankle mobs Tested B ankles        MWM                  Exercise  Diary         Ther Ex              Active w/u              PROM  tested  ankles  x5-6 mins CP w/ midfoot and calc mobs B  x6-7 mins CP same   x5-6 mins w/  "LAD pulses   x3 mins on L  X2-3 mins on L ankle    B HS and glute/piri w/ sciatic nerve floss and LAD x10 mins total   Ankle isometrics   tested   man isos x20 each   tested  x10 each  x20     Ankle 4 way tband Red on R only x 10 Green loop B ER x 30 B loop WE x20 rev No        R hip side glide towards all x10 2x10 rev       PPU 2x6 2x6  2x6-10 2x8      FRIDA 2x1-2 min 2x2 min 2x2 mins  2x2 mins       Lumbar  assessed x 2 mins         Forward knee flex w/ heel flat on 12\" step x20 B W/ post TC glide x10, w/o x10          Sciatic nerve flossing man and self x 5 mins Rev x2 mins         Calf stretching x 2-3 mins    Neuro Re Ed              WS training  x20  rev          Heel raise progressions  intro        TrA isos, single leg ext x10    TrA 90/90 taps 2x5-6 total    Bridging 2x10-12 Rev of all x 2-3 mins   Hip progressions    gait progressions, exag and quick x 5 mins  reg w/ push off x 2-3 mins X2-3 mins    X2-3 mins    Balance progressions    split squat w/ great toe ext 2x10 B         Airex sq X20   x20 sitting  X20 standing  X20 w/ heel raise  3x10 w/ squat  x20 sitting, x 20 standing  rev  x20 standing, w/ mini squat 2x10    HEP and POC review  X8 mins X5 mins X2-3 mins X2 mins  X6 mins w/ HEP update X2-3 mins       Green loop side step 15'x4 around knees, 15'x2 around forefoot         Squat green band around knees 2x10          HF/ant tib march green loop 2x10 rev rev       Ant tib raises 2x10 rev          Navicular sling on L x 3-4 min   Ther Act         Gait              Stairs              Functional lifting/transfers                                                                           Modalities                Heat               Ice                               From 4/15:  Access Code: [email or web address]  URL: [email or web address]  Date: 04/15/2024  Prepared by: Damian Preston    Exercises  - Prone Press Up On Elbows  - 1 x daily - 7 x weekly - 3 sets - 10 reps  - Prone Press Up  - 1 x " daily - 7 x weekly - 3 sets - 10 reps  - Supine Sciatic Nerve Glide  - 1 x daily - 7 x weekly - 3 sets - 10 reps  - Supine Lower Trunk Rotation  - 1 x daily - 7 x weekly - 3 sets - 10 reps  - Supine Posterior Pelvic Tilt  - 1 x daily - 7 x weekly - 3 sets - 10 reps  - Supine 90/90 Alternating Toe Touch  - 1 x daily - 7 x weekly - 3 sets - 10 reps  - Supine Bridge  - 1 x daily - 7 x weekly - 3 sets - 10 reps

## 2024-04-18 ENCOUNTER — APPOINTMENT (OUTPATIENT)
Dept: PHYSICAL THERAPY | Facility: CLINIC | Age: 74
End: 2024-04-18
Payer: COMMERCIAL

## 2024-04-23 ENCOUNTER — OFFICE VISIT (OUTPATIENT)
Dept: PHYSICAL THERAPY | Facility: CLINIC | Age: 74
End: 2024-04-23
Payer: COMMERCIAL

## 2024-04-23 DIAGNOSIS — M25.571 ACUTE RIGHT ANKLE PAIN: Primary | ICD-10-CM

## 2024-04-23 DIAGNOSIS — M77.8 EXTENSOR TENDINITIS OF FOOT: ICD-10-CM

## 2024-04-23 DIAGNOSIS — M54.16 RADICULOPATHY OF LUMBAR REGION: ICD-10-CM

## 2024-04-23 PROCEDURE — 97110 THERAPEUTIC EXERCISES: CPT

## 2024-04-23 PROCEDURE — 97112 NEUROMUSCULAR REEDUCATION: CPT

## 2024-04-23 NOTE — PROGRESS NOTES
Daily Note     Today's date: 2024  Patient name: Shelia Miller  : 1950  MRN: [id number]  Referring provider: Shin Dan DPM  Dx:   Encounter Diagnosis     ICD-10-CM    1. Acute right ankle pain  M25.571       2. Extensor tendinitis of foot  M77.8       3. Radiculopathy of lumbar region  M54.16                      Subjective: Pt reports 3/10 pain this morning. Was more sore yesterday and the day before. Feels that tape helped L ankle. Feels that low back is up and down. Sees neuro next week.      Objective: BKTC improves low back sx short term - added to HEP in place of PPU       Assessment: Tolerated treatment well. Patient demonstrated fatigue post treatment and would benefit from continued PT. Does well w/ mobility progressions, re-did leukotape to L ankle. Will trial more flexion based progressions over the the next week, will consider night time and later day effects of this compared to previous ext based program. Pt tends to have relief w/ ext immediately after but sx do seem to be increasing between sessions.       Plan: Continue per plan of care. Re-eval, check tape, check flexion work     Precautions: standard         POC expires Auth Status Total   Visits  Start date  Expiration date PT/OT + Visit Limit? Co-Insurance    MEDICARE                                                  Visit/Unit Tracking  AUTH Status: Date               Visits  Authed:  Used                Remaining                      Dummy Auth Tracking  1 2 3 4 5 6   7 8 9 10 11 12   13 14 15 16 17 18   19 20 21 22 23 24   25 26 27 28 29 30   31 32 33 34 35 36         Date (Visit #) IE 3/28 (1)  (2) 4/10 (3)  (4) 4/15 (5)  (6)  (7)   Manuals          DTM and TPR          Ankle mobs Tested B ankles         MWM                    Exercise  Diary          Ther Ex               Active w/u               PROM  tested  ankles  x5-6 mins CP w/ midfoot and calc mobs B  x6-7 mins CP same   x5-6 mins w/ LAD pulses   x3 mins  "on L  X2-3 mins on L ankle    B HS and glute/piri w/ sciatic nerve floss and LAD x10 mins total X3-4 mins       X10 mins    Ankle isometrics   tested   man isos x20 each   tested  x10 each  x20   Self elephant stretch x3-4 mins    Ankle 4 way tband Red on R only x 10 Green loop B ER x 30 B loop WE x20 rev No         R hip side glide towards all x10 2x10 rev        PPU 2x6 2x6  2x6-10 2x8       FRIDA 2x1-2 min 2x2 min 2x2 mins  2x2 mins  2x2 mins       Lumbar  assessed x 2 mins    BKTC 5x30 sec       Forward knee flex w/ heel flat on 12\" step x20 B W/ post TC glide x10, w/o x10           Sciatic nerve flossing man and self x 5 mins Rev x2 mins  rev        Calf stretching x 2-3 mins     Neuro Re Ed               WS training  x20  rev           Heel raise progressions  intro        TrA isos, single leg ext x10    TrA 90/90 taps 2x5-6 total    Bridging 2x10-12 Rev of all x 2-3 mins    Hip progressions    gait progressions, exag and quick x 5 mins  reg w/ push off x 2-3 mins X2-3 mins    X2-3 mins  rev   Balance progressions    split squat w/ great toe ext 2x10 B          Airex sq X20   x20 sitting  X20 standing  X20 w/ heel raise  3x10 w/ squat  x20 sitting, x 20 standing  rev  x20 standing, w/ mini squat 2x10     HEP and POC review  X8 mins X5 mins X2-3 mins X2 mins  X6 mins w/ HEP update X2-3 mins  X2 mins       Green loop side step 15'x4 around knees, 15'x2 around forefoot          Squat green band around knees 2x10           HF/ant tib march green loop 2x10 rev rev        Ant tib raises 2x10 rev           Navicular sling on L x 3-4 min X3-4 mins   Ther Act          Gait               Stairs               Functional lifting/transfers                                                                                Modalities                 Heat                Ice                                 From 4/15:  Access Code: [email or web address]  URL: [email or web address]  Date: 04/15/2024  Prepared by: Damian" Mohan    Exercises  - Prone Press Up On Elbows  - 1 x daily - 7 x weekly - 3 sets - 10 reps  - Prone Press Up  - 1 x daily - 7 x weekly - 3 sets - 10 reps  - Supine Sciatic Nerve Glide  - 1 x daily - 7 x weekly - 3 sets - 10 reps  - Supine Lower Trunk Rotation  - 1 x daily - 7 x weekly - 3 sets - 10 reps  - Supine Posterior Pelvic Tilt  - 1 x daily - 7 x weekly - 3 sets - 10 reps  - Supine 90/90 Alternating Toe Touch  - 1 x daily - 7 x weekly - 3 sets - 10 reps  - Supine Bridge  - 1 x daily - 7 x weekly - 3 sets - 10 reps

## 2024-04-24 NOTE — PROGRESS NOTES
Progress Note     Today's date: 2024  Patient name: Shelia Miller  : 1950  MRN: [id number]  Referring provider: Shin Dan DPM  Dx:   Encounter Diagnosis     ICD-10-CM    1. Acute right ankle pain  M25.571       2. Extensor tendinitis of foot  M77.8       3. Radiculopathy of lumbar region  M54.16                      Subjective: Pt reports good progress since starting PT. Notes that prolonged standing can be difficult on ankles. Notes that she feels tape for L ankle really helps. R ankle feels much better as of late. Feels that flexion exercises are helping back. Encouraged by progress and would like to continue. Functional update below:    Goals  Short term goals (3 weeks): ALL PROGRESSED   1) Pt will improve B ankle AROM to WNL pain free.  2) Pt will improve B LE and core strength deficits by 1/3 grade MMT.   3) Pt will reports pain at worse <5/10.  4) Pt will initiate and progress HEP w/ special emphasis on functional ankle mobility and strength.     Long term goals (6 weeks) ALL PROGRESSED   1) Pt will improve FOTO to at least 61.  2) Pt will stand and ambulate community distances w/o deficit related to ankles.   3) Pt will sleep through the night in positioning of choosing 6/7 nights a week w/o waking due to pain.  4) Pt will be independent and compliant w/ HEP in order to maximize functional benefit of skilled PT following d/c.     *goals extended by 2 and 4 weeks, respectively    Pain  Current pain ratin  At best pain ratin  At worst pain ratin-9  Location: L ankle, across low back, into R leg  Quality: dull ache and sharp  Relieving factors: rest and change in position (elevation, ankle brace)  Aggravating factors: standing, stair climbing, running and lifting (standing <1 hour, walking <3-4 blocks, stairs are step to)  Progression: pain is different - improvement in R leg, in L ankle    Social Support  Steps to enter house: yes  Stairs in house: yes   Lives in: multiple-level  "home  Lives with: spouse    Employment status: not working        Objective     Palpation     Additional Palpation Details  TTP through R ATFL and CFL, through L midfoot (plantar aspect)   -4/25: through L ATFL and CFL    Neurological Testing     Sensation     Ankle/Foot   Left Ankle/Foot   Intact: light touch    Right Ankle/Foot   Intact: light touch     Active Range of Motion     Additional Active Range of Motion Details  L ankle DF and PF grossly decreased by 50%, inv/ev <25% normal limits  R ankle grossly 75% of normal limits for all     -4/25: grossly at least 75% B w/o pain    Passive Range of Motion     Right Ankle/Foot  Normal passive range of motion    Additional Passive Range of Motion Details  L ankle DF and PF decrease to 75% of normal limits on L, inv/ev to 50%    R grossly WNL      -4/25: grossly WNL w/o pain    Joint Play   Left Ankle/Foot  Hypomobile in the subtalar joint and midfoot.     Right Ankle/Foot  Hypomobile in the distal tibiofibular joint, talocrural joint, subtalar joint and midfoot.     Strength/Myotome Testing     Left Ankle/Foot   Dorsiflexion: 4  Plantar flexion: 4  Inversion: 4  Eversion: 4  Great toe flexion: 4  Great toe extension: 4    Right Ankle/Foot   Dorsiflexion: 4-  Plantar flexion: 4-  Inversion: 4-  Eversion: 3+  Great toe flexion: 4-  Great toe extension: 3+    Tests     Additional Tests Details  LAD through L TC joint \"feels good\"   -4/25: continued     Low back to be more thoroughly assessed in future   -LAD provides relief, BKTC provides relief   -thoracolumbar ROM grossly 75% of normal range w/ improved control as compared to eval       Ambulation     Ambulation: Level Surfaces     Additional Level Surfaces Ambulation Details  Min decreased step length and WB on L LE, min decreased pace, not specifically antalgic today.    -4/25: improved upright posture, improved pace, min decreased WB through L LE, min antalgic     Functional Assessment        Comments  Sit<>stand: " UE support on LE w/ R side WS    -4/25: can perform unsupported x 4-5 reps w/ fair glute drive    BW squat: not past 25% before compensation    -4/25: at least 50% w/ min UE support, fair glute drive    Stairs: TBA   -4/25: not assessed    SLS: painful B    -4/25: not tested            Assessment: Tolerated treatment well. Patient demonstrated fatigue post treatment and would benefit from continued PT. Pt has been re-assessed after 8 skilled PT visits. She has made objective improvements in strength, functional ROM, functional capacity, and average self reports of pain. Pain has been up and down and is influenced by a variety of factors. We will continue on 2x/week basis w/ emphasis on functional core control as we continue. Responds well to flexion, will continue in this vein barring setback. Pt in agreement w/ plan.      Plan: Continue per plan of care. Check flexion work, full body strength work barring setback.     Precautions: standard         POC expires Auth Status Total   Visits  Start date  Expiration date PT/OT + Visit Limit? Co-Insurance    MEDICARE                                                  Visit/Unit Tracking  AUTH Status: Date               Visits  Authed:  Used                Remaining                      Dummy Auth Tracking  1 2 3 4 5 6   7 8 9 10 11 12   13 14 15 16 17 18   19 20 21 22 23 24   25 26 27 28 29 30   31 32 33 34 35 36         Date (Visit #) IE 3/28 (1) 4/2 (2) 4/10 (3) 4/11 (4) 4/15 (5) 4/17 (6) 4/23 (7) 4/25 (8) PN   Manuals           DTM and TPR           Ankle mobs Tested B ankles          MWM                      Exercise  Diary           Ther Ex                Active w/u                PROM  tested  ankles  x5-6 mins CP w/ midfoot and calc mobs B  x6-7 mins CP same   x5-6 mins w/ LAD pulses   x3 mins on L  X2-3 mins on L ankle    B HS and glute/piri w/ sciatic nerve floss and LAD x10 mins total X3-4 mins       X10 mins  X3 mins      X6 mins    Ankle isometrics   tested   man  "isos x20 each   tested  x10 each  x20   Self elephant stretch x3-4 mins  rev   Ankle 4 way tband Red on R only x 10 Green loop B ER x 30 B loop WE x20 rev No          R hip side glide towards all x10 2x10 rev         PPU 2x6 2x6  2x6-10 2x8  no      FRIDA 2x1-2 min 2x2 min 2x2 mins  2x2 mins  2x2 mins  no      Lumbar  assessed x 2 mins    BKTC 5x30 sec  4x30 sec w/ OP      Forward knee flex w/ heel flat on 12\" step x20 B W/ post TC glide x10, w/o x10            Sciatic nerve flossing man and self x 5 mins Rev x2 mins  rev rev        Calf stretching x 2-3 mins   rev   Neuro Re Ed                WS training  x20  rev            Heel raise progressions  intro        TrA isos, single leg ext x10    TrA 90/90 taps 2x5-6 total    Bridging 2x10-12 Rev of all x 2-3 mins  X20 isos, march x20              Bridge 2x10   Hip progressions    gait progressions, exag and quick x 5 mins  reg w/ push off x 2-3 mins X2-3 mins    X2-3 mins  rev X2 mins   Balance progressions    split squat w/ great toe ext 2x10 B           Airex sq X20   x20 sitting  X20 standing  X20 w/ heel raise  3x10 w/ squat  x20 sitting, x 20 standing  rev  x20 standing, w/ mini squat 2x10   rev   HEP and POC review  X8 mins X5 mins X2-3 mins X2 mins  X6 mins w/ HEP update X2-3 mins  X2 mins        Green loop side step 15'x4 around knees, 15'x2 around forefoot           Squat green band around knees 2x10            HF/ant tib march green loop 2x10 rev rev         Ant tib raises 2x10 rev   Yellow tband side step ev bias 20'x8, high march x20         Navicular sling on L x 3-4 min X3-4 mins X3-4 mins   Ther Act           Gait                Stairs                Functional lifting/transfers                                                                                     Modalities                  Heat                 Ice                                   From 4/15:  Access Code: [email or web address]  URL: [email or web address]  Date: 04/15/2024  Prepared " by: Damian Preston    Exercises  - Prone Press Up On Elbows  - 1 x daily - 7 x weekly - 3 sets - 10 reps  - Prone Press Up  - 1 x daily - 7 x weekly - 3 sets - 10 reps  - Supine Sciatic Nerve Glide  - 1 x daily - 7 x weekly - 3 sets - 10 reps  - Supine Lower Trunk Rotation  - 1 x daily - 7 x weekly - 3 sets - 10 reps  - Supine Posterior Pelvic Tilt  - 1 x daily - 7 x weekly - 3 sets - 10 reps  - Supine 90/90 Alternating Toe Touch  - 1 x daily - 7 x weekly - 3 sets - 10 reps  - Supine Bridge  - 1 x daily - 7 x weekly - 3 sets - 10 reps

## 2024-04-25 ENCOUNTER — EVALUATION (OUTPATIENT)
Dept: PHYSICAL THERAPY | Facility: CLINIC | Age: 74
End: 2024-04-25
Payer: COMMERCIAL

## 2024-04-25 ENCOUNTER — OFFICE VISIT (OUTPATIENT)
Dept: FAMILY MEDICINE CLINIC | Facility: CLINIC | Age: 74
End: 2024-04-25
Payer: COMMERCIAL

## 2024-04-25 VITALS
BODY MASS INDEX: 28.34 KG/M2 | WEIGHT: 160 LBS | HEART RATE: 82 BPM | DIASTOLIC BLOOD PRESSURE: 70 MMHG | RESPIRATION RATE: 16 BRPM | TEMPERATURE: 98.3 F | SYSTOLIC BLOOD PRESSURE: 126 MMHG

## 2024-04-25 DIAGNOSIS — I15.9 SECONDARY HYPERTENSION: ICD-10-CM

## 2024-04-25 DIAGNOSIS — E27.8 ADRENAL NODULE (HCC): ICD-10-CM

## 2024-04-25 DIAGNOSIS — M77.8 EXTENSOR TENDINITIS OF FOOT: ICD-10-CM

## 2024-04-25 DIAGNOSIS — I50.31 DIASTOLIC CHF, ACUTE (HCC): ICD-10-CM

## 2024-04-25 DIAGNOSIS — R25.2 BILATERAL LEG CRAMPS: Primary | ICD-10-CM

## 2024-04-25 DIAGNOSIS — M25.571 ACUTE RIGHT ANKLE PAIN: Primary | ICD-10-CM

## 2024-04-25 DIAGNOSIS — G62.0 DRUG-INDUCED POLYNEUROPATHY (HCC): ICD-10-CM

## 2024-04-25 DIAGNOSIS — M54.16 RADICULOPATHY OF LUMBAR REGION: ICD-10-CM

## 2024-04-25 PROBLEM — E27.9 ADRENAL NODULE (HCC): Status: ACTIVE | Noted: 2024-04-25

## 2024-04-25 PROCEDURE — 97112 NEUROMUSCULAR REEDUCATION: CPT

## 2024-04-25 PROCEDURE — G2211 COMPLEX E/M VISIT ADD ON: HCPCS | Performed by: FAMILY MEDICINE

## 2024-04-25 PROCEDURE — 99214 OFFICE O/P EST MOD 30 MIN: CPT | Performed by: FAMILY MEDICINE

## 2024-04-25 PROCEDURE — 97110 THERAPEUTIC EXERCISES: CPT

## 2024-04-25 NOTE — ASSESSMENT & PLAN NOTE
Wt Readings from Last 3 Encounters:   04/25/24 72.6 kg (160 lb)   03/19/24 73.9 kg (163 lb)   03/04/24 73.9 kg (163 lb)       Stable. No evidence of fluid overload on exam today.

## 2024-04-26 ENCOUNTER — APPOINTMENT (OUTPATIENT)
Dept: LAB | Facility: HOSPITAL | Age: 74
End: 2024-04-26
Payer: COMMERCIAL

## 2024-04-26 DIAGNOSIS — R25.2 BILATERAL LEG CRAMPS: ICD-10-CM

## 2024-04-26 LAB
ALBUMIN SERPL BCP-MCNC: 4.1 G/DL (ref 3.5–5)
ALP SERPL-CCNC: 77 U/L (ref 34–104)
ALT SERPL W P-5'-P-CCNC: 28 U/L (ref 7–52)
ANION GAP SERPL CALCULATED.3IONS-SCNC: 6 MMOL/L (ref 4–13)
AST SERPL W P-5'-P-CCNC: 30 U/L (ref 13–39)
BASOPHILS # BLD AUTO: 0.04 THOUSANDS/ÂΜL (ref 0–0.1)
BASOPHILS NFR BLD AUTO: 1 % (ref 0–1)
BILIRUB SERPL-MCNC: 0.94 MG/DL (ref 0.2–1)
BUN SERPL-MCNC: 23 MG/DL (ref 5–25)
CALCIUM SERPL-MCNC: 9.5 MG/DL (ref 8.4–10.2)
CHLORIDE SERPL-SCNC: 104 MMOL/L (ref 96–108)
CO2 SERPL-SCNC: 30 MMOL/L (ref 21–32)
CREAT SERPL-MCNC: 0.77 MG/DL (ref 0.6–1.3)
EOSINOPHIL # BLD AUTO: 0.22 THOUSAND/ÂΜL (ref 0–0.61)
EOSINOPHIL NFR BLD AUTO: 6 % (ref 0–6)
ERYTHROCYTE [DISTWIDTH] IN BLOOD BY AUTOMATED COUNT: 12.4 % (ref 11.6–15.1)
GFR SERPL CREATININE-BSD FRML MDRD: 76 ML/MIN/1.73SQ M
GLUCOSE P FAST SERPL-MCNC: 101 MG/DL (ref 65–99)
HCT VFR BLD AUTO: 42.1 % (ref 34.8–46.1)
HGB BLD-MCNC: 13.7 G/DL (ref 11.5–15.4)
IMM GRANULOCYTES # BLD AUTO: 0 THOUSAND/UL (ref 0–0.2)
IMM GRANULOCYTES NFR BLD AUTO: 0 % (ref 0–2)
LYMPHOCYTES # BLD AUTO: 0.88 THOUSANDS/ÂΜL (ref 0.6–4.47)
LYMPHOCYTES NFR BLD AUTO: 25 % (ref 14–44)
MAGNESIUM SERPL-MCNC: 2 MG/DL (ref 1.9–2.7)
MCH RBC QN AUTO: 30.9 PG (ref 26.8–34.3)
MCHC RBC AUTO-ENTMCNC: 32.5 G/DL (ref 31.4–37.4)
MCV RBC AUTO: 95 FL (ref 82–98)
MONOCYTES # BLD AUTO: 0.49 THOUSAND/ÂΜL (ref 0.17–1.22)
MONOCYTES NFR BLD AUTO: 14 % (ref 4–12)
NEUTROPHILS # BLD AUTO: 1.91 THOUSANDS/ÂΜL (ref 1.85–7.62)
NEUTS SEG NFR BLD AUTO: 54 % (ref 43–75)
NRBC BLD AUTO-RTO: 0 /100 WBCS
PLATELET # BLD AUTO: 194 THOUSANDS/UL (ref 149–390)
PMV BLD AUTO: 10 FL (ref 8.9–12.7)
POTASSIUM SERPL-SCNC: 4.6 MMOL/L (ref 3.5–5.3)
PROT SERPL-MCNC: 6.6 G/DL (ref 6.4–8.4)
RBC # BLD AUTO: 4.44 MILLION/UL (ref 3.81–5.12)
SODIUM SERPL-SCNC: 140 MMOL/L (ref 135–147)
T4 FREE SERPL-MCNC: 0.88 NG/DL (ref 0.61–1.12)
TSH SERPL DL<=0.05 MIU/L-ACNC: 1.07 UIU/ML (ref 0.45–4.5)
WBC # BLD AUTO: 3.54 THOUSAND/UL (ref 4.31–10.16)

## 2024-04-26 PROCEDURE — 80053 COMPREHEN METABOLIC PANEL: CPT

## 2024-04-26 PROCEDURE — 84439 ASSAY OF FREE THYROXINE: CPT

## 2024-04-26 PROCEDURE — 84443 ASSAY THYROID STIM HORMONE: CPT

## 2024-04-26 PROCEDURE — 83735 ASSAY OF MAGNESIUM: CPT

## 2024-04-26 PROCEDURE — 85025 COMPLETE CBC W/AUTO DIFF WBC: CPT

## 2024-04-26 PROCEDURE — 36415 COLL VENOUS BLD VENIPUNCTURE: CPT

## 2024-04-29 NOTE — PROGRESS NOTES
Daily Note     Today's date: 2024  Patient name: Shelia Miller  : 1950  MRN: [id number]  Referring provider: Shin Dan DPM  Dx:   Encounter Diagnosis     ICD-10-CM    1. Acute right ankle pain  M25.571       2. Extensor tendinitis of foot  M77.8       3. Radiculopathy of lumbar region  M54.16                      Subjective: Pt reports that R knee was very sore over weekend, went to chiro and had x-rays done which showed significant degenerative changes in R medial knee. Had some hands on work at chiro that helped. R lateral ankle is sore today. Back is a little sore but has been better w/ flexion work.      Objective: requires cueing for pacing and form w/ retro walking, to promote drive through forefoot and quad activation - corrects but fatigues quickly      Assessment: Tolerated treatment well. Patient demonstrated fatigue post treatment and would benefit from continued PT. Does well w/ exercise progressions and hands on work to ankles and TC joint today. Fatigue but no increase in pain by end. Will continue to increase intensity slowly, adding techniques that can be replicated at home.      Plan: Continue per plan of care. Retro walking progressions, uneven surfaces, hip/core pull/push progressions     Precautions: standard         POC expires Auth Status Total   Visits  Start date  Expiration date PT/OT + Visit Limit? Co-Insurance    MEDICARE                                                  Visit/Unit Tracking  AUTH Status: Date               Visits  Authed:  Used                Remaining                      Dummy Auth Tracking  1 2 3 4 5 6   7 8 9 10 11 12   13 14 15 16 17 18   19 20 21 22 23 24   25 26 27 28 29 30   31 32 33 34 35 36         Date (Visit #) 4/15 (5)  (6)  (7)  (8) PN  (9)    Manuals         DTM and TPR         Ankle mobs         MWM                  Exercise  Diary         Ther Ex          Active w/u          PROM  x3 mins on L  X2-3 mins on L ankle    B HS  and glute/piri w/ sciatic nerve floss and LAD x10 mins total X3-4 mins       X10 mins  X3 mins      X6 mins  R ankle PROM x 3-4 mins w/ post TC and calc mobs    X 14 mins B LE w/ glute/piri, flexion based work    Ankle isometrics   x20   Self elephant stretch x3-4 mins  rev rev    Ankle 4 way tband No          rev         2x6-10 2x8  no      2x2 mins  2x2 mins  2x2 mins  no        BKTC 5x30 sec  4x30 sec w/ OP 4x15 sec              Sciatic nerve flossing man and self x 5 mins Rev x2 mins  rev rev rev     Calf stretching x 2-3 mins   rev Manual x2-3 mins    Neuro Re Ed          WS training          Heel raise progressions  TrA isos, single leg ext x10    TrA 90/90 taps 2x5-6 total    Bridging 2x10-12 Rev of all x 2-3 mins  X20 isos, march x20              Bridge 2x10 Isos and march x20    Hip progressions   X2-3 mins  rev X2 mins     Balance progressions          Airex sq  x20 standing, w/ mini squat 2x10   rev rev    HEP and POC review  X6 mins w/ HEP update X2-3 mins  X2 mins   X2 mins                        rev rev   Retro walk 17.5# x7 total     rev   Yellow tband side step ev bias 20'x8, high march x20 Yellow tband high march x20, rev of side step      Navicular sling on L x 3-4 min X3-4 mins X3-4 mins no    Ther Act         Gait          Stairs          Functional lifting/transfers                                                       Modalities            Heat           Ice                       From 4/15:  Access Code: [email or web address]  URL: [email or web address]  Date: 04/15/2024  Prepared by: Damian Preston    Exercises  - Prone Press Up On Elbows  - 1 x daily - 7 x weekly - 3 sets - 10 reps  - Prone Press Up  - 1 x daily - 7 x weekly - 3 sets - 10 reps  - Supine Sciatic Nerve Glide  - 1 x daily - 7 x weekly - 3 sets - 10 reps  - Supine Lower Trunk Rotation  - 1 x daily - 7 x weekly - 3 sets - 10 reps  - Supine Posterior Pelvic Tilt  - 1 x daily - 7 x weekly - 3 sets - 10 reps  - Supine 90/90  Alternating Toe Touch  - 1 x daily - 7 x weekly - 3 sets - 10 reps  - Supine Bridge  - 1 x daily - 7 x weekly - 3 sets - 10 reps

## 2024-04-30 ENCOUNTER — OFFICE VISIT (OUTPATIENT)
Dept: NEUROLOGY | Facility: CLINIC | Age: 74
End: 2024-04-30
Payer: COMMERCIAL

## 2024-04-30 ENCOUNTER — OFFICE VISIT (OUTPATIENT)
Dept: PHYSICAL THERAPY | Facility: CLINIC | Age: 74
End: 2024-04-30
Payer: COMMERCIAL

## 2024-04-30 VITALS
DIASTOLIC BLOOD PRESSURE: 63 MMHG | HEIGHT: 63 IN | HEART RATE: 78 BPM | WEIGHT: 161 LBS | BODY MASS INDEX: 28.53 KG/M2 | TEMPERATURE: 97.1 F | SYSTOLIC BLOOD PRESSURE: 126 MMHG | OXYGEN SATURATION: 97 %

## 2024-04-30 DIAGNOSIS — M54.16 RADICULOPATHY OF LUMBAR REGION: ICD-10-CM

## 2024-04-30 DIAGNOSIS — M77.8 EXTENSOR TENDINITIS OF FOOT: ICD-10-CM

## 2024-04-30 DIAGNOSIS — M54.16 RADICULOPATHY, LUMBAR REGION: Primary | ICD-10-CM

## 2024-04-30 DIAGNOSIS — M71.38 SYNOVIAL CYST OF LUMBAR SPINE: ICD-10-CM

## 2024-04-30 DIAGNOSIS — M54.16 CHRONIC RADICULAR LUMBAR PAIN: ICD-10-CM

## 2024-04-30 DIAGNOSIS — G89.29 CHRONIC RADICULAR LUMBAR PAIN: ICD-10-CM

## 2024-04-30 DIAGNOSIS — M25.571 ACUTE RIGHT ANKLE PAIN: Primary | ICD-10-CM

## 2024-04-30 PROCEDURE — 97110 THERAPEUTIC EXERCISES: CPT

## 2024-04-30 PROCEDURE — 97112 NEUROMUSCULAR REEDUCATION: CPT

## 2024-04-30 PROCEDURE — 99214 OFFICE O/P EST MOD 30 MIN: CPT | Performed by: PSYCHIATRY & NEUROLOGY

## 2024-04-30 PROCEDURE — 1159F MED LIST DOCD IN RCRD: CPT | Performed by: PSYCHIATRY & NEUROLOGY

## 2024-04-30 RX ORDER — BACLOFEN 10 MG/1
TABLET ORAL
Qty: 60 TABLET | Refills: 2 | Status: SHIPPED | OUTPATIENT
Start: 2024-04-30

## 2024-04-30 NOTE — PROGRESS NOTES
Return NeuroOutpatient Note        Shelia Miller  [id number]  73 y.o.  1950       Lumbar spinal stenosis  and Radiculopathy, lumbar region         History obtained from:  Patient     HPI/Subjective:    Shelia Miller is a 74 yo F with PMH of lumbar radiculopathy presents as f/u. Per my previous history, she's had numbness at her finger tips from RA. She then had chemo taxel between 2020 and 2021 which did increase her numbness and tingling. Since August of 2022, patient has started getting lower back pain after a trip that required an 8 hour drive to [place]. She had reported left lower back pain, into gluteal region, down posterior thigh and leg and into toes. She tried chiropractor sessions which have helped. We had ordered MRI LS spine which had revealed multilevel spondylotic changes of the lumbar spine including multifactorial severe L4-5 central canal encroachment; an intracanalicular synovial cyst identified at the L3-4 level resulting in moderate central canal encroachment at this level.  We had referred her to neurosurgery and she did see PA of Dr. Snowden. Since patient's pain was doing better with PT, they recommended continuing and trying pain management if needed and if still symptomatic then consider surgery. If pain was not controlled, they would offer fusion.   Patient was then referred to pain management but is offered only neuropathic medications including gabapentin, lyrica and cymbalta. She either developed side effects or it was ineffective.     Past summer, she was dx with Lyme and babesiosis and treated with abx for a month. Her pain has been getting worse since.  Since Feb, her pain is much worse affecting her daily activities.     She has continued with PT/OT  She hasn't gotten around to doing aqua therapy.       Her LDL is 164 and was recently placed on lipitor 10mg. her repeat LDL was 46 but TG were elevated.  She is back on Actemra for RA.        Patient has h/o lyme disease and babesiosis.  It was a long course to heal from it.       Past Medical History:   Diagnosis Date   • Arthritis 2015   • Asthma    • Back pain with radiculopathy 03/01/2023   • BRCA1 negative    • BRCA2 negative    • Breast cancer (HCC) 2020    Left  breast, triple negative   • Cancer (HCC) 7/2019   • Colon polyp    • GERD (gastroesophageal reflux disease) 2016   • History of chemotherapy 10/01/2020    breast cancer - left   • History of radiation therapy 04/01/2021    left breast cancer   • Hypertension    • Lyme disease    • OA (osteoarthritis)    • Rheumatoid arthritis (HCC)      Social History     Socioeconomic History   • Marital status: /Civil Union     Spouse name: Not on file   • Number of children: Not on file   • Years of education: Not on file   • Highest education level: Not on file   Occupational History   • Not on file   Tobacco Use   • Smoking status: Never     Passive exposure: Past   • Smokeless tobacco: Never   Vaping Use   • Vaping status: Never Used   Substance and Sexual Activity   • Alcohol use: Not Currently     Comment: occasional   • Drug use: Never   • Sexual activity: Not on file   Other Topics Concern   • Not on file   Social History Narrative   • Not on file     Social Determinants of Health     Financial Resource Strain: Low Risk  (9/10/2023)    Overall Financial Resource Strain (CARDIA)    • Difficulty of Paying Living Expenses: Not hard at all   Food Insecurity: Not on file   Transportation Needs: No Transportation Needs (9/10/2023)    PRAPARE - Transportation    • Lack of Transportation (Medical): No    • Lack of Transportation (Non-Medical): No   Physical Activity: Not on file   Stress: Not on file   Social Connections: Not on file   Intimate Partner Violence: Not on file   Housing Stability: Not on file     Family History   Problem Relation Age of Onset   • Breast cancer Mother 59   • Cancer Mother         Breast Cancer   • Lung cancer Father    • Cancer Father         Lung cancer   • COPD  Father    • Hypertension Father    • Breast cancer Paternal Grandmother    • Thyroid cancer Son    • Cancer Son         Thyroid Cancer     Allergies   Allergen Reactions   • Fruit Extracts Itching     Cantelope melon peaches   • Nuts - Food Allergy GI Intolerance   • Pollen Extract Other (See Comments)     Pollen , Trees And Flowers   • Avelox [Moxifloxacin] Itching     Current Outpatient Medications on File Prior to Visit   Medication Sig Dispense Refill   • Actemra ACTPen 162 MG/0.9ML SOAJ Once every 10 days     • albuterol (PROVENTIL HFA,VENTOLIN HFA) 90 mcg/act inhaler as needed     • atorvastatin (LIPITOR) 10 mg tablet Take 10 mg by mouth daily     • Calcium Carbonate-Vitamin D3 600-400 MG-UNIT TABS Take by mouth     • CELECOXIB PO Take 200 mg by mouth 2 (two) times a day as needed On hold     • Cholecalciferol 25 MCG (1000 UT) capsule Take 1,000 Units by mouth daily     • Fluticasone-Salmeterol (Advair Diskus) 250-50 mcg/dose inhaler Inhale 1 puff 2 (two) times a day Rinse mouth after use. 60 blister 11   • losartan (COZAAR) 50 mg tablet 50 mg daily     • Multiple Vitamin (MULTIVITAMIN ADULT PO) Take 1 tablet by mouth daily     • omeprazole (PriLOSEC) 20 mg delayed release capsule Take 1 capsule (20 mg total) by mouth daily 90 capsule 3   • PreviDent 5000 Booster Plus 1.1 % PSTE      • raloxifene (EVISTA) 60 mg tablet Take 60 mg by mouth daily     • [DISCONTINUED] cyclobenzaprine (FLEXERIL) 5 mg tablet Per patient taking as needed     • DULoxetine (CYMBALTA) 20 mg capsule Take 1 capsule (20 mg total) by mouth daily (Patient not taking: Reported on 4/25/2024) 30 capsule 2   • [DISCONTINUED] Na Sulfate-K Sulfate-Mg Sulf (Suprep Bowel Prep Kit) 17.5-3.13-1.6 GM/177ML SOLN Take 177 mL by mouth once for 1 dose Take 177 mL by mouth once for 1 dose (Patient not taking: Reported on 6/1/2022) 177 mL 0     Current Facility-Administered Medications on File Prior to Visit   Medication Dose Route Frequency Provider Last  "Rate Last Admin   • triamcinolone acetonide (KENALOG-10) 10 mg/mL injection 10 mg  10 mg Intra-articular     10 mg at 03/19/24 1545         Review of Systems   Refer to positive review of systems in HPI.   Review of Systems    Constitutional- No fever  Eyes- No visual change  ENT- Hearing normal  CV- No chest pain  Resp- No Shortness of breath  GI- No diarrhea  - Bladder normal  MS- No Arthritis   Skin- No rash  Psych- No depression  Endo- No DM  Heme- No nodes    Vitals:    04/30/24 1017   BP: 126/63   BP Location: Left arm   Patient Position: Sitting   Cuff Size: Standard   Pulse: 78   Temp: (!) 97.1 °F (36.2 °C)   TempSrc: Tympanic   SpO2: 97%   Weight: 73 kg (161 lb)   Height: 5' 3\" (1.6 m)       PHYSICAL EXAM:  Appearance: No Acute Distress  Ophthalmoscopic: Disc Flat, Normal fundus  Mental status:  Orientation: Awake, Alert, and Orientedx3  Memory: Registation 3/3 Recall 3/3  Attention: normal  Knowledge: good  Language: No aphasia  Speech: No dysarthria  Cranial Nerves:  2 No Visual Defect on Confrontation, Pupils round, equal, reactive to light  3,4,6 Extraocular Movements Intact, no nystagmus  5 Facial Sensation Intact  7 No facial asymmetry  8 Intact hearing  9,10 Palate symmetric, normal gag  11 Good shoulder shrug  12 Tongue Midline  Gait: unsteady with tandem gait.   Coordination: No ataxia with finger to nose testing, and heel to shin  Sensory: Intact, Symmetric to pinprick, light touch, vibration, and joint position  Muscle Tone: Normal              Muscle exam:  Arm Right Left Leg Right Left   Deltoid 5/5 5/5 Iliopsoas 5/5 5/5   Biceps 5/5 5/5 Quads 5/5 5/5   Triceps 5/5 5/5 Hamstrings 5/5 5/5   Wrist Extension 5/5 5/5 Ankle Dorsi Flexion 5/5 5/5   Wrist Flexion 5/5 5/5 Ankle Plantar Flexion 5/5 5/5   Interossei 5/5 5/5 Ankle Eversion 5/5 5/5   APB 5/5 5/5 Ankle Inversion 5/5 5/5     Difficulty raising legs above 90 degree.   Reflexes   RJ BJ TJ KJ AJ Plantars Lyons's   Right 2+ 2+ 2+ 1+ 0 " Downgoing Not present   Left 2+ 2+ 2+ 1+ 0 Downgoing Not present     Personal review of  Labs:                  Diagnoses and all orders for this visit:      1. Radiculopathy, lumbar region  baclofen 10 mg tablet      2. Synovial cyst of lumbar spine        3. Chronic radicular lumbar pain  baclofen 10 mg tablet            Patient suffers from moderate to severe lower back pain.   Will try baclofen to see if she gets some relief.   Reached out to pain management to see if they would consider VIJI or nerve block as she's not able to tolerate many of neuropathic oral agents.   She is to resume PT/OT.  Asked her to try aqua therapy.             Total time of encounter:  30 min  More than 50% of the time was used in counseling and/or coordination of care  Extent of counseling and/or coordination of care        Jean Pierre Jon MD  [facility]  [address] [place] [id number] [phone]

## 2024-05-02 ENCOUNTER — OFFICE VISIT (OUTPATIENT)
Dept: PHYSICAL THERAPY | Facility: CLINIC | Age: 74
End: 2024-05-02
Payer: COMMERCIAL

## 2024-05-02 DIAGNOSIS — M54.16 RADICULOPATHY OF LUMBAR REGION: ICD-10-CM

## 2024-05-02 DIAGNOSIS — M25.571 ACUTE RIGHT ANKLE PAIN: Primary | ICD-10-CM

## 2024-05-02 DIAGNOSIS — M77.8 EXTENSOR TENDINITIS OF FOOT: ICD-10-CM

## 2024-05-02 PROCEDURE — 97110 THERAPEUTIC EXERCISES: CPT

## 2024-05-02 PROCEDURE — 97112 NEUROMUSCULAR REEDUCATION: CPT

## 2024-05-02 NOTE — PROGRESS NOTES
Daily Note     Today's date: 2024  Patient name: Shelia Miller  : 1950  MRN: [id number]  Referring provider: Shin Dan DPM  Dx:   Encounter Diagnosis     ICD-10-CM    1. Acute right ankle pain  M25.571       2. Extensor tendinitis of foot  M77.8       3. Radiculopathy of lumbar region  M54.16                      Subjective: Pt reports that front of R ankle is sore. Feels better overall since last visit. Feels that retro walking was helpful.       Objective: requires cueing w/ peanut HS curl for force of contraction through HS as compared to trunk or UE compensation - corrects but significant and quick fatigue.      Assessment: Tolerated treatment well. Patient demonstrated fatigue post treatment and would benefit from continued PT. Pt has some post knee soreness, along distal HS, in mid session. Responds well to HS activation work coupled w/ standing progressions. Fatigue but no increase in pain by end. Will benefit from more aggressive challenges barring setback.       Plan: Continue per plan of care.  Retro walk, check HS slider work, standing HS work if able     Precautions: standard         POC expires Auth Status Total   Visits  Start date  Expiration date PT/OT + Visit Limit? Co-Insurance    MEDICARE                                                  Visit/Unit Tracking  AUTH Status: Date               Visits  Authed:  Used                Remaining                      Dummy Auth Tracking  1 2 3 4 5 6   7 8 9 10 11 12   13 14 15 16 17 18   19 20 21 22 23 24   25 26 27 28 29 30   31 32 33 34 35 36         Date (Visit #) 4/15 (5)  (6)  (7)  (8) PN  (9)  (10)   Manuals         DTM and TPR         Ankle mobs         MWM                  Exercise  Diary         Ther Ex          Active w/u          PROM  x3 mins on L  X2-3 mins on L ankle    B HS and glute/piri w/ sciatic nerve floss and LAD x10 mins total X3-4 mins       X10 mins  X3 mins      X6 mins  R ankle PROM x 3-4 mins w/  "post TC and calc mobs    X 14 mins B LE w/ glute/piri, flexion based work No            X8 min same   Ankle isometrics   x20   Self elephant stretch x3-4 mins  rev rev    Ankle 4 way tband No          rev         2x6-10 2x8  no      2x2 mins  2x2 mins  2x2 mins  no        BKTC 5x30 sec  4x30 sec w/ OP 4x15 sec 3x15 sec          LTR x10    Sciatic nerve flossing man and self x 5 mins Rev x2 mins  rev rev rev X10 on L only    Calf stretching x 2-3 mins   rev Manual x2-3 mins MWM for ankle DF on R 2x8 - 10\" step   Neuro Re Ed          WS training          Heel raise progressions  TrA isos, single leg ext x10    TrA 90/90 taps 2x5-6 total    Bridging 2x10-12 Rev of all x 2-3 mins  X20 isos, march x20              Bridge 2x10 Isos and march x20 Isos and march x20              Reg bridge x10, on peanut 2x5, HS curl 2x5   Hip progressions   X2-3 mins  rev X2 mins     Balance progressions          Airex sq  x20 standing, w/ mini squat 2x10   rev rev    HEP and POC review  X6 mins w/ HEP update X2-3 mins  X2 mins   X2 mins  X2 mins         HS slider rev in various positions x 5 mins             rev rev   Retro walk 17.5# x7 total 17.5# x8 total    rev   Yellow tband side step ev bias 20'x8, high march x20 Yellow tband high march x20, rev of side step rev     Navicular sling on L x 3-4 min X3-4 mins X3-4 mins no    Ther Act         Gait          Stairs          Functional lifting/transfers                                                       Modalities            Heat           Ice                       From 4/15:  Access Code: [email or web address]  URL: [email or web address]  Date: 04/15/2024  Prepared by: Damian Preston    Exercises  - Prone Press Up On Elbows  - 1 x daily - 7 x weekly - 3 sets - 10 reps  - Prone Press Up  - 1 x daily - 7 x weekly - 3 sets - 10 reps  - Supine Sciatic Nerve Glide  - 1 x daily - 7 x weekly - 3 sets - 10 reps  - Supine Lower Trunk Rotation  - 1 x daily - 7 x weekly - 3 sets - 10 reps  - " Supine Posterior Pelvic Tilt  - 1 x daily - 7 x weekly - 3 sets - 10 reps  - Supine 90/90 Alternating Toe Touch  - 1 x daily - 7 x weekly - 3 sets - 10 reps  - Supine Bridge  - 1 x daily - 7 x weekly - 3 sets - 10 reps

## 2024-05-09 ENCOUNTER — OFFICE VISIT (OUTPATIENT)
Dept: PHYSICAL THERAPY | Facility: CLINIC | Age: 74
End: 2024-05-09
Payer: COMMERCIAL

## 2024-05-09 DIAGNOSIS — M25.571 ACUTE RIGHT ANKLE PAIN: Primary | ICD-10-CM

## 2024-05-09 DIAGNOSIS — M77.8 EXTENSOR TENDINITIS OF FOOT: ICD-10-CM

## 2024-05-09 DIAGNOSIS — M54.16 RADICULOPATHY OF LUMBAR REGION: ICD-10-CM

## 2024-05-09 PROCEDURE — 97112 NEUROMUSCULAR REEDUCATION: CPT

## 2024-05-09 PROCEDURE — 97110 THERAPEUTIC EXERCISES: CPT

## 2024-05-09 NOTE — PROGRESS NOTES
Daily Note     Today's date: 2024  Patient name: Shelia Miller  : 1950  MRN: [id number]  Referring provider: Shin Dan DPM  Dx:   Encounter Diagnosis     ICD-10-CM    1. Acute right ankle pain  M25.571       2. Extensor tendinitis of foot  M77.8       3. Radiculopathy of lumbar region  M54.16                      Subjective: Pt reports some stiffness in low back, notes that her ankle and foot feel pretty good on L. Slightly sore on R.      Objective: requires cueing w/ sciatic nerve progressions to promote proper glide vs acute stretch - able to correct and maintain.       Assessment: Tolerated treatment well. Patient demonstrated fatigue post treatment and would benefit from continued PT. Does well w/ exercise progressions. Fatigue but no increase in pain by end of session. Will continue on 2x/week basis over the next two weeks. Will slowly incorporate higher level stab work as sx allow.       Plan: Continue per plan of care. Review exercise groupings to create program for home - pt will consider techniques that have helped thus far so we can better organize.      Precautions: standard         POC expires Auth Status Total   Visits  Start date  Expiration date PT/OT + Visit Limit? Co-Insurance    MEDICARE                                                  Visit/Unit Tracking  AUTH Status: Date               Visits  Authed:  Used                Remaining                      Dummy Auth Tracking  1 2 3 4 5 6   7 8 9 10 11 12   13 14 15 16 17 18   19 20 21 22 23 24   25 26 27 28 29 30   31 32 33 34 35 36         Date (Visit #) 4/15 (5)  (6)  (7)  (8) PN  (9)  (10)  (11)   Manuals          DTM and TPR          Ankle mobs          MWM                    Exercise  Diary          Ther Ex           Active w/u           PROM  x3 mins on L  X2-3 mins on L ankle    B HS and glute/piri w/ sciatic nerve floss and LAD x10 mins total X3-4 mins       X10 mins  X3 mins      X6 mins  R ankle PROM  "x 3-4 mins w/ post TC and calc mobs    X 14 mins B LE w/ glute/piri, flexion based work No            X8 min same             X10 min w/ sciatic nerve flossing progressions   Ankle isometrics   x20   Self elephant stretch x3-4 mins  rev rev     Ankle 4 way tband No           rev          2x6-10 2x8  no       2x2 mins  2x2 mins  2x2 mins  no         BKTC 5x30 sec  4x30 sec w/ OP 4x15 sec 3x15 sec  3x15 sec          LTR x10 3x8    Sciatic nerve flossing man and self x 5 mins Rev x2 mins  rev rev rev X10 on L only Above     Calf stretching x 2-3 mins   rev Manual x2-3 mins MWM for ankle DF on R 2x8 - 10\" step rev   Neuro Re Ed           WS training           Heel raise progressions  TrA isos, single leg ext x10    TrA 90/90 taps 2x5-6 total    Bridging 2x10-12 Rev of all x 2-3 mins  X20 isos, march x20              Bridge 2x10 Isos and march x20 Isos and march x20              Reg bridge x10, on peanut 2x5, HS curl 2x5 Rev                Reg 2x10   Hip progressions   X2-3 mins  rev X2 mins      Balance progressions           Airex sq  x20 standing, w/ mini squat 2x10   rev rev     HEP and POC review  X6 mins w/ HEP update X2-3 mins  X2 mins   X2 mins  X2 mins POC discussion x 6 mins         HS slider rev in various positions x 5 mins rev              rev rev   Retro walk 17.5# x7 total 17.5# x8 total 19# x6, 12.5# x2    rev   Yellow tband side step ev bias 20'x8, high march x20 Yellow tband high march x20, rev of side step rev      Navicular sling on L x 3-4 min X3-4 mins X3-4 mins no     Ther Act          Gait           Stairs           Functional lifting/transfers                                                            Modalities             Heat            Ice                         From 4/15:  Access Code: [email or web address]  URL: [email or web address]  Date: 04/15/2024  Prepared by: Damian Preston    Exercises  - Prone Press Up On Elbows  - 1 x daily - 7 x weekly - 3 sets - 10 reps  - Prone Press Up  - " 1 x daily - 7 x weekly - 3 sets - 10 reps  - Supine Sciatic Nerve Glide  - 1 x daily - 7 x weekly - 3 sets - 10 reps  - Supine Lower Trunk Rotation  - 1 x daily - 7 x weekly - 3 sets - 10 reps  - Supine Posterior Pelvic Tilt  - 1 x daily - 7 x weekly - 3 sets - 10 reps  - Supine 90/90 Alternating Toe Touch  - 1 x daily - 7 x weekly - 3 sets - 10 reps  - Supine Bridge  - 1 x daily - 7 x weekly - 3 sets - 10 reps

## 2024-05-14 ENCOUNTER — OFFICE VISIT (OUTPATIENT)
Dept: PHYSICAL THERAPY | Facility: CLINIC | Age: 74
End: 2024-05-14
Payer: COMMERCIAL

## 2024-05-14 DIAGNOSIS — M25.571 ACUTE RIGHT ANKLE PAIN: Primary | ICD-10-CM

## 2024-05-14 DIAGNOSIS — M77.8 EXTENSOR TENDINITIS OF FOOT: ICD-10-CM

## 2024-05-14 DIAGNOSIS — M54.16 RADICULOPATHY OF LUMBAR REGION: ICD-10-CM

## 2024-05-14 PROCEDURE — 97110 THERAPEUTIC EXERCISES: CPT

## 2024-05-14 PROCEDURE — 97112 NEUROMUSCULAR REEDUCATION: CPT

## 2024-05-14 NOTE — PROGRESS NOTES
Daily Note     Today's date: 2024  Patient name: Shelia Miller  : 1950  MRN: [id number]  Referring provider: Shin Dan DPM  Dx:   Encounter Diagnosis     ICD-10-CM    1. Acute right ankle pain  M25.571       2. Extensor tendinitis of foot  M77.8       3. Radiculopathy of lumbar region  M54.16                      Subjective: Pt reports that she was very sore after last session, thought intensity was a bit too high. Weather might have played a role as well. Notes that R side leg was bothering her, below R ankle was also so.      Objective: requires cueing for controlled depth w/ TRX progressions, quick fatigue but no increase in pain      Assessment: Tolerated treatment well. Patient demonstrated fatigue post treatment and would benefit from continued PT. Good tolerance to reduced intensity w/ exercise progressions today. Will benefit from more aggressive strength work barring any setback, kept session lighter so as not to exacerbate sx today.       Plan: Continue per plan of care. Retro walk progressions on turf, slide board     Precautions: standard         POC expires Auth Status Total   Visits  Start date  Expiration date PT/OT + Visit Limit? Co-Insurance    MEDICARE                                                  Visit/Unit Tracking  AUTH Status: Date               Visits  Authed:  Used                Remaining                      Dummy Auth Tracking  1 2 3 4 5 6   7 8 9 10 11 12   13 14 15 16 17 18   19 20 21 22 23 24   25 26 27 28 29 30   31 32 33 34 35 36         Date (Visit #) 4/15 (5)  (6)  (7)  (8) PN  (9)  (10)  (11)  (12)   Manuals           DTM and TPR           Ankle mobs           MWM                      Exercise  Diary           Ther Ex            Active w/u            PROM  x3 mins on L  X2-3 mins on L ankle    B HS and glute/piri w/ sciatic nerve floss and LAD x10 mins total X3-4 mins       X10 mins  X3 mins      X6 mins  R ankle PROM x 3-4 mins w/ post  "TC and calc mobs    X 14 mins B LE w/ glute/piri, flexion based work No            X8 min same             X10 min w/ sciatic nerve flossing progressions Calc mobs, LAD R ankle x 2-3 mins    Post fib head leukotape x 2-3 mins    LE mobility work, LAD, sciatic nerve flossing x 10 mins   Ankle isometrics   x20   Self elephant stretch x3-4 mins  rev rev   Elephant stretch X2-3 mins total   Ankle 4 way tband No            rev           2x6-10 2x8  no        2x2 mins  2x2 mins  2x2 mins  no          BKTC 5x30 sec  4x30 sec w/ OP 4x15 sec 3x15 sec  3x15 sec  3x15 sec          LTR x10 3x8 3x10    Sciatic nerve flossing man and self x 5 mins Rev x2 mins  rev rev rev X10 on L only Above      Calf stretching x 2-3 mins   rev Manual x2-3 mins MWM for ankle DF on R 2x8 - 10\" step rev X10 B on 4\" step   Neuro Re Ed            WS training            Heel raise progressions  TrA isos, single leg ext x10    TrA 90/90 taps 2x5-6 total    Bridging 2x10-12 Rev of all x 2-3 mins  X20 isos, march x20              Bridge 2x10 Isos and march x20 Isos and march x20              Reg bridge x10, on peanut 2x5, HS curl 2x5 Rev                Reg 2x10 Isos x10                no   Hip progressions   X2-3 mins  rev X2 mins       Balance progressions            Airex sq  x20 standing, w/ mini squat 2x10   rev rev      HEP and POC review  X6 mins w/ HEP update X2-3 mins  X2 mins   X2 mins  X2 mins POC discussion x 6 mins X2 mins         HS slider rev in various positions x 5 mins rev            TRX mini squat 2x10    Split squat 2x10 B    Lat squat 2x10    rev rev   Retro walk 17.5# x7 total 17.5# x8 total 19# x6, 12.5# x2     rev   Yellow tband side step ev bias 20'x8, high march x20 Yellow tband high march x20, rev of side step rev       Navicular sling on L x 3-4 min X3-4 mins X3-4 mins no      Ther Act           Gait            Stairs            Functional lifting/transfers                                                                 " Modalities              Heat             Ice                           From 4/15:  Access Code: [email or web address]  URL: [email or web address]  Date: 04/15/2024  Prepared by: Damian Preston    Exercises  - Prone Press Up On Elbows  - 1 x daily - 7 x weekly - 3 sets - 10 reps  - Prone Press Up  - 1 x daily - 7 x weekly - 3 sets - 10 reps  - Supine Sciatic Nerve Glide  - 1 x daily - 7 x weekly - 3 sets - 10 reps  - Supine Lower Trunk Rotation  - 1 x daily - 7 x weekly - 3 sets - 10 reps  - Supine Posterior Pelvic Tilt  - 1 x daily - 7 x weekly - 3 sets - 10 reps  - Supine 90/90 Alternating Toe Touch  - 1 x daily - 7 x weekly - 3 sets - 10 reps  - Supine Bridge  - 1 x daily - 7 x weekly - 3 sets - 10 reps

## 2024-05-16 ENCOUNTER — OFFICE VISIT (OUTPATIENT)
Dept: PHYSICAL THERAPY | Facility: CLINIC | Age: 74
End: 2024-05-16
Payer: COMMERCIAL

## 2024-05-16 DIAGNOSIS — M54.16 RADICULOPATHY OF LUMBAR REGION: ICD-10-CM

## 2024-05-16 DIAGNOSIS — M77.8 EXTENSOR TENDINITIS OF FOOT: ICD-10-CM

## 2024-05-16 DIAGNOSIS — M25.571 ACUTE RIGHT ANKLE PAIN: Primary | ICD-10-CM

## 2024-05-16 PROCEDURE — 97112 NEUROMUSCULAR REEDUCATION: CPT

## 2024-05-16 PROCEDURE — 97110 THERAPEUTIC EXERCISES: CPT

## 2024-05-16 NOTE — PROGRESS NOTES
Daily Note     Today's date: 2024  Patient name: Shelia Miller  : 1950  MRN: [id number]  Referring provider: Shin Dan DPM  Dx:   Encounter Diagnosis     ICD-10-CM    1. Acute right ankle pain  M25.571       2. Extensor tendinitis of foot  M77.8       3. Radiculopathy of lumbar region  M54.16                      Subjective: Pt reports that her R ankle feels better today. R side low back and trunk feel tight, feels that this is effecting how she walks.       Objective: requires cueing w/ cat/camel and prayer pose progressions to promote proper positioning prior to initiating stretch, corrects and maintains.      Assessment: Tolerated treatment well. Patient demonstrated fatigue post treatment and would benefit from continued PT. Does well w/ exercise progressions. Fatigue but no increase in pain by end of session. Good tolerance to strength work, good tolerance to mobility work. Will benefit from more aggressive loaded progressions barring setback. Pt in agreement w/ plan.       Plan: Continue per plan of care. Check on mobility work, bent over rows, rotational work w/ tband     Precautions: standard         POC expires Auth Status Total   Visits  Start date  Expiration date PT/OT + Visit Limit? Co-Insurance    MEDICARE                                                  Visit/Unit Tracking  AUTH Status: Date               Visits  Authed:  Used                Remaining                      Dummy Auth Tracking  1 2 3 4 5 6   7 8 9 10 11 12   13 14 15 16 17 18   19 20 21 22 23 24   25 26 27 28 29 30   31 32 33 34 35 36         Date (Visit #) 4/15 (5)  (6)  (7)  (8) PN  (9)  (10)  (11)  (12)  (13)   Manuals            DTM and TPR            Ankle mobs            MWM                        Exercise  Diary            Ther Ex             Active w/u             PROM  x3 mins on L  X2-3 mins on L ankle    B HS and glute/piri w/ sciatic nerve floss and LAD x10 mins total X3-4 mins  "      X10 mins  X3 mins      X6 mins  R ankle PROM x 3-4 mins w/ post TC and calc mobs    X 14 mins B LE w/ glute/piri, flexion based work No            X8 min same             X10 min w/ sciatic nerve flossing progressions Calc mobs, LAD R ankle x 2-3 mins    Post fib head leukotape x 2-3 mins    LE mobility work, LAD, sciatic nerve flossing x 10 mins Same x 2-3 mins      Rev        X6 mins     Ankle isometrics   x20   Self elephant stretch x3-4 mins  rev rev   Elephant stretch X2-3 mins total rev   Ankle 4 way tband No             rev            2x6-10 2x8  no         2x2 mins  2x2 mins  2x2 mins  no           BKTC 5x30 sec  4x30 sec w/ OP 4x15 sec 3x15 sec  3x15 sec  3x15 sec  rev         LTR x10 3x8 3x10 2x10    Sciatic nerve flossing man and self x 5 mins Rev x2 mins  rev rev rev X10 on L only Above   rev    Calf stretching x 2-3 mins   rev Manual x2-3 mins MWM for ankle DF on R 2x8 - 10\" step rev X10 B on 4\" step    Neuro Re Ed             WS training             Heel raise progressions  TrA isos, single leg ext x10    TrA 90/90 taps 2x5-6 total    Bridging 2x10-12 Rev of all x 2-3 mins  X20 isos, march x20              Bridge 2x10 Isos and march x20 Isos and march x20              Reg bridge x10, on peanut 2x5, HS curl 2x5 Rev                Reg 2x10 Isos x10                no rev   Hip progressions   X2-3 mins  rev X2 mins     Yellow loop wall slides x10, w/ lift off x10   Balance progressions          18 mins tspine mobilty     Cat/camel, prayer pose progressions, quad off table progressions, seated tspine ext over half foam   Airex sq  x20 standing, w/ mini squat 2x10   rev rev       HEP and POC review  X6 mins w/ HEP update X2-3 mins  X2 mins   X2 mins  X2 mins POC discussion x 6 mins X2 mins X2 mins         HS slider rev in various positions x 5 mins rev             TRX mini squat 2x10    Split squat 2x10 B    Lat squat 2x10 no    rev rev   Retro walk 17.5# x7 total 17.5# x8 total 19# x6, 12.5# x2      " rev   Yellow tband side step ev bias 20'x8, high march x20 Yellow tband high march x20, rev of side step rev        Navicular sling on L x 3-4 min X3-4 mins X3-4 mins no       Ther Act            Gait             Stairs             Functional lifting/transfers                                                                      Modalities               Heat              Ice                             From 4/15:  Access Code: [email or web address]  URL: [email or web address]  Date: 04/15/2024  Prepared by: Damian Preston    Exercises  - Prone Press Up On Elbows  - 1 x daily - 7 x weekly - 3 sets - 10 reps  - Prone Press Up  - 1 x daily - 7 x weekly - 3 sets - 10 reps  - Supine Sciatic Nerve Glide  - 1 x daily - 7 x weekly - 3 sets - 10 reps  - Supine Lower Trunk Rotation  - 1 x daily - 7 x weekly - 3 sets - 10 reps  - Supine Posterior Pelvic Tilt  - 1 x daily - 7 x weekly - 3 sets - 10 reps  - Supine 90/90 Alternating Toe Touch  - 1 x daily - 7 x weekly - 3 sets - 10 reps  - Supine Bridge  - 1 x daily - 7 x weekly - 3 sets - 10 reps

## 2024-05-20 DIAGNOSIS — K21.9 GASTROESOPHAGEAL REFLUX DISEASE WITHOUT ESOPHAGITIS: ICD-10-CM

## 2024-05-21 ENCOUNTER — OFFICE VISIT (OUTPATIENT)
Dept: PHYSICAL THERAPY | Facility: CLINIC | Age: 74
End: 2024-05-21
Payer: COMMERCIAL

## 2024-05-21 DIAGNOSIS — M54.16 RADICULOPATHY OF LUMBAR REGION: ICD-10-CM

## 2024-05-21 DIAGNOSIS — M77.8 EXTENSOR TENDINITIS OF FOOT: ICD-10-CM

## 2024-05-21 DIAGNOSIS — M25.571 ACUTE RIGHT ANKLE PAIN: Primary | ICD-10-CM

## 2024-05-21 PROCEDURE — 97112 NEUROMUSCULAR REEDUCATION: CPT

## 2024-05-21 PROCEDURE — 97110 THERAPEUTIC EXERCISES: CPT

## 2024-05-21 RX ORDER — OMEPRAZOLE 20 MG/1
CAPSULE, DELAYED RELEASE ORAL
Qty: 180 CAPSULE | Refills: 1 | Status: SHIPPED | OUTPATIENT
Start: 2024-05-21

## 2024-05-21 NOTE — PROGRESS NOTES
Daily Note     Today's date: 2024  Patient name: Shelia Miller  : 1950  MRN: [id number]  Referring provider: Shin Dan DPM  Dx:   Encounter Diagnosis     ICD-10-CM    1. Acute right ankle pain  M25.571       2. Extensor tendinitis of foot  M77.8       3. Radiculopathy of lumbar region  M54.16                      Subjective: Pt reports that R knee, up through R posterior hip and R side low back are sore today. Notes that she had a couple tough nights of sleep, pain improvement is a little better but not consistent.       Objective: Pt tends to lack drive through forefoot when walking in straight lines, leads to circumduction of R hip - able to correct once cued. Will require further attention.       Assessment: Tolerated treatment well. Patient demonstrated fatigue post treatment and would benefit from continued PT. Does well w/ exercise progressions today. Focused on quad strength as it relates to knee and hip function. Progressed to gait work on turf, focusing on maximizing use of energy towards forward propulsion vs circumduction of R hip. Good correction but will continue to address.       Plan: Continue per plan of care. Review gait, strength work through sag planes, sled work if able     Precautions: standard         POC expires Auth Status Total   Visits  Start date  Expiration date PT/OT + Visit Limit? Co-Insurance    MEDICARE                                                  Visit/Unit Tracking  AUTH Status: Date               Visits  Authed:  Used                Remaining                      Dummy Auth Tracking  1 2 3 4 5 6   7 8 9 10 11 12   13 14 15 16 17 18   19 20 21 22 23 24   25 26 27 28 29 30   31 32 33 34 35 36         Date (Visit #)  (9)  (10)  (11)  (12)  (13)  (14)   Manuals         DTM and TPR         Ankle mobs         MWM                  Exercise  Diary         Ther Ex         Active w/u         PROM R ankle PROM x 3-4 mins w/ post TC and calc mobs    X  "14 mins B LE w/ glute/piri, flexion based work No            X8 min same             X10 min w/ sciatic nerve flossing progressions Calc mobs, LAD R ankle x 2-3 mins    Post fib head leukotape x 2-3 mins    LE mobility work, LAD, sciatic nerve flossing x 10 mins Same x 2-3 mins      Rev        X6 mins                   X5 mins    Ankle isometrics  rev   Elephant stretch X2-3 mins total rev    Ankle 4 way tband                                     4x15 sec 3x15 sec  3x15 sec  3x15 sec  rev BKTC 3x15 sec      LTR x10 3x8 3x10 2x10 2x10 total    rev X10 on L only Above   rev     Manual x2-3 mins MWM for ankle DF on R 2x8 - 10\" step rev X10 B on 4\" step  rev   Neuro Re Ed         WS training         Heel raise progressions Isos and march x20 Isos and march x20              Reg bridge x10, on peanut 2x5, HS curl 2x5 Rev                Reg 2x10 Isos x10                no rev Isos x 10                2x10   Hip progressions     Yellow loop wall slides x10, w/ lift off x10 X10 each    Balance progressions     18 mins tspine mobilty     Cat/camel, prayer pose progressions, quad off table progressions, seated tspine ext over half foam rev   Airex sq rev        HEP and POC review  X2 mins  X2 mins POC discussion x 6 mins X2 mins X2 mins X2 mins     HS slider rev in various positions x 5 mins rev          TRX mini squat 2x10    Split squat 2x10 B    Lat squat 2x10 no STARR HE SS 8\" 2x10 B     HE ant taps x15     HE squats 2x10     Retro walk 17.5# x7 total 17.5# x8 total 19# x6, 12.5# x2   12.5# x5    Yellow tband high march x20, rev of side step rev        no     Gait on turf - 40'x8 - focus on push off   Ther Act         Gait         Stairs         Functional lifting/transfers                                                  Modalities           Heat          Ice                     From 4/15:  Access Code: [email or web address]  URL: [email or web address]  Date: 04/15/2024  Prepared by: Damian Preston    Exercises  - Prone " Press Up On Elbows  - 1 x daily - 7 x weekly - 3 sets - 10 reps  - Prone Press Up  - 1 x daily - 7 x weekly - 3 sets - 10 reps  - Supine Sciatic Nerve Glide  - 1 x daily - 7 x weekly - 3 sets - 10 reps  - Supine Lower Trunk Rotation  - 1 x daily - 7 x weekly - 3 sets - 10 reps  - Supine Posterior Pelvic Tilt  - 1 x daily - 7 x weekly - 3 sets - 10 reps  - Supine 90/90 Alternating Toe Touch  - 1 x daily - 7 x weekly - 3 sets - 10 reps  - Supine Bridge  - 1 x daily - 7 x weekly - 3 sets - 10 reps

## 2024-05-22 NOTE — PROGRESS NOTES
Progress Note     Today's date: 2024  Patient name: Shelia Miller  : 1950  MRN: [id number]  Referring provider: Shin Dan DPM  Dx:   Encounter Diagnosis     ICD-10-CM    1. Acute right ankle pain  M25.571       2. Extensor tendinitis of foot  M77.8       3. Radiculopathy of lumbar region  M54.16                      Subjective: Pt reports that she was able to wear sandals to [employer]. Encouraged by this. Can stand for half and hour to forty five minutes w/o pain in R shin and ankle, improved over fifteen minutes. Feels PT is helping and would like to continue for the rest of her scheduled visits (early ). Functional update below:        Goals  Short term goals (3 weeks): ALL PROGRESSED   1) Pt will improve B ankle AROM to WNL pain free.  2) Pt will improve B LE and core strength deficits by 1/3 grade MMT.   3) Pt will reports pain at worse <5/10.  4) Pt will initiate and progress HEP w/ special emphasis on functional ankle mobility and strength.     Long term goals (6 weeks) ALL PROGRESSED   1) Pt will improve FOTO to at least 61. - achieved   2) Pt will stand and ambulate community distances w/o deficit related to ankles.   3) Pt will sleep through the night in positioning of choosing 6/7 nights a week w/o waking due to pain.  4) Pt will be independent and compliant w/ HEP in order to maximize functional benefit of skilled PT following d/c.     *goals extended by 2 and 4 weeks, respectively    : again extended 2 weeks until likely d/c at that time    Pain  Current pain ratin  At best pain ratin  At worst pain ratin  Location: L ankle, across low back, into R leg  Quality: dull ache and sharp  Relieving factors: rest and change in position (elevation, ankle brace)  Aggravating factors: standing, stair climbing, running and lifting (standing <1 hour, walking <3-4 blocks, stairs are step to)  Progression: improved in all respects, some pain in R shin into ankle (can  "stand longer before it comes on)    Social Support  Steps to enter house: yes  Stairs in house: yes   Lives in: multiple-level home  Lives with: spouse    Employment status: not working        Objective     Palpation     Additional Palpation Details  TTP through R ATFL and CFL, through L midfoot (plantar aspect)   -4/25: through L ATFL and CFL   -5/23: min through L CFL only, min through R post ankle    Neurological Testing     Sensation     Ankle/Foot   Left Ankle/Foot   Intact: light touch    Right Ankle/Foot   Intact: light touch     Active Range of Motion     Additional Active Range of Motion Details  L ankle DF and PF grossly decreased by 50%, inv/ev <25% normal limits  R ankle grossly 75% of normal limits for all     -4/25: grossly at least 75% B w/o pain   -5/23: grossly WNL B w/o pain     Passive Range of Motion     Right Ankle/Foot  Normal passive range of motion    Additional Passive Range of Motion Details  L ankle DF and PF decrease to 75% of normal limits on L, inv/ev to 50%    R grossly WNL      -4/25: grossly WNL w/o pain   -5/23: same     Joint Play   Left Ankle/Foot  Hypomobile in the subtalar joint and midfoot.     Right Ankle/Foot  Hypomobile in the distal tibiofibular joint, talocrural joint, subtalar joint and midfoot.     Strength/Myotome Testing     Left Ankle/Foot   Dorsiflexion: 4  Plantar flexion: 4  Inversion: 4  Eversion: 4  Great toe flexion: 4  Great toe extension: 4    Right Ankle/Foot   Dorsiflexion: 4  Plantar flexion: 4  Inversion: 4  Eversion: 4-  Great toe flexion: 4  Great toe extension: 4-    Tests     Additional Tests Details  LAD through L TC joint \"feels good\"   -4/25: continued    -5/23: continued     Low back to be more thoroughly assessed in future   -LAD provides relief, BKTC provides relief   -thoracolumbar ROM grossly 75% of normal range w/ improved control as compared to eval   -5/23: not tested today, grossly WNL w/ most functional motions       Ambulation "     Ambulation: Level Surfaces     Additional Level Surfaces Ambulation Details  Min decreased step length and WB on L LE, min decreased pace, not specifically antalgic today.    -4/25: improved upright posture, improved pace, min decreased WB through L LE, min antalgic    -5/23: good upright positioning, min L trunk lean, improved equality of WB but min decreased step length on L LE    Functional Assessment        Comments  Sit<>stand: UE support on LE w/ R side WS    -4/25: can perform unsupported x 4-5 reps w/ fair glute drive   -5/23: similar     BW squat: not past 25% before compensation    -4/25: at least 50% w/ min UE support, fair glute drive   -5/23: not tested     Stairs: TBA   -4/25: not assessed   -5/23: has not been a point of emphasis     SLS: painful B    -4/25: not tested   -5/23: not tested           Assessment: Tolerated treatment well. Patient demonstrated fatigue post treatment and would benefit from continued PT. Pt has been re-assessed after 15 skilled therapy visits. She has made objective improvements in functional ankle ROM and strength, quality of reports of pain, FOTO score, and overall quality of life. She is motivated by progress and we will continue on 2x/week basis through rest of visits in early June. Plan to d/c at that time barring setback. Pt in agreement w/ plan.       Plan: Continue per plan of care. Loaded functional strength work as able     Precautions: standard         POC expires Auth Status Total   Visits  Start date  Expiration date PT/OT + Visit Limit? Co-Insurance    MEDICARE                                                  Visit/Unit Tracking  AUTH Status: Date               Visits  Authed:  Used                Remaining                      Dummy Auth Tracking  1 2 3 4 5 6   7 8 9 10 11 12   13 14 15 16 17 18   19 20 21 22 23 24   25 26 27 28 29 30   31 32 33 34 35 36         Date (Visit #) 4/30 (9) 5/2 (10) 5/9 (11) 5/14 (12) 5/16 (13) 5/21 (14) 5/23 (15) PN   Manuals  "         DTM and TPR          Ankle mobs          MWM                    Exercise  Diary          Ther Ex          Active w/u          PROM R ankle PROM x 3-4 mins w/ post TC and calc mobs    X 14 mins B LE w/ glute/piri, flexion based work No            X8 min same             X10 min w/ sciatic nerve flossing progressions Calc mobs, LAD R ankle x 2-3 mins    Post fib head leukotape x 2-3 mins    LE mobility work, LAD, sciatic nerve flossing x 10 mins Same x 2-3 mins      Rev        X6 mins                   X5 mins  X3-4 mins         Rev        X5 mins   Ankle isometrics  rev   Elephant stretch X2-3 mins total rev     Ankle 4 way tband                                         4x15 sec 3x15 sec  3x15 sec  3x15 sec  rev BKTC 3x15 sec  rev     LTR x10 3x8 3x10 2x10 2x10 total x20    rev X10 on L only Above   rev      Manual x2-3 mins MWM for ankle DF on R 2x8 - 10\" step rev X10 B on 4\" step  rev rev   Neuro Re Ed          WS training          Heel raise progressions Isos and march x20 Isos and march x20              Reg bridge x10, on peanut 2x5, HS curl 2x5 Rev                Reg 2x10 Isos x10                no rev Isos x 10                2x10 X20                x20   Hip progressions     Yellow loop wall slides x10, w/ lift off x10 X10 each  ev   Balance progressions     18 mins tspine mobilty     Cat/camel, prayer pose progressions, quad off table progressions, seated tspine ext over half foam rev    Airex sq rev         HEP and POC review  X2 mins  X2 mins POC discussion x 6 mins X2 mins X2 mins X2 mins Functional review and POC discussion x 6 mins     HS slider rev in various positions x 5 mins rev           TRX mini squat 2x10    Split squat 2x10 B    Lat squat 2x10 no STARR HE SS 8\" 2x10 B     HE ant taps x15     HE squats 2x10  Level ground 2x10 B     X20 B       rev    Retro walk 17.5# x7 total 17.5# x8 total 19# x6, 12.5# x2   12.5# x5 no    Yellow tband high march x20, rev of side step rev         no     " Gait on turf - 40'x8 - focus on push off rev   Ther Act          Gait          Stairs          Functional lifting/transfers                                                       Modalities            Heat           Ice                       From 4/15:  Access Code: [email or web address]  URL: [email or web address]  Date: 04/15/2024  Prepared by: Damian Preston    Exercises  - Prone Press Up On Elbows  - 1 x daily - 7 x weekly - 3 sets - 10 reps  - Prone Press Up  - 1 x daily - 7 x weekly - 3 sets - 10 reps  - Supine Sciatic Nerve Glide  - 1 x daily - 7 x weekly - 3 sets - 10 reps  - Supine Lower Trunk Rotation  - 1 x daily - 7 x weekly - 3 sets - 10 reps  - Supine Posterior Pelvic Tilt  - 1 x daily - 7 x weekly - 3 sets - 10 reps  - Supine 90/90 Alternating Toe Touch  - 1 x daily - 7 x weekly - 3 sets - 10 reps  - Supine Bridge  - 1 x daily - 7 x weekly - 3 sets - 10 reps

## 2024-05-23 ENCOUNTER — EVALUATION (OUTPATIENT)
Dept: PHYSICAL THERAPY | Facility: CLINIC | Age: 74
End: 2024-05-23
Payer: COMMERCIAL

## 2024-05-23 DIAGNOSIS — M25.571 ACUTE RIGHT ANKLE PAIN: Primary | ICD-10-CM

## 2024-05-23 DIAGNOSIS — M54.16 RADICULOPATHY OF LUMBAR REGION: ICD-10-CM

## 2024-05-23 DIAGNOSIS — M77.8 EXTENSOR TENDINITIS OF FOOT: ICD-10-CM

## 2024-05-23 PROCEDURE — 97112 NEUROMUSCULAR REEDUCATION: CPT

## 2024-05-23 PROCEDURE — 97110 THERAPEUTIC EXERCISES: CPT

## 2024-05-28 ENCOUNTER — OFFICE VISIT (OUTPATIENT)
Dept: PHYSICAL THERAPY | Facility: CLINIC | Age: 74
End: 2024-05-28
Payer: COMMERCIAL

## 2024-05-28 DIAGNOSIS — M77.8 EXTENSOR TENDINITIS OF FOOT: ICD-10-CM

## 2024-05-28 DIAGNOSIS — M54.16 RADICULOPATHY OF LUMBAR REGION: ICD-10-CM

## 2024-05-28 DIAGNOSIS — M25.571 ACUTE RIGHT ANKLE PAIN: Primary | ICD-10-CM

## 2024-05-28 PROCEDURE — 97110 THERAPEUTIC EXERCISES: CPT

## 2024-05-28 PROCEDURE — 97112 NEUROMUSCULAR REEDUCATION: CPT

## 2024-05-28 NOTE — PROGRESS NOTES
"Daily Note     Today's date: 2024  Patient name: Shelia Miller  : 1950  MRN: [id number]  Referring provider: Shin Dan DPM  Dx:   Encounter Diagnosis     ICD-10-CM    1. Acute right ankle pain  M25.571       2. Extensor tendinitis of foot  M77.8       3. Radiculopathy of lumbar region  M54.16                      Subjective: Pt reports general stiffness today.       Objective: requires cueing w/ STARR SS to promote proper mechanics from ankle joint to TF joint - corrects well and shows better correction w/ elevated heel (no wedge) on 6\" step.       Assessment: Tolerated treatment well. Patient demonstrated fatigue post treatment and would benefit from continued PT. Does well w/ exercise progressions. Fatigue but no increase in pain by end of session. Will continue to benefit from loaded progressions as sx allow. Full body stability and dynamic control will be points of emphasis moving forward.      Plan: Continue per plan of care. Walking w/ CoD, w/ turns, w/ 360, unexpected CoD, stair work as able     Precautions: standard         POC expires Auth Status Total   Visits  Start date  Expiration date PT/OT + Visit Limit? Co-Insurance    MEDICARE                                                  Visit/Unit Tracking  AUTH Status: Date               Visits  Authed:  Used                Remaining                      Dummy Auth Tracking  1 2 3 4 5 6   7 8 9 10 11 12   13 14 15 16 17 18   19 20 21 22 23 24   25 26 27 28 29 30   31 32 33 34 35 36         Date (Visit #)  (13)  (14)  (15) PN  (16)      Manuals         DTM and TPR         Ankle mobs         MWM                  Exercise  Diary         Ther Ex         Active w/u         PROM Same x 2-3 mins      Rev        X6 mins                   X5 mins  X3-4 mins         Rev        X5 mins B LE mobility work x 10 mins            Ankle mobility x 4-5 mins w/ leukotape for post fib head      Ankle isometrics  rev        Ankle 4 way tband          " "                           rev BKTC 3x15 sec  rev X3 self       2x10 2x10 total x20 2x10      rev          rev rev      Neuro Re Ed         WS training         Heel raise progressions rev Isos x 10                2x10 X20                x20 Isos x20     Hip progressions Yellow loop wall slides x10, w/ lift off x10 X10 each  ev rev     Balance progressions 18 mins tspine mobilty     Cat/camel, prayer pose progressions, quad off table progressions, seated tspine ext over half foam rev  rev     Airex sq         HEP and POC review  X2 mins X2 mins Functional review and POC discussion x 6 mins                no STARR HE SS 8\" 2x10 B     HE ant taps x15     HE squats 2x10  Level ground 2x10 B     X20 B       rev Level ground 2x10 B    HE STARR SS on 6\" step x 20 B       12.5# x5 no                 Gait on turf - 40'x8 - focus on push off rev X3-4 mins     Ther Act         Gait         Stairs         Functional lifting/transfers                                                  Modalities           Heat          Ice                     From 4/15:  Access Code: [email or web address]  URL: [email or web address]  Date: 04/15/2024  Prepared by: Damian Preston    Exercises  - Prone Press Up On Elbows  - 1 x daily - 7 x weekly - 3 sets - 10 reps  - Prone Press Up  - 1 x daily - 7 x weekly - 3 sets - 10 reps  - Supine Sciatic Nerve Glide  - 1 x daily - 7 x weekly - 3 sets - 10 reps  - Supine Lower Trunk Rotation  - 1 x daily - 7 x weekly - 3 sets - 10 reps  - Supine Posterior Pelvic Tilt  - 1 x daily - 7 x weekly - 3 sets - 10 reps  - Supine 90/90 Alternating Toe Touch  - 1 x daily - 7 x weekly - 3 sets - 10 reps  - Supine Bridge  - 1 x daily - 7 x weekly - 3 sets - 10 reps                                                                         "

## 2024-05-30 ENCOUNTER — OFFICE VISIT (OUTPATIENT)
Dept: PHYSICAL THERAPY | Facility: CLINIC | Age: 74
End: 2024-05-30
Payer: COMMERCIAL

## 2024-05-30 DIAGNOSIS — M77.8 EXTENSOR TENDINITIS OF FOOT: ICD-10-CM

## 2024-05-30 DIAGNOSIS — M25.571 ACUTE RIGHT ANKLE PAIN: Primary | ICD-10-CM

## 2024-05-30 DIAGNOSIS — M54.16 RADICULOPATHY OF LUMBAR REGION: ICD-10-CM

## 2024-05-30 PROCEDURE — 97110 THERAPEUTIC EXERCISES: CPT

## 2024-05-30 PROCEDURE — 97112 NEUROMUSCULAR REEDUCATION: CPT

## 2024-05-31 NOTE — PROGRESS NOTES
Pain Medicine Follow-Up Note    Assessment:  1. Chronic pain syndrome    2. Lumbar radiculopathy    3. Spinal stenosis of lumbar region with neurogenic claudication    4. Neuropathy        Plan:    My impressions and treatment recommendations were discussed in detail with the patient who verbalized understanding and had no further questions.      Patient returns to the office after initiating duloxetine 20 mg daily.  Patient states that she trialed the medication for 7 days and did not like the way it was making her feel and she did not feel it was providing her any benefit with her pain.  I did advise that typically takes about a month before it would help with any pain symptoms.  Patient prefers not to reinitiate this medication.  The patient has yet to have any injectable interventions regarding her lumbar radiculopathy.  Her pain starts in her right low back and radiates down her right lateral leg down to her right foot.  Patient did states she has a tightness in her right buttock, she does not have tenderness along the piriformis or worsening pain with a piriformis stretch.  She does have a positive straight leg on the right I recommend that she have a lumbar epidural steroid injection at L5-S1. Complete risks and benefits including bleeding, infection, tissue reaction, nerve injury and allergic reaction were discussed. The approach was demonstrated using models and literature was provided. Verbal and written consent was obtained.    Follow-up is planned in 4 weeks after injection time or sooner as warranted.  Discharge instructions were provided. I personally saw and examined the patient and I agree with the above discussed plan of care.    History of Present Illness:    Shelia Miller is a 73 y.o. female who presents to [facility] for interval re-evaluation of the above stated pain complaints. The patient has a past medical and chronic pain history as outlined in the assessment section.  She was last seen on 3/4/2024.    At today's visit patient states that their pain symptoms are the same with a pain score of 2/10 on the verbal numeric pain scale.  The patient's pain is worse in the evening.  The patient's pain is intermittent in nature.  And the quality of the patient's pain is described as dull-aching, cramping, and pins-and-needles.  The patient's pain is located in the right side of low back radiating down her right lateral leg to her right foot.  Patient also states that she has right knee pain as well as left ankle pain at times.  The patient was previously prescribed duloxetine unfortunately she did not feel it was making a difference and discontinuing it after 7 days.  Patient also states she did not like the way it was making her feel.    Other than as stated above, the patient denies any interval changes in medications, medical condition, mental condition, symptoms, or allergies since the last office visit.         Review of Systems:    Review of Systems   Respiratory:  Negative for shortness of breath.    Cardiovascular:  Negative for chest pain.   Gastrointestinal:  Negative for constipation, diarrhea, nausea and vomiting.   Musculoskeletal:  Positive for gait problem. Negative for arthralgias, joint swelling and myalgias.        Joint stiffness, pain in the right lower leg and swelling in the right ankle occasionally   Skin:  Negative for rash.   Neurological:  Negative for dizziness, seizures and weakness.   All other systems reviewed and are negative.        Past Medical History:   Diagnosis Date    Arthritis 2015    Asthma     Back pain with radiculopathy 03/01/2023    BRCA1 negative     BRCA2 negative     Breast cancer (HCC) 2020    Left  breast, triple negative    Cancer (HCC) 7/2019    Colon polyp     GERD (gastroesophageal reflux disease) 2016    History of chemotherapy 10/01/2020    breast cancer - left    History of radiation therapy 04/01/2021    left breast cancer     Hypertension     Lyme disease     OA (osteoarthritis)     Rheumatoid arthritis (HCC)        Past Surgical History:   Procedure Laterality Date    ANKLE SURGERY Right 2016    2 plates and screws    BREAST BIOPSY Left 07/02/2020    BREAST LUMPECTOMY Left 08/12/2020    left breast wire localized lumpectomy    BREAST MASS EXCISION Left 09/02/2020    short superior and long lateral left breast resection, left axillary sentinel lymph node biopsy    COLONOSCOPY      ELBOW FRACTURE REPAIR Right 1996    FRACTURE SURGERY Right     elbow    GUM SURGERY      HYSTERECTOMY  2018    JOINT REPLACEMENT Right 6/2021    hip    REPLACEMENT TOTAL HIP W/  RESURFACING IMPLANTS  06/18/2021       Family History   Problem Relation Age of Onset    Breast cancer Mother 59    Cancer Mother         Breast Cancer    Lung cancer Father     Cancer Father         Lung cancer    COPD Father     Hypertension Father     Breast cancer Paternal Grandmother     Thyroid cancer Son     Cancer Son         Thyroid Cancer       Social History     Occupational History    Not on file   Tobacco Use    Smoking status: Never     Passive exposure: Past    Smokeless tobacco: Never   Vaping Use    Vaping status: Never Used   Substance and Sexual Activity    Alcohol use: Not Currently     Comment: occasional    Drug use: Never    Sexual activity: Not on file         Current Outpatient Medications:     Actemra ACTPen 162 MG/0.9ML SOAJ, Once every 10 days, Disp: , Rfl:     albuterol (PROVENTIL HFA,VENTOLIN HFA) 90 mcg/act inhaler, as needed, Disp: , Rfl:     atorvastatin (LIPITOR) 10 mg tablet, Take 10 mg by mouth daily, Disp: , Rfl:     baclofen 10 mg tablet, Start with 1 tab at bedtime for 1 week and then take 1 tab twice daily., Disp: 60 tablet, Rfl: 2    Calcium Carbonate-Vitamin D3 600-400 MG-UNIT TABS, Take by mouth, Disp: , Rfl:     CELECOXIB PO, Take 200 mg by mouth 2 (two) times a day as needed On hold, Disp: , Rfl:     Cholecalciferol 25 MCG (1000 UT) capsule,  "Take 1,000 Units by mouth daily, Disp: , Rfl:     Fluticasone-Salmeterol (Advair Diskus) 250-50 mcg/dose inhaler, Inhale 1 puff 2 (two) times a day Rinse mouth after use., Disp: 60 blister, Rfl: 11    losartan (COZAAR) 50 mg tablet, 50 mg daily, Disp: , Rfl:     Multiple Vitamin (MULTIVITAMIN ADULT PO), Take 1 tablet by mouth daily, Disp: , Rfl:     omeprazole (PriLOSEC) 20 mg delayed release capsule, TAKE TWO CAPSULES BY MOUTH EVERY DAY IN THE MORNING (GENERIC PRILOSEC), Disp: 180 capsule, Rfl: 1    PreviDent 5000 Booster Plus 1.1 % PSTE, , Disp: , Rfl:     raloxifene (EVISTA) 60 mg tablet, Take 60 mg by mouth daily, Disp: , Rfl:     DULoxetine (CYMBALTA) 20 mg capsule, Take 1 capsule (20 mg total) by mouth daily (Patient not taking: Reported on 4/25/2024), Disp: 30 capsule, Rfl: 2    Current Facility-Administered Medications:     triamcinolone acetonide (KENALOG-10) 10 mg/mL injection 10 mg, 10 mg, Intra-articular, , , 10 mg at 03/19/24 1545    Allergies   Allergen Reactions    Fruit Extracts Itching     Cantelope melon peaches    Nuts - Food Allergy GI Intolerance    Pollen Extract Other (See Comments)     Pollen , Trees And Flowers    Avelox [Moxifloxacin] Itching       Physical Exam:    /74   Pulse 80   Ht 5' 3\" (1.6 m)   Wt 73.9 kg (163 lb)   BMI 28.87 kg/m²     Constitutional:normal, well developed, well nourished, alert, in no distress and non-toxic and no overt pain behavior.  Eyes:anicteric  HEENT:grossly intact  Neck:supple, symmetric, trachea midline and no masses   Pulmonary:even and unlabored  Cardiovascular:No edema or pitting edema present  Skin:Normal without rashes or lesions and well hydrated  Psychiatric:Mood and affect appropriate  Neurologic:Cranial Nerves II-XII grossly intact  Musculoskeletal: antalgic gait    Lumbar Spine Exam    Appearance:  Reversal of lordosis  Palpation/Tenderness:  right lumbar paraspinal tenderness  Special Tests:  Right Straight Leg Test:  positive  Right " Jose's Maneuver:  negative  Right Pelvic Distraction Test:  negative  Right Piriformis Stretch Test:  negative      This document was created using speech voice recognition software.   Grammatical errors, random word insertions, pronoun errors, and incomplete sentences are an occasional consequence of this system due to software limitations, ambient noise, and hardware issues.   Any formal questions or concerns about content, text, or information contained within the body of this dictation should be directly addressed to the provider for clarification.

## 2024-06-03 ENCOUNTER — TELEPHONE (OUTPATIENT)
Dept: PAIN MEDICINE | Facility: CLINIC | Age: 74
End: 2024-06-03

## 2024-06-03 ENCOUNTER — OFFICE VISIT (OUTPATIENT)
Dept: PAIN MEDICINE | Facility: CLINIC | Age: 74
End: 2024-06-03
Payer: COMMERCIAL

## 2024-06-03 VITALS
BODY MASS INDEX: 28.88 KG/M2 | DIASTOLIC BLOOD PRESSURE: 74 MMHG | HEART RATE: 80 BPM | SYSTOLIC BLOOD PRESSURE: 123 MMHG | HEIGHT: 63 IN | WEIGHT: 163 LBS

## 2024-06-03 DIAGNOSIS — G89.4 CHRONIC PAIN SYNDROME: Primary | ICD-10-CM

## 2024-06-03 DIAGNOSIS — M54.16 LUMBAR RADICULOPATHY: ICD-10-CM

## 2024-06-03 DIAGNOSIS — M48.062 SPINAL STENOSIS OF LUMBAR REGION WITH NEUROGENIC CLAUDICATION: ICD-10-CM

## 2024-06-03 DIAGNOSIS — G62.9 NEUROPATHY: ICD-10-CM

## 2024-06-03 PROCEDURE — 99214 OFFICE O/P EST MOD 30 MIN: CPT

## 2024-06-03 NOTE — TELEPHONE ENCOUNTER
Scheduled patient for LESI 06/18/2024  Patient denies RX blood thinners/ NSAIDS  Nothing to eat or drink 1 hour prior to procedure  Needs to arrange transportation  Proper clothing for procedure  No vaccines 2 weeks prior or after procedure  If ill or place on antibiotics, please call to reschedule

## 2024-06-03 NOTE — PATIENT INSTRUCTIONS
Epidural Steroid Injection, Ambulatory Care   GENERAL INFORMATION:   What do I need to know about an epidural steroid injection?  An epidural steroid injection (VIJI) is a procedure to inject steroid medicine into the epidural space. The epidural space is between your spinal cord and vertebrae. Steroids reduce inflammation and fluid buildup in your spine that may be causing pain. You may be given pain medicine along with the steroids.   How do I prepare for an VIJI?  Your healthcare provider will talk to you about how to prepare for your procedure. He will tell you what medicines to take or not take on the day of your procedure. You may need to stop taking blood thinners or other medicines several days before your procedure. You may need to adjust any diabetes medicine you take on the day of your procedure. Steroid medicine can increase your blood sugar level.   What will happen during an VIJI?   You will be given medicine to numb the procedure area. You will be awake for the procedure, but you will not feel pain. You may also be given medicine to help you relax during the procedure. Contrast liquid will be used to help your healthcare provider see the area better. Tell the healthcare provider if you have ever had an allergic reaction to contrast liquid.    Your healthcare provider may place the needle into your neck area, middle of your back, or tailbone area. He may inject the medicine next to the nerves that are causing your pain. He may instead inject the medicine into a larger area of the epidural space. This helps the medicine spread to more nerves. Your healthcare provider will use a fluoroscope to help guide the needle to the right place. A fluoroscope is a type of x-ray. After the procedure, a bandage will be placed over the injection site to prevent infection.  What are the risks of an VIJI?  You may have temporary or permanent nerve damage or paralysis. You may have bleeding or develop a serious infection,  such as meningitis (swelling of the brain coverings). An abscess may also develop. You may need surgery to fix the abscess. You may have a seizure, anxiety, or trouble sleeping. If you are a man, you may have temporary erectile dysfunction (not able to have an erection).   CARE AGREEMENT:   You have the right to help plan your care. Learn about your health condition and how it may be treated. Discuss treatment options with your caregivers to decide what care you want to receive. You always have the right to refuse treatment. The above information is an  only. It is not intended as medical advice for individual conditions or treatments. Talk to your doctor, nurse or pharmacist before following any medical regimen to see if it is safe and effective for you.  © 2014 [employer] Information is for End User's use only and may not be sold, redistributed or otherwise used for commercial purposes. All illustrations and images included in CareNotes® are the copyrighted property of [employer].D.A[employer]M., Inc[employer] or [employer].

## 2024-06-03 NOTE — PROGRESS NOTES
Daily Note     Today's date: 2024  Patient name: Shelia Miller  : 1950  MRN: [id number]  Referring provider: Shin Dan DPM  Dx:   Encounter Diagnosis     ICD-10-CM    1. Acute right ankle pain  M25.571       2. Extensor tendinitis of foot  M77.8       3. Radiculopathy of lumbar region  M54.16                      Subjective: Pt reports no new complaints, has some R leg soreness along posterior aspect of thigh and across front of knee towards big toe. Notes that she will be getting an epidural injection later this month.       Objective: requires cueing w/ sled push/pull to create force of drive through legs as compared to trunk/momentum. Corrects and is able to maintain.       Assessment: Tolerated treatment well. Patient demonstrated fatigue post treatment and would benefit from continued PT. Good tolerance to exercise progressions. Introduced sled work, will benefit from more aggressive strength work as sx allow. Increase intensity slowly, monitor sx post to ensure that post exercise soreness is not too severe.       Plan: Continue per plan of care. Sled work, carries, functional lifting      Precautions: standard         POC expires Auth Status Total   Visits  Start date  Expiration date PT/OT + Visit Limit? Co-Insurance    MEDICARE                                                  Visit/Unit Tracking  AUTH Status: Date               Visits  Authed:  Used                Remaining                      Dummy Auth Tracking  1 2 3 4 5 6   7 8 9 10 11 12   13 14 15 16 17 18   19 20 21 22 23 24   25 26 27 28 29 30   31 32 33 34 35 36         Date (Visit #)  (13)  (14)  (15) PN  (16)   (17)  (18)    Manuals         DTM and TPR         Ankle mobs         MWM                  Exercise  Diary         Ther Ex         Active w/u         PROM Same x 2-3 mins      Rev        X6 mins                   X5 mins  X3-4 mins         Rev        X5 mins B LE mobility work x 10  "mins            Ankle mobility x 4-5 mins w/ leukotape for post fib head  X5 mins                 Post prox fib mobs x 2 mins X8-9 mins mins    Ankle isometrics  rev        Ankle 4 way tband                       Heel raise level ground 2x10              rev BKTC 3x15 sec  rev X3 self       2x10 2x10 total x20 2x10 LTR 2x10 2x10    rev          rev rev      Neuro Re Ed         WS training         Core work rev Isos x 10                2x10 X20                x20 Isos x20 Isos and march x10 rev   Hip progressions Yellow loop wall slides x10, w/ lift off x10 X10 each  ev rev     Balance progressions 18 mins tspine mobilty     Cat/camel, prayer pose progressions, quad off table progressions, seated tspine ext over half foam rev  rev rev rev   Airex sq         HEP and POC review  X2 mins X2 mins Functional review and POC discussion x 6 mins  X2 mins  X2 mins             no STARR HE SS 8\" 2x10 B     HE ant taps x15     HE squats 2x10  Level ground 2x10 B     X20 B       rev Level ground 2x10 B    HE STARR SS on 6\" step x 20 B 2x10 B  HE KOTSS 2x10 B     12.5# x5 no   Slider post 2x10 B        Heel raise on wedges, full range 2x10, 3 sec ecc      Gait on turf - 40'x8 - focus on push off rev X3-4 mins X4 mins fwd/bckwd focus on loading through forefoot Sled push/pull no weight added 50'x4 each   Ther Act         Gait         Stairs         Functional lifting/transfers                                                  Modalities           Heat          Ice                     From 4/15:  Access Code: [email or web address]  URL: [email or web address]  Date: 04/15/2024  Prepared by: Damian Preston    Exercises  - Prone Press Up On Elbows  - 1 x daily - 7 x weekly - 3 sets - 10 reps  - Prone Press Up  - 1 x daily - 7 x weekly - 3 sets - 10 reps  - Supine Sciatic Nerve Glide  - 1 x daily - 7 x weekly - 3 sets - 10 reps  - Supine Lower Trunk Rotation  - 1 x daily - 7 x weekly - 3 sets - 10 reps  - Supine Posterior Pelvic Tilt  - " 1 x daily - 7 x weekly - 3 sets - 10 reps  - Supine 90/90 Alternating Toe Touch  - 1 x daily - 7 x weekly - 3 sets - 10 reps  - Supine Bridge  - 1 x daily - 7 x weekly - 3 sets - 10 reps

## 2024-06-04 ENCOUNTER — OFFICE VISIT (OUTPATIENT)
Dept: PHYSICAL THERAPY | Facility: CLINIC | Age: 74
End: 2024-06-04
Payer: COMMERCIAL

## 2024-06-04 DIAGNOSIS — M25.571 ACUTE RIGHT ANKLE PAIN: Primary | ICD-10-CM

## 2024-06-04 DIAGNOSIS — M77.8 EXTENSOR TENDINITIS OF FOOT: ICD-10-CM

## 2024-06-04 DIAGNOSIS — M54.16 RADICULOPATHY OF LUMBAR REGION: ICD-10-CM

## 2024-06-04 PROCEDURE — 97110 THERAPEUTIC EXERCISES: CPT

## 2024-06-04 PROCEDURE — 97112 NEUROMUSCULAR REEDUCATION: CPT

## 2024-06-05 ENCOUNTER — HOSPITAL ENCOUNTER (INPATIENT)
Facility: HOSPITAL | Age: 74
LOS: 4 days | Discharge: HOME | DRG: 871 | End: 2024-06-09
Attending: EMERGENCY MEDICINE | Admitting: STUDENT IN AN ORGANIZED HEALTH CARE EDUCATION/TRAINING PROGRAM
Payer: COMMERCIAL

## 2024-06-05 ENCOUNTER — APPOINTMENT (EMERGENCY)
Dept: RADIOLOGY | Facility: HOSPITAL | Age: 74
DRG: 871 | End: 2024-06-05
Payer: COMMERCIAL

## 2024-06-05 ENCOUNTER — OFFICE VISIT (OUTPATIENT)
Dept: FAMILY MEDICINE CLINIC | Facility: CLINIC | Age: 74
End: 2024-06-05
Payer: COMMERCIAL

## 2024-06-05 VITALS
WEIGHT: 164.4 LBS | HEART RATE: 72 BPM | DIASTOLIC BLOOD PRESSURE: 60 MMHG | SYSTOLIC BLOOD PRESSURE: 110 MMHG | HEIGHT: 63 IN | RESPIRATION RATE: 18 BRPM | BODY MASS INDEX: 29.13 KG/M2 | TEMPERATURE: 99 F

## 2024-06-05 DIAGNOSIS — M06.9 RHEUMATOID ARTHRITIS, INVOLVING UNSPECIFIED SITE, UNSPECIFIED WHETHER RHEUMATOID FACTOR PRESENT (HCC): ICD-10-CM

## 2024-06-05 DIAGNOSIS — N73.9: ICD-10-CM

## 2024-06-05 DIAGNOSIS — N39.0 ACUTE UTI: Primary | ICD-10-CM

## 2024-06-05 DIAGNOSIS — R65.20 SEPSIS WITH ACUTE ORGAN DYSFUNCTION (HCC): ICD-10-CM

## 2024-06-05 DIAGNOSIS — R50.9 FEVER: Primary | ICD-10-CM

## 2024-06-05 DIAGNOSIS — R93.5 ABNORMAL CT OF THE ABDOMEN: ICD-10-CM

## 2024-06-05 DIAGNOSIS — D72.825 BANDEMIA: ICD-10-CM

## 2024-06-05 DIAGNOSIS — D69.6 THROMBOCYTOPENIA (HCC): ICD-10-CM

## 2024-06-05 DIAGNOSIS — A41.9 SEPSIS WITH ACUTE ORGAN DYSFUNCTION (HCC): ICD-10-CM

## 2024-06-05 DIAGNOSIS — A41.9 SEPSIS (HCC): ICD-10-CM

## 2024-06-05 DIAGNOSIS — R79.89 ELEVATED LACTIC ACID LEVEL: ICD-10-CM

## 2024-06-05 LAB
ALBUMIN SERPL BCP-MCNC: 4.4 G/DL (ref 3.5–5)
ALP SERPL-CCNC: 88 U/L (ref 34–104)
ALT SERPL W P-5'-P-CCNC: 37 U/L (ref 7–52)
ANION GAP SERPL CALCULATED.3IONS-SCNC: 11 MMOL/L (ref 4–13)
APTT PPP: 27 SECONDS (ref 23–37)
AST SERPL W P-5'-P-CCNC: 41 U/L (ref 13–39)
BASOPHILS # BLD MANUAL: 0 THOUSAND/UL (ref 0–0.1)
BASOPHILS NFR MAR MANUAL: 0 % (ref 0–1)
BILIRUB SERPL-MCNC: 1.07 MG/DL (ref 0.2–1)
BUN SERPL-MCNC: 22 MG/DL (ref 5–25)
CALCIUM SERPL-MCNC: 9.5 MG/DL (ref 8.4–10.2)
CHLORIDE SERPL-SCNC: 101 MMOL/L (ref 96–108)
CO2 SERPL-SCNC: 24 MMOL/L (ref 21–32)
CREAT SERPL-MCNC: 1.1 MG/DL (ref 0.6–1.3)
EOSINOPHIL # BLD MANUAL: 0.08 THOUSAND/UL (ref 0–0.4)
EOSINOPHIL NFR BLD MANUAL: 1 % (ref 0–6)
ERYTHROCYTE [DISTWIDTH] IN BLOOD BY AUTOMATED COUNT: 13 % (ref 11.6–15.1)
GFR SERPL CREATININE-BSD FRML MDRD: 49 ML/MIN/1.73SQ M
GLUCOSE SERPL-MCNC: 154 MG/DL (ref 65–140)
HCT VFR BLD AUTO: 46 % (ref 34.8–46.1)
HGB BLD-MCNC: 15 G/DL (ref 11.5–15.4)
INR PPP: 1.02 (ref 0.84–1.19)
LACTATE SERPL-SCNC: 2.1 MMOL/L (ref 0.5–2)
LACTATE SERPL-SCNC: 2.9 MMOL/L (ref 0.5–2)
LG PLATELETS BLD QL SMEAR: PRESENT
LYMPHOCYTES # BLD AUTO: 0.08 THOUSAND/UL (ref 0.6–4.47)
LYMPHOCYTES # BLD AUTO: 1 % (ref 14–44)
MCH RBC QN AUTO: 31 PG (ref 26.8–34.3)
MCHC RBC AUTO-ENTMCNC: 32.6 G/DL (ref 31.4–37.4)
MCV RBC AUTO: 95 FL (ref 82–98)
MONOCYTES # BLD AUTO: 0.08 THOUSAND/UL (ref 0–1.22)
MONOCYTES NFR BLD: 1 % (ref 4–12)
NEUTROPHILS # BLD MANUAL: 7.59 THOUSAND/UL (ref 1.85–7.62)
NEUTS BAND NFR BLD MANUAL: 46 % (ref 0–8)
NEUTS SEG NFR BLD AUTO: 51 % (ref 43–75)
PLATELET # BLD AUTO: 138 THOUSANDS/UL (ref 149–390)
PLATELET BLD QL SMEAR: ABNORMAL
PMV BLD AUTO: 9.9 FL (ref 8.9–12.7)
POTASSIUM SERPL-SCNC: 4.1 MMOL/L (ref 3.5–5.3)
PROCALCITONIN SERPL-MCNC: 1.39 NG/ML
PROT SERPL-MCNC: 7.1 G/DL (ref 6.4–8.4)
PROTHROMBIN TIME: 13.6 SECONDS (ref 11.6–14.5)
RBC # BLD AUTO: 4.84 MILLION/UL (ref 3.81–5.12)
RBC MORPH BLD: NORMAL
SL AMB  POCT GLUCOSE, UA: NEGATIVE
SL AMB LEUKOCYTE ESTERASE,UA: NEGATIVE
SL AMB POCT BILIRUBIN,UA: NEGATIVE
SL AMB POCT BLOOD,UA: NEGATIVE
SL AMB POCT CLARITY,UA: CLEAR
SL AMB POCT COLOR,UA: YELLOW
SL AMB POCT KETONES,UA: NEGATIVE
SL AMB POCT NITRITE,UA: NEGATIVE
SL AMB POCT PH,UA: 6.5
SL AMB POCT SPECIFIC GRAVITY,UA: >=1.03
SL AMB POCT URINE PROTEIN: NEGATIVE
SL AMB POCT UROBILINOGEN: 0.2
SMUDGE CELLS BLD QL SMEAR: PRESENT
SODIUM SERPL-SCNC: 136 MMOL/L (ref 135–147)
WBC # BLD AUTO: 7.82 THOUSAND/UL (ref 4.31–10.16)
WBC TOXIC VACUOLES BLD QL SMEAR: PRESENT

## 2024-06-05 PROCEDURE — 96361 HYDRATE IV INFUSION ADD-ON: CPT

## 2024-06-05 PROCEDURE — 85610 PROTHROMBIN TIME: CPT | Performed by: EMERGENCY MEDICINE

## 2024-06-05 PROCEDURE — 99285 EMERGENCY DEPT VISIT HI MDM: CPT

## 2024-06-05 PROCEDURE — 80053 COMPREHEN METABOLIC PANEL: CPT | Performed by: EMERGENCY MEDICINE

## 2024-06-05 PROCEDURE — 87040 BLOOD CULTURE FOR BACTERIA: CPT | Performed by: EMERGENCY MEDICINE

## 2024-06-05 PROCEDURE — 96375 TX/PRO/DX INJ NEW DRUG ADDON: CPT

## 2024-06-05 PROCEDURE — 99213 OFFICE O/P EST LOW 20 MIN: CPT | Performed by: FAMILY MEDICINE

## 2024-06-05 PROCEDURE — 96365 THER/PROPH/DIAG IV INF INIT: CPT

## 2024-06-05 PROCEDURE — 84145 PROCALCITONIN (PCT): CPT | Performed by: EMERGENCY MEDICINE

## 2024-06-05 PROCEDURE — 85730 THROMBOPLASTIN TIME PARTIAL: CPT | Performed by: EMERGENCY MEDICINE

## 2024-06-05 PROCEDURE — 36415 COLL VENOUS BLD VENIPUNCTURE: CPT | Performed by: EMERGENCY MEDICINE

## 2024-06-05 PROCEDURE — 81003 URINALYSIS AUTO W/O SCOPE: CPT | Performed by: FAMILY MEDICINE

## 2024-06-05 PROCEDURE — 87086 URINE CULTURE/COLONY COUNT: CPT | Performed by: FAMILY MEDICINE

## 2024-06-05 PROCEDURE — 83605 ASSAY OF LACTIC ACID: CPT | Performed by: EMERGENCY MEDICINE

## 2024-06-05 PROCEDURE — 99285 EMERGENCY DEPT VISIT HI MDM: CPT | Performed by: EMERGENCY MEDICINE

## 2024-06-05 PROCEDURE — 99223 1ST HOSP IP/OBS HIGH 75: CPT

## 2024-06-05 PROCEDURE — 85027 COMPLETE CBC AUTOMATED: CPT | Performed by: EMERGENCY MEDICINE

## 2024-06-05 PROCEDURE — 85007 BL SMEAR W/DIFF WBC COUNT: CPT | Performed by: EMERGENCY MEDICINE

## 2024-06-05 PROCEDURE — 74177 CT ABD & PELVIS W/CONTRAST: CPT

## 2024-06-05 RX ORDER — ATORVASTATIN CALCIUM 10 MG/1
10 TABLET, FILM COATED ORAL
Status: DISCONTINUED | OUTPATIENT
Start: 2024-06-06 | End: 2024-06-09 | Stop reason: HOSPADM

## 2024-06-05 RX ORDER — DOCUSATE SODIUM 100 MG/1
100 CAPSULE, LIQUID FILLED ORAL 2 TIMES DAILY
Status: DISCONTINUED | OUTPATIENT
Start: 2024-06-05 | End: 2024-06-09 | Stop reason: HOSPADM

## 2024-06-05 RX ORDER — ALBUTEROL SULFATE 90 UG/1
2 AEROSOL, METERED RESPIRATORY (INHALATION) EVERY 6 HOURS PRN
Status: DISCONTINUED | OUTPATIENT
Start: 2024-06-05 | End: 2024-06-09 | Stop reason: HOSPADM

## 2024-06-05 RX ORDER — ACETAMINOPHEN 325 MG/1
650 TABLET ORAL EVERY 6 HOURS PRN
Status: DISCONTINUED | OUTPATIENT
Start: 2024-06-06 | End: 2024-06-09 | Stop reason: HOSPADM

## 2024-06-05 RX ORDER — ONDANSETRON 2 MG/ML
4 INJECTION INTRAMUSCULAR; INTRAVENOUS EVERY 6 HOURS PRN
Status: DISCONTINUED | OUTPATIENT
Start: 2024-06-05 | End: 2024-06-09 | Stop reason: HOSPADM

## 2024-06-05 RX ORDER — CEFAZOLIN SODIUM 2 G/50ML
2000 SOLUTION INTRAVENOUS ONCE
Status: COMPLETED | OUTPATIENT
Start: 2024-06-05 | End: 2024-06-05

## 2024-06-05 RX ORDER — MAGNESIUM HYDROXIDE/ALUMINUM HYDROXICE/SIMETHICONE 120; 1200; 1200 MG/30ML; MG/30ML; MG/30ML
30 SUSPENSION ORAL EVERY 6 HOURS PRN
Status: DISCONTINUED | OUTPATIENT
Start: 2024-06-05 | End: 2024-06-09 | Stop reason: HOSPADM

## 2024-06-05 RX ORDER — LOSARTAN POTASSIUM 50 MG/1
50 TABLET ORAL DAILY
Status: DISCONTINUED | OUTPATIENT
Start: 2024-06-06 | End: 2024-06-06

## 2024-06-05 RX ORDER — ENOXAPARIN SODIUM 100 MG/ML
40 INJECTION SUBCUTANEOUS DAILY
Status: DISCONTINUED | OUTPATIENT
Start: 2024-06-06 | End: 2024-06-07

## 2024-06-05 RX ORDER — ONDANSETRON 2 MG/ML
4 INJECTION INTRAMUSCULAR; INTRAVENOUS ONCE
Status: COMPLETED | OUTPATIENT
Start: 2024-06-05 | End: 2024-06-05

## 2024-06-05 RX ORDER — RALOXIFENE HYDROCHLORIDE 60 MG/1
60 TABLET, FILM COATED ORAL DAILY
Status: DISCONTINUED | OUTPATIENT
Start: 2024-06-06 | End: 2024-06-09 | Stop reason: HOSPADM

## 2024-06-05 RX ORDER — KETOROLAC TROMETHAMINE 30 MG/ML
15 INJECTION, SOLUTION INTRAMUSCULAR; INTRAVENOUS EVERY 6 HOURS PRN
Status: DISCONTINUED | OUTPATIENT
Start: 2024-06-05 | End: 2024-06-06

## 2024-06-05 RX ORDER — CEFTRIAXONE 1 G/50ML
1000 INJECTION, SOLUTION INTRAVENOUS ONCE
Status: COMPLETED | OUTPATIENT
Start: 2024-06-05 | End: 2024-06-05

## 2024-06-05 RX ORDER — CEFUROXIME AXETIL 500 MG/1
500 TABLET ORAL EVERY 12 HOURS SCHEDULED
Qty: 14 TABLET | Refills: 0 | Status: SHIPPED | OUTPATIENT
Start: 2024-06-05 | End: 2024-06-09

## 2024-06-05 RX ORDER — KETOROLAC TROMETHAMINE 30 MG/ML
15 INJECTION, SOLUTION INTRAMUSCULAR; INTRAVENOUS ONCE
Status: COMPLETED | OUTPATIENT
Start: 2024-06-05 | End: 2024-06-05

## 2024-06-05 RX ORDER — ACETAMINOPHEN 10 MG/ML
1000 INJECTION, SOLUTION INTRAVENOUS ONCE
Status: COMPLETED | OUTPATIENT
Start: 2024-06-05 | End: 2024-06-05

## 2024-06-05 RX ORDER — FLUTICASONE FUROATE AND VILANTEROL 200; 25 UG/1; UG/1
1 POWDER RESPIRATORY (INHALATION) DAILY
Status: DISCONTINUED | OUTPATIENT
Start: 2024-06-05 | End: 2024-06-09 | Stop reason: HOSPADM

## 2024-06-05 RX ADMIN — IOHEXOL 100 ML: 350 INJECTION, SOLUTION INTRAVENOUS at 18:27

## 2024-06-05 RX ADMIN — SODIUM CHLORIDE 500 ML: 0.9 INJECTION, SOLUTION INTRAVENOUS at 17:56

## 2024-06-05 RX ADMIN — SODIUM CHLORIDE 1000 ML: 0.9 INJECTION, SOLUTION INTRAVENOUS at 20:10

## 2024-06-05 RX ADMIN — KETOROLAC TROMETHAMINE 15 MG: 30 INJECTION, SOLUTION INTRAMUSCULAR; INTRAVENOUS at 17:50

## 2024-06-05 RX ADMIN — DOCUSATE SODIUM 100 MG: 100 CAPSULE, LIQUID FILLED ORAL at 22:56

## 2024-06-05 RX ADMIN — ONDANSETRON 4 MG: 2 INJECTION INTRAMUSCULAR; INTRAVENOUS at 17:50

## 2024-06-05 RX ADMIN — CEFAZOLIN SODIUM 2000 MG: 2 SOLUTION INTRAVENOUS at 22:55

## 2024-06-05 RX ADMIN — CEFTRIAXONE 1000 MG: 1 INJECTION, SOLUTION INTRAVENOUS at 18:54

## 2024-06-05 RX ADMIN — ACETAMINOPHEN 650 MG: 325 TABLET ORAL at 23:16

## 2024-06-05 RX ADMIN — FLUTICASONE FUROATE AND VILANTEROL TRIFENATATE 1 PUFF: 200; 25 POWDER RESPIRATORY (INHALATION) at 22:56

## 2024-06-05 RX ADMIN — ACETAMINOPHEN 1000 MG: 10 INJECTION INTRAVENOUS at 17:50

## 2024-06-05 NOTE — ED PROVIDER NOTES
History  Chief Complaint   Patient presents with    Abdominal Pain     Lower abd pain for past 5 days worse since this am. Has hx of uterine prolapse with mesh , had revision and mesh remains.. started vomiting this afternoon. Seen by Dr Reyes today dx with uti put on abx now running high fever    Fever     103.3 fever in triage     Pt is a 74yo F who presents for abdominal pain, nausea, and vomiting.  Patient reports she has had pain for the past 5 days.  Patient states she was seen by PCP today and diagnosed with UTI.  Patient was started on antibiotics.  Patient reports after taking the first dose of her oral antibiotic, she began with nausea and vomiting.  Patient also reports that the abdominal pain has been worsening today.  She states it is bilateral lower but particularly on the right side.  Patient reports that she has history of urinary and gynecologic issues that have required multiple surgeries.  Patient states she does have mesh in her abdomen as well as a known uterine prolapse.  Patient states that she noted chills at home but did not know she had a fever until she arrived in the ED today.  Patient denies any previous similar pain.  Patient states that she has been taking her medications regularly.  Patient reports that she is immunocompromise because she takes Actemra for her arthritis.        Prior to Admission Medications   Prescriptions Last Dose Informant Patient Reported? Taking?   Actemra ACTPen 162 MG/0.9ML SOAJ Past Week Self Yes Yes   Sig: Once a week every Friday   CELECOXIB PO  Self Yes No   Sig: Take 200 mg by mouth 2 (two) times a day as needed On hold   Calcium Carbonate-Vitamin D3 600-400 MG-UNIT TABS  Self Yes No   Sig: Take by mouth   Cholecalciferol 25 MCG (1000 UT) capsule  Self Yes No   Sig: Take 1,000 Units by mouth daily   DULoxetine (CYMBALTA) 20 mg capsule   No No   Sig: Take 1 capsule (20 mg total) by mouth daily   Patient not taking: Reported on 4/25/2024    Fluticasone-Salmeterol (Advair Diskus) 250-50 mcg/dose inhaler 2024  No Yes   Sig: Inhale 1 puff 2 (two) times a day Rinse mouth after use.   Multiple Vitamin (MULTIVITAMIN ADULT PO) 2024 Self Yes Yes   Sig: Take 1 tablet by mouth daily   PreviDent 5000 Booster Plus 1.1 % PSTE   Yes No   albuterol (PROVENTIL HFA,VENTOLIN HFA) 90 mcg/act inhaler More than a month Self Yes No   Sig: as needed   atorvastatin (LIPITOR) 10 mg tablet 2024 Self Yes Yes   Sig: Take 10 mg by mouth daily   baclofen 10 mg tablet Not Taking  No No   Sig: Start with 1 tab at bedtime for 1 week and then take 1 tab twice daily.   Patient not taking: Reported on 2024   cefuroxime (CEFTIN) 500 mg tablet 2024  No Yes   Sig: Take 1 tablet (500 mg total) by mouth every 12 (twelve) hours for 7 days   losartan (COZAAR) 50 mg tablet 2024 Self Yes Yes   Si mg daily   omeprazole (PriLOSEC) 20 mg delayed release capsule 2024  No Yes   Sig: TAKE TWO CAPSULES BY MOUTH EVERY DAY IN THE MORNING (GENERIC PRILOSEC)   raloxifene (EVISTA) 60 mg tablet 2024  Yes Yes   Sig: Take 60 mg by mouth daily      Facility-Administered Medications Last Administration Doses Remaining   triamcinolone acetonide (KENALOG-10) 10 mg/mL injection 10 mg 3/19/2024  3:45 PM           Past Medical History:   Diagnosis Date    Arthritis 2015    Asthma     Back pain with radiculopathy 2023    BRCA1 negative     BRCA2 negative     Breast cancer (HCC)     Left  breast, triple negative    Cancer (HCC) 2019    Colon polyp     GERD (gastroesophageal reflux disease) 2016    History of chemotherapy 10/01/2020    breast cancer - left    History of radiation therapy 2021    left breast cancer    Hypertension     Lyme disease     OA (osteoarthritis)     Rheumatoid arthritis (HCC)        Past Surgical History:   Procedure Laterality Date    ANKLE SURGERY Right     2 plates and screws    BREAST BIOPSY Left 2020    BREAST LUMPECTOMY Left  08/12/2020    left breast wire localized lumpectomy    BREAST MASS EXCISION Left 09/02/2020    short superior and long lateral left breast resection, left axillary sentinel lymph node biopsy    COLONOSCOPY      ELBOW FRACTURE REPAIR Right 1996    FRACTURE SURGERY Right     elbow    GUM SURGERY      HYSTERECTOMY  2018    JOINT REPLACEMENT Right 6/2021    hip    REPLACEMENT TOTAL HIP W/  RESURFACING IMPLANTS  06/18/2021       Family History   Problem Relation Age of Onset    Breast cancer Mother 59    Cancer Mother         Breast Cancer    Lung cancer Father     Cancer Father         Lung cancer    COPD Father     Hypertension Father     Breast cancer Paternal Grandmother     Thyroid cancer Son     Cancer Son         Thyroid Cancer     I have reviewed and agree with the history as documented.    E-Cigarette/Vaping    E-Cigarette Use Never User      E-Cigarette/Vaping Substances    Nicotine No     THC No     CBD No     Flavoring No     Other No     Unknown No      Social History     Tobacco Use    Smoking status: Never     Passive exposure: Past    Smokeless tobacco: Never   Vaping Use    Vaping status: Never Used   Substance Use Topics    Alcohol use: Not Currently     Comment: occasional    Drug use: Never       Review of Systems   Constitutional:  Positive for chills and fever.   Gastrointestinal:  Positive for abdominal pain, nausea and vomiting.   All other systems reviewed and are negative.      Physical Exam  Physical Exam  Vitals reviewed.   Constitutional:       Appearance: She is well-developed. She is ill-appearing. She is not toxic-appearing or diaphoretic.   HENT:      Head: Normocephalic and atraumatic.      Right Ear: External ear normal.      Left Ear: External ear normal.      Nose: Nose normal.      Mouth/Throat:      Pharynx: Oropharynx is clear.   Eyes:      Extraocular Movements: Extraocular movements intact.      Pupils: Pupils are equal, round, and reactive to light.   Cardiovascular:      Rate  and Rhythm: Regular rhythm. Tachycardia present.      Heart sounds: Normal heart sounds. No murmur heard.  Pulmonary:      Effort: Pulmonary effort is normal. No respiratory distress.      Breath sounds: Normal breath sounds. No wheezing or rales.   Abdominal:      General: There is no distension.      Palpations: Abdomen is soft.      Tenderness: There is abdominal tenderness (Bilateral lower, right greater than left). There is no right CVA tenderness, left CVA tenderness, guarding or rebound.      Hernia: No hernia is present.   Musculoskeletal:         General: Normal range of motion.      Cervical back: Normal range of motion and neck supple.      Right lower leg: No edema.      Left lower leg: No edema.   Skin:     General: Skin is warm and dry.      Capillary Refill: Capillary refill takes less than 2 seconds.      Coloration: Skin is not pale.      Findings: No erythema or rash.   Neurological:      General: No focal deficit present.      Mental Status: She is alert and oriented to person, place, and time.   Psychiatric:         Speech: Speech normal.         Behavior: Behavior is cooperative.         Vital Signs  ED Triage Vitals [06/05/24 1642]   Temperature Pulse Respirations Blood Pressure SpO2   (!) 103.3 °F (39.6 °C) (!) 108 20 115/52 92 %      Temp Source Heart Rate Source Patient Position - Orthostatic VS BP Location FiO2 (%)   Tympanic Monitor Sitting Left arm --      Pain Score       10 - Worst Possible Pain           Vitals:    06/05/24 1642 06/05/24 1923 06/05/24 2043 06/05/24 2231   BP: 115/52 113/52 (!) 112/46 (!) 104/45   Pulse: (!) 108 98 101    Patient Position - Orthostatic VS: Sitting Lying Lying          Visual Acuity      ED Medications  Medications   albuterol (PROVENTIL HFA,VENTOLIN HFA) inhaler 2 puff (has no administration in time range)   atorvastatin (LIPITOR) tablet 10 mg (has no administration in time range)   fluticasone-vilanterol 200-25 mcg/actuation 1 puff (1 puff Inhalation  Given 6/5/24 2256)   losartan (COZAAR) tablet 50 mg (has no administration in time range)   raloxifene (EVISTA) tablet 60 mg (has no administration in time range)   acetaminophen (TYLENOL) tablet 650 mg (650 mg Oral Given 6/5/24 2316)   docusate sodium (COLACE) capsule 100 mg (100 mg Oral Given 6/5/24 2256)   ondansetron (ZOFRAN) injection 4 mg (has no administration in time range)   aluminum-magnesium hydroxide-simethicone (MAALOX) oral suspension 30 mL (has no administration in time range)   enoxaparin (LOVENOX) subcutaneous injection 40 mg (has no administration in time range)   ketorolac (TORADOL) injection 15 mg (has no administration in time range)   sodium chloride 0.9 % bolus 1,000 mL (has no administration in time range)   ketorolac (TORADOL) injection 15 mg (15 mg Intravenous Given 6/5/24 1750)   ondansetron (ZOFRAN) injection 4 mg (4 mg Intravenous Given 6/5/24 1750)   acetaminophen (Ofirmev) injection 1,000 mg (0 mg Intravenous Stopped 6/5/24 1805)   sodium chloride 0.9 % bolus 500 mL (0 mL Intravenous Stopped 6/5/24 1856)   cefTRIAXone (ROCEPHIN) IVPB (premix in dextrose) 1,000 mg 50 mL (0 mg Intravenous Stopped 6/5/24 1922)   iohexol (OMNIPAQUE) 350 MG/ML injection (MULTI-DOSE) 100 mL (100 mL Intravenous Given 6/5/24 1827)   sodium chloride 0.9 % bolus 1,000 mL (1,000 mL Intravenous New Bag 6/5/24 2010)   ceFAZolin (ANCEF) IVPB (premix in dextrose) 2,000 mg 50 mL (2,000 mg Intravenous New Bag 6/5/24 2255)       Diagnostic Studies  Results Reviewed       Procedure Component Value Units Date/Time    Blood culture #1 [[id number]] Collected: 06/05/24 1748    Lab Status: Preliminary result Specimen: Blood from Arm, Left Updated: 06/06/24 0001     Blood Culture Received in Microbiology Lab. Culture in Progress.    Blood culture #2 [[id number]] Collected: 06/05/24 1726    Lab Status: Preliminary result Specimen: Blood from Arm, Right Updated: 06/06/24 0001     Blood Culture Received in Microbiology Lab. Culture  in Progress.    Lactic acid 2 Hours [[id number]]  (Abnormal) Collected: 06/05/24 1939    Lab Status: Final result Specimen: Blood from Arm, Right Updated: 06/05/24 1957     LACTIC ACID 2.9 mmol/L     Narrative:      Result may be elevated if tourniquet was used during collection.    RBC Morphology Reflex Test [[id number]] Collected: 06/05/24 1726    Lab Status: Final result Specimen: Blood from Arm, Right Updated: 06/05/24 1901    CBC and differential [[id number]]  (Abnormal) Collected: 06/05/24 1726    Lab Status: Final result Specimen: Blood from Arm, Right Updated: 06/05/24 1837     WBC 7.82 Thousand/uL      RBC 4.84 Million/uL      Hemoglobin 15.0 g/dL      Hematocrit 46.0 %      MCV 95 fL      MCH 31.0 pg      MCHC 32.6 g/dL      RDW 13.0 %      MPV 9.9 fL      Platelets 138 Thousands/uL     Manual Differential(PHLEBS Do Not Order) [[id number]]  (Abnormal) Collected: 06/05/24 1726    Lab Status: Final result Specimen: Blood from Arm, Right Updated: 06/05/24 1837     Segmented % 51 %      Bands % 46 %      Lymphocytes % 1 %      Monocytes % 1 %      Eosinophils % 1 %      Basophils % 0 %      Absolute Neutrophils 7.59 Thousand/uL      Absolute Lymphocytes 0.08 Thousand/uL      Absolute Monocytes 0.08 Thousand/uL      Absolute Eosinophils 0.08 Thousand/uL      Absolute Basophils 0.00 Thousand/uL      Total Counted --     Smudge Cells Present     Toxic Vacuolization Present     RBC Morphology Normal     Platelet Estimate Borderline     Large Platelet Present    Lactic acid [[id number]]  (Abnormal) Collected: 06/05/24 1726    Lab Status: Final result Specimen: Blood from Arm, Right Updated: 06/05/24 1816     LACTIC ACID 2.1 mmol/L     Narrative:      Result may be elevated if tourniquet was used during collection.    Procalcitonin [[id number]]  (Abnormal) Collected: 06/05/24 1726    Lab Status: Final result Specimen: Blood from Arm, Right Updated: 06/05/24 1806     Procalcitonin 1.39 ng/ml     Comprehensive metabolic  panel [[id number]]  (Abnormal) Collected: 06/05/24 1726    Lab Status: Final result Specimen: Blood from Arm, Right Updated: 06/05/24 1757     Sodium 136 mmol/L      Potassium 4.1 mmol/L      Chloride 101 mmol/L      CO2 24 mmol/L      ANION GAP 11 mmol/L      BUN 22 mg/dL      Creatinine 1.10 mg/dL      Glucose 154 mg/dL      Calcium 9.5 mg/dL      AST 41 U/L      ALT 37 U/L      Alkaline Phosphatase 88 U/L      Total Protein 7.1 g/dL      Albumin 4.4 g/dL      Total Bilirubin 1.07 mg/dL      eGFR 49 ml/min/1.73sq m     Narrative:      [employer] guidelines for Chronic Kidney Disease (CKD):     Stage 1 with normal or high GFR (GFR > 90 mL/min/1.73 square meters)    Stage 2 Mild CKD (GFR = 60-89 mL/min/1.73 square meters)    Stage 3A Moderate CKD (GFR = 45-59 mL/min/1.73 square meters)    Stage 3B Moderate CKD (GFR = 30-44 mL/min/1.73 square meters)    Stage 4 Severe CKD (GFR = 15-29 mL/min/1.73 square meters)    Stage 5 End Stage CKD (GFR <15 mL/min/1.73 square meters)  Note: GFR calculation is accurate only with a steady state creatinine    Protime-INR [[id number]]  (Normal) Collected: 06/05/24 1726    Lab Status: Final result Specimen: Blood from Arm, Right Updated: 06/05/24 1751     Protime 13.6 seconds      INR 1.02    APTT [[id number]]  (Normal) Collected: 06/05/24 1726    Lab Status: Final result Specimen: Blood from Arm, Right Updated: 06/05/24 1751     PTT 27 seconds     UA w Reflex to Microscopic w Reflex to Culture [[id number]]     Lab Status: No result Specimen: Urine                    CT abdomen pelvis with contrast   Final Result by Colleen Amaral MD (06/05 1930)      Diffuse abnormal soft tissue infiltration and stranding seen throughout the fatty tissue planes of the deep right pelvis and fascial thickening along the right pelvic sidewall. No loculated drainable collections. See above for further details. Prominent    vasculature within the region. No findings to  suggest venous thrombosis. The prior right ureteral dilatation also appears resolved.               Workstation performed: [id number]                    Procedures  Procedures         ED Course  ED Course as of 06/06/24 0036   Wed Jun 05, 2024 1731 Per chart review, UA performed at PCP office without leuks or nitrites. Not convincing for UTI. Will repeat.    1731 Procedure Note: EKG  Date/Time: 06/05/24 5:31 PM   Interpreted by: Shanice Echols MD  Indications / Diagnosis: Tachycardia  ECG reviewed by me, the ED Physician: yes   The EKG demonstrates:  Rhythm: sinus tachycardia  Intervals: normal intervals  Axis: normal axis  QRS/Blocks: normal QRS  ST Changes: No acute ST Changes, no STD/LUZ.  Flattened/inverted T waves lateral, non-specific   1752 PTT: 27  WNL   1752 POCT INR: 1.02  WNL   1758 Comprehensive metabolic panel(!)  Reviewed and without actionable derangement.    1806 Procalcitonin(!): 1.39  Elevated. Questionable for infection. Unknown source at this time but will initiate abx.    1816 LACTIC ACID(!): 2.1  Elevated. Fluids in process. Will trend.    1837 CBC and differential(!)  Reviewed and without actionable derangement.    1837 Bands %(!): 46  Significantly elevated. Abx ordered.    1934 CT abdomen pelvis with contrast  Diffuse abnormal soft tissue infiltration and stranding seen throughout the fatty tissue planes of the deep right pelvis and fascial thickening along the right pelvic sidewall. No loculated drainable collections.   1938 Pt reassessed and appears more comfortable. States pain improved. Discussed all results with pt as well as recommendation for admission.    1943 SLIM contacted via TT for admission.                                              Medical Decision Making  Pt is a 74yo F who presents with abdominal pain and fever.     Differential diagnosis to include but not limited to cystitis, pyelonephritis, diverticulitis, other intra-abdominal pathology, sepsis, dehydration,  electrolyte abnormality, SHAYY.  Will plan for labs and imaging.  Will treat symptomatically and reassess.  See ED course for results and details.    Plan to admit pt to [facility]. Pt discussed with admitting team and admission orders placed. Pt admitted without incident.         Amount and/or Complexity of Data Reviewed  Labs: ordered. Decision-making details documented in ED Course.  Radiology: ordered. Decision-making details documented in ED Course.    Risk  Prescription drug management.  Decision regarding hospitalization.             Disposition  Final diagnoses:   Fever   Sepsis (HCC)   Bandemia   Elevated lactic acid level   Abnormal CT of the abdomen     Time reflects when diagnosis was documented in both MDM as applicable and the Disposition within this note       Time User Action Codes Description Comment    6/5/2024  7:44 PM Shanice Echols Add [R50.9] Fever     6/5/2024  7:44 PM Shanice Echols Add [A41.9] Sepsis (HCC)     6/5/2024  7:44 PM Shanice Echols Add [D72.825] Bandemia     6/5/2024  7:45 PM Shanice Echols Add [R79.89] Elevated lactic acid level     6/5/2024  7:45 PM Shanice Echols Add [R93.5] Abnormal CT of the abdomen           ED Disposition       ED Disposition   Admit    Condition   Stable    Date/Time   Wed Jun 5, 2024  7:44 PM    Comment   Case was discussed with BRENT and the patient's admission status was agreed to be Admission Status: inpatient status to the service of Dr. Lugo.               Follow-up Information    None         Current Discharge Medication List        CONTINUE these medications which have NOT CHANGED    Details   Actemra ACTPen 162 MG/0.9ML SOAJ Once a week every Friday      atorvastatin (LIPITOR) 10 mg tablet Take 10 mg by mouth daily      cefuroxime (CEFTIN) 500 mg tablet Take 1 tablet (500 mg total) by mouth every 12 (twelve) hours for 7 days  Qty: 14 tablet, Refills: 0    Associated Diagnoses: Acute UTI      Fluticasone-Salmeterol (Advair Diskus) 250-50  mcg/dose inhaler Inhale 1 puff 2 (two) times a day Rinse mouth after use.  Qty: 60 blister, Refills: 11    Comments: Substitution to a formulary equivalent within the same pharmaceutical class is authorized.  Associated Diagnoses: Mild intermittent asthma without complication      losartan (COZAAR) 50 mg tablet 50 mg daily      Multiple Vitamin (MULTIVITAMIN ADULT PO) Take 1 tablet by mouth daily      omeprazole (PriLOSEC) 20 mg delayed release capsule TAKE TWO CAPSULES BY MOUTH EVERY DAY IN THE MORNING (GENERIC PRILOSEC)  Qty: 180 capsule, Refills: 1    Associated Diagnoses: Gastroesophageal reflux disease without esophagitis      raloxifene (EVISTA) 60 mg tablet Take 60 mg by mouth daily      albuterol (PROVENTIL HFA,VENTOLIN HFA) 90 mcg/act inhaler as needed      baclofen 10 mg tablet Start with 1 tab at bedtime for 1 week and then take 1 tab twice daily.  Qty: 60 tablet, Refills: 2    Associated Diagnoses: Radiculopathy, lumbar region; Chronic radicular lumbar pain      Calcium Carbonate-Vitamin D3 600-400 MG-UNIT TABS Take by mouth      CELECOXIB PO Take 200 mg by mouth 2 (two) times a day as needed On hold      Cholecalciferol 25 MCG (1000 UT) capsule Take 1,000 Units by mouth daily      DULoxetine (CYMBALTA) 20 mg capsule Take 1 capsule (20 mg total) by mouth daily  Qty: 30 capsule, Refills: 2    Associated Diagnoses: Chronic pain syndrome; Lumbar radiculopathy      PreviDent 5000 Booster Plus 1.1 % PSTE              No discharge procedures on file.    PDMP Review         Value Time User    PDMP Reviewed  Yes 6/5/2024  8:06 PM CHAD [place]            ED Provider  Electronically Signed by             Shanice Echols MD  06/06/24 0036

## 2024-06-05 NOTE — PROGRESS NOTES
"Ambulatory Visit  Name: Shelia Miller      : 1950      MRN: [id number]  Encounter Provider: Isabel Garg DO  Encounter Date: 2024   Encounter department: [facility]    Assessment & Plan   1. Acute UTI  -     Urine culture  -     POCT urine dip auto non-scope  -     cefuroxime (CEFTIN) 500 mg tablet; Take 1 tablet (500 mg total) by mouth every 12 (twelve) hours for 7 days   Return if symptoms worsen or fail to improve.  History of Present Illness     She has a history of UTI  She has frequency and burning with urination for the past 2 days.         Review of Systems    Objective     /60   Pulse 72   Temp 99 °F (37.2 °C) (Tympanic)   Resp 18   Ht 5' 3\" (1.6 m)   Wt 74.6 kg (164 lb 6.4 oz)   BMI 29.12 kg/m²     Physical Exam  Vitals and nursing note reviewed.   Constitutional:       General: She is not in acute distress.     Appearance: She is well-developed.   HENT:      Head: Normocephalic and atraumatic.      Right Ear: Tympanic membrane normal.      Left Ear: Tympanic membrane normal.   Cardiovascular:      Rate and Rhythm: Normal rate and regular rhythm.      Heart sounds: No murmur heard.  Pulmonary:      Effort: Pulmonary effort is normal. No respiratory distress.      Breath sounds: Normal breath sounds.   Abdominal:      Tenderness: There is abdominal tenderness (suprapubic).   Musculoskeletal:         General: No swelling.      Cervical back: Neck supple.   Neurological:      Mental Status: She is alert.       Administrative Statements           "

## 2024-06-06 ENCOUNTER — APPOINTMENT (OUTPATIENT)
Dept: PHYSICAL THERAPY | Facility: CLINIC | Age: 74
End: 2024-06-06
Payer: COMMERCIAL

## 2024-06-06 PROBLEM — I89.0 LYMPHEDEMA OF LEFT ARM: Status: ACTIVE | Noted: 2021-09-16

## 2024-06-06 PROBLEM — C50.419 MALIGNANT NEOPLASM OF UPPER-OUTER QUADRANT OF FEMALE BREAST (HCC): Status: ACTIVE | Noted: 2021-11-18

## 2024-06-06 PROBLEM — A41.9 SEPSIS WITHOUT ACUTE ORGAN DYSFUNCTION (HCC): Status: ACTIVE | Noted: 2024-06-06

## 2024-06-06 PROBLEM — R65.20 SEPSIS WITH ACUTE ORGAN DYSFUNCTION (HCC): Status: ACTIVE | Noted: 2024-06-06

## 2024-06-06 PROBLEM — R74.8 ELEVATED LIVER ENZYMES: Status: ACTIVE | Noted: 2024-06-06

## 2024-06-06 PROBLEM — M81.0 OSTEOPOROSIS: Chronic | Status: ACTIVE | Noted: 2021-09-16

## 2024-06-06 PROBLEM — C50.912 BREAST CANCER, LEFT BREAST (HCC): Chronic | Status: ACTIVE | Noted: 2024-06-06

## 2024-06-06 PROBLEM — E66.9 OBESITY: Status: ACTIVE | Noted: 2021-09-16

## 2024-06-06 PROBLEM — J45.30 ASTHMA, MILD PERSISTENT: Status: ACTIVE | Noted: 2021-09-16

## 2024-06-06 PROBLEM — K57.90 DIVERTICULOSIS: Status: ACTIVE | Noted: 2021-09-16

## 2024-06-06 PROBLEM — I10 HYPERTENSION: Chronic | Status: ACTIVE | Noted: 2021-09-16

## 2024-06-06 PROBLEM — M06.9 RHEUMATOID ARTHRITIS (HCC): Chronic | Status: ACTIVE | Noted: 2021-09-16

## 2024-06-06 PROBLEM — D63.8 ANEMIA OF CHRONIC DISEASE: Status: ACTIVE | Noted: 2021-11-18

## 2024-06-06 PROBLEM — D64.9 ANEMIA: Status: ACTIVE | Noted: 2021-09-16

## 2024-06-06 PROBLEM — I83.11 VARICOSE VEINS OF RIGHT LOWER EXTREMITY WITH INFLAMMATION: Status: ACTIVE | Noted: 2021-09-16

## 2024-06-06 PROBLEM — E78.49 OTHER HYPERLIPIDEMIA: Status: ACTIVE | Noted: 2023-03-14

## 2024-06-06 PROBLEM — J45.909 ASTHMA: Chronic | Status: ACTIVE | Noted: 2022-05-17

## 2024-06-06 PROBLEM — M16.11 OSTEOARTHRITIS OF RIGHT HIP: Status: ACTIVE | Noted: 2021-06-04

## 2024-06-06 PROBLEM — K21.9 GERD (GASTROESOPHAGEAL REFLUX DISEASE): Status: ACTIVE | Noted: 2021-09-16

## 2024-06-06 PROBLEM — G62.9 PERIPHERAL NEUROPATHY: Status: ACTIVE | Noted: 2021-09-16

## 2024-06-06 LAB
ALBUMIN SERPL BCP-MCNC: 3 G/DL (ref 3.5–5)
ALP SERPL-CCNC: 58 U/L (ref 34–104)
ALT SERPL W P-5'-P-CCNC: 53 U/L (ref 7–52)
ANION GAP SERPL CALCULATED.3IONS-SCNC: 6 MMOL/L (ref 4–13)
AST SERPL W P-5'-P-CCNC: 61 U/L (ref 13–39)
BACTERIA UR CULT: NORMAL
BACTERIA UR QL AUTO: ABNORMAL /HPF
BASOPHILS # BLD AUTO: 0.04 THOUSANDS/ÂΜL (ref 0–0.1)
BASOPHILS NFR BLD AUTO: 0 % (ref 0–1)
BILIRUB SERPL-MCNC: 1.08 MG/DL (ref 0.2–1)
BILIRUB UR QL STRIP: NEGATIVE
BUN SERPL-MCNC: 26 MG/DL (ref 5–25)
CALCIUM ALBUM COR SERPL-MCNC: 8.4 MG/DL (ref 8.3–10.1)
CALCIUM SERPL-MCNC: 7.6 MG/DL (ref 8.4–10.2)
CHLORIDE SERPL-SCNC: 108 MMOL/L (ref 96–108)
CLARITY UR: ABNORMAL
CO2 SERPL-SCNC: 21 MMOL/L (ref 21–32)
COLOR UR: YELLOW
CREAT SERPL-MCNC: 1.3 MG/DL (ref 0.6–1.3)
EOSINOPHIL # BLD AUTO: 0.05 THOUSAND/ÂΜL (ref 0–0.61)
EOSINOPHIL NFR BLD AUTO: 0 % (ref 0–6)
ERYTHROCYTE [DISTWIDTH] IN BLOOD BY AUTOMATED COUNT: 13.4 % (ref 11.6–15.1)
GFR SERPL CREATININE-BSD FRML MDRD: 40 ML/MIN/1.73SQ M
GLUCOSE SERPL-MCNC: 144 MG/DL (ref 65–140)
GLUCOSE UR STRIP-MCNC: NEGATIVE MG/DL
HCT VFR BLD AUTO: 36.1 % (ref 34.8–46.1)
HGB BLD-MCNC: 11.9 G/DL (ref 11.5–15.4)
HGB UR QL STRIP.AUTO: NEGATIVE
IMM GRANULOCYTES # BLD AUTO: 0.28 THOUSAND/UL (ref 0–0.2)
IMM GRANULOCYTES NFR BLD AUTO: 2 % (ref 0–2)
KETONES UR STRIP-MCNC: NEGATIVE MG/DL
LACTATE SERPL-SCNC: 2 MMOL/L (ref 0.5–2)
LEUKOCYTE ESTERASE UR QL STRIP: ABNORMAL
LYMPHOCYTES # BLD AUTO: 0.08 THOUSANDS/ÂΜL (ref 0.6–4.47)
LYMPHOCYTES NFR BLD AUTO: 1 % (ref 14–44)
MAGNESIUM SERPL-MCNC: 1.5 MG/DL (ref 1.9–2.7)
MCH RBC QN AUTO: 31.5 PG (ref 26.8–34.3)
MCHC RBC AUTO-ENTMCNC: 32.7 G/DL (ref 31.4–37.4)
MCV RBC AUTO: 96 FL (ref 82–98)
MONOCYTES # BLD AUTO: 0.45 THOUSAND/ÂΜL (ref 0.17–1.22)
MONOCYTES NFR BLD AUTO: 3 % (ref 4–12)
MUCOUS THREADS UR QL AUTO: ABNORMAL
NEUTROPHILS # BLD AUTO: 15.02 THOUSANDS/ÂΜL (ref 1.85–7.62)
NEUTS SEG NFR BLD AUTO: 94 % (ref 43–75)
NITRITE UR QL STRIP: NEGATIVE
NON-SQ EPI CELLS URNS QL MICRO: ABNORMAL /HPF
NRBC BLD AUTO-RTO: 0 /100 WBCS
PH UR STRIP.AUTO: 6 [PH]
PHOSPHATE SERPL-MCNC: 3.9 MG/DL (ref 2.3–4.1)
PLATELET # BLD AUTO: 120 THOUSANDS/UL (ref 149–390)
PMV BLD AUTO: 10.2 FL (ref 8.9–12.7)
POTASSIUM SERPL-SCNC: 3.8 MMOL/L (ref 3.5–5.3)
PROCALCITONIN SERPL-MCNC: 10.4 NG/ML
PROT SERPL-MCNC: 5 G/DL (ref 6.4–8.4)
PROT UR STRIP-MCNC: ABNORMAL MG/DL
RBC # BLD AUTO: 3.75 MILLION/UL (ref 3.81–5.12)
RBC #/AREA URNS AUTO: ABNORMAL /HPF
SODIUM SERPL-SCNC: 135 MMOL/L (ref 135–147)
SP GR UR STRIP.AUTO: 1.01 (ref 1–1.03)
UROBILINOGEN UR STRIP-ACNC: <2 MG/DL
WBC # BLD AUTO: 15.92 THOUSAND/UL (ref 4.31–10.16)
WBC #/AREA URNS AUTO: ABNORMAL /HPF

## 2024-06-06 PROCEDURE — 84145 PROCALCITONIN (PCT): CPT

## 2024-06-06 PROCEDURE — 80053 COMPREHEN METABOLIC PANEL: CPT

## 2024-06-06 PROCEDURE — 87086 URINE CULTURE/COLONY COUNT: CPT

## 2024-06-06 PROCEDURE — 83735 ASSAY OF MAGNESIUM: CPT

## 2024-06-06 PROCEDURE — 84100 ASSAY OF PHOSPHORUS: CPT

## 2024-06-06 PROCEDURE — 83605 ASSAY OF LACTIC ACID: CPT

## 2024-06-06 PROCEDURE — 85025 COMPLETE CBC W/AUTO DIFF WBC: CPT

## 2024-06-06 PROCEDURE — 99232 SBSQ HOSP IP/OBS MODERATE 35: CPT | Performed by: STUDENT IN AN ORGANIZED HEALTH CARE EDUCATION/TRAINING PROGRAM

## 2024-06-06 PROCEDURE — 81001 URINALYSIS AUTO W/SCOPE: CPT

## 2024-06-06 RX ORDER — DIPHENHYDRAMINE HYDROCHLORIDE 50 MG/ML
25 INJECTION INTRAMUSCULAR; INTRAVENOUS EVERY 6 HOURS PRN
Status: DISCONTINUED | OUTPATIENT
Start: 2024-06-06 | End: 2024-06-09 | Stop reason: HOSPADM

## 2024-06-06 RX ORDER — SODIUM CHLORIDE, SODIUM GLUCONATE, SODIUM ACETATE, POTASSIUM CHLORIDE, MAGNESIUM CHLORIDE, SODIUM PHOSPHATE, DIBASIC, AND POTASSIUM PHOSPHATE .53; .5; .37; .037; .03; .012; .00082 G/100ML; G/100ML; G/100ML; G/100ML; G/100ML; G/100ML; G/100ML
75 INJECTION, SOLUTION INTRAVENOUS CONTINUOUS
Status: DISCONTINUED | OUTPATIENT
Start: 2024-06-06 | End: 2024-06-08

## 2024-06-06 RX ORDER — CEFTRIAXONE 1 G/50ML
1000 INJECTION, SOLUTION INTRAVENOUS ONCE
Status: CANCELLED | OUTPATIENT
Start: 2024-06-06 | End: 2024-06-06

## 2024-06-06 RX ORDER — MAGNESIUM SULFATE HEPTAHYDRATE 40 MG/ML
2 INJECTION, SOLUTION INTRAVENOUS ONCE
Status: COMPLETED | OUTPATIENT
Start: 2024-06-06 | End: 2024-06-06

## 2024-06-06 RX ORDER — SACCHAROMYCES BOULARDII 250 MG
250 CAPSULE ORAL 2 TIMES DAILY
Status: DISCONTINUED | OUTPATIENT
Start: 2024-06-06 | End: 2024-06-09 | Stop reason: HOSPADM

## 2024-06-06 RX ORDER — ALBUMIN (HUMAN) 12.5 G/50ML
25 SOLUTION INTRAVENOUS ONCE
Status: COMPLETED | OUTPATIENT
Start: 2024-06-06 | End: 2024-06-06

## 2024-06-06 RX ORDER — CEFAZOLIN SODIUM 2 G/50ML
2000 SOLUTION INTRAVENOUS EVERY 8 HOURS
Status: DISCONTINUED | OUTPATIENT
Start: 2024-06-06 | End: 2024-06-06

## 2024-06-06 RX ORDER — SODIUM CHLORIDE 9 MG/ML
75 INJECTION, SOLUTION INTRAVENOUS CONTINUOUS
Status: DISCONTINUED | OUTPATIENT
Start: 2024-06-06 | End: 2024-06-06

## 2024-06-06 RX ADMIN — DIPHENHYDRAMINE HYDROCHLORIDE 25 MG: 50 INJECTION, SOLUTION INTRAMUSCULAR; INTRAVENOUS at 18:04

## 2024-06-06 RX ADMIN — ATORVASTATIN CALCIUM 10 MG: 10 TABLET, FILM COATED ORAL at 18:04

## 2024-06-06 RX ADMIN — ACETAMINOPHEN 650 MG: 325 TABLET ORAL at 12:19

## 2024-06-06 RX ADMIN — ACETAMINOPHEN 650 MG: 325 TABLET ORAL at 20:39

## 2024-06-06 RX ADMIN — SODIUM CHLORIDE, SODIUM GLUCONATE, SODIUM ACETATE, POTASSIUM CHLORIDE, MAGNESIUM CHLORIDE, SODIUM PHOSPHATE, DIBASIC, AND POTASSIUM PHOSPHATE 125 ML/HR: .53; .5; .37; .037; .03; .012; .00082 INJECTION, SOLUTION INTRAVENOUS at 09:02

## 2024-06-06 RX ADMIN — CEFAZOLIN SODIUM 2000 MG: 2 SOLUTION INTRAVENOUS at 09:05

## 2024-06-06 RX ADMIN — PIPERACILLIN AND TAZOBACTAM 4.5 G: 4; .5 INJECTION, POWDER, FOR SOLUTION INTRAVENOUS at 21:36

## 2024-06-06 RX ADMIN — SODIUM CHLORIDE, SODIUM GLUCONATE, SODIUM ACETATE, POTASSIUM CHLORIDE, MAGNESIUM CHLORIDE, SODIUM PHOSPHATE, DIBASIC, AND POTASSIUM PHOSPHATE 125 ML/HR: .53; .5; .37; .037; .03; .012; .00082 INJECTION, SOLUTION INTRAVENOUS at 23:10

## 2024-06-06 RX ADMIN — Medication 250 MG: at 21:36

## 2024-06-06 RX ADMIN — ALBUMIN (HUMAN) 25 G: 0.25 INJECTION, SOLUTION INTRAVENOUS at 15:16

## 2024-06-06 RX ADMIN — FLUTICASONE FUROATE AND VILANTEROL TRIFENATATE 1 PUFF: 200; 25 POWDER RESPIRATORY (INHALATION) at 09:20

## 2024-06-06 RX ADMIN — SODIUM CHLORIDE 1000 ML: 0.9 INJECTION, SOLUTION INTRAVENOUS at 00:46

## 2024-06-06 RX ADMIN — ENOXAPARIN SODIUM 40 MG: 40 INJECTION SUBCUTANEOUS at 09:19

## 2024-06-06 RX ADMIN — MAGNESIUM SULFATE HEPTAHYDRATE 2 G: 40 INJECTION, SOLUTION INTRAVENOUS at 09:03

## 2024-06-06 NOTE — ASSESSMENT & PLAN NOTE
On admission blood pressure systolic 110  Takes losartan daily, continue with regimen  Monitor vital signs per unit protocol or when needed.  Noted patient was admitted due to sepsis, avoid hypotension

## 2024-06-06 NOTE — ASSESSMENT & PLAN NOTE
>>ASSESSMENT AND PLAN FOR ASTHMA WRITTEN ON 6/6/2024  1:31 PM BY KARRI FATIMA    Patient takes Advair and Proventil daily, continue with regimen  Reports asthma is well-controlled with inhaler therapy.  Patient reports exacerbation 1-2 times a year.  She was diagnosed in 2014  Follows up with [facility] pulmonology last visit February 20, 2024

## 2024-06-06 NOTE — ASSESSMENT & PLAN NOTE
>>ASSESSMENT AND PLAN FOR RHEUMATOID ARTHRITIS, INVOLVING UNSPECIFIED SITE, UNSPECIFIED WHETHER RHEUMATOID FACTOR PRESENT (HCC) WRITTEN ON 6/6/2024 12:30 AM BY CHAD DUMONT    Treated with methotrexate and TNF in the past. Switched to Actemra weekly, continue her regimen when discharged  Last DEXA scan on 2021 showing lumbar spine: T-score is 0.2 and left Hip: Lowest T-score is -1.4.  Repeated DEXA scan on August 2023  lumbar spine BMD increased to 0.8 positive, hip BMD increased to -1.1 positive.  Patient reports pains in joints on admission, the patient prefers NSAIDs instead of acetaminophen for pain.  Noted GFR is less than 60 and this was explained to the patient at bedside along with family.  Patient and family request NSAIDs for pain education on mechanism of action of NSAIDs and kidney disease was explained, patient and family verbalized understanding.  Pharmacist was made aware.  Follows up with [facility] rheumatologist, last visit January 2024

## 2024-06-06 NOTE — CONSULTS
H&P Exam - Urology       Patient: Shelia Miller   : 1950 Sex: female   MRN: [id number]     CSN: [id number]      History of Present Illness   HPI:  Shelia Miller is a 73 y.o. female who presents to [facility] yesterday with suprapubic pain seen by her primary care physician day earlier diagnosed with UTI taking 1 dose of Ceftin admitted in light of CAT scan findings confirming pelvic inflammation patient history of breast cancer in remission urologic consult called for seen at the bedside patient states that she normally gets up twice a night but was getting up 4 times over the last 2 weeks voiding 4-6 times during the day with episodes of urgency and occasional urgency incontinence she denies gross hematuria dysuria status post mid urethral sling years ago for stress incontinence patient does suffer with spinal stenosis        Review of Systems:   Constitutional:  Negative for activity change, fever, chills and diaphoresis.   HENT: Negative for hearing loss and trouble swallowing.   Eyes: Negative for itching and visual disturbance.   Respiratory: Negative for chest tightness and shortness of breath.   Cardiovascular: Negative for chest pain, edema.   Gastrointestinal: Negative for abdominal distention, na abdominal pain, constipation, diarrhea, Nausea and vomiting.   Genitourinary: Negative for decreased urine volume, difficulty urinating, dysuria, enuresis, frequency, hematuria and urgency.   Musculoskeletal: Negative for gait problem and myalgias.   Neurological: Negative for dizziness and headaches.   Hematological: Does not bruise/bleed easily.       Historical Information   Past Medical History:   Diagnosis Date    Arthritis 2015    Asthma     Back pain with radiculopathy 2023    BRCA1 negative     BRCA2 negative     Breast cancer (HCC)     Left  breast, triple negative    Cancer (HCC) 2019    Colon polyp     GERD (gastroesophageal reflux disease) 2016    History of chemotherapy  10/01/2020    breast cancer - left    History of radiation therapy 04/01/2021    left breast cancer    Hypertension     Lyme disease     OA (osteoarthritis)     Rheumatoid arthritis (HCC)      Past Surgical History:   Procedure Laterality Date    ANKLE SURGERY Right 2016    2 plates and screws    BREAST BIOPSY Left 07/02/2020    BREAST LUMPECTOMY Left 08/12/2020    left breast wire localized lumpectomy    BREAST MASS EXCISION Left 09/02/2020    short superior and long lateral left breast resection, left axillary sentinel lymph node biopsy    COLONOSCOPY      ELBOW FRACTURE REPAIR Right 1996    FRACTURE SURGERY Right     elbow    GUM SURGERY      HYSTERECTOMY  2018    JOINT REPLACEMENT Right 6/2021    hip    REPLACEMENT TOTAL HIP W/  RESURFACING IMPLANTS  06/18/2021     Social History   Social History     Substance and Sexual Activity   Alcohol Use Not Currently    Comment: occasional     Social History     Substance and Sexual Activity   Drug Use Never     Social History     Tobacco Use   Smoking Status Never    Passive exposure: Past   Smokeless Tobacco Never     Family History:   Family History   Problem Relation Age of Onset    Breast cancer Mother 59    Cancer Mother         Breast Cancer    Lung cancer Father     Cancer Father         Lung cancer    COPD Father     Hypertension Father     Breast cancer Paternal Grandmother     Thyroid cancer Son     Cancer Son         Thyroid Cancer       Meds/Allergies     Facility-Administered Medications Prior to Admission:     triamcinolone acetonide (KENALOG-10) 10 mg/mL injection 10 mg    Medications Prior to Admission:     Actemra ACTPen 162 MG/0.9ML SOAJ    atorvastatin (LIPITOR) 10 mg tablet    cefuroxime (CEFTIN) 500 mg tablet    Fluticasone-Salmeterol (Advair Diskus) 250-50 mcg/dose inhaler    losartan (COZAAR) 50 mg tablet    Multiple Vitamin (MULTIVITAMIN ADULT PO)    omeprazole (PriLOSEC) 20 mg delayed release capsule    raloxifene (EVISTA) 60 mg tablet     "albuterol (PROVENTIL HFA,VENTOLIN HFA) 90 mcg/act inhaler    baclofen 10 mg tablet    Calcium Carbonate-Vitamin D3 600-400 MG-UNIT TABS    CELECOXIB PO    Cholecalciferol 25 MCG (1000 UT) capsule    DULoxetine (CYMBALTA) 20 mg capsule    PreviDent 5000 Booster Plus 1.1 % PSTE  Allergies   Allergen Reactions    Fruit Extracts Itching     Cantelope melon peaches    Nuts - Food Allergy GI Intolerance    Pollen Extract Other (See Comments)     Pollen , Trees And Flowers    Avelox [Moxifloxacin] Itching       Objective   Vitals: BP 95/53 (BP Location: Left arm)   Pulse 90   Temp 99.2 °F (37.3 °C) (Oral)   Resp 16   Ht 5' 3\" (1.6 m)   Wt 74.6 kg (164 lb 7.4 oz)   SpO2 94%   BMI 29.13 kg/m²     Physical Exam:  General Alert awake   Normocephalic atraumatic PERRLA  Lungs clear bilaterally  Cardiac normal S1 normal S2  Abdomen soft, flank pain  Extremities no edema    I/O last 24 hours:  In: 600 [IV Piggyback:600]  Out: -     Invasive Devices       Peripheral Intravenous Line  Duration             Peripheral IV 06/05/24 Right Antecubital <1 day                        Lab Results: CBC:   Lab Results   Component Value Date    WBC 15.92 (H) 06/06/2024    HGB 11.9 06/06/2024    HCT 36.1 06/06/2024    MCV 96 06/06/2024     (L) 06/06/2024    RBC 3.75 (L) 06/06/2024    MCH 31.5 06/06/2024    MCHC 32.7 06/06/2024    RDW 13.4 06/06/2024    MPV 10.2 06/06/2024    NRBC 0 06/06/2024     CMP:   Lab Results   Component Value Date     06/06/2024    CO2 21 06/06/2024    BUN 26 (H) 06/06/2024    CREATININE 1.30 06/06/2024    CALCIUM 7.6 (L) 06/06/2024    AST 61 (H) 06/06/2024    ALT 53 (H) 06/06/2024    ALKPHOS 58 06/06/2024    EGFR 40 06/06/2024     Urinalysis:   Lab Results   Component Value Date    COLORU Yellow 06/06/2024    CLARITYU Slightly Cloudy 06/06/2024    SPECGRAV 1.015 06/06/2024    PHUR 6.0 06/06/2024    LEUKOCYTESUR Moderate (A) 06/06/2024    NITRITE Negative 06/06/2024    GLUCOSEU Negative 06/06/2024    " "KETONESU Negative 06/06/2024    BILIRUBINUR Negative 06/06/2024    BLOODU Negative 06/06/2024     Urine Culture: No results found for: \"URINECX\"  PSA: No results found for: \"PSA\"        Assessment/ Plan:  Acute cystitis causing increasing nocturia urgency on antibiotics culture sensitivity pending  Overactive bladder possibly related to spinal stenosis or mid urethral sling obstruction?  Patient will need workup in the office after discharge      Asif Merlos MD    "

## 2024-06-06 NOTE — PLAN OF CARE
Problem: PAIN - ADULT  Goal: Verbalizes/displays adequate comfort level or baseline comfort level  Description: Interventions:  - Encourage patient to monitor pain and request assistance  - Assess pain using appropriate pain scale  - Administer analgesics based on type and severity of pain and evaluate response  - Implement non-pharmacological measures as appropriate and evaluate response  - Consider cultural and social influences on pain and pain management  - Notify physician/advanced practitioner if interventions unsuccessful or patient reports new pain  Outcome: Progressing      Problem: INFECTION - ADULT  Goal: Absence or prevention of progression during hospitalization  Description: INTERVENTIONS:  - Assess and monitor for signs and symptoms of infection  - Monitor lab/diagnostic results  - Monitor all insertion sites, i.e. indwelling lines, tubes, and drains  - Administer medications as ordered  - Instruct and encourage patient and family to use good hand hygiene technique  Outcome: Progressing  Goal: Absence of fever/infection during neutropenic period  Description: INTERVENTIONS:  - Monitor WBC    Outcome: Progressing      Problem: SAFETY ADULT  Goal: Patient will remain free of falls  Description: INTERVENTIONS:  - Educate patient/family on patient safety including physical limitations  - Instruct patient to call for assistance with activity   - Consult OT/PT to assist with strengthening/mobility   - Keep Call bell within reach  - Keep bed low and locked with side rails adjusted as appropriate  - Keep care items and personal belongings within reach  - Initiate and maintain comfort rounds  - Make Fall Risk Sign visible to staff  - Offer Toileting every 2 Hours, in advance of need  - Initiate/Maintain bed alarm  - Obtain necessary fall risk management equipment:   - Apply yellow socks and bracelet for high fall risk patients  - Consider moving patient to room near nurses station  Outcome: Progressing

## 2024-06-06 NOTE — PROGRESS NOTES
Okay so on another thing to when you are doing your porch additionally [facility]  Progress Note  Name: Shelia Miller I  MRN: [id number]  Unit/Bed#: 2 [address] Date of Admission: 6/5/2024   Date of Service: 6/6/2024 I Hospital Day: 1    Assessment & Plan   Abdominal pain  Assessment & Plan  Patient notes having abdominal pain in the left and right lower quadrants over the last 3 days. RLQ>LLQ. Occasionally radiates to the right groin. Patient came in due to fever, fatigue, nausea, and vomiting.  CT of the abdomen pelvis with contrast shows diffuse abnormal soft tissue infiltration and stranding seen throughout the fatty tissue planes of the deep right pelvis and fascial thickening along the right pelvic sidewall. No loculated drainable collections. Prominent vasculature within the region. No findings to suggest venous thrombosis. The prior right ureteral dilatation also appears resolved.  The patient report at bedside that she has a history of prolapse and mesh placement.  UA + leukocytes, - nitrites  Denies nausea and vomiting at this time  Initiate antibiotics to cover potential intra-abdominal infection    * Sepsis with acute organ dysfunction (HCC)  Assessment & Plan  POA febrile, tachycardic, Pro-Pravin and lactic are elevated, now in septic shock    Patient report she has been having feelings of dysuria and frequency for a few days decided to go to her primary care provider ordered a urine culture and Ceftin.  Due to fever, fatigue, nausea and vomiting decided to seek further help in the emergency department    Possible source urinary tract infection or an intra-abdominal infection  CT of the abdomen pelvis with contrast shows diffuse abnormal soft tissue infiltration and stranding seen throughout the fatty tissue planes of the deep right pelvis and fascial thickening along the right pelvic sidewall. No loculated drainable collections. Prominent vasculature within the region. No  findings to suggest venous thrombosis. The prior right ureteral dilatation also appears resolved.  The patient report at bedside that she has a history of prolapse and mesh placement.  Urinalysis is negative for possible infection  The patient received normal saline bolus x 3  The patient was initiated with ceftriaxone in the ER, continue with Ancef> broad-spectrum Zosyn  The patient prefers NSAIDs instead of acetaminophen.  Patient GFR is less than 60 and education was provided at bedside to the patient and family about the use of NSAIDs and kidney function however the patient prefers NSAID use versus acetaminophen, ketorolac x 2 as needed ordered  Monitor the WBC with daily CBC, will repeat lactic in the morning after fluid administration  AM WBC 15.92  Lactic Acid 2.0-resolved  IV Albumin 1td  UA unremarkable  AM Procalcitonin 10.40 ( likely FP d/t underlying hx maligancy)  Monitor vital signs per unit protocol or when needed  Urology consult in place      Elevated liver enzymes  Assessment & Plan  Likely secondary to sepsis see plan above  One-time dose of albumin   will monitor    Asthma  Assessment & Plan  Patient takes Advair and Proventil daily, continue with regimen  Reports asthma is well-controlled with inhaler therapy.  Patient reports exacerbation 1-2 times a year.  She was diagnosed in 2014  Follows up with [facility] pulmonology last visit February 20, 2024    HTN (hypertension)  Assessment & Plan  On admission blood pressure systolic 110  Takes losartan daily, continue with regimen  BP 90/47 hold losartan until BP improves  Give 1 L bolus closely monitor BP  Monitor vital signs per unit protocol or when needed.  Noted patient was admitted due to sepsis, avoid hypotension    Rheumatoid arthritis, involving unspecified site, unspecified whether rheumatoid factor present (HCC)  Assessment & Plan  Treated with methotrexate and TNF in the past. Switched to Actemra weekly, continue her regimen when  discharged  Last DEXA scan on 2021 showing lumbar spine: T-score is 0.2 and left Hip: Lowest T-score is -1.4.  Repeated DEXA scan on August 2023  lumbar spine BMD increased to 0.8 positive, hip BMD increased to -1.1 positive.  Patient reports pains in joints on admission, the patient prefers NSAIDs instead of acetaminophen for pain.  Noted GFR is less than 60 and this was explained to the patient at bedside along with family.  Patient and family request NSAIDs for pain education on mechanism of action of NSAIDs and kidney disease was explained, patient and family verbalized understanding.  Pharmacist was made aware.  Follows up with [facility] rheumatologist, last visit January 2024      Invasive ductal carcinoma of left breast (HCC) s/p lumpectomy, Chemo & XRT  Assessment & Plan  History of history of pT1c pN0 M0 TNBC s/p BCT/SLN diagnosed in 2020  Underwent chemotherapy and radiation [id number]  Follows up with [facility] hematology and oncology last visit October 2023               VTE Pharmacologic Prophylaxis: VTE Score: 3 Moderate Risk (Score 3-4) - Pharmacological DVT Prophylaxis Ordered: enoxaparin (Lovenox).    Mobility:   [facility] Achieved: 7: Walk 25 feet or more  Not measured at this time, will monitor and trend    Patient Centered Rounds: I performed bedside rounds with nursing staff today.   Discussions with Specialists or Other Care Team Provider: Urology    Education and Discussions with Family / Patient: Updated  (daughter) at bedside.    Total Time Spent on Date of Encounter in care of patient: 40 mild mins. This time was spent on one or more of the following: performing physical exam; counseling and coordination of care; obtaining or reviewing history; documenting in the medical record; reviewing/ordering tests, medications or procedures; communicating with other healthcare professionals and discussing with patient's family/caregivers.    Current Length of Stay: 1  day(s)  Current Patient Status: Inpatient   Certification Statement: The patient will continue to require additional inpatient hospital stay due to Sepsis  Discharge Plan: Anticipate discharge in 48-72 hrs to home.    Code Status: Level 1 - Full Code    Subjective:   Patient was seen and examined at bedside, Patient notes lower abdominal pain that has been persisting over the last 3 days. She states that it will occasionally radiate to the right side of the groin. RLQ pain is greater than LLQ. She states last night when she was eating dinner she felt nauseous but that has improved. She notes eating very little for breakfast due to decreased appetite. Patient notes experiencing dysuria last night but notes she has not experienced dysuria today. She denies chills, emesis, headaches, chest pain, palpitations or SOB.    Objective:     Vitals:   Temp (24hrs), Av.1 °F (37.8 °C), Min:98.5 °F (36.9 °C), Max:103.3 °F (39.6 °C)    Temp:  [98.5 °F (36.9 °C)-103.3 °F (39.6 °C)] 99.2 °F (37.3 °C)  HR:  [] 90  Resp:  [16-20] 16  BP: ()/(43-54) 95/53  SpO2:  [91 %-94 %] 94 %  Body mass index is 29.13 kg/m².     Input and Output Summary (last 24 hours):     Intake/Output Summary (Last 24 hours) at 2024 1517  Last data filed at 2024 1856  Gross per 24 hour   Intake 600 ml   Output --   Net 600 ml       Physical Exam:   Physical Exam  Vitals and nursing note reviewed.   Constitutional:       General: She is not in acute distress.     Appearance: She is well-developed.   HENT:      Head: Normocephalic and atraumatic.   Eyes:      Conjunctiva/sclera: Conjunctivae normal.   Cardiovascular:      Rate and Rhythm: Normal rate and regular rhythm.      Heart sounds: No murmur heard.  Pulmonary:      Effort: Pulmonary effort is normal. No respiratory distress.      Breath sounds: Normal breath sounds.   Abdominal:      General: There is no distension.      Palpations: Abdomen is soft.      Tenderness: There is  abdominal tenderness in the right lower quadrant and left lower quadrant.      Comments: RLQ pain > LLQ pain   Musculoskeletal:         General: No swelling.      Cervical back: Neck supple.   Skin:     General: Skin is warm and dry.      Capillary Refill: Capillary refill takes less than 2 seconds.   Neurological:      Mental Status: She is alert.   Psychiatric:         Mood and Affect: Mood normal.          Additional Data:     Labs:  Results from last 7 days   Lab Units 06/06/24  0441 06/05/24  1726   WBC Thousand/uL 15.92* 7.82   HEMOGLOBIN g/dL 11.9 15.0   HEMATOCRIT % 36.1 46.0   PLATELETS Thousands/uL 120* 138*   BANDS PCT %  --  46*   SEGS PCT % 94*  --    LYMPHO PCT % 1* 1*   MONO PCT % 3* 1*   EOS PCT % 0 1     Results from last 7 days   Lab Units 06/06/24  0441   SODIUM mmol/L 135   POTASSIUM mmol/L 3.8   CHLORIDE mmol/L 108   CO2 mmol/L 21   BUN mg/dL 26*   CREATININE mg/dL 1.30   ANION GAP mmol/L 6   CALCIUM mg/dL 7.6*   ALBUMIN g/dL 3.0*   TOTAL BILIRUBIN mg/dL 1.08*   ALK PHOS U/L 58   ALT U/L 53*   AST U/L 61*   GLUCOSE RANDOM mg/dL 144*     Results from last 7 days   Lab Units 06/05/24  1726   INR  1.02             Results from last 7 days   Lab Units 06/06/24  0441 06/05/24  1939 06/05/24  1726   LACTIC ACID mmol/L 2.0 2.9* 2.1*   PROCALCITONIN ng/ml 10.40*  --  1.39*       Lines/Drains:  Invasive Devices       Peripheral Intravenous Line  Duration             Peripheral IV 06/05/24 Right Antecubital <1 day                          Imaging: Reviewed radiology reports from this admission including: abdominal/pelvic CT    Recent Cultures (last 7 days):   Results from last 7 days   Lab Units 06/05/24  1748 06/05/24  1726   BLOOD CULTURE  Received in Microbiology Lab. Culture in Progress. Received in Microbiology Lab. Culture in Progress.       Last 24 Hours Medication List:   Current Facility-Administered Medications   Medication Dose Route Frequency Provider Last Rate    acetaminophen  650 mg Oral  Q6H PRN CHAD [place]      albuterol  2 puff Inhalation Q6H PRN CHAD [place]      aluminum-magnesium hydroxide-simethicone  30 mL Oral Q6H PRN CHAD [place]      atorvastatin  10 mg Oral Daily With Dinner CHAD [place]      cefazolin  2,000 mg Intravenous Q8H CHAD [place] 2,000 mg (06/06/24 0905)    docusate sodium  100 mg Oral BID CHAD [place]      enoxaparin  40 mg Subcutaneous Daily CHAD [place]      fluticasone-vilanterol  1 puff Inhalation Daily CHAD [place]      multi-electrolyte  125 mL/hr Intravenous Continuous CHAD [place] 125 mL/hr (06/06/24 0902)    ondansetron  4 mg Intravenous Q6H PRN CHAD [place]      raloxifene  60 mg Oral Daily CHAD [place]          Today, Patient Was Seen By: Marizol Cristina MD    **Please Note: This note may have been constructed using a voice recognition system.**

## 2024-06-06 NOTE — ASSESSMENT & PLAN NOTE
History of history of pT1c pN0 M0 TNBC s/p BCT/SLN diagnosed in 2020  Underwent chemotherapy and radiation [id number]  Follows up with [facility] hematology and oncology last visit October 2023

## 2024-06-06 NOTE — UTILIZATION REVIEW
Initial Clinical Review    Admission: Date/Time/Statement:   Admission Orders (From admission, onward)       Ordered        06/05/24 1948  INPATIENT ADMISSION  Once                          Orders Placed This Encounter   Procedures    INPATIENT ADMISSION     Standing Status:   Standing     Number of Occurrences:   1     Order Specific Question:   Level of Care     Answer:   Med Surg [16]     Order Specific Question:   Estimated length of stay     Answer:   More than 2 Midnights     Order Specific Question:   Certification     Answer:   I certify that inpatient services are medically necessary for this patient for a duration of greater than two midnights. See H&P and MD Progress Notes for additional information about the patient's course of treatment.     ED Arrival Information       Expected   -    Arrival   6/5/2024 16:02    Acuity   Emergent              Means of arrival   Wheelchair    Escorted by   Family Member    Service   Hospitalist    Admission type   Emergency              Arrival complaint   abdominal pain             Chief Complaint   Patient presents with    Abdominal Pain     Lower abd pain for past 5 days worse since this am. Has hx of uterine prolapse with mesh , had revision and mesh remains.. started vomiting this afternoon. Seen by Dr Reyes today dx with uti put on abx now running high fever    Fever     103.3 fever in triage       Initial Presentation:   73 yof to ER from home for bilateral abd pain, N&V x 5 days. Was to PCP & dx UTI, started on abt, then started with N&V after first dose. Now with worsening pain, N&V, chills. Hx  breast cancer in remission hypertension, RA, asthma, varicose veins, hyperlipidemia and osteoporosis, immunocompromised on Actemra for arthritis, multiple urinary & GYN surgeries, uterine prolapse. Presents febrile to 103.3, tachycardic, s/s as above, bilateral abd tenderness R>L. Admission CT a/p: Diffuse abnormal soft tissue infiltration and stranding seen throughout  the fatty tissue planes of the deep right pelvis and fascial thickening along the right pelvic sidewall. No loculated drainable collections. See above for further details. Prominent vasculature within the region. No findings to suggest venous thrombosis. The prior right ureteral dilatation also appears resolved. Labs: , bands 46, ast 41, tbili 1.07, procalcitonin 1.39, lactic acid 2.1.  Admitted to inpatient status for sepsis 2nd UTI vs intra-abdominal infection. Started on IVABT, cultures pending.     Anticipated Length of Stay/Certification Statement:   Patient will be admitted on an inpatient basis with an anticipated length of stay of greater than 2 midnights secondary to sepsis and pending urology consult.     Date: 6/6/24   Day 2:   Dx: sepsis 2nd UTI vs intra-abdominal infection. Tmax 103.3. IVABT in progress. Persistent RLQ & LLQ abd pain & tenderness. Urology consulted.    Per urology:  Acute cystitis causing increasing nocturia urgency on antibiotics culture sensitivity pending. Overactive bladder possibly related to spinal stenosis or mid urethral sling obstruction?      Date: 6/7/24  Day 3: Has surpassed a 2nd midnight with active treatments and services.  Dx: sepsis 2nd UTI vs intra-abdominal infection. Temp 99.3, BP improving. IVABT continued, IVF maintained. Abd tender RLQ>LLQ. Pain controlled. Urology following for cystitis. Monitor VS/temps, serial abd exams, pain mgt, follow labs/cultures.       ED Triage Vitals [06/05/24 1642]   Temperature Pulse Respirations Blood Pressure SpO2   (!) 103.3 °F (39.6 °C) (!) 108 20 115/52 92 %      Temp Source Heart Rate Source Patient Position - Orthostatic VS BP Location FiO2 (%)   Tympanic Monitor Sitting Left arm --      Pain Score       10 - Worst Possible Pain          Wt Readings from Last 1 Encounters:   06/07/24 74.6 kg (164 lb 7.4 oz)     Additional Vital Signs:   Date/Time Temp Pulse Resp BP MAP (mmHg) SpO2 O2 Device Patient Position - Orthostatic  VS   06/06/24 03:27:34 98.5 °F (36.9 °C) 97 16 97/54 68 -- -- --   06/05/24 22:31:49 99.7 °F (37.6 °C) 97 16 104/45 Abnormal  65 -- -- --   06/05/24 20:43:48 100.6 °F (38.1 °C) Abnormal  101 18 112/46 Abnormal  68 93 % None (Room air) Lying   06/05/24 1923 -- 98 20 113/52 -- 91 % None (Room air) Lying   06/05/24 1642 103.3 °F (39.6 °C) Abnormal  108 Abnormal  20 115/52 -- 92 % None (Room air) Sitting       Pertinent Labs/Diagnostic Test Results:   6/7 echo=    Left Ventricle: Left ventricular cavity size is normal. Wall thickness is upper limits of normal. The left ventricular ejection fraction is 65% by visual estimation. Systolic function is normal. Wall motion is normal. Diastolic function is mildly abnormal, consistent with grade I (abnormal) relaxation.  Left atrial filling pressure is normal.    Left Atrium: The atrium is mildly dilated (35-41 mL/m2).    Mitral Valve: There is mild regurgitation.    Tricuspid Valve: There is mild regurgitation. The right ventricular systolic pressure is mildly elevated. The estimated right ventricular systolic pressure is 42.00 mmHg.    Pulmonic Valve: There is mild regurgitation.    CT abdomen pelvis with contrast   Final Result  (06/05 1930)      Diffuse abnormal soft tissue infiltration and stranding seen throughout the fatty tissue planes of the deep right pelvis and fascial thickening along the right pelvic sidewall. No loculated drainable collections. See above for further details. Prominent    vasculature within the region. No findings to suggest venous thrombosis. The prior right ureteral dilatation also appears resolved.       Results from last 7 days   Lab Units 06/07/24  0540 06/06/24  0441 06/05/24  1726   WBC Thousand/uL 17.98* 15.92* 7.82   HEMOGLOBIN g/dL 11.6 11.9 15.0   HEMATOCRIT % 35.4 36.1 46.0   PLATELETS Thousands/uL 98* 120* 138*   TOTAL NEUT ABS Thousands/µL  --  15.02*  --    BANDS PCT % 34*  --  46*     Results from last 7 days   Lab Units  06/07/24 0540 06/06/24 0441 06/05/24  1726   SODIUM mmol/L 138 135 136   POTASSIUM mmol/L 4.2 3.8 4.1   CHLORIDE mmol/L 108 108 101   CO2 mmol/L 25 21 24   ANION GAP mmol/L 5 6 11   BUN mg/dL 25 26* 22   CREATININE mg/dL 1.05 1.30 1.10   EGFR ml/min/1.73sq m 52 40 49   CALCIUM mg/dL 7.9* 7.6* 9.5   MAGNESIUM mg/dL  --  1.5*  --    PHOSPHORUS mg/dL  --  3.9  --      Results from last 7 days   Lab Units 06/07/24  0540 06/06/24 0441 06/05/24  1726   AST U/L 50* 61* 41*   ALT U/L 45 53* 37   ALK PHOS U/L 67 58 88   TOTAL PROTEIN g/dL 5.1* 5.0* 7.1   ALBUMIN g/dL 3.1* 3.0* 4.4   TOTAL BILIRUBIN mg/dL 1.16* 1.08* 1.07*     Results from last 7 days   Lab Units 06/07/24 0540 06/06/24 0441 06/05/24  1726   GLUCOSE RANDOM mg/dL 108 144* 154*     Results from last 7 days   Lab Units 06/05/24  1726   PROTIME seconds 13.6   INR  1.02   PTT seconds 27     Results from last 7 days   Lab Units 06/06/24 0441 06/05/24  1726   PROCALCITONIN ng/ml 10.40* 1.39*     Results from last 7 days   Lab Units 06/06/24 0441 06/05/24  1939 06/05/24  1726   LACTIC ACID mmol/L 2.0 2.9* 2.1*     Results from last 7 days   Lab Units 06/06/24 0445 06/05/24  1114   CLARITY UA  Slightly Cloudy Clear   COLOR UA  Yellow Yellow   SPEC GRAV UA  1.015  --    PH UA  6.0  --    GLUCOSE UA mg/dl Negative Negative   KETONES UA mg/dl Negative Negative   BLOOD UA  Negative Negative   PROTEIN UA mg/dl 30 (1+)* Negative   NITRITE UA  Negative Negative   BILIRUBIN UA  Negative  --    BILIRUBIN UA POC   --  Negative   UROBILINOGEN UA   --  0.2   UROBILINOGEN UA (BE) mg/dl <2.0  --    LEUKOCYTES UA  Moderate* Negative   WBC UA /hpf 20-30*  --    RBC UA /hpf 0-1  --    BACTERIA UA /hpf Occasional  --    EPITHELIAL CELLS WET PREP /hpf Moderate*  --    MUCUS THREADS  Occasional*  --      Results from last 7 days   Lab Units 06/06/24  0445 06/05/24  1748 06/05/24  1726 06/05/24  1118   BLOOD CULTURE   --  No Growth at 24 hrs. No Growth at 24 hrs.  --    URINE  CULTURE  Culture too young- will reincubate  --   --  10,000-19,000 cfu/ml     ED Treatment:   Medication Administration from 06/05/2024 1602 to 06/05/2024 2026         Date/Time Order Dose Route Action     06/05/2024 1750 EDT ketorolac (TORADOL) injection 15 mg 15 mg Intravenous Given     06/05/2024 1750 EDT ondansetron (ZOFRAN) injection 4 mg 4 mg Intravenous Given     06/05/2024 1750 EDT acetaminophen (Ofirmev) injection 1,000 mg 1,000 mg Intravenous New Bag     06/05/2024 1756 EDT sodium chloride 0.9 % bolus 500 mL 500 mL Intravenous New Bag     06/05/2024 1854 EDT cefTRIAXone (ROCEPHIN) IVPB (premix in dextrose) 1,000 mg 50 mL 1,000 mg Intravenous New Bag     06/05/2024 1827 EDT iohexol (OMNIPAQUE) 350 MG/ML injection (MULTI-DOSE) 100 mL 100 mL Intravenous Given     06/05/2024 2010 EDT sodium chloride 0.9 % bolus 1,000 mL 1,000 mL Intravenous New Bag          Past Medical History:   Diagnosis Date    Arthritis 2015    Asthma     Back pain with radiculopathy 03/01/2023    BRCA1 negative     BRCA2 negative     Breast cancer (HCC) 2020    Left  breast, triple negative    Cancer (HCC) 7/2019    Colon polyp     GERD (gastroesophageal reflux disease) 2016    History of chemotherapy 10/01/2020    breast cancer - left    History of radiation therapy 04/01/2021    left breast cancer    Hypertension     Lyme disease     OA (osteoarthritis)     Rheumatoid arthritis (HCC)      Present on Admission:   Invasive ductal carcinoma of left breast (HCC) s/p lumpectomy, Chemo & XRT   Rheumatoid arthritis, involving unspecified site, unspecified whether rheumatoid factor present (HCC)   HTN (hypertension)   Sepsis with acute organ dysfunction (HCC)   Asthma   Abdominal pain      Admitting Diagnosis: Abdominal pain [R10.9]  Abnormal CT of the abdomen [R93.5]  Bandemia [D72.825]  Fever [R50.9]  Elevated lactic acid level [R79.89]  Sepsis (HCC) [A41.9]  Age/Sex: 73 y.o. female  Admission Orders:  Scd/foot pumps   Consult urology      Scheduled Medications:  Medications 05/29 05/30 05/31 06/01 06/02 06/03 06/04 06/05 06/06 06/07   acetaminophen (Ofirmev) injection 1,000 mg  Dose: 1,000 mg  Freq: Once Route: IV  Last Dose: Stopped (06/05/24 1805)  Start: 06/05/24 1715 End: 06/05/24 1805   Admin Instructions:              1759     180          albumin human (FLEXBUMIN) 25 % injection 25 g  Dose: 25 g  Freq: Once Route: IV  Start: 06/06/24 1445 End: 06/06/24 1516   Admin Instructions:               1516         atorvastatin (LIPITOR) tablet 10 mg  Dose: 10 mg  Freq: Daily with dinner Route: PO  Start: 06/06/24 1630            1804      1630        ceFAZolin (ANCEF) IVPB (premix in dextrose) 2,000 mg 50 mL  Dose: 2,000 mg  Freq: Every 8 hours Route: IV  Last Dose: 2,000 mg (06/06/24 0905)  Start: 06/06/24 0800 End: 06/06/24 1520   Admin Instructions:      Order specific questions:               0905     1520-D/C'd       ceFAZolin (ANCEF) IVPB (premix in dextrose) 2,000 mg 50 mL  Dose: 2,000 mg  Freq: Once Route: IV  Last Dose: 2,000 mg (06/05/24 2255)  Start: 06/05/24 2015 End: 06/05/24 2325   Admin Instructions:      Order specific questions:              2255          cefTRIAXone (ROCEPHIN) IVPB (premix in dextrose) 1,000 mg 50 mL  Dose: 1,000 mg  Freq: Once Route: IV  Last Dose: Stopped (06/05/24 1922)  Start: 06/05/24 1815 End: 06/05/24 1922   Admin Instructions:      Order specific questions:              1851     1922          docusate sodium (COLACE) capsule 100 mg  Dose: 100 mg  Freq: 2 times daily Route: PO  Indications of Use: CONSTIPATION  Start: 06/05/24 2015 2256      (3797) [C]     (8120) [phone]        enoxaparin (LOVENOX) subcutaneous injection 40 mg  Dose: 40 mg  Freq: Daily Route: SC  Start: 06/06/24 0900   Admin Instructions:               0932 0546        fluticasone-vilanterol 200-25 mcg/actuation 1 puff  Dose: 1 puff  Freq: Daily Route: IN  Start: 06/05/24 2015   Admin Instructions:               2256      0920      0908        ketorolac (TORADOL) injection 15 mg  Dose: 15 mg  Freq: Once Route: IV  Start: 06/05/24 1715 End: 06/05/24 1750   Admin Instructions:              1750          losartan (COZAAR) tablet 50 mg  Dose: 50 mg  Freq: Daily Route: PO  Start: 06/06/24 0900 End: 06/06/24 1432   Order specific questions:               (9638) 5122-D/C'd       magnesium sulfate 2 g/50 mL IVPB (premix) 2 g  Dose: 2 g  Freq: Once Route: IV  Last Dose: 2 g (06/06/24 0903)  Start: 06/06/24 0645 End: 06/06/24 1103   Admin Instructions:               0903         ondansetron (ZOFRAN) injection 4 mg  Dose: 4 mg  Freq: Once Route: IV  Start: 06/05/24 1715 End: 06/05/24 1750   Admin Instructions:              1750           piperacillin-tazobactam (ZOSYN) 4.5 g in sodium chloride 0.9 % 100 mL IV LOADING DOSE  Dose: 4.5 g  Freq: Once Route: IV  Last Dose: 4.5 g (06/06/24 2136)  Start: 06/06/24 2130 End: 06/06/24 2206   Admin Instructions:      Order specific questions:               2136         Followed by   piperacillin-tazobactam (ZOSYN) 4.5 g in sodium chloride 0.9 % 100 mL IVPB (EXTENDED INFUSION)  Dose: 4.5 g  Freq: Every 8 hours Route: IV  Last Dose: 4.5 g (06/07/24 0853)  Start: 06/07/24 0130   Admin Instructions:      Order specific questions:                0122     0853     1730         piperacillin-tazobactam (ZOSYN) 4.5 g in sodium chloride 0.9 % 100 mL IV LOADING DOSE  Dose: 4.5 g  Freq: Once Route: IV  Start: 06/06/24 1600 End: 06/06/24 1713   Admin Instructions:      Order specific questions:               1713-D/C'd  (1731)         Followed by   piperacillin-tazobactam (ZOSYN) 4.5 g in sodium chloride 0.9 % 100 mL IVPB (EXTENDED INFUSION)  Dose: 4.5 g  Freq: Every 8 hours Route: IV  Start: 06/06/24 2000 End: 06/06/24 1713   Admin Instructions:      Order specific questions:               1713-D/C'd       raloxifene (EVISTA) tablet 60 mg  Dose: 60 mg  Freq: Daily Route: PO  Start: 06/06/24 0900   Admin  Instructions:               (5252) (1085) [C]        saccharomyces boulardii (FLORASTOR) capsule 250 mg  Dose: 250 mg  Freq: 2 times daily Route: PO  Start: 06/06/24 2100 2136      0853     1800         sodium chloride 0.9 % bolus 1,000 mL  Dose: 1,000 mL  Freq: Once Route: IV  Start: 06/06/24 2100 End: 06/06/24 2113   Admin Instructions:               2113-D/C'd           Followed by   sodium chloride 0.9 % bolus 1,000 mL  Dose: 1,000 mL  Freq: Once Route: IV  Start: 06/06/24 2127 End: 06/06/24 2113   Admin Instructions:               2113-D/C'd       Followed by   sodium chloride 0.9 % bolus 1,000 mL  Dose: 1,000 mL  Freq: Once Route: IV  Start: 06/06/24 2157 End: 06/06/24 2113   Admin Instructions:               2113-D/C'd       sodium chloride 0.9 % bolus 1,000 mL  Dose: 1,000 mL  Freq: Once Route: IV  Last Dose: 1,000 mL (06/06/24 0046)  Start: 06/06/24 0015 End: 06/06/24 0146            0046         sodium chloride 0.9 % bolus 1,000 mL  Dose: 1,000 mL  Freq: Once Route: IV  Last Dose: 1,000 mL (06/05/24 2010)  Start: 06/05/24 2015 End: 06/05/24 2110 2010          sodium chloride 0.9 % bolus 500 mL  Dose: 500 mL  Freq: Once Route: IV  Last Dose: Stopped (06/05/24 1856)  Start: 06/05/24 1715 End: 06/05/24 1856 1756 1856          triamcinolone acetonide (KENALOG-10) 10 mg/mL injection 10 mg  Dose: 10 mg  Route: IX  Start: 03/19/24 1545                            Continuous Meds Sorted by Name  for Shelia Miller as of 05/29/24 through 6/7/24  Legend:       Medications 05/29 05/30 05/31 06/01 06/02 06/03 06/04 06/05 06/06 06/07   multi-electrolyte (PLASMALYTE-A/ISOLYTE-S PH 7.4) IV solution  Rate: 75 mL/hr Dose: 75 mL/hr  Freq: Continuous Route: IV  Indications of Use: IV Hydration  Last Dose: 75 mL/hr (06/07/24 0953)  Start: 06/06/24 0645            0902     2310      0101     0416     0953        sodium chloride 0.9 % infusion  Rate: 75 mL/hr Dose: 75 mL/hr  Freq: Continuous  Route: IV  Start: 06/06/24 1445 End: 06/06/24 1444            1444-D/C'd                   PRN Meds Sorted by Name  for Shelia Miller as of 05/29/24 through 6/7/24  Legend:         Medications 05/29 05/30 05/31 06/01 06/02 06/03 06/04 06/05 06/06 06/07   acetaminophen (TYLENOL) tablet 650 mg  Dose: 650 mg  Freq: Every 6 hours PRN Route: PO  PRN Reasons: mild pain,moderate pain,headaches,fever  Indications of Use: FEVER,HEADACHE,MILD PAIN,PAIN  Start: 06/06/24 0000           2316      1219     2039         albuterol (PROVENTIL HFA,VENTOLIN HFA) inhaler 2 puff  Dose: 2 puff  Freq: Every 6 hours PRN Route: IN  PRN Reason: wheezing  Start: 06/05/24 2009   Admin Instructions:                   aluminum-magnesium hydroxide-simethicone (MAALOX) oral suspension 30 mL  Dose: 30 mL  Freq: Every 6 hours PRN Route: PO  PRN Reasons: indigestion,heartburn  Start: 06/05/24 2009   Admin Instructions:                   diphenhydrAMINE (BENADRYL) injection 25 mg  Dose: 25 mg  Freq: Every 6 hours PRN Route: IV  PRN Reason: itching  Start: 06/06/24 1713   Admin Instructions:               1804         iohexol (OMNIPAQUE) 350 MG/ML injection (MULTI-DOSE) 100 mL  Dose: 100 mL  Freq: Once in imaging Route: IV  PRN Reason: contrast  Start: 06/05/24 1827 End: 06/05/24 1827           1827          ketorolac (TORADOL) injection 15 mg  Dose: 15 mg  Freq: Every 6 hours PRN Route: IV  PRN Reasons: severe pain,moderate pain  Indications of Use: MODERATE TO SEVERE PAIN  Start: 06/05/24 2013 End: 06/06/24 0633   Admin Instructions:               0633-D/C'd       ondansetron (ZOFRAN) injection 4 mg  Dose: 4 mg  Freq: Every 6 hours PRN Route: IV  PRN Reasons: nausea,vomiting  Start: 06/05/24 2009   Admin Instructions:                       Network Utilization Review Department  ATTENTION: Please call with any questions or concerns to [phone] and carefully listen to the prompts so that you are directed to the right person. All voicemails are  confidential.   For Discharge needs, contact Care Management DC Support Team at [phone] opt. 2  Send all requests for admission clinical reviews, approved or denied determinations and any other requests to dedicated fax number below belonging to the campus where the patient is receiving treatment. List of dedicated fax numbers for the Facilities:  FACILITY NAME UR FAX NUMBER   ADMISSION DENIALS (Administrative/Medical Necessity) [phone]   DISCHARGE SUPPORT TEAM (NETWORK) [phone]   PARENT CHILD HEALTH (Maternity/NICU/Pediatrics) [phone]   [facility] [phone]   [facility] [phone]   [facility] [phone]   [facility] [phone]   [facility] [phone]   [facility] [phone]   [facility] [phone]   [facility] [phone]   [facility] [phone]   [facility] [phone]   [facility] [phone]   [facility] [phone]

## 2024-06-06 NOTE — ASSESSMENT & PLAN NOTE
History of history of pT1c pN0 M0 TNBC s/p BCT/SLN diagnosed in 2020  Underwent chemotherapy and radiation [id number]  Follows up with [facility] hematology and oncology last visit October 2023

## 2024-06-06 NOTE — ASSESSMENT & PLAN NOTE
>>ASSESSMENT AND PLAN FOR RHEUMATOID ARTHRITIS, INVOLVING UNSPECIFIED SITE, UNSPECIFIED WHETHER RHEUMATOID FACTOR PRESENT (HCC) WRITTEN ON 6/6/2024  1:31 PM BY KARRI FATIMA    Treated with methotrexate and TNF in the past. Switched to Actemra weekly, continue her regimen when discharged  Last DEXA scan on 2021 showing lumbar spine: T-score is 0.2 and left Hip: Lowest T-score is -1.4.  Repeated DEXA scan on August 2023  lumbar spine BMD increased to 0.8 positive, hip BMD increased to -1.1 positive.  Patient reports pains in joints on admission, the patient prefers NSAIDs instead of acetaminophen for pain.  Noted GFR is less than 60 and this was explained to the patient at bedside along with family.  Patient and family request NSAIDs for pain education on mechanism of action of NSAIDs and kidney disease was explained, patient and family verbalized understanding.  Pharmacist was made aware.  Follows up with [facility] rheumatologist, last visit January 2024

## 2024-06-06 NOTE — ASSESSMENT & PLAN NOTE
Patient report she has been having feelings of dysuria and frequency for a few days decided to go to her primary care provider ordered a urine culture and Ceftin.  Due to fever, fatigue, nausea and vomiting decided to seek further help in the emergency department  On admission the patient is febrile, tachycardic, Pro-Pravin and lactic are elevated, WBC are within normal limits  Possible source urinary tract infection your or an intra-abdominal infection  CT of the abdomen pelvis with contrast shows diffuse abnormal soft tissue infiltration and stranding seen throughout the fatty tissue planes of the deep right pelvis and fascial thickening along the right pelvic sidewall. No loculated drainable collections. Prominent   vasculature within the region. No findings to suggest venous thrombosis. The prior right ureteral dilatation also appears resolved.  The patient report at bedside that she has a history of prolapse and mesh placement.  Urinalysis is negative for possible infection  The patient received normal saline bolus x 3  The patient was initiated with ceftriaxone in the ER, continue with ceftriaxone and Ancef on the floors  The patient prefers NSAIDs instead of acetaminophen.  Patient GFR is less than 60 and education was provided at bedside to the patient and family about the use of NSAIDs and kidney function however the patient prefers NSAID use versus acetaminophen, ketorolac x 2 as needed ordered  Monitor the WBC with daily CBC, will repeat lactic in the morning after fluid administration  Monitor vital signs per unit protocol or when needed  Urology consult in place

## 2024-06-06 NOTE — ASSESSMENT & PLAN NOTE
POA febrile, tachycardic, Pro-Pravin and lactic are elevated, now in septic shock    Patient report she has been having feelings of dysuria and frequency for a few days decided to go to her primary care provider ordered a urine culture and Ceftin.  Due to fever, fatigue, nausea and vomiting decided to seek further help in the emergency department    Possible source urinary tract infection or an intra-abdominal infection  CT of the abdomen pelvis with contrast shows diffuse abnormal soft tissue infiltration and stranding seen throughout the fatty tissue planes of the deep right pelvis and fascial thickening along the right pelvic sidewall. No loculated drainable collections. Prominent vasculature within the region. No findings to suggest venous thrombosis. The prior right ureteral dilatation also appears resolved.  The patient report at bedside that she has a history of prolapse and mesh placement.  Urinalysis is negative for possible infection  The patient received normal saline bolus x 3  The patient was initiated with ceftriaxone in the ER, continue with Ancef> broad-spectrum Zosyn  The patient prefers NSAIDs instead of acetaminophen.  Patient GFR is less than 60 and education was provided at bedside to the patient and family about the use of NSAIDs and kidney function however the patient prefers NSAID use versus acetaminophen, ketorolac x 2 as needed ordered  Monitor the WBC with daily CBC, will repeat lactic in the morning after fluid administration  AM WBC 15.92  Lactic Acid 2.0-resolved  IV Albumin 1td  UA unremarkable  AM Procalcitonin 10.40 ( likely FP d/t underlying hx maligancy)  Monitor vital signs per unit protocol or when needed  Urology consult in place

## 2024-06-06 NOTE — ASSESSMENT & PLAN NOTE
Patient takes Advair and Proventil daily, continue with regimen  Reports asthma is well-controlled with inhaler therapy.  Patient reports exacerbation 1-2 times a year.  She was diagnosed in 2014  Follows up with [facility] pulmonology last visit February 20, 2024

## 2024-06-06 NOTE — H&P
[facility]  H&P  Name: Shelia Miller 73 y.o. female I MRN: [id number]  Unit/Bed#: 2 [place] [address] I Date of Admission: 6/5/2024   Date of Service: 6/6/2024 I Hospital Day: 1      Assessment & Plan   * Sepsis without acute organ dysfunction (HCC)  Assessment & Plan  Patient report she has been having feelings of dysuria and frequency for a few days decided to go to her primary care provider ordered a urine culture and Ceftin.  Due to fever, fatigue, nausea and vomiting decided to seek further help in the emergency department  On admission the patient is febrile, tachycardic, Pro-Pravin and lactic are elevated, WBC are within normal limits  Possible source urinary tract infection your or an intra-abdominal infection  CT of the abdomen pelvis with contrast shows diffuse abnormal soft tissue infiltration and stranding seen throughout the fatty tissue planes of the deep right pelvis and fascial thickening along the right pelvic sidewall. No loculated drainable collections. Prominent   vasculature within the region. No findings to suggest venous thrombosis. The prior right ureteral dilatation also appears resolved.  The patient report at bedside that she has a history of prolapse and mesh placement.  Urinalysis is negative for possible infection  The patient received normal saline bolus x 3  The patient was initiated with ceftriaxone in the ER, continue with ceftriaxone and Ancef on the floors  The patient prefers NSAIDs instead of acetaminophen.  Patient GFR is less than 60 and education was provided at bedside to the patient and family about the use of NSAIDs and kidney function however the patient prefers NSAID use versus acetaminophen, ketorolac x 2 as needed ordered  Monitor the WBC with daily CBC, will repeat lactic in the morning after fluid administration  Monitor vital signs per unit protocol or when needed  Urology consult in place        Asthma  Assessment & Plan  Patient takes Advair and  Proventil daily, continue with regimen  Reports asthma is well-controlled with inhaler therapy.  Patient reports exacerbation 1-2 times a year.  She was diagnosed in 2014  Follows up with [facility] pulmonology last visit February 20, 2024    HTN (hypertension)  Assessment & Plan  On admission blood pressure systolic 110  Takes losartan daily, continue with regimen  Monitor vital signs per unit protocol or when needed.  Noted patient was admitted due to sepsis, avoid hypotension    Rheumatoid arthritis, involving unspecified site, unspecified whether rheumatoid factor present ([facility])  Assessment & Plan  Treated with methotrexate and TNF in the past. Switched to Actemra weekly, continue her regimen when discharged  Last DEXA scan on 2021 showing lumbar spine: T-score is 0.2 and left Hip: Lowest T-score is -1.4.  Repeated DEXA scan on August 2023  lumbar spine BMD increased to 0.8 positive, hip BMD increased to -1.1 positive.  Patient reports pains in joints on admission, the patient prefers NSAIDs instead of acetaminophen for pain.  Noted GFR is less than 60 and this was explained to the patient at bedside along with family.  Patient and family request NSAIDs for pain education on mechanism of action of NSAIDs and kidney disease was explained, patient and family verbalized understanding.  Pharmacist was made aware.  Follows up with [facility] rheumatologist, last visit January 2024      Invasive ductal carcinoma of left breast (HCC) s/p lumpectomy, Chemo & XRT  Assessment & Plan  History of history of pT1c pN0 M0 TNBC s/p BCT/SLN diagnosed in 2020  Underwent chemotherapy and radiation [id number]  Follows up with [facility] hematology and oncology last visit October 2023         VTE Pharmacologic Prophylaxis: VTE Score: 3 Moderate Risk (Score 3-4) - Pharmacological DVT Prophylaxis Ordered: enoxaparin (Lovenox).  Code Status: Level 1 - Full Code   Discussion with family:  Daughter and his spouse at bedside.      Anticipated Length of Stay: Patient will be admitted on an inpatient basis with an anticipated length of stay of greater than 2 midnights secondary to sepsis and pending urology consult.    Total Time Spent on Date of Encounter in care of patient: 75 mins. This time was spent on one or more of the following: performing physical exam; counseling and coordination of care; obtaining or reviewing history; documenting in the medical record; reviewing/ordering tests, medications or procedures; communicating with other healthcare professionals and discussing with patient's family/caregivers.    Chief Complaint: Abdominal pain for a week, fever, nausea and vomiting.    History of Present Illness:  Shelia Miller is a 73 y.o. female with a PMH of breast cancer in remission hypertension, RA, asthma, varicose veins, hyperlipidemia and osteoporosis who presents with abdominal pain for a week, fever, nausea and vomiting.  Patient reports dysuria and frequency for at least 5 days decided to go to her PCP which diagnosed with a UTI and prescribed Ceftin on the morning of 6/5/2024.  The patient reports she was able to take one of the pills but unable to continue with treatment due to signs and symptoms.  In the emergency department a CT scan of the abdomen and pelvis was obtained showing diffuse abnormal soft tissue infiltration and stranding seen through the fatty tissue planes of the deep right pelvis and fascial thickness along the right pelvic sidewall, not loculated drainable collection, prominent vasculature within the region.  The patient states she has a history of prolapse and mesh placement, reported she used to follow with urogynecology.  Blood work shows elevated Pro-Pravin and lactic.  Vital signs shows fever and tachycardia.  The patient reported abdominal pain especially in the right lower quadrant, nausea, vomiting and fever, the patient denies bleeding, constipation, diarrhea, loss of consciousness, discharge, or any  other symptoms than the stated above.    Review of Systems:  Review of Systems   Constitutional:  Positive for fatigue and fever. Negative for chills.   HENT:  Negative for ear pain and sore throat.    Eyes:  Negative for pain and visual disturbance.   Respiratory:  Negative for cough and shortness of breath.    Cardiovascular:  Negative for chest pain and palpitations.   Gastrointestinal:  Positive for abdominal pain, nausea and vomiting.   Endocrine: Negative.    Genitourinary: Negative.  Negative for dysuria and hematuria.   Musculoskeletal:  Positive for arthralgias. Negative for back pain.   Skin:  Negative for color change and rash.   Allergic/Immunologic: Negative.    Neurological: Negative.  Negative for seizures and syncope.   Hematological: Negative.    Psychiatric/Behavioral: Negative.     All other systems reviewed and are negative.      Past Medical and Surgical History:   Past Medical History:   Diagnosis Date    Arthritis 2015    Asthma     Back pain with radiculopathy 03/01/2023    BRCA1 negative     BRCA2 negative     Breast cancer (HCC) 2020    Left  breast, triple negative    Cancer (HCC) 7/2019    Colon polyp     GERD (gastroesophageal reflux disease) 2016    History of chemotherapy 10/01/2020    breast cancer - left    History of radiation therapy 04/01/2021    left breast cancer    Hypertension     Lyme disease     OA (osteoarthritis)     Rheumatoid arthritis (HCC)        Past Surgical History:   Procedure Laterality Date    ANKLE SURGERY Right 2016    2 plates and screws    BREAST BIOPSY Left 07/02/2020    BREAST LUMPECTOMY Left 08/12/2020    left breast wire localized lumpectomy    BREAST MASS EXCISION Left 09/02/2020    short superior and long lateral left breast resection, left axillary sentinel lymph node biopsy    COLONOSCOPY      ELBOW FRACTURE REPAIR Right 1996    FRACTURE SURGERY Right     elbow    GUM SURGERY      HYSTERECTOMY  2018    JOINT REPLACEMENT Right 6/2021    hip     REPLACEMENT TOTAL HIP W/  RESURFACING IMPLANTS  06/18/2021       Meds/Allergies:  Prior to Admission medications    Medication Sig Start Date End Date Taking? Authorizing Provider   Actemra ACTPen 162 MG/0.9ML SOAJ Once a week every Friday 8/31/22  Yes Historical Provider, MD   atorvastatin (LIPITOR) 10 mg tablet Take 10 mg by mouth daily 9/27/22  Yes Historical Provider, MD   cefuroxime (CEFTIN) 500 mg tablet Take 1 tablet (500 mg total) by mouth every 12 (twelve) hours for 7 days 6/5/24 6/12/24 Yes Isabel Garg DO   Fluticasone-Salmeterol (Advair Diskus) 250-50 mcg/dose inhaler Inhale 1 puff 2 (two) times a day Rinse mouth after use. 2/20/24  Yes Jim Orellana MD   losartan (COZAAR) 50 mg tablet 50 mg daily 4/6/22  Yes Historical Provider, MD   Multiple Vitamin (MULTIVITAMIN ADULT PO) Take 1 tablet by mouth daily   Yes Historical Provider, MD   omeprazole (PriLOSEC) 20 mg delayed release capsule TAKE TWO CAPSULES BY MOUTH EVERY DAY IN THE MORNING (GENERIC PRILOSEC) 5/21/24  Yes Erica Emmanuel MD   raloxifene (EVISTA) 60 mg tablet Take 60 mg by mouth daily 10/12/23 10/11/24 Yes Historical Provider, MD   albuterol (PROVENTIL HFA,VENTOLIN HFA) 90 mcg/act inhaler as needed 6/9/21   Historical Provider, MD   baclofen 10 mg tablet Start with 1 tab at bedtime for 1 week and then take 1 tab twice daily.  Patient not taking: Reported on 6/5/2024 4/30/24   Jean Pierre Jon MD   Calcium Carbonate-Vitamin D3 600-400 MG-UNIT TABS Take by mouth    Historical Provider, MD   CELECOXIB PO Take 200 mg by mouth 2 (two) times a day as needed On hold    Historical Provider, MD   Cholecalciferol 25 MCG (1000 UT) capsule Take 1,000 Units by mouth daily    Historical Provider, MD   DULoxetine (CYMBALTA) 20 mg capsule Take 1 capsule (20 mg total) by mouth daily  Patient not taking: Reported on 4/25/2024 3/4/24   CHAD Avitia   PreviDent 5000 Booster Plus 1.1 % PSTE  5/1/23   Historical Provider, MD   Na Sulfate-K Sulfate-Mg Sulf  "(Suprep Bowel Prep Kit) 17.5-3.13-1.6 GM/177ML SOLN Take 177 mL by mouth once for 1 dose Take 177 mL by mouth once for 1 dose  Patient not taking: Reported on 6/1/2022 5/11/22 7/12/22  Erica Emmanuel MD     I have reviewed home medications with patient personally.    Allergies:   Allergies   Allergen Reactions    Fruit Extracts Itching     Cantelope melon peaches    Nuts - Food Allergy GI Intolerance    Pollen Extract Other (See Comments)     Pollen , Trees And Flowers    Avelox [Moxifloxacin] Itching       Social History:  Marital Status: /Civil Union   Occupation: Retired  Patient Pre-hospital Living Situation: Home  Patient Pre-hospital Level of Mobility: walks  Patient Pre-hospital Diet Restrictions: None  Substance Use History:   Social History     Substance and Sexual Activity   Alcohol Use Not Currently    Comment: occasional     Social History     Tobacco Use   Smoking Status Never    Passive exposure: Past   Smokeless Tobacco Never     Social History     Substance and Sexual Activity   Drug Use Never       Family History:  Family History   Problem Relation Age of Onset    Breast cancer Mother 59    Cancer Mother         Breast Cancer    Lung cancer Father     Cancer Father         Lung cancer    COPD Father     Hypertension Father     Breast cancer Paternal Grandmother     Thyroid cancer Son     Cancer Son         Thyroid Cancer       Physical Exam:     Vitals:   Blood Pressure: (!) 104/45 (06/05/24 2231)  Pulse: 101 (06/05/24 2043)  Temperature: 99.7 °F (37.6 °C) (06/05/24 2231)  Temp Source: Oral (06/05/24 2043)  Respirations: 16 (06/05/24 2231)  Height: 5' 3\" (160 cm) (06/05/24 2032)  Weight - Scale: 74.6 kg (164 lb 7.4 oz) (06/05/24 2032)  SpO2: 93 % (06/05/24 2043)    Physical Exam  Vitals and nursing note reviewed.   Constitutional:       General: She is not in acute distress.     Appearance: Normal appearance. She is well-developed.   HENT:      Head: Normocephalic and atraumatic.      " Mouth/Throat:      Mouth: Mucous membranes are dry.   Eyes:      Conjunctiva/sclera: Conjunctivae normal.      Pupils: Pupils are equal, round, and reactive to light.   Cardiovascular:      Rate and Rhythm: Regular rhythm. Tachycardia present.      Heart sounds: No murmur heard.  Pulmonary:      Effort: Pulmonary effort is normal. No respiratory distress.      Breath sounds: Normal breath sounds.   Abdominal:      General: Bowel sounds are normal.      Palpations: Abdomen is soft.      Tenderness: There is abdominal tenderness in the right lower quadrant.   Musculoskeletal:         General: Tenderness present. No swelling.      Cervical back: Neck supple.   Skin:     General: Skin is warm and dry.      Capillary Refill: Capillary refill takes less than 2 seconds.   Neurological:      General: No focal deficit present.      Mental Status: She is alert and oriented to person, place, and time. Mental status is at baseline.   Psychiatric:         Mood and Affect: Mood normal.          Additional Data:     Lab Results:  Results from last 7 days   Lab Units 06/05/24  1726   WBC Thousand/uL 7.82   HEMOGLOBIN g/dL 15.0   HEMATOCRIT % 46.0   PLATELETS Thousands/uL 138*   BANDS PCT % 46*   LYMPHO PCT % 1*   MONO PCT % 1*   EOS PCT % 1     Results from last 7 days   Lab Units 06/05/24  1726   SODIUM mmol/L 136   POTASSIUM mmol/L 4.1   CHLORIDE mmol/L 101   CO2 mmol/L 24   BUN mg/dL 22   CREATININE mg/dL 1.10   ANION GAP mmol/L 11   CALCIUM mg/dL 9.5   ALBUMIN g/dL 4.4   TOTAL BILIRUBIN mg/dL 1.07*   ALK PHOS U/L 88   ALT U/L 37   AST U/L 41*   GLUCOSE RANDOM mg/dL 154*     Results from last 7 days   Lab Units 06/05/24  1726   INR  1.02             Results from last 7 days   Lab Units 06/05/24  1939 06/05/24  1726   LACTIC ACID mmol/L 2.9* 2.1*   PROCALCITONIN ng/ml  --  1.39*       Lines/Drains:  Invasive Devices       Peripheral Intravenous Line  Duration             Peripheral IV 06/05/24 Right Antecubital <1 day                         Imaging: Personally review all imaging pertaining this admission.  CT abdomen pelvis with contrast   Final Result by Colleen Amaral MD (06/05 1930)      Diffuse abnormal soft tissue infiltration and stranding seen throughout the fatty tissue planes of the deep right pelvis and fascial thickening along the right pelvic sidewall. No loculated drainable collections. See above for further details. Prominent    vasculature within the region. No findings to suggest venous thrombosis. The prior right ureteral dilatation also appears resolved.               Workstation performed: [id number]             EKG and Other Studies Reviewed on Admission:   EKG: No EKG obtained.    ** Please Note: This note has been constructed using a voice recognition system. **

## 2024-06-06 NOTE — ASSESSMENT & PLAN NOTE
On admission blood pressure systolic 110  Takes losartan daily, continue with regimen  BP 90/47 hold losartan until BP improves  Give 1 L bolus closely monitor BP  Monitor vital signs per unit protocol or when needed.  Noted patient was admitted due to sepsis, avoid hypotension

## 2024-06-06 NOTE — ASSESSMENT & PLAN NOTE
Patient takes Advair and Proventil daily, continue with regimen  Reports asthma is well-controlled with inhaler therapy.  Patient reports exacerbation 1-2 times a year.  She was diagnosed in 2014  Follows up with [facility] pulmonology last visit February 20, 2024

## 2024-06-06 NOTE — ASSESSMENT & PLAN NOTE
>>ASSESSMENT AND PLAN FOR ASTHMA WRITTEN ON 6/6/2024 12:19 AM BY CHAD DUMONT    Patient takes Advair and Proventil daily, continue with regimen  Reports asthma is well-controlled with inhaler therapy.  Patient reports exacerbation 1-2 times a year.  She was diagnosed in 2014  Follows up with [facility] pulmonology last visit February 20, 2024

## 2024-06-06 NOTE — ASSESSMENT & PLAN NOTE
Patient notes having abdominal pain in the left and right lower quadrants over the last 3 days. RLQ>LLQ. Occasionally radiates to the right groin. Patient came in due to fever, fatigue, nausea, and vomiting.  CT of the abdomen pelvis with contrast shows diffuse abnormal soft tissue infiltration and stranding seen throughout the fatty tissue planes of the deep right pelvis and fascial thickening along the right pelvic sidewall. No loculated drainable collections. Prominent vasculature within the region. No findings to suggest venous thrombosis. The prior right ureteral dilatation also appears resolved.  The patient report at bedside that she has a history of prolapse and mesh placement.  UA + leukocytes, - nitrites  Denies nausea and vomiting at this time  Initiate antibiotics to cover potential intra-abdominal infection

## 2024-06-06 NOTE — ASSESSMENT & PLAN NOTE
Treated with methotrexate and TNF in the past. Switched to Actemra weekly, continue her regimen when discharged  Last DEXA scan on 2021 showing lumbar spine: T-score is 0.2 and left Hip: Lowest T-score is -1.4.  Repeated DEXA scan on August 2023  lumbar spine BMD increased to 0.8 positive, hip BMD increased to -1.1 positive.  Patient reports pains in joints on admission, the patient prefers NSAIDs instead of acetaminophen for pain.  Noted GFR is less than 60 and this was explained to the patient at bedside along with family.  Patient and family request NSAIDs for pain education on mechanism of action of NSAIDs and kidney disease was explained, patient and family verbalized understanding.  Pharmacist was made aware.  Follows up with [facility] rheumatologist, last visit January 2024

## 2024-06-06 NOTE — ASSESSMENT & PLAN NOTE
Treated with methotrexate and TNF in the past. Switched to Actemra weekly, continue her regimen when discharged  Last DEXA scan on 2021 showing lumbar spine: T-score is 0.2 and left Hip: Lowest T-score is -1.4.  Repeated DEXA scan on August 2023  lumbar spine BMD increased to 0.8 positive, hip BMD increased to -1.1 positive.  Patient reports pains in joints on admission, the patient prefers NSAIDs instead of acetaminophen for pain.  Noted GFR is less than 60 and this was explained to the patient at bedside along with family.  Patient and family request NSAIDs for pain education on mechanism of action of NSAIDs and kidney disease was explained, patient and family verbalized understanding.  Pharmacist was made aware.  Follows up with [facility] rheumatologist, last visit January 2024

## 2024-06-06 NOTE — PLAN OF CARE
Problem: PAIN - ADULT  Goal: Verbalizes/displays adequate comfort level or baseline comfort level  Description: Interventions:  - Encourage patient to monitor pain and request assistance  - Assess pain using appropriate pain scale  - Administer analgesics based on type and severity of pain and evaluate response  - Implement non-pharmacological measures as appropriate and evaluate response  - Consider cultural and social influences on pain and pain management  - Notify physician/advanced practitioner if interventions unsuccessful or patient reports new pain  Outcome: Progressing     Problem: INFECTION - ADULT  Goal: Absence or prevention of progression during hospitalization  Description: INTERVENTIONS:  - Assess and monitor for signs and symptoms of infection  - Monitor lab/diagnostic results  - Monitor all insertion sites, i.e. indwelling lines, tubes, and drains  - Monitor endotracheal if appropriate and nasal secretions for changes in amount and color  - [place] appropriate cooling/warming therapies per order  - Administer medications as ordered  - Instruct and encourage patient and family to use good hand hygiene technique  - Identify and instruct in appropriate isolation precautions for identified infection/condition  Outcome: Progressing

## 2024-06-06 NOTE — CASE MANAGEMENT
Case Management Assessment & Discharge Planning Note    Patient name Shelia Miller  Location 2 [facility]/2 [facility] MRN [id number]  : 1950 Date 2024       Current Admission Date: 2024  Current Admission Diagnosis:Sepsis without acute organ dysfunction (HCC)   Patient Active Problem List    Diagnosis Date Noted Date Diagnosed    Breast cancer, left breast (HCC) 2024     Sepsis without acute organ dysfunction (HCC) 2024     Adrenal nodule (HCC) 2024     Acquired deformity of left foot 2024     Hyperlipidemia 2023     Mild asthma 2023     Thrombocytopenia (HCC) 2023     Leukopenia 2023     Abdominal pain 2023     Other hyperlipidemia 2023     Back pain with radiculopathy 2023     Drug-induced polyneuropathy (HCC) 2022     Diastolic CHF, acute ([facility]) 2022     HTN (hypertension) 2022     Asthma 2022     Anemia of chronic disease 2021     Malignant neoplasm of upper-outer quadrant of female breast (HCC) 2021     Anemia 2021     Diverticulosis 2021     GERD (gastroesophageal reflux disease) 2021     Lymphedema of left arm 2021     Obesity 2021     Osteoporosis 2021     Peripheral neuropathy 2021     Varicose veins of right lower extremity with inflammation 2021     Hypertension 2021     Asthma, mild persistent 2021     Rheumatoid arthritis (HCC) 2021     Rheumatoid arthritis, involving unspecified site, unspecified whether rheumatoid factor present ([facility]) 2021     Osteoarthritis of right hip 2021     Invasive ductal carcinoma of left breast ([facility]) s/p lumpectomy, Chemo & XRT 2021     Arthritis of right knee 2016     Orthopedic aftercare 2016       LOS (days): 1  Geometric Mean LOS (GMLOS) (days): 3.6  Days to GMLOS:3     OBJECTIVE:    Risk of Unplanned Readmission Score: 15.36         Current admission status:  Inpatient     Preferred Pharmacy:   [id number] - [place], [place] [facility] [address]  [facility]  [address] [address]  Phone: [phone] Fax: [phone]    Primary Care Provider: Nicky Gale MD    Primary Insurance: ALBERTO PABON  Secondary Insurance:     ASSESSMENT:  Active Health Care Proxies    There are no active Health Care Proxies on file.       Readmission Root Cause  30 Day Readmission: No    Patient Information  Admitted from:: Home  Mental Status: Alert  During Assessment patient was accompanied by: Spouse, Daughter  Assessment information provided by:: Patient  Primary Caregiver: Family  Caregiver's Name:: Abundio Miller  Caregiver's Relationship to Patient:: Family Member  What city do you live in?: [phone]  Type of Current Residence: [address] [place] home  Upon entering residence, is there a bedroom on the main floor (no further steps)?: Yes  Upon entering residence, is there a bathroom on the main floor (no further steps)?: Yes  Living Arrangements: Lives w/ Spouse/significant other    Activities of Daily Living Prior to Admission  Functional Status: Independent  Completes ADLs independently?: Yes  Ambulates independently?: Yes  Does patient use assisted devices?: No  Does patient currently own DME?: Yes  What DME does the patient currently own?: Straight Cane, Walker  Does the patient have a history of Short-Term Rehab?: No  Does patient have a history of HHC?: No  Does patient currently have HHC?: No     Patient Information Continued  Income Source: Pension/half-way  Does patient have prescription coverage?: Yes  Does patient receive dialysis treatments?: No     Means of Transportation  Means of Transport to [facility]:: Drives Self    DISCHARGE DETAILS:    Discharge planning discussed with:: Patient  and family at bedside  Freedom of Choice: Yes  Comments - Freedom of Choice: D/C home when cleared for discharge  CM contacted family/caregiver?: Yes  Were Treatment Team  discharge recommendations reviewed with patient/caregiver?: Yes  Did patient/caregiver verbalize understanding of patient care needs?: Yes  Were patient/caregiver advised of the risks associated with not following Treatment Team discharge recommendations?: Yes    Contacts  Patient Contacts: Abundio Miller ()  Relationship to Patient:: Family  Contact Method: In Person  Reason/Outcome: Discharge Planning, Continuity of Care    Requested Home Health Care         Is the patient interested in HHC at discharge?: No    DME Referral Provided  Referral made for DME?: No    Other Referral/Resources/Interventions Provided:  Interventions: None Indicated  Referral Comments: CM spoke with patient and family at bedside, introduced self and role, conduct assessment and screen for any anticipated discharge needs requuring CM assistance. Patient reports  being independent with adls and iadls and drives self to all appts and if unable,  provides transport. Patient reports having a good community support system and no concerns with prescription costs. Patient denied having any discharge needs requiring CM assistance.     Treatment Team Recommendation: Home  Discharge Destination Plan:: Home  Transport at Discharge : Family      IMM Given (Date):: 06/05/24

## 2024-06-07 ENCOUNTER — APPOINTMENT (INPATIENT)
Dept: NON INVASIVE DIAGNOSTICS | Facility: HOSPITAL | Age: 74
DRG: 871 | End: 2024-06-07
Payer: COMMERCIAL

## 2024-06-07 PROBLEM — N30.00 ACUTE CYSTITIS: Status: ACTIVE | Noted: 2024-06-07

## 2024-06-07 LAB
ALBUMIN SERPL BCP-MCNC: 3.1 G/DL (ref 3.5–5)
ALP SERPL-CCNC: 67 U/L (ref 34–104)
ALT SERPL W P-5'-P-CCNC: 45 U/L (ref 7–52)
ANION GAP SERPL CALCULATED.3IONS-SCNC: 5 MMOL/L (ref 4–13)
AORTIC ROOT: 3.3 CM
APICAL FOUR CHAMBER EJECTION FRACTION: 74 %
AST SERPL W P-5'-P-CCNC: 50 U/L (ref 13–39)
BASOPHILS # BLD MANUAL: 0 THOUSAND/UL (ref 0–0.1)
BASOPHILS NFR MAR MANUAL: 0 % (ref 0–1)
BILIRUB SERPL-MCNC: 1.16 MG/DL (ref 0.2–1)
BSA FOR ECHO PROCEDURE: 1.78 M2
BUN SERPL-MCNC: 25 MG/DL (ref 5–25)
CALCIUM ALBUM COR SERPL-MCNC: 8.6 MG/DL (ref 8.3–10.1)
CALCIUM SERPL-MCNC: 7.9 MG/DL (ref 8.4–10.2)
CHLORIDE SERPL-SCNC: 108 MMOL/L (ref 96–108)
CO2 SERPL-SCNC: 25 MMOL/L (ref 21–32)
CREAT SERPL-MCNC: 1.05 MG/DL (ref 0.6–1.3)
E WAVE DECELERATION TIME: 169 MS
E/A RATIO: 1.13
EOSINOPHIL # BLD MANUAL: 0.72 THOUSAND/UL (ref 0–0.4)
EOSINOPHIL NFR BLD MANUAL: 4 % (ref 0–6)
ERYTHROCYTE [DISTWIDTH] IN BLOOD BY AUTOMATED COUNT: 13.6 % (ref 11.6–15.1)
FRACTIONAL SHORTENING: 39 (ref 28–44)
GFR SERPL CREATININE-BSD FRML MDRD: 52 ML/MIN/1.73SQ M
GLUCOSE SERPL-MCNC: 108 MG/DL (ref 65–140)
HCT VFR BLD AUTO: 35.4 % (ref 34.8–46.1)
HGB BLD-MCNC: 11.6 G/DL (ref 11.5–15.4)
INTERVENTRICULAR SEPTUM IN DIASTOLE (PARASTERNAL SHORT AXIS VIEW): 0.9 CM
INTERVENTRICULAR SEPTUM: 0.9 CM (ref 0.6–1.1)
LAAS-AP2: 22.2 CM2
LAAS-AP4: 18.6 CM2
LEFT ATRIUM SIZE: 3.3 CM
LEFT ATRIUM VOLUME (MOD BIPLANE): 59 ML
LEFT ATRIUM VOLUME INDEX (MOD BIPLANE): 33.1 ML/M2
LEFT INTERNAL DIMENSION IN SYSTOLE: 2.8 CM (ref 2.1–4)
LEFT VENTRICULAR INTERNAL DIMENSION IN DIASTOLE: 4.6 CM (ref 3.5–6)
LEFT VENTRICULAR POSTERIOR WALL IN END DIASTOLE: 0.9 CM
LEFT VENTRICULAR STROKE VOLUME: 67 ML
LVSV (TEICH): 67 ML
LYMPHOCYTES # BLD AUTO: 0.18 THOUSAND/UL (ref 0.6–4.47)
LYMPHOCYTES # BLD AUTO: 1 % (ref 14–44)
MAGNESIUM SERPL-MCNC: 2.4 MG/DL (ref 1.9–2.7)
MCH RBC QN AUTO: 31.8 PG (ref 26.8–34.3)
MCHC RBC AUTO-ENTMCNC: 32.8 G/DL (ref 31.4–37.4)
MCV RBC AUTO: 97 FL (ref 82–98)
METAMYELOCYTE ABSOLUTE CT: 1.08 THOUSAND/UL (ref 0–0.1)
METAMYELOCYTES NFR BLD MANUAL: 6 % (ref 0–1)
MONOCYTES # BLD AUTO: 0.36 THOUSAND/UL (ref 0–1.22)
MONOCYTES NFR BLD: 2 % (ref 4–12)
MV E'TISSUE VEL-LAT: 12 CM/S
MV E'TISSUE VEL-SEP: 10 CM/S
MV PEAK A VEL: 0.79 M/S
MV PEAK E VEL: 89 CM/S
MV STENOSIS PRESSURE HALF TIME: 49 MS
MV VALVE AREA P 1/2 METHOD: 4.49
MYELOCYTE ABSOLUTE CT: 0.18 THOUSAND/UL (ref 0–0.1)
MYELOCYTES NFR BLD MANUAL: 1 % (ref 0–1)
NEUTROPHILS # BLD MANUAL: 15.46 THOUSAND/UL (ref 1.85–7.62)
NEUTS BAND NFR BLD MANUAL: 34 % (ref 0–8)
NEUTS SEG NFR BLD AUTO: 52 % (ref 43–75)
PLATELET # BLD AUTO: 98 THOUSANDS/UL (ref 149–390)
PLATELET BLD QL SMEAR: ABNORMAL
PMV BLD AUTO: 10.7 FL (ref 8.9–12.7)
POTASSIUM SERPL-SCNC: 4.2 MMOL/L (ref 3.5–5.3)
PROT SERPL-MCNC: 5.1 G/DL (ref 6.4–8.4)
RA PRESSURE ESTIMATED: 8 MMHG
RBC # BLD AUTO: 3.65 MILLION/UL (ref 3.81–5.12)
RBC MORPH BLD: NORMAL
RIGHT ATRIUM AREA SYSTOLE A4C: 14.5 CM2
RIGHT VENTRICLE ID DIMENSION: 2.8 CM
RV PSP: 42 MMHG
SL CV LEFT ATRIUM LENGTH A2C: 5.5 CM
SL CV LV EF: 65
SL CV PED ECHO LEFT VENTRICLE DIASTOLIC VOLUME (MOD BIPLANE) 2D: 98 ML
SL CV PED ECHO LEFT VENTRICLE SYSTOLIC VOLUME (MOD BIPLANE) 2D: 30 ML
SODIUM SERPL-SCNC: 138 MMOL/L (ref 135–147)
TR MAX PG: 34 MMHG
TR PEAK VELOCITY: 2.9 M/S
TRICUSPID ANNULAR PLANE SYSTOLIC EXCURSION: 1.7 CM
TRICUSPID VALVE PEAK REGURGITATION VELOCITY: 2.92 M/S
WBC # BLD AUTO: 17.98 THOUSAND/UL (ref 4.31–10.16)

## 2024-06-07 PROCEDURE — 85007 BL SMEAR W/DIFF WBC COUNT: CPT | Performed by: STUDENT IN AN ORGANIZED HEALTH CARE EDUCATION/TRAINING PROGRAM

## 2024-06-07 PROCEDURE — 85027 COMPLETE CBC AUTOMATED: CPT | Performed by: STUDENT IN AN ORGANIZED HEALTH CARE EDUCATION/TRAINING PROGRAM

## 2024-06-07 PROCEDURE — 93306 TTE W/DOPPLER COMPLETE: CPT | Performed by: INTERNAL MEDICINE

## 2024-06-07 PROCEDURE — 83735 ASSAY OF MAGNESIUM: CPT | Performed by: STUDENT IN AN ORGANIZED HEALTH CARE EDUCATION/TRAINING PROGRAM

## 2024-06-07 PROCEDURE — 80053 COMPREHEN METABOLIC PANEL: CPT | Performed by: STUDENT IN AN ORGANIZED HEALTH CARE EDUCATION/TRAINING PROGRAM

## 2024-06-07 PROCEDURE — 87207 SMEAR SPECIAL STAIN: CPT | Performed by: STUDENT IN AN ORGANIZED HEALTH CARE EDUCATION/TRAINING PROGRAM

## 2024-06-07 PROCEDURE — 93306 TTE W/DOPPLER COMPLETE: CPT

## 2024-06-07 PROCEDURE — 99232 SBSQ HOSP IP/OBS MODERATE 35: CPT | Performed by: STUDENT IN AN ORGANIZED HEALTH CARE EDUCATION/TRAINING PROGRAM

## 2024-06-07 RX ORDER — PANTOPRAZOLE SODIUM 20 MG/1
20 TABLET, DELAYED RELEASE ORAL
Status: CANCELLED | OUTPATIENT
Start: 2024-06-08

## 2024-06-07 RX ORDER — LANOLIN ALCOHOL/MO/W.PET/CERES
1 CREAM (GRAM) TOPICAL
Status: DISCONTINUED | OUTPATIENT
Start: 2024-06-08 | End: 2024-06-09 | Stop reason: HOSPADM

## 2024-06-07 RX ORDER — ALBUMIN (HUMAN) 12.5 G/50ML
12.5 SOLUTION INTRAVENOUS ONCE
Status: CANCELLED | OUTPATIENT
Start: 2024-06-07 | End: 2024-06-07

## 2024-06-07 RX ORDER — ALBUMIN (HUMAN) 12.5 G/50ML
12.5 SOLUTION INTRAVENOUS ONCE
Status: COMPLETED | OUTPATIENT
Start: 2024-06-07 | End: 2024-06-07

## 2024-06-07 RX ADMIN — PIPERACILLIN AND TAZOBACTAM 4.5 G: 4; .5 INJECTION, POWDER, FOR SOLUTION INTRAVENOUS at 01:22

## 2024-06-07 RX ADMIN — ACETAMINOPHEN 650 MG: 325 TABLET ORAL at 21:04

## 2024-06-07 RX ADMIN — PIPERACILLIN AND TAZOBACTAM 4.5 G: 4; .5 INJECTION, POWDER, FOR SOLUTION INTRAVENOUS at 17:57

## 2024-06-07 RX ADMIN — ENOXAPARIN SODIUM 40 MG: 40 INJECTION SUBCUTANEOUS at 08:53

## 2024-06-07 RX ADMIN — FLUTICASONE FUROATE AND VILANTEROL TRIFENATATE 1 PUFF: 200; 25 POWDER RESPIRATORY (INHALATION) at 09:08

## 2024-06-07 RX ADMIN — Medication 250 MG: at 08:53

## 2024-06-07 RX ADMIN — PIPERACILLIN AND TAZOBACTAM 4.5 G: 4; .5 INJECTION, POWDER, FOR SOLUTION INTRAVENOUS at 08:53

## 2024-06-07 RX ADMIN — DOCUSATE SODIUM 100 MG: 100 CAPSULE, LIQUID FILLED ORAL at 17:53

## 2024-06-07 RX ADMIN — AZITHROMYCIN MONOHYDRATE 500 MG: 500 INJECTION, POWDER, LYOPHILIZED, FOR SOLUTION INTRAVENOUS at 17:55

## 2024-06-07 RX ADMIN — Medication 250 MG: at 17:53

## 2024-06-07 RX ADMIN — ALBUMIN (HUMAN) 12.5 G: 0.25 INJECTION, SOLUTION INTRAVENOUS at 15:42

## 2024-06-07 RX ADMIN — SODIUM CHLORIDE, SODIUM GLUCONATE, SODIUM ACETATE, POTASSIUM CHLORIDE, MAGNESIUM CHLORIDE, SODIUM PHOSPHATE, DIBASIC, AND POTASSIUM PHOSPHATE 75 ML/HR: .53; .5; .37; .037; .03; .012; .00082 INJECTION, SOLUTION INTRAVENOUS at 09:53

## 2024-06-07 RX ADMIN — DOCUSATE SODIUM 100 MG: 100 CAPSULE, LIQUID FILLED ORAL at 08:52

## 2024-06-07 RX ADMIN — ATORVASTATIN CALCIUM 10 MG: 10 TABLET, FILM COATED ORAL at 17:53

## 2024-06-07 RX ADMIN — SODIUM CHLORIDE, SODIUM GLUCONATE, SODIUM ACETATE, POTASSIUM CHLORIDE, MAGNESIUM CHLORIDE, SODIUM PHOSPHATE, DIBASIC, AND POTASSIUM PHOSPHATE 75 ML/HR: .53; .5; .37; .037; .03; .012; .00082 INJECTION, SOLUTION INTRAVENOUS at 23:20

## 2024-06-07 NOTE — PROGRESS NOTES
[facility]  Progress Note  Name: Shelia Miller I  MRN: [id number]  Unit/Bed#: 2 [address] Date of Admission: 6/5/2024   Date of Service: 6/7/2024 I Hospital Day: 2    Assessment & Plan   Abdominal pain  Assessment & Plan  Patient notes having abdominal pain in the left and right lower quadrants over the last 3 days. RLQ>LLQ. Occasionally radiates to the right groin. Patient came in due to fever, fatigue, nausea, and vomiting.  CT of the abdomen pelvis with contrast shows diffuse abnormal soft tissue infiltration and stranding seen throughout the fatty tissue planes of the deep right pelvis and fascial thickening along the right pelvic sidewall. No loculated drainable collections. Prominent vasculature within the region. No findings to suggest venous thrombosis. The prior right ureteral dilatation also appears resolved.  The patient report at bedside that she has a history of prolapse and mesh placement.  UA + leukocytes, - nitrites  Denies nausea and vomiting at this time  Continue antibiotics to cover potential intra-abdominal infection    * Sepsis with acute organ dysfunction (HCC)  Assessment & Plan  POA febrile, tachycardic, Pro-Pravin and lactic are elevated, now in septic shock    Patient report she has been having feelings of dysuria and frequency for a few days decided to go to her primary care provider ordered a urine culture and Ceftin.  Due to fever, fatigue, nausea and vomiting decided to seek further help in the emergency department    Possible intra-abdominal infection vs sequela of immunotherapy.  CT of the abdomen pelvis with contrast shows diffuse abnormal soft tissue infiltration and stranding seen throughout the fatty tissue planes of the deep right pelvis and fascial thickening along the right pelvic sidewall. No loculated drainable collections. Prominent vasculature within the region. No findings to suggest venous thrombosis. The prior right ureteral dilatation also  appears resolved.  The patient report at bedside that she has a history of prolapse and mesh placement.  Urinalysis is negative for possible infection  normal saline bolus x 3  ceftriaxone in the ER, continue with Ancef> broad-spectrum Zosyn  NSAID use versus acetaminophen, ketorolac x 2 as needed ordered  Monitor the WBC with daily CBC, will repeat lactic in the morning after fluid administration  AM WBC 15.92 > 17.98 maybe secondary to infection of unknown origin or due to not taking immunotherapy  Lactic Acid 2.0-resolved  IV Albumin dose 2  UA + leukocytes, - nitrites, UC negative  6/6 AM Procalcitonin 10.40 ( likely FP d/t underlying hx maligancy)  Monitor vital signs per unit protocol or when needed  Follow up with oncology to assess potential impact of patient's immunotherapy.  ID consulted recs appreciated      Acute cystitis  Assessment & Plan  Acute cystitis causing increasing nocturia urgency on antibiotics culture sensitivity pending  Urology input appreciated  Overactive bladder possibly related to spinal stenosis or mid urethral sling obstruction?  Patient will need workup in the outpatient urology office after discharge    Elevated liver enzymes  Assessment & Plan  Likely secondary to sepsis see plan above  Second dose of albumin   will monitor  AST/ALT improving    Asthma  Assessment & Plan  Patient takes Advair and Proventil daily, continue with regimen  Reports asthma is well-controlled with inhaler therapy.  Patient reports exacerbation 1-2 times a year.  She was diagnosed in 2014  Follows up with [facility] pulmonology last visit February 20, 2024    GERD (gastroesophageal reflux disease)  Assessment & Plan  Patient notes having chest discomfort. Notes having a history of GERD.  Begin pantoprazole 20 mg      HTN (hypertension)  Assessment & Plan  On admission blood pressure systolic 110  Takes losartan daily. Losartan held on 6/6 due to BP in 90s / 40s . Patient received 1 L bolus   Today patient  BP increased to 109/51  Closely monitor BP  Monitor vital signs per unit protocol or when needed.  Noted patient was admitted due to sepsis, avoid hypotension    Rheumatoid arthritis, involving unspecified site, unspecified whether rheumatoid factor present ([facility])  Assessment & Plan  Treated with methotrexate and TNF in the past. Switched to Actemra weekly, continue her regimen when discharged  Last DEXA scan on 2021 showing lumbar spine: T-score is 0.2 and left Hip: Lowest T-score is -1.4.  Repeated DEXA scan on August 2023  lumbar spine BMD increased to 0.8 positive, hip BMD increased to -1.1 positive.  Patient reports pains in joints on admission, the patient prefers NSAIDs instead of acetaminophen for pain.  Noted GFR is less than 60 and this was explained to the patient at bedside along with family.  Patient and family request NSAIDs for pain education on mechanism of action of NSAIDs and kidney disease was explained, patient and family verbalized understanding.  Pharmacist was made aware.  Follows up with [facility] rheumatologist, last visit January 2024      Invasive ductal carcinoma of left breast (HCC) s/p lumpectomy, Chemo & XRT  Assessment & Plan  History of history of pT1c pN0 M0 TNBC s/p BCT/SLN diagnosed in 2020  Underwent chemotherapy and radiation [id number]  Follows up with [facility] hematology and oncology last visit October 2023               VTE Pharmacologic Prophylaxis: VTE Score: 3 Moderate Risk (Score 3-4) - Pharmacological DVT Prophylaxis Ordered: enoxaparin (Lovenox).    Mobility:   Basic Mobility Inpatient Raw Score: 24  JH-HLM Goal: 8: Walk 250 feet or more  JH-HLM Achieved: 6: Walk 10 steps or more  JH-HLM Goal NOT achieved. Continue with multidisciplinary rounding and encourage appropriate mobility to improve upon JH-HLM goals.    Patient Centered Rounds: I performed bedside rounds with nursing staff today.   Discussions with Specialists or Other Care Team Provider:  "    Education and Discussions with Family / Patient: Updated  (daughter) at bedside.    Total Time Spent on Date of Encounter in care of patient: 40 mins. This time was spent on one or more of the following: performing physical exam; counseling and coordination of care; obtaining or reviewing history; documenting in the medical record; reviewing/ordering tests, medications or procedures; communicating with other healthcare professionals and discussing with patient's family/caregivers.    Current Length of Stay: 2 day(s)  Current Patient Status: Inpatient   Certification Statement: The patient will continue to require additional inpatient hospital stay due to clinical course and specialist recs  Discharge Plan: Anticipate discharge in 24-48 hrs to home.    Code Status: Level 1 - Full Code    Subjective:   Patient was seen and examined at bedside. Patient appears comfortable in bed. Patient states she feels much better today and \"feels more like myself\". She rates her abdominal pain a 0/10 when laying down and a 3/10 with movement. Patient notes her appetite has improved and was able to eat more this morning. She denies fever, chills, SOB, headaches, dysuria, nausea, or vomiting. Patient notes she has had a persistent chest discomfort. She is unsure if it is her GERD acting up or whether it was sequela from her multiple episodes of vomiting and retching the other day. Patient endorses having increased frequency of urination.    Objective:     Vitals:   Temp (24hrs), Av.6 °F (37 °C), Min:98 °F (36.7 °C), Max:99.3 °F (37.4 °C)    Temp:  [98 °F (36.7 °C)-99.3 °F (37.4 °C)] 98.8 °F (37.1 °C)  HR:  [85-98] 87  Resp:  [15-21] 17  BP: ()/(44-61) 116/61  SpO2:  [92 %-96 %] 96 %  Body mass index is 29.13 kg/m².     Input and Output Summary (last 24 hours):     Intake/Output Summary (Last 24 hours) at 2024 7695  Last data filed at 2024 0914  Gross per 24 hour   Intake 950 ml   Output --   Net 950 "        Physical Exam:   Physical Exam  Vitals and nursing note reviewed.   Constitutional:       General: She is not in acute distress.     Appearance: She is well-developed.   HENT:      Head: Normocephalic and atraumatic.   Eyes:      Conjunctiva/sclera: Conjunctivae normal.   Cardiovascular:      Rate and Rhythm: Normal rate and regular rhythm.      Heart sounds: No murmur heard.  Pulmonary:      Effort: Pulmonary effort is normal. No respiratory distress.      Breath sounds: Normal breath sounds.   Abdominal:      General: Bowel sounds are normal. There is no distension.      Palpations: Abdomen is soft.      Tenderness: There is abdominal tenderness (Diffuse. Patient states tenderness has decreased).   Musculoskeletal:         General: No swelling.      Cervical back: Neck supple.   Skin:     General: Skin is warm and dry.      Capillary Refill: Capillary refill takes less than 2 seconds.   Neurological:      Mental Status: She is alert.   Psychiatric:         Mood and Affect: Mood normal.          Additional Data:     Labs:  Results from last 7 days   Lab Units 06/07/24  0540 06/06/24  0441   WBC Thousand/uL 17.98* 15.92*   HEMOGLOBIN g/dL 11.6 11.9   HEMATOCRIT % 35.4 36.1   PLATELETS Thousands/uL 98* 120*   BANDS PCT % 34*  --    SEGS PCT %  --  94*   LYMPHO PCT % 1* 1*   MONO PCT % 2* 3*   EOS PCT % 4 0     Results from last 7 days   Lab Units 06/07/24  0540   SODIUM mmol/L 138   POTASSIUM mmol/L 4.2   CHLORIDE mmol/L 108   CO2 mmol/L 25   BUN mg/dL 25   CREATININE mg/dL 1.05   ANION GAP mmol/L 5   CALCIUM mg/dL 7.9*   ALBUMIN g/dL 3.1*   TOTAL BILIRUBIN mg/dL 1.16*   ALK PHOS U/L 67   ALT U/L 45   AST U/L 50*   GLUCOSE RANDOM mg/dL 108     Results from last 7 days   Lab Units 06/05/24  1726   INR  1.02             Results from last 7 days   Lab Units 06/06/24  0441 06/05/24  1939 06/05/24  1726   LACTIC ACID mmol/L 2.0 2.9* 2.1*   PROCALCITONIN ng/ml 10.40*  --  1.39*       Lines/Drains:  Invasive  Devices       Peripheral Intravenous Line  Duration             Peripheral IV 06/06/24 Left;Upper;Ventral (anterior) Arm <1 day    Peripheral IV 06/07/24 Right;Ventral (anterior) Forearm <1 day                          Imaging: Reviewed radiology reports from this admission including: abdominal/pelvic CT    Recent Cultures (last 7 days):   Results from last 7 days   Lab Units 06/06/24  0445 06/05/24  1748 06/05/24  1726 06/05/24  1118   BLOOD CULTURE   --  No Growth at 24 hrs. No Growth at 24 hrs.  --    URINE CULTURE  Culture too young- will reincubate  --   --  10,000-19,000 cfu/ml       Last 24 Hours Medication List:   Current Facility-Administered Medications   Medication Dose Route Frequency Provider Last Rate    acetaminophen  650 mg Oral Q6H PRN CHAD Madrid      albuterol  2 puff Inhalation Q6H PRN CHAD Madrid      aluminum-magnesium hydroxide-simethicone  30 mL Oral Q6H PRN CHAD Madrid      atorvastatin  10 mg Oral Daily With Dinner CHAD Madrid      azithromycin  500 mg Intravenous Q24H Marizol Cristina MD      [START ON 6/8/2024] calcium carbonate-vitamin D  1 tablet Oral Daily With Breakfast Marizol Cristina MD      diphenhydrAMINE  25 mg Intravenous Q6H PRN Marizol Cristina MD      docusate sodium  100 mg Oral BID CHAD Madrid      fluticasone-vilanterol  1 puff Inhalation Daily CHAD Madrid      multi-electrolyte  75 mL/hr Intravenous Continuous CHAD Gray 75 mL/hr (06/07/24 0953)    ondansetron  4 mg Intravenous Q6H PRN CHAD Madrid      piperacillin-tazobactam  4.5 g Intravenous Q8H CHAD Gray 4.5 g (06/07/24 0853)    raloxifene  60 mg Oral Daily CHAD Madrid      saccharomyces boulardii  250 mg Oral BID CHAD Gray          Today, Patient Was Seen By: Marizol Cristina MD    **Please Note: This note may have been constructed using a voice recognition system.**

## 2024-06-07 NOTE — ASSESSMENT & PLAN NOTE
Patient notes having abdominal pain in the left and right lower quadrants over the last 3 days. RLQ>LLQ. Occasionally radiates to the right groin. Patient came in due to fever, fatigue, nausea, and vomiting.  CT of the abdomen pelvis with contrast shows diffuse abnormal soft tissue infiltration and stranding seen throughout the fatty tissue planes of the deep right pelvis and fascial thickening along the right pelvic sidewall. No loculated drainable collections. Prominent vasculature within the region. No findings to suggest venous thrombosis. The prior right ureteral dilatation also appears resolved.  The patient report at bedside that she has a history of prolapse and mesh placement.  UA + leukocytes, - nitrites  Denies nausea and vomiting at this time  Continue antibiotics to cover potential intra-abdominal infection

## 2024-06-07 NOTE — PLAN OF CARE
Problem: PAIN - ADULT  Goal: Verbalizes/displays adequate comfort level or baseline comfort level  Description: Interventions:  - Encourage patient to monitor pain and request assistance  - Assess pain using appropriate pain scale  - Administer analgesics based on type and severity of pain and evaluate response  - Implement non-pharmacological measures as appropriate and evaluate response  - Consider cultural and social influences on pain and pain management  - Notify physician/advanced practitioner if interventions unsuccessful or patient reports new pain  6/7/2024 0020 by Lola Hart RN  Outcome: Progressing    Problem: INFECTION - ADULT  Goal: Absence or prevention of progression during hospitalization  Description: INTERVENTIONS:  - Assess and monitor for signs and symptoms of infection  - Monitor lab/diagnostic results  - Monitor all insertion sites, i.e. indwelling lines, tubes, and drains  - Monitor endotracheal if appropriate and nasal secretions for changes in amount and color  - [place] appropriate cooling/warming therapies per order  - Administer medications as ordered  - Instruct and encourage patient and family to use good hand hygiene technique  - Identify and instruct in appropriate isolation precautions for identified infection/condition  6/7/2024 0020 by Lola Hart RN  Outcome: Progressing         Problem: SAFETY ADULT  Goal: Patient will remain free of falls  Description: INTERVENTIONS:  - Educate patient/family on patient safety including physical limitations  - Instruct patient to call for assistance with activity   - Consult OT/PT to assist with strengthening/mobility   - Keep Call bell within reach  - Keep bed low and locked with side rails adjusted as appropriate  - Keep care items and personal belongings within reach  - Initiate and maintain comfort rounds  - Make Fall Risk Sign visible to staff  - Offer Toileting every 2 Hours, in advance of need  - Initiate/Maintain bed  alarm  - Apply yellow socks and bracelet for high fall risk patients  - Consider moving patient to room near nurses station  6/7/2024 0020 by Lola Hart RN  Outcome: Progressing    Goal: Maintain or return to baseline ADL function  Description: INTERVENTIONS:  -  Assess patient's ability to carry out ADLs; assess patient's baseline for ADL function and identify physical deficits which impact ability to perform ADLs (bathing, care of mouth/teeth, toileting, grooming, dressing, etc.)  - Assess/evaluate cause of self-care deficits   - Assess range of motion  - Assess patient's mobility; develop plan if impaired  - Assess patient's need for assistive devices and provide as appropriate  - Encourage maximum independence but intervene and supervise when necessary  - Involve family in performance of ADLs  - Assess for home care needs following discharge   - Consider OT consult to assist with ADL evaluation and planning for discharge  - Provide patient education as appropriate  6/7/2024 0020 by Lola Hart RN  Outcome: Progressing    Goal: Maintains/Returns to pre admission functional level  Description: INTERVENTIONS:  - Perform AM-PAC 6 Click Basic Mobility/ Daily Activity assessment daily.  - Set and communicate daily mobility goal to care team and patient/family/caregiver.   - Collaborate with rehabilitation services on mobility goals if consulted  - Perform Range of Motion 2 times a day.  - Reposition patient every 2 hours.  - Dangle patient 2 times a day  - Stand patient 2 times a day  - Ambulate patient 2 times a day  - Out of bed to chair 2 times a day   - Out of bed for meals 2 times a day  - Out of bed for toileting  - Record patient progress and toleration of activity level   6/7/2024 0020 by Lola Hart, BRIAN  Outcome: Progressing       Problem: DISCHARGE PLANNING  Goal: Discharge to home or other facility with appropriate resources  Description: INTERVENTIONS:  - Identify barriers to discharge  w/patient and caregiver  - Arrange for needed discharge resources and transportation as appropriate  - Identify discharge learning needs (meds, wound care, etc.)  - Arrange for interpretive services to assist at discharge as needed  - Refer to Case Management Department for coordinating discharge planning if the patient needs post-hospital services based on physician/advanced practitioner order or complex needs related to functional status, cognitive ability, or social support system  6/7/2024 0020 by Lola Hart RN  Outcome: Progressing       Problem: Knowledge Deficit  Goal: Patient/family/caregiver demonstrates understanding of disease process, treatment plan, medications, and discharge instructions  Description: Complete learning assessment and assess knowledge base.  Interventions:  - Provide teaching at level of understanding  - Provide teaching via preferred learning methods  6/7/2024 0020 by Lola Hart RN  Outcome: Progressing

## 2024-06-07 NOTE — ASSESSMENT & PLAN NOTE
Likely secondary to sepsis see plan above  Second dose of albumin   will monitor  AST/ALT improving

## 2024-06-07 NOTE — ASSESSMENT & PLAN NOTE
History of history of pT1c pN0 M0 TNBC s/p BCT/SLN diagnosed in 2020  Underwent chemotherapy and radiation [id number]  Follows up with [facility] hematology and oncology last visit October 2023

## 2024-06-07 NOTE — ASSESSMENT & PLAN NOTE
Patient takes Advair and Proventil daily, continue with regimen  Reports asthma is well-controlled with inhaler therapy.  Patient reports exacerbation 1-2 times a year.  She was diagnosed in 2014  Follows up with [facility] pulmonology last visit February 20, 2024

## 2024-06-07 NOTE — PLAN OF CARE
Problem: PAIN - ADULT  Goal: Verbalizes/displays adequate comfort level or baseline comfort level  Description: Interventions:  - Encourage patient to monitor pain and request assistance  - Assess pain using appropriate pain scale  - Administer analgesics based on type and severity of pain and evaluate response  - Implement non-pharmacological measures as appropriate and evaluate response  - Consider cultural and social influences on pain and pain management  - Notify physician/advanced practitioner if interventions unsuccessful or patient reports new pain  Outcome: Progressing     Problem: INFECTION - ADULT  Goal: Absence or prevention of progression during hospitalization  Description: INTERVENTIONS:  - Assess and monitor for signs and symptoms of infection  - Monitor lab/diagnostic results  - Monitor all insertion sites, i.e. indwelling lines, tubes, and drains  - Monitor endotracheal if appropriate and nasal secretions for changes in amount and color  - [place] appropriate cooling/warming therapies per order  - Administer medications as ordered  - Instruct and encourage patient and family to use good hand hygiene technique  - Identify and instruct in appropriate isolation precautions for identified infection/condition  Outcome: Progressing  Goal: Absence of fever/infection during neutropenic period  Description: INTERVENTIONS:  - Monitor WBC    Outcome: Progressing     Problem: SAFETY ADULT  Goal: Patient will remain free of falls  Description: INTERVENTIONS:  - Educate patient/family on patient safety including physical limitations  - Instruct patient to call for assistance with activity   - Consult OT/PT to assist with strengthening/mobility   - Keep Call bell within reach  - Keep bed low and locked with side rails adjusted as appropriate  - Keep care items and personal belongings within reach  - Initiate and maintain comfort rounds  - Make Fall Risk Sign visible to staff  - Offer Toileting every 2 Hours,  in advance of need  - Initiate/Maintain bed alarm  - Obtain necessary fall risk management equipment: call bell  - Apply yellow socks and bracelet for high fall risk patients  - Consider moving patient to room near nurses station  Outcome: Progressing  Goal: Maintain or return to baseline ADL function  Description: INTERVENTIONS:  -  Assess patient's ability to carry out ADLs; assess patient's baseline for ADL function and identify physical deficits which impact ability to perform ADLs (bathing, care of mouth/teeth, toileting, grooming, dressing, etc.)  - Assess/evaluate cause of self-care deficits   - Assess range of motion  - Assess patient's mobility; develop plan if impaired  - Assess patient's need for assistive devices and provide as appropriate  - Encourage maximum independence but intervene and supervise when necessary  - Involve family in performance of ADLs  - Assess for home care needs following discharge   - Consider OT consult to assist with ADL evaluation and planning for discharge  - Provide patient education as appropriate  Outcome: Progressing  Goal: Maintains/Returns to pre admission functional level  Description: INTERVENTIONS:  - Perform AM-PAC 6 Click Basic Mobility/ Daily Activity assessment daily.  - Set and communicate daily mobility goal to care team and patient/family/caregiver.   - Collaborate with rehabilitation services on mobility goals if consulted  - Ambulate patient 2 times a day  - Out of bed to chair 3 times a day   - Out of bed for meals 3 times a day  - Out of bed for toileting  - Record patient progress and toleration of activity level   Outcome: Progressing     Problem: DISCHARGE PLANNING  Goal: Discharge to home or other facility with appropriate resources  Description: INTERVENTIONS:  - Identify barriers to discharge w/patient and caregiver  - Arrange for needed discharge resources and transportation as appropriate  - Identify discharge learning needs (meds, wound care,  etc.)  - Arrange for interpretive services to assist at discharge as needed  - Refer to Case Management Department for coordinating discharge planning if the patient needs post-hospital services based on physician/advanced practitioner order or complex needs related to functional status, cognitive ability, or social support system  Outcome: Progressing     Problem: Knowledge Deficit  Goal: Patient/family/caregiver demonstrates understanding of disease process, treatment plan, medications, and discharge instructions  Description: Complete learning assessment and assess knowledge base.  Interventions:  - Provide teaching at level of understanding  - Provide teaching via preferred learning methods  Outcome: Progressing

## 2024-06-07 NOTE — ASSESSMENT & PLAN NOTE
>>ASSESSMENT AND PLAN FOR RHEUMATOID ARTHRITIS, INVOLVING UNSPECIFIED SITE, UNSPECIFIED WHETHER RHEUMATOID FACTOR PRESENT (HCC) WRITTEN ON 6/7/2024 10:00 AM BY KARRI FATIMA    Treated with methotrexate and TNF in the past. Switched to Actemra weekly, continue her regimen when discharged  Last DEXA scan on 2021 showing lumbar spine: T-score is 0.2 and left Hip: Lowest T-score is -1.4.  Repeated DEXA scan on August 2023  lumbar spine BMD increased to 0.8 positive, hip BMD increased to -1.1 positive.  Patient reports pains in joints on admission, the patient prefers NSAIDs instead of acetaminophen for pain.  Noted GFR is less than 60 and this was explained to the patient at bedside along with family.  Patient and family request NSAIDs for pain education on mechanism of action of NSAIDs and kidney disease was explained, patient and family verbalized understanding.  Pharmacist was made aware.  Follows up with [facility] rheumatologist, last visit January 2024

## 2024-06-07 NOTE — ASSESSMENT & PLAN NOTE
Patient notes having chest discomfort. Notes having a history of GERD.  Begin pantoprazole 20 mg

## 2024-06-07 NOTE — ASSESSMENT & PLAN NOTE
POA febrile, tachycardic, Pro-Pravin and lactic are elevated, now in septic shock    Patient report she has been having feelings of dysuria and frequency for a few days decided to go to her primary care provider ordered a urine culture and Ceftin.  Due to fever, fatigue, nausea and vomiting decided to seek further help in the emergency department    Possible intra-abdominal infection vs sequela of immunotherapy.  CT of the abdomen pelvis with contrast shows diffuse abnormal soft tissue infiltration and stranding seen throughout the fatty tissue planes of the deep right pelvis and fascial thickening along the right pelvic sidewall. No loculated drainable collections. Prominent vasculature within the region. No findings to suggest venous thrombosis. The prior right ureteral dilatation also appears resolved.  The patient report at bedside that she has a history of prolapse and mesh placement.  Urinalysis is negative for possible infection  normal saline bolus x 3  ceftriaxone in the ER, continue with Ancef> broad-spectrum Zosyn  NSAID use versus acetaminophen, ketorolac x 2 as needed ordered  Monitor the WBC with daily CBC, will repeat lactic in the morning after fluid administration  AM WBC 15.92 > 17.98 maybe secondary to infection of unknown origin or due to not taking immunotherapy  Lactic Acid 2.0-resolved  IV Albumin dose 2  UA + leukocytes, - nitrites, UC negative  6/6 AM Procalcitonin 10.40 ( likely FP d/t underlying hx maligancy)  Monitor vital signs per unit protocol or when needed  Follow up with oncology to assess potential impact of patient's immunotherapy.  ID consulted recs appreciated

## 2024-06-07 NOTE — ASSESSMENT & PLAN NOTE
On admission blood pressure systolic 110  Takes losartan daily. Losartan held on 6/6 due to BP in 90s / 40s . Patient received 1 L bolus   Today patient BP increased to 109/51  Closely monitor BP  Monitor vital signs per unit protocol or when needed.  Noted patient was admitted due to sepsis, avoid hypotension

## 2024-06-07 NOTE — ASSESSMENT & PLAN NOTE
Acute cystitis causing increasing nocturia urgency on antibiotics culture sensitivity pending  Urology input appreciated  Overactive bladder possibly related to spinal stenosis or mid urethral sling obstruction?  Patient will need workup in the outpatient urology office after discharge

## 2024-06-07 NOTE — ASSESSMENT & PLAN NOTE
>>ASSESSMENT AND PLAN FOR ASTHMA WRITTEN ON 6/7/2024 10:00 AM BY KARRI FATIMA    Patient takes Advair and Proventil daily, continue with regimen  Reports asthma is well-controlled with inhaler therapy.  Patient reports exacerbation 1-2 times a year.  She was diagnosed in 2014  Follows up with [facility] pulmonology last visit February 20, 2024

## 2024-06-07 NOTE — ASSESSMENT & PLAN NOTE
Treated with methotrexate and TNF in the past. Switched to Actemra weekly, continue her regimen when discharged  Last DEXA scan on 2021 showing lumbar spine: T-score is 0.2 and left Hip: Lowest T-score is -1.4.  Repeated DEXA scan on August 2023  lumbar spine BMD increased to 0.8 positive, hip BMD increased to -1.1 positive.  Patient reports pains in joints on admission, the patient prefers NSAIDs instead of acetaminophen for pain.  Noted GFR is less than 60 and this was explained to the patient at bedside along with family.  Patient and family request NSAIDs for pain education on mechanism of action of NSAIDs and kidney disease was explained, patient and family verbalized understanding.  Pharmacist was made aware.  Follows up with [facility] rheumatologist, last visit January 2024

## 2024-06-08 LAB
% PARASITEMIA: 0
ALBUMIN SERPL BCP-MCNC: 2.9 G/DL (ref 3.5–5)
ALP SERPL-CCNC: 67 U/L (ref 34–104)
ALT SERPL W P-5'-P-CCNC: 33 U/L (ref 7–52)
ANION GAP SERPL CALCULATED.3IONS-SCNC: 4 MMOL/L (ref 4–13)
AST SERPL W P-5'-P-CCNC: 35 U/L (ref 13–39)
BACTERIA UR CULT: NORMAL
BILIRUB SERPL-MCNC: 0.85 MG/DL (ref 0.2–1)
BUN SERPL-MCNC: 18 MG/DL (ref 5–25)
CALCIUM ALBUM COR SERPL-MCNC: 8.8 MG/DL (ref 8.3–10.1)
CALCIUM SERPL-MCNC: 7.9 MG/DL (ref 8.4–10.2)
CHLORIDE SERPL-SCNC: 110 MMOL/L (ref 96–108)
CO2 SERPL-SCNC: 25 MMOL/L (ref 21–32)
CREAT SERPL-MCNC: 0.74 MG/DL (ref 0.6–1.3)
ERYTHROCYTE [DISTWIDTH] IN BLOOD BY AUTOMATED COUNT: 13.6 % (ref 11.6–15.1)
GFR SERPL CREATININE-BSD FRML MDRD: 80 ML/MIN/1.73SQ M
GLUCOSE SERPL-MCNC: 92 MG/DL (ref 65–140)
HCT VFR BLD AUTO: 33.2 % (ref 34.8–46.1)
HGB BLD-MCNC: 10.8 G/DL (ref 11.5–15.4)
MCH RBC QN AUTO: 31.3 PG (ref 26.8–34.3)
MCHC RBC AUTO-ENTMCNC: 32.5 G/DL (ref 31.4–37.4)
MCV RBC AUTO: 96 FL (ref 82–98)
PARASITE BLD: NO
PLATELET # BLD AUTO: 102 THOUSANDS/UL (ref 149–390)
PMV BLD AUTO: 10.4 FL (ref 8.9–12.7)
POTASSIUM SERPL-SCNC: 3.8 MMOL/L (ref 3.5–5.3)
PROT SERPL-MCNC: 4.9 G/DL (ref 6.4–8.4)
RBC # BLD AUTO: 3.45 MILLION/UL (ref 3.81–5.12)
SODIUM SERPL-SCNC: 139 MMOL/L (ref 135–147)
WBC # BLD AUTO: 13.06 THOUSAND/UL (ref 4.31–10.16)

## 2024-06-08 PROCEDURE — 99232 SBSQ HOSP IP/OBS MODERATE 35: CPT | Performed by: STUDENT IN AN ORGANIZED HEALTH CARE EDUCATION/TRAINING PROGRAM

## 2024-06-08 PROCEDURE — 80053 COMPREHEN METABOLIC PANEL: CPT | Performed by: STUDENT IN AN ORGANIZED HEALTH CARE EDUCATION/TRAINING PROGRAM

## 2024-06-08 PROCEDURE — 85027 COMPLETE CBC AUTOMATED: CPT | Performed by: STUDENT IN AN ORGANIZED HEALTH CARE EDUCATION/TRAINING PROGRAM

## 2024-06-08 RX ADMIN — PIPERACILLIN AND TAZOBACTAM 4.5 G: 4; .5 INJECTION, POWDER, FOR SOLUTION INTRAVENOUS at 01:52

## 2024-06-08 RX ADMIN — PIPERACILLIN AND TAZOBACTAM 4.5 G: 4; .5 INJECTION, POWDER, FOR SOLUTION INTRAVENOUS at 08:42

## 2024-06-08 RX ADMIN — FLUTICASONE FUROATE AND VILANTEROL TRIFENATATE 1 PUFF: 200; 25 POWDER RESPIRATORY (INHALATION) at 08:43

## 2024-06-08 RX ADMIN — Medication 250 MG: at 08:43

## 2024-06-08 RX ADMIN — AZITHROMYCIN MONOHYDRATE 250 MG: 500 INJECTION, POWDER, LYOPHILIZED, FOR SOLUTION INTRAVENOUS at 16:16

## 2024-06-08 RX ADMIN — ATORVASTATIN CALCIUM 10 MG: 10 TABLET, FILM COATED ORAL at 16:16

## 2024-06-08 RX ADMIN — Medication 1 TABLET: at 08:43

## 2024-06-08 RX ADMIN — ALBUTEROL SULFATE 2 PUFF: 90 AEROSOL, METERED RESPIRATORY (INHALATION) at 03:21

## 2024-06-08 RX ADMIN — PIPERACILLIN AND TAZOBACTAM 4.5 G: 4; .5 INJECTION, POWDER, FOR SOLUTION INTRAVENOUS at 18:04

## 2024-06-08 RX ADMIN — SODIUM CHLORIDE, SODIUM GLUCONATE, SODIUM ACETATE, POTASSIUM CHLORIDE, MAGNESIUM CHLORIDE, SODIUM PHOSPHATE, DIBASIC, AND POTASSIUM PHOSPHATE 75 ML/HR: .53; .5; .37; .037; .03; .012; .00082 INJECTION, SOLUTION INTRAVENOUS at 12:07

## 2024-06-08 RX ADMIN — Medication 250 MG: at 17:37

## 2024-06-08 RX ADMIN — ALBUTEROL SULFATE 2 PUFF: 90 AEROSOL, METERED RESPIRATORY (INHALATION) at 23:07

## 2024-06-08 NOTE — ASSESSMENT & PLAN NOTE
History of history of pT1c pN0 M0 TNBC s/p BCT/SLN diagnosed in 2020  Underwent chemotherapy and radiation [id number]  Follows up with [facility] hematology and oncology last visit October 2023

## 2024-06-08 NOTE — ASSESSMENT & PLAN NOTE
>>ASSESSMENT AND PLAN FOR RHEUMATOID ARTHRITIS, INVOLVING UNSPECIFIED SITE, UNSPECIFIED WHETHER RHEUMATOID FACTOR PRESENT (HCC) WRITTEN ON 6/9/2024 11:28 AM BY ILENE ALONZO MD    Treated with methotrexate and TNF in the past. Switched to Actemra weekly, continue her regimen when discharged  Last DEXA scan on 2021 showing lumbar spine: T-score is 0.2 and left Hip: Lowest T-score is -1.4.  Repeated DEXA scan on August 2023  lumbar spine BMD increased to 0.8 positive, hip BMD increased to -1.1 positive.  Patient reports pains in joints on admission, the patient prefers NSAIDs instead of acetaminophen for pain.  Noted GFR is less than 60 and this was explained to the patient at bedside along with family.  Patient and family request NSAIDs for pain education on mechanism of action of NSAIDs and kidney disease was explained, patient and family verbalized understanding.  Pharmacist was made aware.  Follows up with [facility] rheumatologist, last visit January 2024  Day for patient's medication will hold due to acute infection follow-up outpatient rheumatology  Hold Actemra due to possible side effects, reevaluate with rheumatologist

## 2024-06-08 NOTE — ASSESSMENT & PLAN NOTE
>>ASSESSMENT AND PLAN FOR ASTHMA WRITTEN ON 6/8/2024  4:27 PM BY ILENE ALONZO MD    Patient takes Advair and Proventil daily, continue with regimen  Reports asthma is well-controlled with inhaler therapy.  Patient reports exacerbation 1-2 times a year.  She was diagnosed in 2014  Follows up with [facility] pulmonology last visit February 20, 2024

## 2024-06-08 NOTE — PLAN OF CARE
Problem: PAIN - ADULT  Goal: Verbalizes/displays adequate comfort level or baseline comfort level  Description: Interventions:  - Encourage patient to monitor pain and request assistance  - Assess pain using appropriate pain scale  - Administer analgesics based on type and severity of pain and evaluate response  - Implement non-pharmacological measures as appropriate and evaluate response  - Consider cultural and social influences on pain and pain management  - Notify physician/advanced practitioner if interventions unsuccessful or patient reports new pain  Outcome: Progressing     Problem: INFECTION - ADULT  Goal: Absence or prevention of progression during hospitalization  Description: INTERVENTIONS:  - Assess and monitor for signs and symptoms of infection  - Monitor lab/diagnostic results  - Monitor all insertion sites, i.e. indwelling lines, tubes, and drains  - Monitor endotracheal if appropriate and nasal secretions for changes in amount and color  - [place] appropriate cooling/warming therapies per order  - Administer medications as ordered  - Instruct and encourage patient and family to use good hand hygiene technique  - Identify and instruct in appropriate isolation precautions for identified infection/condition  Outcome: Progressing  Goal: Absence of fever/infection during neutropenic period  Description: INTERVENTIONS:  - Monitor WBC    Outcome: Progressing     Problem: SAFETY ADULT  Goal: Patient will remain free of falls  Description: INTERVENTIONS:  - Educate patient/family on patient safety including physical limitations  - Instruct patient to call for assistance with activity   - Consult OT/PT to assist with strengthening/mobility   - Keep Call bell within reach  - Keep bed low and locked with side rails adjusted as appropriate  - Keep care items and personal belongings within reach  - Initiate and maintain comfort rounds  - Make Fall Risk Sign visible to staff  - Offer Toileting every 2 Hours,  in advance of need  - Initiate/Maintain bed alarm  - Obtain necessary fall risk management equipment: walker as needed  - Apply yellow socks and bracelet for high fall risk patients  - Consider moving patient to room near nurses station  Outcome: Progressing  Goal: Maintain or return to baseline ADL function  Description: INTERVENTIONS:  -  Assess patient's ability to carry out ADLs; assess patient's baseline for ADL function and identify physical deficits which impact ability to perform ADLs (bathing, care of mouth/teeth, toileting, grooming, dressing, etc.)  - Assess/evaluate cause of self-care deficits   - Assess range of motion  - Assess patient's mobility; develop plan if impaired  - Assess patient's need for assistive devices and provide as appropriate  - Encourage maximum independence but intervene and supervise when necessary  - Involve family in performance of ADLs  - Assess for home care needs following discharge   - Consider OT consult to assist with ADL evaluation and planning for discharge  - Provide patient education as appropriate  Outcome: Progressing  Goal: Maintains/Returns to pre admission functional level  Description: INTERVENTIONS:  - Perform AM-PAC 6 Click Basic Mobility/ Daily Activity assessment daily.  - Set and communicate daily mobility goal to care team and patient/family/caregiver.   - Collaborate with rehabilitation services on mobility goals if consulted  - Perform Range of Motion 2 times a day.  - Reposition patient every 2 hours.  - Dangle patient 2 times a day  - Stand patient 2 times a day  - Ambulate patient 2 times a day  - Out of bed to chair 2 times a day   - Out of bed for meals 2 times a day  - Out of bed for toileting  - Record patient progress and toleration of activity level   Outcome: Progressing     Problem: DISCHARGE PLANNING  Goal: Discharge to home or other facility with appropriate resources  Description: INTERVENTIONS:  - Identify barriers to discharge w/patient  and caregiver  - Arrange for needed discharge resources and transportation as appropriate  - Identify discharge learning needs (meds, wound care, etc.)  - Arrange for interpretive services to assist at discharge as needed  - Refer to Case Management Department for coordinating discharge planning if the patient needs post-hospital services based on physician/advanced practitioner order or complex needs related to functional status, cognitive ability, or social support system  Outcome: Progressing     Problem: Knowledge Deficit  Goal: Patient/family/caregiver demonstrates understanding of disease process, treatment plan, medications, and discharge instructions  Description: Complete learning assessment and assess knowledge base.  Interventions:  - Provide teaching at level of understanding  - Provide teaching via preferred learning methods  Outcome: Progressing

## 2024-06-08 NOTE — ASSESSMENT & PLAN NOTE
Treated with methotrexate and TNF in the past. Switched to Actemra weekly, continue her regimen when discharged  Last DEXA scan on 2021 showing lumbar spine: T-score is 0.2 and left Hip: Lowest T-score is -1.4.  Repeated DEXA scan on August 2023  lumbar spine BMD increased to 0.8 positive, hip BMD increased to -1.1 positive.  Patient reports pains in joints on admission, the patient prefers NSAIDs instead of acetaminophen for pain.  Noted GFR is less than 60 and this was explained to the patient at bedside along with family.  Patient and family request NSAIDs for pain education on mechanism of action of NSAIDs and kidney disease was explained, patient and family verbalized understanding.  Pharmacist was made aware.  Follows up with [facility] rheumatologist, last visit January 2024  Day for patient's medication will hold due to acute infection follow-up outpatient rheumatology  Hold Actemra due to possible side effects, reevaluate with rheumatologist

## 2024-06-08 NOTE — ASSESSMENT & PLAN NOTE
Acute cystitis causing increasing nocturia urgency on antibiotics culture sensitivity pending  Urology input appreciated  Overactive bladder possibly related to spinal stenosis or mid urethral sling obstruction?  Patient will need workup in the outpatient urology office after discharge  UCX-no growth

## 2024-06-08 NOTE — PROGRESS NOTES
[facility]  Progress Note  Name: Shelia Miller I  MRN: [id number]  Unit/Bed#: 2 [address] Date of Admission: 6/5/2024   Date of Service: 6/8/2024 I Hospital Day: 3    Assessment & Plan   Abdominal pain  Assessment & Plan  Improving per patient report    Patient notes having abdominal pain in the left and right lower quadrants over the last 3 days. RLQ>LLQ. Occasionally radiates to the right groin. Patient came in due to fever, fatigue, nausea, and vomiting.  CT of the abdomen pelvis with contrast shows diffuse abnormal soft tissue infiltration and stranding seen throughout the fatty tissue planes of the deep right pelvis and fascial thickening along the right pelvic sidewall. No loculated drainable collections. Prominent vasculature within the region. No findings to suggest venous thrombosis. The prior right ureteral dilatation also appears resolved.  The patient report at bedside that she has a history of prolapse and mesh placement.  UA + leukocytes, - nitrites  Denies nausea and vomiting at this time  Continue antibiotics to cover potential intra-abdominal infection  D/W ID recs:  Continue Zosyn    * Sepsis with acute organ dysfunction (HCC)  Assessment & Plan  POA febrile, tachycardic, Pro-Pravin and lactic are elevated, now in septic shock    Patient report she has been having feelings of dysuria and frequency for a few days decided to go to her primary care provider ordered a urine culture and Ceftin.  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  CT of the abdomen pelvis with contrast shows diffuse abnormal soft tissue infiltration and stranding seen throughout the fatty tissue planes of the deep right pelvis and fascial thickening along the right pelvic sidewall. No loculated drainable collections.  Prominent vasculature within the region. No findings to suggest venous thrombosis. The prior right ureteral dilatation also appears resolved.  The patient report at bedside that she has a history of prolapse and mesh placement.  Urinalysis is negative for possible infection  normal saline bolus x 3  ceftriaxone in the ER, continue with Ancef> broad-spectrum Zosyn  NSAID use versus acetaminophen, ketorolac x 2 as needed ordered  Monitor the WBC with daily CBC, will repeat lactic in the morning after fluid administration  AM WBC 15.92 > 17.98 maybe secondary to infection of unknown origin or due to not taking immunotherapy  Lactic Acid 2.0-resolved  IV Albumin dose 2  UA + leukocytes, - nitrites, UC negative  6/6 AM Procalcitonin 10.40 ( likely FP d/t underlying hx maligancy)  BCx-NG 48 H  Follow up with oncology to assess potential impact of patient's immunotherapy.  Parasite smear-negative  D/W ID recs:  Continue Zosyn      Acute cystitis  Assessment & Plan  Acute cystitis causing increasing nocturia urgency on antibiotics culture sensitivity pending  Urology input appreciated  Overactive bladder possibly related to spinal stenosis or mid urethral sling obstruction?  Patient will need workup in the outpatient urology office after discharge  UCX-no growth    Elevated liver enzymes  Assessment & Plan  Likely secondary to sepsis see plan above  Second dose of albumin   will monitor  AST/ALT improving    Asthma  Assessment & Plan  Patient takes Advair and Proventil daily, continue with regimen  Reports asthma is well-controlled with inhaler therapy.  Patient reports exacerbation 1-2 times a year.  She was diagnosed in 2014  Follows up with [facility] pulmonology last visit February 20, 2024    GERD (gastroesophageal reflux disease)  Assessment & Plan  Patient notes having chest discomfort. Notes having a history of GERD.  Begin pantoprazole 20 mg      HTN (hypertension)  Assessment & Plan  On admission blood pressure  systolic 110  Takes losartan daily. Losartan held on 6/6 due to BP in 90s / 40s . Patient received 1 L bolus   Today patient BP increased to 109/51  Closely monitor BP  Monitor vital signs per unit protocol or when needed.  Noted patient was admitted due to sepsis, avoid hypotension    Rheumatoid arthritis, involving unspecified site, unspecified whether rheumatoid factor present ([facility])  Assessment & Plan  Treated with methotrexate and TNF in the past. Switched to Actemra weekly, continue her regimen when discharged  Last DEXA scan on 2021 showing lumbar spine: T-score is 0.2 and left Hip: Lowest T-score is -1.4.  Repeated DEXA scan on August 2023  lumbar spine BMD increased to 0.8 positive, hip BMD increased to -1.1 positive.  Patient reports pains in joints on admission, the patient prefers NSAIDs instead of acetaminophen for pain.  Noted GFR is less than 60 and this was explained to the patient at bedside along with family.  Patient and family request NSAIDs for pain education on mechanism of action of NSAIDs and kidney disease was explained, patient and family verbalized understanding.  Pharmacist was made aware.  Follows up with [facility] rheumatologist, last visit January 2024  Day for patient's medication will hold due to acute infection follow-up outpatient rheumatology      Invasive ductal carcinoma of left breast (HCC) s/p lumpectomy, Chemo & XRT  Assessment & Plan  History of history of pT1c pN0 M0 TNBC s/p BCT/SLN diagnosed in 2020  Underwent chemotherapy and radiation [id number]  Follows up with [facility] hematology and oncology last visit October 2023               VTE Pharmacologic Prophylaxis: VTE Score: 3 Moderate Risk (Score 3-4) - Pharmacological DVT Prophylaxis Ordered: enoxaparin (Lovenox).    Mobility:   Basic Mobility Inpatient Raw Score: 24  JH-HLM Goal: 8: Walk 250 feet or more  JH-HLM Achieved: 6: Walk 10 steps or more  JH-HLM Goal NOT achieved. Continue with multidisciplinary  rounding and encourage appropriate mobility to improve upon [facility] goals.    Patient Centered Rounds: I performed bedside rounds with nursing staff today.   Discussions with Specialists or Other Care Team Provider:     Education and Discussions with Family / Patient: Updated  (daughter) via phone.    Total Time Spent on Date of Encounter in care of patient: 40 mins. This time was spent on one or more of the following: performing physical exam; counseling and coordination of care; obtaining or reviewing history; documenting in the medical record; reviewing/ordering tests, medications or procedures; communicating with other healthcare professionals and discussing with patient's family/caregivers.    Current Length of Stay: 3 day(s)  Current Patient Status: Inpatient   Certification Statement: The patient will continue to require additional inpatient hospital stay due to clinical course and specialist recs  Discharge Plan: Anticipate discharge in 24-48 hrs to home.    Code Status: Level 1 - Full Code    Subjective:   Patient was seen and examined at bedside.  Patient reported that she is feeling a lot better, denied any headache, blurry vision, chest pain or shortness of breath, fever or chills patient reported improvement in pelvic pain and this.  Discussed consultation for infectious disease team to continue current regiment with antibiotics.  Patient reported discussion with primary rheumatologist concerning side effects of Actemra being UTI sinusitis and abdominal pain.  Patient had no other concerns at this time    Objective:     Vitals:   Temp (24hrs), Av.7 °F (37.1 °C), Min:98 °F (36.7 °C), Max:99.3 °F (37.4 °C)    Temp:  [98 °F (36.7 °C)-99.3 °F (37.4 °C)] 98.5 °F (36.9 °C)  HR:  [64-76] 64  Resp:  [17-20] 18  BP: (113-135)/(57-68) 135/68  SpO2:  [94 %-97 %] 97 %  Body mass index is 29.13 kg/m².     Input and Output Summary (last 24 hours):     Intake/Output Summary (Last 24 hours) at 2024  1629  Last data filed at 6/8/2024 1207  Gross per 24 hour   Intake 665 ml   Output --   Net 665 ml       Physical Exam:   Physical Exam  Vitals and nursing note reviewed.   Constitutional:       General: She is not in acute distress.     Appearance: She is well-developed.   HENT:      Head: Normocephalic and atraumatic.   Eyes:      Conjunctiva/sclera: Conjunctivae normal.   Cardiovascular:      Rate and Rhythm: Normal rate and regular rhythm.      Heart sounds: No murmur heard.     No friction rub. No gallop.   Pulmonary:      Effort: Pulmonary effort is normal. No respiratory distress.      Breath sounds: Normal breath sounds. No stridor. No wheezing, rhonchi or rales.   Abdominal:      Palpations: Abdomen is soft.      Tenderness: There is abdominal tenderness (RLQ tenderness reduced). There is no guarding or rebound.   Musculoskeletal:         General: No swelling or tenderness.      Cervical back: Neck supple.      Right lower leg: No edema.      Left lower leg: No edema.   Skin:     General: Skin is warm and dry.      Capillary Refill: Capillary refill takes less than 2 seconds.      Findings: No bruising.   Neurological:      Mental Status: She is alert and oriented to person, place, and time.      Motor: No weakness.   Psychiatric:         Mood and Affect: Mood normal.         Behavior: Behavior normal.          Additional Data:     Labs:  Results from last 7 days   Lab Units 06/08/24  0556 06/07/24  0540 06/06/24  0441   WBC Thousand/uL 13.06* 17.98* 15.92*   HEMOGLOBIN g/dL 10.8* 11.6 11.9   HEMATOCRIT % 33.2* 35.4 36.1   PLATELETS Thousands/uL 102* 98* 120*   BANDS PCT %  --  34*  --    SEGS PCT %  --   --  94*   LYMPHO PCT %  --  1* 1*   MONO PCT %  --  2* 3*   EOS PCT %  --  4 0     Results from last 7 days   Lab Units 06/08/24  0556   SODIUM mmol/L 139   POTASSIUM mmol/L 3.8   CHLORIDE mmol/L 110*   CO2 mmol/L 25   BUN mg/dL 18   CREATININE mg/dL 0.74   ANION GAP mmol/L 4   CALCIUM mg/dL 7.9*   ALBUMIN  g/dL 2.9*   TOTAL BILIRUBIN mg/dL 0.85   ALK PHOS U/L 67   ALT U/L 33   AST U/L 35   GLUCOSE RANDOM mg/dL 92     Results from last 7 days   Lab Units 06/05/24  1726   INR  1.02             Results from last 7 days   Lab Units 06/06/24  0441 06/05/24  1939 06/05/24  1726   LACTIC ACID mmol/L 2.0 2.9* 2.1*   PROCALCITONIN ng/ml 10.40*  --  1.39*       Lines/Drains:  Invasive Devices       Peripheral Intravenous Line  Duration             Peripheral IV 06/06/24 Left;Upper;Ventral (anterior) Arm 1 day    Peripheral IV 06/07/24 Right;Ventral (anterior) Forearm 1 day                          Imaging: Reviewed radiology reports from this admission including: abdominal/pelvic CT    Recent Cultures (last 7 days):   Results from last 7 days   Lab Units 06/06/24  0445 06/05/24  1748 06/05/24  1726 06/05/24  1118   BLOOD CULTURE   --  No Growth at 48 hrs. No Growth at 48 hrs.  --    URINE CULTURE  No Growth <1000 cfu/mL  --   --  10,000-19,000 cfu/ml       Last 24 Hours Medication List:   Current Facility-Administered Medications   Medication Dose Route Frequency Provider Last Rate    acetaminophen  650 mg Oral Q6H PRN CHAD Madrid      albuterol  2 puff Inhalation Q6H PRN CHAD [place]      aluminum-magnesium hydroxide-simethicone  30 mL Oral Q6H PRN CHAD Madrid      atorvastatin  10 mg Oral Daily With Dinner CHAD [place]      azithromycin  250 mg Intravenous Q24H Marizol Cristina  mg (06/08/24 1616)    calcium carbonate-vitamin D  1 tablet Oral Daily With Breakfast Marizol Cristina MD      diphenhydrAMINE  25 mg Intravenous Q6H PRN Marizol Cristina MD      docusate sodium  100 mg Oral BID CHAD [place]      fluticasone-vilanterol  1 puff Inhalation Daily CHAD [place]      multi-electrolyte  75 mL/hr Intravenous Continuous CHAD Gray 75 mL/hr (06/08/24 1207)    ondansetron  4 mg Intravenous Q6H PRN CHAD [place]      piperacillin-tazobactam  4.5 g Intravenous Q8H Torey Cobos,  CHAD Stopped (06/08/24 0912)    raloxifene  60 mg Oral Daily CHAD Madrid      saccharomyces boulardii  250 mg Oral BID CHAD Gray          Today, Patient Was Seen By: Marizol Cristina MD    **Please Note: This note may have been constructed using a voice recognition system.**

## 2024-06-08 NOTE — ASSESSMENT & PLAN NOTE
POA febrile, tachycardic, Pro-Pravin and lactic are elevated, now in septic shock    Patient report she has been having feelings of dysuria and frequency for a few days decided to go to her primary care provider ordered a urine culture and Ceftin.  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  CT of the abdomen pelvis with contrast shows diffuse abnormal soft tissue infiltration and stranding seen throughout the fatty tissue planes of the deep right pelvis and fascial thickening along the right pelvic sidewall. No loculated drainable collections. Prominent vasculature within the region. No findings to suggest venous thrombosis. The prior right ureteral dilatation also appears resolved.  The patient report at bedside that she has a history of prolapse and mesh placement.  Urinalysis is negative for possible infection  normal saline bolus x 3  ceftriaxone in the ER, continue with Ancef> broad-spectrum Zosyn  NSAID use versus acetaminophen, ketorolac x 2 as needed ordered  Monitor the WBC with daily CBC, will repeat lactic in the morning after fluid administration  AM WBC 15.92 > 17.98 maybe secondary to infection of unknown origin or due to not taking immunotherapy  Lactic Acid 2.0-resolved  IV Albumin dose 2  UA + leukocytes, - nitrites, UC negative  6/6 AM Procalcitonin 10.40 ( likely FP d/t underlying hx maligancy)  BCx-NG 72H  Follow up with oncology to assess potential impact of patient's immunotherapy.  Parasite smear-negative  D/W ID recs:  Continue Zosyn to oral

## 2024-06-08 NOTE — PLAN OF CARE
Problem: PAIN - ADULT  Goal: Verbalizes/displays adequate comfort level or baseline comfort level  Description: Interventions:  - Encourage patient to monitor pain and request assistance  - Assess pain using appropriate pain scale  - Administer analgesics based on type and severity of pain and evaluate response  - Implement non-pharmacological measures as appropriate and evaluate response  - Consider cultural and social influences on pain and pain management  - Notify physician/advanced practitioner if interventions unsuccessful or patient reports new pain  Outcome: Progressing     Problem: INFECTION - ADULT  Goal: Absence or prevention of progression during hospitalization  Description: INTERVENTIONS:  - Assess and monitor for signs and symptoms of infection  - Monitor lab/diagnostic results  - Monitor all insertion sites, i.e. indwelling lines, tubes, and drains  - Monitor endotracheal if appropriate and nasal secretions for changes in amount and color  - [place] appropriate cooling/warming therapies per order  - Administer medications as ordered  - Instruct and encourage patient and family to use good hand hygiene technique  - Identify and instruct in appropriate isolation precautions for identified infection/condition  Outcome: Progressing  Goal: Absence of fever/infection during neutropenic period  Description: INTERVENTIONS:  - Monitor WBC    Outcome: Progressing     Problem: SAFETY ADULT  Goal: Patient will remain free of falls  Description: INTERVENTIONS:  - Educate patient/family on patient safety including physical limitations  - Instruct patient to call for assistance with activity   - Consult OT/PT to assist with strengthening/mobility   - Keep Call bell within reach  - Keep bed low and locked with side rails adjusted as appropriate  - Keep care items and personal belongings within reach  - Initiate and maintain comfort rounds  - Make Fall Risk Sign visible to staff  - Offer Toileting every 2 Hours,  in advance of need  - Initiate/Maintain bed alarm  - Obtain necessary fall risk management equipment: call bell  - Apply yellow socks and bracelet for high fall risk patients  - Consider moving patient to room near nurses station  Outcome: Progressing  Goal: Maintain or return to baseline ADL function  Description: INTERVENTIONS:  -  Assess patient's ability to carry out ADLs; assess patient's baseline for ADL function and identify physical deficits which impact ability to perform ADLs (bathing, care of mouth/teeth, toileting, grooming, dressing, etc.)  - Assess/evaluate cause of self-care deficits   - Assess range of motion  - Assess patient's mobility; develop plan if impaired  - Assess patient's need for assistive devices and provide as appropriate  - Encourage maximum independence but intervene and supervise when necessary  - Involve family in performance of ADLs  - Assess for home care needs following discharge   - Consider OT consult to assist with ADL evaluation and planning for discharge  - Provide patient education as appropriate  Outcome: Progressing  Goal: Maintains/Returns to pre admission functional level  Description: INTERVENTIONS:  - Perform AM-PAC 6 Click Basic Mobility/ Daily Activity assessment daily.  - Set and communicate daily mobility goal to care team and patient/family/caregiver.   - Collaborate with rehabilitation services on mobility goals if consulted  - Perform Range of Motion 2 times a day.  - Reposition patient every 2 hours.  - Dangle patient 2 times a day  - Stand patient 2 times a day  - Ambulate patient 2 times a day  - Out of bed to chair 2 times a day   - Out of bed for meals 2 times a day  - Out of bed for toileting  - Record patient progress and toleration of activity level   Outcome: Progressing     Problem: DISCHARGE PLANNING  Goal: Discharge to home or other facility with appropriate resources  Description: INTERVENTIONS:  - Identify barriers to discharge w/patient and  caregiver  - Arrange for needed discharge resources and transportation as appropriate  - Identify discharge learning needs (meds, wound care, etc.)  - Arrange for interpretive services to assist at discharge as needed  - Refer to Case Management Department for coordinating discharge planning if the patient needs post-hospital services based on physician/advanced practitioner order or complex needs related to functional status, cognitive ability, or social support system  Outcome: Progressing     Problem: Knowledge Deficit  Goal: Patient/family/caregiver demonstrates understanding of disease process, treatment plan, medications, and discharge instructions  Description: Complete learning assessment and assess knowledge base.  Interventions:  - Provide teaching at level of understanding  - Provide teaching via preferred learning methods  Outcome: Progressing

## 2024-06-08 NOTE — ASSESSMENT & PLAN NOTE
Patient takes Advair and Proventil daily, continue with regimen  Reports asthma is well-controlled with inhaler therapy.  Patient reports exacerbation 1-2 times a year.  She was diagnosed in 2014  Follows up with [facility] pulmonology last visit February 20, 2024

## 2024-06-09 VITALS
SYSTOLIC BLOOD PRESSURE: 147 MMHG | WEIGHT: 164.46 LBS | OXYGEN SATURATION: 98 % | HEART RATE: 79 BPM | BODY MASS INDEX: 29.14 KG/M2 | TEMPERATURE: 98.1 F | DIASTOLIC BLOOD PRESSURE: 81 MMHG | RESPIRATION RATE: 18 BRPM | HEIGHT: 63 IN

## 2024-06-09 PROBLEM — R10.9 ABDOMINAL PAIN: Status: RESOLVED | Noted: 2023-07-26 | Resolved: 2024-06-09

## 2024-06-09 PROBLEM — N30.00 ACUTE CYSTITIS: Status: RESOLVED | Noted: 2024-06-07 | Resolved: 2024-06-09

## 2024-06-09 PROBLEM — N73.9: Status: ACTIVE | Noted: 2024-06-09

## 2024-06-09 LAB
ALBUMIN SERPL BCP-MCNC: 3.2 G/DL (ref 3.5–5)
ALP SERPL-CCNC: 63 U/L (ref 34–104)
ALT SERPL W P-5'-P-CCNC: 36 U/L (ref 7–52)
ANION GAP SERPL CALCULATED.3IONS-SCNC: 5 MMOL/L (ref 4–13)
AST SERPL W P-5'-P-CCNC: 38 U/L (ref 13–39)
BASOPHILS # BLD AUTO: 0.02 THOUSANDS/ÂΜL (ref 0–0.1)
BASOPHILS NFR BLD AUTO: 0 % (ref 0–1)
BILIRUB SERPL-MCNC: 0.84 MG/DL (ref 0.2–1)
BUN SERPL-MCNC: 13 MG/DL (ref 5–25)
CALCIUM ALBUM COR SERPL-MCNC: 9 MG/DL (ref 8.3–10.1)
CALCIUM SERPL-MCNC: 8.4 MG/DL (ref 8.4–10.2)
CHLORIDE SERPL-SCNC: 108 MMOL/L (ref 96–108)
CO2 SERPL-SCNC: 28 MMOL/L (ref 21–32)
CREAT SERPL-MCNC: 0.71 MG/DL (ref 0.6–1.3)
EOSINOPHIL # BLD AUTO: 0.79 THOUSAND/ÂΜL (ref 0–0.61)
EOSINOPHIL NFR BLD AUTO: 10 % (ref 0–6)
ERYTHROCYTE [DISTWIDTH] IN BLOOD BY AUTOMATED COUNT: 13.6 % (ref 11.6–15.1)
GFR SERPL CREATININE-BSD FRML MDRD: 84 ML/MIN/1.73SQ M
GLUCOSE SERPL-MCNC: 96 MG/DL (ref 65–140)
HCT VFR BLD AUTO: 33.9 % (ref 34.8–46.1)
HGB BLD-MCNC: 11.1 G/DL (ref 11.5–15.4)
IMM GRANULOCYTES # BLD AUTO: 0.03 THOUSAND/UL (ref 0–0.2)
IMM GRANULOCYTES NFR BLD AUTO: 0 % (ref 0–2)
LYMPHOCYTES # BLD AUTO: 1.05 THOUSANDS/ÂΜL (ref 0.6–4.47)
LYMPHOCYTES NFR BLD AUTO: 14 % (ref 14–44)
MCH RBC QN AUTO: 31.4 PG (ref 26.8–34.3)
MCHC RBC AUTO-ENTMCNC: 32.7 G/DL (ref 31.4–37.4)
MCV RBC AUTO: 96 FL (ref 82–98)
MONOCYTES # BLD AUTO: 0.63 THOUSAND/ÂΜL (ref 0.17–1.22)
MONOCYTES NFR BLD AUTO: 8 % (ref 4–12)
NEUTROPHILS # BLD AUTO: 5.04 THOUSANDS/ÂΜL (ref 1.85–7.62)
NEUTS SEG NFR BLD AUTO: 68 % (ref 43–75)
NRBC BLD AUTO-RTO: 0 /100 WBCS
PLATELET # BLD AUTO: 115 THOUSANDS/UL (ref 149–390)
PMV BLD AUTO: 10.8 FL (ref 8.9–12.7)
POTASSIUM SERPL-SCNC: 3.8 MMOL/L (ref 3.5–5.3)
PROT SERPL-MCNC: 5.2 G/DL (ref 6.4–8.4)
RBC # BLD AUTO: 3.53 MILLION/UL (ref 3.81–5.12)
SODIUM SERPL-SCNC: 141 MMOL/L (ref 135–147)
WBC # BLD AUTO: 7.56 THOUSAND/UL (ref 4.31–10.16)

## 2024-06-09 PROCEDURE — 80053 COMPREHEN METABOLIC PANEL: CPT | Performed by: STUDENT IN AN ORGANIZED HEALTH CARE EDUCATION/TRAINING PROGRAM

## 2024-06-09 PROCEDURE — 99239 HOSP IP/OBS DSCHRG MGMT >30: CPT | Performed by: STUDENT IN AN ORGANIZED HEALTH CARE EDUCATION/TRAINING PROGRAM

## 2024-06-09 PROCEDURE — 85025 COMPLETE CBC W/AUTO DIFF WBC: CPT | Performed by: STUDENT IN AN ORGANIZED HEALTH CARE EDUCATION/TRAINING PROGRAM

## 2024-06-09 RX ORDER — AMOXICILLIN AND CLAVULANATE POTASSIUM 875; 125 MG/1; MG/1
1 TABLET, FILM COATED ORAL EVERY 12 HOURS SCHEDULED
Qty: 10 TABLET | Refills: 0 | Status: SHIPPED | OUTPATIENT
Start: 2024-06-09 | End: 2024-06-14

## 2024-06-09 RX ORDER — SACCHAROMYCES BOULARDII 250 MG
250 CAPSULE ORAL 2 TIMES DAILY
Qty: 10 CAPSULE | Refills: 0 | Status: SHIPPED | OUTPATIENT
Start: 2024-06-09

## 2024-06-09 RX ADMIN — Medication 250 MG: at 09:27

## 2024-06-09 RX ADMIN — Medication 1 TABLET: at 09:27

## 2024-06-09 RX ADMIN — PIPERACILLIN AND TAZOBACTAM 4.5 G: 4; .5 INJECTION, POWDER, FOR SOLUTION INTRAVENOUS at 09:34

## 2024-06-09 RX ADMIN — FLUTICASONE FUROATE AND VILANTEROL TRIFENATATE 1 PUFF: 200; 25 POWDER RESPIRATORY (INHALATION) at 09:26

## 2024-06-09 RX ADMIN — PIPERACILLIN AND TAZOBACTAM 4.5 G: 4; .5 INJECTION, POWDER, FOR SOLUTION INTRAVENOUS at 01:37

## 2024-06-09 NOTE — PLAN OF CARE
Problem: PAIN - ADULT  Goal: Verbalizes/displays adequate comfort level or baseline comfort level  Description: Interventions:  - Encourage patient to monitor pain and request assistance  - Assess pain using appropriate pain scale  - Administer analgesics based on type and severity of pain and evaluate response  - Implement non-pharmacological measures as appropriate and evaluate response  - Consider cultural and social influences on pain and pain management  - Notify physician/advanced practitioner if interventions unsuccessful or patient reports new pain  Outcome: Progressing     Problem: INFECTION - ADULT  Goal: Absence or prevention of progression during hospitalization  Description: INTERVENTIONS:  - Assess and monitor for signs and symptoms of infection  - Monitor lab/diagnostic results  - Monitor all insertion sites, i.e. indwelling lines, tubes, and drains  - Monitor endotracheal if appropriate and nasal secretions for changes in amount and color  - [place] appropriate cooling/warming therapies per order  - Administer medications as ordered  - Instruct and encourage patient and family to use good hand hygiene technique  - Identify and instruct in appropriate isolation precautions for identified infection/condition  Outcome: Progressing     Problem: SAFETY ADULT  Goal: Patient will remain free of falls  Description: INTERVENTIONS:  - Educate patient/family on patient safety including physical limitations  - Instruct patient to call for assistance with activity   - Consult OT/PT to assist with strengthening/mobility   - Keep Call bell within reach  - Keep bed low and locked with side rails adjusted as appropriate  - Keep care items and personal belongings within reach  - Initiate and maintain comfort rounds  - Make Fall Risk Sign visible to staff  - Offer Toileting every 2 Hours, in advance of need  - Initiate/Maintain bed alarm  - Obtain necessary fall risk management equipment: call bell   Problem:  DISCHARGE PLANNING  Goal: Discharge to home or other facility with appropriate resources  Description: INTERVENTIONS:  - Identify barriers to discharge w/patient and caregiver  - Arrange for needed discharge resources and transportation as appropriate  - Identify discharge learning needs (meds, wound care, etc.)  - Arrange for interpretive services to assist at discharge as needed  - Refer to Case Management Department for coordinating discharge planning if the patient needs post-hospital services based on physician/advanced practitioner order or complex needs related to functional status, cognitive ability, or social support system  Outcome: Progressing     Problem: Knowledge Deficit  Goal: Patient/family/caregiver demonstrates understanding of disease process, treatment plan, medications, and discharge instructions  Description: Complete learning assessment and assess knowledge base.  Interventions:  - Provide teaching at level of understanding  - Provide teaching via preferred learning methods  Outcome: Progressing     - Apply yellow socks and bracelet for high fall risk patients  - Consider moving patient to room near nurses station  Outcome: Progressing     Problem: SAFETY ADULT  Goal: Patient will remain free of falls  Description: INTERVENTIONS:  - Educate patient/family on patient safety including physical limitations  - Instruct patient to call for assistance with activity   - Consult OT/PT to assist with strengthening/mobility   - Keep Call bell within reach  - Keep bed low and locked with side rails adjusted as appropriate  - Keep care items and personal belongings within reach  - Initiate and maintain comfort rounds  - Make Fall Risk Sign visible to staff  - Offer Toileting every 2 Hours, in advance of need  - Initiate/Maintain bed alarm  - Obtain necessary fall risk management equipment: call bell   - Apply yellow socks and bracelet for high fall risk patients  - Consider moving patient to room near  nurses station  Outcome: Progressing

## 2024-06-09 NOTE — NURSING NOTE
Discharge to home via W/C accompanied by daughter and PCA, AVS reviewed, written instructions given to patient, patient verbalized understanding,  E-prescriptions given, IV removed per protocol, masimo removed per protocol,  all belongings taken.

## 2024-06-09 NOTE — ASSESSMENT & PLAN NOTE
Patient notes having abdominal pain in the left and right lower quadrants over the last 3 days. RLQ>LLQ. Occasionally radiates to the right groin. Patient came in due to fever, fatigue, nausea, and vomiting.  CT of the abdomen pelvis with contrast shows diffuse abnormal soft tissue infiltration and stranding seen throughout the fatty tissue planes of the deep right pelvis and fascial thickening along the right pelvic sidewall. No loculated drainable collections. Prominent vasculature within the region. No findings to suggest venous thrombosis. The prior right ureteral dilatation also appears resolved.  The patient report at bedside that she has a history of prolapse and mesh placement.  UA + leukocytes, - nitrites  Denies nausea and vomiting at this time  Continue antibiotics to cover potential intra-abdominal infection  D/W ID recs:   Zosyn > DC on Augmentin

## 2024-06-09 NOTE — PLAN OF CARE
Problem: PAIN - ADULT  Goal: Verbalizes/displays adequate comfort level or baseline comfort level  Description: Interventions:  - Encourage patient to monitor pain and request assistance  - Assess pain using appropriate pain scale  - Administer analgesics based on type and severity of pain and evaluate response  - Implement non-pharmacological measures as appropriate and evaluate response  - Consider cultural and social influences on pain and pain management  - Notify physician/advanced practitioner if interventions unsuccessful or patient reports new pain  Outcome: Progressing     Problem: INFECTION - ADULT  Goal: Absence or prevention of progression during hospitalization  Description: INTERVENTIONS:  - Assess and monitor for signs and symptoms of infection  - Monitor lab/diagnostic results  - Monitor all insertion sites, i.e. indwelling lines, tubes, and drains  - Monitor endotracheal if appropriate and nasal secretions for changes in amount and color  - [place] appropriate cooling/warming therapies per order  - Administer medications as ordered  - Instruct and encourage patient and family to use good hand hygiene technique  - Identify and instruct in appropriate isolation precautions for identified infection/condition  Outcome: Progressing  Goal: Absence of fever/infection during neutropenic period  Description: INTERVENTIONS:  - Monitor WBC    Outcome: Progressing     Problem: SAFETY ADULT  Goal: Patient will remain free of falls  Description: INTERVENTIONS:  - Educate patient/family on patient safety including physical limitations  - Instruct patient to call for assistance with activity   - Consult OT/PT to assist with strengthening/mobility   - Keep Call bell within reach  - Keep bed low and locked with side rails adjusted as appropriate  - Keep care items and personal belongings within reach  - Initiate and maintain comfort rounds  - Make Fall Risk Sign visible to staff  - Offer Toileting every 2 Hours,  in advance of need  - Initiate/Maintain bed alarm  - Obtain necessary fall risk management equipment: walker  - Apply yellow socks and bracelet for high fall risk patients  - Consider moving patient to room near nurses station  Outcome: Progressing  Goal: Maintain or return to baseline ADL function  Description: INTERVENTIONS:  -  Assess patient's ability to carry out ADLs; assess patient's baseline for ADL function and identify physical deficits which impact ability to perform ADLs (bathing, care of mouth/teeth, toileting, grooming, dressing, etc.)  - Assess/evaluate cause of self-care deficits   - Assess range of motion  - Assess patient's mobility; develop plan if impaired  - Assess patient's need for assistive devices and provide as appropriate  - Encourage maximum independence but intervene and supervise when necessary  - Involve family in performance of ADLs  - Assess for home care needs following discharge   - Consider OT consult to assist with ADL evaluation and planning for discharge  - Provide patient education as appropriate  Outcome: Progressing  Goal: Maintains/Returns to pre admission functional level  Description: INTERVENTIONS:  - Perform AM-PAC 6 Click Basic Mobility/ Daily Activity assessment daily.  - Set and communicate daily mobility goal to care team and patient/family/caregiver.   - Collaborate with rehabilitation services on mobility goals if consulted  - Perform Range of Motion 2 times a day.  - Reposition patient every 2 hours.  - Dangle patient 2 times a day  - Stand patient 3 times a day  - Ambulate patient 3 times a day  - Out of bed to chair 3 times a day   - Out of bed for meals 3 times a day  - Out of bed for toileting  - Record patient progress and toleration of activity level   Outcome: Progressing     Problem: DISCHARGE PLANNING  Goal: Discharge to home or other facility with appropriate resources  Description: INTERVENTIONS:  - Identify barriers to discharge w/patient and  caregiver  - Arrange for needed discharge resources and transportation as appropriate  - Identify discharge learning needs (meds, wound care, etc.)  - Arrange for interpretive services to assist at discharge as needed  - Refer to Case Management Department for coordinating discharge planning if the patient needs post-hospital services based on physician/advanced practitioner order or complex needs related to functional status, cognitive ability, or social support system  Outcome: Progressing     Problem: Knowledge Deficit  Goal: Patient/family/caregiver demonstrates understanding of disease process, treatment plan, medications, and discharge instructions  Description: Complete learning assessment and assess knowledge base.  Interventions:  - Provide teaching at level of understanding  - Provide teaching via preferred learning methods  Outcome: Progressing

## 2024-06-09 NOTE — DISCHARGE SUMMARY
[facility]  Discharge- Shelia Miller 1950, 73 y.o. female MRN: [id number]  Unit/Bed#: [address] [address] Encounter: [id number]  Primary Care Provider: Nicky Gale MD   Date and time admitted to hospital: 6/5/2024  4:55 PM    Infection within female pelvic cavity  Assessment & Plan  Patient notes having abdominal pain in the left and right lower quadrants over the last 3 days. RLQ>LLQ. Occasionally radiates to the right groin. Patient came in due to fever, fatigue, nausea, and vomiting.  CT of the abdomen pelvis with contrast shows diffuse abnormal soft tissue infiltration and stranding seen throughout the fatty tissue planes of the deep right pelvis and fascial thickening along the right pelvic sidewall. No loculated drainable collections. Prominent vasculature within the region. No findings to suggest venous thrombosis. The prior right ureteral dilatation also appears resolved.  The patient report at bedside that she has a history of prolapse and mesh placement.  UA + leukocytes, - nitrites  Denies nausea and vomiting at this time  Continue antibiotics to cover potential intra-abdominal infection  D/W ID recs:   Zosyn > DC on Augmentin    Abdominal pain-resolved as of 6/9/2024  Assessment & Plan  See pelvic infection for AP      * Sepsis with acute organ dysfunction (HCC)  Assessment & Plan  POA febrile, tachycardic, Pro-Pravin and lactic are elevated, now in septic shock    Patient report she has been having feelings of dysuria and frequency for a few days decided to go to her primary care provider ordered a urine culture and Ceftin.  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  CT of the abdomen pelvis with contrast shows diffuse abnormal soft tissue infiltration and stranding seen throughout the fatty tissue  planes of the deep right pelvis and fascial thickening along the right pelvic sidewall. No loculated drainable collections. Prominent vasculature within the region. No findings to suggest venous thrombosis. The prior right ureteral dilatation also appears resolved.  The patient report at bedside that she has a history of prolapse and mesh placement.  Urinalysis is negative for possible infection  normal saline bolus x 3  ceftriaxone in the ER, continue with Ancef> broad-spectrum Zosyn  NSAID use versus acetaminophen, ketorolac x 2 as needed ordered  Monitor the WBC with daily CBC, will repeat lactic in the morning after fluid administration  AM WBC 15.92 > 17.98 maybe secondary to infection of unknown origin or due to not taking immunotherapy  Lactic Acid 2.0-resolved  IV Albumin dose 2  UA + leukocytes, - nitrites, UC negative  6/6 AM Procalcitonin 10.40 ( likely FP d/t underlying hx maligancy)  BCx-NG 72H  Follow up with oncology to assess potential impact of patient's immunotherapy.  Parasite smear-negative  D/W ID recs:  Continue Zosyn to oral      Elevated liver enzymes  Assessment & Plan  Likely secondary to sepsis see plan above  Second dose of albumin   will monitor  AST/ALT improving    Asthma  Assessment & Plan  Patient takes Advair and Proventil daily, continue with regimen  Reports asthma is well-controlled with inhaler therapy.  Patient reports exacerbation 1-2 times a year.  She was diagnosed in 2014  Follows up with [facility] pulmonology last visit February 20, 2024    GERD (gastroesophageal reflux disease)  Assessment & Plan  Patient notes having chest discomfort. Notes having a history of GERD.  Begin pantoprazole 20 mg      HTN (hypertension)  Assessment & Plan  On admission blood pressure systolic 110  Takes losartan daily. Losartan held on 6/6 due to BP in 90s / 40s . Patient received 1 L bolus   Today patient BP increased to 109/51  Closely monitor BP  Monitor vital signs per unit protocol or  when needed.  Noted patient was admitted due to sepsis, avoid hypotension    Rheumatoid arthritis, involving unspecified site, unspecified whether rheumatoid factor present ([facility])  Assessment & Plan  Treated with methotrexate and TNF in the past. Switched to Actemra weekly, continue her regimen when discharged  Last DEXA scan on 2021 showing lumbar spine: T-score is 0.2 and left Hip: Lowest T-score is -1.4.  Repeated DEXA scan on August 2023  lumbar spine BMD increased to 0.8 positive, hip BMD increased to -1.1 positive.  Patient reports pains in joints on admission, the patient prefers NSAIDs instead of acetaminophen for pain.  Noted GFR is less than 60 and this was explained to the patient at bedside along with family.  Patient and family request NSAIDs for pain education on mechanism of action of NSAIDs and kidney disease was explained, patient and family verbalized understanding.  Pharmacist was made aware.  Follows up with [facility] rheumatologist, last visit January 2024  Day for patient's medication will hold due to acute infection follow-up outpatient rheumatology  Hold Actemra due to possible side effects, reevaluate with rheumatologist      Invasive ductal carcinoma of left breast (HCC) s/p lumpectomy, Chemo & XRT  Assessment & Plan  History of history of pT1c pN0 M0 TNBC s/p BCT/SLN diagnosed in 2020  Underwent chemotherapy and radiation [id number]  Follows up with [facility] hematology and oncology last visit October 2023    Acute cystitis-resolved as of 6/9/2024  Assessment & Plan  Acute cystitis causing increasing nocturia urgency on antibiotics culture sensitivity pending  Urology input appreciated  Overactive bladder possibly related to spinal stenosis or mid urethral sling obstruction?  Patient will need workup in the outpatient urology office after discharge  UCX-no growth        Medical Problems       Resolved Problems  Date Reviewed: 6/7/2024            Resolved    Abdominal pain 6/9/2024      Resolved by  Marizol Cristina MD    Acute cystitis 6/9/2024     Resolved by  Marizol Cristina MD        Discharging Physician / Practitioner: Marizol Cristina MD  PCP: Nicky Gale MD  Admission Date:   Admission Orders (From admission, onward)       Ordered        06/05/24 1948  INPATIENT ADMISSION  Once                          Discharge Date: 06/09/24      Consultations During Hospital Stay:  ID, urology    Procedures Performed:   N/A    Significant Findings / Test Results:   N/A    Incidental Findings:   N/A    Test Results Pending at Discharge (will require follow up):   N/A     Outpatient Tests Requested:  N/A    Complications:  N/A        Hospital Course:   Shelia Miller is a 73 y.o. female patient who originally presented to the hospital on 6/5/2024 due to abdominal pain.  Patient met sepsis criteria, imaging demonstrated infection in right pelvic wall and possible bladder infection; patient covered with broad-spectrum antibiotics and will be discharged with Augmentin for 5 additional days.  Patient noted to have history of cancer and follow-up with primary oncologist.  Patient seen by urologist and will follow-up outpatient.  Patient has history of vaginal prolapse and rectocele will follow-up with gynecologist/uro.  Patient's history of rheumatoid arthritis, was on home Actemra which may have side effects that contribute to presentation, follow-up with rheumatologist for reevaluation.  Patient had elevated liver enzymes which resolved prior to discharge.      Patient has PMH GERD, HTN, asthma and completed home medication inpatient equivalent, follow-up with specialist for further management.        Please see above list of diagnoses and related plan for additional information.     Condition at Discharge: fair    Discharge Day Visit / Exam:   Subjective: Patient seen and examined at bedside, reported no headache, blurry vision, chest pain, shortness of breath, improvement in abdominal pain, no fever no  "chills.  Patient's family at bedside and patient stated she was ready to be discharged, informed to follow-up with primary PCP and specialist as discussed no other concerns at this time.  Patient reported IVF stopping overnight due to feeling like her tongue was swollen, MD encourage patient to keep Benadryl oral on hand due to possible inflammatory response.    Vitals: Blood Pressure: 147/81 (06/09/24 0844)  Pulse: 79 (06/09/24 0844)  Temperature: 98.1 °F (36.7 °C) (06/09/24 0844)  Temp Source: Oral (06/09/24 0844)  Respirations: 18 (06/09/24 0844)  Height: 5' 3\" (160 cm) (06/07/24 0706)  Weight - Scale: 74.6 kg (164 lb 7.4 oz) (06/07/24 0706)  SpO2: 98 % (06/09/24 0844)  Exam:   Physical Exam  Vitals and nursing note reviewed.   Constitutional:       General: She is not in acute distress.     Appearance: She is well-developed.   HENT:      Head: Normocephalic and atraumatic.   Eyes:      Conjunctiva/sclera: Conjunctivae normal.   Cardiovascular:      Rate and Rhythm: Normal rate and regular rhythm.      Heart sounds: No murmur heard.     No friction rub. No gallop.   Pulmonary:      Effort: Pulmonary effort is normal. No respiratory distress.      Breath sounds: Normal breath sounds. No stridor. No wheezing, rhonchi or rales.   Abdominal:      Palpations: Abdomen is soft.      Tenderness: There is no abdominal tenderness. There is no guarding or rebound.   Musculoskeletal:         General: No swelling or tenderness.      Cervical back: Neck supple.      Right lower leg: No edema.      Left lower leg: No edema.   Skin:     General: Skin is warm and dry.      Capillary Refill: Capillary refill takes less than 2 seconds.      Findings: No bruising.   Neurological:      Mental Status: She is alert and oriented to person, place, and time.      Motor: No weakness.   Psychiatric:         Mood and Affect: Mood normal.         Behavior: Behavior normal.          Discussion with Family: Updated  ( and " daughter) at bedside.    Discharge instructions/Information to patient and family:   See after visit summary for information provided to patient and family.      Provisions for Follow-Up Care:  See after visit summary for information related to follow-up care and any pertinent home health orders.      Mobility at time of Discharge:   Basic Mobility Inpatient Raw Score: 24  JH-HLM Goal: 8: Walk 250 feet or more  JH-HLM Achieved: 6: Walk 10 steps or more  HLM Goal NOT achieved. Continue to encourage mobility in post discharge setting.     Disposition:   Home    Planned Readmission: No     Discharge Statement:  I spent 40 minutes discharging the patient. This time was spent on the day of discharge. I had direct contact with the patient on the day of discharge. Greater than 50% of the total time was spent examining patient, answering all patient questions, arranging and discussing plan of care with patient as well as directly providing post-discharge instructions.  Additional time then spent on discharge activities.    Discharge Medications:  See after visit summary for reconciled discharge medications provided to patient and/or family.      **Please Note: This note may have been constructed using a voice recognition system**

## 2024-06-10 ENCOUNTER — APPOINTMENT (OUTPATIENT)
Dept: PHYSICAL THERAPY | Facility: CLINIC | Age: 74
End: 2024-06-10
Payer: COMMERCIAL

## 2024-06-10 ENCOUNTER — TELEPHONE (OUTPATIENT)
Dept: FAMILY MEDICINE CLINIC | Facility: CLINIC | Age: 74
End: 2024-06-10

## 2024-06-10 ENCOUNTER — TELEPHONE (OUTPATIENT)
Dept: HEMATOLOGY ONCOLOGY | Facility: CLINIC | Age: 74
End: 2024-06-10

## 2024-06-10 ENCOUNTER — TRANSITIONAL CARE MANAGEMENT (OUTPATIENT)
Dept: FAMILY MEDICINE CLINIC | Facility: CLINIC | Age: 74
End: 2024-06-10

## 2024-06-10 DIAGNOSIS — B37.0 THRUSH: Primary | ICD-10-CM

## 2024-06-10 LAB
% PARASITEMIA: 0
BACTERIA BLD CULT: NORMAL
BACTERIA BLD CULT: NORMAL
PARASITE BLD: NO
PATHOLOGIST INTERPRETATION: NORMAL

## 2024-06-10 NOTE — TELEPHONE ENCOUNTER
Patient returned call to Dr. Garg.  Please call at your convenience.  N/A upon transfer to practice.

## 2024-06-10 NOTE — UTILIZATION REVIEW
NOTIFICATION OF ADMISSION DISCHARGE   This is a Notification of Discharge from [facility]. Please be advised that this patient has been discharge from our facility. Below you will find the admission and discharge date and time including the patient’s disposition.   UTILIZATION REVIEW CONTACT:  Kami Santizo  Utilization   Network Utilization Review Department  Phone: [phone] x carefully listen to the prompts. All voicemails are confidential.  Email: [email or web address]     ADMISSION INFORMATION  PRESENTATION DATE: 6/5/2024  4:55 PM  OBERVATION ADMISSION DATE:   INPATIENT ADMISSION DATE: 6/5/24  7:49 PM   DISCHARGE DATE: 6/9/2024  3:59 PM   DISPOSITION:Home/Self Care    Network Utilization Review Department  ATTENTION: Please call with any questions or concerns to [phone] and carefully listen to the prompts so that you are directed to the right person. All voicemails are confidential.   For Discharge needs, contact Care Management DC Support Team at [phone] opt. 2  Send all requests for admission clinical reviews, approved or denied determinations and any other requests to dedicated fax number below belonging to the campus where the patient is receiving treatment. List of dedicated fax numbers for the Facilities:  FACILITY NAME UR FAX NUMBER   ADMISSION DENIALS (Administrative/Medical Necessity) [phone]   DISCHARGE SUPPORT TEAM ([facility]) [phone]   PARENT CHILD HEALTH (Maternity/NICU/Pediatrics) [phone]   [facility] [phone]   [facility] [phone]   [facility] [phone]   [facility] [phone]   [facility] [phone]   [facility] [phone]   [facility] [phone]   [facility] [phone]   [facility]  [facility] [phone]   [facility] [phone]   [facility] [phone]   [facility] [phone]

## 2024-06-10 NOTE — TELEPHONE ENCOUNTER
Appointment Schedule   Who are you speaking with? Patient   If it is not the patient, are they listed on an active communication consent form? N/A   Which provider is the appointment scheduled with? Dr. Concepcion   [place]   When is the appointment scheduled?  Please list date and time 07/15/2024 @4PM   What is the reason for this appointment? HFU     Nothing sooner.    Did patient voice understanding of the details of this appointment? Yes   Was the no show policy reviewed with patient? Yes

## 2024-06-10 NOTE — TELEPHONE ENCOUNTER
6/10/2024 12:00 PM returned call to Shelia     Left message on her voicemail to call the office.   Isabel Garg, DO

## 2024-06-10 NOTE — TELEPHONE ENCOUNTER
Patient returned call post missed call with provider. Please follow up with patient regarding ntibiotic therapy.

## 2024-06-10 NOTE — TELEPHONE ENCOUNTER
6/10/2024 4:05 PM returned call to Shelia     She was discharged yesterday.  She was on a whole host of antibiotics.  She was on Zosyn and zithromax.       Her tongue is shiny red. Salty food makes it irritated. The roof of her mouth is dry.    She has been using inhaler more.    She is also eating yogurt and will start Nystatin    Isabel Garg,

## 2024-06-10 NOTE — TELEPHONE ENCOUNTER
Dr Garg,    During my TCM outreach, Shelia stated her concern about taking Augmentin for the possible sinus infection she was told she may have. She started 2 days ago with oral discomfort. Her mouth is extremely dry and the roof of her mouth feels funny. If she has any salt, it burns her mouth. Her tongue had initially  been swollen but that subsided. She is afraid to take the Augmentin since she has been on so many antibiotics. Is there anything you can suggest for her? She uses [facility]

## 2024-06-11 ENCOUNTER — APPOINTMENT (OUTPATIENT)
Dept: PHYSICAL THERAPY | Facility: CLINIC | Age: 74
End: 2024-06-11
Payer: COMMERCIAL

## 2024-06-12 ENCOUNTER — RA CDI HCC (OUTPATIENT)
Dept: OTHER | Facility: HOSPITAL | Age: 74
End: 2024-06-12

## 2024-06-12 ENCOUNTER — APPOINTMENT (OUTPATIENT)
Dept: PHYSICAL THERAPY | Facility: CLINIC | Age: 74
End: 2024-06-12
Payer: COMMERCIAL

## 2024-06-12 PROBLEM — J45.30 ASTHMA, MILD PERSISTENT: Chronic | Status: ACTIVE | Noted: 2021-09-16

## 2024-06-12 PROBLEM — M06.9 RHEUMATOID ARTHRITIS (HCC): Chronic | Status: ACTIVE | Noted: 2021-07-26

## 2024-06-13 ENCOUNTER — APPOINTMENT (OUTPATIENT)
Dept: PHYSICAL THERAPY | Facility: CLINIC | Age: 74
End: 2024-06-13
Payer: COMMERCIAL

## 2024-06-14 ENCOUNTER — APPOINTMENT (OUTPATIENT)
Dept: LAB | Facility: HOSPITAL | Age: 74
End: 2024-06-14
Payer: COMMERCIAL

## 2024-06-14 ENCOUNTER — TELEPHONE (OUTPATIENT)
Age: 74
End: 2024-06-14

## 2024-06-14 DIAGNOSIS — R53.83 FATIGUE, UNSPECIFIED TYPE: ICD-10-CM

## 2024-06-14 DIAGNOSIS — N39.0 ACUTE UTI: Primary | ICD-10-CM

## 2024-06-14 DIAGNOSIS — N39.0 ACUTE UTI: ICD-10-CM

## 2024-06-14 LAB
ALBUMIN SERPL BCP-MCNC: 3.9 G/DL (ref 3.5–5)
ALP SERPL-CCNC: 62 U/L (ref 34–104)
ALT SERPL W P-5'-P-CCNC: 28 U/L (ref 7–52)
ANION GAP SERPL CALCULATED.3IONS-SCNC: 7 MMOL/L (ref 4–13)
AST SERPL W P-5'-P-CCNC: 22 U/L (ref 13–39)
BILIRUB SERPL-MCNC: 0.69 MG/DL (ref 0.2–1)
BUN SERPL-MCNC: 23 MG/DL (ref 5–25)
CALCIUM SERPL-MCNC: 9.1 MG/DL (ref 8.4–10.2)
CHLORIDE SERPL-SCNC: 103 MMOL/L (ref 96–108)
CO2 SERPL-SCNC: 26 MMOL/L (ref 21–32)
CREAT SERPL-MCNC: 0.74 MG/DL (ref 0.6–1.3)
ERYTHROCYTE [DISTWIDTH] IN BLOOD BY AUTOMATED COUNT: 13.2 % (ref 11.6–15.1)
GFR SERPL CREATININE-BSD FRML MDRD: 80 ML/MIN/1.73SQ M
GLUCOSE SERPL-MCNC: 117 MG/DL (ref 65–140)
HCT VFR BLD AUTO: 38.8 % (ref 34.8–46.1)
HGB BLD-MCNC: 12.7 G/DL (ref 11.5–15.4)
MCH RBC QN AUTO: 31.3 PG (ref 26.8–34.3)
MCHC RBC AUTO-ENTMCNC: 32.7 G/DL (ref 31.4–37.4)
MCV RBC AUTO: 96 FL (ref 82–98)
PLATELET # BLD AUTO: 229 THOUSANDS/UL (ref 149–390)
PMV BLD AUTO: 9.6 FL (ref 8.9–12.7)
POTASSIUM SERPL-SCNC: 4 MMOL/L (ref 3.5–5.3)
PROT SERPL-MCNC: 6.7 G/DL (ref 6.4–8.4)
RBC # BLD AUTO: 4.06 MILLION/UL (ref 3.81–5.12)
SODIUM SERPL-SCNC: 136 MMOL/L (ref 135–147)
WBC # BLD AUTO: 5.49 THOUSAND/UL (ref 4.31–10.16)

## 2024-06-14 PROCEDURE — 80053 COMPREHEN METABOLIC PANEL: CPT

## 2024-06-14 PROCEDURE — 85027 COMPLETE CBC AUTOMATED: CPT

## 2024-06-14 PROCEDURE — 87086 URINE CULTURE/COLONY COUNT: CPT

## 2024-06-14 PROCEDURE — 36415 COLL VENOUS BLD VENIPUNCTURE: CPT

## 2024-06-14 NOTE — TELEPHONE ENCOUNTER
Patient called and stated she is scheduled to come in on Weds 6/19 for a TCM. However, she wanted to know if there was any way she could come in sooner. Patient states she is feeling very exhausted with little to no energy. Please call patient back to advise.

## 2024-06-14 NOTE — TELEPHONE ENCOUNTER
We need to call Shelia to triage her symptoms and send to a provider who is in the office today Pepper GROVE

## 2024-06-14 NOTE — TELEPHONE ENCOUNTER
Please let her know that I ordered blood work and another urine culture for her.  I sent orders to [facility] lab  Thank you,  Isabel Garg, DO

## 2024-06-14 NOTE — TELEPHONE ENCOUNTER
Patient has no energy. She thought she would be feeling better by now. Patient stated she no fever and  Bp was 122/60 this morning prior to taking bp medicatins.  She has one more dose of antibiotics to take. She just does not feel right.    Can you advise in Dr. Benites absence    Indiana Dickinson  CMA

## 2024-06-15 LAB — BACTERIA UR CULT: NORMAL

## 2024-06-18 ENCOUNTER — OFFICE VISIT (OUTPATIENT)
Dept: PHYSICAL THERAPY | Facility: CLINIC | Age: 74
End: 2024-06-18
Payer: COMMERCIAL

## 2024-06-18 DIAGNOSIS — M77.8 EXTENSOR TENDINITIS OF FOOT: ICD-10-CM

## 2024-06-18 DIAGNOSIS — M25.571 ACUTE RIGHT ANKLE PAIN: Primary | ICD-10-CM

## 2024-06-18 DIAGNOSIS — M54.16 RADICULOPATHY OF LUMBAR REGION: ICD-10-CM

## 2024-06-18 PROCEDURE — 97112 NEUROMUSCULAR REEDUCATION: CPT

## 2024-06-18 PROCEDURE — 97110 THERAPEUTIC EXERCISES: CPT

## 2024-06-18 NOTE — PROGRESS NOTES
Daily Note     Today's date: 2024  Patient name: Shelia Miller  : 1950  MRN: [id number]  Referring provider: Shin Dan DPM  Dx:   Encounter Diagnosis     ICD-10-CM    1. Acute right ankle pain  M25.571       2. Extensor tendinitis of foot  M77.8       3. Radiculopathy of lumbar region  M54.16                      Subjective: Pt reports that she was hospitalized w/ sepsis for 4 days recently. Feels she has lost a lot of strength since that time. Pain is still in R side leg into R lateral foot.       Objective: requires slightly longer rest periods today as compared to previous visits, no techniques specifically painful      Assessment: Tolerated treatment well. Patient demonstrated fatigue post treatment and would benefit from continued PT. Full re-assessment to be done at next visit, as pt will be out of PT over the next 2 weeks. We will plan to modify program to account for strength and functional mobility changes. She looked good in reduced intensity exercise program today. Reviewed HEP, I asked her to reach out should anything change over the next two weeks. Good understanding.       Plan: Continue per plan of care. Re-eval     Precautions: standard         POC expires Auth Status Total   Visits  Start date  Expiration date PT/OT + Visit Limit? Co-Insurance    MEDICARE                                                  Visit/Unit Tracking  AUTH Status: Date               Visits  Authed:  Used                Remaining                      Dummy Auth Tracking  1 2 3 4 5 6   7 8 9 10 11 12   13 14 15 16 17 18   19 20 21 22 23 24   25 26 27 28 29 30   31 32 33 34 35 36         Date (Visit #)  (13)  (14)  (15) PN  (16)   (17)  (18)   (19)  (20) PN    Manuals            DTM and TPR            Ankle mobs            MWM                        Exercise  Diary            Ther Ex            Active w/u            PROM Same x 2-3 mins      Rev        X6 mins                   X5 mins   "X3-4 mins         Rev        X5 mins B LE mobility work x 10 mins            Ankle mobility x 4-5 mins w/ leukotape for post fib head  X5 mins                 Post prox fib mobs x 2 mins X8-9 mins mins  X14 mins     Ankle isometrics  rev           Ankle 4 way tband                             Heel raise level ground 2x10                    rev BKTC 3x15 sec  rev X3 self    B DKTC x 5 total      2x10 2x10 total x20 2x10 LTR 2x10 2x10 LTR 2x15      rev      Elephant stretch x 3-4 mins       rev rev         Neuro Re Ed            WS training            Core work rev Isos x 10                2x10 X20                x20 Isos x20 Isos and march x10 rev rev     Hip progressions Yellow loop wall slides x10, w/ lift off x10 X10 each  ev rev        Balance progressions 18 mins tspine mobilty     Cat/camel, prayer pose progressions, quad off table progressions, seated tspine ext over half foam rev  rev rev rev      Airex sq       Green sh ext x15-20    Green sh ext iso hold w/ march 2x10     HEP and POC review  X2 mins X2 mins Functional review and POC discussion x 6 mins  X2 mins  X2 mins X5 mins w/ POC rev for July                  no STARR HE SS 8\" 2x10 B     HE ant taps x15     HE squats 2x10  Level ground 2x10 B     X20 B       rev Level ground 2x10 B    HE STARR SS on 6\" step x 20 B 2x10 B  HE KOTSS 2x10 B 2x10 B       12.5# x5 no   Slider post 2x10 B           Heel raise on wedges, full range 2x10, 3 sec ecc         Gait on turf - 40'x8 - focus on push off rev X3-4 mins X4 mins fwd/bckwd focus on loading through forefoot Sled push/pull no weight added 50'x4 each no     Ther Act            Gait            Stairs            Functional lifting/transfers                                                                 Modalities              Heat             Ice                           From 4/15:  Access Code: [email or web address]  URL: [email or web address]  Date: 04/15/2024  Prepared by: Damian Preston    Exercises  - " Prone Press Up On Elbows  - 1 x daily - 7 x weekly - 3 sets - 10 reps  - Prone Press Up  - 1 x daily - 7 x weekly - 3 sets - 10 reps  - Supine Sciatic Nerve Glide  - 1 x daily - 7 x weekly - 3 sets - 10 reps  - Supine Lower Trunk Rotation  - 1 x daily - 7 x weekly - 3 sets - 10 reps  - Supine Posterior Pelvic Tilt  - 1 x daily - 7 x weekly - 3 sets - 10 reps  - Supine 90/90 Alternating Toe Touch  - 1 x daily - 7 x weekly - 3 sets - 10 reps  - Supine Bridge  - 1 x daily - 7 x weekly - 3 sets - 10 reps

## 2024-06-19 ENCOUNTER — OFFICE VISIT (OUTPATIENT)
Dept: FAMILY MEDICINE CLINIC | Facility: CLINIC | Age: 74
End: 2024-06-19
Payer: COMMERCIAL

## 2024-06-19 VITALS
RESPIRATION RATE: 16 BRPM | HEART RATE: 67 BPM | TEMPERATURE: 99.1 F | WEIGHT: 160 LBS | DIASTOLIC BLOOD PRESSURE: 64 MMHG | SYSTOLIC BLOOD PRESSURE: 132 MMHG | BODY MASS INDEX: 28.34 KG/M2

## 2024-06-19 DIAGNOSIS — I10 HYPERTENSION, UNSPECIFIED TYPE: Chronic | ICD-10-CM

## 2024-06-19 DIAGNOSIS — R65.20 SEPSIS WITH ACUTE ORGAN DYSFUNCTION, DUE TO UNSPECIFIED ORGANISM, UNSPECIFIED ORGAN DYSFUNCTION TYPE, UNSPECIFIED WHETHER SEPTIC SHOCK PRESENT (HCC): Primary | ICD-10-CM

## 2024-06-19 DIAGNOSIS — A41.9 SEPSIS WITH ACUTE ORGAN DYSFUNCTION, DUE TO UNSPECIFIED ORGANISM, UNSPECIFIED ORGAN DYSFUNCTION TYPE, UNSPECIFIED WHETHER SEPTIC SHOCK PRESENT (HCC): Primary | ICD-10-CM

## 2024-06-19 DIAGNOSIS — N73.9: ICD-10-CM

## 2024-06-19 DIAGNOSIS — B37.9 YEAST INFECTION: ICD-10-CM

## 2024-06-19 PROBLEM — R74.8 ELEVATED LIVER ENZYMES: Status: RESOLVED | Noted: 2024-06-06 | Resolved: 2024-06-19

## 2024-06-19 PROCEDURE — G2211 COMPLEX E/M VISIT ADD ON: HCPCS | Performed by: FAMILY MEDICINE

## 2024-06-19 PROCEDURE — 99214 OFFICE O/P EST MOD 30 MIN: CPT | Performed by: FAMILY MEDICINE

## 2024-06-19 RX ORDER — FLUCONAZOLE 150 MG/1
150 TABLET ORAL
Qty: 2 TABLET | Refills: 0 | Status: SHIPPED | OUTPATIENT
Start: 2024-06-19 | End: 2024-06-23

## 2024-06-19 NOTE — PROGRESS NOTES
Transition of Care Visit  Name: Shelia Miller      : 1950      MRN: [id number]  Encounter Provider: Nicky Gale MD  Encounter Date: 2024   Encounter department: [facility]    Assessment & Plan   1. Sepsis with acute organ dysfunction, due to unspecified organism, unspecified organ dysfunction type, unspecified whether septic shock present ([facility])  Assessment & Plan:  Resolved. Thought to be 2/2 UTI vs pelvic infection.   2. Yeast infection  -     fluconazole (DIFLUCAN) 150 mg tablet; Take 1 tablet (150 mg total) by mouth every third day for 2 doses  3. Infection within female pelvic cavity  Assessment & Plan:  She has completed antibiotics. Will follow up with uro-gyn.   4. Hypertension, unspecified type  Assessment & Plan:  She has been more weak than usual. Has not been taking her losartan and BP is still within range. Continue to hold.          History of Present Illness     Transitional Care Management Review:   Shelia Miller is a 73 y.o. female here for TCM follow up.     During the TCM phone call patient stated:  TCM Call       Date and time call was made  6/10/2024  9:18 AM    Hospital care reviewed  Records reviewed    Patient was hospitialized at  [facility]    Date of Admission  24    Date of discharge  24    Diagnosis  Sepsis with acute organ dysfunction    Disposition  Home    Were the patients medications reviewed and updated  No  She states that she knows what to take but will call if any questions    Current Symptoms  --  Oral pain- see separate telephone call          TCM Call       Post hospital issues  None    Should patient be enrolled in anticoag monitoring?  No    Scheduled for follow up?  Yes    Patients specialists  Urologist; Other (comment)    Urologist name  Samuel Lara MD (Urogynecology) and Dr Merlos    Other specialists names  Otis Concepcion MD (Hematology and Oncology)    Did you obtain your prescribed medications  Yes    Do you  need help managing your prescriptions or medications  No    Is transportation to your appointment needed  No    I have advised the patient to call PCP with any new or worsening symptoms  Mikie Browning    Living Arrangements  Spouse or Significiant other    Support System  Family    The type of support provided  Physical; Emotional    Do you have social support  Yes, as much as I need    Are you recieving any outpatient services  No    Are you recieving home care services  No    Interperter language line needed  No    Counseling  Patient    Counseling topics  Activities of daily living; Importance of RX compliance; patient and family education; instructions for management; Risk factor reduction    Comments  See separate call regarding oral discomfort. She knows to report any fevers immediately and to go to the ER if any chest pain or dyspnea, etc Mikie Browning          [facility]  Shelia is here today for hospital discharge f/u after she was hospitalized at [facility] from 6/5 to 6/9/24 for sepsis 2/2 possible pelvic infection vs UTI. She was treated with broad spectrum antibiotics and discharged on augmentin which she has completed. She does have a history of vaginal prolapse and rectocele so will be following up with uro/gyn and GI.   Today reports continued weakness/fatigue, but it has been improving slowly. No fevers/chills/urinary symptoms/abdominal pain.   She does complain of vaginal irritation/discharge which feels like a yeast infection.   Review of Systems   Constitutional:  Positive for fatigue.   HENT: Negative.     Eyes: Negative.    Respiratory: Negative.     Cardiovascular: Negative.    Gastrointestinal: Negative.    Endocrine: Negative.    Genitourinary:  Positive for vaginal discharge.   Musculoskeletal: Negative.    Skin: Negative.    Allergic/Immunologic: Negative.    Neurological:  Positive for weakness.   Hematological: Negative.    Psychiatric/Behavioral: Negative.       Objective     BP  132/64   Pulse 67   Temp 99.1 °F (37.3 °C)   Resp 16   Wt 72.6 kg (160 lb)   BMI 28.34 kg/m²     Physical Exam  Constitutional:       General: She is not in acute distress.     Appearance: She is well-developed. She is not diaphoretic.   HENT:      Head: Normocephalic and atraumatic.   Cardiovascular:      Rate and Rhythm: Normal rate and regular rhythm.      Heart sounds: Normal heart sounds. No murmur heard.     No friction rub. No gallop.   Pulmonary:      Effort: Pulmonary effort is normal. No respiratory distress.      Breath sounds: Normal breath sounds. No wheezing or rales.   Chest:      Chest wall: No tenderness.   Abdominal:      General: Abdomen is flat. There is no distension.      Palpations: Abdomen is soft. There is no mass.      Tenderness: There is no abdominal tenderness. There is no right CVA tenderness, left CVA tenderness, guarding or rebound.      Hernia: No hernia is present.   Musculoskeletal:         General: No deformity. Normal range of motion.      Cervical back: Normal range of motion and neck supple.   Skin:     General: Skin is warm and dry.   Neurological:      General: No focal deficit present.      Mental Status: She is alert and oriented to person, place, and time.   Psychiatric:         Behavior: Behavior normal.         Thought Content: Thought content normal.         Judgment: Judgment normal.       Medications have been reviewed by provider in current encounter    Administrative Statements

## 2024-06-24 ENCOUNTER — TELEPHONE (OUTPATIENT)
Dept: FAMILY MEDICINE CLINIC | Facility: CLINIC | Age: 74
End: 2024-06-24

## 2024-06-24 ENCOUNTER — TELEPHONE (OUTPATIENT)
Age: 74
End: 2024-06-24

## 2024-06-24 NOTE — TELEPHONE ENCOUNTER
Patient will drop off a form for Dr. Gale to complete that states she is has undergone recent medical treatment and [place] to [place] at this [place]. She has to cancel a tour that requires a doctors note.

## 2024-07-06 PROBLEM — R65.20 SEPSIS WITH ACUTE ORGAN DYSFUNCTION (HCC): Status: RESOLVED | Noted: 2024-06-06 | Resolved: 2024-07-06

## 2024-07-06 PROBLEM — A41.9 SEPSIS WITH ACUTE ORGAN DYSFUNCTION (HCC): Status: RESOLVED | Noted: 2024-06-06 | Resolved: 2024-07-06

## 2024-07-09 ENCOUNTER — EVALUATION (OUTPATIENT)
Dept: PHYSICAL THERAPY | Facility: CLINIC | Age: 74
End: 2024-07-09
Payer: COMMERCIAL

## 2024-07-09 DIAGNOSIS — M25.571 ACUTE RIGHT ANKLE PAIN: Primary | ICD-10-CM

## 2024-07-09 DIAGNOSIS — M77.8 EXTENSOR TENDINITIS OF FOOT: ICD-10-CM

## 2024-07-09 DIAGNOSIS — M54.16 RADICULOPATHY OF LUMBAR REGION: ICD-10-CM

## 2024-07-09 PROCEDURE — 97112 NEUROMUSCULAR REEDUCATION: CPT

## 2024-07-09 PROCEDURE — 97110 THERAPEUTIC EXERCISES: CPT

## 2024-07-09 NOTE — PROGRESS NOTES
Progress Note     Today's date: 2024  Patient name: Shelia Miller  : 1950  MRN: [id number]  Referring provider: Shin Dan DPM  Dx:   Encounter Diagnosis     ICD-10-CM    1. Acute right ankle pain  M25.571       2. Extensor tendinitis of foot  M77.8       3. Radiculopathy of lumbar region  M54.16                      Subjective: Pt notes improvement over the last two weeks. Still feels tired, not as bad. Feels that energy is returning. Notes that she has been more active in the last week. Pt reports R lateral leg pain to 5th digit, hip/SI pain at night taht requires movement to sleep, ankles have been better, pain levels are up and down overall. Wants to get back into regular sessions. Encouraged by progress. Update below:       Goals  Short term goals (3 weeks): ALL PROGRESSED   1) Pt will improve B ankle AROM to WNL pain free.  2) Pt will improve B LE and core strength deficits by 1/3 grade MMT.   3) Pt will reports pain at worse <5/10.  4) Pt will initiate and progress HEP w/ special emphasis on functional ankle mobility and strength.     Long term goals (6 weeks) ALL PROGRESSED   1) Pt will improve FOTO to at least 61. - achieved   2) Pt will stand and ambulate community distances w/o deficit related to ankles.   3) Pt will sleep through the night in positioning of choosing 6/7 nights a week w/o waking due to pain.  4) Pt will be independent and compliant w/ HEP in order to maximize functional benefit of skilled PT following d/c.     *goals extended by 2 and 4 weeks, respectively    : again extended 2 weeks until likely d/c at that time    New Goals 24:  Short term goals (3 weeks):  1) Pt will improve B ankle AROM to WNL pain free.  2) Pt will further improve B LE and core strength deficits by 1/3 grade MMT.   3) Pt will reports pain at worse <5/10.  4) Pt will initiate and progress HEP w/ special emphasis on functional ankle mobility and strength.     Long term goals (6 weeks)  1) Pt  will perform two full weeks of home activities and self care w/o deficit related to back pain.  2) Pt will stand and ambulate community distances w/o deficit related to ankles.   3) Pt will sleep through the night in positioning of choosing 6/7 nights a week w/o waking due to pain.  4) Pt will be independent and compliant w/ HEP in order to maximize functional benefit of skilled PT following d/c.     Pain  Current pain rating: 3  At best pain ratin  At worst pain ratin-9 (R lateral leg into little toe)  Location: L ankle, across low back, into R leg  Quality: dull ache and sharp  Relieving factors: rest and change in position (elevation, ankle brace)  Aggravating factors: standing, stair climbing, running and lifting (standing <1 hour, walking <3-4 blocks, stairs are step to)  Progression: improved in all respects, some pain in R shin into ankle (can stand longer before it comes on)    Social Support  Steps to enter house: yes  Stairs in house: yes   Lives in: multiple-level home  Lives with: spouse    Employment status: not working        Objective     Palpation     Additional Palpation Details  TTP through R ATFL and CFL, through L midfoot (plantar aspect)   -: through L ATFL and CFL   -: min through L CFL only, min through R post ankle   -: along R CFL, lateral calf    Neurological Testing     Sensation     Ankle/Foot   Left Ankle/Foot   Intact: light touch    Right Ankle/Foot   Intact: light touch     Active Range of Motion     Additional Active Range of Motion Details  L ankle DF and PF grossly decreased by 50%, inv/ev <25% normal limits  R ankle grossly 75% of normal limits for all     -: grossly at least 75% B w/o pain   -: grossly WNL B w/o pain    -: similar     Passive Range of Motion     Right Ankle/Foot  Normal passive range of motion    Additional Passive Range of Motion Details  L ankle DF and PF decrease to 75% of normal limits on L, inv/ev to 50%    R grossly WNL  "     -4/25: grossly WNL w/o pain   -5/23: same    -7/9: similar     Joint Play   Left Ankle/Foot  Hypomobile in the subtalar joint and midfoot.     Right Ankle/Foot  Hypomobile in the distal tibiofibular joint, talocrural joint, subtalar joint and midfoot.     Strength/Myotome Testing     Left Ankle/Foot   Dorsiflexion: 4  Plantar flexion: 4  Inversion: 4  Eversion: 4  Great toe flexion: 4  Great toe extension: 4    Right Ankle/Foot   Dorsiflexion: 4  Plantar flexion: 4  Inversion: 4  Eversion: 4-  Great toe flexion: 4  Great toe extension: 4-    Tests     Additional Tests Details  LAD through L TC joint \"feels good\"   -4/25: continued    -5/23: continued    -7/9: continued     Low back to be more thoroughly assessed in future   -LAD provides relief, BKTC provides relief   -thoracolumbar ROM grossly 75% of normal range w/ improved control as compared to eval   -5/23: not tested today, grossly WNL w/ most functional motions   -7/9: continues to respond more readily to flexion based progressions       Ambulation     Ambulation: Level Surfaces     Additional Level Surfaces Ambulation Details  Min decreased step length and WB on L LE, min decreased pace, not specifically antalgic today.    -4/25: improved upright posture, improved pace, min decreased WB through L LE, min antalgic    -5/23: good upright positioning, min L trunk lean, improved equality of WB but min decreased step length on L LE   -7/9: min decreased step length and WB on R LE, min fatigue, not specifically antalgic today.     Functional Assessment        Comments  Sit<>stand: UE support on LE w/ R side WS    -4/25: can perform unsupported x 4-5 reps w/ fair glute drive   -5/23: similar    -7/9: at least 5 reps, min L WS today    BW squat: not past 25% before compensation    -4/25: at least 50% w/ min UE support, fair glute drive   -5/23: not tested    -7/9: not tested     Stairs: TBA   -4/25: not assessed   -5/23: has not been a point of emphasis "    -7/9: not tested     SLS: painful B    -4/25: not tested   -5/23: not tested  -7/9: not tested          Assessment: Tolerated treatment well. Patient demonstrated fatigue post treatment, exhibited good technique with therapeutic exercises, and would benefit from continued PT. Pt has done very well over last two weeks on her own. She will benefit from more aggressive strength work in upcoming sessions. Today we focused mainly on re-establishing POC and reviewing HEP, combined w/ manual and mobility work. We will look to increase intensity of strength work slowly, w/ emphasis on mechanics and pain free techniques as much as possible. Pt in agreement w/ plan and will be seen on 2x/week basis over at least the next 4 weeks.      Plan: Continue per plan of care.  Reintroduce strength work     Precautions: standard         POC expires Auth Status Total   Visits  Start date  Expiration date PT/OT + Visit Limit? Co-Insurance    MEDICARE                                                  Visit/Unit Tracking  AUTH Status: Date               Visits  Authed:  Used                Remaining                      Dummy Auth Tracking  1 2 3 4 5 6   7 8 9 10 11 12   13 14 15 16 17 18   19 20 21 22 23 24   25 26 27 28 29 30   31 32 33 34 35 36         Date (Visit #) 5/16 (13) 5/21 (14) 5/23 (15) PN 5/28 (16)  5/30 (17) 6/4 (18)  6/18 (19)  7/9 (20) PN    Manuals            DTM and TPR            Ankle mobs            MWM                        Exercise  Diary            Ther Ex            Active w/u            PROM Same x 2-3 mins      Rev        X6 mins                   X5 mins  X3-4 mins         Rev        X5 mins B LE mobility work x 10 mins            Ankle mobility x 4-5 mins w/ leukotape for post fib head  X5 mins                 Post prox fib mobs x 2 mins X8-9 mins mins  X14 mins B hip and LE mobility work, focus on R side    MWM for R hip flex/IR, LAD, piri and HS/sciatic nerve work     Ankle isometrics  rev       Leukotape  "jin sling for R side only x 5 mins    Ankle 4 way tband                             Heel raise level ground 2x10                    rev BKTC 3x15 sec  rev X3 self    B DKTC x 5 total 3x5      2x10 2x10 total x20 2x10 LTR 2x10 2x10 LTR 2x15 2x15     rev      Elephant stretch x 3-4 mins rev      rev rev         Neuro Re Ed            WS training        Bridge 2x10    Core work rev Isos x 10                2x10 X20                x20 Isos x20 Isos and march x10 rev rev     Hip progressions Yellow loop wall slides x10, w/ lift off x10 X10 each  ev rev        Balance progressions 18 mins tspine mobilty     Cat/camel, prayer pose progressions, quad off table progressions, seated tspine ext over half foam rev  rev rev rev      Airex sq       Green sh ext x15-20    Green sh ext iso hold w/ march 2x10     HEP and POC review  X2 mins X2 mins Functional review and POC discussion x 6 mins  X2 mins  X2 mins X5 mins w/ POC rev for July X8 mins                  no STARR HE SS 8\" 2x10 B     HE ant taps x15     HE squats 2x10  Level ground 2x10 B     X20 B       rev Level ground 2x10 B    HE STARR SS on 6\" step x 20 B 2x10 B  HE KOTSS 2x10 B 2x10 B       12.5# x5 no   Slider post 2x10 B           Heel raise on wedges, full range 2x10, 3 sec ecc         Gait on turf - 40'x8 - focus on push off rev X3-4 mins X4 mins fwd/bckwd focus on loading through forefoot Sled push/pull no weight added 50'x4 each no     Ther Act            Gait            Stairs            Functional lifting/transfers                                                                 Modalities              Heat             Ice                           From 4/15:  Access Code: [email or web address]  URL: [email or web address]  Date: 04/15/2024  Prepared by: Damian Preston    Exercises  - Prone Press Up On Elbows  - 1 x daily - 7 x weekly - 3 sets - 10 reps  - Prone Press Up  - 1 x daily - 7 x weekly - 3 sets - 10 reps  - Supine Sciatic Nerve Glide  - 1 x daily - 7 x " weekly - 3 sets - 10 reps  - Supine Lower Trunk Rotation  - 1 x daily - 7 x weekly - 3 sets - 10 reps  - Supine Posterior Pelvic Tilt  - 1 x daily - 7 x weekly - 3 sets - 10 reps  - Supine 90/90 Alternating Toe Touch  - 1 x daily - 7 x weekly - 3 sets - 10 reps  - Supine Bridge  - 1 x daily - 7 x weekly - 3 sets - 10 reps

## 2024-07-11 ENCOUNTER — OFFICE VISIT (OUTPATIENT)
Dept: PHYSICAL THERAPY | Facility: CLINIC | Age: 74
End: 2024-07-11
Payer: COMMERCIAL

## 2024-07-11 DIAGNOSIS — M25.571 ACUTE RIGHT ANKLE PAIN: Primary | ICD-10-CM

## 2024-07-11 DIAGNOSIS — M54.16 RADICULOPATHY OF LUMBAR REGION: ICD-10-CM

## 2024-07-11 DIAGNOSIS — M77.8 EXTENSOR TENDINITIS OF FOOT: ICD-10-CM

## 2024-07-11 PROCEDURE — 97110 THERAPEUTIC EXERCISES: CPT

## 2024-07-11 PROCEDURE — 97112 NEUROMUSCULAR REEDUCATION: CPT

## 2024-07-11 NOTE — PROGRESS NOTES
Daily Note     Today's date: 2024  Patient name: Shelia Miller  : 1950  MRN: [id number]  Referring provider: Shin Dan DPM  Dx:   Encounter Diagnosis     ICD-10-CM    1. Acute right ankle pain  M25.571       2. Extensor tendinitis of foot  M77.8       3. Radiculopathy of lumbar region  M54.16                      Subjective: Pt reports that she felt good after last visit, ankle still feels good today. Notes that her R knee and posterior R glute are sore today. Gets jolts of pain w/ steps at times.       Objective: requires cueing for true hip rotation w/ clams/BKFO - corrects and is able to maintain. Fatigues quickly.      Assessment: Tolerated treatment well. Patient demonstrated fatigue post treatment and would benefit from continued PT      Plan: Continue per plan of care.      Precautions: standard         POC expires Auth Status Total   Visits  Start date  Expiration date PT/OT + Visit Limit? Co-Insurance    MEDICARE                                                  Visit/Unit Tracking  AUTH Status: Date               Visits  Authed:  Used                Remaining                      Dummy Auth Tracking  1 2 3 4 5 6   7 8 9 10 11 12   13 14 15 16 17 18   19 20 21 22 23 24   25 26 27 28 29 30   31 32 33 34 35 36         Date (Visit #)  (13)  (14)  (15) PN  (16)   (17)  (18)   (19)   (20) PN  (21)   Manuals            DTM and TPR            Ankle mobs            MWM                        Exercise  Diary            Ther Ex            Active w/u            PROM Same x 2-3 mins      Rev        X6 mins                   X5 mins  X3-4 mins         Rev        X5 mins B LE mobility work x 10 mins            Ankle mobility x 4-5 mins w/ leukotape for post fib head  X5 mins                 Post prox fib mobs x 2 mins X8-9 mins mins  X14 mins B hip and LE mobility work, focus on R side    MWM for R hip flex/IR, LAD, piri and HS/sciatic nerve work  X12 mins          2x8 B  "  Ankle isometrics  rev       Leukotape jin sling for R side only x 5 mins no   Ankle 4 way tband                             Heel raise level ground 2x10                    rev BKTC 3x15 sec  rev X3 self    B DKTC x 5 total 3x5      2x10 2x10 total x20 2x10 LTR 2x10 2x10 LTR 2x15 2x15 2x15    rev      Elephant stretch x 3-4 mins rev Self HF stretch on chair x3-4 mins     rev rev      Piriformis stretch x2-3 mins   Neuro Re Ed            WS training        Bridge 2x10 Red tband s/l clams x12 B    Supine TrA red BKFO x12 B   Core work rev Isos x 10                2x10 X20                x20 Isos x20 Isos and march x10 rev rev  rev   Hip progressions Yellow loop wall slides x10, w/ lift off x10 X10 each  ev rev        Balance progressions 18 mins tspine mobilty     Cat/camel, prayer pose progressions, quad off table progressions, seated tspine ext over half foam rev  rev rev rev      Airex sq       Green sh ext x15-20    Green sh ext iso hold w/ march 2x10     HEP and POC review  X2 mins X2 mins Functional review and POC discussion x 6 mins  X2 mins  X2 mins X5 mins w/ POC rev for July X8 mins  X3-4 mins                no STARR HE SS 8\" 2x10 B     HE ant taps x15     HE squats 2x10  Level ground 2x10 B     X20 B       rev Level ground 2x10 B    HE STARR SS on 6\" step x 20 B 2x10 B  HE KOTSS 2x10 B 2x10 B       12.5# x5 no   Slider post 2x10 B           Heel raise on wedges, full range 2x10, 3 sec ecc         Gait on turf - 40'x8 - focus on push off rev X3-4 mins X4 mins fwd/bckwd focus on loading through forefoot Sled push/pull no weight added 50'x4 each no     Ther Act            Gait            Stairs            Functional lifting/transfers                                                                 Modalities              Heat             Ice                           From 4/15:  Access Code: [email or web address]  URL: [email or web address]  Date: 04/15/2024  Prepared by: Damian Preston    Exercises  - Prone " Press Up On Elbows  - 1 x daily - 7 x weekly - 3 sets - 10 reps  - Prone Press Up  - 1 x daily - 7 x weekly - 3 sets - 10 reps  - Supine Sciatic Nerve Glide  - 1 x daily - 7 x weekly - 3 sets - 10 reps  - Supine Lower Trunk Rotation  - 1 x daily - 7 x weekly - 3 sets - 10 reps  - Supine Posterior Pelvic Tilt  - 1 x daily - 7 x weekly - 3 sets - 10 reps  - Supine 90/90 Alternating Toe Touch  - 1 x daily - 7 x weekly - 3 sets - 10 reps  - Supine Bridge  - 1 x daily - 7 x weekly - 3 sets - 10 reps

## 2024-07-15 NOTE — PROGRESS NOTES
Daily Note     Today's date: 2024  Patient name: Shelia Miller  : 1950  MRN: [id number]  Referring provider: Shin Dan DPM  Dx:   Encounter Diagnosis     ICD-10-CM    1. Acute right ankle pain  M25.571       2. Extensor tendinitis of foot  M77.8       3. Radiculopathy of lumbar region  M54.16                      Subjective: Pt reports that her hips were very sore after last session. Feels that her muscles were very tired. Feels better today. Will go for steroid injection in low back on Friday, .       Objective: requires cueing initially w/ clams w/ IR in s/l for full rotation to finish motion, corrects and is able to maintain      Assessment: Tolerated treatment well. Patient demonstrated fatigue post treatment and would benefit from continued PT. Does well w/ exercise progressions today. Slightly reduced total intensity in response to last visit. Will look to increase as sx allow, next visit will likely being on the light to moderate side in order to prevent excessive soreness prior to injection.       Plan: Continue per plan of care. Injection Friday, side step, glute med work     Precautions: standard         POC expires Auth Status Total   Visits  Start date  Expiration date PT/OT + Visit Limit? Co-Insurance    MEDICARE                                                  Visit/Unit Tracking  AUTH Status: Date               Visits  Authed:  Used                Remaining                      Dummy Auth Tracking  1 2 3 4 5 6   7 8 9 10 11 12   13 14 15 16 17 18   19 20 21 22 23 24   25 26 27 28 29 30   31 32 33 34 35 36         Date (Visit #)  (18)   (19)   (20) PN  (21)  (22)     Manuals          DTM and TPR          Ankle mobs          MWM                    Exercise  Diary          Ther Ex          Active w/u          PROM X8-9 mins mins  X14 mins B hip and LE mobility work, focus on R side    MWM for R hip flex/IR, LAD, piri and HS/sciatic nerve work  X12 mins          2x8  B B HS and glue/piri work x10 mins      MWM for R hip flex/IR 2x8    Belt distraction x 2-3 mins     Ankle isometrics    Leukotape jin sling for R side only x 5 mins no no     Ankle 4 way tband                         Leg press 75# 2x8                 B DKTC x 5 total 3x5   2x5      2x10 LTR 2x15 2x15 2x15 2x15       Elephant stretch x 3-4 mins rev Self HF stretch on chair x3-4 mins rev         Piriformis stretch x2-3 mins rev     Neuro Re Ed          WS training   Bridge 2x10 Red tband s/l clams x12 B    Supine TrA red BKFO x12 B Supine single BKFO yellow 2x10    S/l clams w/ IR BW only 2x8 B     Core work rev rev  rev      Hip progressions          Balance progressions rev         Airex sq  Green sh ext x15-20    Green sh ext iso hold w/ march 2x10        HEP and POC review  X2 mins X5 mins w/ POC rev for July X8 mins  X3-4 mins X2-3 mins          Quick gait on turf x 4 laps       HE KOTSS 2x10 B 2x10 B         Slider post 2x10 B                    Sled push/pull no weight added 50'x4 each no        Ther Act          Gait          Stairs          Functional lifting/transfers                                                       Modalities            Heat           Ice                       From 4/15:  Access Code: [email or web address]  URL: [email or web address]  Date: 04/15/2024  Prepared by: Damian Preston    Exercises  - Prone Press Up On Elbows  - 1 x daily - 7 x weekly - 3 sets - 10 reps  - Prone Press Up  - 1 x daily - 7 x weekly - 3 sets - 10 reps  - Supine Sciatic Nerve Glide  - 1 x daily - 7 x weekly - 3 sets - 10 reps  - Supine Lower Trunk Rotation  - 1 x daily - 7 x weekly - 3 sets - 10 reps  - Supine Posterior Pelvic Tilt  - 1 x daily - 7 x weekly - 3 sets - 10 reps  - Supine 90/90 Alternating Toe Touch  - 1 x daily - 7 x weekly - 3 sets - 10 reps  - Supine Bridge  - 1 x daily - 7 x weekly - 3 sets - 10 reps

## 2024-07-16 ENCOUNTER — OFFICE VISIT (OUTPATIENT)
Dept: PHYSICAL THERAPY | Facility: CLINIC | Age: 74
End: 2024-07-16
Payer: COMMERCIAL

## 2024-07-16 DIAGNOSIS — M77.8 EXTENSOR TENDINITIS OF FOOT: ICD-10-CM

## 2024-07-16 DIAGNOSIS — M54.16 RADICULOPATHY OF LUMBAR REGION: ICD-10-CM

## 2024-07-16 DIAGNOSIS — M25.571 ACUTE RIGHT ANKLE PAIN: Primary | ICD-10-CM

## 2024-07-16 PROCEDURE — 97112 NEUROMUSCULAR REEDUCATION: CPT

## 2024-07-16 PROCEDURE — 97110 THERAPEUTIC EXERCISES: CPT

## 2024-07-17 NOTE — PROGRESS NOTES
Daily Note     Today's date: 2024  Patient name: Shelia Miller  : 1950  MRN: [id number]  Referring provider: Shin Dan DPM  Dx:   Encounter Diagnosis     ICD-10-CM    1. Acute right ankle pain  M25.571       2. Extensor tendinitis of foot  M77.8       3. Radiculopathy of lumbar region  M54.16                      Subjective: Pt reports no new complaints. Has some R sided low back and hip soreness. Has injection scheduled for tomorrow.      Objective: requires cueing for stability through R LE w/ slider work in R stance. Corrects and is able to maintain.       Assessment: Tolerated treatment well. Patient demonstrated fatigue post treatment and would benefit from continued PT. Does well w/ exercise progressions, kept session lighter so as not to exacerbate sx prior to injection. Will check in on status next week and progress as able, focusing on maximizing strength should sx start to lessen. Pt in agreement w/ plan.      Plan: Continue per plan of care. Check on injection, progress intensity slowly     Precautions: standard         POC expires Auth Status Total   Visits  Start date  Expiration date PT/OT + Visit Limit? Co-Insurance    MEDICARE                                                  Visit/Unit Tracking  AUTH Status: Date               Visits  Authed:  Used                Remaining                      Dummy Auth Tracking  1 2 3 4 5 6   7 8 9 10 11 12   13 14 15 16 17 18   19 20 21 22 23 24   25 26 27 28 29 30   31 32 33 34 35 36         Date (Visit #)  (18)   (19)   (20) PN  (21)  (22)  (23)    Manuals          DTM and TPR          Ankle mobs          MWM                    Exercise  Diary          Ther Ex          Active w/u          PROM X8-9 mins mins  X14 mins B hip and LE mobility work, focus on R side    MWM for R hip flex/IR, LAD, piri and HS/sciatic nerve work  X12 mins          2x8 B B HS and glue/piri work x10 mins      MWM for R hip flex/IR 2x8    Belt  distraction x 2-3 mins X8 mins    L s/l R lumbar gapping for R sciatic nerve 3x30 sec       [id number]    MWM for flex x10     Ankle isometrics    Leukotape jin sling for R side only x 5 mins no no     Ankle 4 way tband                         Leg press 75# 2x8 Leg press 75# x10    85# 2x8                 B DKTC x 5 total 3x5   2x5 rev     2x10 LTR 2x15 2x15 2x15 2x15 3x15      Elephant stretch x 3-4 mins rev Self HF stretch on chair x3-4 mins rev rev        Piriformis stretch x2-3 mins rev rev    Neuro Re Ed          WS training   Bridge 2x10 Red tband s/l clams x12 B    Supine TrA red BKFO x12 B Supine single BKFO yellow 2x10    S/l clams w/ IR BW only 2x8 B TrA isos x 10    Bridge x10    Core work rev rev  rev      Hip progressions          Balance progressions rev         Airex sq  Green sh ext x15-20    Green sh ext iso hold w/ march 2x10        HEP and POC review  X2 mins X5 mins w/ POC rev for July X8 mins  X3-4 mins X2-3 mins X2-3 mins         Quick gait on turf x 4 laps  rev     HE KOTSS 2x10 B 2x10 B         Slider post 2x10 B     Post/lat 2x8-10 B           YTB side step - shins, at bar x 4 laps     Sled push/pull no weight added 50'x4 each no        Ther Act          Gait          Stairs          Functional lifting/transfers                                                       Modalities            Heat           Ice                       From 4/15:  Access Code: [email or web address]  URL: [email or web address]  Date: 04/15/2024  Prepared by: Damian Preston    Exercises  - Prone Press Up On Elbows  - 1 x daily - 7 x weekly - 3 sets - 10 reps  - Prone Press Up  - 1 x daily - 7 x weekly - 3 sets - 10 reps  - Supine Sciatic Nerve Glide  - 1 x daily - 7 x weekly - 3 sets - 10 reps  - Supine Lower Trunk Rotation  - 1 x daily - 7 x weekly - 3 sets - 10 reps  - Supine Posterior Pelvic Tilt  - 1 x daily - 7 x weekly - 3 sets - 10 reps  - Supine 90/90 Alternating Toe Touch  - 1 x daily - 7 x weekly - 3 sets -  10 reps  - Supine Bridge  - 1 x daily - 7 x weekly - 3 sets - 10 reps

## 2024-07-18 ENCOUNTER — OFFICE VISIT (OUTPATIENT)
Dept: PHYSICAL THERAPY | Facility: CLINIC | Age: 74
End: 2024-07-18
Payer: COMMERCIAL

## 2024-07-18 DIAGNOSIS — M54.16 RADICULOPATHY OF LUMBAR REGION: ICD-10-CM

## 2024-07-18 DIAGNOSIS — M25.571 ACUTE RIGHT ANKLE PAIN: Primary | ICD-10-CM

## 2024-07-18 DIAGNOSIS — M77.8 EXTENSOR TENDINITIS OF FOOT: ICD-10-CM

## 2024-07-18 PROCEDURE — 97112 NEUROMUSCULAR REEDUCATION: CPT

## 2024-07-18 PROCEDURE — 97110 THERAPEUTIC EXERCISES: CPT

## 2024-07-19 ENCOUNTER — APPOINTMENT (OUTPATIENT)
Dept: RADIOLOGY | Facility: HOSPITAL | Age: 74
End: 2024-07-19
Payer: COMMERCIAL

## 2024-07-19 ENCOUNTER — HOSPITAL ENCOUNTER (OUTPATIENT)
Facility: AMBULARY SURGERY CENTER | Age: 74
Setting detail: OUTPATIENT SURGERY
Discharge: HOME | End: 2024-07-19
Attending: STUDENT IN AN ORGANIZED HEALTH CARE EDUCATION/TRAINING PROGRAM | Admitting: STUDENT IN AN ORGANIZED HEALTH CARE EDUCATION/TRAINING PROGRAM
Payer: COMMERCIAL

## 2024-07-19 VITALS
HEIGHT: 63 IN | RESPIRATION RATE: 16 BRPM | WEIGHT: 160 LBS | OXYGEN SATURATION: 97 % | DIASTOLIC BLOOD PRESSURE: 81 MMHG | TEMPERATURE: 97.5 F | SYSTOLIC BLOOD PRESSURE: 157 MMHG | BODY MASS INDEX: 28.35 KG/M2 | HEART RATE: 67 BPM

## 2024-07-19 PROBLEM — M54.16 LUMBAR RADICULOPATHY: Status: ACTIVE | Noted: 2024-07-19

## 2024-07-19 PROCEDURE — NC001 PR NO CHARGE: Performed by: STUDENT IN AN ORGANIZED HEALTH CARE EDUCATION/TRAINING PROGRAM

## 2024-07-19 PROCEDURE — 62323 NJX INTERLAMINAR LMBR/SAC: CPT | Performed by: STUDENT IN AN ORGANIZED HEALTH CARE EDUCATION/TRAINING PROGRAM

## 2024-07-19 RX ORDER — BUPIVACAINE HYDROCHLORIDE 2.5 MG/ML
INJECTION, SOLUTION EPIDURAL; INFILTRATION; INTRACAUDAL AS NEEDED
Status: DISCONTINUED | OUTPATIENT
Start: 2024-07-19 | End: 2024-07-19 | Stop reason: HOSPADM

## 2024-07-19 RX ORDER — LIDOCAINE HYDROCHLORIDE 10 MG/ML
INJECTION, SOLUTION EPIDURAL; INFILTRATION; INTRACAUDAL; PERINEURAL AS NEEDED
Status: DISCONTINUED | OUTPATIENT
Start: 2024-07-19 | End: 2024-07-19 | Stop reason: HOSPADM

## 2024-07-19 NOTE — OP NOTE
Pre-procedure Diagnosis: Lumbar radiculopathy  Post-procedure Diagnosis: Lumbar radiculopathy  Procedure Title(s):  1. L5-S1 interlaminar epidural steroid injection      2. Intraoperative fluoroscopy  Attending Surgeon:   Giuliano Romano MD  Anesthesia:   Local     Indications: The patient is a 73 y.o. year-old female with a diagnosis of lumbar radiculopathy. The patient's history and physical exam were reviewed.  The risks, benefits and alternatives to the procedure were discussed, and all questions were answered to the patient's satisfaction. The patient agreed to proceed, and written informed consent was obtained.    Procedure in Detail: The patient was brought into the procedure room and placed in the prone position on the fluoroscopy table. The area of the lumbar spine was prepped with chlorhexidine gluconate solution times one and draped in a sterile manner.    The  L5-S1 interspace was identified and marked under AP fluoroscopy. The skin and subcutaneous tissues in the area were anesthetized with 1% lidocaine. A 20-gauge Tuohy epidural needle was directed toward the interspace under fluoroscopic guidance until the ligamentum flavum was engaged. From this point, a loss of resistance technique with air was used to identify entrance of the needle into the epidural space. Once an appropriate loss was obtained, negative aspiration was confirmed, and 1 ml Omnipaque 300 contrast solution was injected. An appropriate epidurogram was noted.    Then, after negative aspiration, a solution consisting of 1-mL depo-medrol (80mg/mL) and 1-mL bupivacaine 0.25% and 3-mL preservative-free saline was easily injected. The needle was removed with a 1% lidocaine flush. The patient's back was cleaned and a bandage was placed over the site of needle insertion.    Disposition: The patient tolerated the procedure well, and there were no apparent complications. The patient was taken to the recovery area where written discharge  instructions for the procedure were given.     Estimated Blood Loss: None  Specimens Obtained: N/A

## 2024-07-19 NOTE — H&P
History of Present Illness: The patient is a 73 y.o. female who presents with complaints of lumbar radiculopathy.     Past Medical History:   Diagnosis Date    Arthritis 2015    Asthma     Back pain with radiculopathy 03/01/2023    BRCA1 negative     BRCA2 negative     Breast cancer (HCC) 2020    Left  breast, triple negative    Cancer (HCC) 7/2019    Colon polyp     Elevated liver enzymes 06/06/2024    GERD (gastroesophageal reflux disease) 2016    History of chemotherapy 10/01/2020    breast cancer - left    History of radiation therapy 04/01/2021    left breast cancer    Hypertension     Lyme disease     OA (osteoarthritis)     Rheumatoid arthritis (HCC)        Past Surgical History:   Procedure Laterality Date    ANKLE SURGERY Right 2016    2 plates and screws    BREAST BIOPSY Left 07/02/2020    BREAST LUMPECTOMY Left 08/12/2020    left breast wire localized lumpectomy    BREAST MASS EXCISION Left 09/02/2020    short superior and long lateral left breast resection, left axillary sentinel lymph node biopsy    COLONOSCOPY      ELBOW FRACTURE REPAIR Right 1996    FRACTURE SURGERY Right     elbow    GUM SURGERY      HYSTERECTOMY  2018    JOINT REPLACEMENT Right 6/2021    hip    REPLACEMENT TOTAL HIP W/  RESURFACING IMPLANTS  06/18/2021       No current facility-administered medications for this encounter.    Allergies   Allergen Reactions    Fruit Extracts Itching     Cantelope melon peaches    Nuts - Food Allergy GI Intolerance    Pollen Extract Other (See Comments)     Pollen , Trees And Flowers    Avelox [Moxifloxacin] Itching     Per pt, also facial swelling       Physical Exam: There were no vitals filed for this visit.  General: Awake, Alert, Oriented x 3, Mood and affect appropriate  Respiratory: Respirations even and unlabored  Cardiovascular: Peripheral pulses intact; no edema  Musculoskeletal Exam: low back pain.     ASA Score: 3         Assessment: Lumbar radiculopathy    Plan: L5-S1 LESI

## 2024-07-19 NOTE — DISCHARGE INSTRUCTIONS
Epidural Steroid Injection   WHAT YOU NEED TO KNOW:   An epidural steroid injection (VIJI) is a procedure to inject steroid medicine into the epidural space. The epidural space is between your spinal cord and vertebrae. Steroids reduce inflammation and fluid buildup in your spine that may be causing pain. You may be given pain medicine along with the steroids.          ACTIVITY  Do not drive or operate machinery today.  No strenuous activity today - bending, lifting, etc.  You may resume normal activites starting tomorrow - start slowly and as tolerated.  You may shower today, but no tub baths or hot tubs.  You may have numbness for several hours from the local anesthetic. Please use caution and common sense, especially with weight-bearing activities.    CARE OF THE INJECTION SITE  If you have soreness or pain, apply ice to the area today (20 minutes on/20 minutes off).  Starting tomorrow, you may use warm, moist heat or ice if needed.  You may have an increase or change in your discomfort for 36-48 hours after your treatment.  Apply ice and continue with any pain medication you have been prescribed.  Notify the [facility] if you have any of the following: redness, drainage, swelling, headache, stiff neck or fever above 100°F.    SPECIAL INSTRUCTIONS  Our office will contact you in approximately 14 days for a progress report.    MEDICATIONS  Continue to take all routine medications.  Our office may have instructed you to hold some medications.    As no general anesthesia was used in today's procedure, you should not experience any side effects related to anesthesia.     If you are diabetic, the steroids used in today's injection may temporarily increase your blood sugar levels after the first few days after your injection. Please keep a close eye on your sugars and alert the doctor who manages your diabetes if your sugars are significantly high from your baseline or you are symptomatic.     If you have a  problem specifically related to your procedure, please call our office at [phone].  Problems not related to your procedure should be directed to your primary care physician.

## 2024-07-23 ENCOUNTER — OFFICE VISIT (OUTPATIENT)
Dept: PHYSICAL THERAPY | Facility: CLINIC | Age: 74
End: 2024-07-23
Payer: COMMERCIAL

## 2024-07-23 DIAGNOSIS — M54.16 RADICULOPATHY OF LUMBAR REGION: ICD-10-CM

## 2024-07-23 DIAGNOSIS — M77.8 EXTENSOR TENDINITIS OF FOOT: ICD-10-CM

## 2024-07-23 DIAGNOSIS — M25.571 ACUTE RIGHT ANKLE PAIN: Primary | ICD-10-CM

## 2024-07-23 PROCEDURE — 97112 NEUROMUSCULAR REEDUCATION: CPT

## 2024-07-23 PROCEDURE — 97110 THERAPEUTIC EXERCISES: CPT

## 2024-07-23 NOTE — PROGRESS NOTES
Daily Note     Today's date: 2024  Patient name: Shelia Miller  : 1950  MRN: [id number]  Referring provider: Shin Dan DPM  Dx:   Encounter Diagnosis     ICD-10-CM    1. Acute right ankle pain  M25.571       2. Extensor tendinitis of foot  M77.8       3. Radiculopathy of lumbar region  M54.16                      Subjective: Pt reports no new complaints. Feels that first few days after injection have been good. Deep pain in low back is better, numbness and tingling in feet is also improved. Notes that R knee pain and R ankle pain are better, still getting some discomfort overall.       Objective: requires cueing w/ hip marching in standing to promote core bracing prior to lift, to promote HF activation vs retro trunk lean - corrects and maintans.       Assessment: Tolerated treatment well. Patient demonstrated fatigue post treatment. Does well w/ gentle progressions and will benefit from continued PT. Purposely kept session on lighter side, focused more on mobility in an effort to allow injection to continue to work. Will look to increase intensity slowly, focusing on core, HF, glute, and LE stability and strength. Pt understanding of plan.       Plan: Continue per plan of care. Review hip hinge, carries, step ups or leg press, core and HF work     Precautions: standard         POC expires Auth Status Total   Visits  Start date  Expiration date PT/OT + Visit Limit? Co-Insurance    MEDICARE                                                  Visit/Unit Tracking  AUTH Status: Date               Visits  Authed:  Used                Remaining                      Dummy Auth Tracking  1 2 3 4 5 6   7 8 9 10 11 12   13 14 15 16 17 18   19 20 21 22 23 24   25 26 27 28 29 30   31 32 33 34 35 36         Date (Visit #)  (18)   (19)   (20) PN  (21)  (22)  (23)  (24)   Manuals          DTM and TPR          Ankle mobs          MWM                    Exercise  Diary          Ther Ex           Active w/u          PROM X8-9 mins mins  X14 mins B hip and LE mobility work, focus on R side    MWM for R hip flex/IR, LAD, piri and HS/sciatic nerve work  X12 mins          2x8 B B HS and glue/piri work x10 mins      MWM for R hip flex/IR 2x8    Belt distraction x 2-3 mins X8 mins    L s/l R lumbar gapping for R sciatic nerve 3x30 sec       [id number]    MWM for flex x10  X8-10 mins    No              2x2 min    B hips 2x8-10   Ankle isometrics    Leukotape jin sling for R side only x 5 mins no no     Ankle 4 way tband                         Leg press 75# 2x8 Leg press 75# x10    85# 2x8                 B DKTC x 5 total 3x5   2x5 rev X5-6 total    2x10 LTR 2x15 2x15 2x15 2x15 3x15      Elephant stretch x 3-4 mins rev Self HF stretch on chair x3-4 mins rev rev Rev and practice x 4-5 mins total B       Piriformis stretch x2-3 mins rev rev    Neuro Re Ed          WS training   Bridge 2x10 Red tband s/l clams x12 B    Supine TrA red BKFO x12 B Supine single BKFO yellow 2x10    S/l clams w/ IR BW only 2x8 B TrA isos x 10    Bridge x10 Isos x20, single leg ext 2x8 B    Core work rev rev  rev      Hip progressions          Balance progressions rev      Standing HF march reg x 10, 3# ankle weight 2x10-12   Airex sq  Green sh ext x15-20    Green sh ext iso hold w/ march 2x10        HEP and POC review  X2 mins X5 mins w/ POC rev for July X8 mins  X3-4 mins X2-3 mins X2-3 mins X2-3 mins         Quick gait on turf x 4 laps  rev     HE KOTSS 2x10 B 2x10 B         Slider post 2x10 B     Post/lat 2x8-10 B  rev         YTB side step - shins, at bar x 4 laps     Sled push/pull no weight added 50'x4 each no        Ther Act          Gait          Stairs          Functional lifting/transfers                                                       Modalities            Heat           Ice                       From 4/15:  Access Code: [email or web address]  URL: [email or web address]  Date: 04/15/2024  Prepared by: Damian  Mohan    Exercises  - Prone Press Up On Elbows  - 1 x daily - 7 x weekly - 3 sets - 10 reps  - Prone Press Up  - 1 x daily - 7 x weekly - 3 sets - 10 reps  - Supine Sciatic Nerve Glide  - 1 x daily - 7 x weekly - 3 sets - 10 reps  - Supine Lower Trunk Rotation  - 1 x daily - 7 x weekly - 3 sets - 10 reps  - Supine Posterior Pelvic Tilt  - 1 x daily - 7 x weekly - 3 sets - 10 reps  - Supine 90/90 Alternating Toe Touch  - 1 x daily - 7 x weekly - 3 sets - 10 reps  - Supine Bridge  - 1 x daily - 7 x weekly - 3 sets - 10 reps

## 2024-07-25 ENCOUNTER — OFFICE VISIT (OUTPATIENT)
Dept: PHYSICAL THERAPY | Facility: CLINIC | Age: 74
End: 2024-07-25
Payer: COMMERCIAL

## 2024-07-25 DIAGNOSIS — M25.571 ACUTE RIGHT ANKLE PAIN: Primary | ICD-10-CM

## 2024-07-25 DIAGNOSIS — M54.16 RADICULOPATHY OF LUMBAR REGION: ICD-10-CM

## 2024-07-25 DIAGNOSIS — M77.8 EXTENSOR TENDINITIS OF FOOT: ICD-10-CM

## 2024-07-25 PROCEDURE — 97110 THERAPEUTIC EXERCISES: CPT

## 2024-07-25 PROCEDURE — 97112 NEUROMUSCULAR REEDUCATION: CPT

## 2024-07-25 NOTE — PROGRESS NOTES
Daily Note     Today's date: 2024  Patient name: Shelia Miller  : 1950  MRN: [id number]  Referring provider: Shin Dan DPM  Dx:   Encounter Diagnosis     ICD-10-CM    1. Acute right ankle pain  M25.571       2. Extensor tendinitis of foot  M77.8       3. Radiculopathy of lumbar region  M54.16                      Subjective: Pt reports that she feels good, hesitant to say it but feels that shot is helping. Wants to work on balance, has noticed some unsteadiness w/ daily motions as pain has improved.       Objective: requires cueing w/ 90 deg turns B to promote plant and turn vs stop, pivot or shuffle, and then normal gait - mod correction but fatigues quickly. Tends to wander off 90 deg path when not cued.       Assessment: Tolerated treatment well. Patient demonstrated fatigue post treatment and would benefit from continued PT. Continues to show improvements in strength and mobility over the course of the last week. Shows improved upright control over proper postural stab positions, tolerates slightly greater total load throughout session. Will increase intensity slowly to match sx, focus on strength and balance as it relates to everyday activities.       Plan: Continue per plan of care. Tidal tank work, gait progressions, retro walking      Precautions: standard         POC expires Auth Status Total   Visits  Start date  Expiration date PT/OT + Visit Limit? Co-Insurance    MEDICARE                                                  Visit/Unit Tracking  AUTH Status: Date               Visits  Authed:  Used                Remaining                      Dummy Auth Tracking  1 2 3 4 5 6   7 8 9 10 11 12   13 14 15 16 17 18   19 20 21 22 23 24   25 26 27 28 29 30   31 32 33 34 35 36         Date (Visit #)  (18)   (19)   (20) PN  (21)  (22)  (23)  (24)  (25)    Manuals           DTM and TPR           Ankle mobs           MWM                      Exercise  Diary           Ther  Ex           Active w/u           PROM X8-9 mins mins  X14 mins B hip and LE mobility work, focus on R side    MWM for R hip flex/IR, LAD, piri and HS/sciatic nerve work  X12 mins          2x8 B B HS and glue/piri work x10 mins      MWM for R hip flex/IR 2x8    Belt distraction x 2-3 mins X8 mins    L s/l R lumbar gapping for R sciatic nerve 3x30 sec       [id number]    MWM for flex x10  X8-10 mins    No              2x2 min    B hips 2x8-10 X8 mins     No              X2-3 mins total  2x8-10   Ankle isometrics    Leukotape jin sling for R side only x 5 mins no no      Ankle 4 way tband                           Leg press 75# 2x8 Leg press 75# x10    85# 2x8                   B DKTC x 5 total 3x5   2x5 rev X5-6 total rev    2x10 LTR 2x15 2x15 2x15 2x15 3x15  X20 total     Elephant stretch x 3-4 mins rev Self HF stretch on chair x3-4 mins rev rev Rev and practice x 4-5 mins total B X2-3 min self        Piriformis stretch x2-3 mins rev rev     Neuro Re Ed           WS training   Bridge 2x10 Red tband s/l clams x12 B    Supine TrA red BKFO x12 B Supine single BKFO yellow 2x10    S/l clams w/ IR BW only 2x8 B TrA isos x 10    Bridge x10 Isos x20, single leg ext 2x8 B  rev   Core work rev rev  rev       Hip progressions           Balance progressions rev      Standing HF march reg x 10, 3# ankle weight 2x10-12 rev   Airex sq  Green sh ext x15-20    Green sh ext iso hold w/ march 2x10         HEP and POC review  X2 mins X5 mins w/ POC rev for July X8 mins  X3-4 mins X2-3 mins X2-3 mins X2-3 mins  X2-3 mins        Quick gait on turf x 4 laps  rev  Gait on turf w/ 90 deg turns x5-6 B w/ form rev    HE KOTSS 2x10 B 2x10 B          Slider post 2x10 B     Post/lat 2x8-10 B  rev Slider post/lat 2x10 B BW         YTB side step - shins, at bar x 4 laps      Sled push/pull no weight added 50'x4 each no         Ther Act           Gait           Stairs           Functional lifting/transfers                                                             Modalities             Heat            Ice                         From 4/15:  Access Code: [email or web address]  URL: [email or web address]  Date: 04/15/2024  Prepared by: Damian Preston    Exercises  - Prone Press Up On Elbows  - 1 x daily - 7 x weekly - 3 sets - 10 reps  - Prone Press Up  - 1 x daily - 7 x weekly - 3 sets - 10 reps  - Supine Sciatic Nerve Glide  - 1 x daily - 7 x weekly - 3 sets - 10 reps  - Supine Lower Trunk Rotation  - 1 x daily - 7 x weekly - 3 sets - 10 reps  - Supine Posterior Pelvic Tilt  - 1 x daily - 7 x weekly - 3 sets - 10 reps  - Supine 90/90 Alternating Toe Touch  - 1 x daily - 7 x weekly - 3 sets - 10 reps  - Supine Bridge  - 1 x daily - 7 x weekly - 3 sets - 10 reps

## 2024-07-30 ENCOUNTER — OFFICE VISIT (OUTPATIENT)
Dept: PHYSICAL THERAPY | Facility: CLINIC | Age: 74
End: 2024-07-30
Payer: COMMERCIAL

## 2024-07-30 DIAGNOSIS — M25.571 ACUTE RIGHT ANKLE PAIN: Primary | ICD-10-CM

## 2024-07-30 DIAGNOSIS — M77.8 EXTENSOR TENDINITIS OF FOOT: ICD-10-CM

## 2024-07-30 DIAGNOSIS — M54.16 RADICULOPATHY OF LUMBAR REGION: ICD-10-CM

## 2024-07-30 PROCEDURE — 97110 THERAPEUTIC EXERCISES: CPT

## 2024-07-30 PROCEDURE — 97112 NEUROMUSCULAR REEDUCATION: CPT

## 2024-07-30 NOTE — PROGRESS NOTES
Daily Note     Today's date: 2024  Patient name: Shelia Miller  : 1950  MRN: [id number]  Referring provider: Shin Dan DPM  Dx:   Encounter Diagnosis     ICD-10-CM    1. Acute right ankle pain  M25.571       2. Extensor tendinitis of foot  M77.8       3. Radiculopathy of lumbar region  M54.16                      Subjective: Pt reports that today has been a very busy day. Notes that low back feels good. R ankle is a bit sore, hips feel like they are catching a bit.       Objective: requires cueing for pacing w/ squat work, corrects but fatigues quickly.       Assessment: Tolerated treatment well. Patient demonstrated fatigue post treatment and would benefit from continued PT. Does well w/ exercise progressions. Fatigue but no increase in pain by end of session. Good tolerance to light progressions, focused on ankles and hips for manual work. Will be seen one more time this week, once next week, then patient is away for two weeks. Will be re-assessed thereafter.       Plan: Continue per plan of care. Prep for vacation      Precautions: standard         POC expires Auth Status Total   Visits  Start date  Expiration date PT/OT + Visit Limit? Co-Insurance    MEDICARE                                                  Visit/Unit Tracking  AUTH Status: Date               Visits  Authed:  Used                Remaining                      Dummy Auth Tracking  1 2 3 4 5 6   7 8 9 10 11 12   13 14 15 16 17 18   19 20 21 22 23 24   25 26 27 28 29 30   31 32 33 34 35 36         Date (Visit #)  (22)  (23)  (24)  (25)   (26)     Manuals          DTM and TPR          Ankle mobs          MWM                    Exercise  Diary          Ther Ex          Active w/u          PROM B HS and glue/piri work x10 mins      MWM for R hip flex/IR 2x8    Belt distraction x 2-3 mins X8 mins    L s/l R lumbar gapping for R sciatic nerve 3x30 sec       [id number]    MWM for flex x10  X8-10  mins    No              2x2 min    B hips 2x8-10 X8 mins     No              X2-3 mins total  2x8-10 B hip belt distraction, MWM for flex/IR x6 mins total      LAD x 2 mins B     Ankle isometrics  no    Ankle mobs and LAD x 4-5 mins    Leukotape jin sling x 3-4 mins     Ankle 4 way tband                     Leg press 75# 2x8 Leg press 75# x10    85# 2x8                    2x5 rev X5-6 total rev       2x15 3x15  X20 total       rev rev Rev and practice x 4-5 mins total B X2-3 min self        rev rev        Neuro Re Ed          WS training Supine single BKFO yellow 2x10    S/l clams w/ IR BW only 2x8 B TrA isos x 10    Bridge x10 Isos x20, single leg ext 2x8 B  rev X20 isos and march         Core work     Bridge x20     Hip progressions          Balance progressions   Standing HF march reg x 10, 3# ankle weight 2x10-12 rev 3# x20 B     Airex sq          HEP and POC review  X2-3 mins X2-3 mins X2-3 mins  X2-3 mins X2 mins       Quick gait on turf x 4 laps  rev  Gait on turf w/ 90 deg turns x5-6 B w/ form rev           Squat at bar 2x10       Post/lat 2x8-10 B  rev Slider post/lat 2x10 B BW        YTB side step - shins, at bar x 4 laps                  Ther Act          Gait          Stairs          Functional lifting/transfers                                                       Modalities            Heat           Ice                       From 4/15:  Access Code: [email or web address]  URL: [email or web address]  Date: 04/15/2024  Prepared by: Damian Preston    Exercises  - Prone Press Up On Elbows  - 1 x daily - 7 x weekly - 3 sets - 10 reps  - Prone Press Up  - 1 x daily - 7 x weekly - 3 sets - 10 reps  - Supine Sciatic Nerve Glide  - 1 x daily - 7 x weekly - 3 sets - 10 reps  - Supine Lower Trunk Rotation  - 1 x daily - 7 x weekly - 3 sets - 10 reps  - Supine Posterior Pelvic Tilt  - 1 x daily - 7 x weekly - 3 sets - 10 reps  - Supine 90/90 Alternating Toe Touch  - 1 x daily - 7 x weekly - 3 sets - 10 reps  -  Supine Bridge  - 1 x daily - 7 x weekly - 3 sets - 10 reps

## 2024-07-31 NOTE — PROGRESS NOTES
Daily Note     Today's date: 2024  Patient name: Shelia Miller  : 1950  MRN: [id number]  Referring provider: Shin Dan DPM  Dx:   Encounter Diagnosis     ICD-10-CM    1. Acute right ankle pain  M25.571       2. Extensor tendinitis of foot  M77.8       3. Radiculopathy of lumbar region  M54.16                      Subjective: Pt reports no new complaints, R ankle is a bit sore, L hip is a bit catchy, feels that she is improving overall.       Objective: requires cueing for pacing w/ dynamic lunges, greater total depth tolerated on L as compared to R.      Assessment: Tolerated treatment well. Patient demonstrated fatigue post treatment and would benefit from continued PT. Does well w/ manual work combined w/ gentle leg progressions. Will continue to modify based on pt sx, reviewed HF work for home in combo w/ hip mobility work. Continue to increase as tolerated.      Plan: Continue per plan of care. Dynamic lunge progressions, single leg strength     Precautions: standard         POC expires Auth Status Total   Visits  Start date  Expiration date PT/OT + Visit Limit? Co-Insurance    MEDICARE                                                  Visit/Unit Tracking  AUTH Status: Date               Visits  Authed:  Used                Remaining                      Dummy Auth Tracking  1 2 3 4 5 6   7 8 9 10 11 12   13 14 15 16 17 18   19 20 21 22 23 24   25 26 27 28 29 30   31 32 33 34 35 36         Date (Visit #)  (22)  (23)  (24)  (25)   (26)  (27)    Manuals          DTM and TPR          Ankle mobs          MWM                    Exercise  Diary          Ther Ex          Active w/u          PROM B HS and glue/piri work x10 mins      MWM for R hip flex/IR 2x8    Belt distraction x 2-3 mins X8 mins    L s/l R lumbar gapping for R sciatic nerve 3x30 sec       [id number]    MWM for flex x10  X8-10 mins    No              2x2 min    B hips 2x8-10 X8 mins     No              X2-3 mins  total  2x8-10 B hip belt distraction, MWM for flex/IR x6 mins total      LAD x 2 mins B X4-5 mins              X2-3 mins    Ankle isometrics  no    Ankle mobs and LAD x 4-5 mins    Leukotape jin sling x 3-4 mins Same x 3-4 mins       X2-3 mins    Ankle 4 way tband      Red tband ankle ev x 20 B               Leg press 75# 2x8 Leg press 75# x10    85# 2x8                    2x5 rev X5-6 total rev       2x15 3x15  X20 total  LTR x20     rev rev Rev and practice x 4-5 mins total B X2-3 min self        rev rev        Neuro Re Ed          WS training Supine single BKFO yellow 2x10    S/l clams w/ IR BW only 2x8 B TrA isos x 10    Bridge x10 Isos x20, single leg ext 2x8 B  rev X20 isos and march     Isos and single leg ext 2x10     Core work     Bridge x20 2x10    Hip progressions      Dynamic lunges 3 ways 2x5 each    Balance progressions   Standing HF march reg x 10, 3# ankle weight 2x10-12 rev 3# x20 B     Airex sq          HEP and POC review  X2-3 mins X2-3 mins X2-3 mins  X2-3 mins X2 mins  X2-3 mins      Quick gait on turf x 4 laps  rev  Gait on turf w/ 90 deg turns x5-6 B w/ form rev           Squat at bar 2x10 rev      Post/lat 2x8-10 B  rev Slider post/lat 2x10 B BW        YTB side step - shins, at bar x 4 laps                  Ther Act          Gait          Stairs          Functional lifting/transfers                                                       Modalities            Heat           Ice                       From 4/15:  Access Code: [email or web address]  URL: [email or web address]  Date: 04/15/2024  Prepared by: Damian Preston    Exercises  - Prone Press Up On Elbows  - 1 x daily - 7 x weekly - 3 sets - 10 reps  - Prone Press Up  - 1 x daily - 7 x weekly - 3 sets - 10 reps  - Supine Sciatic Nerve Glide  - 1 x daily - 7 x weekly - 3 sets - 10 reps  - Supine Lower Trunk Rotation  - 1 x daily - 7 x weekly - 3 sets - 10 reps  - Supine Posterior Pelvic Tilt  - 1 x daily - 7 x weekly - 3 sets - 10  reps  - Supine 90/90 Alternating Toe Touch  - 1 x daily - 7 x weekly - 3 sets - 10 reps  - Supine Bridge  - 1 x daily - 7 x weekly - 3 sets - 10 reps

## 2024-08-01 ENCOUNTER — OFFICE VISIT (OUTPATIENT)
Dept: PHYSICAL THERAPY | Facility: CLINIC | Age: 74
End: 2024-08-01
Payer: COMMERCIAL

## 2024-08-01 DIAGNOSIS — M54.16 RADICULOPATHY OF LUMBAR REGION: ICD-10-CM

## 2024-08-01 DIAGNOSIS — M25.571 ACUTE RIGHT ANKLE PAIN: Primary | ICD-10-CM

## 2024-08-01 DIAGNOSIS — M77.8 EXTENSOR TENDINITIS OF FOOT: ICD-10-CM

## 2024-08-01 PROCEDURE — 97110 THERAPEUTIC EXERCISES: CPT

## 2024-08-01 PROCEDURE — 97112 NEUROMUSCULAR REEDUCATION: CPT

## 2024-08-02 ENCOUNTER — TELEPHONE (OUTPATIENT)
Dept: OBGYN CLINIC | Facility: HOSPITAL | Age: 74
End: 2024-08-02

## 2024-08-02 NOTE — TELEPHONE ENCOUNTER
"S/P she is not having any pain in the back and is, \"A Happy Camper\". She will give us a call if or when the pain comes back.   "

## 2024-08-06 ENCOUNTER — OFFICE VISIT (OUTPATIENT)
Dept: PHYSICAL THERAPY | Facility: CLINIC | Age: 74
End: 2024-08-06
Payer: COMMERCIAL

## 2024-08-06 DIAGNOSIS — M25.571 ACUTE RIGHT ANKLE PAIN: Primary | ICD-10-CM

## 2024-08-06 DIAGNOSIS — M77.8 EXTENSOR TENDINITIS OF FOOT: ICD-10-CM

## 2024-08-06 DIAGNOSIS — M54.16 RADICULOPATHY OF LUMBAR REGION: ICD-10-CM

## 2024-08-06 PROCEDURE — 97112 NEUROMUSCULAR REEDUCATION: CPT

## 2024-08-06 PROCEDURE — 97110 THERAPEUTIC EXERCISES: CPT

## 2024-08-06 NOTE — PROGRESS NOTES
Daily Note     Today's date: 2024  Patient name: Shelia Miller  : 1950  MRN: [id number]  Referring provider: Shin Dan DPM  Dx:   Encounter Diagnosis     ICD-10-CM    1. Acute right ankle pain  M25.571       2. Extensor tendinitis of foot  M77.8       3. Radiculopathy of lumbar region  M54.16           Start Time: 1418  Stop Time: 1458  Total time in clinic (min): 40 minutes    Subjective: Pt reports no new complaints. Low back is a little sore today, ankle and knee feel good.      Objective: requires cueing for pacing w/ squat to march to promote hip flexion vs retro WS. Corrects and is able to maintain.       Assessment: Tolerated treatment well. Patient demonstrated fatigue post treatment and would benefit from continued PT. Does well w exercise progressions, fatigue but no increase in pain by end of session. Will benefit from progressive increases when she returns from vacation in two weeks. HEP reviewed and well understood. Continue to increase as sx allow.      Plan: Continue per plan of care. Re-eval after vacation      Precautions: standard         POC expires Auth Status Total   Visits  Start date  Expiration date PT/OT + Visit Limit? Co-Insurance    MEDICARE                                                  Visit/Unit Tracking  AUTH Status: Date               Visits  Authed:  Used                Remaining                      Dummy Auth Tracking  1 2 3 4 5 6   7 8 9 10 11 12   13 14 15 16 17 18   19 20 21 22 23 24   25 26 27 28 29 30   31 32 33 34 35 36         Date (Visit #)  (22)  (23)  (24)  (25)   (26)  (27)  (28)   Manuals          DTM and TPR          Ankle mobs          MWM                    Exercise  Diary          Ther Ex          Active w/u          PROM B HS and glue/piri work x10 mins      MWM for R hip flex/IR 2x8    Belt distraction x 2-3 mins X8 mins    L s/l R lumbar gapping for R sciatic nerve 3x30 sec       [id number]    MWM for flex x10  X8-10  mins    No              2x2 min    B hips 2x8-10 X8 mins     No              X2-3 mins total  2x8-10 B hip belt distraction, MWM for flex/IR x6 mins total      LAD x 2 mins B X4-5 mins              X2-3 mins X4 mins same     B LE mobility work x 8 mins          2x2 mins   Ankle isometrics  no    Ankle mobs and LAD x 4-5 mins    Leukotape jin sling x 3-4 mins Same x 3-4 mins       X2-3 mins no   Ankle 4 way tband      Red tband ankle ev x 20 B               Leg press 75# 2x8 Leg press 75# x10    85# 2x8                    2x5 rev X5-6 total rev   DKTC x20 total    2x15 3x15  X20 total  LTR x20 x20    rev rev Rev and practice x 4-5 mins total B X2-3 min self        rev rev        Neuro Re Ed          WS training Supine single BKFO yellow 2x10    S/l clams w/ IR BW only 2x8 B TrA isos x 10    Bridge x10 Isos x20, single leg ext 2x8 B  rev X20 isos and march     Isos and single leg ext 2x10  Isos and single leg ext 2x10   Core work     Bridge x20 2x10 2x12   Hip progressions      Dynamic lunges 3 ways 2x5 each rev   Balance progressions   Standing HF march reg x 10, 3# ankle weight 2x10-12 rev 3# x20 B  4# 2x10 B    Airex sq       Squat to march 2x10 4# ankle weights   HEP and POC review  X2-3 mins X2-3 mins X2-3 mins  X2-3 mins X2 mins  X2-3 mins  X2-3 mins    Quick gait on turf x 4 laps  rev  Gait on turf w/ 90 deg turns x5-6 B w/ form rev   rev        Squat at bar 2x10 rev      Post/lat 2x8-10 B  rev Slider post/lat 2x10 B BW        YTB side step - shins, at bar x 4 laps                  Ther Act          Gait          Stairs          Functional lifting/transfers                                                       Modalities            Heat           Ice                       From 4/15:  Access Code: [email or web address]  URL: [email or web address]  Date: 04/15/2024  Prepared by: Damian Preston    Exercises  - Prone Press Up On Elbows  - 1 x daily - 7 x weekly - 3 sets - 10 reps  - Prone Press Up  - 1 x daily  - 7 x weekly - 3 sets - 10 reps  - Supine Sciatic Nerve Glide  - 1 x daily - 7 x weekly - 3 sets - 10 reps  - Supine Lower Trunk Rotation  - 1 x daily - 7 x weekly - 3 sets - 10 reps  - Supine Posterior Pelvic Tilt  - 1 x daily - 7 x weekly - 3 sets - 10 reps  - Supine 90/90 Alternating Toe Touch  - 1 x daily - 7 x weekly - 3 sets - 10 reps  - Supine Bridge  - 1 x daily - 7 x weekly - 3 sets - 10 reps

## 2024-08-26 NOTE — PROGRESS NOTES
Progress Note     Today's date: 2024  Patient name: Shelia Miller  : 1950  MRN: [id number]  Referring provider: Shin Dan DPM  Dx:   Encounter Diagnosis     ICD-10-CM    1. Acute right ankle pain  M25.571       2. Extensor tendinitis of foot  M77.8       3. Radiculopathy of lumbar region  M54.16                      Subjective: Pt reports that injection in low back has held pretty well. Notes that trip to [place] went well. Notes that R knee and ankle are sore today. Notes that she did lots of walking in [place]. Feels that that she might be nearing end of need for formal sessions. Functional update below:           Goals  Short term goals (3 weeks): ALL PROGRESSED   1) Pt will improve B ankle AROM to WNL pain free.  2) Pt will improve B LE and core strength deficits by 1/3 grade MMT.   3) Pt will reports pain at worse <5/10.  4) Pt will initiate and progress HEP w/ special emphasis on functional ankle mobility and strength.     Long term goals (6 weeks) ALL PROGRESSED   1) Pt will improve FOTO to at least 61. - achieved   2) Pt will stand and ambulate community distances w/o deficit related to ankles.   3) Pt will sleep through the night in positioning of choosing 6/7 nights a week w/o waking due to pain.  4) Pt will be independent and compliant w/ HEP in order to maximize functional benefit of skilled PT following d/c.     *goals extended by 2 and 4 weeks, respectively    : again extended 2 weeks until likely d/c at that time    New Goals 24:  Short term goals (3 weeks): ALL PROGRESSED   1) Pt will improve B ankle AROM to WNL pain free.  2) Pt will further improve B LE and core strength deficits by 1/3 grade MMT.   3) Pt will reports pain at worse <5/10.  4) Pt will initiate and progress HEP w/ special emphasis on functional ankle mobility and strength.     Long term goals (6 weeks) ALL PROGRESSED  1) Pt will perform two full weeks of home activities and self care w/o deficit related to  back pain.  2) Pt will stand and ambulate community distances w/o deficit related to ankles.   3) Pt will sleep through the night in positioning of choosing 6/7 nights a week w/o waking due to pain.  4) Pt will be independent and compliant w/ HEP in order to maximize functional benefit of skilled PT following d/c.     *all goals progressed by 2 and 4 weeks    Pain  Current pain ratin-2  At best pain ratin  At worst pain ratin-6 (R knee and ankle, upper back)  Location: L ankle, across low back, into R leg  Quality: dull ache and sharp  Relieving factors: rest and change in position (elevation, ankle brace)  Aggravating factors: standing, stair climbing, running and lifting (standing <1 hour, walking <3-4 blocks, stairs are step to)  Progression: improved in all respects, some pain in R shin into ankle (can stand longer before it comes on)    Social Support  Steps to enter house: yes  Stairs in house: yes   Lives in: multiple-level home  Lives with: spouse    Employment status: not working        Objective     Palpation     Additional Palpation Details  TTP through R ATFL and CFL, through L midfoot (plantar aspect)   -: through L ATFL and CFL   -: min through L CFL only, min through R post ankle   -: along R CFL, lateral calf   -: midline upper back, R knee and ankle    Neurological Testing     Sensation     Ankle/Foot   Left Ankle/Foot   Intact: light touch    Right Ankle/Foot   Intact: light touch     Active Range of Motion     Additional Active Range of Motion Details  L ankle DF and PF grossly decreased by 50%, inv/ev <25% normal limits  R ankle grossly 75% of normal limits for all     -: grossly at least 75% B w/o pain   -: grossly WNL B w/o pain    -: similar    -: grossly similar w/o pain    Passive Range of Motion     Right Ankle/Foot  Normal passive range of motion    Additional Passive Range of Motion Details  L ankle DF and PF decrease to 75% of normal limits on L,  "inv/ev to 50%    R grossly WNL      -4/25: grossly WNL w/o pain   -5/23: same    -7/9: similar    -8/27: similar     Joint Play   Left Ankle/Foot  Hypomobile in the subtalar joint and midfoot.     Right Ankle/Foot  Hypomobile in the distal tibiofibular joint, talocrural joint, subtalar joint and midfoot.     Strength/Myotome Testing     Left Ankle/Foot   Dorsiflexion: 4  Plantar flexion: 4  Inversion: 4  Eversion: 4  Great toe flexion: 4  Great toe extension: 4    Right Ankle/Foot   Dorsiflexion: 4  Plantar flexion: 4  Inversion: 4  Eversion: 4  Great toe flexion: 4  Great toe extension: 4    Tests     Additional Tests Details  LAD through L TC joint \"feels good\"   -4/25: continued    -5/23: continued    -7/9: continued   -8/27: continued     Low back to be more thoroughly assessed in future   -LAD provides relief, BKTC provides relief   -thoracolumbar ROM grossly 75% of normal range w/ improved control as compared to eval   -5/23: not tested today, grossly WNL w/ most functional motions   -7/9: continues to respond more readily to flexion based progressions   -8/27: same        Ambulation     Ambulation: Level Surfaces     Additional Level Surfaces Ambulation Details  Min decreased step length and WB on L LE, min decreased pace, not specifically antalgic today.    -4/25: improved upright posture, improved pace, min decreased WB through L LE, min antalgic    -5/23: good upright positioning, min L trunk lean, improved equality of WB but min decreased step length on L LE   -7/9: min decreased step length and WB on R LE, min fatigue, not specifically antalgic today.    -8/27: grossly unremarkable through lower half, min forward flexion through tspine, not specifically paiful     Functional Assessment        Comments  Sit<>stand: UE support on LE w/ R side WS    -4/25: can perform unsupported x 4-5 reps w/ fair glute drive   -5/23: similar    -7/9: at least 5 reps, min L WS today   -8/27: not tested     BW squat: not " past 25% before compensation    -4/25: at least 50% w/ min UE support, fair glute drive   -5/23: not tested    -7/9: not tested    -8/27: at least  50% w/ min L WS    Stairs: TBA   -4/25: not assessed   -5/23: has not been a point of emphasis    -7/9: not tested    -8/27: not tested    SLS: painful B    -4/25: not tested   -5/23: not tested  -7/9: not tested    -8/27: not tested        Assessment: Tolerated treatment well. Patient demonstrated fatigue post treatment and would benefit from continued PT. Pt has been re-assessed after 29 total skilled PT visits. She has done well w/ home program and walking program on her own. She has good tolerance to strength work. She has made good progress towards all goals set at last progress note. Upper back was an issue today. We discussed and practiced an appropriate mobility program. She has one more follow up scheduled as of now, we will decide at that visit what more PT looks like for her. Pt in agreement w/ plan.       Plan: Continue per plan of care. Strength progressions, review upper back, review gym program     Precautions: standard         POC expires Auth Status Total   Visits  Start date  Expiration date PT/OT + Visit Limit? Co-Insurance    MEDICARE                                                  Visit/Unit Tracking  AUTH Status: Date               Visits  Authed:  Used                Remaining                      Dummy Auth Tracking  1 2 3 4 5 6   7 8 9 10 11 12   13 14 15 16 17 18   19 20 21 22 23 24   25 26 27 28 29 30   31 32 33 34 35 36         Date (Visit #) 7/16 (22) 7/18 (23) 7/23 (24) 7/25 (25)  7/30 (26) 8/1 (27) 8/6 (28) 8/26 (29) PN   Manuals           DTM and TPR           Ankle mobs           MWM                      Exercise  Diary           Ther Ex           Active w/u           PROM B HS and glue/piri work x10 mins      MWM for R hip flex/IR 2x8    Belt distraction x 2-3 mins X8 mins    L s/l R lumbar gapping for R sciatic nerve 3x30 sec  "      [id number]    MWM for flex x10  X8-10 mins    No              2x2 min    B hips 2x8-10 X8 mins     No              X2-3 mins total  2x8-10 B hip belt distraction, MWM for flex/IR x6 mins total      LAD x 2 mins B X4-5 mins              X2-3 mins X4 mins same     B LE mobility work x 8 mins          2x2 mins X2-3 mins B LE    X2-3 mins ankle mobility        Upper back and cervical spine check x 8 mins   Ankle isometrics  no    Ankle mobs and LAD x 4-5 mins    Leukotape jin sling x 3-4 mins Same x 3-4 mins       X2-3 mins no above   Ankle 4 way tband      Red tband ankle ev x 20 B  rev           Seated tspine ext 2x10    Leg press 75# 2x8 Leg press 75# x10    85# 2x8                      2x5 rev X5-6 total rev   DKTC x20 total rev    2x15 3x15  X20 total  LTR x20 x20 x20    rev rev Rev and practice x 4-5 mins total B X2-3 min self         rev rev         Neuro Re Ed           WS training Supine single BKFO yellow 2x10    S/l clams w/ IR BW only 2x8 B TrA isos x 10    Bridge x10 Isos x20, single leg ext 2x8 B  rev X20 isos and march     Isos and single leg ext 2x10  Isos and single leg ext 2x10 rev   Core work     Bridge x20 2x10 2x12 2x10   Hip progressions      Dynamic lunges 3 ways 2x5 each rev    Balance progressions   Standing HF march reg x 10, 3# ankle weight 2x10-12 rev 3# x20 B  4# 2x10 B  3# 2x10 B off 3\" step   Airex sq       Squat to march 2x10 4# ankle weights    HEP and POC review  X2-3 mins X2-3 mins X2-3 mins  X2-3 mins X2 mins  X2-3 mins  X2-3 mins X7 mins w/ POC discussion     Quick gait on turf x 4 laps  rev  Gait on turf w/ 90 deg turns x5-6 B w/ form rev   rev         Squat at bar 2x10 rev       Post/lat 2x8-10 B  rev Slider post/lat 2x10 B BW    B ER w/ scap sq GTB x20     YTB side step - shins, at bar x 4 laps      Rows/ext rev              Ther Act           Gait           Stairs           Functional lifting/transfers                                                            Modalities       "       Heat            Ice                         From 4/15:  Access Code: [email or web address]  URL: [email or web address]  Date: 04/15/2024  Prepared by: Damian Preston    Exercises  - Prone Press Up On Elbows  - 1 x daily - 7 x weekly - 3 sets - 10 reps  - Prone Press Up  - 1 x daily - 7 x weekly - 3 sets - 10 reps  - Supine Sciatic Nerve Glide  - 1 x daily - 7 x weekly - 3 sets - 10 reps  - Supine Lower Trunk Rotation  - 1 x daily - 7 x weekly - 3 sets - 10 reps  - Supine Posterior Pelvic Tilt  - 1 x daily - 7 x weekly - 3 sets - 10 reps  - Supine 90/90 Alternating Toe Touch  - 1 x daily - 7 x weekly - 3 sets - 10 reps  - Supine Bridge  - 1 x daily - 7 x weekly - 3 sets - 10 reps

## 2024-08-27 ENCOUNTER — EVALUATION (OUTPATIENT)
Dept: PHYSICAL THERAPY | Facility: CLINIC | Age: 74
End: 2024-08-27
Payer: COMMERCIAL

## 2024-08-27 DIAGNOSIS — M77.8 EXTENSOR TENDINITIS OF FOOT: ICD-10-CM

## 2024-08-27 DIAGNOSIS — M54.16 RADICULOPATHY OF LUMBAR REGION: ICD-10-CM

## 2024-08-27 DIAGNOSIS — M25.571 ACUTE RIGHT ANKLE PAIN: Primary | ICD-10-CM

## 2024-08-27 PROCEDURE — 97112 NEUROMUSCULAR REEDUCATION: CPT

## 2024-08-27 PROCEDURE — 97140 MANUAL THERAPY 1/> REGIONS: CPT

## 2024-08-27 PROCEDURE — 97110 THERAPEUTIC EXERCISES: CPT

## 2024-09-02 NOTE — PROGRESS NOTES
Daily Note     Today's date: 9/3/2024  Patient name: Shelia Miller  : 1950  MRN: [id number]  Referring provider: Shin aDn DPM  Dx:   Encounter Diagnosis     ICD-10-CM    1. Acute right ankle pain  M25.571       2. Extensor tendinitis of foot  M77.8       3. Radiculopathy of lumbar region  M54.16           Start Time: 934  Stop Time: 101  Total time in clinic (min): 41 minutes    Subjective: Pt reports that upper back/neck is sore, but feels good otherwise. Would like to continue on weekly basis for next couple weeks.       Objective: requires cueing for proper scap setting on wall to finish session, able to utilize LT when tactically cued.      Assessment: Tolerated treatment well. Patient demonstrated fatigue post treatment and would benefit from continued PT. Does well w/ exercise progressions. Fatigue but no increase in pain by end of session. Does well w/ tspine mobility work paired w/ mid/low trap work. Will be seen on weekly basis, will continue to review multiple exercise programs to ensure that progressions are proper for home.       Plan: Continue per plan of care. Check mid back, review splits for exercise program      Precautions: standard         POC expires Auth Status Total   Visits  Start date  Expiration date PT/OT + Visit Limit? Co-Insurance    MEDICARE                                                  Visit/Unit Tracking  AUTH Status: Date               Visits  Authed:  Used                Remaining                      Dummy Auth Tracking  1 2 3 4 5 6   7 8 9 10 11 12   13 14 15 16 17 18   19 20 21 22 23 24   25 26 27 28 29 30   31 32 33 34 35 36         Date (Visit #)  (22)  (23)  (24)  (25)   (26)  (27)  (28)  (29) PN 9/3 (30)   Manuals            DTM and TPR            Ankle mobs            MWM                        Exercise  Diary            Ther Ex            Active w/u            PROM B HS and glue/piri work x10 mins      MWM for R hip flex/IR  "2x8    Belt distraction x 2-3 mins X8 mins    L s/l R lumbar gapping for R sciatic nerve 3x30 sec       [id number]    MWM for flex x10  X8-10 mins    No              2x2 min    B hips 2x8-10 X8 mins     No              X2-3 mins total  2x8-10 B hip belt distraction, MWM for flex/IR x6 mins total      LAD x 2 mins B X4-5 mins              X2-3 mins X4 mins same     B LE mobility work x 8 mins          2x2 mins X2-3 mins B LE    X2-3 mins ankle mobility        Upper back and cervical spine check x 8 mins Cervical traction w/ upper quarter mobility x 5 mins    Cat/cow 2x6    Seated tspine ext 2x10    Prayer pose intro   Ankle isometrics  no    Ankle mobs and LAD x 4-5 mins    Leukotape jin sling x 3-4 mins Same x 3-4 mins       X2-3 mins no above    Ankle 4 way tband      Red tband ankle ev x 20 B  rev            Seated tspine ext 2x10 above    Leg press 75# 2x8 Leg press 75# x10    85# 2x8                        2x5 rev X5-6 total rev   DKTC x20 total rev     2x15 3x15  X20 total  LTR x20 x20 x20     rev rev Rev and practice x 4-5 mins total B X2-3 min self          rev rev          Neuro Re Ed            WS training Supine single BKFO yellow 2x10    S/l clams w/ IR BW only 2x8 B TrA isos x 10    Bridge x10 Isos x20, single leg ext 2x8 B  rev X20 isos and march     Isos and single leg ext 2x10  Isos and single leg ext 2x10 rev    Core work     Bridge x20 2x10 2x12 2x10    Hip progressions      Dynamic lunges 3 ways 2x5 each rev     Balance progressions   Standing HF march reg x 10, 3# ankle weight 2x10-12 rev 3# x20 B  4# 2x10 B  3# 2x10 B off 3\" step    Airex sq       Squat to march 2x10 4# ankle weights     HEP and POC review  X2-3 mins X2-3 mins X2-3 mins  X2-3 mins X2 mins  X2-3 mins  X2-3 mins X7 mins w/ POC discussion  X5 mins    Quick gait on turf x 4 laps  rev  Gait on turf w/ 90 deg turns x5-6 B w/ form rev   rev  Wall slides w/ scap setting x 15, w/ lift off x15        Squat at bar 2x10 rev   Prone chest/head " lift 2x5 w/ 5 sec hold     Post/lat 2x8-10 B  rev Slider post/lat 2x10 B BW    B ER w/ scap sq GTB x20 2x20     YTB side step - shins, at bar x 4 laps      Rows/ext rev rev               Ther Act            Gait            Stairs            Functional lifting/transfers                                                                 Modalities              Heat             Ice                           From 4/15:  Access Code: [email or web address]  URL: [email or web address]  Date: 04/15/2024  Prepared by: Damian Preston    Exercises  - Prone Press Up On Elbows  - 1 x daily - 7 x weekly - 3 sets - 10 reps  - Prone Press Up  - 1 x daily - 7 x weekly - 3 sets - 10 reps  - Supine Sciatic Nerve Glide  - 1 x daily - 7 x weekly - 3 sets - 10 reps  - Supine Lower Trunk Rotation  - 1 x daily - 7 x weekly - 3 sets - 10 reps  - Supine Posterior Pelvic Tilt  - 1 x daily - 7 x weekly - 3 sets - 10 reps  - Supine 90/90 Alternating Toe Touch  - 1 x daily - 7 x weekly - 3 sets - 10 reps  - Supine Bridge  - 1 x daily - 7 x weekly - 3 sets - 10 reps

## 2024-09-03 ENCOUNTER — OFFICE VISIT (OUTPATIENT)
Dept: PHYSICAL THERAPY | Facility: CLINIC | Age: 74
End: 2024-09-03
Payer: COMMERCIAL

## 2024-09-03 DIAGNOSIS — M25.571 ACUTE RIGHT ANKLE PAIN: Primary | ICD-10-CM

## 2024-09-03 DIAGNOSIS — M77.8 EXTENSOR TENDINITIS OF FOOT: ICD-10-CM

## 2024-09-03 DIAGNOSIS — M54.16 RADICULOPATHY OF LUMBAR REGION: ICD-10-CM

## 2024-09-03 PROCEDURE — 97110 THERAPEUTIC EXERCISES: CPT

## 2024-09-03 PROCEDURE — 97112 NEUROMUSCULAR REEDUCATION: CPT

## 2024-09-05 ENCOUNTER — OFFICE VISIT (OUTPATIENT)
Dept: PULMONOLOGY | Facility: MEDICAL CENTER | Age: 74
End: 2024-09-05
Payer: COMMERCIAL

## 2024-09-05 VITALS
RESPIRATION RATE: 12 BRPM | HEIGHT: 63 IN | OXYGEN SATURATION: 94 % | SYSTOLIC BLOOD PRESSURE: 130 MMHG | HEART RATE: 72 BPM | TEMPERATURE: 97.6 F | WEIGHT: 161 LBS | DIASTOLIC BLOOD PRESSURE: 70 MMHG | BODY MASS INDEX: 28.53 KG/M2

## 2024-09-05 DIAGNOSIS — M06.9 RHEUMATOID ARTHRITIS, INVOLVING UNSPECIFIED SITE, UNSPECIFIED WHETHER RHEUMATOID FACTOR PRESENT (HCC): ICD-10-CM

## 2024-09-05 DIAGNOSIS — J45.30 MILD PERSISTENT ASTHMA WITHOUT COMPLICATION: ICD-10-CM

## 2024-09-05 DIAGNOSIS — J45.20 MILD INTERMITTENT ASTHMA WITHOUT COMPLICATION: Primary | ICD-10-CM

## 2024-09-05 PROCEDURE — 99214 OFFICE O/P EST MOD 30 MIN: CPT | Performed by: STUDENT IN AN ORGANIZED HEALTH CARE EDUCATION/TRAINING PROGRAM

## 2024-09-05 PROCEDURE — G2211 COMPLEX E/M VISIT ADD ON: HCPCS | Performed by: STUDENT IN AN ORGANIZED HEALTH CARE EDUCATION/TRAINING PROGRAM

## 2024-09-05 RX ORDER — CEPHALEXIN 500 MG/1
CAPSULE ORAL
COMMUNITY
Start: 2024-07-30

## 2024-09-05 RX ORDER — CELECOXIB 200 MG/1
CAPSULE ORAL
COMMUNITY
Start: 2024-07-17

## 2024-09-05 NOTE — PROGRESS NOTES
Consultation - Pulmonary Medicine   Shelia Miller 74 y.o. female MRN: [id number]      Reason for Consult: Asthma    Shelia Miller is a 74 y.o. female  with a PMH of Asthma, HTN, Left Breast Cx(Trip Neg Stage I - Lumpectomy/Chemo/Rad), Osteoporosis, RA(Actemra) and recent history of Lyme disease/Babesiosis  who present to for follow up.    Asthma - Mild Persistent - Likely Th2 mediated - Eos 480, +allergies - symptoms relatively well controlled with inhaler therapy. Patient has symptoms with the change of season now with increased rhinitis. Recommended starting flonase  - Continue Advair  - Albuterol PRN   - Flonase    RA - Previous CT read as possible ILD, reviewed imaging and she has some mosiacism on expiratory and mild bronchiectasis. This is more likely related to her asthma then a progressive ILD. Recent abdominal CT shows no progression, honeycombing or ground glass. PFTs show no restriction but she does have a decreased DLCO. This may be measurment error. Plan to follow up with repeat PFTs at one year.   - PFT with BD  - Follow up 6 months    Jim Orellana MD  [facility]    _____________________________________________________________________  Interval Hx 09/05/24:   Little tight today, weather change  Hospitalized June - UTI/Sepsis  Minimal increase in dyspnea  RA - pain relative well controlled. Joint pain worsen - Transitioned to [place]   No recent prednisone course   Increasing PND/sinus issues associated with weeather changed        HPI:    Shelia Miller is a 74 y.o. female with a PMH of Asthma, HTN, Left Breast Cx(Trip Neg Stage I - Lumpectomy/Chemo/Rad), Osteoporosis, RA(Actemra) and recent history of Lyme disease/Babesiosis  who present to establish care.     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   2/24         FVC 2.07L 77%         FEV1 1.63L 79%         FEV1/FVC  79%         TLC  4.55L         RV  2.39L%         DLCO 54%         BD Neg               Imaging:  I personally reviewed the images on the PAC system pertinent to today's visit  9/2017  IMPRESSION Areas of mild fibrosis mild emphysema and bronchiectasis as   described above.     Other studies:  TTE 10/2014  Normal LV EF - Grade II diastolic dysfunction    PhysicalExamination:  Vitals:   /70 (BP Location: Left arm, Patient Position: Sitting, Cuff Size: Large)   Pulse 72   Temp 97.6 °F (36.4 °C) (Temporal)   Resp 12   Ht 5' 3\" (1.6 m)   Wt 73 kg (161 lb)   SpO2 94%   BMI 28.52 kg/m²   Physical Exam: Unchanged  Appearance -- NAD, speaking full sentences  Neuro -- A&Ox3  Neck -- no JVD  Heart -- RRR, no murmurs  Lungs -- CTAB  Abdomen -- soft, NTND  Extremities -- WWP, no LE edema  Skin -- no rash    Review of Systems:  Aside from what is mentioned in the HPI, the review of systems otherwise negative.    Immunization History   Administered Date(s) Administered    COVID-19 MODERNA VACC 0.5 ML IM 02/13/2021, 03/13/2021, 11/19/2021, 06/01/2022        Current Medications:    Current Outpatient Medications:     Abatacept (ORENCIA SC), Inject under the skin, Disp: , Rfl:     albuterol (PROVENTIL HFA,VENTOLIN HFA) 90 mcg/act inhaler, as needed, Disp: , Rfl:     atorvastatin (LIPITOR) 10 mg tablet, Take 10 mg by mouth daily, Disp: , Rfl:     Calcium " Carbonate-Vitamin D3 600-400 MG-UNIT TABS, Take by mouth, Disp: , Rfl:     celecoxib (CeleBREX) 200 mg capsule, , Disp: , Rfl:     Cholecalciferol 25 MCG (1000 UT) capsule, Take 1,000 Units by mouth daily, Disp: , Rfl:     Fluticasone-Salmeterol (Advair Diskus) 250-50 mcg/dose inhaler, Inhale 1 puff 2 (two) times a day Rinse mouth after use., Disp: 60 blister, Rfl: 11    losartan (COZAAR) 50 mg tablet, 50 mg daily, Disp: , Rfl:     Multiple Vitamin (MULTIVITAMIN ADULT PO), Take 1 tablet by mouth daily, Disp: , Rfl:     omeprazole (PriLOSEC) 20 mg delayed release capsule, TAKE TWO CAPSULES BY MOUTH EVERY DAY IN THE MORNING (GENERIC PRILOSEC), Disp: 180 capsule, Rfl: 1    PreviDent 5000 Booster Plus 1.1 % PSTE, , Disp: , Rfl:     raloxifene (EVISTA) 60 mg tablet, Take 60 mg by mouth daily, Disp: , Rfl:     CELECOXIB PO, Take 200 mg by mouth 2 (two) times a day as needed On hold (Patient not taking: Reported on 9/5/2024), Disp: , Rfl:     cephalexin (KEFLEX) 500 mg capsule, , Disp: , Rfl:     saccharomyces boulardii (FLORASTOR) 250 mg capsule, Take 1 capsule (250 mg total) by mouth 2 (two) times a day (Patient not taking: Reported on 6/19/2024), Disp: 10 capsule, Rfl: 0    Historical Information   Past Medical History:   Diagnosis Date    Arthritis 2015    Asthma     Back pain with radiculopathy 03/01/2023    BRCA1 negative     BRCA2 negative     Breast cancer (HCC) 2020    Left  breast, triple negative    Cancer (HCC) 7/2019    Colon polyp     Elevated liver enzymes 06/06/2024    GERD (gastroesophageal reflux disease) 2016    History of chemotherapy 10/01/2020    breast cancer - left    History of radiation therapy 04/01/2021    left breast cancer    Hypertension     Lyme disease     OA (osteoarthritis)     Rheumatoid arthritis (HCC)     Sepsis (HCC)      Past Surgical History:   Procedure Laterality Date    ANKLE SURGERY Right 2016    2 plates and screws    BREAST BIOPSY Left 07/02/2020    BREAST LUMPECTOMY Left  "08/12/2020    left breast wire localized lumpectomy    BREAST MASS EXCISION Left 09/02/2020    short superior and long lateral left breast resection, left axillary sentinel lymph node biopsy    COLONOSCOPY      ELBOW FRACTURE REPAIR Right 1996    EPIDURAL BLOCK INJECTION N/A 7/19/2024    Procedure: L5-S1 LUMBAR EPIDURAL STEROID INJECTION;  Surgeon: Giuliano Romano MD;  Location: [facility] MAIN OR;  Service: Pain Management     FRACTURE SURGERY Right     elbow    GUM SURGERY      HYSTERECTOMY  2018    JOINT REPLACEMENT Right 6/2021    hip    REPLACEMENT TOTAL HIP W/  RESURFACING IMPLANTS  06/18/2021     Social History   Social History     Tobacco Use   Smoking Status Never    Passive exposure: Past   Smokeless Tobacco Never       Family History:   Family History   Problem Relation Age of Onset    Breast cancer Mother 59    Cancer Mother         Breast Cancer    Lung cancer Father     Cancer Father         Lung cancer    COPD Father     Hypertension Father     Breast cancer Paternal Grandmother     Thyroid cancer Son     Cancer Son         Thyroid Cancer                 Diagnostic Data:  Labs:  I personally reviewed the most recent laboratory data pertinent to today's visit    Lab Results   Component Value Date    WBC 5.49 06/14/2024    HGB 12.7 06/14/2024    HCT 38.8 06/14/2024    MCV 96 06/14/2024     06/14/2024     Lab Results   Component Value Date    CALCIUM 9.1 06/14/2024    K 4.0 06/14/2024    CO2 26 06/14/2024     06/14/2024    BUN 23 06/14/2024    CREATININE 0.74 06/14/2024     No results found for: \"IGE\"  Lab Results   Component Value Date    ALT 28 06/14/2024    AST 22 06/14/2024    ALKPHOS 62 06/14/2024           I have spent a total time of 25 minutes on 09/05/24 in caring for this patient including Diagnostic results, Prognosis, Risks and benefits of tx options, Instructions for management, Patient and family education, Importance of tx compliance, Risk factor reductions, Impressions, " Counseling / Coordination of care, Documenting in the medical record, Reviewing / ordering tests, medicine, procedures  , Obtaining or reviewing history  , and Communicating with other healthcare professionals .   _

## 2024-09-10 ENCOUNTER — OFFICE VISIT (OUTPATIENT)
Dept: PHYSICAL THERAPY | Facility: CLINIC | Age: 74
End: 2024-09-10
Payer: COMMERCIAL

## 2024-09-10 DIAGNOSIS — M54.16 RADICULOPATHY OF LUMBAR REGION: ICD-10-CM

## 2024-09-10 DIAGNOSIS — M77.8 EXTENSOR TENDINITIS OF FOOT: ICD-10-CM

## 2024-09-10 DIAGNOSIS — M25.571 ACUTE RIGHT ANKLE PAIN: Primary | ICD-10-CM

## 2024-09-10 PROCEDURE — 97112 NEUROMUSCULAR REEDUCATION: CPT

## 2024-09-10 PROCEDURE — 97110 THERAPEUTIC EXERCISES: CPT

## 2024-09-10 NOTE — PROGRESS NOTES
Daily Note     Today's date: 9/10/2024  Patient name: Shelia Miller  : 1950  MRN: [id number]  Referring provider: Shin Dan DPM  Dx:   Encounter Diagnosis     ICD-10-CM    1. Acute right ankle pain  M25.571       2. Extensor tendinitis of foot  M77.8       3. Radiculopathy of lumbar region  M54.16                      Subjective: Pt reports some R hip soreness, not sure if it was related to how she was exercising after last visit. Notes that mid back is better, not perfect but better. Notes that CHRIS is better. Feels sluggish this morning.      Objective: requires cueing for glute med and lateral hip stabilizer activation w/ taps, tband work - deferred glute med taps on R side s/t pain even w/ correct form.       Assessment: Tolerated treatment well. Patient demonstrated fatigue post treatment and would benefit from continued PT. Does well w/ upper back review paired w/ hip mobility and strength work. Discussed nature of her R knee pain, how this likely impacts R hip. Modified activity to account for this today, asked her to discontinue glute med taps on own as this seemed to be especially aggravating. Will look to increase intensity slowly barring setback. Will be seen again next week.      Plan: Continue per plan of care. Check taps, lateral hip work, progress upper back work, review mobility program     Precautions: standard         POC expires Auth Status Total   Visits  Start date  Expiration date PT/OT + Visit Limit? Co-Insurance    MEDICARE                                                  Visit/Unit Tracking  AUTH Status: Date               Visits  Authed:  Used                Remaining                      Dummy Auth Tracking  1 2 3 4 5 6   7 8 9 10 11 12   13 14 15 16 17 18   19 20 21 22 23 24   25 26 27 28 29 30   31 32 33 34 35 36         Date (Visit #)  (22)  (23)  (24)  (25)   (26)  (27)  (28)  (29) PN 9/3 (30)  (31)   Manuals             DTM and TPR              Ankle mobs             MWM                          Exercise  Diary             Ther Ex             Active w/u             PROM B HS and glue/piri work x10 mins      MWM for R hip flex/IR 2x8    Belt distraction x 2-3 mins X8 mins    L s/l R lumbar gapping for R sciatic nerve 3x30 sec       [id number]    MWM for flex x10  X8-10 mins    No              2x2 min    B hips 2x8-10 X8 mins     No              X2-3 mins total  2x8-10 B hip belt distraction, MWM for flex/IR x6 mins total      LAD x 2 mins B X4-5 mins              X2-3 mins X4 mins same     B LE mobility work x 8 mins          2x2 mins X2-3 mins B LE    X2-3 mins ankle mobility        Upper back and cervical spine check x 8 mins Cervical traction w/ upper quarter mobility x 5 mins    Cat/cow 2x6    Seated tspine ext 2x10    Prayer pose intro Rev of all x 3 mins        LTR x20 total    B R>L hip mobility work x 5 mins, R side hip gapping w/ MWM for flex and piri stretch x 4-5 mins   Ankle isometrics  no    Ankle mobs and LAD x 4-5 mins    Leukotape jin sling x 3-4 mins Same x 3-4 mins       X2-3 mins no above     Ankle 4 way tband      Red tband ankle ev x 20 B  rev             Seated tspine ext 2x10 above rev    Leg press 75# 2x8 Leg press 75# x10    85# 2x8                          2x5 rev X5-6 total rev   DKTC x20 total rev  rev    2x15 3x15  X20 total  LTR x20 x20 x20      rev rev Rev and practice x 4-5 mins total B X2-3 min self           rev rev           Neuro Re Ed             WS training Supine single BKFO yellow 2x10    S/l clams w/ IR BW only 2x8 B TrA isos x 10    Bridge x10 Isos x20, single leg ext 2x8 B  rev X20 isos and march     Isos and single leg ext 2x10  Isos and single leg ext 2x10 rev  Isos and march x10-15    Blue tband BKFO x10 B, clams x 10, w/ hip IR x10   Core work     Bridge x20 2x10 2x12 2x10  2x10   Hip progressions      Dynamic lunges 3 ways 2x5 each rev      Balance progressions   Standing HF march reg x 10, 3# ankle weight  "2x10-12 rev 3# x20 B  4# 2x10 B  3# 2x10 B off 3\" step     Airex sq       Squat to march 2x10 4# ankle weights   6\" glute med tap x 10-15 on L, x 5 on R   HEP and POC review  X2-3 mins X2-3 mins X2-3 mins  X2-3 mins X2 mins  X2-3 mins  X2-3 mins X7 mins w/ POC discussion  X5 mins X2-3 mins    Quick gait on turf x 4 laps  rev  Gait on turf w/ 90 deg turns x5-6 B w/ form rev   rev  Wall slides w/ scap setting x 15, w/ lift off x15 Green tband x10-15    W/ lift off x10-15         Squat at bar 2x10 rev   Prone chest/head lift 2x5 w/ 5 sec hold rev     Post/lat 2x8-10 B  rev Slider post/lat 2x10 B BW    B ER w/ scap sq GTB x20 2x20      YTB side step - shins, at bar x 4 laps      Rows/ext rev rev rev                Ther Act             Gait             Stairs             Functional lifting/transfers                                                                      Modalities               Heat              Ice                             From 4/15:  Access Code: [email or web address]  URL: [email or web address]  Date: 04/15/2024  Prepared by: Damain Preston    Exercises  - Prone Press Up On Elbows  - 1 x daily - 7 x weekly - 3 sets - 10 reps  - Prone Press Up  - 1 x daily - 7 x weekly - 3 sets - 10 reps  - Supine Sciatic Nerve Glide  - 1 x daily - 7 x weekly - 3 sets - 10 reps  - Supine Lower Trunk Rotation  - 1 x daily - 7 x weekly - 3 sets - 10 reps  - Supine Posterior Pelvic Tilt  - 1 x daily - 7 x weekly - 3 sets - 10 reps  - Supine 90/90 Alternating Toe Touch  - 1 x daily - 7 x weekly - 3 sets - 10 reps  - Supine Bridge  - 1 x daily - 7 x weekly - 3 sets - 10 reps                                                                                                       "

## 2024-09-19 ENCOUNTER — OFFICE VISIT (OUTPATIENT)
Dept: PHYSICAL THERAPY | Facility: CLINIC | Age: 74
End: 2024-09-19
Payer: COMMERCIAL

## 2024-09-19 DIAGNOSIS — M25.571 ACUTE RIGHT ANKLE PAIN: Primary | ICD-10-CM

## 2024-09-19 DIAGNOSIS — M77.8 EXTENSOR TENDINITIS OF FOOT: ICD-10-CM

## 2024-09-19 DIAGNOSIS — M54.16 RADICULOPATHY OF LUMBAR REGION: ICD-10-CM

## 2024-09-19 PROCEDURE — 97112 NEUROMUSCULAR REEDUCATION: CPT

## 2024-09-19 PROCEDURE — 97110 THERAPEUTIC EXERCISES: CPT

## 2024-09-19 NOTE — PROGRESS NOTES
Possible D/C Note     Today's date: 2024  Patient name: Shelia Miller  : 1950  MRN: [id number]  Referring provider: Shin Dan DPM  Dx:   Encounter Diagnosis     ICD-10-CM    1. Acute right ankle pain  M25.571       2. Extensor tendinitis of foot  M77.8       3. Radiculopathy of lumbar region  M54.16                      Subjective: Pt reports that she feels pretty good overall. Notes that R ankle is sore     Goals  Short term goals (3 weeks): ALL PROGRESSED   1) Pt will improve B ankle AROM to WNL pain free.  2) Pt will improve B LE and core strength deficits by 1/3 grade MMT.   3) Pt will reports pain at worse <5/10.  4) Pt will initiate and progress HEP w/ special emphasis on functional ankle mobility and strength.     Long term goals (6 weeks) ALL PROGRESSED   1) Pt will improve FOTO to at least 61. - achieved   2) Pt will stand and ambulate community distances w/o deficit related to ankles.   3) Pt will sleep through the night in positioning of choosing 6/7 nights a week w/o waking due to pain.  4) Pt will be independent and compliant w/ HEP in order to maximize functional benefit of skilled PT following d/c.     *goals extended by 2 and 4 weeks, respectively    : again extended 2 weeks until likely d/c at that time    New Goals 24:  Short term goals (3 weeks): ALL PROGRESSED   1) Pt will improve B ankle AROM to WNL pain free.  2) Pt will further improve B LE and core strength deficits by 1/3 grade MMT.   3) Pt will reports pain at worse <5/10.  4) Pt will initiate and progress HEP w/ special emphasis on functional ankle mobility and strength.     Long term goals (6 weeks) ALL PROGRESSED  1) Pt will perform two full weeks of home activities and self care w/o deficit related to back pain.  2) Pt will stand and ambulate community distances w/o deficit related to ankles.   3) Pt will sleep through the night in positioning of choosing 6/7 nights a week w/o waking due to pain.  4) Pt will  be independent and compliant w/ HEP in order to maximize functional benefit of skilled PT following d/c.     *all goals progressed by 2 and 4 weeks    *ALL GOALS PROGRESSED OR ACHIEVED AT TIME OF D/C     Objective: please see 8/27 note for updated measurements at time of d/c. No significant changes today.      Assessment: Tolerated treatment well. Patient exhibited good technique with therapeutic exercises. Spent most of today assessing R ankle, reviewing gait program, and taking about progressions for home. Does well w/ session. She does have a 1.5 cm increase in circumference for R ankle as compared to L ankle. There does not appear to be structural damage, her sx are likely a combo of wear and tear on surgical ankle repair w/ continued strength deficits in R posterior tib and foot intrinsics. Reviewed program for this w/ good understanding. She is interested in joining [employer], has info on how to do this. She will continue w/ HEP for now and will look to join gym in coming weeks. Pt has been a pleasure to work with and is welcome back any time.       Plan: Likely D/C to HEP or gym program     Precautions: standard         POC expires Auth Status Total   Visits  Start date  Expiration date PT/OT + Visit Limit? Co-Insurance    MEDICARE                                                  Visit/Unit Tracking  AUTH Status: Date               Visits  Authed:  Used                Remaining                      Dummy Auth Tracking  1 2 3 4 5 6   7 8 9 10 11 12   13 14 15 16 17 18   19 20 21 22 23 24   25 26 27 28 29 30   31 32 33 34 35 36         Date (Visit #) 7/16 (22) 7/18 (23) 7/23 (24) 7/25 (25)  7/30 (26) 8/1 (27) 8/6 (28) 8/26 (29) PN 9/3 (30) 9/9 (31) 9/19 (32) D/C   Manuals              DTM and TPR              Ankle mobs              MWM                            Exercise  Diary              Ther Ex              Active w/u              PROM B HS and glue/piri work x10 mins      MWM for R hip flex/IR  "2x8    Belt distraction x 2-3 mins X8 mins    L s/l R lumbar gapping for R sciatic nerve 3x30 sec       [id number]    MWM for flex x10  X8-10 mins    No              2x2 min    B hips 2x8-10 X8 mins     No              X2-3 mins total  2x8-10 B hip belt distraction, MWM for flex/IR x6 mins total      LAD x 2 mins B X4-5 mins              X2-3 mins X4 mins same     B LE mobility work x 8 mins          2x2 mins X2-3 mins B LE    X2-3 mins ankle mobility        Upper back and cervical spine check x 8 mins Cervical traction w/ upper quarter mobility x 5 mins    Cat/cow 2x6    Seated tspine ext 2x10    Prayer pose intro Rev of all x 3 mins        LTR x20 total    B R>L hip mobility work x 5 mins, R side hip gapping w/ MWM for flex and piri stretch x 4-5 mins Ankle mobility work, jin sling leukotape - 10 mins total   Ankle isometrics  no    Ankle mobs and LAD x 4-5 mins    Leukotape jin sling x 3-4 mins Same x 3-4 mins       X2-3 mins no above      Ankle 4 way tband      Red tband ankle ev x 20 B  rev              Seated tspine ext 2x10 above rev     Leg press 75# 2x8 Leg press 75# x10    85# 2x8          Ankle PF/inv x20, rev of ankle stab work                  2x5 rev X5-6 total rev   DKTC x20 total rev  rev     2x15 3x15  X20 total  LTR x20 x20 x20       rev rev Rev and practice x 4-5 mins total B X2-3 min self            rev rev            Neuro Re Ed              WS training Supine single BKFO yellow 2x10    S/l clams w/ IR BW only 2x8 B TrA isos x 10    Bridge x10 Isos x20, single leg ext 2x8 B  rev X20 isos and march     Isos and single leg ext 2x10  Isos and single leg ext 2x10 rev  Isos and march x10-15    Blue tband BKFO x10 B, clams x 10, w/ hip IR x10 rev   Core work     Bridge x20 2x10 2x12 2x10  2x10    Hip progressions      Dynamic lunges 3 ways 2x5 each rev    3 way dynamic lunge x 10-15 each   Balance progressions   Standing HF march reg x 10, 3# ankle weight 2x10-12 rev 3# x20 B  4# 2x10 B  3# 2x10 B off 3\" " "step   Gait - adrienne changes x 2 laps, reg x 2 laps, 45 deg S cuts x3 laps    Airex sq       Squat to march 2x10 4# ankle weights   6\" glute med tap x 10-15 on L, x 5 on R    HEP and POC review  X2-3 mins X2-3 mins X2-3 mins  X2-3 mins X2 mins  X2-3 mins  X2-3 mins X7 mins w/ POC discussion  X5 mins X2-3 mins Final HEP and POC rev x 10 mins    Quick gait on turf x 4 laps  rev  Gait on turf w/ 90 deg turns x5-6 B w/ form rev   rev  Wall slides w/ scap setting x 15, w/ lift off x15 Green tband x10-15    W/ lift off x10-15          Squat at bar 2x10 rev   Prone chest/head lift 2x5 w/ 5 sec hold rev      Post/lat 2x8-10 B  rev Slider post/lat 2x10 B BW    B ER w/ scap sq GTB x20 2x20       YTB side step - shins, at bar x 4 laps      Rows/ext rev rev rev                  Ther Act              Gait              Stairs              Functional lifting/transfers                                                                           Modalities                Heat               Ice                               From 4/15:  Access Code: [email or web address]  URL: [email or web address]  Date: 04/15/2024  Prepared by: Damian Preston    Exercises  - Prone Press Up On Elbows  - 1 x daily - 7 x weekly - 3 sets - 10 reps  - Prone Press Up  - 1 x daily - 7 x weekly - 3 sets - 10 reps  - Supine Sciatic Nerve Glide  - 1 x daily - 7 x weekly - 3 sets - 10 reps  - Supine Lower Trunk Rotation  - 1 x daily - 7 x weekly - 3 sets - 10 reps  - Supine Posterior Pelvic Tilt  - 1 x daily - 7 x weekly - 3 sets - 10 reps  - Supine 90/90 Alternating Toe Touch  - 1 x daily - 7 x weekly - 3 sets - 10 reps  - Supine Bridge  - 1 x daily - 7 x weekly - 3 sets - 10 reps                                                                                                         "

## 2024-10-03 ENCOUNTER — TELEPHONE (OUTPATIENT)
Age: 74
End: 2024-10-03

## 2024-10-03 DIAGNOSIS — Z12.31 ENCOUNTER FOR SCREENING MAMMOGRAM FOR MALIGNANT NEOPLASM OF BREAST: Primary | ICD-10-CM

## 2024-10-03 NOTE — TELEPHONE ENCOUNTER
Patient requesting Mammogram order to be placed so she can get scheduled when due in December. Please call patient when order is placed .

## 2024-10-30 ENCOUNTER — OFFICE VISIT (OUTPATIENT)
Dept: NEUROLOGY | Facility: CLINIC | Age: 74
End: 2024-10-30
Payer: COMMERCIAL

## 2024-10-30 VITALS
HEIGHT: 63 IN | BODY MASS INDEX: 29.02 KG/M2 | DIASTOLIC BLOOD PRESSURE: 70 MMHG | TEMPERATURE: 96.8 F | SYSTOLIC BLOOD PRESSURE: 122 MMHG | HEART RATE: 72 BPM | WEIGHT: 163.8 LBS | OXYGEN SATURATION: 96 %

## 2024-10-30 DIAGNOSIS — E78.5 HLD (HYPERLIPIDEMIA): ICD-10-CM

## 2024-10-30 DIAGNOSIS — M51.369 DDD (DEGENERATIVE DISC DISEASE), LUMBAR: ICD-10-CM

## 2024-10-30 DIAGNOSIS — M71.30 SYNOVIAL CYST: ICD-10-CM

## 2024-10-30 DIAGNOSIS — M54.16 RADICULOPATHY, LUMBAR REGION: Primary | ICD-10-CM

## 2024-10-30 PROCEDURE — 99214 OFFICE O/P EST MOD 30 MIN: CPT | Performed by: PSYCHIATRY & NEUROLOGY

## 2024-10-30 RX ORDER — ABATACEPT 125 MG/ML
INJECTION, SOLUTION SUBCUTANEOUS
COMMUNITY
Start: 2024-10-18

## 2024-10-30 NOTE — PROGRESS NOTES
Return NeuroOutpatient Note        Shelia Miller  [id number]  74 y.o.  1950       Chronic radicular lumbar pain ; Synovial cyst of lumbar spine ; and Radiculopathy, lumbar region         History obtained from:  Patient     HPI/Subjective:    Shelia Miller is a 75 yo F with PMH of lumbar radiculopathy presents as f/u. Per my previous history, she's had numbness at her finger tips from RA. She then had chemo taxel between 2020 and 2021 which did increase her numbness and tingling. Since August of 2022, patient has started getting lower back pain after a trip that required an 8 hour drive to [place]. She had reported left lower back pain, into gluteal region, down posterior thigh and leg and into toes. She tried chiropractor sessions which have helped. We had ordered MRI LS spine which had revealed multilevel spondylotic changes of the lumbar spine including multifactorial severe L4-5 central canal encroachment; an intracanalicular synovial cyst identified at the L3-4 level resulting in moderate central canal encroachment at this level.  We had referred her to neurosurgery and she did see PA of Dr. Snowden. Since patient's pain was doing better with PT, they recommended continuing and trying pain management if needed and if still symptomatic then consider surgery. If pain was not controlled, they would offer fusion.   Patient was then referred to pain management and recently had epidural injections that has helped. She's not taking gabapentin and lyrica anymore.   At present, she denies pain.   For sciatica, she does a lot of stretches at home.    She takes celebrex 200mg once daily.          She has continued with PT/OT  She hasn't gotten around to doing aqua therapy.        Her LDL most recently was 81. She's currently on lipitor 20mg daily. She feels that since the increase to 20mg her muscle cramps at various sites are worse.     She is back on Actemra for RA.        Patient has h/o lyme disease and babesiosis. It was  a long course to heal from it.          Past Medical History:   Diagnosis Date    Arthritis 2015    Asthma     Back pain with radiculopathy 03/01/2023    BRCA1 negative     BRCA2 negative     Breast cancer (HCC) 2020    Left  breast, triple negative    Cancer (HCC) 7/2019    Colon polyp     Elevated liver enzymes 06/06/2024    GERD (gastroesophageal reflux disease) 2016    History of chemotherapy 10/01/2020    breast cancer - left    History of radiation therapy 04/01/2021    left breast cancer    Hypertension     Lyme disease     OA (osteoarthritis)     Rheumatoid arthritis (HCC)     Sepsis (HCC)      Social History     Socioeconomic History    Marital status: /Civil Union     Spouse name: Not on file    Number of children: Not on file    Years of education: Not on file    Highest education level: Not on file   Occupational History    Not on file   Tobacco Use    Smoking status: Never     Passive exposure: Past    Smokeless tobacco: Never   Vaping Use    Vaping status: Never Used   Substance and Sexual Activity    Alcohol use: Not Currently     Comment: occasional    Drug use: Never    Sexual activity: Not on file   Other Topics Concern    Not on file   Social History Narrative    Not on file     Social Determinants of Health     Financial Resource Strain: Low Risk  (9/10/2023)    Overall Financial Resource Strain (CARDIA)     Difficulty of Paying Living Expenses: Not hard at all   Food Insecurity: Not on file   Transportation Needs: No Transportation Needs (9/10/2023)    PRAPARE - Transportation     Lack of Transportation (Medical): No     Lack of Transportation (Non-Medical): No   Physical Activity: Not on file   Stress: Not on file   Social Connections: Not on file   Intimate Partner Violence: Not on file   Housing Stability: Not on file     Family History   Problem Relation Age of Onset    Breast cancer Mother 59    Cancer Mother         Breast Cancer    Lung cancer Father     Cancer Father         Lung  cancer    COPD Father     Hypertension Father     Breast cancer Paternal Grandmother     Thyroid cancer Son     Cancer Son         Thyroid Cancer     Allergies   Allergen Reactions    Fruit Extracts Itching     Cantelope melon peaches    Nuts - Food Allergy GI Intolerance    Tree Extract Cough and Nasal Congestion    Pollen Extract Other (See Comments)     Pollen , Trees And Flowers    Avelox [Moxifloxacin] Itching     Per pt, also facial swelling     Current Outpatient Medications on File Prior to Visit   Medication Sig Dispense Refill    albuterol (PROVENTIL HFA,VENTOLIN HFA) 90 mcg/act inhaler as needed      atorvastatin (LIPITOR) 10 mg tablet Take 20 mg by mouth daily      Calcium Carbonate-Vitamin D3 600-400 MG-UNIT TABS Take by mouth      celecoxib (CeleBREX) 200 mg capsule if needed      Cholecalciferol 25 MCG (1000 UT) capsule Take 1,000 Units by mouth daily      Fluticasone-Salmeterol (Advair Diskus) 250-50 mcg/dose inhaler Inhale 1 puff 2 (two) times a day Rinse mouth after use. 60 blister 11    losartan (COZAAR) 50 mg tablet 50 mg daily      Multiple Vitamin (MULTIVITAMIN ADULT PO) Take 1 tablet by mouth daily      omeprazole (PriLOSEC) 20 mg delayed release capsule TAKE TWO CAPSULES BY MOUTH EVERY DAY IN THE MORNING (GENERIC PRILOSEC) 180 capsule 1    Orencia ClickJect 125 MG/ML SOAJ       PreviDent 5000 Booster Plus 1.1 % PSTE       Abatacept (ORENCIA SC) Inject under the skin (Patient not taking: Reported on 10/30/2024)      CELECOXIB PO Take 200 mg by mouth 2 (two) times a day as needed On hold (Patient not taking: Reported on 9/5/2024)      cephalexin (KEFLEX) 500 mg capsule  (Patient not taking: Reported on 9/5/2024)      raloxifene (EVISTA) 60 mg tablet Take 60 mg by mouth daily      saccharomyces boulardii (FLORASTOR) 250 mg capsule Take 1 capsule (250 mg total) by mouth 2 (two) times a day 10 capsule 0    [DISCONTINUED] Na Sulfate-K Sulfate-Mg Sulf (Suprep Bowel Prep Kit) 17.5-3.13-1.6 GM/177ML  "SOLN Take 177 mL by mouth once for 1 dose Take 177 mL by mouth once for 1 dose (Patient not taking: Reported on 6/1/2022) 177 mL 0     No current facility-administered medications on file prior to visit.         Review of Systems   Refer to positive review of systems in HPI.   Review of Systems    Constitutional- No fever  Eyes- No visual change  ENT- Hearing normal  CV- No chest pain  Resp- No Shortness of breath  GI- No diarrhea  - Bladder normal  MS- No Arthritis   Skin- No rash  Psych- No depression  Endo- No DM  Heme- No nodes    Vitals:    10/30/24 1053   BP: 122/70   BP Location: Left arm   Patient Position: Sitting   Cuff Size: Standard   Pulse: 72   Temp: (!) 96.8 °F (36 °C)   TempSrc: Tympanic   SpO2: 96%   Weight: 74.3 kg (163 lb 12.8 oz)   Height: 5' 3\" (1.6 m)       PHYSICAL EXAM:  Appearance: No Acute Distress  Ophthalmoscopic: Disc Flat, Normal fundus  Mental status:  Orientation: Awake, Alert, and Orientedx3  Memory: Registation 3/3 Recall 3/3  Attention: normal  Knowledge: good  Language: No aphasia  Speech: No dysarthria  Cranial Nerves:  2 No Visual Defect on Confrontation, Pupils round, equal, reactive to light  3,4,6 Extraocular Movements Intact, no nystagmus  5 Facial Sensation Intact  7 No facial asymmetry  8 Intact hearing  9,10 Palate symmetric, normal gag  11 Good shoulder shrug  12 Tongue Midline  Gait: Stable  Coordination: No ataxia with finger to nose testing, and heel to shin  Sensory: Intact, Symmetric to pinprick, light touch, vibration, and joint position  Muscle Tone: Normal              Muscle exam:  Arm Right Left Leg Right Left   Deltoid 5/5 5/5 Iliopsoas 5/5 5/5   Biceps 5/5 5/5 Quads 5/5 5/5   Triceps 5/5 5/5 Hamstrings 5/5 5/5   Wrist Extension 5/5 5/5 Ankle Dorsi Flexion 5/5 5/5   Wrist Flexion 5/5 5/5 Ankle Plantar Flexion 5/5 5/5   Interossei 5/5 5/5 Ankle Eversion 5/5 5/5   APB 5/5 5/5 Ankle Inversion 5/5 5/5       Reflexes   RJ BJ TJ KJ AJ Plantars Lyons's   Right 2+ " 2+ 2+ 1+ 0 Downgoing Not present   Left 2+ 2+ 2+ 1+ 0 Downgoing Not present     Personal review of  Labs:                    Diagnoses and all orders for this visit:      1. Radiculopathy, lumbar region        2. Synovial cyst        3. DDD (degenerative disc disease), lumbar        4. HLD (hyperlipidemia)            She is doing better with her pain.   Asked her to try gabapentin for neuropathy, pain. She hasn't used it in a long time.   As for HLD, LDL is 83. Asked her to take Coq10 with statin to see if it helps with the cramps. If not, then can lower dose to 10mg daily.  She does go to gym.               Total time of encounter:  30 min  More than 50% of the time was used in counseling and/or coordination of care  Extent of counseling and/or coordination of care        Jean Pierre Jon MD  [facility]  [address] [place] [id number] [phone]

## 2024-11-11 ENCOUNTER — OFFICE VISIT (OUTPATIENT)
Dept: HEMATOLOGY ONCOLOGY | Facility: MEDICAL CENTER | Age: 74
End: 2024-11-11
Payer: COMMERCIAL

## 2024-11-11 VITALS
TEMPERATURE: 97.5 F | RESPIRATION RATE: 15 BRPM | HEART RATE: 92 BPM | DIASTOLIC BLOOD PRESSURE: 84 MMHG | HEIGHT: 63 IN | BODY MASS INDEX: 28.7 KG/M2 | SYSTOLIC BLOOD PRESSURE: 138 MMHG | OXYGEN SATURATION: 98 % | WEIGHT: 162 LBS

## 2024-11-11 DIAGNOSIS — C50.912 INVASIVE DUCTAL CARCINOMA OF LEFT BREAST (HCC): Primary | ICD-10-CM

## 2024-11-11 DIAGNOSIS — R92.2 INCONCLUSIVE MAMMOGRAM: ICD-10-CM

## 2024-11-11 PROCEDURE — 99213 OFFICE O/P EST LOW 20 MIN: CPT | Performed by: INTERNAL MEDICINE

## 2024-11-11 NOTE — PROGRESS NOTES
Shelia Miller  1950  [facility] [place] [facility] [id number]    DISCUSSION/SUMMARY:    73-year-old female with history of pT1c pN0 M0 TNBC s/p BCT/SLN sampling, radiation and adjuvant chemotherapy (AC x 4, paclitaxel weekly x 12).  Presently Mrs. Miller feels well and clinically there are no concerning breast cancer associated findings.  Patient will follow-up with Radiation Oncology as directed.  The plan is to continue with self-breast exams and yearly mammograms.    Patient with a history of Haq's esophagus, previously with a pancreatic tail hypoechoic lesion.  Patient is being followed by GI.  Recent CAT scan demonstrated no abnormalities in the pancreas but a questionable left adrenal lesion (indeterminate).    Carefully review your medication list and verify that the list is accurate and up-to-date. Please call the hematology/oncology office if there are medications missing from the list, medications on the list that you are not currently taking or if there is a dosage or instruction that is different from how you're taking that medication.    Patient goals and areas of care:  Breast cancer surveillance  Barriers to care:  None  Patient is able to self-care.  ______________________________________________________________________________________    Chief Complaint   Patient presents with    Follow-up     Oncology History   Invasive ductal carcinoma of left breast (HCC) s/p lumpectomy, Chemo & XRT   2020 Initial Diagnosis    Invasive ductal carcinoma of left breast (HCC)     8/5/2020 Surgery    Breast, Left, Left Breast Mass, Wire Localized Lumpectomy (1 slides [id number] [facility], collected 8/5/2020):  Dr. Dorie Ji, Surgeon    - Invasive breast carcinoma of no special type (ductal NST/invasive ductal carcinoma).   * Size: 15 mm   * [place] grade 3 of 3 (total score: 9 of 9)    -- tubule formation,  score 3    -- nuclear grade, score 3    -- mitoses, score 3.   * Breast tumor prognostic profile (per report)    -- ER: Negative 0%    -- MA: Negative 0%    -- Her2: Negative 0   * Ductal carcinoma in situ (DCIS): Not identified.    * Margins: Negative for carcinoma, less than 1 mm from the closest lateral margin.   * Pathologic stage (AJCC 8th ed.): pT1c.        9/2/2020 Surgery    Left breast re-excision lumpectomy with SLNB  ([facility], Dr. Ji)     A. Left breast lumpectomy:  - benign breast tissue with previous lumpectomy cavity showing marked tissue reaction including foreign body giant cell reaction, granulation tissue and fat necrosis.   - No residual carcinoma seen.    B. Left axillary lymph node:  - two negative lymph nodes.        10/2020 - 3/15/2021 Chemotherapy    Adjuvant chemotherapy ([facility])  AC (Adriamycin, Cytoxan) x 4 cycles  Paclitaxel x 12 cycles     Dr. Conchis Faye, [facility]      3/12/2021 -  Cancer Staged    Staging form: Breast, AJCC 8th Edition  - Pathologic: Stage IB (pT1c, pN0, cM0, G3, ER-, MA-, HER2-) - Signed by Robin Velasquez MD on 3/12/2021  Multigene prognostic tests performed: None  Histologic grading system: 3 grade system       4/12/2021 - 5/10/2021 Radiation    4256 cGy in 16 fractions to the entire left breast followed by  additional 1000 cGy in 5 fractions to the left lumpectomy cavity  Robin Velasquez MD     4/12/2021 - 5/10/2021 Radiation    BH L BreaEZ 6X 16 / 16 266 0 4,256 21   L Breast e 9E 5 / 5 200 0 1,000 6      Treatment Dates:  4/12/2021 - 5/10/2021.        History of Present Illness:  73-year-old female previously diagnosed with breast cancer.  Mrs. Miller recently moved to the [facility] and was in need of a new oncologist.    In the late summer of 2020 patient felt a left-sided mass.  Patient was due for a mammogram at that time.  Results demonstrated an abnormality (that specific mammogram is not available).   Patient was sent for biopsy - positive.  Patient was subsequently referred to a surgeon and underwent left-sided BCS/SLN excisions.  Patient was found to have early stage triple negative breast cancer and received AC x 4 and then paclitaxel x 12 weeks.  Mrs. Miller then began surveillance.    Patient states feeling okay for the most part.  No breast related issues.  Appetite is good, weight is stable.  No respiratory problems.  No other GI,  or GYN issues.  Patient states that she has occasional pain in her left lateral rib cage.  No specific association with activities.  The pain lasts momentarily and then goes away.    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  Patient states that routine health maintenance and medical care is otherwise up-to-date.    Review of Systems   Constitutional: Negative.    HENT: Negative.     Eyes: Negative.    Respiratory: Negative.     Cardiovascular: Negative.    Gastrointestinal: Negative.    Endocrine: Negative.    Genitourinary: Negative.    Musculoskeletal:  Positive for arthralgias.   Skin: Negative.    Allergic/Immunologic: Negative.    Neurological: Negative.    Hematological: Negative.    Psychiatric/Behavioral: Negative.     All other systems reviewed and are negative.    Patient Active Problem List   Diagnosis    Invasive ductal carcinoma of left breast (HCC) s/p lumpectomy, Chemo & XRT    HTN (hypertension)    Drug-induced polyneuropathy (HCC)    Diastolic CHF, acute (HCC)    Back pain with radiculopathy    Thrombocytopenia (HCC)    Leukopenia    Hyperlipidemia    Acquired deformity of left foot    Adrenal nodule (HCC)    Anemia of chronic disease    Anemia    Arthritis of right knee    Breast cancer, left breast (HCC)    Diverticulosis    GERD (gastroesophageal reflux disease)    Lymphedema of left arm    Malignant neoplasm of upper-outer quadrant of female breast (HCC)    Obesity    Orthopedic aftercare     Osteoarthritis of right hip    Osteoporosis    Peripheral neuropathy    Varicose veins of right lower extremity with inflammation    Hypertension    Other hyperlipidemia    Asthma, mild persistent    Rheumatoid arthritis (HCC)    Infection within female pelvic cavity    Lumbar radiculopathy     Past Medical History:   Diagnosis Date    Arthritis 2015    Asthma     Back pain with radiculopathy 03/01/2023    BRCA1 negative     BRCA2 negative     Breast cancer (HCC) 2020    Left  breast, triple negative    Cancer (HCC) 7/2019    Colon polyp     Elevated liver enzymes 06/06/2024    GERD (gastroesophageal reflux disease) 2016    History of chemotherapy 10/01/2020    breast cancer - left    History of radiation therapy 04/01/2021    left breast cancer    Hypertension     Lyme disease     OA (osteoarthritis)     Rheumatoid arthritis (HCC)     Sepsis (HCC)      Past Surgical History:   Procedure Laterality Date    ANKLE SURGERY Right 2016    2 plates and screws    BREAST BIOPSY Left 07/02/2020    BREAST LUMPECTOMY Left 08/12/2020    left breast wire localized lumpectomy    BREAST MASS EXCISION Left 09/02/2020    short superior and long lateral left breast resection, left axillary sentinel lymph node biopsy    COLONOSCOPY      ELBOW FRACTURE REPAIR Right 1996    EPIDURAL BLOCK INJECTION N/A 7/19/2024    Procedure: L5-S1 LUMBAR EPIDURAL STEROID INJECTION;  Surgeon: Giuliano Romano MD;  Location: [facility] MAIN OR;  Service: Pain Management     FRACTURE SURGERY Right     elbow    GUM SURGERY      HYSTERECTOMY  2018    JOINT REPLACEMENT Right 6/2021    hip    REPLACEMENT TOTAL HIP W/  RESURFACING IMPLANTS  06/18/2021     Family History   Problem Relation Age of Onset    Breast cancer Mother 59    Cancer Mother         Breast Cancer    Lung cancer Father     Cancer Father         Lung cancer    COPD Father     Hypertension Father     Breast cancer Paternal Grandmother     Thyroid cancer Son     Cancer Son         Thyroid Cancer    Family history:  mother diagnosed with breast cancer at the age of 65 and  of metastatic breast cancer at the age of 67, paternal grandmother with breast cancer (NOS), 4 children in good general health, 1 son recently diagnosed with thyroid cancer (NOS)    Social History     Socioeconomic History    Marital status: /Civil Union     Spouse name: Not on file    Number of children: Not on file    Years of education: Not on file    Highest education level: Not on file   Occupational History    Not on file   Tobacco Use    Smoking status: Never     Passive exposure: Past    Smokeless tobacco: Never   Vaping Use    Vaping status: Never Used   Substance and Sexual Activity    Alcohol use: Not Currently     Comment: occasional    Drug use: Never    Sexual activity: Not on file   Other Topics Concern    Not on file   Social History Narrative    Not on file     Social Determinants of Health     Financial Resource Strain: Low Risk  (9/10/2023)    Overall Financial Resource Strain (CARDIA)     Difficulty of Paying Living Expenses: Not hard at all   Food Insecurity: Not on file   Transportation Needs: No Transportation Needs (9/10/2023)    PRAPARE - Transportation     Lack of Transportation (Medical): No     Lack of Transportation (Non-Medical): No   Physical Activity: Not on file   Stress: Not on file   Social Connections: Not on file   Intimate Partner Violence: Not on file   Housing Stability: Not on file   Social history:  No tobacco, alcohol or drug abuse, no toxic exposure    Current Outpatient Medications:     albuterol (PROVENTIL HFA,VENTOLIN HFA) 90 mcg/act inhaler, as needed, Disp: , Rfl:     atorvastatin (LIPITOR) 10 mg tablet, Take 20 mg by mouth daily, Disp: , Rfl:     Calcium Carbonate-Vitamin D3 600-400 MG-UNIT TABS, Take by mouth, Disp: , Rfl:     celecoxib (CeleBREX) 200 mg capsule, if needed, Disp: , Rfl:     Cholecalciferol 25 MCG (1000 UT) capsule, Take 1,000 Units by mouth daily, Disp: , Rfl:      Fluticasone-Salmeterol (Advair Diskus) 250-50 mcg/dose inhaler, Inhale 1 puff 2 (two) times a day Rinse mouth after use., Disp: 60 blister, Rfl: 11    losartan (COZAAR) 50 mg tablet, 50 mg daily, Disp: , Rfl:     Multiple Vitamin (MULTIVITAMIN ADULT PO), Take 1 tablet by mouth daily, Disp: , Rfl:     omeprazole (PriLOSEC) 20 mg delayed release capsule, TAKE TWO CAPSULES BY MOUTH EVERY DAY IN THE MORNING (GENERIC PRILOSEC), Disp: 180 capsule, Rfl: 1    Orencia ClickJect 125 MG/ML SOAJ, , Disp: , Rfl:     PreviDent 5000 Booster Plus 1.1 % PSTE, , Disp: , Rfl:     saccharomyces boulardii (FLORASTOR) 250 mg capsule, Take 1 capsule (250 mg total) by mouth 2 (two) times a day, Disp: 10 capsule, Rfl: 0    Abatacept (ORENCIA SC), Inject under the skin (Patient not taking: Reported on 10/30/2024), Disp: , Rfl:     CELECOXIB PO, Take 200 mg by mouth 2 (two) times a day as needed On hold (Patient not taking: Reported on 9/5/2024), Disp: , Rfl:     cephalexin (KEFLEX) 500 mg capsule, , Disp: , Rfl:     raloxifene (EVISTA) 60 mg tablet, Take 60 mg by mouth daily, Disp: , Rfl:     Allergies   Allergen Reactions    Fruit Extracts Itching     Cantelope melon peaches    Nuts - Food Allergy GI Intolerance    Tree Extract Cough and Nasal Congestion    Pollen Extract Other (See Comments)     Pollen , Trees And Flowers    Avelox [Moxifloxacin] Itching     Per pt, also facial swelling       Vitals:    11/11/24 0845   Resp: 15     Physical Exam  Constitutional:       Appearance: She is well-developed.      Comments: Well-nourished female, no respiratory distress, no signs of pain   HENT:      Head: Normocephalic and atraumatic.      Right Ear: External ear normal.      Left Ear: External ear normal.   Eyes:      Conjunctiva/sclera: Conjunctivae normal.      Pupils: Pupils are equal, round, and reactive to light.   Cardiovascular:      Rate and Rhythm: Normal rate and regular rhythm.      Heart sounds: Normal heart sounds.   Pulmonary:       Effort: Pulmonary effort is normal.      Breath sounds: Normal breath sounds.      Comments: Clear bilaterally  Abdominal:      General: Bowel sounds are normal.      Palpations: Abdomen is soft.      Comments: +bowel sounds, nontender, soft, cannot palpate liver or spleen, no guarding, no rigidity or rebound   Musculoskeletal:         General: Normal range of motion.      Cervical back: Normal range of motion and neck supple.      Comments: No rashes or tenderness with palpation of the left anterior and lateral rib cage   Skin:     General: Skin is warm.      Comments: Good color, warm, moist, no petechiae or ecchymoses   Neurological:      Mental Status: She is alert and oriented to person, place, and time.      Deep Tendon Reflexes: Reflexes are normal and symmetric.   Psychiatric:         Behavior: Behavior normal.         Thought Content: Thought content normal.         Judgment: Judgment normal.     Breast: Deferred, no axillary adenopathy bilaterally  Extremities:  No lower extremity edema bilaterally, no cords, pulses are 1+  Lymphatics:  No adenopathy in the neck, supraclavicular region, axilla bilaterally    Labs  No recent labs. Labs from 7/30/24 and [facility] 24 reviewed.      8/26/2023 ([facility] WBC = 4.1 hemoglobin = 12.7 hematocrit = 38.4 platelet = 240 neutrophil = 51% BUN = 19 creatinine = 0.69 calcium = 9.6 LFTs WNL    1/20/2023 immunofixation did not demonstrate any monoclonal gammopathy (SPEP demonstrated a faint possible band in the gamma)    8/8/2022 BUN = 17 creatinine = 0.86 calcium = 8.7 AST = 28 ALT = 40 alkaline phosphatase = 29 total protein = 7.1 total bilirubin = 0.63    Imaging    12/7/2022  Mammogram -    IMPRESSION:   Stable therapeutic changes left breast.     No evidence for malignancy.           ASSESSMENT/BI-RADS CATEGORY:  Left: 2 - Benign  Right: 1 - Negative  Overall: 2 - Benign       11/11/2022 1 mammogram diagnostic bilateral 3D and CAD.  Impression stated  stable exam, overall category 2, posttreatment changes visible in the left breast, recommend diagnostic mammogram 1 year follow-up breast.    6/17/2022 CAT scan abdomen pelvis    IMPRESSION:     1.  Normal pancreas.  2.  Hepatic steatosis.  3.  Indeterminate left adrenal nodule. Although its imaging features are indeterminate, it does not have suspicious imaging features (heterogeneity, necrosis, irregular margins), therefore this is likely benign, and can be followed by non-contrast   abdomen CT or MRI in 12 months.  If patient has history of adrenal hyperfunction, consider biochemical evaluation.        05/25/2022 right upper quadrant ultrasound    KIDNEY:   Right kidney measures 11.1 x 4.3 x 4.4 cm. Volume 109.6 mL  Small simple cyst measures 7 mm.  ASCITES:   None.    IMPRESSION:  Probable fatty liver.  No gallstones.  Normal ducts.  Hypoechoic area near the pancreatic tail may be related to bowel gas artifact although a shadowing structure is possible.  Follow-up with MRI or CT with contrast in approximately one month's time may be useful assuming availability to the national shortage.    11/10/2021 mammogram diagnostic bilateral with 3D and CAD.  Impression stated therapeutic changes left breast, no evidence for malignancy, category 2 both breast, recommend diagnostic mammogram 1 year both breasts.    Pathology    Case Report   Surgical Pathology Report                         Case: [id number]                                    Authorizing Provider:  Robin Velasquez MD         Collected:           03/09/2021 0806               Ordering Location:     [facility]      Received:            03/09/2021 06                                      [facility]                                                                                   Laboratory                                                                    Pathologist:           Cira Garza MD                                                                    Specimen:    Breast, Left, Left Breast Mass, Wire Localized Lumpectomy (1 [id number]                     [facility], collected 8/5/2020)                                                    Final Diagnosis   A. Breast, Left, Left Breast Mass, Wire Localized Lumpectomy (1 slides [id number] [facility], collected 8/5/2020):  - Invasive breast carcinoma of no special type (ductal NST/invasive ductal carcinoma).   * Size: 15 mm   * [place] grade 3 of 3 (total score: 9 of 9)    -- tubule formation, score 3    -- nuclear grade, score 3    -- mitoses, score 3.   * Breast tumor prognostic profile (per report)    -- ER: Negative 0%    -- MD: Negative 0%    -- Her2: Negative 0   * Ductal carcinoma in situ (DCIS): Not identified.    * Margins: Negative for carcinoma, less than 1 mm from the closest lateral margin.   * Pathologic stage (AJCC 8th ed.): pT1c.     Comment: Only 1 slide is available for review.    Electronically signed by Cira Garza MD on 3/9/2021 at 12:26 PM       08/06/2020 Department of pathology [facility]: Left breast mass, wire localized lumpectomy: Poorly differentiated carcinoma consistent with invasive breast ductal carcinoma, grade 3, margins negative, left side, tumor size = 15 mm, single focus, no DCIS, skin not present, no skeletal muscle present, 2 neg lymph nodes, ER = 0%, MD = 0% HER2/selina = 0 = negative      Impression:  New pain that starts past the breast in the back and goes anteriorly, lasts 30 sec but can be intense.    P.Get -Labs, Daignostic rather then screening mammogram, consider surgical consultation if pain persists. She noticed it getting worse soon after starting Lipitor. There is a well-known placebo effect of lipitor causing pain, but I suggested that she co-ordinate with her PMD and try holding it for a week or two and see if the pain improves.  Stage 1 breast cancer treated with lumpectomy and radiation and adjuvant TC. No  recent labs- will obtain.. Also she is due for mammogram - will order

## 2024-12-09 ENCOUNTER — APPOINTMENT (OUTPATIENT)
Dept: RADIOLOGY | Facility: CLINIC | Age: 74
End: 2024-12-09
Payer: COMMERCIAL

## 2024-12-09 ENCOUNTER — OFFICE VISIT (OUTPATIENT)
Dept: OBGYN CLINIC | Facility: CLINIC | Age: 74
End: 2024-12-09
Payer: COMMERCIAL

## 2024-12-09 VITALS
WEIGHT: 162 LBS | BODY MASS INDEX: 28.7 KG/M2 | SYSTOLIC BLOOD PRESSURE: 120 MMHG | HEART RATE: 70 BPM | HEIGHT: 63 IN | DIASTOLIC BLOOD PRESSURE: 69 MMHG

## 2024-12-09 DIAGNOSIS — Z01.89 ENCOUNTER FOR LOWER EXTREMITY COMPARISON IMAGING STUDY: ICD-10-CM

## 2024-12-09 DIAGNOSIS — M25.561 RIGHT KNEE PAIN, UNSPECIFIED CHRONICITY: ICD-10-CM

## 2024-12-09 DIAGNOSIS — M25.561 CHRONIC PAIN OF RIGHT KNEE: ICD-10-CM

## 2024-12-09 DIAGNOSIS — G89.29 CHRONIC PAIN OF RIGHT KNEE: ICD-10-CM

## 2024-12-09 DIAGNOSIS — M17.11 PRIMARY OSTEOARTHRITIS OF RIGHT KNEE: Primary | ICD-10-CM

## 2024-12-09 PROCEDURE — 73562 X-RAY EXAM OF KNEE 3: CPT

## 2024-12-09 PROCEDURE — 20610 DRAIN/INJ JOINT/BURSA W/O US: CPT | Performed by: ORTHOPAEDIC SURGERY

## 2024-12-09 PROCEDURE — 99203 OFFICE O/P NEW LOW 30 MIN: CPT | Performed by: ORTHOPAEDIC SURGERY

## 2024-12-09 PROCEDURE — 73564 X-RAY EXAM KNEE 4 OR MORE: CPT

## 2024-12-09 RX ORDER — TRIAMCINOLONE ACETONIDE 40 MG/ML
40 INJECTION, SUSPENSION INTRA-ARTICULAR; INTRAMUSCULAR
Status: COMPLETED | OUTPATIENT
Start: 2024-12-09 | End: 2024-12-09

## 2024-12-09 RX ORDER — ROSUVASTATIN CALCIUM 5 MG/1
5 TABLET, COATED ORAL DAILY
COMMUNITY
Start: 2024-12-04 | End: 2025-12-04

## 2024-12-09 RX ORDER — ROPIVACAINE HYDROCHLORIDE 2 MG/ML
4 INJECTION, SOLUTION EPIDURAL; INFILTRATION; PERINEURAL
Status: COMPLETED | OUTPATIENT
Start: 2024-12-09 | End: 2024-12-09

## 2024-12-09 RX ADMIN — TRIAMCINOLONE ACETONIDE 40 MG: 40 INJECTION, SUSPENSION INTRA-ARTICULAR; INTRAMUSCULAR at 09:00

## 2024-12-09 RX ADMIN — ROPIVACAINE HYDROCHLORIDE 4 ML: 2 INJECTION, SOLUTION EPIDURAL; INFILTRATION; PERINEURAL at 09:00

## 2024-12-09 NOTE — PROGRESS NOTES
"Ortho Sports Medicine New Patient Visit     Assesment:   74 y.o. female with primary osteoarthritis of right knee    Plan:    Shelia is a pleasant 74 y.o. female who presents for initial evaluation of her right knee. She is symptomatic of her severe underlying osteoarthritis of the right knee. Together we reviewed her X-rays, demonstrating the degenerative changes of the right knee. We discussed non operative and operative treatment. Non operative treatment includes physical therapy, oral analgesics, and injection therapy. Operative treatment includes total knee arthroplasty; however, I do not believe this is an appropriate intervention at this time. I offered her a corticosteroid injection of the right knee today. She consented to and tolerated the procedure well. Injection aftercare instructions were provided to her. We discussed that should she experience short relief from this injection today, we can consider visco supplementation injections. I also provided her with a referral to physical therapy for the right knee. She may follow-up as needed in regards to her right knee pain.    Large joint arthrocentesis: R knee  Universal Protocol:  Consent: Verbal consent obtained.  Risks and benefits: risks, benefits and alternatives were discussed  Consent given by: patient  Time out: Immediately prior to procedure a \"time out\" was called to verify the correct patient, procedure, equipment, support staff and site/side marked as required.  Patient understanding: patient states understanding of the procedure being performed  Site marked: the operative site was marked  Patient identity confirmed: verbally with patient  Supporting Documentation  Indications: pain   Procedure Details  Location: knee - R knee  Preparation: Patient was prepped and draped in the usual sterile fashion  Needle size: 22 G  Approach: anterolateral  Medications administered: 40 mg triamcinolone acetonide 40 mg/mL; 4 mL ropivacaine 0.2 %    Patient " tolerance: patient tolerated the procedure well with no immediate complications  Dressing:  Sterile dressing applied           Follow up:    Return if symptoms worsen or fail to improve.        Chief Complaint   Patient presents with    Right Knee - Pain       History of Present Illness:    The patient is a 74 y.o. female who presents for initial evaluation of her right knee. The right knee began bothering her for years; however her knee pain worsened over the past 3 months. She denies any recent injuries or traumas to the right knee. She indicates her right knee pain is located anteriorly and medially on the knee. She feels the pain is activity related, as it worsens throughout the day. She has a relevant history of rheumatoid arthritis. She has had a corticosteroid injection of the right knee in 2016 with relief. She takes Celebrex, orencia, and tylenol for pain control with some relief.      Knee Surgical History:  None    Past Medical, Social and Family History:  Past Medical History:   Diagnosis Date    Arthritis 2015    Asthma     Back pain with radiculopathy 03/01/2023    BRCA1 negative     BRCA2 negative     Breast cancer (HCC) 2020    Left  breast, triple negative    Cancer (HCC) 7/2019    Colon polyp     Elevated liver enzymes 06/06/2024    GERD (gastroesophageal reflux disease) 2016    History of chemotherapy 10/01/2020    breast cancer - left    History of radiation therapy 04/01/2021    left breast cancer    Hypertension     Lyme disease     OA (osteoarthritis)     Rheumatoid arthritis (HCC)     Sepsis (HCC)      Past Surgical History:   Procedure Laterality Date    ANKLE SURGERY Right 2016    2 plates and screws    BREAST BIOPSY Left 07/02/2020    BREAST LUMPECTOMY Left 08/12/2020    left breast wire localized lumpectomy    BREAST MASS EXCISION Left 09/02/2020    short superior and long lateral left breast resection, left axillary sentinel lymph node biopsy    COLONOSCOPY      ELBOW FRACTURE REPAIR  Right 1996    EPIDURAL BLOCK INJECTION N/A 7/19/2024    Procedure: L5-S1 LUMBAR EPIDURAL STEROID INJECTION;  Surgeon: Giuliano Romano MD;  Location: [facility] MAIN OR;  Service: Pain Management     FRACTURE SURGERY Right     elbow    GUM SURGERY      HYSTERECTOMY  2018    JOINT REPLACEMENT Right 6/2021    hip    REPLACEMENT TOTAL HIP W/  RESURFACING IMPLANTS  06/18/2021     Allergies   Allergen Reactions    Fruit Extracts Itching     Cantelope melon peaches    Nuts - Food Allergy GI Intolerance    Tree Extract Cough and Nasal Congestion    Pollen Extract Other (See Comments)     Pollen , Trees And Flowers    Avelox [Moxifloxacin] Itching     Per pt, also facial swelling     Current Outpatient Medications on File Prior to Visit   Medication Sig Dispense Refill    albuterol (PROVENTIL HFA,VENTOLIN HFA) 90 mcg/act inhaler as needed      Calcium Carbonate-Vitamin D3 600-400 MG-UNIT TABS Take by mouth      celecoxib (CeleBREX) 200 mg capsule if needed      Cholecalciferol 25 MCG (1000 UT) capsule Take 1,000 Units by mouth daily      Fluticasone-Salmeterol (Advair Diskus) 250-50 mcg/dose inhaler Inhale 1 puff 2 (two) times a day Rinse mouth after use. 60 blister 11    losartan (COZAAR) 50 mg tablet 50 mg daily      Multiple Vitamin (MULTIVITAMIN ADULT PO) Take 1 tablet by mouth daily      omeprazole (PriLOSEC) 20 mg delayed release capsule TAKE TWO CAPSULES BY MOUTH EVERY DAY IN THE MORNING (GENERIC PRILOSEC) 180 capsule 1    Orencia ClickJect 125 MG/ML SOAJ once a week      PreviDent 5000 Booster Plus 1.1 % PSTE       atorvastatin (LIPITOR) 10 mg tablet Take 20 mg by mouth daily (Patient not taking: Reported on 12/9/2024)      CELECOXIB PO Take 200 mg by mouth 2 (two) times a day as needed On hold (Patient not taking: Reported on 9/5/2024)      raloxifene (EVISTA) 60 mg tablet Take 60 mg by mouth daily      rosuvastatin (CRESTOR) 5 mg tablet Take 5 mg by mouth daily (Patient not taking: Reported on 12/9/2024)       saccharomyces boulardii (FLORASTOR) 250 mg capsule Take 1 capsule (250 mg total) by mouth 2 (two) times a day 10 capsule 0    [DISCONTINUED] Abatacept (ORENCIA SC) Inject under the skin (Patient not taking: Reported on 10/30/2024)      [DISCONTINUED] cephalexin (KEFLEX) 500 mg capsule  (Patient not taking: Reported on 9/5/2024)      [DISCONTINUED] Na Sulfate-K Sulfate-Mg Sulf (Suprep Bowel Prep Kit) 17.5-3.13-1.6 GM/177ML SOLN Take 177 mL by mouth once for 1 dose Take 177 mL by mouth once for 1 dose (Patient not taking: Reported on 6/1/2022) 177 mL 0     No current facility-administered medications on file prior to visit.     Social History     Socioeconomic History    Marital status: /Civil Union     Spouse name: Not on file    Number of children: Not on file    Years of education: Not on file    Highest education level: Not on file   Occupational History    Not on file   Tobacco Use    Smoking status: Never     Passive exposure: Past    Smokeless tobacco: Never   Vaping Use    Vaping status: Never Used   Substance and Sexual Activity    Alcohol use: Not Currently     Comment: occasional    Drug use: Never    Sexual activity: Not on file   Other Topics Concern    Not on file   Social History Narrative    Not on file     Social Drivers of Health     Financial Resource Strain: Low Risk  (9/10/2023)    Overall Financial Resource Strain (CARDIA)     Difficulty of Paying Living Expenses: Not hard at all   Food Insecurity: Not on file   Transportation Needs: No Transportation Needs (9/10/2023)    PRAPARE - Transportation     Lack of Transportation (Medical): No     Lack of Transportation (Non-Medical): No   Physical Activity: Not on file   Stress: Not on file   Social Connections: Not on file   Intimate Partner Violence: Not on file   Housing Stability: Not on file         I have reviewed the past medical, surgical, social and family history, medications and allergies as documented in the EMR.    Review of  "systems: ROS is negative other than that noted in the HPI.  Constitutional: Negative for fatigue and fever.   HENT: Negative for sore throat.    Respiratory: Negative for shortness of breath.    Cardiovascular: Negative for chest pain.   Gastrointestinal: Negative for abdominal pain.   Endocrine: Negative for cold intolerance and heat intolerance.   Genitourinary: Negative for flank pain.   Musculoskeletal: Negative for back pain.   Skin: Negative for rash.   Allergic/Immunologic: Negative for immunocompromised state.   Neurological: Negative for dizziness.   Psychiatric/Behavioral: Negative for agitation.      Physical Exam:    Blood pressure 120/69, pulse 70, height 5' 3\" (1.6 m), weight 73.5 kg (162 lb).    General/Constitutional: NAD, well developed, well nourished  HENT: Normocephalic, atraumatic  CV: Intact distal pulses, regular rate  Resp: No respiratory distress or labored breathing  Abdomen: soft, nondistended   Lymphatic: No lymphadenopathy palpated  Neuro: Alert and Oriented x 3, no focal deficits  Psych: Normal mood, normal affect  Skin: Warm, dry, no rashes, no erythema      Knee Exam:   No significant skin lesions or deformity  Range of motion from 0° to 110°  Medial joint line tenderness   Knee is stable to varus stress, valgus stress, Lachman, and posterior drawer.    Neurovascularly intact distally    Knee Imaging    X-rays of the right knee reviewed and interpreted today. These show severe narrowing of the medial compartment. Moderate narrowing of the patellofemoral compartment. No acute fractures or dislocations.    Scribe Attestation      I,:  Bailey Brar am acting as a scribe while in the presence of the attending physician.:       I,:  Grey Romano MD personally performed the services described in this documentation    as scribed in my presence.:            "

## 2025-01-27 ENCOUNTER — OFFICE VISIT (OUTPATIENT)
Dept: PULMONOLOGY | Facility: MEDICAL CENTER | Age: 75
End: 2025-01-27
Payer: COMMERCIAL

## 2025-01-27 VITALS
HEIGHT: 63 IN | BODY MASS INDEX: 28.88 KG/M2 | HEART RATE: 67 BPM | TEMPERATURE: 96.4 F | RESPIRATION RATE: 12 BRPM | OXYGEN SATURATION: 96 % | SYSTOLIC BLOOD PRESSURE: 118 MMHG | DIASTOLIC BLOOD PRESSURE: 68 MMHG | WEIGHT: 163 LBS

## 2025-01-27 DIAGNOSIS — J45.20 MILD INTERMITTENT ASTHMA WITHOUT COMPLICATION: Primary | ICD-10-CM

## 2025-01-27 DIAGNOSIS — M06.9 RHEUMATOID ARTHRITIS, INVOLVING UNSPECIFIED SITE, UNSPECIFIED WHETHER RHEUMATOID FACTOR PRESENT (HCC): ICD-10-CM

## 2025-01-27 DIAGNOSIS — I50.30 HEART FAILURE WITH PRESERVED EJECTION FRACTION, UNSPECIFIED HF CHRONICITY (HCC): ICD-10-CM

## 2025-01-27 PROCEDURE — 99213 OFFICE O/P EST LOW 20 MIN: CPT | Performed by: STUDENT IN AN ORGANIZED HEALTH CARE EDUCATION/TRAINING PROGRAM

## 2025-01-27 RX ORDER — PREDNISONE 20 MG/1
40 TABLET ORAL DAILY
Qty: 10 TABLET | Refills: 0 | Status: SHIPPED | OUTPATIENT
Start: 2025-01-27 | End: 2025-02-01

## 2025-01-27 NOTE — PROGRESS NOTES
Pulmonary Outpatient Progress Note   Shelia Miller 74 y.o. female MRN: [id number]  1/27/2025      Assessment:    Suspected URI patient complaining of occasional wheezing, scant sputum production, rhinorrhea, no fevers.  Likely has 2 separate processes.  She gets mild URI related symptoms after she takes Orencia for her RA but now that she is having wheezing and requiring rescue inhaler we will treat this as a separate process.  No indication for antimicrobials right now.  We will prescribe 40 mg prednisone for 5 days, continue using rescue inhaler  RA on Orencia, mild restriction on PFT, CT with faint mosaicism which is probably more asthma related.  Patient follows with rheumatologist in [place]  Mild intermittent asthma on Advair 250 he has peripheral eosinophilia, generally stable with no flares.  No prednisone requirements.  Does not use rescue inhaler often except for the last week  Lifetime non-smoker    Plan:    40 mg prednisone for 5 days for URI related symptoms  Continue Advair  Continue rescue inhaler as needed  No indication for antimicrobial therapy  Delay PFTs for 4 to 6 weeks given the current URI symptoms  Follow-up in 6 months      History of Present Illness   HPI:    74-year-old female here for sick visit.  He has a past medical history of RA on Orencia, used to be on Actemra, follows with rheumatologist [place] and has been stable, did receive knee injections several weeks ago due to RA symptoms.    She says every week when she takes her Orencia she gets mild URI symptoms that go away with Xyzal.  Although over the past week her symptoms did not go away and now she has some coughing, sputum production, and wheezing.  Given the symptoms she came in for urgent evaluation.    Overall clinically stable but she is using more rescue inhalers which she generally does not require at all.    Review of Systems   Constitutional:  Negative for appetite change and fever.   HENT:  Positive for postnasal  drip. Negative for rhinorrhea, sneezing, sore throat and trouble swallowing.    Eyes: Negative.    Respiratory:  Positive for cough and wheezing.    Cardiovascular:  Negative for chest pain.   Gastrointestinal: Negative.    Endocrine: Negative.    Genitourinary: Negative.    Musculoskeletal: Negative.  Negative for myalgias.   Skin: Negative.    Allergic/Immunologic: Positive for immunocompromised state.   Neurological:  Positive for headaches.   Hematological: Negative.    Psychiatric/Behavioral: Negative.          Historical Information   Past Medical History:   Diagnosis Date   • Arthritis 2015   • Asthma    • Back pain with radiculopathy 03/01/2023   • BRCA1 negative    • BRCA2 negative    • Breast cancer (HCC) 2020    Left  breast, triple negative   • Cancer (HCC) 7/2019   • Colon polyp    • Elevated liver enzymes 06/06/2024   • GERD (gastroesophageal reflux disease) 2016   • History of chemotherapy 10/01/2020    breast cancer - left   • History of radiation therapy 04/01/2021    left breast cancer   • Hypertension    • Lyme disease    • OA (osteoarthritis)    • Rheumatoid arthritis (HCC)    • Sepsis (HCC)      Past Surgical History:   Procedure Laterality Date   • ANKLE SURGERY Right 2016    2 plates and screws   • BREAST BIOPSY Left 07/02/2020   • BREAST LUMPECTOMY Left 08/12/2020    left breast wire localized lumpectomy   • BREAST MASS EXCISION Left 09/02/2020    short superior and long lateral left breast resection, left axillary sentinel lymph node biopsy   • COLONOSCOPY     • ELBOW FRACTURE REPAIR Right 1996   • EPIDURAL BLOCK INJECTION N/A 7/19/2024    Procedure: L5-S1 LUMBAR EPIDURAL STEROID INJECTION;  Surgeon: Giuliano Romano MD;  Location: [facility] MAIN OR;  Service: Pain Management    • FRACTURE SURGERY Right     elbow   • GUM SURGERY     • HYSTERECTOMY  2018   • JOINT REPLACEMENT Right 6/2021    hip   • REPLACEMENT TOTAL HIP W/  RESURFACING IMPLANTS  06/18/2021     Family History   Problem Relation  Age of Onset   • Breast cancer Mother 59   • Cancer Mother         Breast Cancer   • Lung cancer Father    • Cancer Father         Lung cancer   • COPD Father    • Hypertension Father    • Breast cancer Paternal Grandmother    • Thyroid cancer Son    • Cancer Son         Thyroid Cancer     Social History     Tobacco Use   Smoking Status Never   • Passive exposure: Past   Smokeless Tobacco Never       Occupational History: NA    Meds/Allergies     Current Outpatient Medications:   •  albuterol (PROVENTIL HFA,VENTOLIN HFA) 90 mcg/act inhaler, as needed, Disp: , Rfl:   •  Calcium Carbonate-Vitamin D3 600-400 MG-UNIT TABS, Take by mouth, Disp: , Rfl:   •  celecoxib (CeleBREX) 200 mg capsule, if needed, Disp: , Rfl:   •  Cholecalciferol 25 MCG (1000 UT) capsule, Take 1,000 Units by mouth daily, Disp: , Rfl:   •  Fluticasone-Salmeterol (Advair Diskus) 250-50 mcg/dose inhaler, Inhale 1 puff 2 (two) times a day Rinse mouth after use., Disp: 60 blister, Rfl: 11  •  losartan (COZAAR) 50 mg tablet, 50 mg daily, Disp: , Rfl:   •  Multiple Vitamin (MULTIVITAMIN ADULT PO), Take 1 tablet by mouth daily, Disp: , Rfl:   •  omeprazole (PriLOSEC) 20 mg delayed release capsule, TAKE TWO CAPSULES BY MOUTH EVERY DAY IN THE MORNING (GENERIC PRILOSEC), Disp: 180 capsule, Rfl: 1  •  Orencia ClickJect 125 MG/ML SOAJ, once a week, Disp: , Rfl:   •  predniSONE 20 mg tablet, Take 2 tablets (40 mg total) by mouth daily for 5 days, Disp: 10 tablet, Rfl: 0  •  PreviDent 5000 Booster Plus 1.1 % PSTE, , Disp: , Rfl:   •  atorvastatin (LIPITOR) 10 mg tablet, Take 20 mg by mouth daily (Patient not taking: Reported on 12/9/2024), Disp: , Rfl:   •  CELECOXIB PO, Take 200 mg by mouth 2 (two) times a day as needed On hold (Patient not taking: Reported on 9/5/2024), Disp: , Rfl:   •  raloxifene (EVISTA) 60 mg tablet, Take 60 mg by mouth daily, Disp: , Rfl:   •  rosuvastatin (CRESTOR) 5 mg tablet, Take 5 mg by mouth daily (Patient not taking: Reported on  "12/9/2024), Disp: , Rfl:   •  saccharomyces boulardii (FLORASTOR) 250 mg capsule, Take 1 capsule (250 mg total) by mouth 2 (two) times a day, Disp: 10 capsule, Rfl: 0  Allergies   Allergen Reactions   • Fruit Extracts Itching     Cantelope melon peaches   • Nuts - Food Allergy GI Intolerance   • Tree Extract Cough and Nasal Congestion   • Pollen Extract Other (See Comments)     Pollen , Trees And Flowers   • Avelox [Moxifloxacin] Itching     Per pt, also facial swelling       Vitals: Blood pressure 118/68, pulse 67, temperature (!) 96.4 °F (35.8 °C), temperature source Temporal, resp. rate 12, height 5' 3\" (1.6 m), weight 73.9 kg (163 lb), SpO2 96%., Body mass index is 28.87 kg/m². Oxygen Therapy  SpO2: 96 %    Physical Exam:    GEN: alert and oriented x 3, pleasant and cooperative   HEENT:  Normocephalic, atraumatic mild erythema nares, ears clear, Mallampati 3, no erythema in the pharynx  NECK: No JVD   HEART: Rate, normal S1 and S2  LUNGS: Clear to auscultation bilaterally; no wheezes, rales, or rhonchi; respiration nonlabored   ABDOMEN:  Normoactive bowel sounds, soft, no tenderness, no distention  EXTREMITIES: no edema  NEURO: no gross focal findings  SKIN:  Dry, intact, warm to touch    Labs: I have personally reviewed pertinent lab results.  Peripheral eosinophilia 0.74  No results found for: \"IGE\"    Imaging and other studies: Results Review Statement: I personally reviewed the following image studies in PACS and associated radiology reports: CT chest. My interpretation of the radiology images/reports is: CT chest with mosaic pattern.    Pulmonary function testing:  Personally interpreted by me  PFT with mild restriction    Other Studies: Results Review Statement: No pertinent imaging studies reviewed.    Juan Harris MD  Pulmonary and Critical Care Medicine     Answers submitted by the patient for this visit:  Pulmonology Questionnaire (Submitted on 1/25/2025)  Chief Complaint: Primary symptoms  Do you have " chest tightness?: Yes  Do you experience frequent throat clearing?: Yes  Chronicity: new  When did you first notice your symptoms?: in the past 7 days  How often do your symptoms occur?: 2 to 4 times per day  Since you first noticed this problem, how has it changed?: gradually worsening  Do you have shortness of breath that occurs with effort or exertion?: Yes  Do you have heartburn?: Yes  Do you have fatigue?: Yes  Do you have nasal congestion?: Yes  Do you have shortness of breath when lying flat?: No  Do you have shortness of breath when you wake up?: No  Do you have sweats?: No  Have you experienced weight loss?: No  Which of the following makes your symptoms worse?: change in weather, exercise, exposure to fumes, exposure to smoke, strenuous activity, URI  Which of the following makes your symptoms better?: rest, steroid inhaler  Risk factors for lung disease: animal exposure

## 2025-01-27 NOTE — PATIENT INSTRUCTIONS
It was a pleasure seeing you today!    Prednisone 40 mg for 5 days  PFT in 4 to 6 weeks  Follow-up in 6 months    Juan Harris MD  Pulmonary and Critical Care Medicine

## 2025-01-28 ENCOUNTER — OFFICE VISIT (OUTPATIENT)
Age: 75
End: 2025-01-28
Payer: COMMERCIAL

## 2025-01-28 VITALS — TEMPERATURE: 98 F | BODY MASS INDEX: 28.87 KG/M2 | WEIGHT: 163 LBS

## 2025-01-28 DIAGNOSIS — D22.62 MULTIPLE BENIGN MELANOCYTIC NEVI OF UPPER AND LOWER EXTREMITIES AND TRUNK: ICD-10-CM

## 2025-01-28 DIAGNOSIS — D22.71 MULTIPLE BENIGN MELANOCYTIC NEVI OF UPPER AND LOWER EXTREMITIES AND TRUNK: ICD-10-CM

## 2025-01-28 DIAGNOSIS — D18.01 CHERRY ANGIOMA: ICD-10-CM

## 2025-01-28 DIAGNOSIS — D22.72 MULTIPLE BENIGN MELANOCYTIC NEVI OF UPPER AND LOWER EXTREMITIES AND TRUNK: ICD-10-CM

## 2025-01-28 DIAGNOSIS — L81.4 LENTIGO: ICD-10-CM

## 2025-01-28 DIAGNOSIS — D23.9 DERMATOFIBROMA: Primary | ICD-10-CM

## 2025-01-28 DIAGNOSIS — L82.1 SEBORRHEIC KERATOSIS: ICD-10-CM

## 2025-01-28 DIAGNOSIS — L85.3 XEROSIS OF SKIN: ICD-10-CM

## 2025-01-28 DIAGNOSIS — D22.5 MULTIPLE BENIGN MELANOCYTIC NEVI OF UPPER AND LOWER EXTREMITIES AND TRUNK: ICD-10-CM

## 2025-01-28 DIAGNOSIS — D22.61 MULTIPLE BENIGN MELANOCYTIC NEVI OF UPPER AND LOWER EXTREMITIES AND TRUNK: ICD-10-CM

## 2025-01-28 PROCEDURE — 88305 TISSUE EXAM BY PATHOLOGIST: CPT | Performed by: PATHOLOGY

## 2025-01-28 PROCEDURE — 99204 OFFICE O/P NEW MOD 45 MIN: CPT | Performed by: DERMATOLOGY

## 2025-01-28 PROCEDURE — 11104 PUNCH BX SKIN SINGLE LESION: CPT | Performed by: DERMATOLOGY

## 2025-01-28 NOTE — PATIENT INSTRUCTIONS
"DERMATOFIBROMA    Physical Exam:  Anatomic Location Affected:  left elbow  Morphological Description:  6 mm skin colored hard dermal papule  Pertinent Positives:  Pertinent Negatives:    Additional History of Present Condition:  patient notes that gets bothersome at times    Assessment and Plan:  Based on a thorough discussion of this condition and the management approach to it (including a comprehensive discussion of the known risks, side effects and potential benefits of treatment), the patient (family) agrees to implement the following specific plan:  Reassured benign  Discussed surgical punch removal in future    Assessment and Plan:  A dermatofibroma is a common benign fibrous nodule that most often arises on the skin of the lower legs.  A dermatofibroma is also called a \"cutaneous fibrous histiocytoma.\"  Dermatofibromas occur at all ages and in people of every ethnicity. They are more common in women than in men.    It is not clear if dermatofibroma is a reactive process or if it is a neoplasm. The lesions are made up of proliferating fibroblasts. Histiocytes may also be involved.  They are sometimes attributed to an insect bite or ingrownhair or local trauma, but not consistently. They may be more numerous in patients with altered immunity.    Dermatofibromas most often occur on the legs and arms, but may also arise on the trunk or any site of the body.  Typical clinical features include the following:  People may have 1 or up to 15 lesions.  Size varies from 0.5-1.5 cm diameter; most lesions are 7-10 mm diameter.  They are firm nodules tethered to the skin surface and mobile over subcutaneous tissue.  The skin \"dimples\" on pinching the lesion.  Color may be pink to light brown in white skin, and dark brown to black in dark skin; some appear paler in the center.  They do not usually cause symptoms, but they are sometimes painful or itchy.  Because they are often raised lesions, they may be traumatized, for " "example by a razor.  Occasionally dozens may erupt within a few months, usually in the setting of immunosuppression (for example autoimmune disease, cancer or certain medications).  Dermatofibroma does not give rise to cancer. However, occasionally, it may be mistaken for dermatofibrosarcoma or desmoplastic melanoma.    A dermatofibroma is harmless and seldom causes any symptoms. Usually, only reassurance is needed. If it is nuisance or causing concern, the lesion can be removed surgically, resulting in a scar that is, by definition, usually longer in diameter than the widest portion of the dermatofibroma.  Cryotherapy, shave biopsy and laser surgery are rarely completely successful.  Skin punch biopsy or incisional biopsy may be undertaken if there is an atypical feature such as recent enlargement, ulceration, or asymmetrical structures and colours on dermatoscopy.     Plan:  1. Instructed to keep the wound dry and covered for 24-48h and clean thereafter.  2. Warning signs of infection were reviewed.    3. Recommended that the patient use acetaminophen as needed for pain  4. Sutures if any should be removed in 14 days      Standard post-procedure care has been explained and has been included in written form within the patient's copy of Informed Consent.       MELANOCYTIC NEVI (\"Moles\")    Physical Exam:  Anatomic Location Affected:   Mostly on sun-exposed areas of the trunk and extremities  Morphological Description:  Scattered, 1-4mm round to ovoid, symmetrical-appearing, even bordered, skin colored to dark brown macules/papules, mostly in sun-exposed areas  Pertinent Positives:  Pertinent Negatives:    Additional information: patient on [facility] for RA     Assessment and Plan:  Based on a thorough discussion of this condition and the management approach to it (including a comprehensive discussion of the known risks, side effects and potential benefits of treatment), the patient (family) agrees to implement the " "following specific plan:  When outside we recommend using a wide brim hat, sunglasses, long sleeve and pants, sunscreen with SPF 30+ with reapplication every 2 hours, or SPF specific clothing   Benign, reassured  Every other year skin check     Melanocytic Nevi  Melanocytic nevi (\"moles\") are tan or brown, raised or flat areas of the skin which have an increased number of melanocytes. Melanocytes are the cells in our body which make pigment and account for skin color.    Some moles are present at birth (I.e., \"congenital nevi\"), while others come up later in life (i.e., \"acquired nevi\").  The sun can stimulate the body to make more moles.  Sunburns are not the only thing that triggers more moles.  Chronic sun exposure can do it too.     Clinically distinguishing a healthy mole from melanoma may be difficult, even for experienced dermatologists. The \"ABCDE's\" of moles have been suggested as a means of helping to alert a person to a suspicious mole and the possible increased risk of melanoma.  The suggestions for raising alert are as follows:    Asymmetry: Healthy moles tend to be symmetric, while melanomas are often asymmetric.  Asymmetry means if you draw a line through the mole, the two halves do not match in color, size, shape, or surface texture. Asymmetry can be a result of rapid enlargement of a mole, the development of a raised area on a previously flat lesion, scaling, ulceration, bleeding or scabbing within the mole.  Any mole that starts to demonstrate \"asymmetry\" should be examined promptly by a board certified dermatologist.     Border: Healthy moles tend to have discrete, even borders.  The border of a melanoma often blends into the normal skin and does not sharply delineate the mole from normal skin.  Any mole that starts to demonstrate \"uneven borders\" should be examined promptly by a board certified dermatologist.     Color: Healthy moles tend to be one color throughout.  Melanomas tend to be made up " "of different colors ranging from dark black, blue, white, or red.  Any mole that demonstrates a color change should be examined promptly by a board certified dermatologist.     Diameter: Healthy moles tend to be smaller than 0.6 cm in size; an exception are \"congenital nevi\" that can be larger.  Melanomas tend to grow and can often be greater than 0.6 cm (1/4 of an inch, or the size of a pencil eraser). This is only a guideline, and many normal moles may be larger than 0.6 cm without being unhealthy.  Any mole that starts to change in size (small to bigger or bigger to smaller) should be examined promptly by a board certified dermatologist.     Evolving: Healthy moles tend to \"stay the same.\"  Melanomas may often show signs of change or evolution such as a change in size, shape, color, or elevation.  Any mole that starts to itch, bleed, crust, burn, hurt, or ulcerate or demonstrate a change or evolution should be examined promptly by a board certified dermatologist.      LENTIGO    Physical Exam:  Anatomic Location Affected:  trunk, arms  Morphological Description:  Light brown macules  Pertinent Positives:  Pertinent Negatives:    Assessment and Plan:  Based on a thorough discussion of this condition and the management approach to it (including a comprehensive discussion of the known risks, side effects and potential benefits of treatment), the patient (family) agrees to implement the following specific plan:  When outside we recommend using a wide brim hat, sunglasses, long sleeve and pants, sunscreen with SPF 30+ with reapplication every 2 hours, or SPF specific clothing     What is a lentigo?  A lentigo is a pigmented flat or slightly raised lesion with a clearly defined edge. Unlike an ephelis (freckle), it does not fade in the winter months. There are several kinds of lentigo.  The name lentigo originally referred to its appearance resembling a small lentil. The plural of lentigo is lentigines, although " “lentigos” is also in common use.    Who gets lentigines?  Lentigines can affect males and females of all ages and races. Solar lentigines are especially prevalent in fair skinned adults. Lentigines associated with syndromes are present at birth or arise during childhood.    What causes lentigines?  Common forms of lentigo are due to exposure to ultraviolet radiation:  Sun damage including sunburn   Indoor tanning   Phototherapy, especially photochemotherapy (PUVA)    Ionizing radiation, eg radiation therapy, can also cause lentigines.  Several familial syndromes associated with widespread lentigines originate from mutations in [place]-MAP kinase, mTOR signaling and PTEN pathways.    What is the treatment for lentigines?  Most lentigines are left alone. Attempts to lighten them may not be successful. The following approaches are used:  SPF 50+ broad-spectrum sunscreen   Hydroquinone bleaching cream   Alpha hydroxy acids   Vitamin C   Retinoids   Azelaic acid   Chemical peels  Individual lesions can be permanently removed using:  Cryotherapy   Intense pulsed light   Pigment lasers    How can lentigines be prevented?  Lentigines associated with exposure ultraviolet radiation can be prevented by very careful sun protection. Clothing is more successful at preventing new lentigines than are sunscreens.    What is the outlook for lentigines?  Lentigines usually persist. They may increase in number with age and sun exposure. Some in sun-protected sites may fade and disappear.    KARIMI ANGIOMAS    Physical Exam:  Anatomic Location Affected:  Mostly on sun-exposed areas of the trunk and extremities  Morphological Description:  Scattered cherry red, 1-4 mm papules.  Pertinent Positives:  Pertinent Negatives:    Assessment and Plan:  Based on a thorough discussion of this condition and the management approach to it (including a comprehensive discussion of the known risks, side effects and potential benefits of treatment), the  "patient (family) agrees to implement the following specific plan:  Monitor for changes  Benign, reassured    Assessment and Plan:    Cherry angioma, also known as Paulino de Markus spots, are benign vascular skin lesions. A \"cherry angioma\" is a firm red, blue or purple papule, 0.1-1 cm in diameter. When thrombosed, they can appear black in colour until evaluated with a dermatoscope when the red or purple colour is more easily seen. Cherry angioma may develop on any part of the body but most often appear on the scalp, face, lips and trunk.  An angioma is due to proliferating endothelial cells; these are the cells that line the inside of a blood vessel.    Angiomas can arise in early life or later in life; the most common type of angioma is a cherry angioma.  Cherry angiomas are very common in males and females of any age or race. They are more noticeable in white skin than in skin of colour. They markedly increase in number from about the age of 40. There may be a family history of similar lesions. Eruptive cherry angiomas have been rarely reported to be associated with internal malignancy. The cause of angiomas is unknown. Genetic analysis of cherry angiomas has shown that they frequently carry specific somatic missense mutations in the GNAQ and GNA11 (Q209H) genes, which are involved in other vascular and melanocytic proliferations.      SEBORRHEIC KERATOSIS; NON-INFLAMED    Physical Exam:  Anatomic Location Affected: Mostly on sun-exposed areas of the trunk and extremities  Morphological Description:  Flat and raised, waxy, smooth to warty textured, yellow to brownish-grey to dark brown to blackish, discrete, \"stuck-on\" appearing papules.  Pertinent Positives:  Pertinent Negatives:    Assessment and Plan:  Based on a thorough discussion of this condition and the management approach to it (including a comprehensive discussion of the known risks, side effects and potential benefits of treatment), the patient " "(family) agrees to implement the following specific plan:  Monitor for changes  Benign, reassured    Seborrheic Keratosis  A seborrheic keratosis is a harmless warty spot that appears during adult life as a common sign of skin aging.  Seborrheic keratoses can arise on any area of skin, covered or uncovered, with the usual exception of the palms and soles. They do not arise from mucous membranes. Seborrheic keratoses can have highly variable appearance.      Seborrheic keratoses are extremely common. It has been estimated that over 90% of adults over the age of 60 years have one or more of them. They occur in males and females of all races, typically beginning to erupt in the 30s or 40s. They are uncommon under the age of 20 years.  The precise cause of seborrhoeic keratoses is not known.  Seborrhoeic keratoses are considered degenerative in nature. As time goes by, seborrheic keratoses tend to become more numerous. Some people inherit a tendency to develop a very large number of them; some people may have hundreds of them.      There is no easy way to remove multiple lesions on a single occasion.  Unless a specific lesion is \"inflamed\" and is causing pain or stinging/burning or is bleeding, most insurance companies do not authorize treatment.    XEROSIS (\"DRY SKIN\")    Physical Exam:  Anatomic Location Affected:  diffuse  Morphological Description:  xerosis  Pertinent Positives:  Pertinent Negatives:    Assessment and Plan:  Based on a thorough discussion of this condition and the management approach to it (including a comprehensive discussion of the known risks, side effects and potential benefits of treatment), the patient (family) agrees to implement the following specific plan:  Use moisturizer like Eucerin,Cerave or Aveeno Cream 3 times a day for the dry skin            Dry skin refers to skin that feels dry to touch. Dry skin has a dull surface with a rough, scaly quality. The skin is less pliable and cracked. " When dryness is severe, the skin may become inflamed and fissured.  Although any body site can be dry, dry skin tends to affect the shins more than any other site.    Dry skin is lacking moisture in the outer horny cell layer (stratum corneum) and this results in cracks in the skin surface.  Dry skin is also called xerosis, xeroderma or asteatosis (lack of fat).  It can affect males and females of all ages. There is some racial variability in water and lipid content of the skin.  Dry skin that starts in early childhood may be one of about 20 types of ichthyosis (fish-scale skin). There is often a family history of dry skin.   Dry skin is commonly seen in people with atopic dermatitis.  Nearly everyone > 60 years has dry skin.    Dry skin that begins later may be seen in people with certain diseases and conditions.  Postmenopausal women  Hypothyroidism  Chronic renal disease   Malnutrition and weight loss   Subclinical dermatitis   Treatment with certain drugs such as oral retinoids, diuretics and epidermal growth factor receptor inhibitors      What is the treatment for dry skin?  The mainstay of treatment of dry skin and ichthyosis is moisturisers/emollients. They should be applied liberally and often enough to:  Reduce itch   Improve the barrier function   Prevent entry of irritants, bacteria   Reduce transepidermal water loss.      How can dry skin be prevented?  Eliminate aggravating factors:  Reduce the frequency of bathing.   A humidifier in winter and air conditioner in summer   Compare having a short shower with a prolonged soak in a bath.   Use lukewarm, not hot, water.   Replace standard soap with a substitute such as a synthetic detergent cleanser, water-miscible emollient, bath oil, anti-pruritic tar oil, colloidal oatmeal etc.   Apply an emollient liberally and often, particularly shortly after bathing, and when itchy. The drier the skin, the thicker this should be, especially on the hands.    What is  the outlook for dry skin?  A tendency to dry skin may persist life-long, or it may improve once contributing factors are controlled.

## 2025-01-28 NOTE — PROGRESS NOTES
"[facility]ology [facility] Note     Patient Name: Shelia Miller  Encounter Date: 1/28/25     Have you been cared for by a [facility] Dermatologist in the last 3 years and, if so, which description applies to you?    NO.   I am considered a \"new\" patient and must complete all patient intake questions. I am FEMALE/of child-bearing potential.    REVIEW OF SYSTEMS:  Have you recently had or currently have any of the following? Recent fever or chills? No  Any non-healing wound? No  Are you pregnant or planning to become pregnant? No  Are you currently or planning to be nursing or breast feeding? No   PAST MEDICAL HISTORY:  Have you personally ever had or currently have any of the following?  If \"YES,\" then please provide more detail. Skin cancer (such as Melanoma, Basal Cell Carcinoma, Squamous Cell Carcinoma?  No  Tuberculosis, HIV/AIDS, Hepatitis B or C: No  Radiation Treatment YES, breast cancer, 2020   HISTORY OF IMMUNOSUPPRESSION:   Do you have a history of any of the following:  Systemic Immunosuppression such as Diabetes, Biologic or Immunotherapy, Chemotherapy, Organ Transplantation, Bone Marrow Transplantation or Prednsione?  YES, chemo breast cancer 2020    Answering \"YES\" requires the addition of the dotphrase \"IMMUNOSUPPRESSED\" as the first diagnosis of the patient's visit.   FAMILY HISTORY:  Any \"first degree relatives\" (parent, brother, sister, or child) with the following?    Skin Cancer, Pancreatic or Other Cancer? No   PATIENT EXPERIENCE:    Do you want the Dermatologist to perform a COMPLETE skin exam today including a clinical examination under the \"bra and underwear\" areas?  Yes  If necessary, do we have your permission to call and leave a detailed message on your Preferred Phone number that includes your specific medical information?  Yes      Allergies   Allergen Reactions   • Fruit Extracts Itching     Cantelope melon peaches   • Nuts - Food Allergy GI Intolerance   • Tree Extract Cough and Nasal " Congestion   • Pollen Extract Other (See Comments)     Pollen , Trees And Flowers   • Avelox [Moxifloxacin] Itching     Per pt, also facial swelling      Current Outpatient Medications:   •  albuterol (PROVENTIL HFA,VENTOLIN HFA) 90 mcg/act inhaler, as needed, Disp: , Rfl:   •  Calcium Carbonate-Vitamin D3 600-400 MG-UNIT TABS, Take by mouth, Disp: , Rfl:   •  celecoxib (CeleBREX) 200 mg capsule, if needed, Disp: , Rfl:   •  Cholecalciferol 25 MCG (1000 UT) capsule, Take 1,000 Units by mouth daily, Disp: , Rfl:   •  Fluticasone-Salmeterol (Advair Diskus) 250-50 mcg/dose inhaler, Inhale 1 puff 2 (two) times a day Rinse mouth after use., Disp: 60 blister, Rfl: 11  •  losartan (COZAAR) 50 mg tablet, 50 mg daily, Disp: , Rfl:   •  Multiple Vitamin (MULTIVITAMIN ADULT PO), Take 1 tablet by mouth daily, Disp: , Rfl:   •  omeprazole (PriLOSEC) 20 mg delayed release capsule, TAKE TWO CAPSULES BY MOUTH EVERY DAY IN THE MORNING (GENERIC PRILOSEC), Disp: 180 capsule, Rfl: 1  •  Orencia ClickJect 125 MG/ML SOAJ, once a week, Disp: , Rfl:   •  predniSONE 20 mg tablet, Take 2 tablets (40 mg total) by mouth daily for 5 days, Disp: 10 tablet, Rfl: 0  •  PreviDent 5000 Booster Plus 1.1 % PSTE, , Disp: , Rfl:   •  rosuvastatin (CRESTOR) 5 mg tablet, Take 5 mg by mouth daily, Disp: , Rfl:   •  atorvastatin (LIPITOR) 10 mg tablet, Take 20 mg by mouth daily (Patient not taking: Reported on 12/9/2024), Disp: , Rfl:   •  CELECOXIB PO, Take 200 mg by mouth 2 (two) times a day as needed On hold (Patient not taking: Reported on 9/5/2024), Disp: , Rfl:   •  raloxifene (EVISTA) 60 mg tablet, Take 60 mg by mouth daily, Disp: , Rfl:   •  saccharomyces boulardii (FLORASTOR) 250 mg capsule, Take 1 capsule (250 mg total) by mouth 2 (two) times a day, Disp: 10 capsule, Rfl: 0          Whom besides the patient is providing clinical information about today's encounter?   NO ADDITIONAL HISTORIAN (patient alone provided history)    Physical Exam and  "Assessment/Plan by Diagnosis:      DERMATOFIBROMA    Physical Exam:  Anatomic Location Affected:  left elbow  Morphological Description:  6 mm skin colored hard dermal papule  Pertinent Positives:  Pertinent Negatives:    Additional History of Present Condition:  patient notes that it gets bothersome at times, especially if she hits it or rubs against it.     Assessment and Plan:  Based on a thorough discussion of this condition and the management approach to it (including a comprehensive discussion of the known risks, side effects and potential benefits of treatment), the patient (family) agrees to implement the following specific plan:  Reassured benign  However, due to bothersome nature and uncomfortable location, offered punch biopsy excision today and patient was in agreement     Assessment and Plan:  A dermatofibroma is a common benign fibrous nodule that most often arises on the skin of the lower legs.  A dermatofibroma is also called a \"cutaneous fibrous histiocytoma.\"  Dermatofibromas occur at all ages and in people of every ethnicity. They are more common in women than in men.    It is not clear if dermatofibroma is a reactive process or if it is a neoplasm. The lesions are made up of proliferating fibroblasts. Histiocytes may also be involved.  They are sometimes attributed to an insect bite or ingrownhair or local trauma, but not consistently. They may be more numerous in patients with altered immunity.    Dermatofibromas most often occur on the legs and arms, but may also arise on the trunk or any site of the body.  Typical clinical features include the following:  People may have 1 or up to 15 lesions.  Size varies from 0.5-1.5 cm diameter; most lesions are 7-10 mm diameter.  They are firm nodules tethered to the skin surface and mobile over subcutaneous tissue.  The skin \"dimples\" on pinching the lesion.  Color may be pink to light brown in white skin, and dark brown to black in dark skin; some appear " paler in the center.  They do not usually cause symptoms, but they are sometimes painful or itchy.  Because they are often raised lesions, they may be traumatized, for example by a razor.  Occasionally dozens may erupt within a few months, usually in the setting of immunosuppression (for example autoimmune disease, cancer or certain medications).  Dermatofibroma does not give rise to cancer. However, occasionally, it may be mistaken for dermatofibrosarcoma or desmoplastic melanoma.    A dermatofibroma is harmless and seldom causes any symptoms. Usually, only reassurance is needed. If it is nuisance or causing concern, the lesion can be removed surgically, resulting in a scar that is, by definition, usually longer in diameter than the widest portion of the dermatofibroma.  Cryotherapy, shave biopsy and laser surgery are rarely completely successful.  Skin punch biopsy or incisional biopsy may be undertaken if there is an atypical feature such as recent enlargement, ulceration, or asymmetrical structures and colours on dermatoscopy.     PROCEDURE NOTE:  PUNCH BIOPSY      Performing Physician:  Dr. Aranda    Anatomic Location; Clinical Description with size (cm); Pre-Op Diagnosis:    ATTENTION:  DERMPATH GROUP    SPECIMEN A; Skin; Anatomic Location: left elbow; Procedure/Protocol: Skin Specimen (submit in [facility]):Punch Biopsy (when a punch biopsy tool is used; simple closure is included) (CPT 27591; each additional punch biopsy is CPT 12380)   74 y.o. year old  Female with a Morphological Description:6 mm skin colored hard dermal papule  Differential Diagnosis and/or Specific Clinical Question: rule out ; dermatofibroma     Anesthesia: 1% xylocaine with epi       Topical anesthesia: None       Indications: To indicate diagnosis and management plan.    Procedure Details     Patient informed of the risks (including bleeding,scaring and infection) and benefits of the procedure explained. Verbal and written informed  "consent obtained. The area was prepped and draped in the usual fashion. Anesthesia was obtained with 1% lidocaine with epinephrine. The skin was then stretched perpendicular to the skin tension lines and a punch biopsy to an appropriate sampling depth was obtained with a 8 mm punch with a forceps and iris scissors.     Hemostasis was obtained with 4-0 Prolene x 3 sutures.     Complications:  None      Specimen has been sent for review by Dermatopathology.      Plan:  1. Instructed to keep the wound dry and covered for 24-48h and clean thereafter.  2. Warning signs of infection were reviewed.    3. Recommended that the patient use acetaminophen as needed for pain  4. Sutures if any should be removed in 14 days      Standard post-procedure care has been explained and has been included in written form within the patient's copy of Informed Consent.       MELANOCYTIC NEVI (\"Moles\")    Physical Exam:  Anatomic Location Affected:   Mostly on sun-exposed areas of the trunk and extremities  Morphological Description:  Scattered, 1-4mm round to ovoid, symmetrical-appearing, even bordered, skin colored to dark brown macules/papules, mostly in sun-exposed areas  Pertinent Positives:  Pertinent Negatives:    Additional information: patient on Orencia for RA     Assessment and Plan:  Based on a thorough discussion of this condition and the management approach to it (including a comprehensive discussion of the known risks, side effects and potential benefits of treatment), the patient (family) agrees to implement the following specific plan:  When outside we recommend using a wide brim hat, sunglasses, long sleeve and pants, sunscreen with SPF 30+ with reapplication every 2 hours, or SPF specific clothing   Benign, reassured  Every other year skin check     Melanocytic Nevi  Melanocytic nevi (\"moles\") are tan or brown, raised or flat areas of the skin which have an increased number of melanocytes. Melanocytes are the cells in our " "body which make pigment and account for skin color.    Some moles are present at birth (I.e., \"congenital nevi\"), while others come up later in life (i.e., \"acquired nevi\").  The sun can stimulate the body to make more moles.  Sunburns are not the only thing that triggers more moles.  Chronic sun exposure can do it too.     Clinically distinguishing a healthy mole from melanoma may be difficult, even for experienced dermatologists. The \"ABCDE's\" of moles have been suggested as a means of helping to alert a person to a suspicious mole and the possible increased risk of melanoma.  The suggestions for raising alert are as follows:    Asymmetry: Healthy moles tend to be symmetric, while melanomas are often asymmetric.  Asymmetry means if you draw a line through the mole, the two halves do not match in color, size, shape, or surface texture. Asymmetry can be a result of rapid enlargement of a mole, the development of a raised area on a previously flat lesion, scaling, ulceration, bleeding or scabbing within the mole.  Any mole that starts to demonstrate \"asymmetry\" should be examined promptly by a board certified dermatologist.     Border: Healthy moles tend to have discrete, even borders.  The border of a melanoma often blends into the normal skin and does not sharply delineate the mole from normal skin.  Any mole that starts to demonstrate \"uneven borders\" should be examined promptly by a board certified dermatologist.     Color: Healthy moles tend to be one color throughout.  Melanomas tend to be made up of different colors ranging from dark black, blue, white, or red.  Any mole that demonstrates a color change should be examined promptly by a board certified dermatologist.     Diameter: Healthy moles tend to be smaller than 0.6 cm in size; an exception are \"congenital nevi\" that can be larger.  Melanomas tend to grow and can often be greater than 0.6 cm (1/4 of an inch, or the size of a pencil eraser). This is only a " "guideline, and many normal moles may be larger than 0.6 cm without being unhealthy.  Any mole that starts to change in size (small to bigger or bigger to smaller) should be examined promptly by a board certified dermatologist.     Evolving: Healthy moles tend to \"stay the same.\"  Melanomas may often show signs of change or evolution such as a change in size, shape, color, or elevation.  Any mole that starts to itch, bleed, crust, burn, hurt, or ulcerate or demonstrate a change or evolution should be examined promptly by a board certified dermatologist.      LENTIGO    Physical Exam:  Anatomic Location Affected:  trunk, arms  Morphological Description:  Light brown macules  Pertinent Positives:  Pertinent Negatives:    Assessment and Plan:  Based on a thorough discussion of this condition and the management approach to it (including a comprehensive discussion of the known risks, side effects and potential benefits of treatment), the patient (family) agrees to implement the following specific plan:  When outside we recommend using a wide brim hat, sunglasses, long sleeve and pants, sunscreen with SPF 30+ with reapplication every 2 hours, or SPF specific clothing     What is a lentigo?  A lentigo is a pigmented flat or slightly raised lesion with a clearly defined edge. Unlike an ephelis (freckle), it does not fade in the winter months. There are several kinds of lentigo.  The name lentigo originally referred to its appearance resembling a small lentil. The plural of lentigo is lentigines, although “lentigos” is also in common use.    Who gets lentigines?  Lentigines can affect males and females of all ages and races. Solar lentigines are especially prevalent in fair skinned adults. Lentigines associated with syndromes are present at birth or arise during childhood.    What causes lentigines?  Common forms of lentigo are due to exposure to ultraviolet radiation:  Sun damage including sunburn   Indoor tanning " "  Phototherapy, especially photochemotherapy (PUVA)    Ionizing radiation, eg radiation therapy, can also cause lentigines.  Several familial syndromes associated with widespread lentigines originate from mutations in [place]-MAP kinase, mTOR signaling and PTEN pathways.    What is the treatment for lentigines?  Most lentigines are left alone. Attempts to lighten them may not be successful. The following approaches are used:  SPF 50+ broad-spectrum sunscreen   Hydroquinone bleaching cream   Alpha hydroxy acids   Vitamin C   Retinoids   Azelaic acid   Chemical peels  Individual lesions can be permanently removed using:  Cryotherapy   Intense pulsed light   Pigment lasers    How can lentigines be prevented?  Lentigines associated with exposure ultraviolet radiation can be prevented by very careful sun protection. Clothing is more successful at preventing new lentigines than are sunscreens.    What is the outlook for lentigines?  Lentigines usually persist. They may increase in number with age and sun exposure. Some in sun-protected sites may fade and disappear.    KARIMI ANGIOMAS    Physical Exam:  Anatomic Location Affected:  Mostly on sun-exposed areas of the trunk and extremities  Morphological Description:  Scattered cherry red, 1-4 mm papules.  Pertinent Positives:  Pertinent Negatives:    Assessment and Plan:  Based on a thorough discussion of this condition and the management approach to it (including a comprehensive discussion of the known risks, side effects and potential benefits of treatment), the patient (family) agrees to implement the following specific plan:  Monitor for changes  Benign, reassured    Assessment and Plan:    Cherry angioma, also known as Paulino de Markus spots, are benign vascular skin lesions. A \"cherry angioma\" is a firm red, blue or purple papule, 0.1-1 cm in diameter. When thrombosed, they can appear black in colour until evaluated with a dermatoscope when the red or purple colour is " "more easily seen. Cherry angioma may develop on any part of the body but most often appear on the scalp, face, lips and trunk.  An angioma is due to proliferating endothelial cells; these are the cells that line the inside of a blood vessel.    Angiomas can arise in early life or later in life; the most common type of angioma is a cherry angioma.  Cherry angiomas are very common in males and females of any age or race. They are more noticeable in white skin than in skin of colour. They markedly increase in number from about the age of 40. There may be a family history of similar lesions. Eruptive cherry angiomas have been rarely reported to be associated with internal malignancy. The cause of angiomas is unknown. Genetic analysis of cherry angiomas has shown that they frequently carry specific somatic missense mutations in the GNAQ and GNA11 (Q209H) genes, which are involved in other vascular and melanocytic proliferations.      SEBORRHEIC KERATOSIS; NON-INFLAMED    Physical Exam:  Anatomic Location Affected: Mostly on sun-exposed areas of the trunk and extremities  Morphological Description:  Flat and raised, waxy, smooth to warty textured, yellow to brownish-grey to dark brown to blackish, discrete, \"stuck-on\" appearing papules.  Pertinent Positives:  Pertinent Negatives:    Assessment and Plan:  Based on a thorough discussion of this condition and the management approach to it (including a comprehensive discussion of the known risks, side effects and potential benefits of treatment), the patient (family) agrees to implement the following specific plan:  Monitor for changes  Benign, reassured    Seborrheic Keratosis  A seborrheic keratosis is a harmless warty spot that appears during adult life as a common sign of skin aging.  Seborrheic keratoses can arise on any area of skin, covered or uncovered, with the usual exception of the palms and soles. They do not arise from mucous membranes. Seborrheic keratoses can " "have highly variable appearance.      Seborrheic keratoses are extremely common. It has been estimated that over 90% of adults over the age of 60 years have one or more of them. They occur in males and females of all races, typically beginning to erupt in the 30s or 40s. They are uncommon under the age of 20 years.  The precise cause of seborrhoeic keratoses is not known.  Seborrhoeic keratoses are considered degenerative in nature. As time goes by, seborrheic keratoses tend to become more numerous. Some people inherit a tendency to develop a very large number of them; some people may have hundreds of them.      There is no easy way to remove multiple lesions on a single occasion.  Unless a specific lesion is \"inflamed\" and is causing pain or stinging/burning or is bleeding, most insurance companies do not authorize treatment.    XEROSIS (\"DRY SKIN\")    Physical Exam:  Anatomic Location Affected:  diffuse  Morphological Description:  xerosis  Pertinent Positives:  Pertinent Negatives:    Assessment and Plan:  Based on a thorough discussion of this condition and the management approach to it (including a comprehensive discussion of the known risks, side effects and potential benefits of treatment), the patient (family) agrees to implement the following specific plan:  Use moisturizer like Eucerin,Cerave or Aveeno Cream 3 times a day for the dry skin            Dry skin refers to skin that feels dry to touch. Dry skin has a dull surface with a rough, scaly quality. The skin is less pliable and cracked. When dryness is severe, the skin may become inflamed and fissured.  Although any body site can be dry, dry skin tends to affect the shins more than any other site.    Dry skin is lacking moisture in the outer horny cell layer (stratum corneum) and this results in cracks in the skin surface.  Dry skin is also called xerosis, xeroderma or asteatosis (lack of fat).  It can affect males and females of all ages. There is " some racial variability in water and lipid content of the skin.  Dry skin that starts in early childhood may be one of about 20 types of ichthyosis (fish-scale skin). There is often a family history of dry skin.   Dry skin is commonly seen in people with atopic dermatitis.  Nearly everyone > 60 years has dry skin.    Dry skin that begins later may be seen in people with certain diseases and conditions.  Postmenopausal women  Hypothyroidism  Chronic renal disease   Malnutrition and weight loss   Subclinical dermatitis   Treatment with certain drugs such as oral retinoids, diuretics and epidermal growth factor receptor inhibitors      What is the treatment for dry skin?  The mainstay of treatment of dry skin and ichthyosis is moisturisers/emollients. They should be applied liberally and often enough to:  Reduce itch   Improve the barrier function   Prevent entry of irritants, bacteria   Reduce transepidermal water loss.      How can dry skin be prevented?  Eliminate aggravating factors:  Reduce the frequency of bathing.   A humidifier in winter and air conditioner in summer   Compare having a short shower with a prolonged soak in a bath.   Use lukewarm, not hot, water.   Replace standard soap with a substitute such as a synthetic detergent cleanser, water-miscible emollient, bath oil, anti-pruritic tar oil, colloidal oatmeal etc.   Apply an emollient liberally and often, particularly shortly after bathing, and when itchy. The drier the skin, the thicker this should be, especially on the hands.    What is the outlook for dry skin?  A tendency to dry skin may persist life-long, or it may improve once contributing factors are controlled.      Scribe Attestation    I,:  Mine Russell am acting as a scribe while in the presence of the attending physician.:       I,:  Christine Jung MD personally performed the services described in this documentation    as scribed in my presence.:         Lacie Aranda,  MD  Dermatology, PGY-2

## 2025-01-29 ENCOUNTER — TELEPHONE (OUTPATIENT)
Age: 75
End: 2025-01-29

## 2025-01-29 DIAGNOSIS — J45.20 MILD INTERMITTENT ASTHMA WITHOUT COMPLICATION: Primary | ICD-10-CM

## 2025-01-29 NOTE — TELEPHONE ENCOUNTER
Pt states she feels like symptoms are worse now because she has been on the Prednisone and it was getting a bit better, she has then developed a sinus infection where she is getting out green/mucus, constant coughing up phlegm as well, she does have a bit of wheeze. Congestion and headache She was wondering if she would need to be on an antibiotic now.     Please send to [facility] [place].     She hasn't taken her immunosuppressants.

## 2025-01-30 ENCOUNTER — RESULTS FOLLOW-UP (OUTPATIENT)
Dept: FAMILY MEDICINE CLINIC | Facility: CLINIC | Age: 75
End: 2025-01-30

## 2025-01-30 ENCOUNTER — HOSPITAL ENCOUNTER (OUTPATIENT)
Dept: RADIOLOGY | Facility: HOSPITAL | Age: 75
Discharge: HOME | End: 2025-01-30
Attending: INTERNAL MEDICINE
Payer: COMMERCIAL

## 2025-01-30 VITALS — WEIGHT: 163 LBS | HEIGHT: 63 IN | BODY MASS INDEX: 28.88 KG/M2

## 2025-01-30 DIAGNOSIS — C50.912 INVASIVE DUCTAL CARCINOMA OF LEFT BREAST (HCC): ICD-10-CM

## 2025-01-30 DIAGNOSIS — R92.2 INCONCLUSIVE MAMMOGRAM: ICD-10-CM

## 2025-01-30 PROCEDURE — 76642 ULTRASOUND BREAST LIMITED: CPT

## 2025-01-30 PROCEDURE — 77066 DX MAMMO INCL CAD BI: CPT

## 2025-01-30 PROCEDURE — G0279 TOMOSYNTHESIS, MAMMO: HCPCS

## 2025-01-31 PROCEDURE — 88305 TISSUE EXAM BY PATHOLOGIST: CPT | Performed by: PATHOLOGY

## 2025-02-04 ENCOUNTER — TELEPHONE (OUTPATIENT)
Age: 75
End: 2025-02-04

## 2025-02-04 ENCOUNTER — RESULTS FOLLOW-UP (OUTPATIENT)
Age: 75
End: 2025-02-04

## 2025-02-04 DIAGNOSIS — B37.9 YEAST INFECTION: Primary | ICD-10-CM

## 2025-02-04 RX ORDER — FLUCONAZOLE 150 MG/1
150 TABLET ORAL ONCE
Qty: 1 TABLET | Refills: 0 | Status: SHIPPED | OUTPATIENT
Start: 2025-02-04 | End: 2025-02-04

## 2025-02-04 NOTE — TELEPHONE ENCOUNTER
Patient called states pulmonologist put her on Augmentin and now she is having a yeast infection, soreness, itching and swelling of her vagina. Would like to know if she can have Diflucan sent to [facility] in [place]. Would like a call back when sent in

## 2025-02-04 NOTE — RESULT ENCOUNTER NOTE
DERMATOPATHOLOGY RESULT NOTE    Results reviewed by ordering physician.  Called patient to personally discuss results. Discussed results with patient.       Instructions for Clinical Derm Team:   (remember to route Result Note to appropriate staff):    None    Result & Plan by Specimen:    Specimen A: benign  Plan: monitor and reassured, benign       Component  Ref Range & Units (hover)   Case Report  Surgical Pathology Report                         Case: [id number]                                  Authorizing Provider:  Lacie Aranda MD           Collected:           01/28/2025 [facility]              Ordering Location:     [facility]      Received:            01/28/2025 [address] [place] [address]                                                                  Pathologist:           Júnior De Souza MD                                                      Specimen:    Skin, Other, A: left elbow                                                              Final Diagnosis  A. Skin, left elbow:  FIBROMA WITH NUMEROUS ADIPOCYTES    Note: There are also overlapping features of dermatofibroma.  Electronically signed by Júnior De Souza MD on 1/31/2025 at 1508 EST  Note   Interpretation performed at [facility] [address] [place] [address]    Additional Information   All reported additional testing was performed with appropriately reactive controls.  These tests were developed and their performance characteristics determined by [facility] or appropriate performing facility, though some tests may be performed on tissues which have not been validated for performance characteristics (such as staining performed on alcohol exposed cell blocks and decalcified tissues).  Results should be interpreted with caution and in the context of the patients' clinical condition. These tests may not be cleared or approved by the U.S. Food and Drug Administration,  "though the FDA has determined that such clearance or approval is not necessary. These tests are used for clinical purposes and they should not be regarded as investigational or for research. This laboratory has been approved by CLIA 88, designated as a high-complexity laboratory and is qualified to perform these tests.  .  Gross Description   A. The specimen is received in formalin, labeled with the patient's name and hospital number, and is designated \"left elbow\".  The specimen consists of a 0.8 x 0.8 cm punch biopsy of pale-white skin within underlying soft tissue to a depth of 0.5 cm.  The epithelial surface exhibits a white, hairbearing nodule measuring 0.6 x 0.5 x 0.2 cm.  The skin surface is inked red and the margin of resection is inked green.  The specimen is trisected.  Entirely submitted. One cassette.  Between sponges.    Note: The estimated total formalin fixation time based upon information provided by the submitting clinician and the standard processing schedule is under 72 hours.              "

## 2025-02-04 NOTE — TELEPHONE ENCOUNTER
Diflucan sent to pharmacy. Can we please also schedule her AWV. Thank you.  Goal: Pt will amb 100ft CGA with least restrictive device in 2-weeks.

## 2025-02-05 ENCOUNTER — RA CDI HCC (OUTPATIENT)
Dept: OTHER | Facility: HOSPITAL | Age: 75
End: 2025-02-05

## 2025-02-06 ENCOUNTER — OFFICE VISIT (OUTPATIENT)
Dept: OBGYN CLINIC | Facility: CLINIC | Age: 75
End: 2025-02-06
Payer: COMMERCIAL

## 2025-02-06 ENCOUNTER — APPOINTMENT (OUTPATIENT)
Dept: RADIOLOGY | Facility: CLINIC | Age: 75
End: 2025-02-06
Payer: COMMERCIAL

## 2025-02-06 VITALS — HEIGHT: 63 IN | BODY MASS INDEX: 29.09 KG/M2 | WEIGHT: 164.2 LBS

## 2025-02-06 DIAGNOSIS — M25.552 LEFT HIP PAIN: ICD-10-CM

## 2025-02-06 DIAGNOSIS — M16.12 PRIMARY OSTEOARTHRITIS OF ONE HIP, LEFT: Primary | ICD-10-CM

## 2025-02-06 PROCEDURE — 99214 OFFICE O/P EST MOD 30 MIN: CPT | Performed by: ORTHOPAEDIC SURGERY

## 2025-02-06 PROCEDURE — 73502 X-RAY EXAM HIP UNI 2-3 VIEWS: CPT

## 2025-02-06 RX ORDER — FLUCONAZOLE 150 MG/1
TABLET ORAL
COMMUNITY
Start: 2025-02-04 | End: 2025-02-07 | Stop reason: ALTCHOICE

## 2025-02-06 NOTE — H&P (VIEW-ONLY)
Assessment/Plan:  1. Primary osteoarthritis of one hip, left  Ambulatory Referral to Physical Therapy    FL spine and pain procedure      2. Left hip pain  XR hip/pelv 2-3 vws left if performed    Ambulatory Referral to Physical Therapy    FL spine and pain procedure        Scribe Attestation      I,:  Pato Han am acting as a scribe while in the presence of the attending physician.:       I,:  Kong Andino, DO personally performed the services described in this documentation    as scribed in my presence.:           Shelia is a pleasant 74 y.o. female who presents in office for initial evaluation of left hip pain. Upon review of available imaging and clinical evaluation, we discussed her current pain and symptoms are consistent with left hip osteoarthritis. I recommended she begin outpatient physical therapy for hip and thigh stretching and strengthening. We also discussed undergoing a left hip image guided injection with Dr. JENNIFER Romano for pain relief. The patient was agreeable to the plan and referrals were placed at today's visit. She will receive a call tomorrow from our  to establish a visit for the image guided CSI. All her questions and concerns were addressed. We will see her back on an as needed basis.     Subjective: Initial evaluation of left hip pain    Patient ID: Shelia Miller is a 74 y.o. female who presents for initial evaluation of left hip pain beginning 2 weeks ago with no mechanism of injury. Her current pain is located in the groin and lateral hip rated a 6/10 with activity. Pain is aggravated by weight bearing related activity including standing for extended periods of time, walking on uneven surfaces, and stairs. She is currently managing pain with daily Celebrex with moderate relief in symptoms. She previously underwent right total hip arthroplasty on 6/18/2021 with Dr. Angel and is pleased with her outcomes. She has yet to undergo treatment for her current left hip pain  including physical therapy and injection therapy. She notes occasional burning and tingling sensations that radiate from the left hip to the left knee and foot.       Review of Systems   Constitutional:  Positive for activity change. Negative for chills and fever.   HENT:  Negative for ear pain and sore throat.    Eyes:  Negative for pain and visual disturbance.   Respiratory:  Negative for cough and shortness of breath.    Cardiovascular:  Negative for chest pain and palpitations.   Gastrointestinal:  Negative for abdominal pain and vomiting.   Genitourinary:  Negative for dysuria and hematuria.   Musculoskeletal:  Positive for arthralgias and myalgias. Negative for back pain.   Skin:  Negative for color change and rash.   Neurological:  Negative for seizures and syncope.   All other systems reviewed and are negative.        Past Medical History:   Diagnosis Date    Arthritis 2015    Asthma     Back pain with radiculopathy 03/01/2023    BRCA1 negative     BRCA2 negative     Breast cancer (HCC) 2020    Left  breast, triple negative    Cancer (HCC) 7/2019    Colon polyp     Elevated liver enzymes 06/06/2024    GERD (gastroesophageal reflux disease) 2016    History of chemotherapy 10/01/2020    breast cancer - left    History of radiation therapy 04/01/2021    left breast cancer    Hypertension     Lyme disease     OA (osteoarthritis)     Rheumatoid arthritis (HCC)     Sepsis (HCC)        Past Surgical History:   Procedure Laterality Date    ANKLE SURGERY Right 2016    2 plates and screws    BREAST BIOPSY Left 07/02/2020    BREAST LUMPECTOMY Left 08/12/2020    left breast wire localized lumpectomy    BREAST MASS EXCISION Left 09/02/2020    short superior and long lateral left breast resection, left axillary sentinel lymph node biopsy    COLONOSCOPY      ELBOW FRACTURE REPAIR Right 1996    EPIDURAL BLOCK INJECTION N/A 7/19/2024    Procedure: L5-S1 LUMBAR EPIDURAL STEROID INJECTION;  Surgeon: Giuliano Romano MD;   Location: [facility] MAIN OR;  Service: Pain Management     FRACTURE SURGERY Right     elbow    GUM SURGERY      HYSTERECTOMY  2018    JOINT REPLACEMENT Right 6/2021    hip    REPLACEMENT TOTAL HIP W/  RESURFACING IMPLANTS  06/18/2021       Family History   Problem Relation Age of Onset    Breast cancer Mother 59    Cancer Mother         Breast Cancer    Lung cancer Father     Cancer Father         Lung cancer    COPD Father     Hypertension Father     Breast cancer Paternal Grandmother     Thyroid cancer Son     Cancer Son         Thyroid Cancer       Social History     Occupational History    Not on file   Tobacco Use    Smoking status: Never     Passive exposure: Past    Smokeless tobacco: Never   Vaping Use    Vaping status: Never Used   Substance and Sexual Activity    Alcohol use: Not Currently     Comment: occasional    Drug use: Never    Sexual activity: Not on file         Current Outpatient Medications:     albuterol (PROVENTIL HFA,VENTOLIN HFA) 90 mcg/act inhaler, as needed, Disp: , Rfl:     Calcium Carbonate-Vitamin D3 600-400 MG-UNIT TABS, Take by mouth, Disp: , Rfl:     celecoxib (CeleBREX) 200 mg capsule, if needed, Disp: , Rfl:     Cholecalciferol 25 MCG (1000 UT) capsule, Take 1,000 Units by mouth daily, Disp: , Rfl:     fluconazole (DIFLUCAN) 150 mg tablet, , Disp: , Rfl:     Fluticasone-Salmeterol (Advair Diskus) 250-50 mcg/dose inhaler, Inhale 1 puff 2 (two) times a day Rinse mouth after use., Disp: 60 blister, Rfl: 11    losartan (COZAAR) 50 mg tablet, 50 mg daily, Disp: , Rfl:     Multiple Vitamin (MULTIVITAMIN ADULT PO), Take 1 tablet by mouth daily, Disp: , Rfl:     omeprazole (PriLOSEC) 20 mg delayed release capsule, TAKE TWO CAPSULES BY MOUTH EVERY DAY IN THE MORNING (GENERIC PRILOSEC), Disp: 180 capsule, Rfl: 1    Orencia ClickJect 125 MG/ML SOAJ, once a week, Disp: , Rfl:     PreviDent 5000 Booster Plus 1.1 % PSTE, , Disp: , Rfl:     rosuvastatin (CRESTOR) 5 mg tablet, Take 5 mg by mouth  daily, Disp: , Rfl:     CELECOXIB PO, Take 200 mg by mouth 2 (two) times a day as needed On hold (Patient not taking: Reported on 9/5/2024), Disp: , Rfl:     raloxifene (EVISTA) 60 mg tablet, Take 60 mg by mouth daily, Disp: , Rfl:     Allergies   Allergen Reactions    Fruit Extracts Itching     Cantelope melon peaches    Nuts - Food Allergy GI Intolerance    Tree Extract Cough and Nasal Congestion    Pollen Extract Other (See Comments)     Pollen , Trees And Flowers    Avelox [Moxifloxacin] Itching     Per pt, also facial swelling       Objective:  There were no vitals filed for this visit.    Body mass index is 29.09 kg/m².    Left Hip Exam     Tenderness   Left hip tenderness location: No SI joint or greater trochanter tenderness.    Range of Motion   Abduction:  40   Adduction:  30   Left hip flexion: 105.   External rotation:  40   Internal rotation: 20     Other   Erythema: absent  Scars: absent  Sensation: normal  Pulse: present    Comments:  + [facility]            Physical Exam  Nursing note reviewed.   Constitutional:       Appearance: Normal appearance.   HENT:      Head: Normocephalic.      Nose: Nose normal.   Eyes:      Extraocular Movements: Extraocular movements intact.      Conjunctiva/sclera: Conjunctivae normal.   Cardiovascular:      Rate and Rhythm: Normal rate and regular rhythm.      Pulses: Normal pulses.   Pulmonary:      Breath sounds: Normal breath sounds.   Musculoskeletal:      Cervical back: Normal range of motion.      Comments: As noted   Skin:     General: Skin is warm and dry.   Neurological:      Mental Status: She is alert and oriented to person, place, and time.   Psychiatric:         Mood and Affect: Mood normal.         Behavior: Behavior normal.         I have personally reviewed pertinent films in PACS.  X-ray imaging obtained on 2/6/2025 demonstrate moderate to severe left hip osteoarthritis with sclerotic changes. No acute fracture or dislocation. No lytic or blastic  osseous lesions.     This document was created using speech voice recognition software.   Grammatical errors, random word insertions, pronoun errors, and incomplete sentences are an occasional consequence of this system due to software limitations, ambient noise, and hardware issues.   Any formal questions or concerns about content, text, or information contained within the body of this dictation should be directly addressed to the provider for clarification.

## 2025-02-06 NOTE — PROGRESS NOTES
Assessment/Plan:  1. Primary osteoarthritis of one hip, left  Ambulatory Referral to Physical Therapy    FL spine and pain procedure      2. Left hip pain  XR hip/pelv 2-3 vws left if performed    Ambulatory Referral to Physical Therapy    FL spine and pain procedure        Scribe Attestation      I,:  Pato Han am acting as a scribe while in the presence of the attending physician.:       I,:  Kong Andino, DO personally performed the services described in this documentation    as scribed in my presence.:           Shelia is a pleasant 74 y.o. female who presents in office for initial evaluation of left hip pain. Upon review of available imaging and clinical evaluation, we discussed her current pain and symptoms are consistent with left hip osteoarthritis. I recommended she begin outpatient physical therapy for hip and thigh stretching and strengthening. We also discussed undergoing a left hip image guided injection with Dr. JENNIFER Romano for pain relief. The patient was agreeable to the plan and referrals were placed at today's visit. She will receive a call tomorrow from our  to establish a visit for the image guided CSI. All her questions and concerns were addressed. We will see her back on an as needed basis.     Subjective: Initial evaluation of left hip pain    Patient ID: Shelia Miller is a 74 y.o. female who presents for initial evaluation of left hip pain beginning 2 weeks ago with no mechanism of injury. Her current pain is located in the groin and lateral hip rated a 6/10 with activity. Pain is aggravated by weight bearing related activity including standing for extended periods of time, walking on uneven surfaces, and stairs. She is currently managing pain with daily Celebrex with moderate relief in symptoms. She previously underwent right total hip arthroplasty on 6/18/2021 with Dr. Angel and is pleased with her outcomes. She has yet to undergo treatment for her current left hip pain  including physical therapy and injection therapy. She notes occasional burning and tingling sensations that radiate from the left hip to the left knee and foot.       Review of Systems   Constitutional:  Positive for activity change. Negative for chills and fever.   HENT:  Negative for ear pain and sore throat.    Eyes:  Negative for pain and visual disturbance.   Respiratory:  Negative for cough and shortness of breath.    Cardiovascular:  Negative for chest pain and palpitations.   Gastrointestinal:  Negative for abdominal pain and vomiting.   Genitourinary:  Negative for dysuria and hematuria.   Musculoskeletal:  Positive for arthralgias and myalgias. Negative for back pain.   Skin:  Negative for color change and rash.   Neurological:  Negative for seizures and syncope.   All other systems reviewed and are negative.        Past Medical History:   Diagnosis Date    Arthritis 2015    Asthma     Back pain with radiculopathy 03/01/2023    BRCA1 negative     BRCA2 negative     Breast cancer (HCC) 2020    Left  breast, triple negative    Cancer (HCC) 7/2019    Colon polyp     Elevated liver enzymes 06/06/2024    GERD (gastroesophageal reflux disease) 2016    History of chemotherapy 10/01/2020    breast cancer - left    History of radiation therapy 04/01/2021    left breast cancer    Hypertension     Lyme disease     OA (osteoarthritis)     Rheumatoid arthritis (HCC)     Sepsis (HCC)        Past Surgical History:   Procedure Laterality Date    ANKLE SURGERY Right 2016    2 plates and screws    BREAST BIOPSY Left 07/02/2020    BREAST LUMPECTOMY Left 08/12/2020    left breast wire localized lumpectomy    BREAST MASS EXCISION Left 09/02/2020    short superior and long lateral left breast resection, left axillary sentinel lymph node biopsy    COLONOSCOPY      ELBOW FRACTURE REPAIR Right 1996    EPIDURAL BLOCK INJECTION N/A 7/19/2024    Procedure: L5-S1 LUMBAR EPIDURAL STEROID INJECTION;  Surgeon: Giuliano Romano MD;   Location: [facility] MAIN OR;  Service: Pain Management     FRACTURE SURGERY Right     elbow    GUM SURGERY      HYSTERECTOMY  2018    JOINT REPLACEMENT Right 6/2021    hip    REPLACEMENT TOTAL HIP W/  RESURFACING IMPLANTS  06/18/2021       Family History   Problem Relation Age of Onset    Breast cancer Mother 59    Cancer Mother         Breast Cancer    Lung cancer Father     Cancer Father         Lung cancer    COPD Father     Hypertension Father     Breast cancer Paternal Grandmother     Thyroid cancer Son     Cancer Son         Thyroid Cancer       Social History     Occupational History    Not on file   Tobacco Use    Smoking status: Never     Passive exposure: Past    Smokeless tobacco: Never   Vaping Use    Vaping status: Never Used   Substance and Sexual Activity    Alcohol use: Not Currently     Comment: occasional    Drug use: Never    Sexual activity: Not on file         Current Outpatient Medications:     albuterol (PROVENTIL HFA,VENTOLIN HFA) 90 mcg/act inhaler, as needed, Disp: , Rfl:     Calcium Carbonate-Vitamin D3 600-400 MG-UNIT TABS, Take by mouth, Disp: , Rfl:     celecoxib (CeleBREX) 200 mg capsule, if needed, Disp: , Rfl:     Cholecalciferol 25 MCG (1000 UT) capsule, Take 1,000 Units by mouth daily, Disp: , Rfl:     fluconazole (DIFLUCAN) 150 mg tablet, , Disp: , Rfl:     Fluticasone-Salmeterol (Advair Diskus) 250-50 mcg/dose inhaler, Inhale 1 puff 2 (two) times a day Rinse mouth after use., Disp: 60 blister, Rfl: 11    losartan (COZAAR) 50 mg tablet, 50 mg daily, Disp: , Rfl:     Multiple Vitamin (MULTIVITAMIN ADULT PO), Take 1 tablet by mouth daily, Disp: , Rfl:     omeprazole (PriLOSEC) 20 mg delayed release capsule, TAKE TWO CAPSULES BY MOUTH EVERY DAY IN THE MORNING (GENERIC PRILOSEC), Disp: 180 capsule, Rfl: 1    Orencia ClickJect 125 MG/ML SOAJ, once a week, Disp: , Rfl:     PreviDent 5000 Booster Plus 1.1 % PSTE, , Disp: , Rfl:     rosuvastatin (CRESTOR) 5 mg tablet, Take 5 mg by mouth  daily, Disp: , Rfl:     CELECOXIB PO, Take 200 mg by mouth 2 (two) times a day as needed On hold (Patient not taking: Reported on 9/5/2024), Disp: , Rfl:     raloxifene (EVISTA) 60 mg tablet, Take 60 mg by mouth daily, Disp: , Rfl:     Allergies   Allergen Reactions    Fruit Extracts Itching     Cantelope melon peaches    Nuts - Food Allergy GI Intolerance    Tree Extract Cough and Nasal Congestion    Pollen Extract Other (See Comments)     Pollen , Trees And Flowers    Avelox [Moxifloxacin] Itching     Per pt, also facial swelling       Objective:  There were no vitals filed for this visit.    Body mass index is 29.09 kg/m².    Left Hip Exam     Tenderness   Left hip tenderness location: No SI joint or greater trochanter tenderness.    Range of Motion   Abduction:  40   Adduction:  30   Left hip flexion: 105.   External rotation:  40   Internal rotation: 20     Other   Erythema: absent  Scars: absent  Sensation: normal  Pulse: present    Comments:  + [facility]            Physical Exam  Nursing note reviewed.   Constitutional:       Appearance: Normal appearance.   HENT:      Head: Normocephalic.      Nose: Nose normal.   Eyes:      Extraocular Movements: Extraocular movements intact.      Conjunctiva/sclera: Conjunctivae normal.   Cardiovascular:      Rate and Rhythm: Normal rate and regular rhythm.      Pulses: Normal pulses.   Pulmonary:      Breath sounds: Normal breath sounds.   Musculoskeletal:      Cervical back: Normal range of motion.      Comments: As noted   Skin:     General: Skin is warm and dry.   Neurological:      Mental Status: She is alert and oriented to person, place, and time.   Psychiatric:         Mood and Affect: Mood normal.         Behavior: Behavior normal.         I have personally reviewed pertinent films in PACS.  X-ray imaging obtained on 2/6/2025 demonstrate moderate to severe left hip osteoarthritis with sclerotic changes. No acute fracture or dislocation. No lytic or blastic  osseous lesions.     This document was created using speech voice recognition software.   Grammatical errors, random word insertions, pronoun errors, and incomplete sentences are an occasional consequence of this system due to software limitations, ambient noise, and hardware issues.   Any formal questions or concerns about content, text, or information contained within the body of this dictation should be directly addressed to the provider for clarification.

## 2025-02-07 ENCOUNTER — OFFICE VISIT (OUTPATIENT)
Dept: FAMILY MEDICINE CLINIC | Facility: CLINIC | Age: 75
End: 2025-02-07
Payer: COMMERCIAL

## 2025-02-07 VITALS
HEART RATE: 72 BPM | DIASTOLIC BLOOD PRESSURE: 84 MMHG | TEMPERATURE: 98.5 F | HEIGHT: 63 IN | SYSTOLIC BLOOD PRESSURE: 122 MMHG | BODY MASS INDEX: 29.09 KG/M2 | WEIGHT: 164.2 LBS | RESPIRATION RATE: 18 BRPM

## 2025-02-07 DIAGNOSIS — D69.6 THROMBOCYTOPENIA (HCC): ICD-10-CM

## 2025-02-07 DIAGNOSIS — E27.9 ADRENAL NODULE (HCC): ICD-10-CM

## 2025-02-07 DIAGNOSIS — G62.0 DRUG-INDUCED POLYNEUROPATHY (HCC): ICD-10-CM

## 2025-02-07 DIAGNOSIS — Z00.00 MEDICARE ANNUAL WELLNESS VISIT, SUBSEQUENT: Primary | ICD-10-CM

## 2025-02-07 DIAGNOSIS — I50.30 HEART FAILURE WITH PRESERVED EJECTION FRACTION, UNSPECIFIED HF CHRONICITY (HCC): ICD-10-CM

## 2025-02-07 PROCEDURE — G0439 PPPS, SUBSEQ VISIT: HCPCS | Performed by: FAMILY MEDICINE

## 2025-02-07 NOTE — PROGRESS NOTES
Name: Shelia Miller      : 1950      MRN: [id number]  Encounter Provider: Nicky Gale MD  Encounter Date: 2025   Encounter department: [facility]    Assessment & Plan  Medicare annual wellness visit, subsequent         Drug-induced polyneuropathy (HCC)         Heart failure with preserved ejection fraction, unspecified HF chronicity (HCC)  Wt Readings from Last 3 Encounters:   25 74.5 kg (164 lb 3.2 oz)   25 74.5 kg (164 lb 3.2 oz)   25 73.9 kg (163 lb)     No evidence of fluid overload one exam today.        Adrenal nodule (HCC)         Thrombocytopenia (HCC)           Depression Screening and Follow-up Plan: Patient was screened for depression during today's encounter. They screened negative with a PHQ-2 score of 0.    Urinary Incontinence Plan of Care: counseling topics discussed: limiting fluid intake 3-4 hours before bed.       Preventive health issues were discussed with patient, and age appropriate screening tests were ordered as noted in patient's After Visit Summary. Personalized health advice and appropriate referrals for health education or preventive services given if needed, as noted in patient's After Visit Summary.    History of Present Illness     HPI   Patient Care Team:  Nicky Gale MD as PCP - General (Family Medicine)  Yaw Torres MD (Radiation Oncology)  Otis Concepcion MD as Medical Oncologist (Hematology and Oncology)  Shin Fletcher MD (Hematology and Oncology)    Review of Systems   Constitutional: Negative.    HENT: Negative.     Eyes: Negative.    Respiratory: Negative.     Cardiovascular: Negative.    Gastrointestinal: Negative.    Endocrine: Negative.    Genitourinary: Negative.    Musculoskeletal: Negative.    Skin: Negative.    Allergic/Immunologic: Negative.    Neurological: Negative.    Hematological: Negative.    Psychiatric/Behavioral: Negative.       Medical History Reviewed by provider this encounter:  Tobacco  Allergies  Meds   Problems  Med Hx  Surg Hx  Fam Hx       Annual Wellness Visit Questionnaire   Shelia is here for her Subsequent Wellness visit.     Health Risk Assessment:   Patient rates overall health as good. Patient feels that their physical health rating is same. Patient is very satisfied with their life. Eyesight was rated as same. Hearing was rated as same. Patient feels that their emotional and mental health rating is same. Patients states they are never, rarely angry. Patient states they are sometimes unusually tired/fatigued. Pain experienced in the last 7 days has been a lot. Patient's pain rating has been 7/10. Patient states that she has experienced no weight loss or gain in last 6 months.     Depression Screening:   PHQ-2 Score: 0      Fall Risk Screening:   In the past year, patient has experienced: no history of falling in past year      Urinary Incontinence Screening:   Patient has leaked urine accidently in the last six months.     Home Safety:  Patient does not have trouble with stairs inside or outside of their home. Patient has working smoke alarms and has working carbon monoxide detector. Home safety hazards include: none.     Nutrition:   Current diet is Regular.     Medications:   Patient is currently taking over-the-counter supplements. OTC medications include: see medication list. Patient is able to manage medications.     Activities of Daily Living (ADLs)/Instrumental Activities of Daily Living (IADLs):   Walk and transfer into and out of bed and chair?: Yes  Dress and groom yourself?: Yes    Bathe or shower yourself?: Yes    Feed yourself? Yes  Do your laundry/housekeeping?: Yes  Manage your money, pay your bills and track your expenses?: Yes  Make your own meals?: Yes    Do your own shopping?: Yes    Previous Hospitalizations:   Any hospitalizations or ED visits within the last 12 months?: Yes    How many hospitalizations have you had in the last year?: 1-2    Advance Care Planning:   Living will: No     Durable POA for healthcare: No    Advanced directive: Yes    Advanced directive counseling given: Yes      PREVENTIVE SCREENINGS      Cardiovascular Screening:    General: Screening Not Indicated and History Lipid Disorder      Diabetes Screening:     General: Screening Current      Colorectal Cancer Screening:     General: Screening Current      Breast Cancer Screening:     General: History Breast Cancer      Cervical Cancer Screening:    General: Screening Not Indicated      Osteoporosis Screening:    General: Screening Not Indicated and History Osteoporosis      Abdominal Aortic Aneurysm (AAA) Screening:        General: Screening Not Indicated      Lung Cancer Screening:     General: Screening Not Indicated      Hepatitis C Screening:    General: Screening Current    Screening, Brief Intervention, and Referral to Treatment (SBIRT)    Screening  Typical number of drinks in a day: 0  Typical number of drinks in a week: 0  Interpretation: Low risk drinking behavior.    Single Item Drug Screening:  How often have you used an illegal drug (including marijuana) or a prescription medication for non-medical reasons in the past year? never    Single Item Drug Screen Score: 0  Interpretation: Negative screen for possible drug use disorder    Brief Intervention  Alcohol & drug use screenings were reviewed. No concerns regarding substance use disorder identified.     Social Drivers of Health     Financial Resource Strain: Low Risk  (9/10/2023)    Overall Financial Resource Strain (CARDIA)     Difficulty of Paying Living Expenses: Not hard at all   Food Insecurity: No Food Insecurity (2/7/2025)    Hunger Vital Sign     Worried About Running Out of Food in the Last Year: Never true     Ran Out of Food in the Last Year: Never true   Transportation Needs: No Transportation Needs (2/7/2025)    PRAPARE - Transportation     Lack of Transportation (Medical): No     Lack of Transportation (Non-Medical): No   Housing Stability: Low  "Risk  (2/7/2025)    Housing Stability Vital Sign     Unable to Pay for Housing in the Last Year: No     Number of Times Moved in the Last Year: 0     Homeless in the Last Year: No   Utilities: Not At Risk (2/7/2025)    [facility] Utilities     Threatened with loss of utilities: No     No results found.    Objective   /84   Pulse 72   Temp 98.5 °F (36.9 °C)   Resp 18   Ht 5' 3\" (1.6 m)   Wt 74.5 kg (164 lb 3.2 oz)   BMI 29.09 kg/m²     Physical Exam  Vitals and nursing note reviewed.   Constitutional:       General: She is not in acute distress.     Appearance: She is well-developed.   HENT:      Head: Normocephalic and atraumatic.      Right Ear: Tympanic membrane, ear canal and external ear normal. There is no impacted cerumen.      Left Ear: Tympanic membrane, ear canal and external ear normal. There is no impacted cerumen.      Nose: Nose normal.      Mouth/Throat:      Mouth: Mucous membranes are moist.   Eyes:      Conjunctiva/sclera: Conjunctivae normal.   Cardiovascular:      Rate and Rhythm: Normal rate and regular rhythm.      Heart sounds: No murmur heard.  Pulmonary:      Effort: Pulmonary effort is normal. No respiratory distress.      Breath sounds: Normal breath sounds.   Abdominal:      General: Abdomen is flat. There is no distension.      Palpations: Abdomen is soft. There is no mass.      Tenderness: There is no abdominal tenderness. There is no guarding or rebound.      Hernia: No hernia is present.   Musculoskeletal:         General: Normal range of motion.      Cervical back: Neck supple.   Skin:     General: Skin is warm and dry.   Neurological:      General: No focal deficit present.      Mental Status: She is alert and oriented to person, place, and time.         "

## 2025-02-07 NOTE — ASSESSMENT & PLAN NOTE
Wt Readings from Last 3 Encounters:   02/07/25 74.5 kg (164 lb 3.2 oz)   02/06/25 74.5 kg (164 lb 3.2 oz)   01/30/25 73.9 kg (163 lb)     No evidence of fluid overload one exam today.

## 2025-02-07 NOTE — PATIENT INSTRUCTIONS
Medicare Preventive Visit Patient Instructions  Thank you for completing your Welcome to Medicare Visit or Medicare Annual Wellness Visit today. Your next wellness visit will be due in one year (2/8/2026).  The screening/preventive services that you may require over the next 5-10 years are detailed below. Some tests may not apply to you based off risk factors and/or age. Screening tests ordered at today's visit but not completed yet may show as past due. Also, please note that scanned in results may not display below.  Preventive Screenings:  Service Recommendations Previous Testing/Comments   Colorectal Cancer Screening  * Colonoscopy    * Fecal Occult Blood Test (FOBT)/Fecal Immunochemical Test (FIT)  * Fecal DNA/Cologuard Test  * Flexible Sigmoidoscopy Age: 45-75 years old   Colonoscopy: every 10 years (may be performed more frequently if at higher risk)  OR  FOBT/FIT: every 1 year  OR  Cologuard: every 3 years  OR  Sigmoidoscopy: every 5 years  Screening may be recommended earlier than age 45 if at higher risk for colorectal cancer. Also, an individualized decision between you and your healthcare provider will decide whether screening between the ages of 76-85 would be appropriate. Colonoscopy: 07/12/2022  FOBT/FIT: Not on file  Cologuard: Not on file  Sigmoidoscopy: Not on file    Screening Current     Breast Cancer Screening Age: 40+ years old  Frequency: every 1-2 years  Not required if history of left and right mastectomy Mammogram: 01/30/2025    History Breast Cancer   Cervical Cancer Screening Between the ages of 21-29, pap smear recommended once every 3 years.   Between the ages of 30-65, can perform pap smear with HPV co-testing every 5 years.   Recommendations may differ for women with a history of total hysterectomy, cervical cancer, or abnormal pap smears in past. Pap Smear: Not on file    Screening Not Indicated   Hepatitis C Screening Once for adults born between 1945 and 1965  More frequently in  patients at high risk for Hepatitis C Hep C Antibody: 08/08/2022    Screening Current   Diabetes Screening 1-2 times per year if you're at risk for diabetes or have pre-diabetes Fasting glucose: 101 mg/dL (4/26/2024)  A1C: No results in last 5 years (No results in last 5 years)  Screening Current   Cholesterol Screening Once every 5 years if you don't have a lipid disorder. May order more often based on risk factors. Lipid panel: 01/02/2024    Screening Not Indicated  History Lipid Disorder     Other Preventive Screenings Covered by Medicare:  Abdominal Aortic Aneurysm (AAA) Screening: covered once if your at risk. You're considered to be at risk if you have a family history of AAA.  Lung Cancer Screening: covers low dose CT scan once per year if you meet all of the following conditions: (1) Age 55-77; (2) No signs or symptoms of lung cancer; (3) Current smoker or have quit smoking within the last 15 years; (4) You have a tobacco smoking history of at least 20 pack years (packs per day multiplied by number of years you smoked); (5) You get a written order from a healthcare provider.  Glaucoma Screening: covered annually if you're considered high risk: (1) You have diabetes OR (2) Family history of glaucoma OR (3)  aged 50 and older OR (4)  American aged 65 and older  Osteoporosis Screening: covered every 2 years if you meet one of the following conditions: (1) You're estrogen deficient and at risk for osteoporosis based off medical history and other findings; (2) Have a vertebral abnormality; (3) On glucocorticoid therapy for more than 3 months; (4) Have primary hyperparathyroidism; (5) On osteoporosis medications and need to assess response to drug therapy.   Last bone density test (DXA Scan): 11/09/2021.  HIV Screening: covered annually if you're between the age of 15-65. Also covered annually if you are younger than 15 and older than 65 with risk factors for HIV infection. For pregnant  patients, it is covered up to 3 times per pregnancy.    Immunizations:  Immunization Recommendations   Influenza Vaccine Annual influenza vaccination during flu season is recommended for all persons aged >= 6 months who do not have contraindications   Pneumococcal Vaccine   * Pneumococcal conjugate vaccine = PCV13 (Prevnar 13), PCV15 (Vaxneuvance), PCV20 (Prevnar 20)  * Pneumococcal polysaccharide vaccine = PPSV23 (Pneumovax) Adults 19-65 yo with certain risk factors or if 65+ yo  If never received any pneumonia vaccine: recommend Prevnar 20 (PCV20)  Give PCV20 if previously received 1 dose of PCV13 or PPSV23   Hepatitis B Vaccine 3 dose series if at intermediate or high risk (ex: diabetes, end stage renal disease, liver disease)   Respiratory syncytial virus (RSV) Vaccine - COVERED BY MEDICARE PART D  * RSVPreF3 (Arexvy) CDC recommends that adults 60 years of age and older may receive a single dose of RSV vaccine using shared clinical decision-making (SCDM)   Tetanus (Td) Vaccine - COST NOT COVERED BY MEDICARE PART B Following completion of primary series, a booster dose should be given every 10 years to maintain immunity against tetanus. Td may also be given as tetanus wound prophylaxis.   Tdap Vaccine - COST NOT COVERED BY MEDICARE PART B Recommended at least once for all adults. For pregnant patients, recommended with each pregnancy.   Shingles Vaccine (Shingrix) - COST NOT COVERED BY MEDICARE PART B  2 shot series recommended in those 19 years and older who have or will have weakened immune systems or those 50 years and older     Health Maintenance Due:      Topic Date Due   • Breast Cancer Screening: Mammogram  01/30/2026   • DXA SCAN  11/09/2026   • Colorectal Cancer Screening  07/09/2032   • Hepatitis C Screening  Completed     Immunizations Due:      Topic Date Due   • Pneumococcal Vaccine: 65+ Years (1 of 2 - PCV) Never done   • Influenza Vaccine (1) Never done   • COVID-19 Vaccine (5 - 2024-25 season)  09/01/2024     Advance Directives   What are advance directives?  Advance directives are legal documents that state your wishes and plans for medical care. These plans are made ahead of time in case you lose your ability to make decisions for yourself. Advance directives can apply to any medical decision, such as the treatments you want, and if you want to donate organs.   What are the types of advance directives?  There are many types of advance directives, and each state has rules about how to use them. You may choose a combination of any of the following:  Living will:  This is a written record of the treatment you want. You can also choose which treatments you do not want, which to limit, and which to stop at a certain time. This includes surgery, medicine, IV fluid, and tube feedings.   Durable power of  for healthcare (DPAHC):  This is a written record that states who you want to make healthcare choices for you when you are unable to make them for yourself. This person, called a proxy, is usually a family member or a friend. You may choose more than 1 proxy.  Do not resuscitate (DNR) order:  A DNR order is used in case your heart stops beating or you stop breathing. It is a request not to have certain forms of treatment, such as CPR. A DNR order may be included in other types of advance directives.  Medical directive:  This covers the care that you want if you are in a coma, near death, or unable to make decisions for yourself. You can list the treatments you want for each condition. Treatment may include pain medicine, surgery, blood transfusions, dialysis, IV or tube feedings, and a ventilator (breathing machine).  Values history:  This document has questions about your views, beliefs, and how you feel and think about life. This information can help others choose the care that you would choose.  Why are advance directives important?  An advance directive helps you control your care. Although spoken  wishes may be used, it is better to have your wishes written down. Spoken wishes can be misunderstood, or not followed. Treatments may be given even if you do not want them. An advance directive may make it easier for your family to make difficult choices about your care.   Urinary Incontinence   Urinary incontinence (UI)  is when you lose control of your bladder. UI develops because your bladder cannot store or empty urine properly. The 3 most common types of UI are stress incontinence, urge incontinence, or both.  Medicines:   May be given to help strengthen your bladder control. Report any side effects of medication to your healthcare provider.  Do pelvic muscle exercises often:  Your pelvic muscles help you stop urinating. Squeeze these muscles tight for 5 seconds, then relax for 5 seconds. Gradually work up to squeezing for 10 seconds. Do 3 sets of 15 repetitions a day, or as directed. This will help strengthen your pelvic muscles and improve bladder control.  Train your bladder:  Go to the bathroom at set times, such as every 2 hours, even if you do not feel the urge to go. You can also try to hold your urine when you feel the urge to go. For example, hold your urine for 5 minutes when you feel the urge to go. As that becomes easier, hold your urine for 10 minutes.   Self-care:   Keep a UI record.  Write down how often you leak urine and how much you leak. Make a note of what you were doing when you leaked urine.  Drink liquids as directed. You may need to limit the amount of liquid you drink to help control your urine leakage. Do not drink any liquid right before you go to bed. Limit or do not have drinks that contain caffeine or alcohol.   Prevent constipation.  Eat a variety of high-fiber foods. Good examples are high-fiber cereals, beans, vegetables, and whole-grain breads. Walking is the best way to trigger your intestines to have a bowel movement.  Exercise regularly and maintain a healthy weight.   Weight loss and exercise will decrease pressure on your bladder and help you control your leakage.   Use a catheter as directed  to help empty your bladder. A catheter is a tiny, plastic tube that is put into your bladder to drain your urine.   Go to behavior therapy as directed.  Behavior therapy may be used to help you learn to control your urge to urinate.    Weight Management   Why it is important to manage your weight:  Being overweight increases your risk of health conditions such as heart disease, high blood pressure, type 2 diabetes, and certain types of cancer. It can also increase your risk for osteoarthritis, sleep apnea, and other respiratory problems. Aim for a slow, steady weight loss. Even a small amount of weight loss can lower your risk of health problems.  How to lose weight safely:  A safe and healthy way to lose weight is to eat fewer calories and get regular exercise. You can lose up about 1 pound a week by decreasing the number of calories you eat by 500 calories each day.   Healthy meal plan for weight management:  A healthy meal plan includes a variety of foods, contains fewer calories, and helps you stay healthy. A healthy meal plan includes the following:  Eat whole-grain foods more often.  A healthy meal plan should contain fiber. Fiber is the part of grains, fruits, and vegetables that is not broken down by your body. Whole-grain foods are healthy and provide extra fiber in your diet. Some examples of whole-grain foods are whole-wheat breads and pastas, oatmeal, brown rice, and bulgur.  Eat a variety of vegetables every day.  Include dark, leafy greens such as spinach, kale, radha greens, and mustard greens. Eat yellow and orange vegetables such as carrots, sweet potatoes, and winter squash.   Eat a variety of fruits every day.  Choose fresh or canned fruit (canned in its own juice or light syrup) instead of juice. Fruit juice has very little or no fiber.  Eat low-fat dairy foods.   Drink fat-free (skim) milk or 1% milk. Eat fat-free yogurt and low-fat cottage cheese. Try low-fat cheeses such as mozzarella and other reduced-fat cheeses.  Choose meat and other protein foods that are low in fat.  Choose beans or other legumes such as split peas or lentils. Choose fish, skinless poultry (chicken or turkey), or lean cuts of red meat (beef or pork). Before you cook meat or poultry, cut off any visible fat.   Use less fat and oil.  Try baking foods instead of frying them. Add less fat, such as margarine, sour cream, regular salad dressing and mayonnaise to foods. Eat fewer high-fat foods. Some examples of high-fat foods include french fries, doughnuts, ice cream, and cakes.  Eat fewer sweets.  Limit foods and drinks that are high in sugar. This includes candy, cookies, regular soda, and sweetened drinks.  Exercise:  Exercise at least 30 minutes per day on most days of the week. Some examples of exercise include walking, biking, dancing, and swimming. You can also fit in more physical activity by taking the stairs instead of the elevator or parking farther away from stores. Ask your healthcare provider about the best exercise plan for you.      © Copyright [employer] 2018 Information is for End User's use only and may not be sold, redistributed or otherwise used for commercial purposes. All illustrations and images included in CareNotes® are the copyrighted property of [employer] or [employer]

## 2025-02-10 ENCOUNTER — CLINICAL SUPPORT (OUTPATIENT)
Age: 75
End: 2025-02-10

## 2025-02-10 DIAGNOSIS — Z48.02 ENCOUNTER FOR REMOVAL OF SUTURES: Primary | ICD-10-CM

## 2025-02-10 PROCEDURE — RECHECK: Performed by: DERMATOLOGY

## 2025-02-10 NOTE — PROGRESS NOTES
Suture removal    Date/Time: 2/10/2025 10:00 AM    Performed by: Mine Russell  Authorized by: Christine Jung MD  Universal Protocol:  Consent: Verbal consent obtained.  Consent given by: patient  Timeout called at: 2/10/2025 10:29 AM.  Patient understanding: patient states understanding of the procedure being performed  Patient consent: the patient's understanding of the procedure matches consent given  Procedure consent: procedure consent matches procedure scheduled  Relevant documents: relevant documents present and verified  Test results: test results available and properly labeled  Site marked: the operative site was not marked  Radiology Images displayed and confirmed. If images not available, report reviewed: imaging studies not available  Patient identity confirmed: verbally with patient      Patient location:  Clinic  Location:     Laterality:  Left    Location:  Upper extremity    Upper extremity location:  Arm    Arm location:  L lower arm  Procedure details:     Tools used:  Suture removal kit (11 blade)    Wound appearance:  No sign(s) of infection, good wound healing and clean    Number of sutures removed:  3  Post-procedure details:     Post-removal:  Band-Aid applied

## 2025-02-13 ENCOUNTER — EVALUATION (OUTPATIENT)
Dept: PHYSICAL THERAPY | Facility: CLINIC | Age: 75
End: 2025-02-13
Payer: COMMERCIAL

## 2025-02-13 DIAGNOSIS — M16.12 PRIMARY OSTEOARTHRITIS OF ONE HIP, LEFT: ICD-10-CM

## 2025-02-13 DIAGNOSIS — M25.552 LEFT HIP PAIN: Primary | ICD-10-CM

## 2025-02-13 PROCEDURE — 97112 NEUROMUSCULAR REEDUCATION: CPT

## 2025-02-13 PROCEDURE — 97161 PT EVAL LOW COMPLEX 20 MIN: CPT

## 2025-02-13 NOTE — PROGRESS NOTES
PT Evaluation     Today's date: 2025  Patient name: Shelia Miller  : 1950  MRN: [id number]  Referring provider: Kong Andino DO  Dx:   Encounter Diagnosis     ICD-10-CM    1. Left hip pain  M25.552 Ambulatory Referral to Physical Therapy      2. Primary osteoarthritis of one hip, left  M16.12 Ambulatory Referral to Physical Therapy                     Assessment  Impairments: abnormal gait, abnormal muscle firing, abnormal muscle tone, abnormal or restricted ROM, abnormal movement, activity intolerance, impaired physical strength, lacks appropriate home exercise program and pain with function  Symptom irritability: low    Assessment details: Pt is a pleasant 74 y.o. female who presents to [facility] PT with 4-6 week hx of L hip pain. Today, she presents with weakness, decreased ROM, impaired balance, new onset of impairment of functional mobility, decreased activity tolerance, and decreased strength. Functionally, she is limited in her ability to stand and ambulate, negotiate stairs, perform age appropriate recreation and exercise, and sleep through the night. She is motivated to improve. Pt will benefit from skilled PT to address the aforementioned deficits and limitations in an effort to maximize pain free functional mobility and overall quality of life. Progress as able with these goals in mind.       Understanding of Dx/Px/POC: good     Prognosis: good    Goals  Short term goals (4 weeks):  1) Pt will improve L hip AROM to by 25% pain free.  2) Pt will improve B LE and core strength deficits by 1/3 grade MMT.   3) Pt will reports pain at worse <5/10.  4) Pt will initiate and progress HEP w/ special emphasis on functional hip control and active mobility.    Long term goals (8 weeks)  1) Pt will improve FOTO to at least 58.   2) Pt will stand and ambulate for community distances, to include using stairs, for 2 full weeks w/o AD, w/ normalized gait mechanics, w/ pain <3/10.   3) Pt will sleep through  the night in positioning of choosing 6/7 nights a week w/o waking due to pain.  4) Pt will be independent and compliant w/ HEP in order to maximize functional benefit of skilled PT following d/c.       Plan  Patient would benefit from: skilled PT  Planned modality interventions: cryotherapy and thermotherapy: hydrocollator packs    Planned therapy interventions: abdominal trunk stabilization, activity modification, joint mobilization, manual therapy, massage, motor coordination training, neuromuscular re-education, patient education, postural training, stretching, strengthening, therapeutic activities, therapeutic exercise, therapeutic training, transfer training, home exercise program, gait training, graded activity, functional ROM exercises, graded exercise, flexibility, body mechanics training, balance and balance/weight bearing training    Frequency: 2x week  Duration in weeks: 8  Treatment plan discussed with: patient  Plan details: HEP to start: Anjel burton, donte sq, bridging, standing WS, traction w/ family member if able          Subjective Evaluation    History of Present Illness  Mechanism of injury: Pt has had significant L hip pain for the last 4-6 weeks. Notes that she was taking down [place] decorations and accepted weight onto L leg too hard, pain since. Pt was off RA medication due to upper respiratory virus at the time, wonders if this had an effect. Prednisone taper helped at the end of January, sx returned to same level after. Chiro did some traction work, this seemed to help. Notes that mornings are difficult, going down stairs is really painful, and any prolonged WB is very painful. She has benefited from skilled PT in the past and is motivated to improve. Functional status below:   Quality of life: good    Patient Goals  Patient goals for therapy: increased strength, decreased pain, increased motion and return to sport/leisure activities  Patient goal: get rid of L hip pain! be able to travel  and play w/ grand kids w/o hip pain  Pain  Current pain rating: 3  At best pain ratin  At worst pain ratin  Location: L anterior hip, L lateral hip, shoots into L anterior thigh and foot  Quality: sharp and radiating  Relieving factors: rest, medications, relaxation and change in position (celebrex (prn, prescribed), sitting w/ L leg elevated and leaning back)  Aggravating factors: stair climbing, standing, walking, overhead activity, lifting and running (down stairs is worst)  Progression: improved    Social Support  Steps to enter house: yes  Stairs in house: yes   Lives in: multiple-level home  Lives with: spouse    Employment status: not working        Objective     Palpation     Additional Palpation Details  TTP along L anterior hip (deep), along greater trochanter of L hip and corresponding post/lat attachment sights     Neurological Testing     Sensation     Hip   Left Hip   Intact: light touch    Right Hip   Intact: light touch    Additional Neurological Details  Longstanding neuropathy in  plantar aspect of B feet    Active Range of Motion   Left Hip   Flexion: 70 degrees with pain  Abduction: 25 degrees with pain  External rotation (90/90): 25 degrees   Internal rotation (90/90): 3 degrees     Right Hip   Normal active range of motion    Passive Range of Motion   Left Hip   Flexion: 90 degrees with pain  Abduction: 35 degrees with pain  External rotation (90/90): 20 degrees   Internal rotation (90/90): 5 degrees with pain    Strength/Myotome Testing     Additional Strength Details  R hip grossly 4-4+/5    L hip flex: 3/5 w/ pain  L hip abd/add: 3+/5  L hip ext: 4-/5    Core at least 1+/5     Tests     Additional Tests Details  Classic signs of L hip OA - decreased L hip flex and IR     MWM for L hip flexion and IR immediately improves PROM, improves WB capacity - well maintained post    Ambulation     Ambulation: Level Surfaces     Additional Level Surfaces Ambulation Details  R side SC, decreased  WB through L LE w/ R side WS, decreased step length and shorter step length on L, antalgic     Functional Assessment        Comments  Sit<>stand: UE support on LE w/ heavy R side WS, min WB through L LE     BW squat: not past 25% before significant form breakdown    Stairs: not tested     SLS: unable on L    TUG: TBA              Precautions: standard        Insurance Eval/ Re-eval POC expires Auth Status Total visits  Start date  Expiration date Misc   [employer]  2/13/25 4/13/25 Not req    $10 co-pay                 Date 2/13        Visit Number 1        Auth                  Manual         DTM and TPR         MWM  L hip flex and IR 2x8-12        LAD L hip LAD x2-3 mins total                          TherEx         Active w/u         PROM Check B hips        AAROM         Lower quarter mobility          QS  SLR  Hip abd QS and GS rev                                   Neuro Re-Ed         TrA progressions Isos and march x20        Bridge 2x10 w/ ad sq  Ad sq x20        Hip hinge         Squat intro        WS In standing w/ cane x 2-3 mins                          HEP and POC discussion X3-4 mins        TherAct         Stairs         Functional transfers                                    Gait Training                                    Modalities         CP

## 2025-02-17 ENCOUNTER — OFFICE VISIT (OUTPATIENT)
Dept: PHYSICAL THERAPY | Facility: CLINIC | Age: 75
End: 2025-02-17
Payer: COMMERCIAL

## 2025-02-17 DIAGNOSIS — M25.552 LEFT HIP PAIN: Primary | ICD-10-CM

## 2025-02-17 DIAGNOSIS — M16.12 PRIMARY OSTEOARTHRITIS OF ONE HIP, LEFT: ICD-10-CM

## 2025-02-17 PROCEDURE — 97110 THERAPEUTIC EXERCISES: CPT

## 2025-02-17 PROCEDURE — 97112 NEUROMUSCULAR REEDUCATION: CPT

## 2025-02-17 PROCEDURE — 97140 MANUAL THERAPY 1/> REGIONS: CPT

## 2025-02-17 NOTE — PROGRESS NOTES
"Daily Note     Today's date: 2025  Patient name: Shelia Miller  : 1950  MRN: [id number]  Referring provider: Kong Andino DO  Dx:   Encounter Diagnosis     ICD-10-CM    1. Left hip pain  M25.552       2. Primary osteoarthritis of one hip, left  M16.12                      Subjective: Pt reports that L hip is sore. Felt okay after eval. Notes that loaded hip flexion work is sore in all positions. Sleeping is very difficult.       Objective: MWM for L hip flex and IR, as well las LAD on L improve sx. Requires cueing for forward WS and glute drive during step ups, mod correction.       Assessment: Tolerated treatment well. Patient demonstrated fatigue post treatment and would benefit from continued PT. Does well w/ gentle progressions today. Kept session light so as not to exacerbate sx. Will look to increase intensity slowly, focusing on WB capacity. Despite improvement in sx post manual work, loaded hip flexion work continues to be sore.       Plan: Continue per plan of care. Loaded progressions as able     Precautions: standard        Insurance Eval/ Re-eval POC expires Auth Status Total visits  Start date  Expiration date Misc   [employer]  25 Not req    $10 co-pay                 Date        Visit Number 1 2       Auth                  Manual         DTM and TPR         MWM  L hip flex and IR 2x8-12 2x10-12       LAD L hip LAD x2-3 mins total 2x2 mins                         TherEx         Active w/u         PROM Check B hips X2-3 mins L hip        AAROM         Lower quarter mobility          QS  SLR  Hip abd QS and GS rev          L hip fall outs x20         Self L hip flexor stretch on chair demo and practice x 5-6 mins                 Neuro Re-Ed         TrA progressions  and march x20 Ad x20       Bridge 2x10 w/ ad sq  Ad sq x20 2x10 ad sq       Hip hinge         Squat intro        WS In standing w/ cane x 2-3 mins In standing w/ SC x 2-3 mins          Step ups 4\" 2x10, " up/down x 2 sets                HEP and POC discussion X3-4 mins        TherAct         Stairs         Functional transfers                                    Gait Training                                    Modalities         CP

## 2025-02-19 ENCOUNTER — OFFICE VISIT (OUTPATIENT)
Dept: PHYSICAL THERAPY | Facility: CLINIC | Age: 75
End: 2025-02-19
Payer: COMMERCIAL

## 2025-02-19 DIAGNOSIS — M16.12 PRIMARY OSTEOARTHRITIS OF ONE HIP, LEFT: ICD-10-CM

## 2025-02-19 DIAGNOSIS — M25.552 LEFT HIP PAIN: Primary | ICD-10-CM

## 2025-02-19 PROCEDURE — 97110 THERAPEUTIC EXERCISES: CPT

## 2025-02-19 PROCEDURE — 97140 MANUAL THERAPY 1/> REGIONS: CPT

## 2025-02-19 PROCEDURE — 97112 NEUROMUSCULAR REEDUCATION: CPT

## 2025-02-19 NOTE — PROGRESS NOTES
Daily Note     Today's date: 2025  Patient name: Shelia Miller  : 1950  MRN: [id number]  Referring provider: Kong Andino DO  Dx:   Encounter Diagnosis     ICD-10-CM    1. Left hip pain  M25.552       2. Primary osteoarthritis of one hip, left  M16.12                      Subjective: Pt reports no new complaints. Hip is sore today. Felt a little relief after last session, notes that mornings are always difficult.       Objective: greater range into L hip flex and IR following repeated MWM, both active and passive - safe to attempt self hip mobs on own.      Assessment: Tolerated treatment well. Patient demonstrated fatigue post treatment and would benefit from continued PT. Does well w/ gentle session, focused mainly on active range following hip mobility work. Reviewed progressions for home w/ power bands, namely half kneel hip mobs in all directions, as well as in supine position. Will look to increase intensity if able at next visit. Injection scheduled for next week.       Plan: Continue per plan of care. Check on self hip mobs, increase WB if able     Precautions: standard        Insurance Eval/ Re-eval POC expires Auth Status Total visits  Start date  Expiration date Misc   [employer]  25 Not req    $10 co-pay                 Date       Visit Number 1 2 3      Auth                  Manual         DTM and TPR         MWM  L hip flex and IR 2x8-12 2x10-12 2x10-15      LAD L hip LAD x2-3 mins total 2x2 mins 3x2 min holds total                        TherEx         Active w/u         PROM Check B hips X2-3 mins L hip  X3-4 min total      AAROM         Lower quarter mobility          QS  SLR  Hip abd QS and GS rev          L hip fall outs x20 X20 no resistance         Self L hip flexor stretch on chair demo and practice x 5-6 mins  Rev     Self hip stretching x 3-4 mins         Self hip traction rev w/ bands, supine and kneeling x 5 mins      Neuro Re-Ed         TrA  "progressions Isos and march x20 Ad x20 Isos, ad sq x 20      Bridge 2x10 w/ ad sq  Ad sq x20 2x10 ad sq X10 reg      Hip hinge         Squat intro        WS In standing w/ cane x 2-3 mins In standing w/ SC x 2-3 mins  X3-4 mins         Step ups 4\" 2x10, up/down x 2 sets rev               HEP and POC discussion X3-4 mins  X5 min w/ option review      TherAct         Stairs         Functional transfers                                    Gait Training                                    Modalities         CP                                                                              "

## 2025-02-24 NOTE — PROGRESS NOTES
Daily Note     Today's date: 2025  Patient name: Shelia Miller  : 1950  MRN: [id number]  Referring provider: Kong Andino DO  Dx:   Encounter Diagnosis     ICD-10-CM    1. Left hip pain  M25.552       2. Primary osteoarthritis of one hip, left  M16.12                      Subjective: Pt reports that L hip is sore today. Felt good after last visit, had busy weekend w/ company and notes that her L hip hurt after this.       Objective: MWM and LAD for L hip continue to provide relief, improved activation of L HF following cueing for TrA use prior to initiation.       Assessment: Tolerated treatment well. Patient demonstrated fatigue post treatment and would benefit from continued PT. Does well w/ light session. Will keep next session light in prep for injection later this week. Plan to layer in strengthening and functional movement training after injection, should this provide some relief. Pt motivated by progress.       Plan: Continue per plan of care. Prep for injection.      Precautions: standard        Insurance Eval/ Re-eval POC expires Auth Status Total visits  Start date  Expiration date Misc   [employer]  25 Not req    $10 co-pay                 Date      Visit Number 1 2 3 4     Auth                  Manual         DTM and TPR         MWM  L hip flex and IR 2x8-12 2x10-12 2x10-15 2x10-12      LAD L hip LAD x2-3 mins total 2x2 mins 3x2 min holds total 3x2 min                        TherEx         Active w/u         PROM Check B hips X2-3 mins L hip  X3-4 min total X4-5      AAROM         Lower quarter mobility          QS  SLR  Hip abd QS and GS rev          L hip fall outs x20 X20 no resistance  X30 no res       Self L hip flexor stretch on chair demo and practice x 5-6 mins  Rev     Self hip stretching x 3-4 mins Rev         Self hip traction rev w/ bands, supine and kneeling x 5 mins rev     Neuro Re-Ed         TrA progressions Isos and march x20 Ad x20 Isos, ad sq  "x 20 Isos, ad sq, march x20 each     Bridge 2x10 w/ ad sq  Ad sq x20 2x10 ad sq X10 reg X10-15 total     Hip hinge         Squat intro        WS In standing w/ cane x 2-3 mins In standing w/ SC x 2-3 mins  X3-4 mins  rev       Step ups 4\" 2x10, up/down x 2 sets rev rev              HEP and POC discussion X3-4 mins  X5 min w/ option review X4-5 mins w/ prep for injection x 2-3 mins     TherAct         Stairs         Functional transfers                                    Gait Training                                    Modalities         CP                                                                                "

## 2025-02-25 ENCOUNTER — OFFICE VISIT (OUTPATIENT)
Dept: PHYSICAL THERAPY | Facility: CLINIC | Age: 75
End: 2025-02-25
Payer: COMMERCIAL

## 2025-02-25 DIAGNOSIS — M16.12 PRIMARY OSTEOARTHRITIS OF ONE HIP, LEFT: ICD-10-CM

## 2025-02-25 DIAGNOSIS — M25.552 LEFT HIP PAIN: Primary | ICD-10-CM

## 2025-02-25 PROCEDURE — 97112 NEUROMUSCULAR REEDUCATION: CPT

## 2025-02-25 PROCEDURE — 97110 THERAPEUTIC EXERCISES: CPT

## 2025-02-25 PROCEDURE — 97140 MANUAL THERAPY 1/> REGIONS: CPT

## 2025-02-27 ENCOUNTER — OFFICE VISIT (OUTPATIENT)
Dept: PHYSICAL THERAPY | Facility: CLINIC | Age: 75
End: 2025-02-27
Payer: COMMERCIAL

## 2025-02-27 DIAGNOSIS — M16.12 PRIMARY OSTEOARTHRITIS OF ONE HIP, LEFT: ICD-10-CM

## 2025-02-27 DIAGNOSIS — M25.552 LEFT HIP PAIN: Primary | ICD-10-CM

## 2025-02-27 PROCEDURE — 97112 NEUROMUSCULAR REEDUCATION: CPT

## 2025-02-27 PROCEDURE — 97110 THERAPEUTIC EXERCISES: CPT

## 2025-02-27 NOTE — PROGRESS NOTES
Daily Note     Today's date: 2025  Patient name: Shelia Miller  : 1950  MRN: [id number]  Referring provider: Kong Andino DO  Dx:   Encounter Diagnosis     ICD-10-CM    1. Left hip pain  M25.552       2. Primary osteoarthritis of one hip, left  M16.12                      Subjective: Pt reports L hip is slightly better. Arrives w/ cane and limp, notes that pain has been slightly better. Notes that injection is happening tomorrow.       Objective: greater force production through R HS, lateral hip musculature as compared to R w/ MRE's. Improves w/ reps.       Assessment: Tolerated treatment well. Patient demonstrated fatigue post treatment and would benefit from continued PT. Good tolerance to exercise progressions. Will look to increase intensity as sx allow, focus on techniques that can be replicated at home. Will look to increase intensity of WB program barring setback after injection on .       Plan: Continue per plan of care. Check for results of injection     Precautions: standard        Insurance Eval/ Re-eval POC expires Auth Status Total visits  Start date  Expiration date Misc   [employer]  25 Not req    $10 co-pay                 Date      Visit Number 1 2 3 4     Auth                  Manual         DTM and TPR         MWM  L hip flex and IR 2x8-12 2x10-12 2x10-15 2x10-12      LAD L hip LAD x2-3 mins total 2x2 mins 3x2 min holds total 3x2 min                        TherEx         Active w/u         PROM Check B hips X2-3 mins L hip  X3-4 min total X4-5      AAROM         Lower quarter mobility          QS  SLR  Hip abd QS and GS rev          L hip fall outs x20 X20 no resistance  X30 no res       Self L hip flexor stretch on chair demo and practice x 5-6 mins  Rev     Self hip stretching x 3-4 mins Rev         Self hip traction rev w/ bands, supine and kneeling x 5 mins rev     Neuro Re-Ed         TrA progressions Isos and march x20 Ad x20 Isos, ad sq x 20  "Isos, ad sq, march x20 each     Bridge 2x10 w/ ad sq  Ad sq x20 2x10 ad sq X10 reg X10-15 total     Hip hinge         Squat intro        WS In standing w/ cane x 2-3 mins In standing w/ SC x 2-3 mins  X3-4 mins  rev       Step ups 4\" 2x10, up/down x 2 sets rev rev              HEP and POC discussion X3-4 mins  X5 min w/ option review X4-5 mins w/ prep for injection x 2-3 mins     TherAct         Stairs         Functional transfers                                    Gait Training                                    Modalities         CP                                                                                  "

## 2025-02-28 ENCOUNTER — HOSPITAL ENCOUNTER (OUTPATIENT)
Facility: AMBULARY SURGERY CENTER | Age: 75
Setting detail: OUTPATIENT SURGERY
Discharge: HOME | End: 2025-02-28
Attending: STUDENT IN AN ORGANIZED HEALTH CARE EDUCATION/TRAINING PROGRAM | Admitting: STUDENT IN AN ORGANIZED HEALTH CARE EDUCATION/TRAINING PROGRAM
Payer: COMMERCIAL

## 2025-02-28 ENCOUNTER — APPOINTMENT (OUTPATIENT)
Dept: RADIOLOGY | Facility: HOSPITAL | Age: 75
End: 2025-02-28
Payer: COMMERCIAL

## 2025-02-28 VITALS
TEMPERATURE: 97.6 F | HEART RATE: 87 BPM | SYSTOLIC BLOOD PRESSURE: 171 MMHG | OXYGEN SATURATION: 98 % | DIASTOLIC BLOOD PRESSURE: 90 MMHG | RESPIRATION RATE: 18 BRPM

## 2025-02-28 PROBLEM — M25.552 LEFT HIP PAIN: Status: ACTIVE | Noted: 2025-02-28

## 2025-02-28 PROCEDURE — 77002 NEEDLE LOCALIZATION BY XRAY: CPT

## 2025-02-28 PROCEDURE — 77002 NEEDLE LOCALIZATION BY XRAY: CPT | Performed by: STUDENT IN AN ORGANIZED HEALTH CARE EDUCATION/TRAINING PROGRAM

## 2025-02-28 PROCEDURE — 20610 DRAIN/INJ JOINT/BURSA W/O US: CPT | Performed by: STUDENT IN AN ORGANIZED HEALTH CARE EDUCATION/TRAINING PROGRAM

## 2025-02-28 PROCEDURE — NC001 PR NO CHARGE: Performed by: STUDENT IN AN ORGANIZED HEALTH CARE EDUCATION/TRAINING PROGRAM

## 2025-02-28 RX ORDER — ROPIVACAINE HYDROCHLORIDE 2 MG/ML
INJECTION, SOLUTION EPIDURAL; INFILTRATION; PERINEURAL AS NEEDED
Status: DISCONTINUED | OUTPATIENT
Start: 2025-02-28 | End: 2025-02-28 | Stop reason: HOSPADM

## 2025-02-28 RX ORDER — LIDOCAINE HYDROCHLORIDE 10 MG/ML
INJECTION, SOLUTION EPIDURAL; INFILTRATION; INTRACAUDAL; PERINEURAL AS NEEDED
Status: DISCONTINUED | OUTPATIENT
Start: 2025-02-28 | End: 2025-02-28 | Stop reason: HOSPADM

## 2025-02-28 RX ORDER — METHYLPREDNISOLONE ACETATE 80 MG/ML
INJECTION, SUSPENSION INTRA-ARTICULAR; INTRALESIONAL; INTRAMUSCULAR; SOFT TISSUE AS NEEDED
Status: DISCONTINUED | OUTPATIENT
Start: 2025-02-28 | End: 2025-02-28 | Stop reason: HOSPADM

## 2025-02-28 NOTE — DISCHARGE INSTRUCTIONS

## 2025-03-03 ENCOUNTER — APPOINTMENT (OUTPATIENT)
Dept: PHYSICAL THERAPY | Facility: CLINIC | Age: 75
End: 2025-03-03
Payer: COMMERCIAL

## 2025-03-04 ENCOUNTER — OFFICE VISIT (OUTPATIENT)
Dept: PHYSICAL THERAPY | Facility: CLINIC | Age: 75
End: 2025-03-04
Payer: COMMERCIAL

## 2025-03-04 ENCOUNTER — HOSPITAL ENCOUNTER (OUTPATIENT)
Dept: PULMONOLOGY | Facility: HOSPITAL | Age: 75
Discharge: HOME | End: 2025-03-04
Attending: STUDENT IN AN ORGANIZED HEALTH CARE EDUCATION/TRAINING PROGRAM
Payer: COMMERCIAL

## 2025-03-04 DIAGNOSIS — M06.9 RHEUMATOID ARTHRITIS, INVOLVING UNSPECIFIED SITE, UNSPECIFIED WHETHER RHEUMATOID FACTOR PRESENT (HCC): ICD-10-CM

## 2025-03-04 DIAGNOSIS — M16.12 PRIMARY OSTEOARTHRITIS OF ONE HIP, LEFT: ICD-10-CM

## 2025-03-04 DIAGNOSIS — M25.552 LEFT HIP PAIN: Primary | ICD-10-CM

## 2025-03-04 PROCEDURE — 94726 PLETHYSMOGRAPHY LUNG VOLUMES: CPT | Performed by: INTERNAL MEDICINE

## 2025-03-04 PROCEDURE — 97110 THERAPEUTIC EXERCISES: CPT

## 2025-03-04 PROCEDURE — 97112 NEUROMUSCULAR REEDUCATION: CPT

## 2025-03-04 PROCEDURE — 94729 DIFFUSING CAPACITY: CPT | Performed by: INTERNAL MEDICINE

## 2025-03-04 PROCEDURE — 94760 N-INVAS EAR/PLS OXIMETRY 1: CPT

## 2025-03-04 PROCEDURE — 94729 DIFFUSING CAPACITY: CPT

## 2025-03-04 PROCEDURE — 94726 PLETHYSMOGRAPHY LUNG VOLUMES: CPT

## 2025-03-04 PROCEDURE — 94060 EVALUATION OF WHEEZING: CPT

## 2025-03-04 PROCEDURE — 94060 EVALUATION OF WHEEZING: CPT | Performed by: INTERNAL MEDICINE

## 2025-03-04 RX ORDER — ALBUTEROL SULFATE 0.83 MG/ML
2.5 SOLUTION RESPIRATORY (INHALATION) ONCE
Status: COMPLETED | OUTPATIENT
Start: 2025-03-04 | End: 2025-03-04

## 2025-03-04 RX ADMIN — ALBUTEROL SULFATE 2.5 MG: 2.5 SOLUTION RESPIRATORY (INHALATION) at 08:55

## 2025-03-04 NOTE — PROGRESS NOTES
Daily Note     Today's date: 3/4/2025  Patient name: Shelia Miller  : 1950  MRN: [id number]  Referring provider: Kong Andino DO  Dx:   Encounter Diagnosis     ICD-10-CM    1. Left hip pain  M25.552       2. Primary osteoarthritis of one hip, left  M16.12                      Subjective: Pt reports that L hip feels different. Has a bit less discomfort in L anterior hip, feels it slightly more in lateral hip, low back, and L knee. Not sure if this is from walking differently.       Objective: increased L hip IR and flexion as compared to pre shot, tolerates small increases in loaded L leg progressions as compared to pre shot.       Assessment: Tolerated treatment well. Patient demonstrated fatigue post treatment and would benefit from continued PT. Does well w/ exercise progressions today, tolerates slightly more load w/o sharp increase in sx. Will look to increase intensity tomorrow w/ different exercises. Will perform mobility work pre.       Plan: Continue per plan of care. Leg press, seated 90/90 rotation, hip flexion progressions      Precautions: standard        Insurance Eval/ Re-eval POC expires Auth Status Total visits  Start date  Expiration date Misc   [employer]  25 Not req    $10 co-pay                 Date 2/13 2/17 2/19 2/25 3/4    Visit Number 1 2 3 4 5    Auth                  Manual         DTM and TPR         MWM  L hip flex and IR 2x8-12 2x10-12 2x10-15 2x10-12  X15-20 on L hip flex/IR    LAD L hip LAD x2-3 mins total 2x2 mins 3x2 min holds total 3x2 min  2x2 min holds                      TherEx         Active w/u         PROM Check B hips X2-3 mins L hip  X3-4 min total X4-5  X5-6 mins all directions     AAROM         Lower quarter mobility          QS  SLR  Hip abd QS and GS rev          L hip fall outs x20 X20 no resistance  X30 no res Yellow BKFO x 20 B      Self L hip flexor stretch on chair demo and practice x 5-6 mins  Rev     Self hip stretching x 3-4 mins Rev  rev       " Self hip traction rev w/ bands, supine and kneeling x 5 mins rev     Neuro Re-Ed         TrA progressions Isos and march x20 Ad x20 Isos, ad sq x 20 Isos, ad sq, march x20 each Isos w/ march, ad sq x20 each    Bridge 2x10 w/ ad sq  Ad sq x20 2x10 ad sq X10 reg X10-15 total Reg x 20  Yellow tband x20    Hip hinge         Squat intro    Gait on turf:  Marching x3 laps w/ form rev    WS In standing w/ cane x 2-3 mins In standing w/ SC x 2-3 mins  X3-4 mins  rev       Step ups 4\" 2x10, up/down x 2 sets rev rev 4\" ant x 20, self ROM for hip and knee flex x20             HEP and POC discussion X3-4 mins  X5 min w/ option review X4-5 mins w/ prep for injection x 2-3 mins X2-3 mins    TherAct         Stairs         Functional transfers                                    Gait Training                                    Modalities         CP                                                                                    " Tumor Debulked?: curette

## 2025-03-05 ENCOUNTER — OFFICE VISIT (OUTPATIENT)
Dept: PHYSICAL THERAPY | Facility: CLINIC | Age: 75
End: 2025-03-05
Payer: COMMERCIAL

## 2025-03-05 DIAGNOSIS — M25.552 LEFT HIP PAIN: Primary | ICD-10-CM

## 2025-03-05 DIAGNOSIS — M16.12 PRIMARY OSTEOARTHRITIS OF ONE HIP, LEFT: ICD-10-CM

## 2025-03-05 PROCEDURE — 97110 THERAPEUTIC EXERCISES: CPT

## 2025-03-05 PROCEDURE — 97112 NEUROMUSCULAR REEDUCATION: CPT

## 2025-03-05 NOTE — PROGRESS NOTES
Daily Note     Today's date: 3/5/2025  Patient name: Shelia Miller  : 1950  MRN: [id number]  Referring provider: Kong Andino DO  Dx:   Encounter Diagnosis     ICD-10-CM    1. Left hip pain  M25.552       2. Primary osteoarthritis of one hip, left  M16.12           Start Time: 0930  Stop Time: 1012  Total time in clinic (min): 42 minutes    Subjective: Pt reports       Objective:       Assessment: Tolerated treatment well. Patient demonstrated fatigue post treatment and would benefit from continued PT      Plan: Continue per plan of care.      Precautions: standard        Insurance Eval/ Re-eval POC expires Auth Status Total visits  Start date  Expiration date Misc   [employer]  25 Not req    $10 co-pay                 Date 2/13 2/17 2/19 2/25 3/4 3   Visit Number 1 2 3 4 5 6   Auth                  Manual         DTM and TPR         MWM  L hip flex and IR 2x8-12 2x10-12 2x10-15 2x10-12  X15-20 on L hip flex/IR L side only x15-20 for flex/IR w/ belt dist   LAD L hip LAD x2-3 mins total 2x2 mins 3x2 min holds total 3x2 min  2x2 min holds 3x2 min holds                     TherEx         Active w/u         PROM Check B hips X2-3 mins L hip  X3-4 min total X4-5  X5-6 mins all directions  X5-6 mins total   AAROM         Lower quarter mobility          QS  SLR  Hip abd QS and GS rev     Heel raises x20     L hip fall outs x20 X20 no resistance  X30 no res Yellow BKFO x 20 B Pink 2x10     Self L hip flexor stretch on chair demo and practice x 5-6 mins  Rev     Self hip stretching x 3-4 mins Rev  rev       Self hip traction rev w/ bands, supine and kneeling x 5 mins rev     Neuro Re-Ed         TrA progressions Isos and march x20 Ad x20 Isos, ad sq x 20 Isos, ad sq, march x20 each Isos w/ march, ad sq x20 each Isos w/ ad sq, march x20   Bridge 2x10 w/ ad sq  Ad sq x20 2x10 ad sq X10 reg X10-15 total Reg x 20  Yellow tband x20 Pink band hip abd x20  Ad sq x20   Hip hinge         Squat intro    Gait on  "turf:  Marching x3 laps w/ form rev X4-5 laps total   WS In standing w/ cane x 2-3 mins In standing w/ SC x 2-3 mins  X3-4 mins  rev  TRX squat 1/4 range 3x8     Step ups 4\" 2x10, up/down x 2 sets rev rev 4\" ant x 20, self ROM for hip and knee flex x20             HEP and POC discussion X3-4 mins  X5 min w/ option review X4-5 mins w/ prep for injection x 2-3 mins X2-3 mins X3-4 mins   TherAct         Stairs         Functional transfers                                    Gait Training                                    Modalities         CP                                                                                      "

## 2025-03-06 ENCOUNTER — OFFICE VISIT (OUTPATIENT)
Dept: RADIATION ONCOLOGY | Facility: HOSPITAL | Age: 75
End: 2025-03-06
Attending: RADIOLOGY
Payer: COMMERCIAL

## 2025-03-06 VITALS
RESPIRATION RATE: 18 BRPM | OXYGEN SATURATION: 95 % | TEMPERATURE: 96.9 F | SYSTOLIC BLOOD PRESSURE: 124 MMHG | HEART RATE: 81 BPM | DIASTOLIC BLOOD PRESSURE: 78 MMHG

## 2025-03-06 DIAGNOSIS — C50.912 INVASIVE DUCTAL CARCINOMA OF LEFT BREAST (HCC): Primary | ICD-10-CM

## 2025-03-06 PROCEDURE — 99211 OFF/OP EST MAY X REQ PHY/QHP: CPT | Performed by: RADIOLOGY

## 2025-03-06 PROCEDURE — 99213 OFFICE O/P EST LOW 20 MIN: CPT | Performed by: RADIOLOGY

## 2025-03-06 NOTE — PROGRESS NOTES
Follow-up Visit   Name: Shelia Miller      : 1950      MRN: [id number]  Encounter Provider: Yaw Torres MD  Encounter Date: 3/6/2025   Encounter department: [facility] RADIATION ONCOLOGY  :  Assessment & Plan  Invasive ductal carcinoma of left breast (HCC) s/p lumpectomy, Chemo & XRT  Ms. Miller is a 74 year old woman with a history of stage IB [id number] grade 3 triple negative breast cancer status post breast conservation surgery, adjuvant chemotherapy and radiation (completed 5/10/21). She presents today for follow up.     She remains without mammographic or clinical evidence of recurrent disease.  She will continue follow up with her surgical team and ongoing surveillance.  She will follow up with this office on an as-needed basis.  She was encouraged to call with questions/concerns in the interim.           History of Present Illness   Ms. Miller is a 74 year old woman with the above history.    2024 HemOnc Dr. Chance PELAEZ.      2025 Mammo/US: ASSESSMENT/BI-RADS CATEGORY:  Right: 2 - Benign  Overall: 2 - Benign        2025  Left intra articular hip injection  3/4/2025 PFTs  2025 HemOn    Upon interview, she denies new breast masses or cosmetic concerns.  She is without additional acute concerns.    Oncology History   Cancer Staging   Invasive ductal carcinoma of left breast (HCC) s/p lumpectomy, Chemo & XRT  Staging form: Breast, AJCC 8th Edition  - Pathologic: Stage IB (pT1c, pN0, cM0, G3, ER-, MS-, HER2-) - Signed by Robin Velasquez MD on 3/12/2021  Multigene prognostic tests performed: None  Histologic grading system: 3 grade system  Oncology History   Invasive ductal carcinoma of left breast (HCC) s/p lumpectomy, Chemo & XRT    Initial Diagnosis    Invasive ductal carcinoma of left breast (HCC)     2020 Surgery    Breast, Left, Left Breast Mass, Wire Localized Lumpectomy (1 slides [id number] [facility], collected 2020):    Dorie Ji, Surgeon    - Invasive breast carcinoma of no special type (ductal NST/invasive ductal carcinoma).   * Size: 15 mm   * Karine grade 3 of 3 (total score: 9 of 9)    -- tubule formation, score 3    -- nuclear grade, score 3    -- mitoses, score 3.   * Breast tumor prognostic profile (per report)    -- ER: Negative 0%    -- WV: Negative 0%    -- Her2: Negative 0   * Ductal carcinoma in situ (DCIS): Not identified.    * Margins: Negative for carcinoma, less than 1 mm from the closest lateral margin.   * Pathologic stage (AJCC 8th ed.): pT1c.        9/2/2020 Surgery    Left breast re-excision lumpectomy with SLNB  ([facility], Dr. Ji)     A. Left breast lumpectomy:  - benign breast tissue with previous lumpectomy cavity showing marked tissue reaction including foreign body giant cell reaction, granulation tissue and fat necrosis.   - No residual carcinoma seen.    B. Left axillary lymph node:  - two negative lymph nodes.        10/2020 - 3/15/2021 Chemotherapy    Adjuvant chemotherapy ([facility]  AC (Adriamycin, Cytoxan) x 4 cycles  Paclitaxel x 12 cycles     Dr. Conchis Faye, [facility]      3/12/2021 -  Cancer Staged    Staging form: Breast, AJCC 8th Edition  - Pathologic: Stage IB (pT1c, pN0, cM0, G3, ER-, WV-, HER2-) - Signed by Robin Velasquez MD on 3/12/2021  Multigene prognostic tests performed: None  Histologic grading system: 3 grade system       4/12/2021 - 5/10/2021 Radiation    4256 cGy in 16 fractions to the entire left breast followed by  additional 1000 cGy in 5 fractions to the left lumpectomy cavity  Robin Velasquez MD     4/12/2021 - 5/10/2021 Radiation    BH L BreaEZ 6X 16 / 16 266 0 4,256 21   L Breast e 9E 5 / 5 200 0 1,000 6      Treatment Dates:  4/12/2021 - 5/10/2021.         Review of Systems Refer to nursing note.      Current Outpatient Medications:     albuterol (PROVENTIL HFA,VENTOLIN HFA) 90 mcg/act inhaler, as needed, Disp: , Rfl:      Calcium Carbonate-Vitamin D3 600-400 MG-UNIT TABS, Take by mouth, Disp: , Rfl:     celecoxib (CeleBREX) 200 mg capsule, if needed, Disp: , Rfl:     CELECOXIB PO, Take 200 mg by mouth 2 (two) times a day as needed On hold, Disp: , Rfl:     Cholecalciferol 25 MCG (1000 UT) capsule, Take 1,000 Units by mouth daily, Disp: , Rfl:     Fluticasone-Salmeterol (Advair Diskus) 250-50 mcg/dose inhaler, Inhale 1 puff 2 (two) times a day Rinse mouth after use., Disp: 60 blister, Rfl: 11    losartan (COZAAR) 50 mg tablet, 50 mg daily, Disp: , Rfl:     Multiple Vitamin (MULTIVITAMIN ADULT PO), Take 1 tablet by mouth daily, Disp: , Rfl:     omeprazole (PriLOSEC) 20 mg delayed release capsule, TAKE TWO CAPSULES BY MOUTH EVERY DAY IN THE MORNING (GENERIC PRILOSEC), Disp: 180 capsule, Rfl: 1    Orencia ClickJect 125 MG/ML SOAJ, once a week Stopped currently due to infection, Disp: , Rfl:     PreviDent 5000 Booster Plus 1.1 % PSTE, , Disp: , Rfl:     raloxifene (EVISTA) 60 mg tablet, Take 60 mg by mouth daily, Disp: , Rfl:     rosuvastatin (CRESTOR) 5 mg tablet, Take 5 mg by mouth daily, Disp: , Rfl:         Objective   /78 (BP Location: Right arm)   Pulse 81   Temp (!) 96.9 °F (36.1 °C) (Temporal)   Resp 18   SpO2 95%     Pain Screenin  ECOG  0  Physical Exam   Constitutional:       Appearance: Normal appearance.   HENT:      Head: Normocephalic and atraumatic.   Eyes:      General: No scleral icterus.     Conjunctiva/sclera: Conjunctivae normal.   Cardiovascular:      Rate and Rhythm: Normal rate   Pulmonary:      Effort: Pulmonary effort is normal.   Chest:      Comments: Performed with chaperone.   The left breast has stable mild fibrosis and volume loss at the lumpectomy cavity site without discrete mass.  The right breast is within normal limits.  No supraclavicular, cervical or axillary lymphadenopathy.  Bilateral nipples everted.   Abdominal:      General: Abdomen is flat. There is no distension.    Musculoskeletal:         General: Normal range of motion.   Skin:     General: Skin is warm and dry.   Neurological:      General: No focal deficit present.   Psychiatric:         Mood and Affect: Mood normal.     Administrative Statements   I have spent a total time of 20 minutes in caring for this patient on the day of the visit/encounter including Diagnostic results, Patient and family education, Counseling / Coordination of care, Documenting in the medical record, Reviewing/placing orders in the medical record (including tests, medications, and/or procedures), and Obtaining or reviewing history  .

## 2025-03-06 NOTE — ASSESSMENT & PLAN NOTE
Ms. Miller is a 74 year old woman with a history of stage IB [id number] grade 3 triple negative breast cancer status post breast conservation surgery, adjuvant chemotherapy and radiation (completed 5/10/21). She presents today for follow up.     She remains without mammographic or clinical evidence of recurrent disease.  She will continue follow up with her surgical team and ongoing surveillance.  She will follow up with this office on an as-needed basis.  She was encouraged to call with questions/concerns in the interim.

## 2025-03-06 NOTE — PROGRESS NOTES
Shelia Miller 1950 is a 74 y.o. female with a history of stage IB [id number] grade 3 triple negative breast cancer status post breast conservation surgery, adjuvant chemotherapy and radiation (completed 5/10/21). She was last seen on 3/7/2024 and presents for annual follow up. She is currently undergoing physical therapy for left hip pain.     11/11/2024 [facility] Dr. Chance PELAEZ.     1/30/2025 Mammo/US: ASSESSMENT/BI-RADS CATEGORY:  Right: 2 - Benign  Overall: 2 - Benign      2/28/2025  Left intra articular hip injection  3/4/2025 PFTs  11/25/2025 [facility]        Oncology History   Invasive ductal carcinoma of left breast (HCC) s/p lumpectomy, Chemo & XRT   2020 Initial Diagnosis    Invasive ductal carcinoma of left breast (HCC)     8/5/2020 Surgery    Breast, Left, Left Breast Mass, Wire Localized Lumpectomy (1 slides [id number] [facility], collected 8/5/2020):  Dr. Dorie Ji, Surgeon    - Invasive breast carcinoma of no special type (ductal NST/invasive ductal carcinoma).   * Size: 15 mm   * Karine grade 3 of 3 (total score: 9 of 9)    -- tubule formation, score 3    -- nuclear grade, score 3    -- mitoses, score 3.   * Breast tumor prognostic profile (per report)    -- ER: Negative 0%    -- HI: Negative 0%    -- Her2: Negative 0   * Ductal carcinoma in situ (DCIS): Not identified.    * Margins: Negative for carcinoma, less than 1 mm from the closest lateral margin.   * Pathologic stage (AJCC 8th ed.): pT1c.        9/2/2020 Surgery    Left breast re-excision lumpectomy with SLNB  ([facility], Dr. Ji)     A. Left breast lumpectomy:  - benign breast tissue with previous lumpectomy cavity showing marked tissue reaction including foreign body giant cell reaction, granulation tissue and fat necrosis.   - No residual carcinoma seen.    B. Left axillary lymph node:  - two negative lymph nodes.        10/2020 - 3/15/2021 Chemotherapy    Adjuvant [place]  AC (Adriamycin, Cytoxan) x 4 cycles  Paclitaxel x 12 cycles     Dr. Conchis Faye, [facility]      3/12/2021 -  Cancer Staged    Staging form: Breast, AJCC 8th Edition  - Pathologic: Stage IB (pT1c, pN0, cM0, G3, ER-, WI-, HER2-) - Signed by Robin Velasquez MD on 3/12/2021  Multigene prognostic tests performed: None  Histologic grading system: 3 grade system       4/12/2021 - 5/10/2021 Radiation    4256 cGy in 16 fractions to the entire left breast followed by  additional 1000 cGy in 5 fractions to the left lumpectomy cavity  Robin Velasquez MD     4/12/2021 - 5/10/2021 Radiation    BH L BreaEZ 6X 16 / 16 266 0 4,256 21   L Breast e 9E 5 / 5 200 0 1,000 6      Treatment Dates:  4/12/2021 - 5/10/2021.          Review of Systems:  Review of Systems   Constitutional:  Positive for fatigue.   HENT: Negative.     Eyes: Negative.    Respiratory: Negative.     Cardiovascular: Negative.    Gastrointestinal: Negative.    Endocrine: Negative.    Genitourinary: Negative.    Musculoskeletal:  Positive for arthralgias (hands and feet (hx of RA) recent left hip pain s/p injection), back pain (lower) and gait problem (walks steady with cane).   Skin: Negative.    Allergic/Immunologic: Negative.    Neurological:  Positive for weakness and numbness (neuropathy).   Hematological: Negative.    Psychiatric/Behavioral: Negative.         Clinical Trial: no    Health Maintenance   Topic Date Due    Zoster Vaccine (1 of 2) Never done    Pneumococcal Vaccine: 65+ Years (1 of 2 - PCV) Never done    BMI: Followup Plan  09/11/2024    COVID-19 Vaccine (5 - 2024-25 season) 05/07/2025 (Originally 9/1/2024)    Influenza Vaccine (1) 06/30/2025 (Originally 9/1/2024)    RSV Vaccine for Pregnant Patients and Patients Age 60+ Years (1 - Risk 60-74 years 1-dose series) 02/07/2026 (Originally 8/9/2010)    PT PLAN OF CARE  03/15/2025    Breast Cancer Screening: Mammogram  01/30/2026    Depression Screening  02/07/2026    Urinary Incontinence  Screening  02/07/2026    Medicare Annual Wellness Visit (AWV)  02/07/2026    BMI: Adult  02/07/2026    Fall Risk  02/13/2026    DXA SCAN  11/09/2026    Colorectal Cancer Screening  07/09/2032    Hepatitis C Screening  Completed    Osteoporosis Screening  Completed    Meningococcal B Vaccine  Aged Out    RSV Vaccine age 0-20 Months  Aged Out    HIB Vaccine  Aged Out    IPV Vaccine  Aged Out    Hepatitis A Vaccine  Aged Out    Meningococcal ACWY Vaccine  Aged Out    HPV Vaccine  Aged Out     Patient Active Problem List   Diagnosis    Invasive ductal carcinoma of left breast (HCC) s/p lumpectomy, Chemo & XRT    HTN (hypertension)    Drug-induced polyneuropathy (HCC)    Diastolic CHF, acute (HCC)    Back pain with radiculopathy    Thrombocytopenia (HCC)    Leukopenia    Hyperlipidemia    Acquired deformity of left foot    Adrenal nodule (HCC)    Anemia of chronic disease    Anemia    Arthritis of right knee    Breast cancer, left breast (HCC)    Diverticulosis    GERD (gastroesophageal reflux disease)    Lymphedema of left arm    Malignant neoplasm of upper-outer quadrant of female breast (HCC)    Obesity    Orthopedic aftercare    Osteoarthritis of right hip    Osteoporosis    Peripheral neuropathy    Varicose veins of right lower extremity with inflammation    Hypertension    Other hyperlipidemia    Asthma, mild persistent    Rheumatoid arthritis (HCC)    Infection within female pelvic cavity    Lumbar radiculopathy    Heart failure with preserved ejection fraction, unspecified HF chronicity (HCC)    Left hip pain     Past Medical History:   Diagnosis Date    Arthritis 2015    Asthma     Back pain with radiculopathy 03/01/2023    BRCA1 negative     BRCA2 negative     Breast cancer (HCC) 2020    Left  breast, triple negative    Cancer (HCC) 7/2019    Colon polyp     Elevated liver enzymes 06/06/2024    GERD (gastroesophageal reflux disease) 2016    History of chemotherapy 10/01/2020    breast cancer - left    History  of radiation therapy 04/01/2021    left breast cancer    Hypertension     Lyme disease     OA (osteoarthritis)     Rheumatoid arthritis (HCC)     Sepsis (HCC)      Past Surgical History:   Procedure Laterality Date    ANKLE SURGERY Right 2016    2 plates and screws    BREAST BIOPSY Left 07/02/2020    BREAST LUMPECTOMY Left 08/12/2020    left breast wire localized lumpectomy    BREAST MASS EXCISION Left 09/02/2020    short superior and long lateral left breast resection, left axillary sentinel lymph node biopsy    COLONOSCOPY      ELBOW FRACTURE REPAIR Right 1996    EPIDURAL BLOCK INJECTION N/A 7/19/2024    Procedure: L5-S1 LUMBAR EPIDURAL STEROID INJECTION;  Surgeon: Giuliano Romano MD;  Location: [facility] MAIN OR;  Service: Pain Management     FRACTURE SURGERY Right     elbow    GUM SURGERY      HYSTERECTOMY  2018    JOINT REPLACEMENT Right 6/2021    hip    AR ARTHROCENTESIS ASPIR&/INJ MAJOR JT/BURSA W/O US Left 2/28/2025    Procedure: LEFT INTRA-ARTICULAR HIP INJECTION;  Surgeon: Giuliano Romano MD;  Location: [facility] MAIN OR;  Service: Pain Management     REPLACEMENT TOTAL HIP W/  RESURFACING IMPLANTS  06/18/2021     Family History   Problem Relation Age of Onset    Breast cancer Mother 59    Cancer Mother         Breast Cancer    Lung cancer Father     Cancer Father         Lung cancer    COPD Father     Hypertension Father     Breast cancer Paternal Grandmother     Thyroid cancer Son     Cancer Son         Thyroid Cancer     Social History     Socioeconomic History    Marital status: /Civil Union     Spouse name: Not on file    Number of children: Not on file    Years of education: Not on file    Highest education level: Not on file   Occupational History    Not on file   Tobacco Use    Smoking status: Never     Passive exposure: Past    Smokeless tobacco: Never   Vaping Use    Vaping status: Never Used   Substance and Sexual Activity    Alcohol use: Not Currently     Comment: occasional    Drug use: Never     Sexual activity: Not on file   Other Topics Concern    Not on file   Social History Narrative    Not on file     Social Drivers of Health     Financial Resource Strain: Low Risk  (9/10/2023)    Overall Financial Resource Strain (CARDIA)     Difficulty of Paying Living Expenses: Not hard at all   Food Insecurity: No Food Insecurity (2/7/2025)    Hunger Vital Sign     Worried About Running Out of Food in the Last Year: Never true     Ran Out of Food in the Last Year: Never true   Transportation Needs: No Transportation Needs (2/7/2025)    PRAPARE - Transportation     Lack of Transportation (Medical): No     Lack of Transportation (Non-Medical): No   Physical Activity: Not on file   Stress: Not on file   Social Connections: Not on file   Intimate Partner Violence: Not on file   Housing Stability: Low Risk  (2/7/2025)    Housing Stability Vital Sign     Unable to Pay for Housing in the Last Year: No     Number of Times Moved in the Last Year: 0     Homeless in the Last Year: No       Current Outpatient Medications:     albuterol (PROVENTIL HFA,VENTOLIN HFA) 90 mcg/act inhaler, as needed, Disp: , Rfl:     Calcium Carbonate-Vitamin D3 600-400 MG-UNIT TABS, Take by mouth, Disp: , Rfl:     celecoxib (CeleBREX) 200 mg capsule, if needed, Disp: , Rfl:     CELECOXIB PO, Take 200 mg by mouth 2 (two) times a day as needed On hold, Disp: , Rfl:     Cholecalciferol 25 MCG (1000 UT) capsule, Take 1,000 Units by mouth daily, Disp: , Rfl:     Fluticasone-Salmeterol (Advair Diskus) 250-50 mcg/dose inhaler, Inhale 1 puff 2 (two) times a day Rinse mouth after use., Disp: 60 blister, Rfl: 11    losartan (COZAAR) 50 mg tablet, 50 mg daily, Disp: , Rfl:     Multiple Vitamin (MULTIVITAMIN ADULT PO), Take 1 tablet by mouth daily, Disp: , Rfl:     omeprazole (PriLOSEC) 20 mg delayed release capsule, TAKE TWO CAPSULES BY MOUTH EVERY DAY IN THE MORNING (GENERIC PRILOSEC), Disp: 180 capsule, Rfl: 1    Orencia ClickJect 125 MG/ML SOAJ, once a  week Stopped currently due to infection, Disp: , Rfl:     PreviDent 5000 Booster Plus 1.1 % PSTE, , Disp: , Rfl:     raloxifene (EVISTA) 60 mg tablet, Take 60 mg by mouth daily, Disp: , Rfl:     rosuvastatin (CRESTOR) 5 mg tablet, Take 5 mg by mouth daily, Disp: , Rfl:   Allergies   Allergen Reactions    Fruit Extracts Itching     Cantelope melon peaches    Nuts - Food Allergy GI Intolerance    Tree Extract Cough and Nasal Congestion    Pollen Extract Other (See Comments)     Pollen , Trees And Flowers    Avelox [Moxifloxacin] Itching     Per pt, also facial swelling     Vitals:    03/06/25 0825   BP: 124/78   BP Location: Right arm   Pulse: 81   Resp: 18   Temp: (!) 96.9 °F (36.1 °C)   TempSrc: Temporal   SpO2: 95%

## 2025-03-09 NOTE — PROGRESS NOTES
Daily Note     Today's date: 3/10/2025  Patient name: Shelia Miller  : 1950  MRN: [id number]  Referring provider: Kong Andino DO  Dx:   Encounter Diagnosis     ICD-10-CM    1. Left hip pain  M25.552       2. Primary osteoarthritis of one hip, left  M16.12                      Subjective: Pt reports that L hip may be slightly better. Notes that her L knee was really bothering her after lots of standing on 3/8. Would like to be able to get rid of cane, notes that at times when hip catches she really feels like she still needs this.       Objective: requires significant cueing in standing for L hip flexion vs trunk retro/lateral lean and use of momentum to advance leg. Mod correction, catching sensation intermittently in L anterior hip w/ attempts at proper form.       Assessment: Tolerated treatment well. Patient demonstrated fatigue post treatment and would benefit from continued PT. L hip flexion and IR range improved since injection, MWM provides increase in range to a  smaller extent than it did before injection. Spent most of session focused on activating hip flexors on L, in supine w/ core work and in standing at stairs and w/ gait. Catching sensation is not consistent throughout session, when it does occur it causes significant pain and hitch. Overall pain in L hip has decreased, but catching sensation has not significantly calmed. Will continue to monitor.       Plan: Continue per plan of care. Functional strength in WB positions as able     Precautions: standard        Insurance Eval/ Re-eval POC expires Auth Status Total visits  Start date  Expiration date Misc   [employer]  25 Not req    $10 co-pay                 Date 2/13 2/17 2/19 2/25 3/4 3/5 3/10   Visit Number 1 2 3 4 5 6 7   Auth                    Manual          DTM and TPR          MWM  L hip flex and IR 2x8-12 2x10-12 2x10-15 2x10-12  X15-20 on L hip flex/IR L side only x15-20 for flex/IR w/ belt dist X15 total each    LAD L  "hip LAD x2-3 mins total 2x2 mins 3x2 min holds total 3x2 min  2x2 min holds 3x2 min holds X2-3 mins           AP knee mobs L x2-3 mins             TherEx          Active w/u          PROM Check B hips X2-3 mins L hip  X3-4 min total X4-5  X5-6 mins all directions  X5-6 mins total X4-5 mins total   AAROM          Lower quarter mobility           QS  SLR  Hip abd QS and GS rev     Heel raises x20      L hip fall outs x20 X20 no resistance  X30 no res Yellow BKFO x 20 B Pink 2x10 rev     Self L hip flexor stretch on chair demo and practice x 5-6 mins  Rev     Self hip stretching x 3-4 mins Rev  rev        Self hip traction rev w/ bands, supine and kneeling x 5 mins rev      Neuro Re-Ed          TrA progressions Isos and march x20 Ad x20 Isos, ad sq x 20 Isos, ad sq, march x20 each Isos w/ march, ad sq x20 each Isos w/ ad sq, march x20 Isos, ad sq, marches x 20 each   Bridge 2x10 w/ ad sq  Ad sq x20 2x10 ad sq X10 reg X10-15 total Reg x 20  Yellow tband x20 Pink band hip abd x20  Ad sq x20 Reg x20   Hip hinge          Squat intro    Gait on turf:  Marching x3 laps w/ form rev X4-5 laps total X3-4 mins in clinic    WS In standing w/ cane x 2-3 mins In standing w/ SC x 2-3 mins  X3-4 mins  rev  TRX squat 1/4 range 3x8      Step ups 4\" 2x10, up/down x 2 sets rev rev 4\" ant x 20, self ROM for hip and knee flex x20  12\" step ant lunge active x 20 w/ UE support    6\" ant up/down x5 laps          6\" ant hip flexion lifts from step, from floor x20 each   HEP and POC discussion X3-4 mins  X5 min w/ option review X4-5 mins w/ prep for injection x 2-3 mins X2-3 mins X3-4 mins X2-3 mins    TherAct          Stairs          Functional transfers                                        Gait Training                                        Modalities          CP                                                                                         "

## 2025-03-10 ENCOUNTER — OFFICE VISIT (OUTPATIENT)
Dept: PHYSICAL THERAPY | Facility: CLINIC | Age: 75
End: 2025-03-10
Payer: COMMERCIAL

## 2025-03-10 ENCOUNTER — RESULTS FOLLOW-UP (OUTPATIENT)
Dept: PULMONOLOGY | Facility: MEDICAL CENTER | Age: 75
End: 2025-03-10

## 2025-03-10 DIAGNOSIS — M16.12 PRIMARY OSTEOARTHRITIS OF ONE HIP, LEFT: ICD-10-CM

## 2025-03-10 DIAGNOSIS — M25.552 LEFT HIP PAIN: Primary | ICD-10-CM

## 2025-03-10 PROCEDURE — 97112 NEUROMUSCULAR REEDUCATION: CPT

## 2025-03-10 PROCEDURE — 97110 THERAPEUTIC EXERCISES: CPT

## 2025-03-12 ENCOUNTER — EVALUATION (OUTPATIENT)
Dept: PHYSICAL THERAPY | Facility: CLINIC | Age: 75
End: 2025-03-12
Payer: COMMERCIAL

## 2025-03-12 DIAGNOSIS — M16.12 PRIMARY OSTEOARTHRITIS OF ONE HIP, LEFT: ICD-10-CM

## 2025-03-12 DIAGNOSIS — M25.552 LEFT HIP PAIN: Primary | ICD-10-CM

## 2025-03-12 PROCEDURE — 97112 NEUROMUSCULAR REEDUCATION: CPT

## 2025-03-12 PROCEDURE — 97110 THERAPEUTIC EXERCISES: CPT

## 2025-03-12 NOTE — PROGRESS NOTES
Progress Note     Today's date: 3/12/2025  Patient name: Shelia Miller  : 1950  MRN: [id number]  Referring provider: Kong Andino DO  Dx:   Encounter Diagnosis     ICD-10-CM    1. Left hip pain  M25.552       2. Primary osteoarthritis of one hip, left  M16.12           Start Time: 0930  Stop Time: 1003  Total time in clinic (min): 33 minutes    Subjective: Pt reports up and down results since injection ~10 days ago. She notes that motion is slowly improving, pain is different in hip (feels more lateral and posterior), and muscles feel more sore. Still using cane. Still feels unsafe without cane. Main goal is pain relief. Functional update below:    Goals  Short term goals (4 weeks): ALL PROGRESSED   1) Pt will improve L hip AROM to by 25% pain free.  2) Pt will improve B LE and core strength deficits by 1/3 grade MMT.   3) Pt will reports pain at worse <5/10.  4) Pt will initiate and progress HEP w/ special emphasis on functional hip control and active mobility.    Long term goals (8 weeks) ALL PROGRESSED   1) Pt will improve FOTO to at least 58.   2) Pt will stand and ambulate for community distances, to include using stairs, for 2 full weeks w/o AD, w/ normalized gait mechanics, w/ pain <3/10.   3) Pt will sleep through the night in positioning of choosing 6/7 nights a week w/o waking due to pain.  4) Pt will be independent and compliant w/ HEP in order to maximize functional benefit of skilled PT following d/c.     *goals extended by 2 and 4 weeks since 3/12/25    Patient Goals  Patient goals for therapy: increased strength, decreased pain, increased motion and return to sport/leisure activities  Patient goal: get rid of L hip pain! be able to travel and play w/ grand kids w/o hip pain  Pain  Current pain ratin  At best pain ratin  At worst pain ratin-8  Location: L posterior and lateral hip> anterior hip, soreness into L thigh  Quality: sharp and radiating  Relieving factors: rest,  medications, relaxation and change in position (celebrex (prn, prescribed), sitting w/ L leg elevated and leaning back)  Aggravating factors: stair climbing, standing, walking, overhead activity, lifting and running (down stairs is worst)  Progression: up and down since injection    Social Support  Steps to enter house: yes  Stairs in house: yes   Lives in: multiple-level home  Lives with: spouse    Employment status: not working        Objective     Palpation     Additional Palpation Details  TTP along L anterior hip (deep), along greater trochanter of L hip and corresponding post/lat attachment sights    -3/12/25: TTP along L lateral hip (greater troch), along L SI joint and gluteal muscles     Neurological Testing     Sensation     Hip   Left Hip   Intact: light touch    Right Hip   Intact: light touch    Additional Neurological Details  Longstanding neuropathy in  plantar aspect of B feet    Active Range of Motion   Left Hip   Flexion: 105 degrees with pain  Abduction: 45 degrees with pain  External rotation (90/90): 35 degrees   Internal rotation (90/90): 5-10 degrees     Right Hip   Normal active range of motion    Passive Range of Motion   Left Hip   Flexion: 110 degrees with pain  Abduction: 45 degrees with pain  External rotation (90/90): 40 degrees   Internal rotation (90/90): 10-12 degrees with pain    Strength/Myotome Testing     Additional Strength Details  R hip grossly 4-4+/5    L hip flex: 3+/5 w/ min pain  L hip abd/add: 4-/5  L hip ext: 4/5    Core at least 1+/5     Tests     Additional Tests Details  Classic signs of L hip OA - decreased L hip flex and IR    -3/12/25: continued but improved since injection    MWM for L hip flexion and IR immediately improves PROM, improves WB capacity - well maintained post   -3/12/25: continued positive response    Ambulation     Ambulation: Level Surfaces     Additional Level Surfaces Ambulation Details  R side SC, decreased WB through L LE w/ R side WS,  "decreased step length and shorter step length on L, antalgic    -3/12/25: still uses RC cane, flexed forward through hips and low back, better equality of step length, better push off on L, antalgic at times    Functional Assessment        Comments  Sit<>stand: UE support on LE w/ heavy R side WS, min WB through L LE    -3/12/25: requires UE support from low surfaces, fair glute drive      BW squat: not past 25% before significant form breakdown   -3/12/25: not past 25% before form breakdown    Stairs: not tested   -3/12/25: 4\" in clinic w/ UE support up/down, attempts at 6-8\" require increasing levels of support     SLS: unable on L   -3/12/25: NT            Assessment: Tolerated treatment well. Patient demonstrated fatigue post treatment and would benefit from continued PT. Pt has been re-assessed after 8 skilled PT visits. Since evaluation, she has improved in her functional L hip ROM and strength. Her pain remains similar to evaluation but has shifted in total duration, intensity, and location. We spoke today about importance of maximizing pain free strength and ROM to help w/ maximizing functional mobility through L hip in WB positions. We will continue to monitor sx, both location and intensity, over the next 2-4 weeks w/ the hopes of giving a detailed and accurate picture of whether or not injection therapy will be a viable option moving forward. Treatment will focus on loaded strength and mobility work whenever possible. Pt in agreement w/ plan.      Plan: Continue per plan of care. Steps, sit<>stand, leg press, carries     Precautions: standard        Insurance Eval/ Re-eval POC expires Auth Status Total visits  Start date  Expiration date Misc   [employer]  2/13/25 4/13/25 Not req    $10 co-pay                 Date 2/13 2/17 2/19 2/25 3/4 3/5 3/10 3/12   Visit Number 1 2 3 4 5 6 7 8 - PN   Auth                      Manual           DTM and TPR           MWM  L hip flex and IR 2x8-12 2x10-12 2x10-15 2x10-12  " "X15-20 on L hip flex/IR L side only x15-20 for flex/IR w/ belt dist X15 total each  X8 each    LAD L hip LAD x2-3 mins total 2x2 mins 3x2 min holds total 3x2 min  2x2 min holds 3x2 min holds X2-3 mins  X1-2 min only           AP knee mobs L x2-3 mins no              TherEx           Active w/u           PROM Check B hips X2-3 mins L hip  X3-4 min total X4-5  X5-6 mins all directions  X5-6 mins total X4-5 mins total X4-5 mins    AAROM           Lower quarter mobility            QS  SLR  Hip abd QS and GS rev     Heel raises x20       L hip fall outs x20 X20 no resistance  X30 no res Yellow BKFO x 20 B Pink 2x10 rev      Self L hip flexor stretch on chair demo and practice x 5-6 mins  Rev     Self hip stretching x 3-4 mins Rev  rev         Self hip traction rev w/ bands, supine and kneeling x 5 mins rev       Neuro Re-Ed           TrA progressions Isos and march x20 Ad x20 Isos, ad sq x 20 Isos, ad sq, march x20 each Isos w/ march, ad sq x20 each Isos w/ ad sq, march x20 Isos, ad sq, marches x 20 each Isos, march x20 each   Bridge 2x10 w/ ad sq  Ad sq x20 2x10 ad sq X10 reg X10-15 total Reg x 20  Yellow tband x20 Pink band hip abd x20  Ad sq x20 Reg x20 W/ ad sq x20   Hip hinge           Squat intro    Gait on turf:  Marching x3 laps w/ form rev X4-5 laps total X3-4 mins in clinic  Rev x 2-3 mins    WS In standing w/ cane x 2-3 mins In standing w/ SC x 2-3 mins  X3-4 mins  rev  TRX squat 1/4 range 3x8       Step ups 4\" 2x10, up/down x 2 sets rev rev 4\" ant x 20, self ROM for hip and knee flex x20  12\" step ant lunge active x 20 w/ UE support    6\" ant up/down x5 laps           6\" ant hip flexion lifts from step, from floor x20 each    HEP and POC discussion X3-4 mins  X5 min w/ option review X4-5 mins w/ prep for injection x 2-3 mins X2-3 mins X3-4 mins X2-3 mins  Functional rev of POC and results of PN x 10 mins   TherAct           Stairs           Functional transfers                                            Gait " Training                                            Modalities           CP

## 2025-03-14 ENCOUNTER — TELEPHONE (OUTPATIENT)
Dept: PAIN MEDICINE | Facility: MEDICAL CENTER | Age: 75
End: 2025-03-14

## 2025-03-14 NOTE — TELEPHONE ENCOUNTER
Patient reports 25-50% improvement post inj  Pain level 4/10   Patient stated she [place] [employer] and when she returned home, she was in a lot of pain.

## 2025-03-16 NOTE — PROGRESS NOTES
Daily Note     Today's date: 3/17/2025  Patient name: Shelia Miller  : 1950  MRN: [id number]  Referring provider: Kong Andino,   Dx:   Encounter Diagnosis     ICD-10-CM    1. Left hip pain  M25.552       2. Primary osteoarthritis of one hip, left  M16.12                      Subjective: Pt reports that she felt pretty good after last visit, felt like she was walking slightly better over weekend. Notes that she gets catching sensation in L hip that causes her to stop, feels that L hip is going to give out at times. Pain in hip is different, still doesn't feel comfortable w/o cane.       Objective: tolerates supine single leg ext on L today through near full range w/ stretch sensation but no specific pain, has not tolerated this previously (pain).      Assessment: Tolerated treatment well. Patient demonstrated fatigue post treatment and would benefit from continued PT. Does well within session. Catching sensation in L hip occurs in WB throughout, does not seem to follow a specific pattern. At this point it is clear that injection changed sx, improved pain slightly, function is relatively similar. We will continue w/ strength work so long as it is tolerated. We will check in w/ Dr. Andino in coming weeks for further evaluation pending significnat turnaround in progress.      Plan: Continue per plan of care.  Check on catching, hip strength in loaded positions if possible     Precautions: standard        Insurance Eval/ Re-eval POC expires Auth Status Total visits  Start date  Expiration date Misc   [employer]  25 Not req    $10 co-pay                 Date 2/13 2/17 2/19 2/25 3/4 3/5 3/10 3/12 3/17   Visit Number 1 2 3 4 5 6 7 8 - PN 9   Auth                        Manual            DTM and TPR            MWM  L hip flex and IR 2x8-12 2x10-12 2x10-15 2x10-12  X15-20 on L hip flex/IR L side only x15-20 for flex/IR w/ belt dist X15 total each  X8 each  X10-12 HF and hip IR MWM   LAD L hip LAD x2-3  "mins total 2x2 mins 3x2 min holds total 3x2 min  2x2 min holds 3x2 min holds X2-3 mins  X1-2 min only  X2-3 mins          AP knee mobs L x2-3 mins no                TherEx            Active w/u            PROM Check B hips X2-3 mins L hip  X3-4 min total X4-5  X5-6 mins all directions  X5-6 mins total X4-5 mins total X4-5 mins  X3-4 mins    AAROM            Lower quarter mobility          MET for L post rot innom x10-15 total   QS  SLR  Hip abd QS and GS rev     Heel raises x20        L hip fall outs x20 X20 no resistance  X30 no res Yellow BKFO x 20 B Pink 2x10 rev       Self L hip flexor stretch on chair demo and practice x 5-6 mins  Rev     Self hip stretching x 3-4 mins Rev  rev    Leg press 65# x20 total      Self hip traction rev w/ bands, supine and kneeling x 5 mins rev     Forward flexion in sitting w/ progressions x10-15 total   Neuro Re-Ed            TrA progressions Isos and march x20 Ad x20 Isos, ad sq x 20 Isos, ad sq, march x20 each Isos w/ march, ad sq x20 each Isos w/ ad sq, march x20 Isos, ad sq, marches x 20 each Isos, march x20 each Isos x10  March x20   Bridge 2x10 w/ ad sq  Ad sq x20 2x10 ad sq X10 reg X10-15 total Reg x 20  Yellow tband x20 Pink band hip abd x20  Ad sq x20 Reg x20 W/ ad sq x20 Reg x 20   Hip hinge            Squat intro    Gait on turf:  Marching x3 laps w/ form rev X4-5 laps total X3-4 mins in clinic  Rev x 2-3 mins  X2-3 mins   WS In standing w/ cane x 2-3 mins In standing w/ SC x 2-3 mins  X3-4 mins  rev  TRX squat 1/4 range 3x8   Sit<>stand from airex 2x8-10     Step ups 4\" 2x10, up/down x 2 sets rev rev 4\" ant x 20, self ROM for hip and knee flex x20  12\" step ant lunge active x 20 w/ UE support    6\" ant up/down x5 laps            6\" ant hip flexion lifts from step, from floor x20 each  HF lifts from 6\" step x20 each B      HEP and POC discussion X3-4 mins  X5 min w/ option review X4-5 mins w/ prep for injection x 2-3 mins X2-3 mins X3-4 mins X2-3 mins  Functional rev of " POC and results of PN x 10 mins Rev of HEP and POC x 2-3 mins   TherAct            Stairs            Functional transfers                                                Gait Training                                                Modalities            CP

## 2025-03-17 ENCOUNTER — OFFICE VISIT (OUTPATIENT)
Dept: PHYSICAL THERAPY | Facility: CLINIC | Age: 75
End: 2025-03-17
Payer: COMMERCIAL

## 2025-03-17 DIAGNOSIS — M16.12 PRIMARY OSTEOARTHRITIS OF ONE HIP, LEFT: ICD-10-CM

## 2025-03-17 DIAGNOSIS — M25.552 LEFT HIP PAIN: Primary | ICD-10-CM

## 2025-03-17 PROCEDURE — 97110 THERAPEUTIC EXERCISES: CPT

## 2025-03-17 PROCEDURE — 97112 NEUROMUSCULAR REEDUCATION: CPT

## 2025-03-19 ENCOUNTER — APPOINTMENT (OUTPATIENT)
Dept: PHYSICAL THERAPY | Facility: CLINIC | Age: 75
End: 2025-03-19
Payer: COMMERCIAL

## 2025-03-24 ENCOUNTER — OFFICE VISIT (OUTPATIENT)
Dept: PHYSICAL THERAPY | Facility: CLINIC | Age: 75
End: 2025-03-24
Payer: COMMERCIAL

## 2025-03-24 DIAGNOSIS — M25.552 LEFT HIP PAIN: Primary | ICD-10-CM

## 2025-03-24 DIAGNOSIS — M16.12 PRIMARY OSTEOARTHRITIS OF ONE HIP, LEFT: ICD-10-CM

## 2025-03-24 PROCEDURE — 97112 NEUROMUSCULAR REEDUCATION: CPT

## 2025-03-24 PROCEDURE — 97110 THERAPEUTIC EXERCISES: CPT

## 2025-03-24 NOTE — PROGRESS NOTES
Daily Note     Today's date: 3/24/2025  Patient name: Shelia Miller  : 1950  MRN: [id number]  Referring provider: Kong Andino DO  Dx:   Encounter Diagnosis     ICD-10-CM    1. Left hip pain  M25.552       2. Primary osteoarthritis of one hip, left  M16.12           Start Time: 09  Stop Time: 1015  Total time in clinic (min): 43 minutes    Subjective: Pt reports continued hip pain. Intermittent, sometimes causes her to catch. Feels that leg may give out a points. Going to call ortho about other options.       Objective: requires cueing and at least single airex to control sit<>stand - unable to control from level chair height      Assessment: Tolerated treatment well. Patient demonstrated fatigue post treatment and would benefit from continued PT. Does well w/ exercise progressions despite pain. Responds well to MWM and mobility work but will require further increases in resistance barring setback. No new complaints to finish, will check in about plan once MD visit scheduled.       Plan: Continue per plan of care. Check on MD visit, strength work however able      Precautions: standard        Insurance Eval/ Re-eval POC expires Auth Status Total visits  Start date  Expiration date Misc   [employer]  25 Not req    $10 co-pay                 Date 2/13 2/17 2/19 2/25 3/4 3/5 3/10 3/12 3/17 3/24   Visit Number 1 2 3 4 5 6 7 8 - PN 9 10   Auth                          Manual             DTM and TPR             MWM  L hip flex and IR 2x8-12 2x10-12 2x10-15 2x10-12  X15-20 on L hip flex/IR L side only x15-20 for flex/IR w/ belt dist X15 total each  X8 each  X10-12 HF and hip IR MWM X12-15 each    LAD L hip LAD x2-3 mins total 2x2 mins 3x2 min holds total 3x2 min  2x2 min holds 3x2 min holds X2-3 mins  X1-2 min only  X2-3 mins X2-3 mins total          AP knee mobs L x2-3 mins no                  TherEx             Active w/u             PROM Check B hips X2-3 mins L hip  X3-4 min total X4-5  X5-6 mins  "all directions  X5-6 mins total X4-5 mins total X4-5 mins  X3-4 mins  X4 mins total    AAROM             Lower quarter mobility          MET for L post rot innom x10-15 total no   QS  SLR  Hip abd QS and GS rev     Heel raises x20    x20     L hip fall outs x20 X20 no resistance  X30 no res Yellow BKFO x 20 B Pink 2x10 rev   S/l hip abd x20 total, clams rev      Self L hip flexor stretch on chair demo and practice x 5-6 mins  Rev     Self hip stretching x 3-4 mins Rev  rev    Leg press 65# x20 total 75# x25      Self hip traction rev w/ bands, supine and kneeling x 5 mins rev     Forward flexion in sitting w/ progressions x10-15 total    Neuro Re-Ed             TrA progressions Isos and march x20 Ad x20 Isos, ad sq x 20 Isos, ad sq, march x20 each Isos w/ march, ad sq x20 each Isos w/ ad sq, march x20 Isos, ad sq, marches x 20 each Isos, march x20 each Isos x10  March x20 X10  x20   Bridge 2x10 w/ ad sq  Ad sq x20 2x10 ad sq X10 reg X10-15 total Reg x 20  Yellow tband x20 Pink band hip abd x20  Ad sq x20 Reg x20 W/ ad sq x20 Reg x 20 x20   Hip hinge             Squat intro    Gait on turf:  Marching x3 laps w/ form rev X4-5 laps total X3-4 mins in clinic  Rev x 2-3 mins  X2-3 mins rev   WS In standing w/ cane x 2-3 mins In standing w/ SC x 2-3 mins  X3-4 mins  rev  TRX squat 1/4 range 3x8   Sit<>stand from airex 2x8-10 2x10     Step ups 4\" 2x10, up/down x 2 sets rev rev 4\" ant x 20, self ROM for hip and knee flex x20  12\" step ant lunge active x 20 w/ UE support    6\" ant up/down x5 laps   4\" steps up/down x 3-4 rounds          6\" ant hip flexion lifts from step, from floor x20 each  HF lifts from 6\" step x20 each B    rev   HEP and POC discussion X3-4 mins  X5 min w/ option review X4-5 mins w/ prep for injection x 2-3 mins X2-3 mins X3-4 mins X2-3 mins  Functional rev of POC and results of PN x 10 mins Rev of HEP and POC x 2-3 mins X2-3 mins    TherAct             Stairs             Functional transfers           "                                          Gait Training                                                    Modalities             CP

## 2025-03-25 NOTE — PROGRESS NOTES
Daily Note     Today's date: 3/26/2025  Patient name: Shelia Miller  : 1950  MRN: [id number]  Referring provider: Kong Andino DO  Dx:   Encounter Diagnosis     ICD-10-CM    1. Left hip pain  M25.552       2. Primary osteoarthritis of one hip, left  M16.12           Start Time: 931  Stop Time: 1005  Total time in clinic (min): 34 minutes    Subjective: Pt reports that L hip is very sore today. Had catching and clicking feeling in L hip over last couple days. Notes that she initially felt okay after last visit, but that yesterday as very sore. Has MD follow up next week.       Objective: MWM for L hip flex and IR improves range within session, well maintained post - did not add significant strength work today.       Assessment: Tolerated treatment well. Patient demonstrated fatigue post treatment and would benefit from continued PT. Does well w/ manual focused session and very light stab work. Will look to increase intensity slowly, pending results of MD visit. She does show a classic arthritic pattern, however she does have anterior thigh tingling which could be a sign of upper lumbar nerve involvement. Did not fully investigate this today as sx were made better following listed manual work. Will continue w/ pain control and gentle stability work until MD visit, will re-assess plan thereafter.       Plan: Continue per plan of care. Check on MD visit, prep for MD visit     Precautions: standard        Insurance Eval/ Re-eval POC expires Auth Status Total visits  Start date  Expiration date Misc   [employer]  25 Not req    $10 co-pay                 Date  3/4 3/5 3/10 3/12 3/17 3/24 3/26   Visit Number 1 2 3 4 5 6 7 8 - PN 9 10 11   Auth                            Manual              DTM and TPR              MWM  L hip flex and IR 2x8-12 2x10-12 2x10-15 2x10-12  X15-20 on L hip flex/IR L side only x15-20 for flex/IR w/ belt dist X15 total each  X8 each  X10-12 HF and hip IR MWM  "X12-15 each  X15-20 each   LAD L hip LAD x2-3 mins total 2x2 mins 3x2 min holds total 3x2 min  2x2 min holds 3x2 min holds X2-3 mins  X1-2 min only  X2-3 mins X2-3 mins total X2 mins           AP knee mobs L x2-3 mins no                    TherEx              Active w/u              PROM Check B hips X2-3 mins L hip  X3-4 min total X4-5  X5-6 mins all directions  X5-6 mins total X4-5 mins total X4-5 mins  X3-4 mins  X4 mins total  X4-5 mins   AAROM              Lower quarter mobility          MET for L post rot innom x10-15 total no no   QS  SLR  Hip abd QS and GS rev     Heel raises x20    x20      L hip fall outs x20 X20 no resistance  X30 no res Yellow BKFO x 20 B Pink 2x10 rev   S/l hip abd x20 total, clams rev  BKFO x20     Self L hip flexor stretch on chair demo and practice x 5-6 mins  Rev     Self hip stretching x 3-4 mins Rev  rev    Leg press 65# x20 total 75# x25       Self hip traction rev w/ bands, supine and kneeling x 5 mins rev     Forward flexion in sitting w/ progressions x10-15 total     Neuro Re-Ed              TrA progressions Isos and march x20 Ad x20 Isos, ad sq x 20 Isos, ad sq, march x20 each Isos w/ march, ad sq x20 each Isos w/ ad sq, march x20 Isos, ad sq, marches x 20 each Isos, march x20 each Isos x10  March x20 X10  x20 Isos, march x20 each   Bridge 2x10 w/ ad sq  Ad sq x20 2x10 ad sq X10 reg X10-15 total Reg x 20  Yellow tband x20 Pink band hip abd x20  Ad sq x20 Reg x20 W/ ad sq x20 Reg x 20 x20 X20    Hip hinge              Squat intro    Gait on turf:  Marching x3 laps w/ form rev X4-5 laps total X3-4 mins in clinic  Rev x 2-3 mins  X2-3 mins rev    WS In standing w/ cane x 2-3 mins In standing w/ SC x 2-3 mins  X3-4 mins  rev  TRX squat 1/4 range 3x8   Sit<>stand from airex 2x8-10 2x10      Step ups 4\" 2x10, up/down x 2 sets rev rev 4\" ant x 20, self ROM for hip and knee flex x20  12\" step ant lunge active x 20 w/ UE support    6\" ant up/down x5 laps   4\" steps up/down x 3-4 " "rounds           6\" ant hip flexion lifts from step, from floor x20 each  HF lifts from 6\" step x20 each B    rev    HEP and POC discussion X3-4 mins  X5 min w/ option review X4-5 mins w/ prep for injection x 2-3 mins X2-3 mins X3-4 mins X2-3 mins  Functional rev of POC and results of PN x 10 mins Rev of HEP and POC x 2-3 mins X2-3 mins  X5 mins w/ MD prep   TherAct              Stairs              Functional transfers                                                        Gait Training                                                        Modalities              CP                                                                                                     "

## 2025-03-26 ENCOUNTER — OFFICE VISIT (OUTPATIENT)
Dept: PHYSICAL THERAPY | Facility: CLINIC | Age: 75
End: 2025-03-26
Payer: COMMERCIAL

## 2025-03-26 DIAGNOSIS — M16.12 PRIMARY OSTEOARTHRITIS OF ONE HIP, LEFT: ICD-10-CM

## 2025-03-26 DIAGNOSIS — M25.552 LEFT HIP PAIN: Primary | ICD-10-CM

## 2025-03-26 PROCEDURE — 97110 THERAPEUTIC EXERCISES: CPT

## 2025-03-26 PROCEDURE — 97112 NEUROMUSCULAR REEDUCATION: CPT

## 2025-03-31 ENCOUNTER — OFFICE VISIT (OUTPATIENT)
Dept: PHYSICAL THERAPY | Facility: CLINIC | Age: 75
End: 2025-03-31
Payer: COMMERCIAL

## 2025-03-31 DIAGNOSIS — M16.12 PRIMARY OSTEOARTHRITIS OF ONE HIP, LEFT: ICD-10-CM

## 2025-03-31 DIAGNOSIS — M25.552 LEFT HIP PAIN: Primary | ICD-10-CM

## 2025-03-31 PROCEDURE — 97110 THERAPEUTIC EXERCISES: CPT

## 2025-03-31 PROCEDURE — 97112 NEUROMUSCULAR REEDUCATION: CPT

## 2025-03-31 NOTE — PROGRESS NOTES
Daily Note     Today's date: 3/31/2025  Patient name: Shelia Miller  : 1950  MRN: [id number]  Referring provider: Kong Andino DO  Dx:   Encounter Diagnosis     ICD-10-CM    1. Left hip pain  M25.552       2. Primary osteoarthritis of one hip, left  M16.12                      Subjective: Pt reports that L lateral hip is sore, notes that she feels a snapping feeling in that area. Feels that pain in anterior hip is different. Pain overall is shifting, less acute but still present.       Objective: rolling stick to mid and prox ITB on L lessens sx in lateral hip, well maintained post - to be investigated at next visit.       Assessment: Tolerated treatment well. Patient demonstrated fatigue post treatment and would benefit from continued PT. Does well w/ exercise progressions, fatigue but no increase in pain by end of session. Does well w/ gentle exercise. Rolling stick seems to make a difference. Her sx are up and down, she does well w/ gentle exercise but we have not made significant progress in regards to overall function. Pt will be seen by ortho later this week, will look at that note and assess utility of PT moving forward.      Plan: Continue per plan of care. Roling stick, check ITB, check ant hip,check MD note     Precautions: standard        Insurance Eval/ Re-eval POC expires Auth Status Total visits  Start date  Expiration date Misc   [employer]  25 Not req    $10 co-pay                 Date 3/5 3/10 3/12 3/17 3/24 3/26 3/31    Visit Number 6 7 8 - PN 9 10 11 12    Auth                      Manual           DTM and TPR           MWM  L side only x15-20 for flex/IR w/ belt dist X15 total each  X8 each  X10-12 HF and hip IR MWM X12-15 each  X15-20 each X10 for hip flex and IR     LAD 3x2 min holds X2-3 mins  X1-2 min only  X2-3 mins X2-3 mins total X2 mins  X2-3 mins       AP knee mobs L x2-3 mins no    Rolling stick to L ITB 2x3 mis               TherEx           Active w/u           PROM  "X5-6 mins total X4-5 mins total X4-5 mins  X3-4 mins  X4 mins total  X4-5 mins X2-3 mins    AAROM           Lower quarter mobility     MET for L post rot innom x10-15 total no no     QS  SLR  Hip abd Heel raises x20    x20       Pink 2x10 rev   S/l hip abd x20 total, clams rev  BKFO x20 S/l clams x20 BW        Leg press 65# x20 total 75# x25          Forward flexion in sitting w/ progressions x10-15 total       Neuro Re-Ed           TrA progressions Isos w/ ad sq, march x20 Isos, ad sq, marches x 20 each Isos, march x20 each Isos x10  March x20 X10  x20 Isos, march x20 each Isos, march x20    Bridge Pink band hip abd x20  Ad sq x20 Reg x20 W/ ad sq x20 Reg x 20 x20 X20  x20    Hip hinge           Squat X4-5 laps total X3-4 mins in clinic  Rev x 2-3 mins  X2-3 mins rev  Gait post stick x2-3 mins    WS TRX squat 1/4 range 3x8   Sit<>stand from airex 2x8-10 2x10  BKFO x20      12\" step ant lunge active x 20 w/ UE support    6\" ant up/down x5 laps   4\" steps up/down x 3-4 rounds  no      6\" ant hip flexion lifts from step, from floor x20 each  HF lifts from 6\" step x20 each B    rev      HEP and POC discussion X3-4 mins X2-3 mins  Functional rev of POC and results of PN x 10 mins Rev of HEP and POC x 2-3 mins X2-3 mins  X5 mins w/ MD prep X2-3 mins    TherAct           Stairs           Functional transfers                                            Gait Training                                            Modalities           CP                                                                                                    "

## 2025-04-02 ENCOUNTER — OFFICE VISIT (OUTPATIENT)
Dept: PHYSICAL THERAPY | Facility: CLINIC | Age: 75
End: 2025-04-02
Payer: COMMERCIAL

## 2025-04-02 ENCOUNTER — OFFICE VISIT (OUTPATIENT)
Dept: OBGYN CLINIC | Facility: CLINIC | Age: 75
End: 2025-04-02
Payer: COMMERCIAL

## 2025-04-02 VITALS — WEIGHT: 162 LBS | BODY MASS INDEX: 28.7 KG/M2 | HEIGHT: 63 IN

## 2025-04-02 DIAGNOSIS — M54.16 LUMBAR RADICULOPATHY: ICD-10-CM

## 2025-04-02 DIAGNOSIS — D63.8 ANEMIA OF CHRONIC DISEASE: ICD-10-CM

## 2025-04-02 DIAGNOSIS — M06.9 RHEUMATOID ARTHRITIS, INVOLVING UNSPECIFIED SITE, UNSPECIFIED WHETHER RHEUMATOID FACTOR PRESENT (HCC): Chronic | ICD-10-CM

## 2025-04-02 DIAGNOSIS — M25.552 LEFT HIP PAIN: ICD-10-CM

## 2025-04-02 DIAGNOSIS — I15.9 SECONDARY HYPERTENSION: ICD-10-CM

## 2025-04-02 DIAGNOSIS — M16.12 PRIMARY OSTEOARTHRITIS OF ONE HIP, LEFT: Primary | ICD-10-CM

## 2025-04-02 DIAGNOSIS — E78.5 HYPERLIPIDEMIA, UNSPECIFIED HYPERLIPIDEMIA TYPE: ICD-10-CM

## 2025-04-02 DIAGNOSIS — G62.0 DRUG-INDUCED POLYNEUROPATHY (HCC): ICD-10-CM

## 2025-04-02 DIAGNOSIS — K21.9 GASTROESOPHAGEAL REFLUX DISEASE WITHOUT ESOPHAGITIS: ICD-10-CM

## 2025-04-02 DIAGNOSIS — Z01.818 PRE-OPERATIVE EXAMINATION: ICD-10-CM

## 2025-04-02 DIAGNOSIS — Z96.641 STATUS POST TOTAL REPLACEMENT OF RIGHT HIP: ICD-10-CM

## 2025-04-02 DIAGNOSIS — M25.552 LEFT HIP PAIN: Primary | ICD-10-CM

## 2025-04-02 DIAGNOSIS — M16.12 PRIMARY OSTEOARTHRITIS OF ONE HIP, LEFT: ICD-10-CM

## 2025-04-02 DIAGNOSIS — D69.6 THROMBOCYTOPENIA (HCC): ICD-10-CM

## 2025-04-02 DIAGNOSIS — C50.912 INVASIVE DUCTAL CARCINOMA OF LEFT BREAST (HCC): ICD-10-CM

## 2025-04-02 PROCEDURE — 99215 OFFICE O/P EST HI 40 MIN: CPT | Performed by: ORTHOPAEDIC SURGERY

## 2025-04-02 PROCEDURE — 97112 NEUROMUSCULAR REEDUCATION: CPT

## 2025-04-02 RX ORDER — GABAPENTIN 300 MG/1
300 CAPSULE ORAL ONCE
OUTPATIENT
Start: 2025-04-02 | End: 2025-04-02

## 2025-04-02 RX ORDER — ACETAMINOPHEN 325 MG/1
975 TABLET ORAL ONCE
OUTPATIENT
Start: 2025-04-02 | End: 2025-04-02

## 2025-04-02 RX ORDER — FOLIC ACID 1 MG/1
1 TABLET ORAL DAILY
Qty: 30 TABLET | Refills: 0 | Status: SHIPPED | OUTPATIENT
Start: 2025-04-02 | End: 2025-05-02

## 2025-04-02 RX ORDER — CHLORHEXIDINE GLUCONATE ORAL RINSE 1.2 MG/ML
15 SOLUTION DENTAL ONCE
OUTPATIENT
Start: 2025-04-02 | End: 2025-04-02

## 2025-04-02 RX ORDER — MUPIROCIN 20 MG/G
OINTMENT TOPICAL 2 TIMES DAILY
Qty: 15 G | Refills: 0 | Status: SHIPPED | OUTPATIENT
Start: 2025-04-02

## 2025-04-02 RX ORDER — ZINC SULFATE 50(220)MG
220 CAPSULE ORAL DAILY
Qty: 30 CAPSULE | Refills: 0 | Status: SHIPPED | OUTPATIENT
Start: 2025-04-02

## 2025-04-02 RX ORDER — FERROUS SULFATE 324(65)MG
324 TABLET, DELAYED RELEASE (ENTERIC COATED) ORAL
Qty: 30 TABLET | Refills: 0 | Status: SHIPPED | OUTPATIENT
Start: 2025-04-02

## 2025-04-02 NOTE — PROGRESS NOTES
Name: Shelia Miller      : 1950      MRN: [id number]  Encounter Provider: Kong Andino DO  Encounter Date: 2025   Encounter department: [facility]  :  Assessment & Plan  Primary osteoarthritis of one hip, left    Orders:    zinc sulfate (ZINCATE) 220 mg capsule; Take 1 capsule (220 mg total) by mouth daily    mupirocin (BACTROBAN) 2 % ointment; Apply topically 2 (two) times a day Apply in each nares twice a day for the 5 days before surgery.  You should finish the morning of surgery    Cholecalciferol (VITAMIN D3) 1,000 units tablet; Take 1 tablet (1,000 Units total) by mouth daily    ascorbic Acid (VITAMIN C) 500 MG CPCR; Take 1 capsule (500 mg total) by mouth 2 (two) times a day    ferrous sulfate 324 (65 Fe) mg; Take 1 tablet (324 mg total) by mouth daily before breakfast    folic acid (FOLVITE) 1 mg tablet; Take 1 tablet (1 mg total) by mouth daily    Case request operating room: ARTHROPLASTY HIP TOTAL ANTERIOR,NAVIGATED - overnight, left; Standing    Comprehensive metabolic panel; Future    Hemoglobin A1C W/EAG Estimation; Future    CBC and differential; Future    MRSA culture; Future    If Symptomatic, order: UA w Reflex to Microscopic w Reflex to Culture    Protime-INR; Future    APTT; Future    Ambulatory referral to Cardiology; Future    Ambulatory referral to Physical Therapy; Future    Ambulatory referral to Family Practice; Future    EKG 12 lead; Future    Left hip pain    Orders:    zinc sulfate (ZINCATE) 220 mg capsule; Take 1 capsule (220 mg total) by mouth daily    mupirocin (BACTROBAN) 2 % ointment; Apply topically 2 (two) times a day Apply in each nares twice a day for the 5 days before surgery.  You should finish the morning of surgery    Cholecalciferol (VITAMIN D3) 1,000 units tablet; Take 1 tablet (1,000 Units total) by mouth daily    ascorbic Acid (VITAMIN C) 500 MG CPCR; Take 1 capsule (500 mg total) by mouth 2 (two) times a day    ferrous  sulfate 324 (65 Fe) mg; Take 1 tablet (324 mg total) by mouth daily before breakfast    folic acid (FOLVITE) 1 mg tablet; Take 1 tablet (1 mg total) by mouth daily    Case request operating room: ARTHROPLASTY HIP TOTAL ANTERIOR,NAVIGATED - overnight, left; Standing    Comprehensive metabolic panel; Future    Hemoglobin A1C W/EAG Estimation; Future    CBC and differential; Future    MRSA culture; Future    If Symptomatic, order: UA w Reflex to Microscopic w Reflex to Culture    Protime-INR; Future    APTT; Future    Ambulatory referral to Cardiology; Future    Ambulatory referral to Physical Therapy; Future    Ambulatory referral to Family Practice; Future    EKG 12 lead; Future    Rheumatoid arthritis, involving unspecified site, unspecified whether rheumatoid factor present (HCC)    Orders:    Ambulatory referral to Cardiology; Future    Ambulatory referral to Family Practice; Future    Invasive ductal carcinoma of left breast (HCC) s/p lumpectomy, Chemo & XRT    Orders:    Ambulatory referral to Family Practice; Future    Lumbar radiculopathy    Orders:    Ambulatory referral to Family Practice; Future    Secondary hypertension    Orders:    Ambulatory referral to Cardiology; Future    Ambulatory referral to Family Practice; Future    Hyperlipidemia, unspecified hyperlipidemia type    Orders:    Ambulatory referral to Cardiology; Future    Ambulatory referral to Family Practice; Future    Gastroesophageal reflux disease without esophagitis    Orders:    Ambulatory referral to Family Practice; Future    Drug-induced polyneuropathy (HCC)    Orders:    Ambulatory referral to Family Practice; Future    Thrombocytopenia (HCC)    Orders:    Ambulatory referral to Cardiology; Future    Ambulatory referral to Family Practice; Future    Anemia of chronic disease    Orders:    Ambulatory referral to Cardiology; Future    Ambulatory referral to Family Practice; Future    Status post total replacement of right  hip    Orders:    Ambulatory referral to Family Practice; Future    Pre-operative examination    Orders:    Comprehensive metabolic panel; Future    Hemoglobin A1C W/EAG Estimation; Future    CBC and differential; Future    MRSA culture; Future    If Symptomatic, order: UA w Reflex to Microscopic w Reflex to Culture    Protime-INR; Future    APTT; Future    Ambulatory referral to Cardiology; Future    Ambulatory referral to Physical Therapy; Future    Ambulatory referral to Family Practice; Future    EKG 12 lead; Future         Shelia Miller is a pleasant 74 y.o. female known to our practice presenting today for follow-up of her activity related left hip and groin pain due to her severe underlying osteoarthritis.  We can deduce that since she did see some relief from the intra-articular cortisone injection at the end of February, that is the pain relief she could expect from a total hip arthroplasty.  She is now demonstrated failure of all forms of conservative treatments including recent physical therapy, NSAIDs, Tylenol, ambulatory assistive devices, and the aforementioned injection.  We spent time detailing the differences between the posterior approach which she had on the right hip versus the anterior approach to total hip arthroplasty.  Pre-, marina-, and postoperative expectations were discussed.  Risks of surgery including not limited to bleeding, infection, stiffness, need for further surgery, persistent limp, persistent pain, damage to vessels or nerves, leg length discrepancy, blood clots, heart attack, stroke, and death were discussed.  Consents were signed and placed into the chart for a direct anterior left total hip arthroplasty.  She would not be eligible for surgery until after May 28 due to her recent injection.  She does have support at home with her , and will be an excellent candidate for outpatient physical therapy postoperatively.  She will need clearance from her primary care physician and  cardiologist prior to the intended procedure.  We will plan to keep her overnight due to her cardiac comorbidities.  We will have her hold Orencia for 5 weeks preoperatively and she can restart it once she has been determined to be healed.  She does have a history of breast cancer, but has been in remission for almost 5 years.  Please see risk stratification discussion below.  She will need a 1 week postop appointment for a wound check.  She was introduced today to our surgical scheduler for further planning, and we look forward to taking care of her in the near future when she is eligible for surgery.  All questions addressed    I have personally reviewed pertinent films in PACS of the recently taken mag marker x-rays of her pelvis and left hip which demonstrates severe degenerative changes with narrowing of the femoral acetabular joint.  There is marginal osteophytosis and sclerosis of the acetabulum.  There is no fracture, dislocation, avascular necrosis, lytic or blastic lesion.  Her right total of arthroplasty is stable and she has severe lumbosacral degenerative disease    History of MRSA: no  History of DVT/PE: no  History of Diabetes:no  History of Malignancy: yes - breast CA in remission 5 years  History of GI bleed/Peptic ulcer: no  Are you a smoker:no  Are you on medication for autoimmune disease (rheumatoid arthritis, psoriatic arthritis, lupus, etc.): yes - on Orencia  Are you using OTC supplements: no  Clearances: PCP and Cardiology  Implants: Hip  Discharge: Overnight  PT: Outpatient    Subjective: Left hip follow up    History: Shelia Miller is a 74 y.o. female well-known to our practice for her activity related left hip and groin pain due to her underlying osteoarthritis.  After her last appointment, she underwent an intra-articular cortisone injection with Dr. Romano at the end of February.  She reports that it did help take the edge off for a week or so.  She has been also participating with  "physical therapy, but has not noted any significant long-lasting relief.  She continues to have pain in the left hip and groin on a daily basis which has limited her ability to perform activities of daily living and enjoyment.  Since she continues to be limited, she is interested in definitive intervention    Estimated body mass index is 28.7 kg/m² as calculated from the following:    Height as of this encounter: 5' 3\" (1.6 m).    Weight as of this encounter: 73.5 kg (162 lb).    No results found for: \"HGBA1C\"    Social History     Occupational History    Not on file   Tobacco Use    Smoking status: Never     Passive exposure: Past    Smokeless tobacco: Never   Vaping Use    Vaping status: Never Used   Substance and Sexual Activity    Alcohol use: Not Currently     Comment: occasional    Drug use: Never    Sexual activity: Not on file       Objective:  Left Hip Exam     Tenderness   The patient is experiencing tenderness in the greater trochanter and lateral (No SI joint).    Range of Motion   Abduction:  40 normal   Adduction:  30 normal   Extension:  0 normal   Left hip flexion: 105.   External rotation:  40 normal   Internal rotation: 20 abnormal     Muscle Strength   Abduction: 5/5   Adduction: 5/5   Flexion: 5/5     Tests   RAUDEL: positive  Elian: negative    Other   Erythema: absent  Scars: absent  Sensation: normal  Pulse: present    Comments:  + Chandra, log roll, RAUDEL, FADIR  Ambulates slowly with antalgic gait on left with cane  Thigh calf soft and nontender  Small overhanging abdominal pannus  Skin over anterior hip without erythema, warmth, abrasion, laceration, ulceration, or other sign of infection  Grossly distally neurovascularly intact            There were no vitals filed for this visit.    Past Medical History:   Diagnosis Date    Arthritis 2015    Asthma     Back pain with radiculopathy 03/01/2023    BRCA1 negative     BRCA2 negative     Breast cancer (HCC) 2020    Left  breast, triple " negative    Cancer (HCC) 7/2019    Colon polyp     Elevated liver enzymes 06/06/2024    GERD (gastroesophageal reflux disease) 2016    History of chemotherapy 10/01/2020    breast cancer - left    History of radiation therapy 04/01/2021    left breast cancer    Hypertension     Lyme disease     OA (osteoarthritis)     Rheumatoid arthritis (HCC)     Sepsis (HCC)        Past Surgical History:   Procedure Laterality Date    ANKLE SURGERY Right 2016    2 plates and screws    BREAST BIOPSY Left 07/02/2020    BREAST LUMPECTOMY Left 08/12/2020    left breast wire localized lumpectomy    BREAST MASS EXCISION Left 09/02/2020    short superior and long lateral left breast resection, left axillary sentinel lymph node biopsy    COLONOSCOPY      ELBOW FRACTURE REPAIR Right 1996    EPIDURAL BLOCK INJECTION N/A 7/19/2024    Procedure: L5-S1 LUMBAR EPIDURAL STEROID INJECTION;  Surgeon: Giuliano Romano MD;  Location: [facility] MAIN OR;  Service: Pain Management     FRACTURE SURGERY Right     elbow    GUM SURGERY      HYSTERECTOMY  2018    JOINT REPLACEMENT Right 6/2021    hip    [place] ARTHROCENTESIS ASPIR&/INJ MAJOR JT/BURSA W/O US Left 2/28/2025    Procedure: LEFT INTRA-ARTICULAR HIP INJECTION;  Surgeon: Giuliano Romano MD;  Location: [facility] MAIN OR;  Service: Pain Management     REPLACEMENT TOTAL HIP W/  RESURFACING IMPLANTS  06/18/2021       Family History   Problem Relation Age of Onset    Breast cancer Mother 59    Cancer Mother         Breast Cancer    Lung cancer Father     Cancer Father         Lung cancer    COPD Father     Hypertension Father     Breast cancer Paternal Grandmother     Thyroid cancer Son     Cancer Son         Thyroid Cancer         Current Outpatient Medications:     albuterol (PROVENTIL HFA,VENTOLIN HFA) 90 mcg/act inhaler, as needed, Disp: , Rfl:     ascorbic Acid (VITAMIN C) 500 MG CPCR, Take 1 capsule (500 mg total) by mouth 2 (two) times a day, Disp: 60 capsule, Rfl: 0    Calcium Carbonate-Vitamin D3  600-400 MG-UNIT TABS, Take by mouth, Disp: , Rfl:     celecoxib (CeleBREX) 200 mg capsule, if needed, Disp: , Rfl:     CELECOXIB PO, Take 200 mg by mouth 2 (two) times a day as needed On hold, Disp: , Rfl:     Cholecalciferol (VITAMIN D3) 1,000 units tablet, Take 1 tablet (1,000 Units total) by mouth daily, Disp: 30 tablet, Rfl: 0    Cholecalciferol 25 MCG (1000 UT) capsule, Take 1,000 Units by mouth daily, Disp: , Rfl:     ferrous sulfate 324 (65 Fe) mg, Take 1 tablet (324 mg total) by mouth daily before breakfast, Disp: 30 tablet, Rfl: 0    Fluticasone-Salmeterol (Advair Diskus) 250-50 mcg/dose inhaler, Inhale 1 puff 2 (two) times a day Rinse mouth after use., Disp: 60 blister, Rfl: 11    folic acid (FOLVITE) 1 mg tablet, Take 1 tablet (1 mg total) by mouth daily, Disp: 30 tablet, Rfl: 0    losartan (COZAAR) 50 mg tablet, 50 mg daily, Disp: , Rfl:     Multiple Vitamin (MULTIVITAMIN ADULT PO), Take 1 tablet by mouth daily, Disp: , Rfl:     mupirocin (BACTROBAN) 2 % ointment, Apply topically 2 (two) times a day Apply in each nares twice a day for the 5 days before surgery.  You should finish the morning of surgery, Disp: 15 g, Rfl: 0    omeprazole (PriLOSEC) 20 mg delayed release capsule, TAKE TWO CAPSULES BY MOUTH EVERY DAY IN THE MORNING (GENERIC PRILOSEC), Disp: 180 capsule, Rfl: 1    Orencia ClickJect 125 MG/ML SOAJ, once a week Stopped currently due to infection, Disp: , Rfl:     PreviDent 5000 Booster Plus 1.1 % PSTE, , Disp: , Rfl:     rosuvastatin (CRESTOR) 5 mg tablet, Take 5 mg by mouth daily, Disp: , Rfl:     zinc sulfate (ZINCATE) 220 mg capsule, Take 1 capsule (220 mg total) by mouth daily, Disp: 30 capsule, Rfl: 0    raloxifene (EVISTA) 60 mg tablet, Take 60 mg by mouth daily, Disp: , Rfl:     Allergies   Allergen Reactions    Fruit Extracts Itching     Cantelope melon peaches    Nuts - Food Allergy GI Intolerance    Tree Extract Cough and Nasal Congestion    Pollen Extract Other (See Comments)      Pollen , Trees And Flowers    Avelox [Moxifloxacin] Itching     Per pt, also facial swelling         Review of Systems   Constitutional:  Positive for activity change.   HENT: Negative.     Eyes: Negative.    Respiratory: Negative.     Cardiovascular: Negative.    Gastrointestinal: Negative.    Endocrine: Negative.    Genitourinary: Negative.    Musculoskeletal:  Positive for arthralgias, gait problem, joint swelling and myalgias.   Skin: Negative.    Allergic/Immunologic: Negative.    Hematological: Negative.    Psychiatric/Behavioral: Negative.       Physical Exam  Vitals and nursing note reviewed.   Constitutional:       Appearance: Normal appearance. She is well-developed and normal weight.      Comments: Body mass index is 28.7 kg/m².   HENT:      Head: Normocephalic and atraumatic.      Right Ear: External ear normal.      Left Ear: External ear normal.      Nose: Nose normal.      Mouth/Throat:      Mouth: Mucous membranes are moist.   Eyes:      Extraocular Movements: Extraocular movements intact.      Conjunctiva/sclera: Conjunctivae normal.   Cardiovascular:      Rate and Rhythm: Normal rate.      Pulses: Normal pulses.   Pulmonary:      Effort: Pulmonary effort is normal.   Abdominal:      Palpations: Abdomen is soft.   Musculoskeletal:      Cervical back: Normal range of motion.      Comments: See ortho exam   Skin:     General: Skin is warm and dry.   Neurological:      General: No focal deficit present.      Mental Status: She is alert and oriented to person, place, and time. Mental status is at baseline.   Psychiatric:         Mood and Affect: Mood normal.         Behavior: Behavior normal.         Thought Content: Thought content normal.         Judgment: Judgment normal.         Scribe Attestation      I,:  Otis Mcintosh PA-C am acting as a scribe while in the presence of the attending physician.:       I,:  Kong Andino DO personally performed the services described in this  documentation    as scribed in my presence.:             This document was created using speech voice recognition software.   Grammatical errors, random word insertions, pronoun errors, and incomplete sentences are an occasional consequence of this system due to software limitations, ambient noise, and hardware issues.   Any formal questions or concerns about content, text, or information contained within the body of this dictation should be directly addressed to the provider for clarification.

## 2025-04-02 NOTE — ASSESSMENT & PLAN NOTE
Orders:    Ambulatory referral to Cardiology; Future    Ambulatory referral to Family Practice; Future

## 2025-04-02 NOTE — PROGRESS NOTES
Daily Note     Today's date: 2025  Patient name: Shelia Miller  : 1950  MRN: [id number]  Referring provider: Kong Andino DO  Dx:   Encounter Diagnosis     ICD-10-CM    1. Left hip pain  M25.552       2. Primary osteoarthritis of one hip, left  M16.12           Start Time: 1150  Stop Time: 1225  Total time in clinic (min): 35 minutes    Subjective: Pt reports that she is scheduled for L CARLOS on 25. She notes that MD follow up went well, her case is indicative of more severe L hip OA that will require surgery. Pt encouraged w/ plan. Feels good w/ exercise program. Will follow up again after surgery.    Goals  Short term goals (4 weeks): ALL PROGRESSED   1) Pt will improve L hip AROM to by 25% pain free.  2) Pt will improve B LE and core strength deficits by 1/3 grade MMT.   3) Pt will reports pain at worse <5/10.  4) Pt will initiate and progress HEP w/ special emphasis on functional hip control and active mobility.    Long term goals (8 weeks) ALL PROGRESSED   1) Pt will improve FOTO to at least 58.   2) Pt will stand and ambulate for community distances, to include using stairs, for 2 full weeks w/o AD, w/ normalized gait mechanics, w/ pain <3/10.   3) Pt will sleep through the night in positioning of choosing 6/7 nights a week w/o waking due to pain.  4) Pt will be independent and compliant w/ HEP in order to maximize functional benefit of skilled PT following d/c.     *goals extended by 2 and 4 weeks since 3/12/25    No change in goals at d/c 25    Objective: please see 3/12/25 note for updated measurements at time of d/c as status has not changed.      Assessment: Tolerated treatment well. Patient home program reviewed, expectations for surgery and recovery reviewed, home set up reviewed. Pt has undergone hip replacement on R side, feels good w/ prep and does not feel she needs a pre-op visit. She will follow up on 25 w/ us and we will progress her post-operatively. She has our contact  "info and is free to reach out at any time prior.      Plan: D/C to HEP     Precautions: standard        Insurance Eval/ Re-eval POC expires Auth Status Total visits  Start date  Expiration date Misc   [employer]  2/13/25 4/13/25 Not req    $10 co-pay                 Date 3/5 3/10 3/12 3/17 3/24 3/26 3/31 4/2/25   Visit Number [phone] - PN 9 10 11 12 13   Auth                      Manual           DTM and TPR           MWM  L side only x15-20 for flex/IR w/ belt dist X15 total each  X8 each  X10-12 HF and hip IR MWM X12-15 each  X15-20 each X10 for hip flex and IR  No MWM   LAD 3x2 min holds X2-3 mins  X1-2 min only  X2-3 mins X2-3 mins total X2 mins  X2-3 mins       AP knee mobs L x2-3 mins no    Rolling stick to L ITB 2x3 mis               TherEx           Active w/u           PROM X5-6 mins total X4-5 mins total X4-5 mins  X3-4 mins  X4 mins total  X4-5 mins X2-3 mins    AAROM           Lower quarter mobility     MET for L post rot innom x10-15 total no no     QS  SLR  Hip abd Heel raises x20    x20       Pink 2x10 rev   S/l hip abd x20 total, clams rev  BKFO x20 S/l clams x20 BW        Leg press 65# x20 total 75# x25          Forward flexion in sitting w/ progressions x10-15 total       Neuro Re-Ed           TrA progressions Isos w/ ad sq, march x20 Isos, ad sq, marches x 20 each Isos, march x20 each Isos x10  March x20 X10  x20 Isos, march x20 each Isos, march x20    Bridge Pink band hip abd x20  Ad sq x20 Reg x20 W/ ad sq x20 Reg x 20 x20 X20  x20    Hip hinge           Squat X4-5 laps total X3-4 mins in clinic  Rev x 2-3 mins  X2-3 mins rev  Gait post stick x2-3 mins    WS TRX squat 1/4 range 3x8   Sit<>stand from airex 2x8-10 2x10  BKFO x20      12\" step ant lunge active x 20 w/ UE support    6\" ant up/down x5 laps   4\" steps up/down x 3-4 rounds  no      6\" ant hip flexion lifts from step, from floor x20 each  HF lifts from 6\" step x20 each B    rev      HEP and POC discussion X3-4 mins X2-3 mins  Functional rev " of POC and results of PN x 10 mins Rev of HEP and POC x 2-3 mins X2-3 mins  X5 mins w/ MD prep X2-3 mins POC discussion w/ prep for CARLOS, exercises for home, house prep - 15 mins   TherAct           Stairs           Functional transfers                                            Gait Training                                            Modalities           CP

## 2025-04-02 NOTE — ASSESSMENT & PLAN NOTE
Orders:    zinc sulfate (ZINCATE) 220 mg capsule; Take 1 capsule (220 mg total) by mouth daily    mupirocin (BACTROBAN) 2 % ointment; Apply topically 2 (two) times a day Apply in each nares twice a day for the 5 days before surgery.  You should finish the morning of surgery    Cholecalciferol (VITAMIN D3) 1,000 units tablet; Take 1 tablet (1,000 Units total) by mouth daily    ascorbic Acid (VITAMIN C) 500 MG CPCR; Take 1 capsule (500 mg total) by mouth 2 (two) times a day    ferrous sulfate 324 (65 Fe) mg; Take 1 tablet (324 mg total) by mouth daily before breakfast    folic acid (FOLVITE) 1 mg tablet; Take 1 tablet (1 mg total) by mouth daily    Case request operating room: ARTHROPLASTY HIP TOTAL ANTERIOR,NAVIGATED - overnight, left; Standing    Comprehensive metabolic panel; Future    Hemoglobin A1C W/EAG Estimation; Future    CBC and differential; Future    MRSA culture; Future    If Symptomatic, order: UA w Reflex to Microscopic w Reflex to Culture    Protime-INR; Future    APTT; Future    Ambulatory referral to Cardiology; Future    Ambulatory referral to Physical Therapy; Future    Ambulatory referral to Family Practice; Future    EKG 12 lead; Future

## 2025-04-03 NOTE — ASSESSMENT & PLAN NOTE
She has been more weak than usual. Has not been taking her losartan and BP is still within range. Continue to hold.    Ambulatory

## 2025-04-07 ENCOUNTER — APPOINTMENT (OUTPATIENT)
Dept: PHYSICAL THERAPY | Facility: CLINIC | Age: 75
End: 2025-04-07
Payer: COMMERCIAL

## 2025-04-08 ENCOUNTER — OFFICE VISIT (OUTPATIENT)
Age: 75
End: 2025-04-08
Payer: COMMERCIAL

## 2025-04-08 VITALS — WEIGHT: 164 LBS | TEMPERATURE: 97 F | BODY MASS INDEX: 29.05 KG/M2

## 2025-04-08 DIAGNOSIS — L57.0 ACTINIC KERATOSIS: Primary | ICD-10-CM

## 2025-04-08 DIAGNOSIS — L71.9 ROSACEA: ICD-10-CM

## 2025-04-08 PROCEDURE — 99214 OFFICE O/P EST MOD 30 MIN: CPT | Performed by: NURSE PRACTITIONER

## 2025-04-08 PROCEDURE — 17000 DESTRUCT PREMALG LESION: CPT | Performed by: NURSE PRACTITIONER

## 2025-04-08 NOTE — PATIENT INSTRUCTIONS
ROSACEA    Physical Exam:  Anatomic Location Affected:  face  Morphological Description:  clear today  Pertinent Positives:  Pertinent Negatives:    Additional History of Present Condition:  Patient notes that after having hip injection started getting flushing,dry on face, sore skin on forehead; patient wears tinted moisturizer; patient washes face with philosophy face wash; patient had Rosacea in late thirty's; patient was put on immune suppressants in past; patient currently on  Orencia 125 MG      Assessment and Plan:  Based on a thorough discussion of this condition and the management approach to it (including a comprehensive discussion of the known risks, side effects and potential benefits of treatment), the patient (family) agrees to implement the following specific plan:  No treatment today  Stay away from anything that would make flushing    Rosacea is a chronic rash affecting the mid-face including the nose, cheeks, chin, forehead, and eyelids. The incidence is usually greatest between the ages of 30-60 years and is more common in people with fair skin. Common characteristics include redness, telangiectasias, papules and pustules over affected areas. Rosacea may look similar to acne, but there is a lack of comedones. Occasionally the eyes may also be involved in ocular rosacea. In advanced disease, enlargement of the sebaceous glands in the nose, termed rhinophyma, may be present.     Rosacea results in red spots (papules) and sometimes pustules over the face, but unlike acne there are no blackheads, whiteheads, or cystic nodules. Patients often experience increased facial flushing with prominent blood vessels (erythematotelangiectatic rosacea) and dry, sensitive skin. These symptoms are exacerbated by sun exposure, hot or spicy foods, topical steroids and oil-based facial products.     In ocular rosacea, eyelids may be red and sore due to conjunctivitis, keratitis, and episcleritis. If rhinophyma  develops due to enlargement of sebaceous glands, the patient may have an enlarged and irregularly shaped nose with prominent pores. In rosacea that is refractory to treatment, patients can develop persistent redness and swelling of the face due to lymphatic obstruction (Morbihan disease).     Distribution around the cheeks may be confused with the malar or “butterfly rash” of lupus. However, the rash of lupus spares the nasal creases and lacks papules and pustules. If signs of photosensitivity, oral ulcers, arthritis, and kidney dysfunction are present then consider referral to a rheumatologist.     There are many potential causes of rosacea including genetic, environmental, vascular, and inflammatory factors. These include, but are not limited to:  Chronic exposure to ultraviolet radiation   Increased immune responses in the form of cathelicidins that promote vessel dilation and infiltration with white blood cells (neutrophils) into the dermis  Increased matrix metalloproteinases such as collagen and elastase that remodel normal tissue may contribute to inflammation of the skin making it thicker and harder  There is some evidence to suggest that increased numbers of demodex mites on patient skin may contribute to rosacea papules     General Treatment Approach   Avoid exacerbating factors such as heat, spicy foods, and alcohol   Use daily SPF30+ sunscreen and other methods of coverage for sun protection  Use water-based make-up   Avoid applying topical steroids to affected areas as they can cause perioral dermatitis and exacerbate rosacea     Topical Treatment Approach  Metronidazole cream or gel by itself or in combination with oral antibiotics for more severe cases  Azelaic acid cream or lotion is effective for mild inflammatory rosacea when applied twice daily to affected areas  Brimonidine gel and oxymetazoline hydrochloride cream can reduce facial redness temporarily   Ivermectin cream can treat papulopustular  rosacea by controlling demodex mites and inflammation   Pimecrolimus cream or tacrolimus ointment twice a day for 2-3 months can help reduce inflammation    Oral Treatment Approach  Antibiotics such as doxycycline, minocycline, or erythromycin for 1-3 months  Clonidine and carvedilol can help reduce facial flushing and are generally well tolerated. Common side effects include low blood pressure, gastrointestinal upset, dry eyes, blurred vision and low heart rate.   Isotretinoin at low doses can be effective for long term treatment when antibiotics fail. Side effects may make it unsuitable for some patients.   NSAIDs such as diclofenac can help reduce discomfort and redness in the skin.     Procedural/Surgical Treatment Approach   Vascular lasers or intense pulsed light treatment may be used to treat persistent telangiectasia and papulopustular rosacea  Plastic surgery and carbon dioxide lasers may be used to treat rhinophyma       ACTINIC KERATOSIS    Physical Exam:  Anatomic Location Affected:  left upper forehead  Morphological Description:  pink scaly macule    Assessment and Plan:  Based on a thorough discussion of this condition and the management approach to it (including a comprehensive discussion of the known risks, side effects and potential benefits of treatment), the patient (family) agrees to implement the following specific plan:    Liquid nitrogen was applied for 10-12 seconds to the skin lesion and the expected blistering or scabbing reaction explained. Do not pick at the area. Patient reminded to expect hypopigmented scars from the procedure. Return if lesion fails to fully resolve.      Actinic keratoses are very common on sites repeatedly exposed to the sun, especially the backs of the hands and the face.  They are considered precancers and have a low risk of turning into squamous cell carcinoma. It is rare for a solitary actinic keratosis to evolve into a squamous cell carcinoma (SCC), but the  risk is 10-15% when more than 10 actinic keratoses are present. A tender, thickened, ulcerated or enlarging actinic keratosis is suspicious of SCC.    Actinic keratoses may be prevented by strict sun protection. If already present, keratoses may improve with a very high sun protection factor (50+) broad-spectrum sunscreen applied at least daily to affected areas, year-round.  We recommend that sun protective clothing and hats and sunglasses be worn whenever possible.  Note that you can make you own UPF 30 rate clothing using just your own washing machine with a product called sun guard    There are several different options for treating actinic keratoses    Topical “medications such as 5-fluorouracil or Aldara  - good for field treatment ie treats what's seen and not seen    Cryotherapy - good for single spots but treats “only what we see” versus a field treatment    Photodynamic therapy - involves application of a light sensitizing medicine and then exposure to a special light, also a good field treatment

## 2025-04-08 NOTE — PROGRESS NOTES
"[facility]at[facility] Note     Patient Name: Shelia Miller  Encounter Date: 4/8/25     Have you been cared for by a [facility] Dermatologist in the last 3 years and, if so, which description applies to you?    Yes.  I have been here within the last 3 years, and my medical history has NOT changed since that time.  I am FEMALE/of child-bearing potential.    REVIEW OF SYSTEMS:  Have you recently had or currently have any of the following? No changes in my recent health.   PAST MEDICAL HISTORY:  Have you personally ever had or currently have any of the following?  If \"YES,\" then please provide more detail. YES Patient had Radiation in past for breast cancer in 2020   HISTORY OF IMMUNOSUPPRESSION: Do you have a history of any of the following:  Systemic Immunosuppression such as Diabetes, Biologic or Immunotherapy, Chemotherapy, Organ Transplantation, Bone Marrow Transplantation or Prednisone?  YES, chemo 2020 breast cancer     Answering \"YES\" requires the addition of the dotphrase \"IMMUNOSUPPRESSED\" as the first diagnosis of the patient's visit.   FAMILY HISTORY:  Any \"first degree relatives\" (parent, brother, sister, or child) with the following?    No changes in my family's known health.   PATIENT EXPERIENCE:    Do you want the Dermatologist to perform a COMPLETE skin exam today including a clinical examination under the \"bra and underwear\" areas?  NO  If necessary, do we have your permission to call and leave a detailed message on your Preferred Phone number that includes your specific medical information?  Yes      Allergies   Allergen Reactions    Fruit Extracts Itching     Cantelope melon peaches    Nuts - Food Allergy GI Intolerance    Tree Extract Cough and Nasal Congestion    Pollen Extract Other (See Comments)     Pollen , Trees And Flowers    Avelox [Moxifloxacin] Itching     Per pt, also facial swelling      Current Outpatient Medications:     albuterol (PROVENTIL HFA,VENTOLIN HFA) 90 mcg/act inhaler, " as needed, Disp: , Rfl:     ascorbic Acid (VITAMIN C) 500 MG CPCR, Take 1 capsule (500 mg total) by mouth 2 (two) times a day, Disp: 60 capsule, Rfl: 0    Calcium Carbonate-Vitamin D3 600-400 MG-UNIT TABS, Take by mouth, Disp: , Rfl:     celecoxib (CeleBREX) 200 mg capsule, if needed, Disp: , Rfl:     Cholecalciferol (VITAMIN D3) 1,000 units tablet, Take 1 tablet (1,000 Units total) by mouth daily, Disp: 30 tablet, Rfl: 0    Fluticasone-Salmeterol (Advair Diskus) 250-50 mcg/dose inhaler, Inhale 1 puff 2 (two) times a day Rinse mouth after use., Disp: 60 blister, Rfl: 11    losartan (COZAAR) 50 mg tablet, 50 mg daily, Disp: , Rfl:     Multiple Vitamin (MULTIVITAMIN ADULT PO), Take 1 tablet by mouth daily, Disp: , Rfl:     omeprazole (PriLOSEC) 20 mg delayed release capsule, TAKE TWO CAPSULES BY MOUTH EVERY DAY IN THE MORNING (GENERIC PRILOSEC), Disp: 180 capsule, Rfl: 1    Orencia ClickJect 125 MG/ML SOAJ, once a week Stopped currently due to infection, Disp: , Rfl:     PreviDent 5000 Booster Plus 1.1 % PSTE, , Disp: , Rfl:     raloxifene (EVISTA) 60 mg tablet, Take 60 mg by mouth daily, Disp: , Rfl:     rosuvastatin (CRESTOR) 5 mg tablet, Take 5 mg by mouth daily, Disp: , Rfl:     CELECOXIB PO, Take 200 mg by mouth 2 (two) times a day as needed On hold, Disp: , Rfl:     Cholecalciferol 25 MCG (1000 UT) capsule, Take 1,000 Units by mouth daily, Disp: , Rfl:     ferrous sulfate 324 (65 Fe) mg, Take 1 tablet (324 mg total) by mouth daily before breakfast (Patient not taking: Reported on 4/8/2025), Disp: 30 tablet, Rfl: 0    folic acid (FOLVITE) 1 mg tablet, Take 1 tablet (1 mg total) by mouth daily (Patient not taking: Reported on 4/8/2025), Disp: 30 tablet, Rfl: 0    mupirocin (BACTROBAN) 2 % ointment, Apply topically 2 (two) times a day Apply in each nares twice a day for the 5 days before surgery.  You should finish the morning of surgery (Patient not taking: Reported on 4/8/2025), Disp: 15 g, Rfl: 0    zinc sulfate  "(ZINCATE) 220 mg capsule, Take 1 capsule (220 mg total) by mouth daily (Patient not taking: Reported on 4/8/2025), Disp: 30 capsule, Rfl: 0          Whom besides the patient is providing clinical information about today's encounter?   NO ADDITIONAL HISTORIAN (patient alone provided history)    Physical Exam and Assessment/Plan by Diagnosis:    ROSACEA    Physical Exam:  Anatomic Location Affected:  face  Morphological Description:  clear today      Additional History of Present Condition:  Patient notes that after having hip injection she started getting intermittent flushing & dry skin on face; patient wears tinted moisturizer; patient washes face with philosophy face wash; patient had rosacea in the past, about 30 years ago.       Assessment and Plan:  Based on a thorough discussion of this condition and the management approach to it (including a comprehensive discussion of the known risks, side effects and potential benefits of treatment), the patient (family) agrees to implement the following specific plan:  No obvious clinical findings of rosacea today - clear on exam   No treatment today; patient encouraged to track \"triggers\"       ACTINIC KERATOSIS    Physical Exam:  Anatomic Location Affected:  left upper forehead  Morphological Description:  pink scaly papule    Assessment and Plan:  Based on a thorough discussion of this condition and the management approach to it (including a comprehensive discussion of the known risks, side effects and potential benefits of treatment), the patient (family) agrees to implement the following specific plan:    Liquid nitrogen was applied for 10-12 seconds to the skin lesion and the expected blistering or scabbing reaction explained. Do not pick at the area. Patient reminded to expect hypopigmented scars from the procedure. Return if lesion fails to fully resolve.      Actinic keratoses are very common on sites repeatedly exposed to the sun, especially the backs of the " hands and the face.  They are considered precancers and have a low risk of turning into squamous cell carcinoma. It is rare for a solitary actinic keratosis to evolve into a squamous cell carcinoma (SCC), but the risk is 10-15% when more than 10 actinic keratoses are present. A tender, thickened, ulcerated or enlarging actinic keratosis is suspicious of SCC.    Actinic keratoses may be prevented by strict sun protection. If already present, keratoses may improve with a very high sun protection factor (50+) broad-spectrum sunscreen applied at least daily to affected areas, year-round.  We recommend that sun protective clothing and hats and sunglasses be worn whenever possible.  Note that you can make you own UPF 30 rate clothing using just your own washing machine with a product called sun guard    There are several different options for treating actinic keratoses    Topical “medications such as 5-fluorouracil or Aldara  - good for field treatment ie treats what's seen and not seen    Cryotherapy - good for single spots but treats “only what we see” versus a field treatment    Photodynamic therapy - involves application of a light sensitizing medicine and then exposure to a special light, also a good field treatment    PROCEDURE:  DESTRUCTION OF PRE-MALIGNANT LESIONS  After a thorough discussion of treatment options and risk/benefits/alternatives (including but not limited to local pain, scarring, dyspigmentation, blistering, and possible superinfection), verbal and written consent were obtained and the aforementioned lesions were treated on with cryotherapy using liquid nitrogen x 1 cycle for 5-10 seconds.    TOTAL NUMBER of 1 pre-malignant lesions were treated today on the ANATOMIC LOCATION: left upper forehead.     The patient tolerated the procedure well, and after-care instructions were provided.       Scribe Attestation      I,:  Mine Russell am acting as a scribe while in the presence of the attending  physician.:       I,:  CHAD Solis personally performed the services described in this documentation    as scribed in my presence.:

## 2025-04-09 ENCOUNTER — APPOINTMENT (OUTPATIENT)
Dept: PHYSICAL THERAPY | Facility: CLINIC | Age: 75
End: 2025-04-09
Payer: COMMERCIAL

## 2025-04-14 ENCOUNTER — APPOINTMENT (OUTPATIENT)
Dept: PHYSICAL THERAPY | Facility: CLINIC | Age: 75
End: 2025-04-14
Payer: COMMERCIAL

## 2025-04-16 ENCOUNTER — APPOINTMENT (OUTPATIENT)
Dept: PHYSICAL THERAPY | Facility: CLINIC | Age: 75
End: 2025-04-16
Payer: COMMERCIAL

## 2025-04-21 ENCOUNTER — EVALUATION (OUTPATIENT)
Dept: PHYSICAL THERAPY | Facility: CLINIC | Age: 75
End: 2025-04-21
Attending: ORTHOPAEDIC SURGERY
Payer: COMMERCIAL

## 2025-04-21 DIAGNOSIS — M16.12 PRIMARY OSTEOARTHRITIS OF ONE HIP, LEFT: ICD-10-CM

## 2025-04-21 DIAGNOSIS — M25.552 LEFT HIP PAIN: Primary | ICD-10-CM

## 2025-04-21 PROCEDURE — 97112 NEUROMUSCULAR REEDUCATION: CPT

## 2025-04-21 PROCEDURE — 97110 THERAPEUTIC EXERCISES: CPT

## 2025-04-21 NOTE — PROGRESS NOTES
Progress Note     Today's date: 2025  Patient name: Shelia Miller  : 1950  MRN: [id number]  Referring provider: Kong Andino DO  Dx:   Encounter Diagnosis     ICD-10-CM    1. Left hip pain  M25.552       2. Primary osteoarthritis of one hip, left  M16.12                      Subjective: Pt reports that L hip has gotten significantly worse over the last week. Notes that she gets snapping sensation in L lateral hip, feels crunching in this area. Pain burns and radiates from lateral hip to L lateral knee. Makes it really difficult to walk. Tried to tape foot to help prevent it from rolling in, didn't change too much. CARLOS still scheduled for 25. Notes that she is hopeful PT can help to manage pain in meantime. Voltaren, NSAIDS provide minimal relief. Arrives today on SC w/ heavy reliance on support to help her continue to ambulate. Functional status below:      Goals  Short term goals (4 weeks): ALL PROGRESSED   1) Pt will improve L hip AROM to by 25% pain free.  2) Pt will improve B LE and core strength deficits by 1/3 grade MMT.   3) Pt will reports pain at worse <5/10.  4) Pt will initiate and progress HEP w/ special emphasis on functional hip control and active mobility.    Long term goals (8 weeks) ALL PROGRESSED   1) Pt will improve FOTO to at least 58.   2) Pt will stand and ambulate for community distances, to include using stairs, for 2 full weeks w/o AD, w/ normalized gait mechanics, w/ pain <3/10.   3) Pt will sleep through the night in positioning of choosing 6/7 nights a week w/o waking due to pain.  4) Pt will be independent and compliant w/ HEP in order to maximize functional benefit of skilled PT following d/c.     *goals extended by 2 and 4 weeks since 3/12/25    Goals restarted from 25:  Short term goals (4 weeks):   1) Pt will improve L hip AROM to by 25% pain free.  2) Pt will improve B LE and core strength deficits by 1/3 grade MMT.   3) Pt will reports pain at worse </10.  4)  Pt will initiate and progress HEP w/ special emphasis on functional hip control and active mobility.    Long term goals (8 weeks)   1) Pt will improve FOTO to at least 58.   2) Pt will stand and ambulate for community distances, to include using stairs, for 2 full weeks w/o AD, w/ normalized gait mechanics, w/ pain <3/10.   3) Pt will sleep through the night in positioning of choosing 6/7 nights a week w/o waking due to pain.  4) Pt will be independent and compliant w/ HEP in order to maximize functional benefit of skilled PT following d/c.     Patient Goals  Patient goals for therapy: increased strength, decreased pain, increased motion and return to sport/leisure activities  Patient goal: get rid of L hip pain! be able to travel and play w/ grand kids w/o hip pain  Pain  Current pain ratin  At best pain ratin  At worst pain ratin-10  Location: L posterior and lateral hip> anterior hip, soreness into L thigh  Quality: sharp and radiating  Relieving factors: rest, medications, relaxation and change in position (celebrex (prn, prescribed), sitting w/ L leg elevated and leaning back)  Aggravating factors: stair climbing, standing, walking, overhead activity, lifting and running (down stairs is worst)  Progression: worse, especially in last week    Social Support  Steps to enter house: yes  Stairs in house: yes   Lives in: multiple-level home  Lives with: spouse    Employment status: not working        Objective     Palpation     Additional Palpation Details  TTP along L anterior hip (deep), along greater trochanter of L hip and corresponding post/lat attachment sights    -3/12/25: TTP along L lateral hip (greater troch), along L SI joint and gluteal muscles    -25: TTP along L lateral hip (around greater troch), along proximal ITB > distal ITB    Neurological Testing     Sensation     Hip   Left Hip   Intact: light touch    Right Hip   Intact: light touch    Additional Neurological  Details  Longstanding neuropathy in  plantar aspect of B feet    Active Range of Motion   Left Hip   Flexion: 95 degrees with pain  Abduction: 38 degrees with pain  External rotation (90/90): 25 degrees   Internal rotation (90/90): 5 degrees     Right Hip   Normal active range of motion    Passive Range of Motion   Left Hip   Flexion: 110 degrees with pain  Abduction: 45 degrees with pain  External rotation (90/90): 40 degrees   Internal rotation (90/90): 10-12 degrees with pain    Strength/Myotome Testing     Additional Strength Details  R hip grossly 4-/5    L hip flex: 3+/5 w/ min pain  L hip abd/add: 3+/5  L hip ext: 3+/5    Core at least 1+/5     Tests     Additional Tests Details  Classic signs of L hip OA - decreased L hip flex and IR    -3/12/25: continued but improved since injection   -4/21/25: continued - greater level than before    MWM for L hip flexion and IR immediately improves PROM, improves WB capacity - well maintained post   -3/12/25: continued positive response   -4/21/25: soreness along medial aspect of L hip joint line, min improvement in lateral hip sx immediately following session    Ambulation     Ambulation: Level Surfaces     Additional Level Surfaces Ambulation Details  R side SC, decreased WB through L LE w/ R side WS, decreased step length and shorter step length on L, antalgic    -3/12/25: still uses RC cane, flexed forward through hips and low back, better equality of step length, better push off on L, antalgic at times   -4/21/25: heave reliance on R side SC w/ R side WS throughout, decreased step length and push off on L, antalgic, requires frequent rest    Functional Assessment        Comments  Sit<>stand: UE support on LE w/ heavy R side WS, min WB through L LE    -3/12/25: requires UE support from low surfaces, fair glute drive    -4/21/25: heavy R side WS w/ poor push off on L     BW squat: not past 25% before significant form breakdown   -3/12/25: not past 25% before form  "breakdown   -4/21/25: not tested     Stairs: not tested   -3/12/25: 4\" in clinic w/ UE support up/down, attempts at 6-8\" require increasing levels of support   -4/21/25: not tested, too much pain      SLS: unable on L   -3/12/25: NT   -4/21/25: unable on L               Assessment: Tolerated treatment well. Patient demonstrated fatigue post treatment and would benefit from continued PT. Pt returns to PT following 1.5 months out w/ worsening L lateral hip pain. She does appear to have some \"snapping\" over greater troch, however pattern is inconsistent and can be changed w/ the angle that hip flexes through. She has decreased active L hip range, decreased L hip strength, decreased control through L hip w/ all active motions, and increased pain. We will restart PT w/ heavy focus on pain relief and functional strength in prep for L CARLOS in early June 2025. Pt in agreement w/ plan.       Plan: Continue per plan of care. Isometrics, pain relief, possible cupping, mobility work as able     Precautions: standard        Insurance Eval/ Re-eval POC expires Auth Status Total visits  Start date  Expiration date Misc   [employer]  2/13/25 4/13/25 Not req    $10 co-pay                 Date 3/5 3/10 3/12 3/17 3/24 3/26 3/31 4/2/25 4/21/25   Visit Number [phone] - PN 9 10 11 12 13 14 - PN   Auth                        Manual            DTM and TPR            MWM  L side only x15-20 for flex/IR w/ belt dist X15 total each  X8 each  X10-12 HF and hip IR MWM X12-15 each  X15-20 each X10 for hip flex and IR  No MWM X10-12 flex and IR/ER   LAD 3x2 min holds X2-3 mins  X1-2 min only  X2-3 mins X2-3 mins total X2 mins  X2-3 mins   X2-3 mins     AP knee mobs L x2-3 mins no    Rolling stick to L ITB 2x3 mis  L lateral hip and ITB x 3 mins               TherEx            Active w/u            PROM X5-6 mins total X4-5 mins total X4-5 mins  X3-4 mins  X4 mins total  X4-5 mins X2-3 mins  X3-4 mins   AAROM            Lower quarter mobility     MET " "for L post rot innom x10-15 total no no      QS  SLR  Hip abd Heel raises x20    x20        Pink 2x10 rev   S/l hip abd x20 total, clams rev  BKFO x20 S/l clams x20 BW  X20 BW only    Isometrics IR/ER 2x10 each w/ belt       Leg press 65# x20 total 75# x25           Forward flexion in sitting w/ progressions x10-15 total        Neuro Re-Ed            TrA progressions Isos w/ ad sq, march x20 Isos, ad sq, marches x 20 each Isos, march x20 each Isos x10  March x20 X10  x20 Isos, march x20 each Isos, march x20  Isos and march x20   Bridge Pink band hip abd x20  Ad sq x20 Reg x20 W/ ad sq x20 Reg x 20 x20 X20  x20     Hip hinge            Squat X4-5 laps total X3-4 mins in clinic  Rev x 2-3 mins  X2-3 mins rev  Gait post stick x2-3 mins     WS TRX squat 1/4 range 3x8   Sit<>stand from airex 2x8-10 2x10  BKFO x20       12\" step ant lunge active x 20 w/ UE support    6\" ant up/down x5 laps   4\" steps up/down x 3-4 rounds  no       6\" ant hip flexion lifts from step, from floor x20 each  HF lifts from 6\" step x20 each B    rev       HEP and POC discussion X3-4 mins X2-3 mins  Functional rev of POC and results of PN x 10 mins Rev of HEP and POC x 2-3 mins X2-3 mins  X5 mins w/ MD prep X2-3 mins POC discussion w/ prep for CARLOS, exercises for home, house prep - 15 mins X8 mins   TherAct            Stairs            Functional transfers                                                Gait Training                                                Modalities            CP                                                                                                         "

## 2025-04-28 ENCOUNTER — TELEPHONE (OUTPATIENT)
Dept: OBGYN CLINIC | Facility: HOSPITAL | Age: 75
End: 2025-04-28

## 2025-04-28 ENCOUNTER — OFFICE VISIT (OUTPATIENT)
Dept: PHYSICAL THERAPY | Facility: CLINIC | Age: 75
End: 2025-04-28
Attending: ORTHOPAEDIC SURGERY
Payer: COMMERCIAL

## 2025-04-28 DIAGNOSIS — M16.12 PRIMARY OSTEOARTHRITIS OF ONE HIP, LEFT: ICD-10-CM

## 2025-04-28 DIAGNOSIS — M25.552 LEFT HIP PAIN: Primary | ICD-10-CM

## 2025-04-28 PROCEDURE — 97110 THERAPEUTIC EXERCISES: CPT

## 2025-04-28 PROCEDURE — 97112 NEUROMUSCULAR REEDUCATION: CPT

## 2025-04-28 NOTE — TELEPHONE ENCOUNTER
Preoperative Elective Admission Assessment    Jayce- [phone]    EKG/LAB/MRSA SWAB/CXR date: 5/3    Living Situation:    Who does pt live with: spouse  What kind of home: multi-level, stays on first floor  How do they enter the home: front  How many levels in home: 2   # of steps to enter home: 2  # of steps to second floor: n/a  Are there handrails: Yes  Are there landings: No  Sleeping arrangement: first/entry floor  Where is Bathroom: entry level  Where is the tub or shower: walk in shower w/ grab bars  Toilet height? Concerns for low toilets standard, has toilet seat riser  Dogs or ther pets: 1 dog     First Floor Setup:   Is there a bathroom: Yes  Where would pt sleep: bed     DME: frame walker. RW ordered on 4/28. Instructed to bring DOS  We discussed clearing pathways in the home and making sure there is accessibly to use the walker, for example, removing throw rugs.      Patient's Current Level of Function: Ambulates with walker, Ambulates with cane, and ADLs: Independent    Post-op Caregiver: spouse  Caregiver Name and phone number for Inpatient discharge needs: Abundio  Currently receive any HHC/aides/community supports: No     Post-op Transport: spouse  To/from hospital: spouse  To/from PT 2-3x/week: spouse  Uses community transport now: No     Outpatient Physical Therapy Site:  Site: [facility]  pre and post-op appts scheduled? Yes     Medication Management: self and out of bottle  Preferred Pharmacy for Post-op Medications: [facility]  Blood Management Vitamin Regimen:Starting on 5/3  Post-op anticoagulant: to be determined by surgical team postoperatively  Has Bactroban for 5 days preop: Yes  Educated on Preoperative Bathing Instructions, and use of Soap for 5 days before surgery.      DC Plan: Pt plans to be discharged home    Barriers to DC identified preoperatively: none identified    BMI: 29.05    Patient Education:  Pt educated on post-op pain, early mobilization (POD0), LOS  goals, OP PT goals, and preoperative bathing. Patient educated that our goal is to appropriately discharge patient based off their post-op function while striving to maintain maximal independence. The goal is to discharge patient to home and for them to attend outpatient physical therapy.    Assigned to care team? Yes

## 2025-04-28 NOTE — PROGRESS NOTES
Daily Note     Today's date: 2025  Patient name: Shelia Miller  : 1950  MRN: [id number]  Referring provider: Kong Andino DO  Dx:   Encounter Diagnosis     ICD-10-CM    1. Left hip pain  M25.552       2. Primary osteoarthritis of one hip, left  M16.12                      Subjective: Pt reports that L hip is very sore. Felt slightly better after last visit, thinks exercises were helpful.      Objective: MWM for effective w/ manual bracing to L lateral hip prior to initiating motion.      Assessment: Tolerated treatment well. Patient demonstrated fatigue post treatment and would benefit from continued PT. Does well w/ exercise progressions. Fatigue but no increase in pain by end of session. Sx slightly better to finish, good tolerance to isometric training. Will look to increase intensity slowly, only as sx allow.       Plan: Continue per plan of care. Iso progressions, possible sit<>stand or leg press      Precautions: standard        Insurance Eval/ Re-eval POC expires Auth Status Total visits  Start date  Expiration date Misc   [employer]  25 Not req    $10 co-pay                 Date 3/24 3/26 3/31 4/2/25 4/21/25 4/28/25   Visit Number 10 11 12 13 14 - PN 15   Auth                  Manual         DTM and TPR         MWM  X12-15 each  X15-20 each X10 for hip flex and IR  No MWM X10-12 flex and IR/ER 2X10-15 total flex, IR x 10   LAD X2-3 mins total X2 mins  X2-3 mins   X2-3 mins 2x2 mins      Rolling stick to L ITB 2x3 mis  L lateral hip and ITB x 3 mins 2x2-3 mins             TherEx         Active w/u         PROM X4 mins total  X4-5 mins X2-3 mins  X3-4 mins    AAROM         Lower quarter mobility  no no       QS  SLR  Hip abd x20         S/l hip abd x20 total, clams rev  BKFO x20 S/l clams x20 BW  X20 BW only    Isometrics IR/ER 2x10 each w/ belt x20      3x10    75# x25                 Neuro Re-Ed         TrA progressions X1  x20 Isos, march x20 each Isos, march x20  Isos and march x2  "X20-25 each   Bridge x20 X20  x20      Hip hinge         Squat rev  Gait post stick x2-3 mins      WS 2x10  BKFO x20       4\" steps up/down x 3-4 rounds  no       rev        HEP and POC discussion X2-3 mins  X5 mins w/ MD prep X2-3 mins POC discussion w/ prep for CARLOS, exercises for home, house prep - 15 mins X8 mins X3-4 mins   TherAct         Stairs         Functional transfers                                    Gait Training                                    Modalities         CP                                                                                                        "

## 2025-04-29 ENCOUNTER — TELEPHONE (OUTPATIENT)
Age: 75
End: 2025-04-29

## 2025-04-29 NOTE — TELEPHONE ENCOUNTER
Caller: Shelia    Doctor: Dr. Andino    Reason for call: Pain in left leg causing problems with functioning and sleeping.  Has tried suggestion given by Dr Andino and has minimal relief.  Would like to discuss what else can be done.     Call back#: [phone]

## 2025-04-30 ENCOUNTER — PATIENT MESSAGE (OUTPATIENT)
Dept: OBGYN CLINIC | Facility: CLINIC | Age: 75
End: 2025-04-30

## 2025-04-30 DIAGNOSIS — M16.12 PRIMARY OSTEOARTHRITIS OF ONE HIP, LEFT: Primary | ICD-10-CM

## 2025-04-30 DIAGNOSIS — M54.16 LUMBAR RADICULOPATHY: ICD-10-CM

## 2025-04-30 DIAGNOSIS — M25.552 LEFT HIP PAIN: ICD-10-CM

## 2025-04-30 NOTE — TELEPHONE ENCOUNTER
Caller: Shelia (Daughter)    Doctor: Dr. Andino    Reason for call: Daughter is calling because patient did not get a call back from yesterday and it in a lot of pain. She has a lot of questions. Daughter is a  her class was coming in. She was not able to give me all the questions her mom had. She was calling as a courtesy call for her mom.    Call back#: [phone] (PATIENT PHONE NUMBER)

## 2025-05-01 ENCOUNTER — APPOINTMENT (OUTPATIENT)
Dept: RADIOLOGY | Facility: CLINIC | Age: 75
End: 2025-05-01
Attending: ORTHOPAEDIC SURGERY
Payer: COMMERCIAL

## 2025-05-01 ENCOUNTER — OFFICE VISIT (OUTPATIENT)
Dept: OBGYN CLINIC | Facility: CLINIC | Age: 75
End: 2025-05-01
Payer: COMMERCIAL

## 2025-05-01 ENCOUNTER — APPOINTMENT (OUTPATIENT)
Dept: LAB | Facility: CLINIC | Age: 75
End: 2025-05-01
Payer: COMMERCIAL

## 2025-05-01 VITALS — BODY MASS INDEX: 29.06 KG/M2 | WEIGHT: 164 LBS | HEIGHT: 63 IN

## 2025-05-01 DIAGNOSIS — M16.12 PRIMARY OSTEOARTHRITIS OF ONE HIP, LEFT: ICD-10-CM

## 2025-05-01 DIAGNOSIS — M25.552 LEFT HIP PAIN: ICD-10-CM

## 2025-05-01 DIAGNOSIS — N73.9: ICD-10-CM

## 2025-05-01 DIAGNOSIS — M16.12 PRIMARY OSTEOARTHRITIS OF ONE HIP, LEFT: Primary | ICD-10-CM

## 2025-05-01 DIAGNOSIS — M87.9 OSTEONECROSIS OF HIP WITH COLLAPSE OF FEMORAL HEAD PRESENT ON X-RAY (HCC): ICD-10-CM

## 2025-05-01 LAB
CRP SERPL QL: 7.3 MG/L
DME PARACHUTE DELIVERY DATE ACTUAL: NORMAL
DME PARACHUTE DELIVERY DATE REQUESTED: NORMAL
DME PARACHUTE ITEM DESCRIPTION: NORMAL
DME PARACHUTE ORDER STATUS: NORMAL
DME PARACHUTE SUPPLIER NAME: NORMAL
DME PARACHUTE SUPPLIER PHONE: NORMAL
ERYTHROCYTE [SEDIMENTATION RATE] IN BLOOD: 36 MM/HOUR (ref 0–29)

## 2025-05-01 PROCEDURE — 86140 C-REACTIVE PROTEIN: CPT

## 2025-05-01 PROCEDURE — 85652 RBC SED RATE AUTOMATED: CPT

## 2025-05-01 PROCEDURE — 73502 X-RAY EXAM HIP UNI 2-3 VIEWS: CPT

## 2025-05-01 PROCEDURE — 36415 COLL VENOUS BLD VENIPUNCTURE: CPT

## 2025-05-01 PROCEDURE — 99214 OFFICE O/P EST MOD 30 MIN: CPT | Performed by: ORTHOPAEDIC SURGERY

## 2025-05-01 RX ORDER — TRAMADOL HYDROCHLORIDE 50 MG/1
50 TABLET ORAL EVERY 6 HOURS PRN
Qty: 30 TABLET | Refills: 0 | Status: SHIPPED | OUTPATIENT
Start: 2025-05-01

## 2025-05-01 NOTE — ASSESSMENT & PLAN NOTE
Orders:    Synovial fluid white cell count w/ diff; Future    Body fluid culture and Gram stain; Future    Synovial fluid, crystal; Future    FL guided fine needle aspiration (all inc); Future    Sedimentation rate, automated; Future    C-reactive protein; Future    Ambulatory Referral to Interventional Radiology; Future

## 2025-05-01 NOTE — ASSESSMENT & PLAN NOTE
Orders:    Synovial fluid white cell count w/ diff; Future    Body fluid culture and Gram stain; Future    Synovial fluid, crystal; Future    FL guided fine needle aspiration (all inc); Future    Sedimentation rate, automated; Future    C-reactive protein; Future    Ambulatory Referral to Interventional Radiology; Future    traMADol (Ultram) 50 mg tablet; Take 1 tablet (50 mg total) by mouth every 6 (six) hours as needed for moderate pain

## 2025-05-01 NOTE — PROGRESS NOTES
Name: Shelia Miller      : 1950      MRN: [id number]  Encounter Provider: Kong Andino DO  Encounter Date: 2025   Encounter department: [facility]  :  Assessment & Plan  Primary osteoarthritis of one hip, left    Orders:    XR hip/pelv 2-3 vws left if performed; Future    Synovial fluid white cell count w/ diff; Future    Body fluid culture and Gram stain; Future    Synovial fluid, crystal; Future    FL guided fine needle aspiration (all inc); Future    Sedimentation rate, automated; Future    C-reactive protein; Future    Ambulatory Referral to Interventional Radiology; Future    traMADol (Ultram) 50 mg tablet; Take 1 tablet (50 mg total) by mouth every 6 (six) hours as needed for moderate pain    Left hip pain    Orders:    Synovial fluid white cell count w/ diff; Future    Body fluid culture and Gram stain; Future    Synovial fluid, crystal; Future    FL guided fine needle aspiration (all inc); Future    Sedimentation rate, automated; Future    C-reactive protein; Future    Ambulatory Referral to Interventional Radiology; Future    traMADol (Ultram) 50 mg tablet; Take 1 tablet (50 mg total) by mouth every 6 (six) hours as needed for moderate pain    Infection within female pelvic cavity    Orders:    Synovial fluid white cell count w/ diff; Future    Body fluid culture and Gram stain; Future    Synovial fluid, crystal; Future    FL guided fine needle aspiration (all inc); Future    Sedimentation rate, automated; Future    C-reactive protein; Future    Ambulatory Referral to Interventional Radiology; Future    Osteonecrosis of hip with collapse of femoral head present on x-ray (HCC)    Orders:    XR hip/pelv 2-3 vws left if performed; Future    Synovial fluid white cell count w/ diff; Future    Body fluid culture and Gram stain; Future    Synovial fluid, crystal; Future    FL guided fine needle aspiration (all inc); Future    Sedimentation rate, automated; Future     C-reactive protein; Future    Ambulatory Referral to Interventional Radiology; Future    traMADol (Ultram) 50 mg tablet; Take 1 tablet (50 mg total) by mouth every 6 (six) hours as needed for moderate pain         Shelia Miller is a pleasant 74 y.o. female well-known to our practice presenting today for follow-up of her left hip.  With the significant worsening of her symptoms since her steroid injection 2 months ago, we obtained updated x-rays today.  They demonstrated that she has had significant advancement of her degenerative changes with femoral head collapse and superolateral migration, now with end-stage arthritis whereas there was no evidence of collapse prior to her steroid injection.  She also has a history of sepsis of unknown origin that required admission in June of last year.  Additionally, she does have a history of rheumatoid arthritis.  With the severe advancement of her arthritis over the past 2 months since steroid injection and her history as mentioned, we discussed that we have to rule out indolent infection as a reason for the abrupt collapse of her femoral head.  At this time, she does not have any signs of sepsis and has been afebrile without chills.  We have ordered an ESR and CRP for baseline evaluation.  We have also ordered an urgent aspiration of her left hip by interventional radiology to send for Gram stain and culture, cell count and differential, and crystals.  These have all been ordered.  We discussed with Shelia and her daughter that if infection is determined to be present, we will not be able to move forward with her direct anterior total hip arthroplasty as planned.  It would require multiple operations including an incision and drainage and insertion of a temporary spacer with a prolonged course of antibiotics.  However, if no infection is present, we will be able to move forward with her total hip arthroplasty as planned in June.  We are unable to move her surgical date up due to  "to the proximity of her recent intra-articular injection.  We will call her when the results of the aspiration and lab work are available to further delineate her plan of care.  She and her daughter expressed understanding all of their questions were addressed today    I have personally reviewed pertinent films in PACS of the updated x-rays taken today of her pelvis and left hip and compared them to previous films.  There has been significant advancement of her degenerative changes since her previous films 3 months ago and fluoroscopic images from her hip injection 2 months ago.  There is now severe end-stage osteoarthritis with femoral head collapse and superolateral migration of the femoral head.    Subjective: Left hip follow-up    History: Shelia Miller is a 74 y.o. female presenting today for an interval check of her left hip.  She is scheduled to undergo direct anterior left total hip arthroplasty in the beginning of June after failing to see relief from an intra-articular cortisone injection at the end of February.  However, she and her daughter are concerned about the severity of worsening of her symptoms in a short period of time.  She now has to use ambulatory assistive devices at all times and has severe pain in the groin with grinding with any movements.  She denies any fevers or chills.  She has not been on any recent antibiotics.  She has not noted any rashes or changes to the skin around the hip    Estimated body mass index is 29.05 kg/m² as calculated from the following:    Height as of this encounter: 5' 3\" (1.6 m).    Weight as of this encounter: 74.4 kg (164 lb).    No results found for: \"HGBA1C\"    Social History     Occupational History    Not on file   Tobacco Use    Smoking status: Never     Passive exposure: Past    Smokeless tobacco: Never   Vaping Use    Vaping status: Never Used   Substance and Sexual Activity    Alcohol use: Not Currently     Comment: occasional    Drug use: Never    Sexual " activity: Not on file       Objective:  Left Hip Exam     Tenderness   The patient is experiencing tenderness in the greater trochanter and lateral (No SI joint).    Range of Motion   Abduction:  30 abnormal   Adduction:  15 abnormal   Extension:  0 normal   Flexion:  normal Left hip flexion: 105.  External rotation:  30 abnormal   Internal rotation: 15 abnormal     Muscle Strength   Abduction: 5/5   Adduction: 5/5   Flexion: 4/5     Tests   RAUDEL: positive  Elian: negative    Other   Erythema: absent  Scars: absent  Sensation: normal  Pulse: present    Comments:  + Stinchfield, log roll, RAUDEL, FADIR  Ambulates slowly with antalgic gait on left with cane  Thigh calf soft and nontender  Small overhanging abdominal pannus  Skin over anterior hip without erythema, warmth, abrasion, laceration, ulceration, or other sign of infection  Grossly distally neurovascularly intact            There were no vitals filed for this visit.    Past Medical History:   Diagnosis Date    Arthritis 2015    Asthma     Back pain with radiculopathy 03/01/2023    BRCA1 negative     BRCA2 negative     Breast cancer (HCC) 2020    Left  breast, triple negative    Cancer (HCC) 7/2019    Colon polyp     Elevated liver enzymes 06/06/2024    GERD (gastroesophageal reflux disease) 2016    History of chemotherapy 10/01/2020    breast cancer - left    History of radiation therapy 04/01/2021    left breast cancer    Hypertension     Lyme disease     OA (osteoarthritis)     Rheumatoid arthritis (HCC)     Sepsis (HCC)        Past Surgical History:   Procedure Laterality Date    ANKLE SURGERY Right 2016    2 plates and screws    BREAST BIOPSY Left 07/02/2020    BREAST LUMPECTOMY Left 08/12/2020    left breast wire localized lumpectomy    BREAST MASS EXCISION Left 09/02/2020    short superior and long lateral left breast resection, left axillary sentinel lymph node biopsy    COLONOSCOPY      ELBOW FRACTURE REPAIR Right 1996    EPIDURAL BLOCK INJECTION N/A  7/19/2024    Procedure: L5-S1 LUMBAR EPIDURAL STEROID INJECTION;  Surgeon: Giuliano Romano MD;  Location: [facility] MAIN OR;  Service: Pain Management     FRACTURE SURGERY Right     elbow    GUM SURGERY      HYSTERECTOMY  2018    JOINT REPLACEMENT Right 6/2021    hip    [place] ARTHROCENTESIS ASPIR&/INJ MAJOR JT/BURSA W/O US Left 2/28/2025    Procedure: LEFT INTRA-ARTICULAR HIP INJECTION;  Surgeon: Giuliano Romano MD;  Location: [facility] MAIN OR;  Service: Pain Management     REPLACEMENT TOTAL HIP W/  RESURFACING IMPLANTS  06/18/2021       Family History   Problem Relation Age of Onset    Breast cancer Mother 59    Cancer Mother         Breast Cancer    Lung cancer Father     Cancer Father         Lung cancer    COPD Father     Hypertension Father     Breast cancer Paternal Grandmother     Thyroid cancer Son     Cancer Son         Thyroid Cancer         Current Outpatient Medications:     albuterol (PROVENTIL HFA,VENTOLIN HFA) 90 mcg/act inhaler, as needed, Disp: , Rfl:     ascorbic Acid (VITAMIN C) 500 MG CPCR, Take 1 capsule (500 mg total) by mouth 2 (two) times a day, Disp: 60 capsule, Rfl: 0    Calcium Carbonate-Vitamin D3 600-400 MG-UNIT TABS, Take by mouth, Disp: , Rfl:     celecoxib (CeleBREX) 200 mg capsule, if needed, Disp: , Rfl:     CELECOXIB PO, Take 200 mg by mouth 2 (two) times a day as needed On hold, Disp: , Rfl:     Cholecalciferol (VITAMIN D3) 1,000 units tablet, Take 1 tablet (1,000 Units total) by mouth daily, Disp: 30 tablet, Rfl: 0    Cholecalciferol 25 MCG (1000 UT) capsule, Take 1,000 Units by mouth daily, Disp: , Rfl:     Fluticasone-Salmeterol (Advair Diskus) 250-50 mcg/dose inhaler, Inhale 1 puff 2 (two) times a day Rinse mouth after use., Disp: 60 blister, Rfl: 11    losartan (COZAAR) 50 mg tablet, 50 mg daily, Disp: , Rfl:     Multiple Vitamin (MULTIVITAMIN ADULT PO), Take 1 tablet by mouth daily, Disp: , Rfl:     omeprazole (PriLOSEC) 20 mg delayed release capsule, TAKE TWO CAPSULES BY MOUTH  EVERY DAY IN THE MORNING (GENERIC PRILOSEC), Disp: 180 capsule, Rfl: 1    Orencia ClickJect 125 MG/ML SOAJ, once a week Stopped currently due to infection, Disp: , Rfl:     PreviDent 5000 Booster Plus 1.1 % PSTE, , Disp: , Rfl:     rosuvastatin (CRESTOR) 5 mg tablet, Take 5 mg by mouth daily, Disp: , Rfl:     traMADol (Ultram) 50 mg tablet, Take 1 tablet (50 mg total) by mouth every 6 (six) hours as needed for moderate pain, Disp: 30 tablet, Rfl: 0    ferrous sulfate 324 (65 Fe) mg, Take 1 tablet (324 mg total) by mouth daily before breakfast (Patient not taking: Reported on 5/1/2025), Disp: 30 tablet, Rfl: 0    folic acid (FOLVITE) 1 mg tablet, Take 1 tablet (1 mg total) by mouth daily (Patient not taking: Reported on 5/1/2025), Disp: 30 tablet, Rfl: 0    mupirocin (BACTROBAN) 2 % ointment, Apply topically 2 (two) times a day Apply in each nares twice a day for the 5 days before surgery.  You should finish the morning of surgery (Patient not taking: Reported on 5/1/2025), Disp: 15 g, Rfl: 0    raloxifene (EVISTA) 60 mg tablet, Take 60 mg by mouth daily, Disp: , Rfl:     zinc sulfate (ZINCATE) 220 mg capsule, Take 1 capsule (220 mg total) by mouth daily (Patient not taking: Reported on 5/1/2025), Disp: 30 capsule, Rfl: 0    Allergies   Allergen Reactions    Fruit Extracts Itching     Cantelope melon peaches    Nuts - Food Allergy GI Intolerance    Tree Extract Cough and Nasal Congestion    Pollen Extract Other (See Comments)     Pollen , Trees And Flowers    Avelox [Moxifloxacin] Itching     Per pt, also facial swelling         Review of Systems   Constitutional:  Positive for activity change.   HENT: Negative.     Eyes: Negative.    Respiratory: Negative.     Cardiovascular: Negative.    Gastrointestinal: Negative.    Endocrine: Negative.    Genitourinary: Negative.    Musculoskeletal:  Positive for arthralgias, gait problem, joint swelling and myalgias.   Skin: Negative.    Allergic/Immunologic: Negative.     Hematological: Negative.    Psychiatric/Behavioral: Negative.       Physical Exam  Vitals and nursing note reviewed.   Constitutional:       Appearance: Normal appearance. She is well-developed.      Comments: Body mass index is 29.05 kg/m².   HENT:      Head: Normocephalic and atraumatic.      Right Ear: External ear normal.      Left Ear: External ear normal.   Eyes:      Extraocular Movements: Extraocular movements intact.      Conjunctiva/sclera: Conjunctivae normal.   Cardiovascular:      Rate and Rhythm: Normal rate.      Pulses: Normal pulses.   Pulmonary:      Effort: Pulmonary effort is normal.   Abdominal:      Palpations: Abdomen is soft.   Musculoskeletal:      Cervical back: Normal range of motion.      Comments: See ortho exam   Skin:     General: Skin is warm and dry.   Neurological:      General: No focal deficit present.      Mental Status: She is alert and oriented to person, place, and time. Mental status is at baseline.   Psychiatric:         Mood and Affect: Mood normal.         Behavior: Behavior normal.         Thought Content: Thought content normal.         Judgment: Judgment normal.       Scribe Attestation      I,:  Otis Mcintosh PA-C am acting as a scribe while in the presence of the attending physician.:       I,:  Kong Andino, DO personally performed the services described in this documentation    as scribed in my presence.:             This document was created using speech voice recognition software.   Grammatical errors, random word insertions, pronoun errors, and incomplete sentences are an occasional consequence of this system due to software limitations, ambient noise, and hardware issues.   Any formal questions or concerns about content, text, or information contained within the body of this dictation should be directly addressed to the provider for clarification.

## 2025-05-05 ENCOUNTER — HOSPITAL ENCOUNTER (OUTPATIENT)
Dept: RADIOLOGY | Facility: HOSPITAL | Age: 75
Discharge: HOME | End: 2025-05-05
Attending: PHYSICIAN ASSISTANT
Payer: COMMERCIAL

## 2025-05-05 ENCOUNTER — APPOINTMENT (OUTPATIENT)
Dept: LAB | Facility: CLINIC | Age: 75
End: 2025-05-05
Payer: COMMERCIAL

## 2025-05-05 DIAGNOSIS — M25.552 LEFT HIP PAIN: ICD-10-CM

## 2025-05-05 DIAGNOSIS — N73.9: ICD-10-CM

## 2025-05-05 DIAGNOSIS — M16.12 PRIMARY OSTEOARTHRITIS OF ONE HIP, LEFT: ICD-10-CM

## 2025-05-05 DIAGNOSIS — Z01.818 PRE-OPERATIVE EXAMINATION: ICD-10-CM

## 2025-05-05 DIAGNOSIS — M87.9 OSTEONECROSIS OF HIP WITH COLLAPSE OF FEMORAL HEAD PRESENT ON X-RAY (HCC): ICD-10-CM

## 2025-05-05 LAB
ALBUMIN SERPL BCG-MCNC: 4.1 G/DL (ref 3.5–5)
ALP SERPL-CCNC: 71 U/L (ref 34–104)
ALT SERPL W P-5'-P-CCNC: 15 U/L (ref 7–52)
ANION GAP SERPL CALCULATED.3IONS-SCNC: 11 MMOL/L (ref 4–13)
APTT PPP: 32 SECONDS (ref 23–34)
AST SERPL W P-5'-P-CCNC: 21 U/L (ref 13–39)
BASOPHILS # BLD AUTO: 0.03 THOUSANDS/ÂΜL (ref 0–0.1)
BASOPHILS NFR BLD AUTO: 1 % (ref 0–1)
BILIRUB SERPL-MCNC: 0.57 MG/DL (ref 0.2–1)
BILIRUB UR QL STRIP: NEGATIVE
BUN SERPL-MCNC: 22 MG/DL (ref 5–25)
CALCIUM SERPL-MCNC: 9.5 MG/DL (ref 8.4–10.2)
CHLORIDE SERPL-SCNC: 101 MMOL/L (ref 96–108)
CLARITY UR: CLEAR
CO2 SERPL-SCNC: 26 MMOL/L (ref 21–32)
COLOR UR: NORMAL
CREAT SERPL-MCNC: 0.7 MG/DL (ref 0.6–1.3)
CRYSTALS SNV QL MICRO: NORMAL
EOSINOPHIL # BLD AUTO: 0.14 THOUSAND/ÂΜL (ref 0–0.61)
EOSINOPHIL NFR BLD AUTO: 2 % (ref 0–6)
ERYTHROCYTE [DISTWIDTH] IN BLOOD BY AUTOMATED COUNT: 12.2 % (ref 11.6–15.1)
GFR SERPL CREATININE-BSD FRML MDRD: 85 ML/MIN/1.73SQ M
GLUCOSE P FAST SERPL-MCNC: 106 MG/DL (ref 65–99)
GLUCOSE UR STRIP-MCNC: NEGATIVE MG/DL
HCT VFR BLD AUTO: 41.9 % (ref 34.8–46.1)
HGB BLD-MCNC: 13.6 G/DL (ref 11.5–15.4)
HGB UR QL STRIP.AUTO: NEGATIVE
IMM GRANULOCYTES # BLD AUTO: 0.02 THOUSAND/UL (ref 0–0.2)
IMM GRANULOCYTES NFR BLD AUTO: 0 % (ref 0–2)
INR PPP: 1.06 (ref 0.85–1.19)
KETONES UR STRIP-MCNC: NEGATIVE MG/DL
LEUKOCYTE ESTERASE UR QL STRIP: NEGATIVE
LYMPHOCYTES # BLD AUTO: 1.12 THOUSANDS/ÂΜL (ref 0.6–4.47)
LYMPHOCYTES NFR BLD AUTO: 19 % (ref 14–44)
MCH RBC QN AUTO: 30.4 PG (ref 26.8–34.3)
MCHC RBC AUTO-ENTMCNC: 32.5 G/DL (ref 31.4–37.4)
MCV RBC AUTO: 94 FL (ref 82–98)
MONOCYTES # BLD AUTO: 0.53 THOUSAND/ÂΜL (ref 0.17–1.22)
MONOCYTES NFR BLD AUTO: 9 % (ref 4–12)
NEUTROPHILS # BLD AUTO: 3.93 THOUSANDS/ÂΜL (ref 1.85–7.62)
NEUTS SEG NFR BLD AUTO: 69 % (ref 43–75)
NITRITE UR QL STRIP: NEGATIVE
NRBC BLD AUTO-RTO: 0 /100 WBCS
PH UR STRIP.AUTO: 6 [PH]
PLATELET # BLD AUTO: 272 THOUSANDS/UL (ref 149–390)
PMV BLD AUTO: 10.2 FL (ref 8.9–12.7)
POTASSIUM SERPL-SCNC: 4.5 MMOL/L (ref 3.5–5.3)
PROT SERPL-MCNC: 7.1 G/DL (ref 6.4–8.4)
PROT UR STRIP-MCNC: NEGATIVE MG/DL
PROTHROMBIN TIME: 14.1 SECONDS (ref 12.3–15)
RBC # BLD AUTO: 4.48 MILLION/UL (ref 3.81–5.12)
SODIUM SERPL-SCNC: 138 MMOL/L (ref 135–147)
SP GR UR STRIP.AUTO: 1.01 (ref 1–1.03)
UROBILINOGEN UR STRIP-ACNC: <2 MG/DL
WBC # BLD AUTO: 5.77 THOUSAND/UL (ref 4.31–10.16)

## 2025-05-05 PROCEDURE — 36415 COLL VENOUS BLD VENIPUNCTURE: CPT

## 2025-05-05 PROCEDURE — 85610 PROTHROMBIN TIME: CPT

## 2025-05-05 PROCEDURE — 10007 FNA BX W/FLUOR GDN 1ST LES: CPT

## 2025-05-05 PROCEDURE — 87205 SMEAR GRAM STAIN: CPT | Performed by: PHYSICIAN ASSISTANT

## 2025-05-05 PROCEDURE — 87070 CULTURE OTHR SPECIMN AEROBIC: CPT | Performed by: PHYSICIAN ASSISTANT

## 2025-05-05 PROCEDURE — 85730 THROMBOPLASTIN TIME PARTIAL: CPT

## 2025-05-05 PROCEDURE — 87081 CULTURE SCREEN ONLY: CPT

## 2025-05-05 PROCEDURE — 80053 COMPREHEN METABOLIC PANEL: CPT

## 2025-05-05 PROCEDURE — 85025 COMPLETE CBC W/AUTO DIFF WBC: CPT

## 2025-05-05 PROCEDURE — 89060 EXAM SYNOVIAL FLUID CRYSTALS: CPT | Performed by: PHYSICIAN ASSISTANT

## 2025-05-05 RX ORDER — ROPIVACAINE HYDROCHLORIDE 2 MG/ML
10 INJECTION, SOLUTION EPIDURAL; INFILTRATION; PERINEURAL ONCE
Status: DISCONTINUED | OUTPATIENT
Start: 2025-05-05 | End: 2025-05-05

## 2025-05-05 RX ORDER — LIDOCAINE HYDROCHLORIDE 10 MG/ML
5 INJECTION, SOLUTION EPIDURAL; INFILTRATION; INTRACAUDAL; PERINEURAL ONCE
Status: COMPLETED | OUTPATIENT
Start: 2025-05-05 | End: 2025-05-05

## 2025-05-05 RX ORDER — METHYLPREDNISOLONE ACETATE 80 MG/ML
80 INJECTION, SUSPENSION INTRA-ARTICULAR; INTRALESIONAL; INTRAMUSCULAR; SOFT TISSUE
Status: DISCONTINUED | OUTPATIENT
Start: 2025-05-05 | End: 2025-05-05

## 2025-05-05 RX ADMIN — IOHEXOL 1 ML: 300 INJECTION, SOLUTION INTRAVENOUS at 10:52

## 2025-05-05 RX ADMIN — LIDOCAINE HYDROCHLORIDE 5 ML: 10 INJECTION, SOLUTION EPIDURAL; INFILTRATION; INTRACAUDAL at 11:26

## 2025-05-06 LAB — MRSA NOSE QL CULT: NORMAL

## 2025-05-08 LAB
BACTERIA SPEC BFLD CULT: NO GROWTH
GRAM STN SPEC: NORMAL
GRAM STN SPEC: NORMAL

## 2025-05-08 NOTE — PRE-PROCEDURE INSTRUCTIONS
Pre-Surgery Instructions:   Medication Instructions    albuterol (PROVENTIL HFA,VENTOLIN HFA) 90 mcg/act inhaler Uses PRN- OK to take day of surgery    ascorbic Acid (VITAMIN C) 500 MG CPCR Hold day of surgery.    Cholecalciferol (VITAMIN D3) 1,000 units tablet Hold day of surgery.    ferrous sulfate 324 (65 Fe) mg Hold day of surgery.    Fluticasone-Salmeterol (Advair Diskus) 250-50 mcg/dose inhaler Take day of surgery.    folic acid (FOLVITE) 1 mg tablet Hold day of surgery.    losartan (COZAAR) 50 mg tablet Hold day of surgery.    mupirocin (BACTROBAN) 2 % ointment Instructions provided by MD    omeprazole (PriLOSEC) 20 mg delayed release capsule Take day of surgery.    Orencia ClickJect 125 MG/ML SOAJ Instructions provided by MD    PreviDent 5000 Booster Plus 1.1 % PSTE Hold day of surgery.    rosuvastatin (CRESTOR) 5 mg tablet Take night before surgery    traMADol (Ultram) 50 mg tablet Stop taking 7 days prior to surgery.    zinc sulfate (ZINCATE) 220 mg capsule Hold day of surgery.    Medication instructions for day of surgery reviewed. Please take all instructed medications with only a sip of water.       You will receive a call one business day prior to surgery with an arrival time and hospital directions. If your surgery is scheduled on a Monday, the hospital will be calling you on the Friday prior to your surgery. If you have not heard from anyone by 8pm, please call the hospital supervisor through the hospital  at [phone]. ([place] [phone] or [place] [phone]).    Do not eat or drink anything after midnight the night before your surgery, including candy, mints, lifesavers, or chewing gum. Do not drink alcohol 24hrs before your surgery. Try not to smoke at least 24hrs before your surgery.       Follow the pre surgery showering instructions as listed in the “My Surgical Experience Booklet” or otherwise provided by your surgeon's office. Do not use a blade to shave the surgical area 1  week before surgery. It is okay to use a clean electric clippers up to 24 hours before surgery. Do not apply any lotions, creams, including makeup, cologne, deodorant, or perfumes after showering on the day of your surgery. Do not use dry shampoo, hair spray, hair gel, or any type of hair products.     No contact lenses, eye make-up, or artificial eyelashes. Remove nail polish, including gel polish, and any artificial, gel, or acrylic nails if possible. Remove all jewelry including rings and body piercing jewelry.     Wear causal clothing that is easy to take on and off. Consider your type of surgery.    Keep any valuables, jewelry, piercings at home. Please bring any specially ordered equipment (sling, braces) if indicated.    Arrange for a responsible person to drive you to and from the hospital on the day of your surgery. Please confirm the visitor policy for the day of your procedure when you receive your phone call with an arrival time.     Call the surgeon's office with any new illnesses, exposures, or additional questions prior to surgery.    Please reference your “My Surgical Experience Booklet” for additional information to prepare for your upcoming surgery.

## 2025-05-15 ENCOUNTER — APPOINTMENT (OUTPATIENT)
Dept: LAB | Facility: CLINIC | Age: 75
End: 2025-05-15
Attending: PHYSICIAN ASSISTANT
Payer: COMMERCIAL

## 2025-05-15 DIAGNOSIS — Z01.818 PRE-OPERATIVE EXAMINATION: ICD-10-CM

## 2025-05-15 DIAGNOSIS — M25.552 LEFT HIP PAIN: ICD-10-CM

## 2025-05-15 DIAGNOSIS — M16.12 PRIMARY OSTEOARTHRITIS OF ONE HIP, LEFT: ICD-10-CM

## 2025-05-15 LAB
EST. AVERAGE GLUCOSE BLD GHB EST-MCNC: 117 MG/DL
HBA1C MFR BLD: 5.7 %

## 2025-05-15 PROCEDURE — 83036 HEMOGLOBIN GLYCOSYLATED A1C: CPT

## 2025-05-15 PROCEDURE — 36415 COLL VENOUS BLD VENIPUNCTURE: CPT

## 2025-05-21 ENCOUNTER — CONSULT (OUTPATIENT)
Dept: FAMILY MEDICINE CLINIC | Facility: CLINIC | Age: 75
End: 2025-05-21
Payer: COMMERCIAL

## 2025-05-21 VITALS
WEIGHT: 155 LBS | SYSTOLIC BLOOD PRESSURE: 110 MMHG | HEIGHT: 63 IN | OXYGEN SATURATION: 98 % | HEART RATE: 86 BPM | BODY MASS INDEX: 27.46 KG/M2 | TEMPERATURE: 97.4 F | DIASTOLIC BLOOD PRESSURE: 70 MMHG | RESPIRATION RATE: 18 BRPM

## 2025-05-21 DIAGNOSIS — Z01.818 PREOP EXAMINATION: Primary | ICD-10-CM

## 2025-05-21 PROBLEM — E78.49 OTHER HYPERLIPIDEMIA: Status: RESOLVED | Noted: 2023-03-14 | Resolved: 2025-05-21

## 2025-05-21 PROBLEM — D64.9 ANEMIA: Status: RESOLVED | Noted: 2021-09-16 | Resolved: 2025-05-21

## 2025-05-21 PROBLEM — I10 HYPERTENSION: Chronic | Status: RESOLVED | Noted: 2021-09-16 | Resolved: 2025-05-21

## 2025-05-21 PROBLEM — M25.552 LEFT HIP PAIN: Status: RESOLVED | Noted: 2025-02-28 | Resolved: 2025-05-21

## 2025-05-21 PROBLEM — N73.9: Status: RESOLVED | Noted: 2024-06-09 | Resolved: 2025-05-21

## 2025-05-21 PROCEDURE — 99214 OFFICE O/P EST MOD 30 MIN: CPT | Performed by: FAMILY MEDICINE

## 2025-05-21 PROCEDURE — G2211 COMPLEX E/M VISIT ADD ON: HCPCS | Performed by: FAMILY MEDICINE

## 2025-05-21 NOTE — PATIENT INSTRUCTIONS
Pre-operative Medication Instructions    Avoid herbs or non-directed vitamins one week prior to surgery  Avoid aspirin containing medications or non-steroidal anti-inflammatory drugs one week preceding surgery  May take tylenol for pain up until the night before surgery    ACE Inhibitors or ARBs     Medication Name     losartan (COZAAR) 50 mg tablet      Continue this medication up to the evening before surgery/procedure, but do not take the morning of the day of surgery.    Biologic Meds     Medication Name     Orencia ClickJect 125 MG/ML SOAJ      Stop taking this medication one week before surgery after talking to your prescribing physicain and surgeon about the risk of flareup.    Selective Estrogen Receptor Modifiers     Medication Name     raloxifene (EVISTA) 60 mg tablet      Continue to take this medication on your normal schedule.  If this is an oral medication and you take it in the morning, then you may take this medicine with a sip of water.  This medication may need to be discontinued for a least 4 weeks prior to your surgery if the surgical procedure is associated with a high risk of blod clots as in hip or knee replacement for example.  Please consult with your Surgeon to discuss discontinuing this medication before your surgery.    Cholesterol lowering meds     Medication Name     rosuvastatin (CRESTOR) 5 mg tablet      Continue to take this medication on your normal schedule.  If this is an oral medication and you take it in the morning, then you may take this medicine with a sip of water.    Opioid Medications     Medication Name     traMADol (Ultram) 50 mg tablet      Continue to take this medication on your normal schedule.  If this is an oral medication and you take it in the morning, then you may take this medicine with a sip of water.

## 2025-05-21 NOTE — H&P (VIEW-ONLY)
Pre-operative Clearance  Name: Shelia Miller      : 1950      MRN: [id number]  Encounter Provider: Nicky Gale MD  Encounter Date: 2025   Encounter department: [facility]    :  Assessment & Plan  Preop examination  Reviewed cardiac clearance done by her cardiologist Dr. Stanton. She is cleared for surgery.              Pre-operative Clearance:     Clearance:  Patient is medically optimized (CLEARED) for proposed surgery without any additional cardiac testing.      Medication Instructions:   - Avoid herbs or non-directed vitamins one week prior to surgery    - Avoid aspirin containing medications or non-steroidal anti-inflammatory drugs one week preceding surgery    - May take tylenol for pain up until the night before surgery    - ACE Inhibitors or ARBs: Continue this medication up to the evening before surgery/procedure, but do not take the morning of the day of surgery.  - Biologic Response Modifiers: Stop taking this medication one week before surgery after talking to prescribing physician/surgeon about the risk of flareup.  - Selective estrogen receptor modifiers (ie, tamoxifen, raloxifine, clomid): Continue to take this medication on your normal schedule. This medication may need to be discontinued for at least 4 weeks prior to surgery if the surgical procedure is associated with a high risk of blood clots. Consult with your surgeon.  - Hyperlipidemia meds: Continue to take this medication on your normal schedule.  - Opioids: Continue to take this medication on your normal schedule.      Depression Screening and Follow-up Plan: Patient was screened for depression during today's encounter. They screened negative with a PHQ-2 score of 0.      Urinary Incontinence Plan of Care: counseling topics discussed: practice Kegel (pelvic floor strengthening) exercises, use restroom every 2 hours and limiting fluid intake to 60 oz. per day.       History of Present Illness     Pre-Op Examination      Surgery: ARTHROPLASTY HIP TOTAL ANTERIOR, Left hip   Anticipated Date of Surgery: 6/2/25   Surgeon: Kong Andino     Previous history of bleeding disorders or clots?: No    Previous Anesthesia reaction?: No    Prolonged steroid use in the last 6 months?: No      Assessment of Cardiac Risk:   - Unstable or severe angina or MI in the last 6 weeks or history of stent placement in the last year?: No    - Decompensated heart failure (e.g. New onset heart failure, NYHA  Class IV heart failure, or worsening existing heart failure)?: No    - Significant arrhythmias such as high grade AV block, symptomatic ventricular arrhythmia, newly recognized ventricular tachycardia, supraventricular tachycardia with resting heart rate >100, or symptomatic bradycardia?: No    - Severe heart valve disease including aortic stenosis or symptomatic mitral stenosis?: No      Pre-operative Risk Factors:    - History of cerebrovascular disease: No    - History of ischemic heart disease: No    - History of congestive heart failure: Yes    - Pre-operative treatment with insulin: No    - Pre-operative creatinine >2 mg/dL: No      Review of Systems   Constitutional: Negative.    HENT: Negative.     Eyes: Negative.    Respiratory: Negative.     Cardiovascular: Negative.    Gastrointestinal: Negative.    Endocrine: Negative.    Genitourinary: Negative.    Musculoskeletal: Negative.    Skin: Negative.    Allergic/Immunologic: Negative.    Neurological: Negative.    Hematological: Negative.    Psychiatric/Behavioral: Negative.       Past Medical History   Past Medical History:   Diagnosis Date    Arthritis 2015    Asthma     Back pain with radiculopathy 03/01/2023    BRCA1 negative     BRCA2 negative     Breast cancer (HCC) 2020    Left  breast, triple negative    Cancer (HCC) 7/2019    Colon polyp     Elevated liver enzymes 06/06/2024    GERD (gastroesophageal reflux disease) 2016    History of chemotherapy 10/01/2020    breast cancer - left     History of radiation therapy 04/01/2021    left breast cancer    History of transfusion 1979    post partum    Hypertension     Lyme disease     OA (osteoarthritis)     Rheumatoid arthritis (HCC)     Sepsis (HCC)      Past Surgical History:   Procedure Laterality Date    ANKLE SURGERY Right 2016    2 plates and screws    BREAST BIOPSY Left 07/02/2020    BREAST LUMPECTOMY Left 08/12/2020    left breast wire localized lumpectomy    BREAST MASS EXCISION Left 09/02/2020    short superior and long lateral left breast resection, left axillary sentinel lymph node biopsy    COLONOSCOPY      ELBOW FRACTURE REPAIR Right 1996    EPIDURAL BLOCK INJECTION N/A 07/19/2024    Procedure: L5-S1 LUMBAR EPIDURAL STEROID INJECTION;  Surgeon: Giuliano Romano MD;  Location: [facility] MAIN OR;  Service: Pain Management     FRACTURE SURGERY Right     elbow    GUM SURGERY      HYSTERECTOMY  2018    JOINT REPLACEMENT Right 6/2021    hip    IL ARTHROCENTESIS ASPIR&/INJ MAJOR JT/BURSA W/O US Left 02/28/2025    Procedure: LEFT INTRA-ARTICULAR HIP INJECTION;  Surgeon: Giuliano Romano MD;  Location: [facility] MAIN OR;  Service: Pain Management     REPLACEMENT TOTAL HIP W/  RESURFACING IMPLANTS  06/18/2021     Family History   Problem Relation Age of Onset    Breast cancer Mother 59    Cancer Mother         Breast Cancer    Lung cancer Father     Cancer Father         Lung cancer    COPD Father     Hypertension Father     Breast cancer Paternal Grandmother     Thyroid cancer Son     Cancer Son         Thyroid Cancer     Social History     Tobacco Use    Smoking status: Never     Passive exposure: Past    Smokeless tobacco: Never   Vaping Use    Vaping status: Never Used   Substance and Sexual Activity    Alcohol use: Yes     Comment: occasional    Drug use: Never    Sexual activity: Not on file     Medications[1]  Allergies   Allergen Reactions    Fruit Extracts Itching     Cantelope melon peaches    Nuts - Food Allergy GI Intolerance    Pollen Extract  "Other (See Comments)     Pollen , Trees And Flowers    Tree Extract Cough and Nasal Congestion    Avelox [Moxifloxacin] Itching     Per pt, also facial swelling     Objective   /70   Pulse 86   Temp (!) 97.4 °F (36.3 °C)   Resp 18   Ht 5' 3\" (1.6 m)   Wt 70.3 kg (155 lb)   SpO2 98%   BMI 27.46 kg/m²     Physical Exam  Vitals and nursing note reviewed.   Constitutional:       General: She is not in acute distress.     Appearance: She is well-developed.   HENT:      Head: Normocephalic and atraumatic.      Right Ear: Tympanic membrane, ear canal and external ear normal. There is no impacted cerumen.      Left Ear: Tympanic membrane, ear canal and external ear normal. There is no impacted cerumen.      Nose: Nose normal.      Mouth/Throat:      Mouth: Mucous membranes are moist.     Eyes:      Conjunctiva/sclera: Conjunctivae normal.       Cardiovascular:      Rate and Rhythm: Normal rate and regular rhythm.      Heart sounds: No murmur heard.  Pulmonary:      Effort: Pulmonary effort is normal. No respiratory distress.      Breath sounds: Normal breath sounds.   Abdominal:      General: Abdomen is flat. There is no distension.      Palpations: Abdomen is soft. There is no mass.      Tenderness: There is no abdominal tenderness. There is no guarding or rebound.      Hernia: No hernia is present.     Musculoskeletal:         General: Normal range of motion.      Cervical back: Neck supple.     Skin:     General: Skin is warm and dry.     Neurological:      General: No focal deficit present.      Mental Status: She is alert and oriented to person, place, and time.           Nicky Gale MD         [1]   Current Outpatient Medications on File Prior to Visit   Medication Sig    albuterol (PROVENTIL HFA,VENTOLIN HFA) 90 mcg/act inhaler as needed    ascorbic Acid (VITAMIN C) 500 MG CPCR Take 1 capsule (500 mg total) by mouth 2 (two) times a day    Calcium Carbonate-Vitamin D3 600-400 MG-UNIT TABS Take by mouth "    Cholecalciferol (VITAMIN D3) 1,000 units tablet Take 1 tablet (1,000 Units total) by mouth daily    ferrous sulfate 324 (65 Fe) mg Take 1 tablet (324 mg total) by mouth daily before breakfast    Fluticasone-Salmeterol (Advair Diskus) 250-50 mcg/dose inhaler Inhale 1 puff 2 (two) times a day Rinse mouth after use.    folic acid (FOLVITE) 1 mg tablet Take 1 tablet (1 mg total) by mouth daily    losartan (COZAAR) 50 mg tablet 50 mg in the morning.    Multiple Vitamin (MULTIVITAMIN ADULT PO) Take 1 tablet by mouth in the morning.    mupirocin (BACTROBAN) 2 % ointment Apply topically 2 (two) times a day Apply in each nares twice a day for the 5 days before surgery.  You should finish the morning of surgery    omeprazole (PriLOSEC) 20 mg delayed release capsule TAKE TWO CAPSULES BY MOUTH EVERY DAY IN THE MORNING (GENERIC PRILOSEC)    Orencia ClickJect 125 MG/ML SOAJ once a week Stopped currently due to infection    PreviDent 5000 Booster Plus 1.1 % PSTE     raloxifene (EVISTA) 60 mg tablet Take 60 mg by mouth in the morning.    rosuvastatin (CRESTOR) 5 mg tablet Take 5 mg by mouth in the morning.    traMADol (Ultram) 50 mg tablet Take 1 tablet (50 mg total) by mouth every 6 (six) hours as needed for moderate pain    zinc sulfate (ZINCATE) 220 mg capsule Take 1 capsule (220 mg total) by mouth daily    [DISCONTINUED] Na Sulfate-K Sulfate-Mg Sulf (Suprep Bowel Prep Kit) 17.5-3.13-1.6 GM/177ML SOLN Take 177 mL by mouth once for 1 dose Take 177 mL by mouth once for 1 dose (Patient not taking: Reported on 6/1/2022)

## 2025-05-21 NOTE — PROGRESS NOTES
Pre-operative Clearance  Name: Shelia Miller      : 1950      MRN: [id number]  Encounter Provider: Nicky Gale MD  Encounter Date: 2025   Encounter department: [facility]    :  Assessment & Plan  Preop examination  Reviewed cardiac clearance done by her cardiologist Dr. Stanton. She is cleared for surgery.              Pre-operative Clearance:     Clearance:  Patient is medically optimized (CLEARED) for proposed surgery without any additional cardiac testing.      Medication Instructions:   - Avoid herbs or non-directed vitamins one week prior to surgery    - Avoid aspirin containing medications or non-steroidal anti-inflammatory drugs one week preceding surgery    - May take tylenol for pain up until the night before surgery    - ACE Inhibitors or ARBs: Continue this medication up to the evening before surgery/procedure, but do not take the morning of the day of surgery.  - Biologic Response Modifiers: Stop taking this medication one week before surgery after talking to prescribing physician/surgeon about the risk of flareup.  - Selective estrogen receptor modifiers (ie, tamoxifen, raloxifine, clomid): Continue to take this medication on your normal schedule. This medication may need to be discontinued for at least 4 weeks prior to surgery if the surgical procedure is associated with a high risk of blood clots. Consult with your surgeon.  - Hyperlipidemia meds: Continue to take this medication on your normal schedule.  - Opioids: Continue to take this medication on your normal schedule.      Depression Screening and Follow-up Plan: Patient was screened for depression during today's encounter. They screened negative with a PHQ-2 score of 0.      Urinary Incontinence Plan of Care: counseling topics discussed: practice Kegel (pelvic floor strengthening) exercises, use restroom every 2 hours and limiting fluid intake to 60 oz. per day.       History of Present Illness     Pre-Op Examination      Surgery: ARTHROPLASTY HIP TOTAL ANTERIOR, Left hip   Anticipated Date of Surgery: 6/2/25   Surgeon: Kong Andino     Previous history of bleeding disorders or clots?: No    Previous Anesthesia reaction?: No    Prolonged steroid use in the last 6 months?: No      Assessment of Cardiac Risk:   - Unstable or severe angina or MI in the last 6 weeks or history of stent placement in the last year?: No    - Decompensated heart failure (e.g. New onset heart failure, NYHA  Class IV heart failure, or worsening existing heart failure)?: No    - Significant arrhythmias such as high grade AV block, symptomatic ventricular arrhythmia, newly recognized ventricular tachycardia, supraventricular tachycardia with resting heart rate >100, or symptomatic bradycardia?: No    - Severe heart valve disease including aortic stenosis or symptomatic mitral stenosis?: No      Pre-operative Risk Factors:    - History of cerebrovascular disease: No    - History of ischemic heart disease: No    - History of congestive heart failure: Yes    - Pre-operative treatment with insulin: No    - Pre-operative creatinine >2 mg/dL: No      Review of Systems   Constitutional: Negative.    HENT: Negative.     Eyes: Negative.    Respiratory: Negative.     Cardiovascular: Negative.    Gastrointestinal: Negative.    Endocrine: Negative.    Genitourinary: Negative.    Musculoskeletal: Negative.    Skin: Negative.    Allergic/Immunologic: Negative.    Neurological: Negative.    Hematological: Negative.    Psychiatric/Behavioral: Negative.       Past Medical History   Past Medical History:   Diagnosis Date    Arthritis 2015    Asthma     Back pain with radiculopathy 03/01/2023    BRCA1 negative     BRCA2 negative     Breast cancer (HCC) 2020    Left  breast, triple negative    Cancer (HCC) 7/2019    Colon polyp     Elevated liver enzymes 06/06/2024    GERD (gastroesophageal reflux disease) 2016    History of chemotherapy 10/01/2020    breast cancer - left     History of radiation therapy 04/01/2021    left breast cancer    History of transfusion 1979    post partum    Hypertension     Lyme disease     OA (osteoarthritis)     Rheumatoid arthritis (HCC)     Sepsis (HCC)      Past Surgical History:   Procedure Laterality Date    ANKLE SURGERY Right 2016    2 plates and screws    BREAST BIOPSY Left 07/02/2020    BREAST LUMPECTOMY Left 08/12/2020    left breast wire localized lumpectomy    BREAST MASS EXCISION Left 09/02/2020    short superior and long lateral left breast resection, left axillary sentinel lymph node biopsy    COLONOSCOPY      ELBOW FRACTURE REPAIR Right 1996    EPIDURAL BLOCK INJECTION N/A 07/19/2024    Procedure: L5-S1 LUMBAR EPIDURAL STEROID INJECTION;  Surgeon: Giuliano Romano MD;  Location: [facility] MAIN OR;  Service: Pain Management     FRACTURE SURGERY Right     elbow    GUM SURGERY      HYSTERECTOMY  2018    JOINT REPLACEMENT Right 6/2021    hip    MD ARTHROCENTESIS ASPIR&/INJ MAJOR JT/BURSA W/O US Left 02/28/2025    Procedure: LEFT INTRA-ARTICULAR HIP INJECTION;  Surgeon: Giuliano Romano MD;  Location: [facility] MAIN OR;  Service: Pain Management     REPLACEMENT TOTAL HIP W/  RESURFACING IMPLANTS  06/18/2021     Family History   Problem Relation Age of Onset    Breast cancer Mother 59    Cancer Mother         Breast Cancer    Lung cancer Father     Cancer Father         Lung cancer    COPD Father     Hypertension Father     Breast cancer Paternal Grandmother     Thyroid cancer Son     Cancer Son         Thyroid Cancer     Social History     Tobacco Use    Smoking status: Never     Passive exposure: Past    Smokeless tobacco: Never   Vaping Use    Vaping status: Never Used   Substance and Sexual Activity    Alcohol use: Yes     Comment: occasional    Drug use: Never    Sexual activity: Not on file     Medications[1]  Allergies   Allergen Reactions    Fruit Extracts Itching     Cantelope melon peaches    Nuts - Food Allergy GI Intolerance    Pollen Extract  "Other (See Comments)     Pollen , Trees And Flowers    Tree Extract Cough and Nasal Congestion    Avelox [Moxifloxacin] Itching     Per pt, also facial swelling     Objective   /70   Pulse 86   Temp (!) 97.4 °F (36.3 °C)   Resp 18   Ht 5' 3\" (1.6 m)   Wt 70.3 kg (155 lb)   SpO2 98%   BMI 27.46 kg/m²     Physical Exam  Vitals and nursing note reviewed.   Constitutional:       General: She is not in acute distress.     Appearance: She is well-developed.   HENT:      Head: Normocephalic and atraumatic.      Right Ear: Tympanic membrane, ear canal and external ear normal. There is no impacted cerumen.      Left Ear: Tympanic membrane, ear canal and external ear normal. There is no impacted cerumen.      Nose: Nose normal.      Mouth/Throat:      Mouth: Mucous membranes are moist.     Eyes:      Conjunctiva/sclera: Conjunctivae normal.       Cardiovascular:      Rate and Rhythm: Normal rate and regular rhythm.      Heart sounds: No murmur heard.  Pulmonary:      Effort: Pulmonary effort is normal. No respiratory distress.      Breath sounds: Normal breath sounds.   Abdominal:      General: Abdomen is flat. There is no distension.      Palpations: Abdomen is soft. There is no mass.      Tenderness: There is no abdominal tenderness. There is no guarding or rebound.      Hernia: No hernia is present.     Musculoskeletal:         General: Normal range of motion.      Cervical back: Neck supple.     Skin:     General: Skin is warm and dry.     Neurological:      General: No focal deficit present.      Mental Status: She is alert and oriented to person, place, and time.           Nicky Gale MD         [1]   Current Outpatient Medications on File Prior to Visit   Medication Sig    albuterol (PROVENTIL HFA,VENTOLIN HFA) 90 mcg/act inhaler as needed    ascorbic Acid (VITAMIN C) 500 MG CPCR Take 1 capsule (500 mg total) by mouth 2 (two) times a day    Calcium Carbonate-Vitamin D3 600-400 MG-UNIT TABS Take by mouth "    Cholecalciferol (VITAMIN D3) 1,000 units tablet Take 1 tablet (1,000 Units total) by mouth daily    ferrous sulfate 324 (65 Fe) mg Take 1 tablet (324 mg total) by mouth daily before breakfast    Fluticasone-Salmeterol (Advair Diskus) 250-50 mcg/dose inhaler Inhale 1 puff 2 (two) times a day Rinse mouth after use.    folic acid (FOLVITE) 1 mg tablet Take 1 tablet (1 mg total) by mouth daily    losartan (COZAAR) 50 mg tablet 50 mg in the morning.    Multiple Vitamin (MULTIVITAMIN ADULT PO) Take 1 tablet by mouth in the morning.    mupirocin (BACTROBAN) 2 % ointment Apply topically 2 (two) times a day Apply in each nares twice a day for the 5 days before surgery.  You should finish the morning of surgery    omeprazole (PriLOSEC) 20 mg delayed release capsule TAKE TWO CAPSULES BY MOUTH EVERY DAY IN THE MORNING (GENERIC PRILOSEC)    Orencia ClickJect 125 MG/ML SOAJ once a week Stopped currently due to infection    PreviDent 5000 Booster Plus 1.1 % PSTE     raloxifene (EVISTA) 60 mg tablet Take 60 mg by mouth in the morning.    rosuvastatin (CRESTOR) 5 mg tablet Take 5 mg by mouth in the morning.    traMADol (Ultram) 50 mg tablet Take 1 tablet (50 mg total) by mouth every 6 (six) hours as needed for moderate pain    zinc sulfate (ZINCATE) 220 mg capsule Take 1 capsule (220 mg total) by mouth daily    [DISCONTINUED] Na Sulfate-K Sulfate-Mg Sulf (Suprep Bowel Prep Kit) 17.5-3.13-1.6 GM/177ML SOLN Take 177 mL by mouth once for 1 dose Take 177 mL by mouth once for 1 dose (Patient not taking: Reported on 6/1/2022)

## 2025-05-27 DIAGNOSIS — M25.552 LEFT HIP PAIN: ICD-10-CM

## 2025-05-27 DIAGNOSIS — M16.12 PRIMARY OSTEOARTHRITIS OF ONE HIP, LEFT: ICD-10-CM

## 2025-05-27 DIAGNOSIS — M87.9 OSTEONECROSIS OF HIP WITH COLLAPSE OF FEMORAL HEAD PRESENT ON X-RAY (HCC): ICD-10-CM

## 2025-05-28 RX ORDER — TRAMADOL HYDROCHLORIDE 50 MG/1
50 TABLET ORAL EVERY 6 HOURS PRN
Qty: 30 TABLET | Refills: 0 | Status: ON HOLD | OUTPATIENT
Start: 2025-05-28 | End: 2025-06-02

## 2025-05-29 ENCOUNTER — ANESTHESIA EVENT (OUTPATIENT)
Dept: PERIOP | Facility: HOSPITAL | Age: 75
End: 2025-05-29
Payer: COMMERCIAL

## 2025-05-29 NOTE — DISCHARGE INSTR - AVS FIRST PAGE
Direct Anterior Total Hip Replacement     WHAT YOU SHOULD KNOW:   Minimally invasive total hip replacement is surgery to replace a damaged hip joint with an implant.          AFTER YOU LEAVE:     Medicines:   Anticoagulants  are a type of blood thinner medicine that helps prevent clots. Clots can cause strokes, heart attacks, and death. These medicines may cause you to bleed or bruise more easily.  You will be on full-dose aspirin 325mg twice a day for 6 weeks.    Watch for bleeding from your gums or nose. Watch for blood in your urine and bowel movements. Use a soft washcloth and a soft toothbrush. If you shave, use an electric razor. Avoid activities that can cause bruising or bleeding.    Tell your healthcare provider about all medicines you take because many medicines cannot be used with anticoagulants. Do not start or stop any medicines unless your healthcare provider tells you to. Tell your dentist and other healthcare providers that you take anticoagulants. Wear a bracelet or necklace that says you take this medicine.     NSAIDs  help decrease swelling, pain, and fever. This medicine is available with or without a doctor's order. NSAIDs can cause stomach bleeding or kidney problems in certain people. If you take blood thinner medicine, always ask your orthopedist if NSAIDs are safe for you. Always read the medicine label and follow directions.  You will be given Celebrex 100 mg to take twice a day for the first 2 weeks postoperatively.    Prescription pain medicine  You should take 975mg of over the counter acetaminophen (tylenol) every 8 hours for baseline pain control. You will also be given oxycodone 5mg tablets. Take 1-2 by mouth every 6 hours as needed for pain.  Due to your other prescriptions, you will also receive Narcan    Stool softeners  make it easier for you to have a bowel movement. You may need this medicine to treat or prevent constipation.  You will be given Colace 100mg to take twice a  day    Take your medicine as directed.  Contact your orthopedist if you think your medicine is not helping or if you have side effects. Tell him if you are allergic to any medicine. Keep a list of the medicines, vitamins, and herbs you take. Include the amounts, and when and why you take them. Bring the list or the pill bottles to follow-up visits. Carry your medicine list with you in case of an emergency.    Follow up with your orthopedist as directed:  You may need to return to have your wound checked. Write down your questions so you remember to ask them during your visits.  Please schedule an appointment to see Dr. Andino 1 week after surgery for a wound check.    Physical therapy:  A physical therapist teaches you exercises to help improve movement and strength, and to decrease pain.  You will begin outpatient physical therapy later this week as planned.    Wound Care:  Please leave the Mepilex dressing in place for 7 days after surgery. After 7 days, you may remove the dressing and leave the incision open to air.  If the incision is uncomfortable under clothing after the Mepilex is removed, you may cover it with a a dry sterile dressing, but should remain dry at all times. Once the dressing is off, please keep the incision dry for another week until your follow up appointment    Self-care:   Use a cane, walker, or crutches as directed.  These devices will help decrease your risk of falling. Your therapist will guide you.     Use ice:  Ice helps decrease swelling and pain. Ice may also help prevent tissue damage. Use an ice pack or put crushed ice in a plastic bag. Cover it with a towel, and place it on your knee for 15 to 20 minutes every hour as directed.    Prevent dislocation of your hip implant:      Avoid extremes of external rotation of the leg    Contact your orthopedist if:  You have a fever over 101.0°F.    You have trouble moving or bending your joint.    You have increased pain and swelling in your  joint, even after you take pain medicine.    You have back pain or lower leg pain when you bend your foot upwards.    You have questions or concerns about your condition or care.    Blood soaks through/saturates your bandage.    Your incision comes apart.    Your incision is red, swollen, or draining pus.    You cannot walk or move your joint, or the limb is numb.    Your leg feels warm, tender, and painful. It may look swollen and red.     Seek immediate care or call 911 if:   You feel like you are going to faint.    You have a seizure or feel confused.     You feel lightheaded, short of breath, and have chest pain.    You have chest pain when you take a deep breath or cough. You may cough up blood.      © 2014 [employer] Information is for End User's use only and may not be sold, redistributed or otherwise used for commercial purposes. All illustrations and images included in CareNotes® are the copyrighted property of [email or web address], [email or web address] or [email or web address].  The above information is an  only. It is not intended as medical advice for individual conditions or treatments. Talk to your doctor, nurse or pharmacist before following any medical regimen to see if it is safe and effective for you.

## 2025-06-02 ENCOUNTER — APPOINTMENT (OUTPATIENT)
Dept: RADIOLOGY | Facility: HOSPITAL | Age: 75
End: 2025-06-02
Payer: COMMERCIAL

## 2025-06-02 ENCOUNTER — ANESTHESIA (OUTPATIENT)
Dept: PERIOP | Facility: HOSPITAL | Age: 75
End: 2025-06-02
Payer: COMMERCIAL

## 2025-06-02 ENCOUNTER — HOSPITAL ENCOUNTER (OUTPATIENT)
Facility: HOSPITAL | Age: 75
Setting detail: OUTPATIENT SURGERY
Discharge: HOME | End: 2025-06-03
Attending: ORTHOPAEDIC SURGERY | Admitting: ORTHOPAEDIC SURGERY
Payer: COMMERCIAL

## 2025-06-02 DIAGNOSIS — M25.552 LEFT HIP PAIN: ICD-10-CM

## 2025-06-02 DIAGNOSIS — Z96.642 STATUS POST TOTAL REPLACEMENT OF LEFT HIP: Primary | ICD-10-CM

## 2025-06-02 DIAGNOSIS — M16.12 PRIMARY OSTEOARTHRITIS OF ONE HIP, LEFT: ICD-10-CM

## 2025-06-02 LAB
GLUCOSE SERPL-MCNC: 135 MG/DL (ref 65–140)
GLUCOSE SERPL-MCNC: 97 MG/DL (ref 65–140)

## 2025-06-02 PROCEDURE — C1776 JOINT DEVICE (IMPLANTABLE): HCPCS | Performed by: ORTHOPAEDIC SURGERY

## 2025-06-02 PROCEDURE — 88304 TISSUE EXAM BY PATHOLOGIST: CPT | Performed by: PATHOLOGY

## 2025-06-02 PROCEDURE — 73501 X-RAY EXAM HIP UNI 1 VIEW: CPT

## 2025-06-02 PROCEDURE — 0054T BONE SRGRY CMPTR FLUOR IMAGE: CPT | Performed by: ORTHOPAEDIC SURGERY

## 2025-06-02 PROCEDURE — 27130 TOTAL HIP ARTHROPLASTY: CPT | Performed by: PHYSICIAN ASSISTANT

## 2025-06-02 PROCEDURE — 97163 PT EVAL HIGH COMPLEX 45 MIN: CPT

## 2025-06-02 PROCEDURE — 88311 DECALCIFY TISSUE: CPT | Performed by: PATHOLOGY

## 2025-06-02 PROCEDURE — 27130 TOTAL HIP ARTHROPLASTY: CPT | Performed by: ORTHOPAEDIC SURGERY

## 2025-06-02 PROCEDURE — 0054T BONE SRGRY CMPTR FLUOR IMAGE: CPT | Performed by: PHYSICIAN ASSISTANT

## 2025-06-02 PROCEDURE — C1713 ANCHOR/SCREW BN/BN,TIS/BN: HCPCS | Performed by: ORTHOPAEDIC SURGERY

## 2025-06-02 PROCEDURE — 82948 REAGENT STRIP/BLOOD GLUCOSE: CPT

## 2025-06-02 PROCEDURE — 97110 THERAPEUTIC EXERCISES: CPT

## 2025-06-02 DEVICE — M-SPEC METAL FEMORAL HEAD 12/14 TAPER DIAMETER 36MM +5: Type: IMPLANTABLE DEVICE | Site: HIP | Status: FUNCTIONAL

## 2025-06-02 DEVICE — EMPHASYS ACETABULAR SHELL THREE-HOLE 50MM CEMENTLESS
Type: IMPLANTABLE DEVICE | Site: HIP | Status: FUNCTIONAL
Brand: EMPHASYS

## 2025-06-02 DEVICE — PINNACLE CANCELLOUS BONE SCREW 6.5MM X 15MM
Type: IMPLANTABLE DEVICE | Site: HIP | Status: FUNCTIONAL
Brand: PINNACLE

## 2025-06-02 DEVICE — PINNACLE CANCELLOUS BONE SCREW 6.5MM X 25MM
Type: IMPLANTABLE DEVICE | Site: HIP | Status: FUNCTIONAL
Brand: PINNACLE

## 2025-06-02 DEVICE — EMPHASYS POLYETHYLENE LINER AOX NEUTRAL 50MM 36MM
Type: IMPLANTABLE DEVICE | Site: HIP | Status: FUNCTIONAL
Brand: EMPHASYS

## 2025-06-02 DEVICE — ACTIS DUOFIX HIP PROSTHESIS (FEMORAL STEM 12/14 TAPER CEMENTLESS SIZE 4 HIGH COLLAR)  CE
Type: IMPLANTABLE DEVICE | Site: HIP | Status: FUNCTIONAL
Brand: ACTIS

## 2025-06-02 RX ORDER — OXYCODONE HCL 10 MG/1
10 TABLET, FILM COATED, EXTENDED RELEASE ORAL ONCE
Status: COMPLETED | OUTPATIENT
Start: 2025-06-02 | End: 2025-06-02

## 2025-06-02 RX ORDER — CEFAZOLIN SODIUM 2 G/50ML
2000 SOLUTION INTRAVENOUS EVERY 8 HOURS
Status: COMPLETED | OUTPATIENT
Start: 2025-06-02 | End: 2025-06-03

## 2025-06-02 RX ORDER — ACETAMINOPHEN 325 MG/1
975 TABLET ORAL ONCE
Status: COMPLETED | OUTPATIENT
Start: 2025-06-02 | End: 2025-06-02

## 2025-06-02 RX ORDER — MAGNESIUM HYDROXIDE/ALUMINUM HYDROXICE/SIMETHICONE 120; 1200; 1200 MG/30ML; MG/30ML; MG/30ML
30 SUSPENSION ORAL EVERY 6 HOURS PRN
Status: DISCONTINUED | OUTPATIENT
Start: 2025-06-02 | End: 2025-06-03 | Stop reason: HOSPADM

## 2025-06-02 RX ORDER — ONDANSETRON 2 MG/ML
4 INJECTION INTRAMUSCULAR; INTRAVENOUS EVERY 6 HOURS PRN
Status: DISCONTINUED | OUTPATIENT
Start: 2025-06-02 | End: 2025-06-03 | Stop reason: HOSPADM

## 2025-06-02 RX ORDER — CEFAZOLIN SODIUM 2 G/50ML
2000 SOLUTION INTRAVENOUS ONCE
Status: COMPLETED | OUTPATIENT
Start: 2025-06-02 | End: 2025-06-02

## 2025-06-02 RX ORDER — FENTANYL CITRATE/PF 50 MCG/ML
50 SYRINGE (ML) INJECTION
Status: DISCONTINUED | OUTPATIENT
Start: 2025-06-02 | End: 2025-06-02 | Stop reason: HOSPADM

## 2025-06-02 RX ORDER — RALOXIFENE HYDROCHLORIDE 60 MG/1
60 TABLET, FILM COATED ORAL DAILY
Status: DISCONTINUED | OUTPATIENT
Start: 2025-06-03 | End: 2025-06-03 | Stop reason: HOSPADM

## 2025-06-02 RX ORDER — BUPIVACAINE HYDROCHLORIDE 7.5 MG/ML
INJECTION, SOLUTION INTRASPINAL AS NEEDED
Status: DISCONTINUED | OUTPATIENT
Start: 2025-06-02 | End: 2025-06-02

## 2025-06-02 RX ORDER — PHENYLEPHRINE HCL IN 0.9% NACL 1 MG/10 ML
SYRINGE (ML) INTRAVENOUS AS NEEDED
Status: DISCONTINUED | OUTPATIENT
Start: 2025-06-02 | End: 2025-06-02

## 2025-06-02 RX ORDER — CELECOXIB 100 MG/1
100 CAPSULE ORAL 2 TIMES DAILY
Status: DISCONTINUED | OUTPATIENT
Start: 2025-06-02 | End: 2025-06-03 | Stop reason: HOSPADM

## 2025-06-02 RX ORDER — HYDROMORPHONE HCL/PF 1 MG/ML
0.5 SYRINGE (ML) INJECTION EVERY 2 HOUR PRN
Status: DISCONTINUED | OUTPATIENT
Start: 2025-06-02 | End: 2025-06-03 | Stop reason: HOSPADM

## 2025-06-02 RX ORDER — OXYCODONE HYDROCHLORIDE 5 MG/1
5 TABLET ORAL EVERY 4 HOURS PRN
Status: DISCONTINUED | OUTPATIENT
Start: 2025-06-02 | End: 2025-06-03 | Stop reason: HOSPADM

## 2025-06-02 RX ORDER — DEXAMETHASONE SODIUM PHOSPHATE 4 MG/ML
10 INJECTION, SOLUTION INTRA-ARTICULAR; INTRALESIONAL; INTRAMUSCULAR; INTRAVENOUS; SOFT TISSUE ONCE
Status: COMPLETED | OUTPATIENT
Start: 2025-06-03 | End: 2025-06-03

## 2025-06-02 RX ORDER — ASPIRIN 325 MG
325 TABLET ORAL 2 TIMES DAILY
Status: DISCONTINUED | OUTPATIENT
Start: 2025-06-02 | End: 2025-06-03 | Stop reason: HOSPADM

## 2025-06-02 RX ORDER — PANTOPRAZOLE SODIUM 40 MG/1
40 TABLET, DELAYED RELEASE ORAL DAILY
Status: DISCONTINUED | OUTPATIENT
Start: 2025-06-02 | End: 2025-06-03 | Stop reason: HOSPADM

## 2025-06-02 RX ORDER — OXYCODONE HYDROCHLORIDE 10 MG/1
10 TABLET ORAL EVERY 4 HOURS PRN
Status: DISCONTINUED | OUTPATIENT
Start: 2025-06-02 | End: 2025-06-03 | Stop reason: HOSPADM

## 2025-06-02 RX ORDER — FLUTICASONE FUROATE AND VILANTEROL 200; 25 UG/1; UG/1
1 POWDER RESPIRATORY (INHALATION)
Status: DISCONTINUED | OUTPATIENT
Start: 2025-06-03 | End: 2025-06-03 | Stop reason: HOSPADM

## 2025-06-02 RX ORDER — DOCUSATE SODIUM 100 MG/1
100 CAPSULE, LIQUID FILLED ORAL 2 TIMES DAILY
Status: DISCONTINUED | OUTPATIENT
Start: 2025-06-02 | End: 2025-06-03 | Stop reason: HOSPADM

## 2025-06-02 RX ORDER — PROPOFOL 10 MG/ML
INJECTION, EMULSION INTRAVENOUS CONTINUOUS PRN
Status: DISCONTINUED | OUTPATIENT
Start: 2025-06-02 | End: 2025-06-02

## 2025-06-02 RX ORDER — SODIUM CHLORIDE, SODIUM LACTATE, POTASSIUM CHLORIDE, CALCIUM CHLORIDE 600; 310; 30; 20 MG/100ML; MG/100ML; MG/100ML; MG/100ML
75 INJECTION, SOLUTION INTRAVENOUS CONTINUOUS
Status: DISCONTINUED | OUTPATIENT
Start: 2025-06-02 | End: 2025-06-03

## 2025-06-02 RX ORDER — SODIUM CHLORIDE 9 MG/ML
INJECTION, SOLUTION INTRAVENOUS AS NEEDED
Status: DISCONTINUED | OUTPATIENT
Start: 2025-06-02 | End: 2025-06-02 | Stop reason: HOSPADM

## 2025-06-02 RX ORDER — BISACODYL 10 MG
10 SUPPOSITORY, RECTAL RECTAL DAILY PRN
Status: DISCONTINUED | OUTPATIENT
Start: 2025-06-02 | End: 2025-06-03 | Stop reason: HOSPADM

## 2025-06-02 RX ORDER — SODIUM CHLORIDE, SODIUM LACTATE, POTASSIUM CHLORIDE, CALCIUM CHLORIDE 600; 310; 30; 20 MG/100ML; MG/100ML; MG/100ML; MG/100ML
INJECTION, SOLUTION INTRAVENOUS CONTINUOUS PRN
Status: DISCONTINUED | OUTPATIENT
Start: 2025-06-02 | End: 2025-06-02

## 2025-06-02 RX ORDER — BUPIVACAINE HYDROCHLORIDE 5 MG/ML
INJECTION, SOLUTION EPIDURAL; INTRACAUDAL; PERINEURAL
Status: DISCONTINUED | OUTPATIENT
Start: 2025-06-02 | End: 2025-06-02

## 2025-06-02 RX ORDER — SODIUM CHLORIDE 9 MG/ML
INJECTION, SOLUTION INTRAVENOUS CONTINUOUS PRN
Status: DISCONTINUED | OUTPATIENT
Start: 2025-06-02 | End: 2025-06-02

## 2025-06-02 RX ORDER — GABAPENTIN 100 MG/1
200 CAPSULE ORAL 2 TIMES DAILY
Status: DISCONTINUED | OUTPATIENT
Start: 2025-06-02 | End: 2025-06-03 | Stop reason: HOSPADM

## 2025-06-02 RX ORDER — ACETAMINOPHEN 325 MG/1
975 TABLET ORAL EVERY 8 HOURS
Status: DISCONTINUED | OUTPATIENT
Start: 2025-06-02 | End: 2025-06-03 | Stop reason: HOSPADM

## 2025-06-02 RX ORDER — TRANEXAMIC ACID 10 MG/ML
1000 INJECTION, SOLUTION INTRAVENOUS ONCE
Status: COMPLETED | OUTPATIENT
Start: 2025-06-02 | End: 2025-06-02

## 2025-06-02 RX ORDER — PRAVASTATIN SODIUM 40 MG
40 TABLET ORAL
Status: DISCONTINUED | OUTPATIENT
Start: 2025-06-02 | End: 2025-06-03 | Stop reason: HOSPADM

## 2025-06-02 RX ORDER — CHLORHEXIDINE GLUCONATE ORAL RINSE 1.2 MG/ML
15 SOLUTION DENTAL ONCE
Status: COMPLETED | OUTPATIENT
Start: 2025-06-02 | End: 2025-06-02

## 2025-06-02 RX ORDER — MIDAZOLAM HYDROCHLORIDE 2 MG/2ML
INJECTION, SOLUTION INTRAMUSCULAR; INTRAVENOUS AS NEEDED
Status: DISCONTINUED | OUTPATIENT
Start: 2025-06-02 | End: 2025-06-02

## 2025-06-02 RX ORDER — PROPOFOL 10 MG/ML
INJECTION, EMULSION INTRAVENOUS AS NEEDED
Status: DISCONTINUED | OUTPATIENT
Start: 2025-06-02 | End: 2025-06-02

## 2025-06-02 RX ORDER — ONDANSETRON 2 MG/ML
4 INJECTION INTRAMUSCULAR; INTRAVENOUS ONCE AS NEEDED
Status: DISCONTINUED | OUTPATIENT
Start: 2025-06-02 | End: 2025-06-02 | Stop reason: HOSPADM

## 2025-06-02 RX ORDER — ALBUTEROL SULFATE 90 UG/1
1 INHALANT RESPIRATORY (INHALATION) EVERY 6 HOURS PRN
Status: DISCONTINUED | OUTPATIENT
Start: 2025-06-02 | End: 2025-06-03 | Stop reason: HOSPADM

## 2025-06-02 RX ORDER — GABAPENTIN 300 MG/1
300 CAPSULE ORAL ONCE
Status: COMPLETED | OUTPATIENT
Start: 2025-06-02 | End: 2025-06-02

## 2025-06-02 RX ORDER — MAGNESIUM HYDROXIDE 1200 MG/15ML
LIQUID ORAL AS NEEDED
Status: DISCONTINUED | OUTPATIENT
Start: 2025-06-02 | End: 2025-06-02 | Stop reason: HOSPADM

## 2025-06-02 RX ADMIN — ACETAMINOPHEN 975 MG: 325 TABLET, FILM COATED ORAL at 14:54

## 2025-06-02 RX ADMIN — PROPOFOL 60 MCG/KG/MIN: 10 INJECTION, EMULSION INTRAVENOUS at 11:07

## 2025-06-02 RX ADMIN — SODIUM CHLORIDE: 0.9 INJECTION, SOLUTION INTRAVENOUS at 12:49

## 2025-06-02 RX ADMIN — OXYCODONE HYDROCHLORIDE 10 MG: 10 TABLET ORAL at 22:07

## 2025-06-02 RX ADMIN — BUPIVACAINE 20 ML: 13.3 INJECTION, SUSPENSION, LIPOSOMAL INFILTRATION at 13:03

## 2025-06-02 RX ADMIN — CHLORHEXIDINE GLUCONATE 15 ML: 1.2 SOLUTION ORAL at 09:56

## 2025-06-02 RX ADMIN — GABAPENTIN 300 MG: 300 CAPSULE ORAL at 09:56

## 2025-06-02 RX ADMIN — HYDROMORPHONE HYDROCHLORIDE 0.5 MG: 1 INJECTION, SOLUTION INTRAMUSCULAR; INTRAVENOUS; SUBCUTANEOUS at 16:50

## 2025-06-02 RX ADMIN — Medication 100 MCG: at 12:11

## 2025-06-02 RX ADMIN — BUPIVACAINE HYDROCHLORIDE IN DEXTROSE 1.4 ML: 7.5 INJECTION, SOLUTION SUBARACHNOID at 11:06

## 2025-06-02 RX ADMIN — OXYCODONE HYDROCHLORIDE 5 MG: 5 TABLET ORAL at 14:54

## 2025-06-02 RX ADMIN — CELECOXIB 100 MG: 100 CAPSULE ORAL at 16:51

## 2025-06-02 RX ADMIN — CEFAZOLIN SODIUM 2000 MG: 2 SOLUTION INTRAVENOUS at 19:49

## 2025-06-02 RX ADMIN — PROPOFOL 40 MG: 10 INJECTION, EMULSION INTRAVENOUS at 11:07

## 2025-06-02 RX ADMIN — PHENYLEPHRINE HYDROCHLORIDE 50 MCG/MIN: 10 INJECTION INTRAVENOUS at 11:28

## 2025-06-02 RX ADMIN — ASPIRIN 325 MG: 325 TABLET ORAL at 22:07

## 2025-06-02 RX ADMIN — PANTOPRAZOLE SODIUM 40 MG: 40 TABLET, DELAYED RELEASE ORAL at 14:54

## 2025-06-02 RX ADMIN — PRAVASTATIN SODIUM 40 MG: 40 TABLET ORAL at 16:51

## 2025-06-02 RX ADMIN — OXYCODONE HYDROCHLORIDE 10 MG: 10 TABLET, FILM COATED, EXTENDED RELEASE ORAL at 09:56

## 2025-06-02 RX ADMIN — GABAPENTIN 200 MG: 100 CAPSULE ORAL at 16:51

## 2025-06-02 RX ADMIN — DOCUSATE SODIUM 100 MG: 100 CAPSULE, LIQUID FILLED ORAL at 16:51

## 2025-06-02 RX ADMIN — CEFAZOLIN SODIUM 2000 MG: 2 SOLUTION INTRAVENOUS at 11:15

## 2025-06-02 RX ADMIN — SODIUM CHLORIDE, SODIUM LACTATE, POTASSIUM CHLORIDE, AND CALCIUM CHLORIDE: .6; .31; .03; .02 INJECTION, SOLUTION INTRAVENOUS at 10:16

## 2025-06-02 RX ADMIN — MIDAZOLAM 2 MG: 1 INJECTION INTRAMUSCULAR; INTRAVENOUS at 11:02

## 2025-06-02 RX ADMIN — SODIUM CHLORIDE, SODIUM LACTATE, POTASSIUM CHLORIDE, AND CALCIUM CHLORIDE 75 ML/HR: .6; .31; .03; .02 INJECTION, SOLUTION INTRAVENOUS at 14:55

## 2025-06-02 RX ADMIN — Medication 100 MCG: at 11:27

## 2025-06-02 RX ADMIN — ACETAMINOPHEN 975 MG: 325 TABLET, FILM COATED ORAL at 09:56

## 2025-06-02 RX ADMIN — BUPIVACAINE HYDROCHLORIDE 15 ML: 5 INJECTION, SOLUTION EPIDURAL; INTRACAUDAL; PERINEURAL at 13:03

## 2025-06-02 RX ADMIN — ACETAMINOPHEN 975 MG: 325 TABLET, FILM COATED ORAL at 22:07

## 2025-06-02 RX ADMIN — TRANEXAMIC ACID 1000 MG: 10 INJECTION, SOLUTION INTRAVENOUS at 11:15

## 2025-06-02 NOTE — PHYSICAL THERAPY NOTE
"       PHYSICAL THERAPY EVALUATION/TREATMENT     06/02/25 1138   PT Last Visit   PT Visit Date 06/02/25   Note Type   Note type Evaluation   Pain Assessment   Pain Assessment Tool 0-10   Pain Score 7   Pain Location/Orientation Orientation: Left;Location: Hip  (Pt had pain meds prior to PT.)   Restrictions/Precautions   Weight Bearing Precautions Per Order Yes   LLE Weight Bearing Per Order WBAT   Other Precautions Pain;Fall Risk;Chair Alarm;Bed Alarm  (L arm lymphedema)   Home Living   Type of Home House   Home Layout Able to live on main level with bedroom/bathroom  (2 LUZ with L rail)   Bathroom Shower/Tub Walk-in shower   Bathroom Equipment Toilet raiser;Grab bars in shower;Shower chair   Home Equipment Walker;Cane   Prior Function   Level of [place] Independent with ADLs;Independent with functional mobility  (Pt reports progressive difficulty ambulating over the last month.  Pt reports using a walker for the past month and only being able to ambulate household distances due to L hip pain.)   Lives With Spouse   Receives Help From Family   Vocational Retired   General   Additional Pertinent History Pt is POD zero s/p L CARLOS with plan for DC home POD 1.  Pt has a history of RA and also osteonecrosis of the L hip following steriod injection which pt reports caused a rapid degeneration in her hip joint and her function.   Family/Caregiver Present Yes  (daughter and )   Cognition   Overall Cognitive Status WFL   Arousal/Participation Alert   Orientation Level Oriented X4   Following Commands Follows all commands and directions without difficulty   Subjective   Subjective \"I have alot of pain\"   RLE Assessment   RLE Assessment WFL   LLE Assessment   LLE Assessment   (PROM WFL, MMT hip 2/5, knee 3+/5, ankle 4-/5)   Bed Mobility   Supine to Sit 3  Moderate assistance   Additional items LE management;Increased time required   Transfers   Sit to Stand 4  Minimal assistance   Stand to Sit 4  Minimal assistance "   Stand pivot 4  Minimal assistance   Additional items   (with walker)   Ambulation/Elevation   Gait pattern Wide KRIS   Gait Assistance 4  Minimal assist   Assistive Device Rolling walker   Distance 15 feet   Balance   Static Sitting Fair +   Static Standing Fair   Ambulatory Fair -   Activity Tolerance   Activity Tolerance Patient limited by fatigue;Patient limited by pain   Assessment   Prognosis Good   Problem List Decreased strength;Decreased range of motion;Decreased mobility;Impaired balance;Pain   Assessment Pt is 74 y.o. female seen for PT evaluation s/p admit to [facility] on 6/2/2025 w/ Status post total replacement of left hip. PT consulted to assess pt's functional mobility and d/c needs. Order placed for PT eval and tx, w/ WBAT L LE order.  Co-morbidities affecting patient's physical performance include: breast cancer, osteopenia, back pain with radiculopathy, RA, obesity, peripheral neuropathy.  Personal factors affecting patient at time of initial evaluation include: ambulating with assistive device, stairs to enter home, inability to ambulate household distances, and inability to perform ADLS.         Prior to admission, patient was independent with functional mobility with walker only short distances due to L hip pain and independent with ADLS.  Upon evaluation: Pt ambulated x 15 feet with min assist and a walker.           Please find objective findings from Physical Therapy assessment regarding body systems outlined above with impairments and limitations including weakness, impaired balance, gait deviations, pain, decreased activity tolerance, decreased functional mobility tolerance, and fall risk.The Barthel Index was used as a functional outcome tool presenting with a score of Barthel Index Score: 45 today indicating marked limitations of functional mobility and ADLS.  Patient's clinical presentation is currently unstable/unpredictable as seen in patient's presentation of changing level  "of pain, increased fall risk, new onset of impairment of functional mobility, and new onset of weakness. Pt would benefit from continued Physical Therapy treatment to address deficits as defined above and maximize level of functional mobility. As demonstrated by objective findings, the assigned level of complexity for this evaluation is high.    The patient's [facility] Basic Mobility Inpatient Short Form Raw Score is 15. A Raw score of less than or equal to 16 suggests the patient may benefit from discharge to post-acute rehabilitation services, however anticipate pt will make rapid progress as pt is just POD zero s/p LTHA.  Recommend pt be DC home with family support and OP PT. Please also refer to the recommendation of the Physical Therapist for safe discharge planning.   Goals   Patient Goals \"I want to be able to walk in the grocery store\"   STG Expiration Date 06/09/25   Short Term Goal #1 1. independent bed mobility,   2. independent transfers bed to chair with   a walker,   3. modified independent ambulation with a walker 100 feet indoor level surfaces with a steady non antalgic gait,   4. Pt will be independent with a HEP designed to improve her L LE strength and ROM   LTG Expiration Date 06/16/25   Long Term Goal #1 1. independent up and down 2 steps with a L rail so pt can enter and exit her home,   2. independent ambulation outdoor level surfaces with a walker 400 feet with a steady non antalgic gait,   3. improve L LE strength to at least 4/5   Plan   Treatment/Interventions Elevations;LE strengthening/ROM;Functional transfer training;Gait training;Bed mobility;Equipment eval/education;Patient/family training;Therapeutic exercise;Spoke to nursing   PT Frequency Twice a day   Discharge Recommendation   Rehab Resource Intensity Level, PT III (Minimum Resource Intensity)   Equipment Recommended   (Pt has a cane and walker)   [facility] Basic Mobility Inpatient   Turning in Flat Bed Without Bedrails 3   Lying on " Back to Sitting on Edge of Flat Bed Without Bedrails 2   Moving Bed to Chair 3   Standing Up From Chair Using Arms 3   Walk in Room 3   Climb 3-5 Stairs With Railing 1   Basic Mobility Inpatient Raw Score 15   Basic Mobility Standardized Score 36.97   [facility] Highest Level Of Mobility   [facility] Goal 4: Move to chair/commode   -[facility] Achieved 6: Walk 10 steps or more   Barthel Index   Feeding 10   Bathing 0   Grooming Score 5   Dressing Score 5   Bladder Score 0   Bowels Score 10   Toilet Use Score 5   Transfers (Bed/Chair) Score 10   Mobility (Level Surface) Score 0   Stairs Score 0   Barthel Index Score 45   Additional Treatment Session   Start Time 1605   End Time 1615   Treatment Assessment S: Pt agreeable to therapy. Pt would like to try to move first before asking for more pain meds.   O: Written HEP issued. Pt performed x 10 each ankle pumps, quad set, glut set, AAROM heel slides, AAROM hip abd/add, LAQ.    A: Pt tolerated initial activity well.  Pt was surprised how much her hip moved without significant pain.    P: Continue PT BID. Review HEP, gait training in peters, stair climbing.   End of Consult   Patient Position at End of Consult All needs within reach;Bed/Chair alarm activated;Bedside chair   Licensure   [place] License Number  Wendie Maximus PT [id number]

## 2025-06-02 NOTE — INTERVAL H&P NOTE
H&P reviewed. After examining the patient I find no changes in the patients condition since the H&P had been written.  In addition the patient was seen and examined at bedside today.  She states her activity related left hip and groin pain continues to be severe.  Pain radiates diffusely throughout left groin.  Please see today's exam below.  Patient denies any recent fever, chills, nausea, vomiting, headache, chest pain, trouble breathing.  The patient has been seen and cleared by her medical subspecialist in preparation for the procedure.  The patient be a good candidate for aspirin postoperatively for DVT prophylaxis.  The patient be a good candidate for outpatient physical therapy following her discharge from the hospital.  All questions addressed this morning.  Proceed the OR for direct anterior left total arthroplasty.    Vital signs will be reviewed prior to the case    Left Hip Exam      Tenderness   The patient is experiencing tenderness in the greater trochanter and lateral (No SI joint).     Range of Motion   Abduction:  40 normal   Adduction:  30 normal   Extension:  0 normal   Left hip flexion: 105.   External rotation:  40 normal   Internal rotation: 20 abnormal      Muscle Strength   Abduction: 5/5   Adduction: 5/5   Flexion: 5/5      Tests   RAUDEL: positive  Elian: negative     Other   Erythema: absent  Scars: absent  Sensation: normal  Pulse: present     Comments:  + [place]inchfield, log roll, LEXIE STARKS  Thigh calf soft and nontender  Small overhanging abdominal pannus  Skin over anterior hip without erythema, warmth, abrasion, laceration, ulceration, or other sign of infection  Grossly distally neurovascularly intact  Left lower extremity shorter than right lower extremity    Kong Andino D.O.  Division of Adult Reconstruction  Department of Orthopaedic Surgery  [facility]

## 2025-06-02 NOTE — PLAN OF CARE
Problem: PAIN - ADULT  Goal: Verbalizes/displays adequate comfort level or baseline comfort level  Description: Interventions:  - Encourage patient to monitor pain and request assistance  - Assess pain using appropriate pain scale  - Administer analgesics as ordered based on type and severity of pain and evaluate response  - Implement non-pharmacological measures as appropriate and evaluate response  - Consider cultural and social influences on pain and pain management  - Notify physician/advanced practitioner if interventions unsuccessful or patient reports new pain  - Educate patient/family on pain management process including their role and importance of  reporting pain   - Provide non-pharmacologic/complimentary pain relief interventions  Outcome: Progressing     Problem: INFECTION - ADULT  Goal: Absence or prevention of progression during hospitalization  Description: INTERVENTIONS:  - Assess and monitor for signs and symptoms of infection  - Monitor lab/diagnostic results  - Monitor all insertion sites, i.e. indwelling lines, tubes, and drains  - Monitor endotracheal if appropriate and nasal secretions for changes in amount and color  - [place] appropriate cooling/warming therapies per order  - Administer medications as ordered  - Instruct and encourage patient and family to use good hand hygiene technique  - Identify and instruct in appropriate isolation precautions for identified infection/condition  Outcome: Progressing  Goal: Absence of fever/infection during neutropenic period  Description: INTERVENTIONS:  - Monitor WBC  - Perform strict hand hygiene  - Limit to healthy visitors only  - No plants, dried, fresh or silk flowers with braun in patient room  Outcome: Progressing

## 2025-06-02 NOTE — PLAN OF CARE
Problem: PHYSICAL THERAPY ADULT  Goal: Performs mobility at highest level of function for planned discharge setting.  See evaluation for individualized goals.  Description: Treatment/Interventions: Elevations, LE strengthening/ROM, Functional transfer training, Gait training, Bed mobility, Equipment eval/education, Patient/family training, Therapeutic exercise, Spoke to nursing  Equipment Recommended:  (Pt has a cane and walker)       See flowsheet documentation for full assessment, interventions and recommendations.  Note: Prognosis: Good  Problem List: Decreased strength, Decreased range of motion, Decreased mobility, Impaired balance, Pain  Assessment: Pt is 74 y.o. female seen for PT evaluation s/p admit to [facility] on 6/2/2025 w/ Status post total replacement of left hip. PT consulted to assess pt's functional mobility and d/c needs. Order placed for PT eval and tx, w/ WBAT L LE order.  Co-morbidities affecting patient's physical performance include: breast cancer, osteopenia, back pain with radiculopathy, RA, obesity, peripheral neuropathy.  Personal factors affecting patient at time of initial evaluation include: ambulating with assistive device, stairs to enter home, inability to ambulate household distances, and inability to perform ADLS.     Prior to admission, patient was independent with functional mobility with walker and independent with ADLS.  Upon evaluation: Pt ambulated x 15 feet with min assist and a walker.       Please find objective findings from Physical Therapy assessment regarding body systems outlined above with impairments and limitations including weakness, impaired balance, gait deviations, pain, decreased activity tolerance, decreased functional mobility tolerance, and fall risk.The Barthel Index was used as a functional outcome tool presenting with a score of Barthel Index Score: 45 today indicating marked limitations of functional mobility and ADLS.  Patient's clinical  presentation is currently unstable/unpredictable as seen in patient's presentation of changing level of pain, increased fall risk, new onset of impairment of functional mobility, and new onset of weakness. Pt would benefit from continued Physical Therapy treatment to address deficits as defined above and maximize level of functional mobility.     As demonstrated by objective findings, the assigned level of complexity for this evaluation is high.The patient's [facility] Basic Mobility Inpatient Short Form Raw Score is 15. A Raw score of less than or equal to 16 suggests the patient may benefit from discharge to post-acute rehabilitation services, however anticipate pt will make rapid progress as pt is just POD zero s/p LTHA.  Recommend pt be DC home with family support and OP PT. Please also refer to the recommendation of the Physical Therapist for safe discharge planning.        Rehab Resource Intensity Level, PT: III (Minimum Resource Intensity)    See flowsheet documentation for full assessment.

## 2025-06-02 NOTE — OP NOTE
OPERATIVE REPORT  PATIENT NAME: Shelia Miller  : 1950  MRN: [id number]  Pt Location:  [facility] ROOM 03    Surgery Date: 2025    Surgeons and Role:     * Kong Andino DO - Primary     * Otis Mcintosh PA-C - Assisting,no qualified resident was available an assistant was needed for patient positioning, prepping and draping, soft tissue retraction, protection of vital structures throughout the case, exposure of the acetabulum and femur for preparation and implantation of final prosthesis, superficial closure, and to complete the case safely.    * Ashok Mitchell,  ATC/OTC - 2nd Assist    Preop Diagnosis:  Primary osteoarthritis of one hip, left [M16.12]  Avascular necrosis left femoral head  Left hip pain [M25.552]    Postop diagnosis:  Primary osteoarthritis of one hip, left [M16.12]  Avascular necrosis left femoral head  Left hip pain [M25.552]    Procedure(s):  Left - ARTHROPLASTY HIP TOTAL ANTERIOR.NAVIGATED     Specimens: Left femoral head: Sent for pathology  ID Type Source Tests Collected by Time Destination   1 : left femoral head Tissue Femoral Head TISSUE EXAM Kong Andino DO 2025 1145      Estimated Blood Loss:   320 mL    Drains: None  Urethral Catheter Latex 16 Fr. (Active)   Number of days: 0     Anesthesia Type:   Spinal with sedation, postoperative Raul block with Exparel    Intravenous fluids: 1 L    Antibiotics: Ancef 2 g    Urine output: Bernstein: 200 cc    Implant Name Type Inv. Item Serial No.  Lot No. LRB No. Used Action   ACETABULAR SHELL 3HL 50MM CMNTLS EMPHASYS - [id number]  ACETABULAR SHELL 3HL 50MM CMNTLS EMPHASYS  DEPUY [id number] Left 1 Implanted   LINER AOX NEUTRAL 50 X 36MM EMPHASYS - [id number]  LINER AOX NEUTRAL 50 X 36MM EMPHASYS  DEPUY [id number] Left 1 Implanted   SCREW YESIKA 6.5 X 25MM SLF TAP PINNACLE - [id number]  SCREW YESIKA 6.5 X 25MM SLF TAP PINNACLE  DEPUY [id number] Left 1 Implanted   SCREW YESIKA 6.5 X 25MM SLF TAP PINNACLE - [id number]  SCREW YESIKA 6.5  X 25MM SLF TAP PINNACLE  DEPUY [id number] Left 1 Implanted   SCREW CANC 6.5 X 15MM SLF TAP - [id number]  SCREW CANC 6.5 X 15MM SLF TAP  DEPUY [id number] Left 1 Implanted   STEM FEM SZ 4 TAPER CMNTLS HI COLLAR ACTIS - [id number]  STEM FEM SZ 4 TAPER CMNTLS HI COLLAR ACTIS  DEPUY [id number] Left 1 Implanted   HEAD FEMORAL 12/14 TPR 36MM +5MM M SPC ARTICULEZE - [id number]  HEAD FEMORAL 12/14 TPR 36MM +5MM M SPC ARTICULEZE  DEPUY [id number] Left 1 Implanted     Operative Indications:  Primary osteoarthritis of one hip, left [M16.12]  Avascular necrosis left femoral head  Left hip pain [M25.552]  Patient is a very pleasant 74-year-old female known to me for treatment of her activity related left hip and groin pain.  The patient has undergone conservative measures of treatment and all have failed to provide long-lasting relief of her discomfort.  After the last left hip steroid injection her groin pain became significantly worse and she had difficulty ambulating.  X-rays demonstrate that there appeared to be rapid progression of her underlying arthritis and possibly avascular necrosis.  The patient was worked up for infection and was found to be negative within her left hip articulation after the steroid injection.  The patient was scheduled for direct anterior left total arthroplasty.  The risks and benefits of undergoing the procedure were discussed at length.  Consents were signed and placed in the chart.  Patient was optimized by her medical subspecialist in preparation for the procedure.  Patient ultimately underwent direct anterior left total arthroplasty on 6/2/2025.    Operative Findings:  Intraoperatively there was evidence of grade 4 degenerative change of both sides and hip articulation.  When the femoral head was removed it was examined closely and there was evidence of avascular necrosis on the superior aspect of the head contributing to the deformity.  The head was sent to pathology for evaluation.  The hip  navigation software was utilized for the case.  The acetabular component was implanted in approximately 45 degrees of inclination and 17 degrees of anteversion with excellent press-fit.  3 acetabular screws were utilized to augment fixation.  The polyethylene liner was locked into place.  The press-fit femoral component was implanted down into the proximal femur into its final position where it had excellent rotational and axial stability.  There is no fracture of the calcar that occurred.  The final construct demonstrated excellent stability to 50 degrees of internal rotation, 110 degrees of external rotation, and 50 degrees of external rotation with the leg lowered to the floor.  On leg length evaluation the operative extremity was lengthened approximate 9 mm from its preoperative state.  The patient tolerated suture well.  There were no complications.    Complications:   None    Hip Approach: Direct anterior    Chronic Narcotic Use:  No    Procedure and Technique:  The patient was identified and marked in the preoperative holding area, and the correct operative extremity and intended procedure was confirmed. The consents were visualized and confirmed for correct procedure and laterality. The patient was then taken back to the operating room where there were administered spinal anesthesia by the anesthesia staff. The patient was administered 2 g of Ancef intravenously by the anesthesia staff. The patient was then transferred to the HANA table for the procedure. After the patient was appropriately positioned, a AP of the pelvis was obtained using fluoroscopy to evaluate preoperative leg lengths. It was found that the left lower extremity was approximately 8 mm shoulder than the right lower extremity.  The overhanging abdominal pannus was taped superiorly out of the way.  The left lower extremity was prepped and draped in a standard sterile fashion. After a timeout was performed and all members of the operating room  team were in agreement of the correct patient, and correct intended procedure the bony landmarks were identified using marking pen. Tranexamic acid was administered by anesthesia.  A modified Concepcion-Rangel incision was delineated obliquely starting 2 cm distal and 2 cm laterally to the ASIS.  Sharp dissection was carried down through the subcutaneous tissues to the investing fascia of the tensor fascia aisha muscle. Electrocautery was used to maintain hemostasis in the subcutaneous tissues. The investing fascia of the tensor fascia aisha was incised in line with the fibers in this compartment was entered bluntly and the muscle was retracted laterally. The perforating circumflex femoral vessels were carefully identified and cauterized using electrocautery. Dissection was then carried down to the left hip capsule, and the pre-capsular fat was excised. A capsulotomy was carried out across the edge of the acetabulum superiorly down the femoral neck and into the intra-articular trochanteric line. Both sides of the capsulotomy were tagged with a Ethibond suture.  Dissection was carried out medially around the medial calcar. It was also carried out laterally to expose the saddle. The femoral neck osteotomy was templated and carried out using an oscillating saw and finished with a osteotome in accordance with preoperative templating. The femoral head was extracted from the acetabulum using a corkscrew.  There were grade 4 changes diffusely with osteophytes peripherally.  The superior aspect of the femoral head was examined closely and there was evidence of necrosis of the superior aspect of the femoral head causing subchondral collapse and the resultant deformity of the superior head.  The femoral head was sized and ultimately sent to pathology for evaluation.  There were grade 4 changes diffusely in the acetabulum.  The acetabulum was cleaned of debris and pulvinar, the OLEG was well-visualized, the remaining labrum was  removed, and reaming began. The acetabulum was reamed starting with a size 45 reamer and carried up in 1-2 mm increments until a size 49 was reached. A size 49 acetabular trial was impacted into the acetabulum and demonstrated an appropriate fit.  The hip navigation software was utilized throughout the case.  The appropriate abduction and anteversion of approximately 45° and 19° respectively was confirmed using fluoroscopy. The trial implant was removed, the host bone was touched with a size 50 reamer, and a size 50 DePuy Emphasys multi-cluster hole final acetabular component was impacted into place under direct visualization with fluoroscopy.  This final position was in 45 degrees of inclination and 17 degrees of anteversion.  This demonstrated good scratch fit. 3 acetabular screws were drilled, measured, and the appropriate size screw was inserted in sequence.  All 3 screws had good purchase.  The final 36 mm/50 mm Emphasys AOX highly cross-linked polyethylene insert was impacted into the acetabular component.  The liner was locked in its position on visual and tactile inspection.      Attention was then turned back to the proximal femur.  The appropriate proximal femoral releases were utilized as needed to mobilize the femur, ensuring not to release the obturator externus.  The soft tissue was removed from the region between the femoral neck and the greater trochanter a box osteotome was used to cut into the lateral aspect of the proximal femur.  The small starter broach was then inserted into the proximal femur adding proximally 7-10° of anteversion while preparing the proximal femur. Broaching was carried up in sequence until a size 4 broach demonstrated excellent fit and rotational stability. The calcar planer was used to bring the femoral neck osteotomy coplanar with the broach. Trialing was performed.  A trial construct with a standard neck and a +1.5 36 mm femoral head showed suboptimal stability on 110  degrees of external rotation, 50 degrees of internal rotation, and 50 degrees of external rotation with extension of the hip to the floor. Leg lengths were evaluated on fluoroscopy images and the hip navigation software.  The current construct demonstrated that the desired lengthening and offset was not appropriately recreated.  Using the One Trial software it was decided that a +5 ball with a high offset stem would most appropriately recreate the patient's length and offset required.  This was deemed to be the final construct.   The trial components were removed from the proximal femur and the final Depuy Actis size 4 high offset femoral component was inserted into the femur by hand and impacted into place.  The calcar and proximal femur was evaluated closely and no fracture of the proximal femur occurred on implantation of the final prosthesis.  The trunnion was cleaned and dried and the final M-spec +5 36 mm CoCr head was impacted upon the final femoral stem. Hip was reduced and taken through stability testing. The patient demonstrated excellent stability with 110 degrees of external rotation, 50 degrees of internal rotation, and 50 degrees of external rotation and extension of the hip to the floor.  There was no lateral subluxation of the hip on manual testing.  Leg lengths were again evaluated using fluoroscopy and the leg lengths were optimized with approximately 9 mm of length obtained, 4 mm of femoral offset with 2 mm of total offset obtained from the preoperative state.      The wound was copiously irrigated with normal saline followed by Biasurge irrigation.  The capsule was closed using a #2 Ethibond suture. The wound was again irrigated.  The investing fascia of the tensor fascia aisha muscle was closed using a bidirectional barbed #1 barbed suture.  The deep subcutaneous tissues were reapproximated using an undyed 0 Vicryl, the superficial subcutaneous tissues were reapproximated using an undyed 2-0  Vicryl, the skin edges were reapproximated using a 3-0 bidirectional barbed Monocryl suture, and the skin edges sealed with Exofin.  After the skin adhesive had dried a silver impregnated Mepilex dressing was applied over the surgical incision.  The patient was awakened from spinal anesthesia with sedation and transferred to PACU in stable condition.     I was present for all critical portions of the procedure.    Patient Disposition:  PACU         SIGNATURE: Kong Andino DO  DATE: June 2, 2025  TIME: 1:44 PM

## 2025-06-02 NOTE — ANESTHESIA POSTPROCEDURE EVALUATION
Post-Op Assessment Note    CV Status:  Stable    Pain management: adequate       Mental Status:  Alert and awake   Hydration Status:  Euvolemic   PONV Controlled:  Controlled   Airway Patency:  Patent     Post Op Vitals Reviewed: Yes    No anethesia notable event occurred.    Staff: Anesthesiologist         Last Filed PACU Vitals:  Vitals Value Taken Time   Temp 98.6 °F (37 °C) 06/02/25 13:44   Pulse 104 06/02/25 14:09   /65 06/02/25 14:09   Resp 18 06/02/25 14:09   SpO2 99 % 06/02/25 14:10       Modified Jose M:     Vitals Value Taken Time   Activity 2 06/02/25 13:30   Respiration 2 06/02/25 13:30   Circulation 2 06/02/25 13:30   Consciousness 1 06/02/25 13:30   Oxygen Saturation 2 06/02/25 13:30     Modified Jose M Score: 9

## 2025-06-02 NOTE — ANESTHESIA PROCEDURE NOTES
Peripheral Block    Patient location during procedure: PACU  Start time: 6/2/2025 1:03 PM  Reason for block: at surgeon's request and post-op pain management  Staffing  Performed by: Alda Menezes MD  Authorized by: Alda Menezes MD    Preanesthetic Checklist  Completed: patient identified, IV checked, site marked, risks and benefits discussed, surgical consent, monitors and equipment checked, pre-op evaluation and timeout performed  Peripheral Block  Patient position: supine  Prep: ChloraPrep  Patient monitoring: continuous pulse oximetry and frequent blood pressure checks  Block type: PENG  Laterality: right  Injection technique: single-shot  Procedures: ultrasound guided, Ultrasound guidance required for the procedure to increase accuracy and safety of medication placement and decrease risk of complications.  Ultrasound permanent image saved  bupivacaine (PF) (MARCAINE) 0.5 % injection 20 mL - Perineural   15 mL - 6/2/2025 1:03:00 PM  bupivacaine liposomal (EXPAREL) 1.3 % injection 20 mL - Perineural   20 mL - 6/2/2025 1:03:00 PM  Needle  Needle type: Stimuplex   Needle gauge: 20 G  Needle length: 4 in  Needle localization: ultrasound guidance  Assessment  Injection assessment: frequent aspiration, incremental injection, needle tip visualized at all times, injected with ease, negative aspiration, negative for heart rate change, no symptoms of intraneural/intravenous injection and no paresthesia on injection  Paresthesia pain: none  Post-procedure:  site cleaned  patient tolerated the procedure well with no immediate complications

## 2025-06-02 NOTE — ANESTHESIA PROCEDURE NOTES
Spinal Block    Patient location during procedure: OR  Start time: 6/2/2025 11:06 AM  Reason for block: procedure for pain and at surgeon's request  Staffing  Performed by: Gianna Ovalles MD  Authorized by: Gianna Ovalles MD    Preanesthetic Checklist  Completed: patient identified, IV checked, site marked, risks and benefits discussed, surgical consent, monitors and equipment checked, pre-op evaluation and timeout performed  Spinal Block  Patient position: sitting  Prep: ChloraPrep and site prepped and draped  Patient monitoring: frequent blood pressure checks, continuous pulse ox and heart rate  Approach: midline  Location: L3-4  Needle  Needle type: Pencan   Needle gauge: 24 G  Needle length: 4 in  Assessment  Sensory level: T10  Injection Assessment:  negative aspiration for heme, no paresthesia on injection and positive aspiration for clear CSF.  Post-procedure:  site cleaned

## 2025-06-02 NOTE — ANESTHESIA POSTPROCEDURE EVALUATION
Post-Op Assessment Note    CV Status:  Stable  Pain Score: 0    Pain management: adequate       Mental Status:  Alert and awake   Hydration Status:  Stable   PONV Controlled:  Controlled   Airway Patency:  Patent and adequate     Post Op Vitals Reviewed: Yes    No anethesia notable event occurred.    Staff: CRNA           Last Filed PACU Vitals:  Vitals Value Taken Time   Temp     Pulse     BP     Resp     SpO2

## 2025-06-02 NOTE — ANESTHESIA PREPROCEDURE EVALUATION
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     Dental   No notable dental hx     Pulmonary   Breath sounds clear to auscultation    Neurological    She appears awake, alert and oriented x3.      Other Findings  post-pubertal.      Anesthesia Plan  ASA Score- 2     Anesthesia Type- spinal with ASA Monitors.         Additional Monitors:     Airway Plan:            Plan Factors-Exercise tolerance (METS): >4 METS.    Chart reviewed. EKG reviewed.  Existing labs reviewed. Patient summary reviewed.    Patient is not a current smoker.              Induction-     Postoperative Plan- Plan for postoperative opioid use.   Monitoring Plan - Monitoring plan - standard ASA monitoring  Post Operative Pain Plan - plan for postoperative opioid use        Informed Consent- Anesthetic plan and risks discussed with patient.  I personally reviewed this patient with the CRNA. Discussed and agreed on the Anesthesia Plan with the CRNA..      NPO Status:  No vitals data found for the desired time range.

## 2025-06-03 VITALS
TEMPERATURE: 97.5 F | BODY MASS INDEX: 27.46 KG/M2 | RESPIRATION RATE: 18 BRPM | HEIGHT: 63 IN | HEART RATE: 90 BPM | DIASTOLIC BLOOD PRESSURE: 67 MMHG | SYSTOLIC BLOOD PRESSURE: 142 MMHG | OXYGEN SATURATION: 95 % | WEIGHT: 154.98 LBS

## 2025-06-03 LAB
ANION GAP SERPL CALCULATED.3IONS-SCNC: 6 MMOL/L (ref 4–13)
BUN SERPL-MCNC: 12 MG/DL (ref 5–25)
CALCIUM SERPL-MCNC: 8.5 MG/DL (ref 8.4–10.2)
CHLORIDE SERPL-SCNC: 99 MMOL/L (ref 96–108)
CO2 SERPL-SCNC: 28 MMOL/L (ref 21–32)
CREAT SERPL-MCNC: 0.62 MG/DL (ref 0.6–1.3)
ERYTHROCYTE [DISTWIDTH] IN BLOOD BY AUTOMATED COUNT: 12.5 % (ref 11.6–15.1)
GFR SERPL CREATININE-BSD FRML MDRD: 89 ML/MIN/1.73SQ M
GLUCOSE SERPL-MCNC: 127 MG/DL (ref 65–140)
HCT VFR BLD AUTO: 35.2 % (ref 34.8–46.1)
HGB BLD-MCNC: 11.6 G/DL (ref 11.5–15.4)
MCH RBC QN AUTO: 30.7 PG (ref 26.8–34.3)
MCHC RBC AUTO-ENTMCNC: 33 G/DL (ref 31.4–37.4)
MCV RBC AUTO: 93 FL (ref 82–98)
PLATELET # BLD AUTO: 185 THOUSANDS/UL (ref 149–390)
PMV BLD AUTO: 9.6 FL (ref 8.9–12.7)
POTASSIUM SERPL-SCNC: 4.4 MMOL/L (ref 3.5–5.3)
RBC # BLD AUTO: 3.78 MILLION/UL (ref 3.81–5.12)
SODIUM SERPL-SCNC: 133 MMOL/L (ref 135–147)
WBC # BLD AUTO: 10.4 THOUSAND/UL (ref 4.31–10.16)

## 2025-06-03 PROCEDURE — 97167 OT EVAL HIGH COMPLEX 60 MIN: CPT

## 2025-06-03 PROCEDURE — 80048 BASIC METABOLIC PNL TOTAL CA: CPT | Performed by: PHYSICIAN ASSISTANT

## 2025-06-03 PROCEDURE — 99024 POSTOP FOLLOW-UP VISIT: CPT | Performed by: ORTHOPAEDIC SURGERY

## 2025-06-03 PROCEDURE — 97110 THERAPEUTIC EXERCISES: CPT

## 2025-06-03 PROCEDURE — 85027 COMPLETE CBC AUTOMATED: CPT | Performed by: PHYSICIAN ASSISTANT

## 2025-06-03 PROCEDURE — 97116 GAIT TRAINING THERAPY: CPT

## 2025-06-03 PROCEDURE — 97535 SELF CARE MNGMENT TRAINING: CPT

## 2025-06-03 PROCEDURE — 97530 THERAPEUTIC ACTIVITIES: CPT

## 2025-06-03 RX ORDER — CELECOXIB 100 MG/1
100 CAPSULE ORAL 2 TIMES DAILY
Qty: 28 CAPSULE | Refills: 0 | Status: SHIPPED | OUTPATIENT
Start: 2025-06-03

## 2025-06-03 RX ORDER — ACETAMINOPHEN 325 MG/1
975 TABLET ORAL EVERY 8 HOURS
Start: 2025-06-03

## 2025-06-03 RX ORDER — OXYCODONE HYDROCHLORIDE 5 MG/1
TABLET ORAL
Qty: 40 TABLET | Refills: 0 | Status: SHIPPED | OUTPATIENT
Start: 2025-06-03

## 2025-06-03 RX ORDER — DOCUSATE SODIUM 100 MG/1
100 CAPSULE, LIQUID FILLED ORAL 2 TIMES DAILY
Qty: 60 CAPSULE | Refills: 0 | Status: SHIPPED | OUTPATIENT
Start: 2025-06-03

## 2025-06-03 RX ORDER — ASPIRIN 325 MG
325 TABLET ORAL 2 TIMES DAILY
Qty: 60 TABLET | Refills: 0 | Status: SHIPPED | OUTPATIENT
Start: 2025-06-03 | End: 2025-07-14

## 2025-06-03 RX ADMIN — HYDROMORPHONE HYDROCHLORIDE 0.5 MG: 1 INJECTION, SOLUTION INTRAMUSCULAR; INTRAVENOUS; SUBCUTANEOUS at 09:47

## 2025-06-03 RX ADMIN — DOCUSATE SODIUM 100 MG: 100 CAPSULE, LIQUID FILLED ORAL at 08:05

## 2025-06-03 RX ADMIN — ACETAMINOPHEN 975 MG: 325 TABLET, FILM COATED ORAL at 17:00

## 2025-06-03 RX ADMIN — ASPIRIN 325 MG: 325 TABLET ORAL at 08:05

## 2025-06-03 RX ADMIN — DEXAMETHASONE SODIUM PHOSPHATE 10 MG: 4 INJECTION INTRA-ARTICULAR; INTRALESIONAL; INTRAMUSCULAR; INTRAVENOUS; SOFT TISSUE at 12:17

## 2025-06-03 RX ADMIN — OXYCODONE HYDROCHLORIDE 10 MG: 10 TABLET ORAL at 08:05

## 2025-06-03 RX ADMIN — ACETAMINOPHEN 975 MG: 325 TABLET, FILM COATED ORAL at 05:58

## 2025-06-03 RX ADMIN — CEFAZOLIN SODIUM 2000 MG: 2 SOLUTION INTRAVENOUS at 03:42

## 2025-06-03 RX ADMIN — PRAVASTATIN SODIUM 40 MG: 40 TABLET ORAL at 17:00

## 2025-06-03 RX ADMIN — GABAPENTIN 200 MG: 100 CAPSULE ORAL at 08:04

## 2025-06-03 RX ADMIN — SODIUM CHLORIDE, SODIUM LACTATE, POTASSIUM CHLORIDE, AND CALCIUM CHLORIDE 75 ML/HR: .6; .31; .03; .02 INJECTION, SOLUTION INTRAVENOUS at 00:04

## 2025-06-03 RX ADMIN — PANTOPRAZOLE SODIUM 40 MG: 40 TABLET, DELAYED RELEASE ORAL at 08:05

## 2025-06-03 RX ADMIN — GABAPENTIN 200 MG: 100 CAPSULE ORAL at 17:00

## 2025-06-03 RX ADMIN — OXYCODONE HYDROCHLORIDE 10 MG: 10 TABLET ORAL at 13:16

## 2025-06-03 RX ADMIN — DOCUSATE SODIUM 100 MG: 100 CAPSULE, LIQUID FILLED ORAL at 17:00

## 2025-06-03 RX ADMIN — CELECOXIB 100 MG: 100 CAPSULE ORAL at 17:00

## 2025-06-03 RX ADMIN — CELECOXIB 100 MG: 100 CAPSULE ORAL at 08:05

## 2025-06-03 RX ADMIN — HYDROMORPHONE HYDROCHLORIDE 0.5 MG: 1 INJECTION, SOLUTION INTRAMUSCULAR; INTRAVENOUS; SUBCUTANEOUS at 01:33

## 2025-06-03 RX ADMIN — FLUTICASONE FUROATE AND VILANTEROL TRIFENATATE 1 PUFF: 200; 25 POWDER RESPIRATORY (INHALATION) at 08:06

## 2025-06-03 NOTE — CASE MANAGEMENT
Case Management Discharge Planning Note    Patient name Shelia Miller  Location [facility] /[facility] MRN [id number]  : 1950 Date 6/3/2025       Current Admission Date: 2025  Current Admission Diagnosis:Status post total replacement of left hip   Patient Active Problem List    Diagnosis Date Noted    Status post total replacement of left hip 2025    Status post total replacement of right hip 2025    Heart failure with preserved ejection fraction, unspecified HF chronicity (HCC) 2025    Lumbar radiculopathy 2024    Breast cancer, left breast ([facility]) 2024    Adrenal nodule ([facility]) 2024    Acquired deformity of left foot 2024    Hyperlipidemia 2023    Thrombocytopenia ([facility]) 2023    Leukopenia 2023    Back pain with radiculopathy 2023    Drug-induced polyneuropathy ([facility]) 2022    Diastolic CHF, acute ([facility]) 2022    HTN (hypertension) 2022    Anemia of chronic disease 2021    Malignant neoplasm of upper-outer quadrant of female breast ([facility]) 2021    Diverticulosis 2021    GERD (gastroesophageal reflux disease) 2021    Lymphedema of left arm 2021    Obesity 2021    Osteoporosis 2021    Peripheral neuropathy 2021    Varicose veins of right lower extremity with inflammation 2021    Asthma, mild persistent 2021    Rheumatoid arthritis (HCC) 2021    Osteoarthritis of right hip 2021    Invasive ductal carcinoma of left breast ([facility]) s/p lumpectomy, Chemo & XRT 2021    Arthritis of right knee 2016      LOS (days): 0  Geometric Mean LOS (GMLOS) (days):   Days to GMLOS:     OBJECTIVE:     Current admission status: Outpatient Surgery   Preferred Pharmacy:   [id number] - [place], [place] - [facility] [address]  [address] [address]  Phone: [phone] Fax: [phone]    Primary Care Provider: Nicky Gale MD    Primary  Insurance: [employer] REP  Secondary Insurance:     DISCHARGE DETAILS:    Per chart review and physical therapy documentation pt is reported to have no discharge needs at this time.  Pt was admitted following elective orthopedic surgery.  Plan is home with outpatient therapy.  Pt already has DME at home.  SW/CM will be available if needs arise.    Requested Home Health Care         Is the patient interested in HHC at discharge?: No    DME Referral Provided  Referral made for DME?: No    Other Referral/Resources/Interventions Provided:  Interventions: None Indicated    Treatment Team Recommendation: Home, Outpatient Rehab  Discharge Destination Plan:: Home, Outpatient Rehab  Transport at Discharge : Family

## 2025-06-03 NOTE — PLAN OF CARE
Problem: PAIN - ADULT  Goal: Verbalizes/displays adequate comfort level or baseline comfort level  Description: Interventions:  - Encourage patient to monitor pain and request assistance  - Assess pain using appropriate pain scale  - Administer analgesics as ordered based on type and severity of pain and evaluate response  - Implement non-pharmacological measures as appropriate and evaluate response  - Consider cultural and social influences on pain and pain management  - Notify physician/advanced practitioner if interventions unsuccessful or patient reports new pain  - Educate patient/family on pain management process including their role and importance of  reporting pain   - Provide non-pharmacologic/complimentary pain relief interventions  Outcome: Progressing     Problem: INFECTION - ADULT  Goal: Absence or prevention of progression during hospitalization  Description: INTERVENTIONS:  - Assess and monitor for signs and symptoms of infection  - Monitor lab/diagnostic results  - Monitor all insertion sites, i.e. indwelling lines, tubes, and drains  - Monitor endotracheal if appropriate and nasal secretions for changes in amount and color  - [place] appropriate cooling/warming therapies per order  - Administer medications as ordered  - Instruct and encourage patient and family to use good hand hygiene technique  - Identify and instruct in appropriate isolation precautions for identified infection/condition  Outcome: Progressing     Problem: SAFETY ADULT  Goal: Patient will remain free of falls  Description: INTERVENTIONS:  - Educate patient/family on patient safety including physical limitations  - Instruct patient to call for assistance with activity   - Consider consulting OT/PT to assist with strengthening/mobility based on AM PAC & JH-HLM score  - Consult OT/PT to assist with strengthening/mobility   - Keep Call bell within reach  - Keep bed low and locked with side rails adjusted as appropriate  - Keep  care items and personal belongings within reach  - Initiate and maintain comfort rounds  - Make Fall Risk Sign visible to staff  - Offer Toileting every 2 Hours, in advance of need  - Initiate/Maintain bed alarm  - Obtain necessary fall risk management equipment: walker  - Apply yellow socks and bracelet for high fall risk patients  - Consider moving patient to room near nurses station  Outcome: Progressing

## 2025-06-03 NOTE — PROGRESS NOTES
Progress Note - Orthopedics   Name: Shelia Miller 74 y.o. female I MRN: [id number]  Unit/Bed#: 2 [address] Date of Admission: 6/2/2025   Date of Service: 6/3/2025 I Hospital Day: 0    Assessment & Plan  Status post total replacement of left hip  POD#1  Ancef 2g IV x 2 for 24 hours postop - completed  DVT prophylaxis: Aspirin 325mg BID/SCD's/Ambulation  PT/OT- WBAT LLE  Analgesia PRN  Follow up AM labs - stable, DC IVF  Bernstein DC - monitor for void  Dressing- monitor for drainage, Mepilex may remain in place for 7 days  Discharge planning -patient has done well with nursing and therapy, and is now met all discharge arteria.  Discharge instructions patient questions were discussed in detail with the patient and her family at bedside.  She will begin outpatient physical therapy later this week.  She will return to see Dr. Andino in 1 week for a postoperative wound check      Ok for discharge from Orthopedics service perspective.    Subjective   74 y.o.female POD#1. No acute events, no new complaints. Pain well controlled. Denies fevers, chills, CP, SOB, N/V, numbness or tingling. Patient reports no issues with urination. Patient states she has burning in the thigh but otherwise is doing well    Objective :  Temp:  [97.5 °F (36.4 °C)-98.6 °F (37 °C)] 97.5 °F (36.4 °C)  HR:  [] 90  BP: (121-158)/(66-83) 142/67  Resp:  [16-19] 18  SpO2:  [93 %-99 %] 95 %  O2 Device: None (Room air)    Physical Exam  Vitals and nursing note reviewed.   Constitutional:       Appearance: Normal appearance. She is well-developed.      Comments: Body mass index is 27.45 kg/m².   HENT:      Head: Normocephalic and atraumatic.      Right Ear: External ear normal.      Left Ear: External ear normal.     Eyes:      Extraocular Movements: Extraocular movements intact.      Conjunctiva/sclera: Conjunctivae normal.       Cardiovascular:      Rate and Rhythm: Normal rate.      Pulses: Normal pulses.   Pulmonary:      Effort: Pulmonary effort is  "normal.   Abdominal:      Palpations: Abdomen is soft.     Musculoskeletal:      Cervical back: Normal range of motion.      Comments: See ortho exam     Skin:     General: Skin is warm and dry.     Neurological:      General: No focal deficit present.      Mental Status: She is alert and oriented to person, place, and time. Mental status is at baseline.     Psychiatric:         Mood and Affect: Mood normal.         Behavior: Behavior normal.         Thought Content: Thought content normal.         Judgment: Judgment normal.       Musculoskeletal: Left hip  Skin intact . No erythema or ecchymosis.  Vascular Status unchanged  Neurologic Status unchanged  Dressing c/d/i  Motor intact to +FHL/EHL, +ankle dorsi/plantar flexion  Sensation intact to saphenous, sural, tibial, superficial peroneal nerve, and deep peroneal  2+ DP pulse  No calf swelling or tenderness to palpation      Lab Results: I have reviewed the following results:  Lab Results   Component Value Date    WBC 10.40 (H) 06/03/2025    HGB 11.6 06/03/2025    HCT 35.2 06/03/2025    MCV 93 06/03/2025     06/03/2025     Lab Results   Component Value Date    SODIUM 133 (L) 06/03/2025    K 4.4 06/03/2025    CL 99 06/03/2025    CO2 28 06/03/2025    AGAP 6 06/03/2025    BUN 12 06/03/2025    CREATININE 0.62 06/03/2025    GLUC 127 06/03/2025    GLUF 106 (H) 05/05/2025    CALCIUM 8.5 06/03/2025    AST 21 05/05/2025    ALT 15 05/05/2025    ALKPHOS 71 05/05/2025    TP 7.1 05/05/2025    TBILI 0.57 05/05/2025    EGFR 89 06/03/2025         Wound Culture: No results found for: \"WOUNDCULT\"    Imaging Results Review: I personally reviewed the following image studies in PACS and associated radiology reports: xray(s). My interpretation of the radiology images/reports is: postop xrays reviewed and acceptable.    "

## 2025-06-03 NOTE — PHYSICAL THERAPY NOTE
PT TREATMENT       06/03/25 1152   PT Last Visit   PT Visit Date 06/03/25   Note Type   Note Type BID visit/treatment   Pain Assessment   Pain Assessment Tool 0-10   Pain Score 6   Pain Location/Orientation Orientation: Left;Location: Hip   Pain Onset/Description Onset: Ongoing;Descriptor: Sore;Descriptor: Burning   Effect of Pain on Daily Activities limits rest/activity tolerance   Patient's Stated Pain Goal No pain   Hospital Pain Intervention(s) Repositioned;Ambulation/increased activity;Cold applied  (medicated for pain prior to PT session ; cold pack donned to L hip at EOS)   Restrictions/Precautions   Weight Bearing Precautions Per Order Yes   LLE Weight Bearing Per Order WBAT   Other Precautions Fall Risk;Pain;Bed Alarm;Chair Alarm   General   Chart Reviewed Yes   Family/Caregiver Present Yes  (dtr + spouse at bedside throughout session)   Cognition   Overall Cognitive Status WFL   Arousal/Participation Cooperative   Orientation Level Oriented X4   Following Commands Follows all commands and directions without difficulty   Subjective   Subjective Pt agreeable to PT session this AM.   Bed Mobility   Supine to Sit Unable to assess   Sit to Supine Unable to assess   Additional Comments Received sitting in bedside chair   Transfers   Sit to Stand 4  Minimal assistance  (progressing to close supervision)   Additional items Verbal cues;Increased time required   Stand to Sit 5  Supervision   Additional items Assist x 1;Verbal cues;Increased time required;Armrests   Toilet transfer 5  Supervision   Additional items Assist x 1;Verbal cues;Increased time required;Armrests;Commode  (commode placed over toilet)   Ambulation/Elevation   Gait pattern Wide KRIS;Foward flexed;Short stride;Antalgic;Decreased L stance;Step to;Step through pattern  (step to progressing to step through gait pattern with decreased step length ; decreased gait speed)   Gait Assistance 4  Minimal assist  (progressing to supervision)   Additional  items Assist x 1;Verbal cues   Assistive Device Rolling walker   Distance 15 feet x 2 ; 5 feet   Stair Management Assistance 4  Minimal assist   Additional items Assist x 1;Verbal cues   Stair Management Technique Two rails;Step to pattern   Number of Stairs 4   Balance   Static Sitting Fair +   Dynamic Sitting Fair   Static Standing Fair   Dynamic Standing Fair -   Ambulatory Fair -  (RW)   Activity Tolerance   Activity Tolerance Patient tolerated treatment well;Patient limited by pain   Exercises   THR Sitting;Left  (Ankle pumps, quad/glute sets, heel slides, hip abd, LAQ x 5-10 reps L)   Assessment   Prognosis Good   Problem List Decreased strength;Decreased range of motion;Decreased mobility;Impaired balance;Pain   Assessment Pt seen for PT session this AM. Pt making progress towards IP PT goals. Able to progress ambulation x increased distance with Min A progressing to supervision using RW. Pt requires intermittent verbal cues for hand placement + sequencing with good response. Pt reports mild improvement in L hip burning pain as session progressed. Able to progress to negotiate x 4 steps with step to pattern with Min A + intermittent verbal cues for sequencing. HEP reviewed with pt who verbalized understanding. Continue to recommend level 3 minimum resource intensity with plan to start OP PT later this week.  The patient's AM-PAC Basic Mobility Inpatient Short Form Raw Score is 18. A Raw score of greater than 16 suggests the patient may benefit from discharge to home. Please also refer to the recommendation of the Physical Therapist for safe discharge planning.     Goals   Patient Goals to decraese pain, improve ability to walk   Plan   Treatment/Interventions LE strengthening/ROM;Functional transfer training;ADL retraining;Therapeutic exercise;Endurance training;Bed mobility;Gait training;Spoke to nursing   Progress Progressing toward goals   PT Frequency Twice a day   Discharge Recommendation   Rehab  Resource Intensity Level, PT III (Minimum Resource Intensity)   AM-PAC Basic Mobility Inpatient   Turning in Flat Bed Without Bedrails 3   Lying on Back to Sitting on Edge of Flat Bed Without Bedrails 3   Moving Bed to Chair 3   Standing Up From Chair Using Arms 3   Walk in Room 3   Climb 3-5 Stairs With Railing 3   Basic Mobility Inpatient Raw Score 18   Basic Mobility Standardized Score 41.05   [facility] Highest Level Of Mobility   JH-HLM Goal 6: Walk 10 steps or more   JH-HLM Achieved 7: Walk 25 feet or more   Education   Education Provided Mobility training;Home exercise program;Assistive device   Patient Demonstrates acceptance/verbal understanding   End of Consult   Patient Position at End of Consult Bedside chair;All needs within reach   Licensure   [place] License Number  Sara Alarcon [id number]

## 2025-06-03 NOTE — NURSING NOTE
Pt unable to void more than 10cc post catheter removal at 6am today. KIMMY Mcintosh made aware. Order for straight cath one time. Patient can then discharge after straight cath. Orders read back and carried out.

## 2025-06-03 NOTE — OCCUPATIONAL THERAPY NOTE
"  Occupational Therapy Evaluation/Treatment       06/03/25 0930   OT Last Visit   OT Visit Date 06/03/25   Note Type   Note type Evaluation   Pain Assessment   Pain Assessment Tool 0-10   Pain Score 7   Pain Location/Orientation Orientation: Left;Location: Hip  (at rest; up to 10/10 when standing, reporting burning/stabbing/shooting pain down L thigh; received pain meds approx 1 hour prior to session)   Restrictions/Precautions   Weight Bearing Precautions Per Order Yes   LLE Weight Bearing Per Order WBAT   Other Precautions Chair Alarm;Bed Alarm;Fall Risk;Pain   Home Living   Type of Home House   Home Layout Able to live on main level with bedroom/bathroom   Bathroom Shower/Tub Walk-in shower   Bathroom Toilet   (comfort height)   Bathroom Equipment Toilet raiser;Grab bars in shower;Shower chair   Home Equipment Walker;Cane   Prior Function   Level of [place] Independent with ADLs;Independent with functional mobility;Independent with IADLS  (Pt reports progressive difficulty ambulating over the last month.  Pt reports using a walker for the past month and only being able to get around in the house due to L hip pain.)   Lives With Spouse   Receives Help From Family   IADLs Independent with driving;Independent with meal prep;Independent with medication management   Falls in the last 6 months 0   Vocational Retired   General   Additional Pertinent History Pt is POD #1 s/p L CARLOS with plan for DC home today. Pt has a history of RA and also osteonecrosis of the L hip following steriod injection which pt reports caused a rapid degeneration in her hip joint and her function.   Family/Caregiver Present Yes  (dtr)   Subjective   Subjective \"This pain is different than what I had yesterday.\"   ADL   Eating Assistance 7  Independent   Grooming Assistance 5  Supervision/Setup   UB Bathing Assistance 5  Supervision/Setup   LB Bathing Assistance 4  Minimal Assistance   UB Dressing Assistance 5  Supervision/Setup   LB " Dressing Assistance 4  Minimal Assistance   Toileting Assistance  4  Minimal Assistance   Bed Mobility   Supine to Sit Unable to assess   Sit to Supine Unable to assess   Additional Comments pt sitting EOB upon OT arrival and transferred to recliner chair   Transfers   Sit to Stand 4  Minimal assistance   Additional items Assist x 1;Verbal cues;Increased time required   Stand to Sit 5  Supervision   Additional items Assist x 1;Verbal cues;Increased time required;Armrests   Balance   Static Sitting Fair +   Dynamic Sitting Fair   Static Standing Fair   Dynamic Standing Fair -   Activity Tolerance   Activity Tolerance Patient limited by pain   Medical Staff Made Aware spoke with PT Sara   Nurse Made Aware BRIAN COLON Assessment   RUE Assessment WFL   LUE Assessment   LUE Assessment WFL   Cognition   Overall Cognitive Status WFL   Arousal/Participation Alert;Cooperative   Attention Within functional limits   Orientation Level Oriented X4   Memory Within functional limits   Following Commands Follows all commands and directions without difficulty   Assessment   Limitation Decreased ADL status;Decreased endurance;Decreased Safe judgement during ADL;Decreased self-care trans;Decreased high-level ADLs  (decreased balance and mobility)   Prognosis Good   Assessment Patient evaluated by Occupational Therapy.  Patient admitted with Status post total replacement of left hip.  The patients occupational profile, medical and therapy history includes a extensive additional review of physical, cognitive, or psychosocial history related to current functional performance.  Comorbidities affecting functional mobility and ADLS include: breast cancer, osteopenia, back pain with radiculopathy, RA, obesity, peripheral neuropathy.  Prior to admission, patient was independent with functional mobility with RW as needed, independent with ADLS, and independent with IADLS.  The evaluation identifies the following performance deficits:  "weakness, impaired balance, decreased endurance, increased fall risk, new onset of impairment of functional mobility, decreased ADLS, decreased IADLS, pain, decreased activity tolerance, decreased safety awareness, and decreased strength, that result in activity limitations and/or participation restrictions. This evaluation requires clinical decision making of high complexity, because the patient presents with comorbidites that affect occupational performance and required significant modification of tasks or assistance with consideration of multiple treatment options.  The Barthel Index was used as a functional outcome tool presenting with a score of Barthel Index Score: 50, indicating marked limitations of functional mobility and ADLS.  The patient's raw score on the AM-PAC Daily Activity Inpatient Short Form is 19. A raw score of greater than or equal to 19 suggests the patient may benefit from discharge to home. Please refer to the recommendation of the Occupational Therapist for safe discharge planning.  Patient will benefit from skilled Occupational Therapy services to address above deficits and facilitate a safe return to prior level of function.   Goals   Patient Goals to have less pain   STG Time Frame   (1-7 days)   Short Term Goal  Goals established to promote Patient Goals: \"I want to be able to walk in the grocery store\": Grooming: independent seated; Bathing: supervision; Upper Body Dressing independent; Lower Body Dressing: supervision; Toileting: supervision; Patient will increase ambulatory standard toilet transfer to supervision with rolling walker to increase performance and safety with ADLS and functional mobility; Patient will increase standing tolerance to 4 minutes during ADL task to decrease assistance level and decrease fall risk; Patient will increase bed mobility to independent in preparation for ADLS and transfers; Patient will increase functional mobility to and from bathroom with rolling " walker with supervision to increase performance with ADLS and to use a toilet; Patient will tolerate 8 minutes of UE ROM/strengthening to increase general activity tolerance and performance in ADLS/IADLS; Patient will improve functional activity tolerance to 10 minutes of sustained functional tasks to increase participation in basic self-care and decrease assistance level; Patient will increase dynamic sitting balance to fair+ to improve the ability to sit at edge of bed or on a chair for ADLS;  Patient will increase dynamic standing balance to fair to improve postural stability and decrease fall risk during standing ADLS and transfers.   LTG Time Frame   (8-14 days)   Long Term Goal Bathing: independent; Lower Body Dressing: independent; Toileting: independent; Patient will increase ambulatory standard toilet transfer to independent with rolling walker to increase performance and safety with ADLS and functional mobility; Patient will increase standing tolerance to 8 minutes during ADL task to decrease assistance level and decrease fall risk; Patient will increase functional mobility to and from bathroom with rolling walker independently to increase performance with ADLS and to use a toilet; Patient will tolerate 15 minutes of UE ROM/strengthening to increase general activity tolerance and performance in ADLS/IADLS; Patient will improve functional activity tolerance to 20 minutes of sustained functional tasks to increase participation in basic self-care and decrease assistance level; Patient will increase dynamic sitting balance to good to improve the ability to sit at edge of bed or on a chair for ADLS;  Patient will increase dynamic standing balance to fair+ to improve postural stability and decrease fall risk during standing ADLS and transfers.  Pt will score >/= 23/24 on AM-PAC Daily Activity Inpatient scale to promote safe independence with ADLs and functional mobility; Pt will score >/= 80/100 on Barthel Index  in order to decrease caregiver assistance needed and increase ability to perform ADLs and functional mobility.   Plan   Treatment Interventions ADL retraining;Functional transfer training;UE strengthening/ROM;Endurance training;Patient/family training;Equipment evaluation/education;Compensatory technique education;Activityengagement;Energy conservation;Continued evaluation   Goal Expiration Date 06/17/25   OT Frequency 3-5x/wk   Discharge Recommendation   Rehab Resource Intensity Level, OT No post-acute rehabilitation needs  (OPPT only)   AM-PAC Daily Activity Inpatient   Lower Body Dressing 3   Bathing 3   Toileting 3   Upper Body Dressing 3   Grooming 3   Eating 4   Daily Activity Raw Score 19   Daily Activity Standardized Score (Calc for Raw Score >=11) 40.22   AM-PAC Applied Cognition Inpatient   Following a Speech/Presentation 4   Understanding Ordinary Conversation 4   Taking Medications 4   Remembering Where Things Are Placed or Put Away 4   Remembering List of 4-5 Errands 4   Taking Care of Complicated Tasks 4   Applied Cognition Raw Score 24   Applied Cognition Standardized Score 62.21   Barthel Index   Feeding 10   Bathing 0   Grooming Score 5   Dressing Score 5   Bladder Score 5   Bowels Score 10   Toilet Use Score 5   Transfers (Bed/Chair) Score 10   Mobility (Level Surface) Score 0   Stairs Score 0   Barthel Index Score 50   Additional Treatment Session   Start Time 0915   End Time 0930   Treatment Assessment Pt completed additional sit to stand with min A. Able to take few steps from bed to chair using RW and min A, +time. Pt reporting significant incr pain in L anterior thigh, unable to tolerate further mobility. Completed LB dressing with min assist, UB dressing with set-up/supervision. Educated on and reviewed safe dressing strategies to increase ease and independence with pt verbalizing understanding. Educated pt on safe car transfer technique with pt verbalizing understanding. Agreeable to attempt  commode transfer. Stood with min A, progressing to supervision. However unable to advance LLE at this time due to cont significant pain in stance. RN Jen notified and to administer additional pain medication. All questions and concerns addressed appropriately at this time. Pt would benefit from cont OT services while in hospital to maximize safety and fxl performance of ADLs. Anticipate once pain managed pt will make rapid progress and have no OT needs once d/c'd.     Wendie Vides OTR/L   [place] License # [id number]  [place] License # [id number]

## 2025-06-03 NOTE — ASSESSMENT & PLAN NOTE
POD#1  Ancef 2g IV x 2 for 24 hours postop - completed  DVT prophylaxis: Aspirin 325mg BID/SCD's/Ambulation  PT/OT- WBAT LLE  Analgesia PRN  Follow up AM labs - stable, DC IVF  Bernstein DC - monitor for void  Dressing- monitor for drainage, Mepilex may remain in place for 7 days  Discharge planning -patient has done well with nursing and therapy, and is now met all discharge arteria.  Discharge instructions patient questions were discussed in detail with the patient and her family at bedside.  She will begin outpatient physical therapy later this week.  She will return to see Dr. Andino in 1 week for a postoperative wound check

## 2025-06-03 NOTE — NURSING NOTE
Discharge instructions reviewed with patient and . Verbalizes understanding. Saline lock removed. Escorted off unit with wheelchair to be transported home via . All belongings with pt. Pt in no distress.

## 2025-06-03 NOTE — PLAN OF CARE
Problem: PAIN - ADULT  Goal: Verbalizes/displays adequate comfort level or baseline comfort level  Description: Interventions:  - Encourage patient to monitor pain and request assistance  - Assess pain using appropriate pain scale  - Administer analgesics as ordered based on type and severity of pain and evaluate response  - Implement non-pharmacological measures as appropriate and evaluate response  - Consider cultural and social influences on pain and pain management  - Notify physician/advanced practitioner if interventions unsuccessful or patient reports new pain  - Educate patient/family on pain management process including their role and importance of  reporting pain   - Provide non-pharmacologic/complimentary pain relief interventions  Outcome: Progressing     Problem: INFECTION - ADULT  Goal: Absence or prevention of progression during hospitalization  Description: INTERVENTIONS:  - Assess and monitor for signs and symptoms of infection  - Monitor lab/diagnostic results  - Monitor all insertion sites, i.e. indwelling lines, tubes, and drains  - Monitor endotracheal if appropriate and nasal secretions for changes in amount and color  - [place] appropriate cooling/warming therapies per order  - Administer medications as ordered  - Instruct and encourage patient and family to use good hand hygiene technique  - Identify and instruct in appropriate isolation precautions for identified infection/condition  Outcome: Progressing  Goal: Absence of fever/infection during neutropenic period  Description: INTERVENTIONS:  - Monitor WBC  - Perform strict hand hygiene  - Limit to healthy visitors only  - No plants, dried, fresh or silk flowers with braun in patient room  Outcome: Progressing     Problem: SAFETY ADULT  Goal: Patient will remain free of falls  Description: INTERVENTIONS:  - Educate patient/family on patient safety including physical limitations  - Instruct patient to call for assistance with activity   -  Consider consulting OT/PT to assist with strengthening/mobility based on AM PAC & JH-HLM score  - Consult OT/PT to assist with strengthening/mobility   - Keep Call bell within reach  - Keep bed low and locked with side rails adjusted as appropriate  - Keep care items and personal belongings within reach  - Initiate and maintain comfort rounds  - Make Fall Risk Sign visible to staff  - Offer Toileting every 2 Hours, in advance of need  - Initiate/Maintain bed alarm  - Obtain necessary fall risk management equipment: yellow socks   - Apply yellow socks and bracelet for high fall risk patients  - Consider moving patient to room near nurses station  Outcome: Progressing  Goal: Maintain or return to baseline ADL function  Description: INTERVENTIONS:  -  Assess patient's ability to carry out ADLs; assess patient's baseline for ADL function and identify physical deficits which impact ability to perform ADLs (bathing, care of mouth/teeth, toileting, grooming, dressing, etc.)  - Assess/evaluate cause of self-care deficits   - Assess range of motion  - Assess patient's mobility; develop plan if impaired  - Assess patient's need for assistive devices and provide as appropriate  - Encourage maximum independence but intervene and supervise when necessary  - Involve family in performance of ADLs  - Assess for home care needs following discharge   - Consider OT consult to assist with ADL evaluation and planning for discharge  - Provide patient education as appropriate  - Monitor functional capacity and physical performance, use of AM PAC & JH-HLM   - Monitor gait, balance and fatigue with ambulation    Outcome: Progressing  Goal: Maintains/Returns to pre admission functional level  Description: INTERVENTIONS:  - Perform AM-PAC 6 Click Basic Mobility/ Daily Activity assessment daily.  - Set and communicate daily mobility goal to care team and patient/family/caregiver.   - Collaborate with rehabilitation services on mobility goals  if consulted  - Perform Range of Motion 3 times a day.  - Reposition patient every 2 hours.  - Dangle patient 3 times a day  - Stand patient 3 times a day  - Ambulate patient 3 times a day  - Out of bed to chair 3 times a day   - Out of bed for meals 3 times a day  - Out of bed for toileting  - Record patient progress and toleration of activity level   Outcome: Progressing     Problem: DISCHARGE PLANNING  Goal: Discharge to home or other facility with appropriate resources  Description: INTERVENTIONS:  - Identify barriers to discharge w/patient and caregiver  - Arrange for needed discharge resources and transportation as appropriate  - Identify discharge learning needs (meds, wound care, etc.)  - Arrange for interpretive services to assist at discharge as needed  - Refer to Case Management Department for coordinating discharge planning if the patient needs post-hospital services based on physician/advanced practitioner order or complex needs related to functional status, cognitive ability, or social support system  Outcome: Progressing     Problem: Knowledge Deficit  Goal: Patient/family/caregiver demonstrates understanding of disease process, treatment plan, medications, and discharge instructions  Description: Complete learning assessment and assess knowledge base.  Interventions:  - Provide teaching at level of understanding  - Provide teaching via preferred learning methods  Outcome: Progressing

## 2025-06-03 NOTE — PHYSICAL THERAPY NOTE
PT TREATMENT       06/03/25 1440   PT Last Visit   PT Visit Date 06/03/25   Note Type   Note Type BID visit/treatment   Pain Assessment   Pain Assessment Tool 0-10   Pain Score 7   Pain Location/Orientation Orientation: Left;Location: Hip   Pain Onset/Description Onset: Ongoing;Descriptor: Sore;Descriptor: Burning   Patient's Stated Pain Goal No pain   Hospital Pain Intervention(s) Repositioned;Ambulation/increased activity  (medicated for pain prior to PT)   Restrictions/Precautions   Weight Bearing Precautions Per Order Yes   LLE Weight Bearing Per Order WBAT   Other Precautions Fall Risk;Pain   General   Chart Reviewed Yes   Family/Caregiver Present Yes  (dtr at  at bedside throughout session)   Cognition   Overall Cognitive Status WFL   Arousal/Participation Cooperative   Orientation Level Oriented X4   Following Commands Follows all commands and directions without difficulty   Subjective   Subjective Pt agreeable to PT session this afternoon   Bed Mobility   Supine to Sit Unable to assess   Sit to Supine Unable to assess   Additional Comments Received sitting in bedside chair   Transfers   Sit to Stand 5  Supervision   Additional items Armrests;Increased time required   Stand to Sit 5  Supervision   Additional items Armrests;Increased time required   Toilet transfer 5  Supervision   Additional items Assist x 1;Verbal cues;Increased time required;Armrests;Commode  (commode placed over toilet)   Additional Comments Able to sit on toilet for approx 10 minutes - unable to urinate. RN Jen clifton   Ambulation/Elevation   Gait pattern Foward flexed;Short stride;Step through pattern  (decreased gait speed)   Gait Assistance 5  Supervision   Additional items Verbal cues   Assistive Device Rolling walker   Distance 15 feet + 60 feet   Stair Management Assistance 4  Minimal assist   Additional items Assist x 1;Verbal cues;Increased time required   Stair Management Technique One rail L;With cane;Step to  pattern   Number of Stairs 4   Balance   Static Sitting Fair +   Dynamic Sitting Fair   Static Standing Fair   Dynamic Standing Fair -   Ambulatory Fair -  (RW)   Activity Tolerance   Activity Tolerance Patient tolerated treatment well;Patient limited by pain   Nurse Made Aware yes BRIAN Li   Assessment   Prognosis Good   Problem List Decreased strength;Decreased range of motion;Decreased mobility;Impaired balance;Pain   Assessment Pt seen for PT session this afternoon. Pt continues to have good progress towards IP PT goals. Able to progress to ambulate x increased distance with supervision using RW. Pt reports mild improvement in pain/burning as session progressed. Able to negotiate x 4 steps with rail/cane + intermittent verbal cues for proper sequencing. Pt safe from therapy perspective for d/c home with plan to start OP PT later this week.  The patient's AM-PAC Basic Mobility Inpatient Short Form Raw Score is 18. A Raw score of greater than 16 suggests the patient may benefit from discharge to home. Please also refer to the recommendation of the Physical Therapist for safe discharge planning.     Goals   Patient Goals to decrease pain, improve ability to walk   Plan   Treatment/Interventions Functional transfer training;LE strengthening/ROM;Therapeutic exercise;Endurance training;Gait training;Spoke to nursing   Progress Progressing toward goals   PT Frequency Twice a day   Discharge Recommendation   Rehab Resource Intensity Level, PT III (Minimum Resource Intensity)   AM-PAC Basic Mobility Inpatient   Turning in Flat Bed Without Bedrails 3   Lying on Back to Sitting on Edge of Flat Bed Without Bedrails 3   Moving Bed to Chair 3   Standing Up From Chair Using Arms 3   Walk in Room 3   Climb 3-5 Stairs With Railing 3   Basic Mobility Inpatient Raw Score 18   Basic Mobility Standardized Score 41.05   [facility] Highest Level Of Mobility   -HLM Goal 6: Walk 10 steps or more   -HLM Achieved 7: Walk 25 feet  or more   Education   Education Provided Mobility training;Home exercise program;Assistive device   Patient Demonstrates acceptance/verbal understanding   End of Consult   Patient Position at End of Consult Bedside chair;All needs within reach   Licensure   [place] License Number  Sara Alarcon [id number]

## 2025-06-04 ENCOUNTER — TELEPHONE (OUTPATIENT)
Dept: OBGYN CLINIC | Facility: HOSPITAL | Age: 75
End: 2025-06-04

## 2025-06-04 PROCEDURE — 88304 TISSUE EXAM BY PATHOLOGIST: CPT | Performed by: PATHOLOGY

## 2025-06-04 PROCEDURE — 88311 DECALCIFY TISSUE: CPT | Performed by: PATHOLOGY

## 2025-06-04 NOTE — TELEPHONE ENCOUNTER
Patient contacted for a postoperative follow up assessment. Patient states current pain level of a 4/10 when sitting and 5-7/10 when walking with RW.  Patient denies increase in swelling and dressing is Dressing C/D/I Patient isicing the site regularly. NN educated patient on post-op bruising, swelling, and icing. PT 6/5 at 9:30AM.      We reviewed patients AVS medication list. Patient is taking Tylenol 975mg every 8 hours, Oxycodone 5mg every 4 hours, ASA 325mg BID, Celebrex 200mg BID, and Colace 100mg BID.Patient has not had a BM but is passing gas.       Patient denies nausea, vomiting, abdominal pain, chest pain, shortness of breath, fever, dizziness, and calf pain. Patient confirmed post-op appointment with surgeon on 6/11 at 9:30AM .Patient does not have any other questions or concerns at this time. Pt was encouraged to call with any questions, concerns or issues.

## 2025-06-04 NOTE — PROGRESS NOTES
PT Evaluation     Today's date: 2025  Patient name: Shelia Miller  : 1950  MRN: [id number]  Referring provider: Kong Andino DO  Dx:   Encounter Diagnosis     ICD-10-CM    1. Left hip pain  M25.552       2. S/P total left hip arthroplasty  Z96.642           Start Time: 934  Stop Time: 1014  Total time in clinic (min): 40 minutes    Assessment  Impairments: abnormal gait, abnormal muscle firing, abnormal muscle tone, abnormal or restricted ROM, abnormal movement, activity intolerance, impaired physical strength, lacks appropriate home exercise program and pain with function  Symptom irritability: low    Assessment details: Pt is a pleasant 74 y.o. female who presents to [facility] PT for rehab following L CARLOS performed on 25. Today, she presents with weakness, decreased ROM, impaired balance, new onset of impairment of functional mobility, decreased activity tolerance, and decreased strength. Functionally, she is limited in her ability to stand and ambulate, negotiate stairs, perform age appropriate recreation and exercise, sleep through the night, and perform normal home activities. She is motivated to improve. Pt will benefit from skilled PT to address the aforementioned deficits and limitations in an effort to maximize pain free functional mobility and overall quality of life. Progress as able with these goals in mind.       Understanding of Dx/Px/POC: good     Prognosis: good    Goals  Short term goals (4 weeks):  1) Pt will improve L hip AROM by 25% pain free.  2) Pt will improve B LE and core strength deficits by 1/3 grade MMT.   3) Pt will reports pain at worse <5/10.  4) Pt will initiate and progress HEP w/ special emphasis on functional L hip strength and mobility.     Long term goals (8 weeks)  1) Pt will improve FOTO to at least 62.   2) Pt will sleep through the night in positioning of choosing 6/7 nights a week w/o waking due to pain.  3) Pt will ambulate community distances w/o deficit  related to L hip.  4) Pt will be independent and compliant w/ HEP in order to maximize functional benefit of skilled PT following d/c.       Plan  Patient would benefit from: skilled PT  Planned modality interventions: cryotherapy and thermotherapy: hydrocollator packs    Planned therapy interventions: abdominal trunk stabilization, activity modification, joint mobilization, manual therapy, massage, motor coordination training, neuromuscular re-education, patient education, postural training, stretching, strengthening, therapeutic activities, therapeutic exercise, therapeutic training, transfer training, home exercise program, gait training, graded activity, functional ROM exercises, graded exercise, flexibility, body mechanics training, balance and balance/weight bearing training    Frequency: 2-3x week  Duration in weeks: 8  Treatment plan discussed with: patient  Plan details: HEP to start: ad sq, quad and glute set, bridges, weight shift, gait work        Subjective Evaluation    History of Present Illness  Date of surgery: 6/2/2025  Mechanism of injury: surgery  Mechanism of injury: Pt underwent elective L CARLOS on 6/2/25. Pain prior to surgery was severe, she wasn't able to do much in the way of exercise in the 2-3 weeks prior to surgery. 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 Deterioration accelerated, OA increased. Pt opted for surgery as soon as she could after injection. Notes that she has been improving since surgery. Pain was worse last night, took medication prior to coming in today. Main goal is to get function of L leg back so she can return to normal life activities. Functional status below:   Quality of life: good    Patient Goals  Patient goals for therapy: increased strength, decreased pain, increased motion and return to sport/leisure activities  Patient goal: get back to regular activity, cooking, housework, socializing w/ friends and family  Pain  Current pain  rating: 3  At best pain ratin  At worst pain ratin  Location: L lateral and anterior hip  Quality: burning and dull ache  Relieving factors: rest, medications, change in position and ice (oxy prescribed per surgeon)  Aggravating factors: standing, walking, stair climbing, lifting and running  Progression: improved    Social Support  Steps to enter house: yes  2  Stairs in house: yes   Lives in: multiple-level home (can stay on first floor)  Lives with: spouse    Employment status: not working  Treatments  Previous treatment: physical therapy      Objective     Palpation     Additional Palpation Details  Generally TTP about L anterior and lateral hip    Neurological Testing     Sensation     Hip   Left Hip   Intact: light touch    Right Hip   Intact: light touch    Additional Neurological Details  Dull through L lateral and medial thigh and distal lateral shin    Active Range of Motion   Left Hip   Flexion: 80 degrees   Extension: 10 degrees   Abduction: 25 degrees     Right Hip   Normal active range of motion    Passive Range of Motion   Left Hip   Flexion: 105 degrees   Extension: 15 degrees   Abduction: 35 degrees   External rotation (90/90): 15 degrees   Internal rotation (90/90): 10 degrees     Right Hip   Normal passive range of motion    Strength/Myotome Testing     Left Hip   Planes of Motion   Flexion: 3+  Extension: 3+  Abduction: 3+  Adduction: 3+  External rotation: 3+  Internal rotation: 3+    Right Hip   Normal muscle strength    Additional Strength Details  Core - at least 1+/5     Tests     Additional Tests Details  Deferred s/t knowledge of diagnosis       Ambulation     Ambulation: Level Surfaces     Additional Level Surfaces Ambulation Details  Walker, decreased WB through L LE w/ min decrease in step length and pace, not specifically antalgic    General Comments:      Hip Comments   Functional Testing:   Sit<>stand: UE support on LE w/ R side WS, quick fatigue     BW squat: not past 25% in  walker today    Stairs: TBA     SLS: unable on L     TUG: TBA at follow up       Flowsheet Rows      Flowsheet Row Most Recent Value   PT/OT G-Codes    Current Score 44   Projected Score 62   FOTO information reviewed Yes                 Insurance Eval/ Re-eval POC expires Auth Status Total visits  Start date  Expiration date Misc   [employer]  6/5/25 9/25/25 Not req    Co-Pay $10                 Date 6/5/25        Visit Number 1        Auth         TUG  TBA at next visit                 Manual         DTM and TPR                                             TherEx         Active w/u         PROM X4-5 mins         AAROM         Lower quarter mobility          QS  SLR  Hip abd QS and GS x 20 each                                   Neuro Re-Ed         TrA progressions Isos x10  Isos w/ ad sq x10        Bridge X10 total        Hip hinge         Squat Sit<>stand x 5 total        Standing WS Med/lat in walker x 2-3 mins         Gait  Assessed x 2-3 mins                  HEP and POC discussion X5 mins         TherAct         Stairs         Functional transfers                                    Gait Training                                    Modalities         CP

## 2025-06-05 ENCOUNTER — EVALUATION (OUTPATIENT)
Dept: PHYSICAL THERAPY | Facility: CLINIC | Age: 75
End: 2025-06-05
Payer: COMMERCIAL

## 2025-06-05 DIAGNOSIS — M25.552 LEFT HIP PAIN: Primary | ICD-10-CM

## 2025-06-05 DIAGNOSIS — Z96.642 S/P TOTAL LEFT HIP ARTHROPLASTY: ICD-10-CM

## 2025-06-05 PROCEDURE — 97110 THERAPEUTIC EXERCISES: CPT

## 2025-06-05 PROCEDURE — 97164 PT RE-EVAL EST PLAN CARE: CPT

## 2025-06-05 PROCEDURE — 97112 NEUROMUSCULAR REEDUCATION: CPT

## 2025-06-09 ENCOUNTER — OFFICE VISIT (OUTPATIENT)
Age: 75
End: 2025-06-09
Payer: COMMERCIAL

## 2025-06-09 ENCOUNTER — OFFICE VISIT (OUTPATIENT)
Dept: PHYSICAL THERAPY | Facility: CLINIC | Age: 75
End: 2025-06-09
Payer: COMMERCIAL

## 2025-06-09 VITALS
SYSTOLIC BLOOD PRESSURE: 120 MMHG | HEIGHT: 63 IN | HEART RATE: 95 BPM | OXYGEN SATURATION: 96 % | DIASTOLIC BLOOD PRESSURE: 78 MMHG | BODY MASS INDEX: 28.17 KG/M2 | WEIGHT: 159 LBS

## 2025-06-09 DIAGNOSIS — M51.369 DDD (DEGENERATIVE DISC DISEASE), LUMBAR: Primary | ICD-10-CM

## 2025-06-09 DIAGNOSIS — M25.552 LEFT HIP PAIN: Primary | ICD-10-CM

## 2025-06-09 DIAGNOSIS — Z96.642 S/P TOTAL LEFT HIP ARTHROPLASTY: ICD-10-CM

## 2025-06-09 DIAGNOSIS — M71.38 SYNOVIAL CYST OF LUMBAR FACET JOINT: ICD-10-CM

## 2025-06-09 DIAGNOSIS — G62.9 PERIPHERAL NEUROPATHY: ICD-10-CM

## 2025-06-09 PROCEDURE — 97110 THERAPEUTIC EXERCISES: CPT

## 2025-06-09 PROCEDURE — 99214 OFFICE O/P EST MOD 30 MIN: CPT | Performed by: PSYCHIATRY & NEUROLOGY

## 2025-06-09 PROCEDURE — 97112 NEUROMUSCULAR REEDUCATION: CPT

## 2025-06-09 RX ORDER — GABAPENTIN 300 MG/1
300 CAPSULE ORAL 2 TIMES DAILY
Qty: 60 CAPSULE | Refills: 6 | Status: SHIPPED | OUTPATIENT
Start: 2025-06-09

## 2025-06-09 NOTE — PROGRESS NOTES
"Daily Note     Today's date: 2025  Patient name: Shelia Miller  : 1950  MRN: [id number]  Referring provider: Kong Andino DO  Dx:   Encounter Diagnosis     ICD-10-CM    1. Left hip pain  M25.552       2. S/P total left hip arthroplasty  Z96.642           Start Time: 1148  Stop Time: 1229  Total time in clinic (min): 41 minutes    Subjective: Pt reports no new complaints. L hip is sore, but improving. Notes that WB status is slowly getting better. Sleeping is very difficult. Still taking pain medication as directed.       Objective: requires cueing for push off and proper WS w/ 4\" step leading w/ L LE, tends to shift towards R and use UE when not cued. Able to correct and maintain.       Assessment: Tolerated treatment well. Patient demonstrated fatigue post treatment, exhibited good technique with therapeutic exercises, and would benefit from continued PT. Does well w/ exercise progressions today, fatigue but no increase in pain by end of session. Good tolerance to strength work in loaded positions, able to ascend and descend 4\" stairs step over step w/ B UE support. Quick fatigue w/ 3 point gait w/ SPC but good tolerance to increased WB when cued. Continue to increase as able.      Plan: Continue per plan of care. Week 1.5 NV     Precautions: standard, DOS 25       Insurance Eval/ Re-eval POC expires Auth Status Total visits  Start date  Expiration date Misc   [employer]  25 Not req    Co-Pay $10                 Date 25       Visit Number 1 2       Auth         TUG  TBA at next visit                 Manual         DTM and TPR                                             TherEx         Active w/u         PROM X4-5 mins  X8 mins CP        AAROM         Lower quarter mobility   LTR x10-15       QS  SLR  Hip abd QS and GS x 20 each X20-30 each                                  Neuro Re-Ed         TrA progressions Isos x10  Isos w/ ad sq x10 2x10, march attempted, add sq x20     " "  Bridge X10 total NV       Hip hinge         Squat Sit<>stand x 5 total From 2 airex 2x5        Standing WS Med/lat in walker x 2-3 mins  X2 mins        Gait  Assessed x 2-3 mins  In walker x 2-3 mins, SPC x 100'         4\" step taps x20, full case x 5 rounds        HEP and POC discussion X5 mins  X 5 mins       TherAct         Stairs         Functional transfers                                    Gait Training                                    Modalities         CP                                                                                                          "

## 2025-06-09 NOTE — PROGRESS NOTES
Return NeuroOutpatient Note        Shelia Miller  [id number]  74 y.o.  1950       Radiculopathy, lumbar region ; Synovial cyst ; DDD (degenerative disc disease), lumbar ; and HLD (hyperlipidemia)        History obtained from:  Patient     HPI/Subjective:    Shelia Miller is a 75 yo F with PMH of radiculopathy, synovial cyst presents as f/u.   Per my previous history, she's had numbness at her finger tips from RA. She then had chemo taxel between 2020 and 2021 which did increase her numbness and tingling. Since August of 2022, patient has started getting lower back pain after a trip that required an 8 hour drive to [place]. She had reported left lower back pain, into gluteal region, down posterior thigh and leg and into toes. She tried chiropractor sessions which have helped. We had ordered MRI LS spine which had revealed multilevel spondylotic changes of the lumbar spine including multifactorial severe L4-5 central canal encroachment; an intracanalicular synovial cyst identified at the L3-4 level resulting in moderate central canal encroachment at this level.  We had referred her to neurosurgery and she did see PA of Dr. Snowden. Since patient's pain was doing better with PT, they recommended continuing and trying pain management if needed and if still symptomatic then consider surgery. If pain was not controlled, they would offer fusion.   Patient was then referred to pain management and recently had epidural injection in July of 2024 and that had worked well.     She recently had left hip replacement hence is using walker.     She does report intermittent pins and needle sensation in left foot at night. Gabapentin helps but she only takes it as needed.    She doesn't have sxs of sciatica either.      She takes celebrex 200mg once daily.           She has continued with PT/OT  She hasn't gotten around to doing aqua therapy.        Her LDL most recently was 81. She is switched from lipitor to crestor.        She is  back on Actemra for RA.        Patient has h/o lyme disease and babesiosis. It was a long course to heal from it.           Past Medical History[1]  Social History     Socioeconomic History   • Marital status: /Civil Union     Spouse name: Not on file   • Number of children: Not on file   • Years of education: Not on file   • Highest education level: Not on file   Occupational History   • Not on file   Tobacco Use   • Smoking status: Never     Passive exposure: Past   • Smokeless tobacco: Never   Vaping Use   • Vaping status: Never Used   Substance and Sexual Activity   • Alcohol use: Yes     Comment: occasional   • Drug use: Never   • Sexual activity: Not on file   Other Topics Concern   • Not on file   Social History Narrative   • Not on file     Social Drivers of Health     Financial Resource Strain: Low Risk  (9/10/2023)    Overall Financial Resource Strain (CARDIA)    • Difficulty of Paying Living Expenses: Not hard at all   Food Insecurity: No Food Insecurity (6/2/2025)    Nursing - Inadequate Food Risk Classification    • Worried About Running Out of Food in the Last Year: Never true    • Ran Out of Food in the Last Year: Never true    • Ran Out of Food in the Last Year: Never true   Transportation Needs: No Transportation Needs (6/2/2025)    Nursing - Transportation Risk Classification    • Lack of Transportation: Not on file    • Lack of Transportation: No   Physical Activity: Not on file   Stress: Not on file   Social Connections: Not on file   Intimate Partner Violence: Unknown (6/2/2025)    Nursing IPS    • Feels Physically and Emotionally Safe: Not on file    • Physically Hurt by Someone: Not on file    • Humiliated or Emotionally Abused by Someone: Not on file    • Physically Hurt by Someone: No    • Hurt or Threatened by Someone: No   Housing Stability: Unknown (6/2/2025)    Nursing: Inadequate Housing Risk Classification    • Has Housing: Not on file    • Worried About Losing Housing: Not on  "file    • Unable to Get Utilities: Not on file    • Unable to Pay for Housing in the Last Year: No    • Has Housin     Family History[2]  Allergies[3]  Medications Ordered Prior to Encounter[4]      Review of Systems   Refer to positive review of systems in HPI.   Review of Systems    Constitutional- No fever  Eyes- No visual change  ENT- Hearing normal  CV- No chest pain  Resp- No Shortness of breath  GI- No diarrhea  - Bladder normal  MS- No Arthritis   Skin- No rash  Psych- No depression  Endo- No DM  Heme- No nodes    Vitals:    25 1352   BP: 120/78   BP Location: Left arm   Patient Position: Sitting   Cuff Size: Standard   Pulse: 95   SpO2: 96%   Weight: 72.1 kg (159 lb)   Height: 5' 3\" (1.6 m)       PHYSICAL EXAM:  Appearance: No Acute Distress  Ophthalmoscopic: Disc Flat, Normal fundus  Mental status:  Orientation: Awake, Alert, and Orientedx3  Memory: Registation 3/3 Recall 3/3  Attention: normal  Knowledge: good  Language: No aphasia  Speech: No dysarthria  Cranial Nerves:  2 No Visual Defect on Confrontation, Pupils round, equal, reactive to light  3,4,6 Extraocular Movements Intact, no nystagmus  5 Facial Sensation Intact  7 No facial asymmetry  8 Intact hearing  9,10 Palate symmetric, normal gag  11 Good shoulder shrug  12 Tongue Midline  Gait: uses walker  Coordination: No ataxia with finger to nose testing, and heel to shin  Sensory: Intact, Symmetric to pinprick, light touch, vibration, and joint position  Muscle Tone: Normal              Muscle exam:  Arm Right Left Leg Right Left   Deltoid 5/5 5/5 Iliopsoas 5/5 5/5   Biceps 5/5 5/5 Quads 5/5 5/5   Triceps 5/5 5/5 Hamstrings 5/5 5/5   Wrist Extension 5/5 5/5 Ankle Dorsi Flexion 5/5 5/5   Wrist Flexion 5/5 5/5 Ankle Plantar Flexion 5/5 5/5   Interossei 5/5 5/5 Ankle Eversion 5/5 5/5   APB 5/5 5/5 Ankle Inversion 5/5 5/5       Reflexes   RJ BJ TJ KJ AJ Plantars Lyons's   Right 2+ 2+ 2+ 1+ 0 Downgoing Not present   Left 2+ 2+ 2+ 1+ 0 " Downgoing Not present     Personal review of  Labs:                Diagnoses and all orders for this visit:      Assessment & Plan  DDD (degenerative disc disease), lumbar    Orders:  •  gabapentin (Neurontin) 300 mg capsule; Take 1 capsule (300 mg total) by mouth 2 (two) times a day    Asked her to take gabapentin as scheduled to have continuous relief from paresthesia.   Synovial cyst of lumbar facet joint  Patient had been seeing pain management but currently she doesn't feel the need to return.        Peripheral neuropathy    Orders:  •  gabapentin (Neurontin) 300 mg capsule; Take 1 capsule (300 mg total) by mouth 2 (two) times a day                      Total time of encounter:  30 min  More than 50% of the time was used in counseling and/or coordination of care  Extent of counseling and/or coordination of care        Jean Pierre Jon MD  [facility]  [address] [place] [address] [id number] [phone]           [1]  Past Medical History:  Diagnosis Date   • Arthritis 2015   • Asthma    • Back pain with radiculopathy 03/01/2023   • BRCA1 negative    • BRCA2 negative    • Breast cancer (HCC) 2020    Left  breast, triple negative   • Cancer (HCC) 7/2019   • Colon polyp    • Elevated liver enzymes 06/06/2024   • GERD (gastroesophageal reflux disease) 2016   • History of chemotherapy 10/01/2020    breast cancer - left   • History of radiation therapy 04/01/2021    left breast cancer   • History of transfusion 1979    post partum   • Hypertension    • Lyme disease    • OA (osteoarthritis)    • Rheumatoid arthritis (HCC)    • Sepsis (HCC)    [2]  Family History  Problem Relation Name Age of Onset   • Breast cancer Mother Shelia Bryson 59   • Cancer Mother Shelia BOWMANAngEmber         Breast Cancer   • Lung cancer Father Brody Columba    • Cancer Father Brody Columba         Lung cancer   • COPD Father Brody Columba    • Hypertension Father Brody CAITLINEmber    • Breast cancer  Paternal Grandmother     • Thyroid cancer Son Juan Pablo Miller    • Cancer Son Juan Pablo Miller         Thyroid Cancer   [3]  Allergies  Allergen Reactions   • Fruit Extracts Itching     Cantelope melon peaches   • Nuts - Food Allergy GI Intolerance   • Pollen Extract Other (See Comments)     Pollen , Trees And Flowers   • Tree Extract Cough and Nasal Congestion   • Avelox [Moxifloxacin] Itching     Per pt, also facial swelling   [4]  Current Outpatient Medications on File Prior to Visit   Medication Sig Dispense Refill   • acetaminophen (TYLENOL) 325 mg tablet Take 3 tablets (975 mg total) by mouth every 8 (eight) hours     • albuterol (PROVENTIL HFA,VENTOLIN HFA) 90 mcg/act inhaler as needed     • aspirin 325 mg tablet Take 1 tablet (325 mg total) by mouth in the morning and 1 tablet (325 mg total) before bedtime. Do all this for 82 doses. 60 tablet 0   • celecoxib (CeleBREX) 100 mg capsule Take 1 capsule (100 mg total) by mouth 2 (two) times a day 28 capsule 0   • docusate sodium (COLACE) 100 mg capsule Take 1 capsule (100 mg total) by mouth 2 (two) times a day 60 capsule 0   • Fluticasone-Salmeterol (Advair Diskus) 250-50 mcg/dose inhaler Inhale 1 puff 2 (two) times a day Rinse mouth after use. (Patient taking differently: Inhale 1 puff as needed Rinse mouth after use.) 60 blister 11   • losartan (COZAAR) 50 mg tablet 50 mg in the morning.     • naloxone (NARCAN) 4 mg/0.1 mL nasal spray Administer 1 spray into a nostril. If no response after 2-3 minutes, give another dose in the other nostril using a new spray. 1 each 0   • omeprazole (PriLOSEC) 20 mg delayed release capsule TAKE TWO CAPSULES BY MOUTH EVERY DAY IN THE MORNING (GENERIC PRILOSEC) 180 capsule 1   • oxyCODONE (Roxicodone) 5 immediate release tablet May take 1 tablet (5 mg total) by mouth every 4 (four) hours as needed for moderate pain or severe pain. May also take 2 tablets (10 mg total) every 6 (six) hours as needed for moderate pain or severe pain. Max  Daily Amount: 60 mg. 40 tablet 0   • rosuvastatin (CRESTOR) 5 mg tablet Take 5 mg by mouth in the morning.     • Calcium Carbonate-Vitamin D3 600-400 MG-UNIT TABS Take by mouth (Patient not taking: Reported on 6/9/2025)     • Multiple Vitamin (MULTIVITAMIN ADULT PO) Take 1 tablet by mouth in the morning. (Patient not taking: Reported on 6/9/2025)     • [Paused] Orencia ClickJect 125 MG/ML SOAJ once a week Stopped currently due to infection (Patient not taking: Reported on 6/9/2025)     • PreviDent 5000 Booster Plus 1.1 % PSTE  (Patient not taking: Reported on 6/9/2025)     • raloxifene (EVISTA) 60 mg tablet Take 60 mg by mouth in the morning. (Patient not taking: Reported on 6/9/2025)     • [DISCONTINUED] Na Sulfate-K Sulfate-Mg Sulf (Suprep Bowel Prep Kit) 17.5-3.13-1.6 GM/177ML SOLN Take 177 mL by mouth once for 1 dose Take 177 mL by mouth once for 1 dose (Patient not taking: Reported on 6/1/2022) 177 mL 0     No current facility-administered medications on file prior to visit.

## 2025-06-11 ENCOUNTER — OFFICE VISIT (OUTPATIENT)
Dept: OBGYN CLINIC | Facility: CLINIC | Age: 75
End: 2025-06-11

## 2025-06-11 ENCOUNTER — OFFICE VISIT (OUTPATIENT)
Dept: PHYSICAL THERAPY | Facility: CLINIC | Age: 75
End: 2025-06-11
Payer: COMMERCIAL

## 2025-06-11 VITALS — WEIGHT: 159 LBS | BODY MASS INDEX: 28.17 KG/M2 | HEIGHT: 63 IN

## 2025-06-11 DIAGNOSIS — Z96.642 S/P TOTAL LEFT HIP ARTHROPLASTY: ICD-10-CM

## 2025-06-11 DIAGNOSIS — M25.552 LEFT HIP PAIN: Primary | ICD-10-CM

## 2025-06-11 DIAGNOSIS — Z96.642 STATUS POST TOTAL REPLACEMENT OF LEFT HIP: Primary | ICD-10-CM

## 2025-06-11 DIAGNOSIS — Z96.642 AFTERCARE FOLLOWING LEFT HIP JOINT REPLACEMENT SURGERY: ICD-10-CM

## 2025-06-11 DIAGNOSIS — Z47.1 AFTERCARE FOLLOWING LEFT HIP JOINT REPLACEMENT SURGERY: ICD-10-CM

## 2025-06-11 PROCEDURE — 97112 NEUROMUSCULAR REEDUCATION: CPT

## 2025-06-11 PROCEDURE — 97110 THERAPEUTIC EXERCISES: CPT

## 2025-06-11 PROCEDURE — 99024 POSTOP FOLLOW-UP VISIT: CPT | Performed by: ORTHOPAEDIC SURGERY

## 2025-06-11 NOTE — PROGRESS NOTES
Daily Note     Today's date: 2025  Patient name: Shelia Miller  : 1950  MRN: [id number]  Referring provider: Kong Andino DO  Dx:   Encounter Diagnosis     ICD-10-CM    1. Left hip pain  M25.552       2. S/P total left hip arthroplasty  Z96.642                      Subjective: Pt reports pain/burning feeling w/ first few steps after sitting, lessens thereafter. MD follow up went well. Sleeping is difficult but feels she is improving overall.       Objective: shows better push off on L LE w/ gait and steps as compared to previous visit, less cueing required to engage posterior chain and push vs pull through UE.       Assessment: Tolerated treatment well. Patient demonstrated fatigue post treatment and would benefit from continued PT. Does well w/ strength work today. Will be back tomorrow, will look to increase intensity at that point. Pt has excellent tolerance despite fatigue to start session. Gait work tomorrow.       Plan: Continue per plan of care. Single leg work, gait work, balance work, mobility      Precautions: standard, DOS 25       Insurance Eval/ Re-eval POC expires Auth Status Total visits  Start date  Expiration date Misc   [employer]  25 Not req    Co-Pay $10                 Date 25      Visit Number 1 2 3      Auth         TUG  TBA at next visit                 Manual         DTM and TPR                                             TherEx         Active w/u         PROM X4-5 mins  X8 mins CP  X6 mins CP      AAROM         Lower quarter mobility   LTR x10-15 X101-5      QS  SLR  Hip abd QS and GS x 20 each X20-30 each X10 each         Ad sq x 20         LAQ x20               Neuro Re-Ed         TrA progressions Isos x10  Isos w/ ad sq x10 2x10, march attempted, add sq x20 Isos and march x20      Bridge X10 total NV Ad sq w/ bridge 2x5, w/o sq x5      Hip hinge         Squat Sit<>stand x 5 total From 2 airex 2x5  Mini squat at bar 2x7      Standing WS  "Med/lat in walker x 2-3 mins  X2 mins  X2 mins      Gait  Assessed x 2-3 mins  In walker x 2-3 mins, SPC x 100' Throughout session x 3-4 mins w/ focus on WB through L LE        4\" step taps x20, full case x 5 rounds  4\" ant taps x10, step ups full x 5 ounds    6\" step ups 2x7    8\" self ROM x 1 min      HEP and POC discussion X5 mins  X 5 mins X2-3 mins      TherAct         Stairs         Functional transfers                                    Gait Training                                    Modalities         CP                                                                                                            "

## 2025-06-11 NOTE — PROGRESS NOTES
"Name: Shelia Miller      : 1950      MRN: [id number]  Encounter Provider: Kong Andino DO  Encounter Date: 2025   Encounter department: [facility]  :  Assessment & Plan  Status post total replacement of left hip         Aftercare following left hip joint replacement surgery              Shelia Miller is a pleasant 74 y.o. female presenting today for follow-up 1 week after a direct anterior left total hip arthroplasty.  She is doing well at this time.  Her incision was reapproximated with Steri-Strips and a new Mepilex dressing was placed.  She should keep this in place and dry for the next week.  There is no concern for infection or dehiscence at this time.  She will continue with aspirin for DVT prophylaxis.  We discussed that if oxycodone continues to make her feel funny, we could try switching to tramadol.  We encouraged her to continue her efforts at physical therapy.  We discussed appropriate elevation to help reduce edema and recommended against spending time in a recliner.  We will plan to see her next week for her regularly scheduled 2-week postop appointment with an x-ray on arrival of her left hip.  All questions addressed        Subjective: Wound check left hip    History: Shelia Miller is a 74 y.o. female presenting today for follow-up 1 week after a direct anterior left total hip arthroplasty.  She reports that she is doing well at this time.  She has been taking her aspirin for DVT prophylaxis.  She is kept the Mepilex dressing in place and dry.  She has been working with physical therapy and at home to improve her range of motion and function.  She has noted swelling in her leg, but admits that she has been sitting in a recliner.  She has been taking Tylenol, Celebrex, and oxycodone for pain control.    Estimated body mass index is 28.17 kg/m² as calculated from the following:    Height as of this encounter: 5' 3\" (1.6 m).    Weight as of this encounter: " 72.1 kg (159 lb).    Lab Results   Component Value Date    HGBA1C 5.7 (H) 05/15/2025       Social History     Occupational History    Not on file   Tobacco Use    Smoking status: Never     Passive exposure: Past    Smokeless tobacco: Never   Vaping Use    Vaping status: Never Used   Substance and Sexual Activity    Alcohol use: Yes     Comment: occasional    Drug use: Never    Sexual activity: Not on file       Objective:  Left Hip Exam     Other   Erythema: absent  Scars: present  Sensation: normal  Pulse: present    Comments:  Mild edema left lower extremity normal for this stage postoperatively  Anterior incision well-approximated without erythema, warmth, drainage, or dehiscence.  Few areas were cleaned sterilely and reapproximated with Steri-Strips and a new Mepilex dressing was placed.  No sign of infection  Mobilizes well to table  Ambulates with antalgic gait with use of a walker  Grossly distally neurovascularly unchanged            There were no vitals filed for this visit.    Past Medical History[1]    Past Surgical History[2]    Family History[3]    Current Medications[4]    Allergies[5]      Review of Systems   Constitutional:  Positive for activity change.   HENT: Negative.     Eyes: Negative.    Respiratory: Negative.     Cardiovascular:  Positive for leg swelling.   Gastrointestinal: Negative.    Endocrine: Negative.    Genitourinary: Negative.    Musculoskeletal:  Positive for arthralgias, gait problem, joint swelling and myalgias.   Skin: Negative.    Allergic/Immunologic: Negative.    Hematological: Negative.    Psychiatric/Behavioral: Negative.       Physical Exam  Vitals and nursing note reviewed.   Constitutional:       Appearance: Normal appearance. She is well-developed.      Comments: Body mass index is 28.17 kg/m².   HENT:      Head: Normocephalic and atraumatic.      Right Ear: External ear normal.      Left Ear: External ear normal.     Eyes:      Extraocular Movements: Extraocular  movements intact.      Conjunctiva/sclera: Conjunctivae normal.       Cardiovascular:      Rate and Rhythm: Normal rate.      Pulses: Normal pulses.   Pulmonary:      Effort: Pulmonary effort is normal.   Abdominal:      Palpations: Abdomen is soft.     Musculoskeletal:      Cervical back: Normal range of motion.      Comments: See ortho exam     Skin:     General: Skin is warm and dry.     Neurological:      General: No focal deficit present.      Mental Status: She is alert and oriented to person, place, and time. Mental status is at baseline.     Psychiatric:         Mood and Affect: Mood normal.         Behavior: Behavior normal.         Thought Content: Thought content normal.         Judgment: Judgment normal.         Scribe Attestation      I,:  Otis Mcintosh PA-C am acting as a scribe while in the presence of the attending physician.:       I,:  Kong Andino, DO personally performed the services described in this documentation    as scribed in my presence.:             This document was created using speech voice recognition software.   Grammatical errors, random word insertions, pronoun errors, and incomplete sentences are an occasional consequence of this system due to software limitations, ambient noise, and hardware issues.   Any formal questions or concerns about content, text, or information contained within the body of this dictation should be directly addressed to the provider for clarification.         [1]   Past Medical History:  Diagnosis Date    Arthritis 2015    Asthma     Back pain with radiculopathy 03/01/2023    BRCA1 negative     BRCA2 negative     Breast cancer (HCC) 2020    Left  breast, triple negative    Cancer (HCC) 7/2019    Colon polyp     Elevated liver enzymes 06/06/2024    GERD (gastroesophageal reflux disease) 2016    History of chemotherapy 10/01/2020    breast cancer - left    History of radiation therapy 04/01/2021    left breast cancer    History of transfusion  1979    post partum    Hypertension     Lyme disease     OA (osteoarthritis)     Rheumatoid arthritis (HCC)     Sepsis (HCC)    [2]   Past Surgical History:  Procedure Laterality Date    ANKLE SURGERY Right 2016    2 plates and screws    BREAST BIOPSY Left 07/02/2020    BREAST LUMPECTOMY Left 08/12/2020    left breast wire localized lumpectomy    BREAST MASS EXCISION Left 09/02/2020    short superior and long lateral left breast resection, left axillary sentinel lymph node biopsy    COLONOSCOPY      ELBOW FRACTURE REPAIR Right 1996    EPIDURAL BLOCK INJECTION N/A 07/19/2024    Procedure: L5-S1 LUMBAR EPIDURAL STEROID INJECTION;  Surgeon: Giuliano Romano MD;  Location: [facility] MAIN OR;  Service: Pain Management     FRACTURE SURGERY Right     elbow    GUM SURGERY      HYSTERECTOMY  2018    JOINT REPLACEMENT Right 6/2021    hip    AK ARTHROCENTESIS ASPIR&/INJ MAJOR JT/BURSA W/O US Left 02/28/2025    Procedure: LEFT INTRA-ARTICULAR HIP INJECTION;  Surgeon: Giuliano Romano MD;  Location: [facility] MAIN OR;  Service: Pain Management     AK ARTHRP ACETBLR/PROX FEM PROSTC AGRFT/ALGRFT Left 6/2/2025    Procedure: ARTHROPLASTY HIP TOTAL ANTERIOR,NAVIGATED - overnight, left;  Surgeon: Kong Andino DO;  Location: [facility] MAIN OR;  Service: Orthopedics    REPLACEMENT TOTAL HIP W/  RESURFACING IMPLANTS  06/18/2021   [3]   Family History  Problem Relation Name Age of Onset    Breast cancer Mother Shelia BOWMAN'Ember 59    Cancer Mother Shelia BOWMAN'Ember         Breast Cancer    Lung cancer Father Brody BOWMAN’Ember     Cancer Father Brody BOWMAN’Ember         Lung cancer    COPD Father Brody BOWMAN’Ember     Hypertension Father Brody O’Ember     Breast cancer Paternal Grandmother      Thyroid cancer Son Juan Pablo Miller     Cancer Son Juan Pablo Miller         Thyroid Cancer   [4]   Current Outpatient Medications:     acetaminophen (TYLENOL) 325 mg tablet, Take 3 tablets (975 mg total) by mouth every 8 (eight) hours, Disp: , Rfl:     albuterol  (PROVENTIL HFA,VENTOLIN HFA) 90 mcg/act inhaler, as needed, Disp: , Rfl:     aspirin 325 mg tablet, Take 1 tablet (325 mg total) by mouth in the morning and 1 tablet (325 mg total) before bedtime. Do all this for 82 doses., Disp: 60 tablet, Rfl: 0    celecoxib (CeleBREX) 100 mg capsule, Take 1 capsule (100 mg total) by mouth 2 (two) times a day, Disp: 28 capsule, Rfl: 0    docusate sodium (COLACE) 100 mg capsule, Take 1 capsule (100 mg total) by mouth 2 (two) times a day, Disp: 60 capsule, Rfl: 0    Fluticasone-Salmeterol (Advair Diskus) 250-50 mcg/dose inhaler, Inhale 1 puff 2 (two) times a day Rinse mouth after use., Disp: 60 blister, Rfl: 11    gabapentin (Neurontin) 300 mg capsule, Take 1 capsule (300 mg total) by mouth 2 (two) times a day, Disp: 60 capsule, Rfl: 6    losartan (COZAAR) 50 mg tablet, 50 mg in the morning., Disp: , Rfl:     naloxone (NARCAN) 4 mg/0.1 mL nasal spray, Administer 1 spray into a nostril. If no response after 2-3 minutes, give another dose in the other nostril using a new spray., Disp: 1 each, Rfl: 0    omeprazole (PriLOSEC) 20 mg delayed release capsule, TAKE TWO CAPSULES BY MOUTH EVERY DAY IN THE MORNING (GENERIC PRILOSEC), Disp: 180 capsule, Rfl: 1    oxyCODONE (Roxicodone) 5 immediate release tablet, May take 1 tablet (5 mg total) by mouth every 4 (four) hours as needed for moderate pain or severe pain. May also take 2 tablets (10 mg total) every 6 (six) hours as needed for moderate pain or severe pain. Max Daily Amount: 60 mg., Disp: 40 tablet, Rfl: 0    rosuvastatin (CRESTOR) 5 mg tablet, Take 5 mg by mouth in the morning., Disp: , Rfl:     Calcium Carbonate-Vitamin D3 600-400 MG-UNIT TABS, Take by mouth (Patient not taking: Reported on 6/9/2025), Disp: , Rfl:     Multiple Vitamin (MULTIVITAMIN ADULT PO), Take 1 tablet by mouth in the morning. (Patient not taking: Reported on 6/9/2025), Disp: , Rfl:     [Paused] Orencia ClickJect 125 MG/ML SOAJ, once a week Stopped currently due  to infection (Patient not taking: Reported on 6/9/2025), Disp: , Rfl:     PreviDent 5000 Booster Plus 1.1 % PSTE, , Disp: , Rfl:     raloxifene (EVISTA) 60 mg tablet, Take 60 mg by mouth in the morning. (Patient not taking: Reported on 6/9/2025), Disp: , Rfl:   [5]   Allergies  Allergen Reactions    Fruit Extracts Itching     Cantelope melon peaches    Nuts - Food Allergy GI Intolerance    Pollen Extract Other (See Comments)     Pollen , Trees And Flowers    Tree Extract Cough and Nasal Congestion    Avelox [Moxifloxacin] Itching     Per pt, also facial swelling

## 2025-06-12 ENCOUNTER — APPOINTMENT (OUTPATIENT)
Dept: PHYSICAL THERAPY | Facility: CLINIC | Age: 75
End: 2025-06-12
Payer: COMMERCIAL

## 2025-06-16 ENCOUNTER — OFFICE VISIT (OUTPATIENT)
Dept: PHYSICAL THERAPY | Facility: CLINIC | Age: 75
End: 2025-06-16
Payer: COMMERCIAL

## 2025-06-16 DIAGNOSIS — Z96.642 S/P TOTAL LEFT HIP ARTHROPLASTY: ICD-10-CM

## 2025-06-16 DIAGNOSIS — M25.552 LEFT HIP PAIN: Primary | ICD-10-CM

## 2025-06-16 PROCEDURE — 97112 NEUROMUSCULAR REEDUCATION: CPT

## 2025-06-16 PROCEDURE — 97110 THERAPEUTIC EXERCISES: CPT

## 2025-06-16 NOTE — PROGRESS NOTES
Daily Note     Today's date: 2025  Patient name: Shelia Miller  : 1950  MRN: [id number]  Referring provider: Kong Andino DO  Dx:   Encounter Diagnosis     ICD-10-CM    1. Left hip pain  M25.552       2. S/P total left hip arthroplasty  Z96.642                      Subjective: Pt reports slow and steady progress. Feels stronger, feels she is better able to bear weight through the L leg w/ less fear of discomfort and instability. Notes that pain is still present, taking less pain medication now. Encouraged by progress.       Objective: requires cueing w/ leg press to promote equal leg drive, tends to offset weight towards R when not cued. Corrects and is able to maintain.      Assessment: Tolerated treatment well. Patient demonstrated fatigue post treatment and would benefit from continued PT. Does well w/ exercise progressions. Fatigue but no increase in pain by end. Good tolerance to loaded progressions, showing better ability to accept weight on L leg in loaded positions w/o reaching for UE support. Continue to increase intensity as able.      Plan: Continue per plan of care. Sliders, gait, adduction progressions, sit<>stand work     Precautions: standard, DOS 25       Insurance Eval/ Re-eval POC expires Auth Status Total visits  Start date  Expiration date Misc   [employer]etna  25 Not req    Co-Pay $10                 Date 25     Visit Number 1 2 3 4     Auth         TUG  TBA at next visit                 Manual         DTM and TPR                                             TherEx         Active w/u         PROM X4-5 mins  X8 mins CP  X6 mins CP X6-7 mins CP     AAROM         Lower quarter mobility   LTR x10-15 X10-15 X20 total     QS  SLR  Hip abd QS and GS x 20 each X20-30 each X10 each X20 total        Ad sq x 20 X20-30        LAQ x20 x20         Leg press 65# 3x8-10 total     Neuro Re-Ed         TrA progressions Isos x10  Isos w/ ad sq x10 2x10, march  "attempted, add sq x20 Isos and march x20 Rev x 20     Bridge X10 total NV Ad sq w/ bridge 2x5, w/o sq x5 Reg x20, ad sq x20     Hip hinge    Standing hip march x20     Squat Sit<>stand x 5 total From 2 airex 2x5  Mini squat at bar 2x7      Standing WS Med/lat in walker x 2-3 mins  X2 mins  X2 mins X2 mins throughout      Gait  Assessed x 2-3 mins  In walker x 2-3 mins, SPC x 100' Throughout session x 3-4 mins w/ focus on WB through L LE Focus on step through and equal WB x 2-3 mins       4\" step taps x20, full case x 5 rounds  4\" ant taps x10, step ups full x 5 ounds    6\" step ups 2x7    8\" self ROM x 1 min 4\" up/down  x6 full sets      HEP and POC discussion X5 mins  X 5 mins X2-3 mins X2 mins     TherAct         Stairs         Functional transfers                                    Gait Training                                    Modalities         CP                                                                                                              "

## 2025-06-18 ENCOUNTER — OFFICE VISIT (OUTPATIENT)
Dept: OBGYN CLINIC | Facility: CLINIC | Age: 75
End: 2025-06-18

## 2025-06-18 ENCOUNTER — APPOINTMENT (OUTPATIENT)
Dept: RADIOLOGY | Facility: CLINIC | Age: 75
End: 2025-06-18
Attending: ORTHOPAEDIC SURGERY
Payer: COMMERCIAL

## 2025-06-18 VITALS — BODY MASS INDEX: 27.24 KG/M2 | WEIGHT: 153.8 LBS

## 2025-06-18 DIAGNOSIS — Z96.642 AFTERCARE FOLLOWING LEFT HIP JOINT REPLACEMENT SURGERY: ICD-10-CM

## 2025-06-18 DIAGNOSIS — Z47.1 AFTERCARE FOLLOWING LEFT HIP JOINT REPLACEMENT SURGERY: ICD-10-CM

## 2025-06-18 DIAGNOSIS — Z96.642 STATUS POST TOTAL REPLACEMENT OF LEFT HIP: Primary | ICD-10-CM

## 2025-06-18 DIAGNOSIS — Z96.642 STATUS POST TOTAL REPLACEMENT OF LEFT HIP: ICD-10-CM

## 2025-06-18 PROCEDURE — 99024 POSTOP FOLLOW-UP VISIT: CPT | Performed by: ORTHOPAEDIC SURGERY

## 2025-06-18 PROCEDURE — 73502 X-RAY EXAM HIP UNI 2-3 VIEWS: CPT

## 2025-06-18 NOTE — ASSESSMENT & PLAN NOTE
Orders:    XR knee 3 vw left non injury; Future    XR hip/pelv 2-3 vws left if performed; Future

## 2025-06-18 NOTE — PROGRESS NOTES
Name: Shelia Miller      : 1950      MRN: [id number]  Encounter Provider: Kong Andino DO  Encounter Date: 2025   Encounter department: [facility]  :  Assessment & Plan  Status post total replacement of left hip    Orders:    XR knee 3 vw left non injury; Future    XR hip/pelv 2-3 vws left if performed; Future    Aftercare following left hip joint replacement surgery    Orders:    XR knee 3 vw left non injury; Future    XR hip/pelv 2-3 vws left if performed; Future         Shelia Miller is a pleasant 74 y.o. female presenting today for follow-up 2 weeks after a direct anterior left total hip arthroplasty.  She is doing very well at this time.  The prosthesis remains stable on imaging and exam.  Her incision is healing nicely, and she may now get it wet in the shower, but should avoid direct scrubbing or saturation.  She will continue with aspirin for DVT prophylaxis for the next 4 weeks.  We discussed that she may return to driving if she is not taking oxycodone during the day.  For sleeping, we have recommended that she ice before bed and take Tylenol and a half of an oxycodone with food to help sleep.  She can call if she needs a refill of the pain medication.  She will continue her efforts at physical therapy and at home, and we will plan to see her back in 4 weeks for a clinical reevaluation.  All questions addressed    I have personally reviewed pertinent films in PACS of the updated x-rays taken today of her pelvis and left hip which show a well aligned and well-fixed total prosthesis.  There is no signs of loosening, periprosthetic fracture, or other interval complication.  Leg length, offset, and acetabular version all appear appropriate.      Subjective: 2 weeks status post direct anterior left total hip arthroplasty    History: Shelia Miller is a 74 y.o. female presenting today for follow-up of surgery in 1 week from her last appointment.  She kept the Mepilex  "dressing in place and dry.  She reports some burning and nerve pain in her anterior thigh, but no significant groin pain.  She is pleased with her mobility and work with physical therapy.  She denies any new injuries or falls.  She has been compliant with her aspirin for DVT prophylaxis    Estimated body mass index is 27.24 kg/m² as calculated from the following:    Height as of 6/11/25: 5' 3\" (1.6 m).    Weight as of this encounter: 69.8 kg (153 lb 12.8 oz).    Lab Results   Component Value Date    HGBA1C 5.7 (H) 05/15/2025       Social History     Occupational History    Not on file   Tobacco Use    Smoking status: Never     Passive exposure: Past    Smokeless tobacco: Never   Vaping Use    Vaping status: Never Used   Substance and Sexual Activity    Alcohol use: Yes     Comment: occasional    Drug use: Never    Sexual activity: Not on file       Objective:  Left Hip Exam     Tenderness   The patient is experiencing tenderness in the anterior.    Range of Motion   Abduction:  45 normal   Adduction:  25 normal   Extension:  0 normal   Flexion:  120 normal   External rotation:  normal Left hip external rotation: 45.  Internal rotation: 25 normal     Muscle Strength   Abduction: 5/5   Adduction: 5/5   Flexion: 5/5     Tests   RAUDEL: negative  Elian: negative    Other   Erythema: absent  Scars: present  Sensation: normal  Pulse: present    Comments:  Mild edema left lower extremity normal for this stage postoperatively  Anterior incision well-approximated without erythema, warmth, drainage, or dehiscence. No sign of infection  Mobilizes well to table  Ambulates with antalgic gait with use of a walker  Grossly distally neurovascularly unchanged  Negative [facility], logroll            There were no vitals filed for this visit.    Past Medical History[1]    Past Surgical History[2]    Family History[3]    Current Medications[4]    Allergies[5]      Review of Systems   Constitutional:  Positive for activity change. "   HENT: Negative.     Eyes: Negative.    Respiratory: Negative.     Cardiovascular:  Positive for leg swelling.   Gastrointestinal: Negative.    Endocrine: Negative.    Genitourinary: Negative.    Musculoskeletal:  Positive for arthralgias, gait problem, joint swelling and myalgias.   Skin: Negative.    Allergic/Immunologic: Negative.    Hematological: Negative.    Psychiatric/Behavioral: Negative.           Physical Exam  Vitals and nursing note reviewed.   Constitutional:       Appearance: Normal appearance. She is well-developed.      Comments: Body mass index is 27.24 kg/m².   HENT:      Head: Normocephalic and atraumatic.      Right Ear: External ear normal.      Left Ear: External ear normal.     Eyes:      Extraocular Movements: Extraocular movements intact.      Conjunctiva/sclera: Conjunctivae normal.       Cardiovascular:      Rate and Rhythm: Normal rate.      Pulses: Normal pulses.   Pulmonary:      Effort: Pulmonary effort is normal.   Abdominal:      Palpations: Abdomen is soft.     Musculoskeletal:      Cervical back: Normal range of motion.      Comments: See ortho exam     Skin:     General: Skin is warm and dry.     Neurological:      General: No focal deficit present.      Mental Status: She is alert and oriented to person, place, and time. Mental status is at baseline.     Psychiatric:         Mood and Affect: Mood normal.         Behavior: Behavior normal.         Thought Content: Thought content normal.         Judgment: Judgment normal.         Scribe Attestation      I,:  Otis Mcintosh PA-C am acting as a scribe while in the presence of the attending physician.:       I,:  Kong Andino, DO personally performed the services described in this documentation    as scribed in my presence.:             This document was created using speech voice recognition software.   Grammatical errors, random word insertions, pronoun errors, and incomplete sentences are an occasional consequence  of this system due to software limitations, ambient noise, and hardware issues.   Any formal questions or concerns about content, text, or information contained within the body of this dictation should be directly addressed to the provider for clarification.         [1]   Past Medical History:  Diagnosis Date    Arthritis 2015    Asthma     Back pain with radiculopathy 03/01/2023    BRCA1 negative     BRCA2 negative     Breast cancer (HCC) 2020    Left  breast, triple negative    Cancer (HCC) 7/2019    Colon polyp     Elevated liver enzymes 06/06/2024    GERD (gastroesophageal reflux disease) 2016    History of chemotherapy 10/01/2020    breast cancer - left    History of radiation therapy 04/01/2021    left breast cancer    History of transfusion 1979    post partum    Hypertension     Lyme disease     OA (osteoarthritis)     Rheumatoid arthritis (HCC)     Sepsis (HCC)    [2]   Past Surgical History:  Procedure Laterality Date    ANKLE SURGERY Right 2016    2 plates and screws    BREAST BIOPSY Left 07/02/2020    BREAST LUMPECTOMY Left 08/12/2020    left breast wire localized lumpectomy    BREAST MASS EXCISION Left 09/02/2020    short superior and long lateral left breast resection, left axillary sentinel lymph node biopsy    COLONOSCOPY      ELBOW FRACTURE REPAIR Right 1996    EPIDURAL BLOCK INJECTION N/A 07/19/2024    Procedure: L5-S1 LUMBAR EPIDURAL STEROID INJECTION;  Surgeon: Giuliano Romano MD;  Location: [facility] MAIN OR;  Service: Pain Management     FRACTURE SURGERY Right     elbow    GUM SURGERY      HYSTERECTOMY  2018    JOINT REPLACEMENT Right 6/2021    hip    NC ARTHROCENTESIS ASPIR&/INJ MAJOR JT/BURSA W/O US Left 02/28/2025    Procedure: LEFT INTRA-ARTICULAR HIP INJECTION;  Surgeon: Giuliano Romano MD;  Location: [facility] MAIN OR;  Service: Pain Management     NC ARTHRP ACETBLR/PROX FEM PROSTC AGRFT/ALGRFT Left 6/2/2025    Procedure: ARTHROPLASTY HIP TOTAL ANTERIOR,NAVIGATED - overnight, left;  Surgeon:  Kong Andino, DO;  Location: [facility] MAIN OR;  Service: Orthopedics    REPLACEMENT TOTAL HIP W/  RESURFACING IMPLANTS  06/18/2021   [3]   Family History  Problem Relation Name Age of Onset    Breast cancer Mother Shelia Massey 59    Cancer Mother Shelia Massey         Breast Cancer    Lung cancer Father Brody Waterman     Cancer Father Brody Waterman         Lung cancer    COPD Father Brody Waterman     Hypertension Father Brody Waterman     Breast cancer Paternal Grandmother      Thyroid cancer Son Juan Pablo Miller     Cancer Son Juan Pablo Miller         Thyroid Cancer   [4]   Current Outpatient Medications:     acetaminophen (TYLENOL) 325 mg tablet, Take 3 tablets (975 mg total) by mouth every 8 (eight) hours, Disp: , Rfl:     albuterol (PROVENTIL HFA,VENTOLIN HFA) 90 mcg/act inhaler, as needed, Disp: , Rfl:     aspirin 325 mg tablet, Take 1 tablet (325 mg total) by mouth in the morning and 1 tablet (325 mg total) before bedtime. Do all this for 82 doses., Disp: 60 tablet, Rfl: 0    docusate sodium (COLACE) 100 mg capsule, Take 1 capsule (100 mg total) by mouth 2 (two) times a day, Disp: 60 capsule, Rfl: 0    Fluticasone-Salmeterol (Advair Diskus) 250-50 mcg/dose inhaler, Inhale 1 puff 2 (two) times a day Rinse mouth after use., Disp: 60 blister, Rfl: 11    gabapentin (Neurontin) 300 mg capsule, Take 1 capsule (300 mg total) by mouth 2 (two) times a day, Disp: 60 capsule, Rfl: 6    losartan (COZAAR) 50 mg tablet, 50 mg in the morning., Disp: , Rfl:     naloxone (NARCAN) 4 mg/0.1 mL nasal spray, Administer 1 spray into a nostril. If no response after 2-3 minutes, give another dose in the other nostril using a new spray., Disp: 1 each, Rfl: 0    omeprazole (PriLOSEC) 20 mg delayed release capsule, TAKE TWO CAPSULES BY MOUTH EVERY DAY IN THE MORNING (GENERIC PRILOSEC), Disp: 180 capsule, Rfl: 1    oxyCODONE (Roxicodone) 5 immediate release tablet, May take 1 tablet (5 mg total) by mouth every 4 (four) hours as  needed for moderate pain or severe pain. May also take 2 tablets (10 mg total) every 6 (six) hours as needed for moderate pain or severe pain. Max Daily Amount: 60 mg., Disp: 40 tablet, Rfl: 0    rosuvastatin (CRESTOR) 5 mg tablet, Take 5 mg by mouth in the morning., Disp: , Rfl:     Calcium Carbonate-Vitamin D3 600-400 MG-UNIT TABS, Take by mouth (Patient not taking: Reported on 6/9/2025), Disp: , Rfl:     Multiple Vitamin (MULTIVITAMIN ADULT PO), Take 1 tablet by mouth in the morning. (Patient not taking: Reported on 6/9/2025), Disp: , Rfl:     [Paused] Orencia ClickJect 125 MG/ML SOAJ, once a week Stopped currently due to infection (Patient not taking: Reported on 6/9/2025), Disp: , Rfl:     PreviDent 5000 Booster Plus 1.1 % PSTE, , Disp: , Rfl:     raloxifene (EVISTA) 60 mg tablet, Take 60 mg by mouth in the morning. (Patient not taking: Reported on 6/9/2025), Disp: , Rfl:   [5]   Allergies  Allergen Reactions    Fruit Extracts Itching     Cantelope melon peaches    Nuts - Food Allergy GI Intolerance    Pollen Extract Other (See Comments)     Pollen , Trees And Flowers    Tree Extract Cough and Nasal Congestion    Avelox [Moxifloxacin] Itching     Per pt, also facial swelling

## 2025-06-19 ENCOUNTER — OFFICE VISIT (OUTPATIENT)
Dept: PHYSICAL THERAPY | Facility: CLINIC | Age: 75
End: 2025-06-19
Payer: COMMERCIAL

## 2025-06-19 DIAGNOSIS — M25.552 LEFT HIP PAIN: Primary | ICD-10-CM

## 2025-06-19 DIAGNOSIS — Z96.642 S/P TOTAL LEFT HIP ARTHROPLASTY: ICD-10-CM

## 2025-06-19 PROCEDURE — 97112 NEUROMUSCULAR REEDUCATION: CPT

## 2025-06-19 PROCEDURE — 97110 THERAPEUTIC EXERCISES: CPT

## 2025-06-19 NOTE — PROGRESS NOTES
Daily Note     Today's date: 2025  Patient name: Shelia Miller  : 1950  MRN: [id number]  Referring provider: Kong Andino DO  Dx:   Encounter Diagnosis     ICD-10-CM    1. Left hip pain  M25.552       2. S/P total left hip arthroplasty  Z96.642                      Subjective: Pt reports good visit w/ Dr. Andino, motivated by progress. Feels that leg is becoming more secure, hurts less. Feels that PT is helping.       Objective: requires cueing for hip flexion and push off during unsupported gait cycle but is able to correct and maintain once cued.       Assessment: Tolerated treatment well. Patient demonstrated fatigue post treatment and would benefit from continued PT. Does well w/ exercise progressions. Trialed gait w/o AD for short durations only. Good tolerance, good ability to maintain upright posture and equal step length/WB. Will look to increase intensity slowly, focus on techniques that can be replicated at home.       Plan: Continue per plan of care. Single leg strength, leg press, unsupported gait, core work     Precautions: standard, DOS 25       Insurance Eval/ Re-eval POC expires Auth Status Total visits  Start date  Expiration date Misc   [employer]et[employer]  25 Not req    Co-Pay $10                 Date 25    Visit Number 1 2 3 4 5    Auth         TUG  TBA at next visit                 Manual         DTM and TPR                                Heel raises x20             TherEx         Active w/u         PROM X4-5 mins  X8 mins CP  X6 mins CP X6-7 mins CP X6 mins CP    AAROM         Lower quarter mobility   LTR x10-15 X10-15 X20 total LTR x20-30 total    QS  SLR  Hip abd QS and GS x 20 each X20-30 each X10 each X20 total X20-30       Ad sq x 20 X20-30 x20       LAQ x20 x20 rev        Leg press 65# 3x8-10 total NV    Neuro Re-Ed         TrA progressions Isos x10  Isos w/ ad sq x10 2x10, march attempted, add sq x20 Isos and march x20 Rev x 20 Isos  "and ad sq x20    Bridge X10 total NV Ad sq w/ bridge 2x5, w/o sq x5 Reg x20, ad sq x20 3x10    Hip hinge    Standing hip march x20 x20    Squat Sit<>stand x 5 total From 2 airex 2x5  Mini squat at bar 2x7  rev    Standing WS Med/lat in walker x 2-3 mins  X2 mins  X2 mins X2 mins throughout  rev    Gait  Assessed x 2-3 mins  In walker x 2-3 mins, SPC x 100' Throughout session x 3-4 mins w/ focus on WB through L LE Focus on step through and equal WB x 2-3 mins Single point cane x 3-4 laps, no AD 2x2 laps (40')      4\" step taps x20, full case x 5 rounds  4\" ant taps x10, step ups full x 5 ounds    6\" step ups 2x7    8\" self ROM x 1 min 4\" up/down  x6 full sets  4\" full set x 5 rounds    6\" x 3 rounds    6\" ant step ups x20    6\" lat step ups x20    HEP and POC discussion X5 mins  X 5 mins X2-3 mins X2 mins X2 mins    TherAct         Stairs         Functional transfers                                    Gait Training                                    Modalities         CP                                                                                                                "

## 2025-06-23 ENCOUNTER — OFFICE VISIT (OUTPATIENT)
Dept: PHYSICAL THERAPY | Facility: CLINIC | Age: 75
End: 2025-06-23
Payer: COMMERCIAL

## 2025-06-23 DIAGNOSIS — Z96.642 STATUS POST TOTAL REPLACEMENT OF LEFT HIP: ICD-10-CM

## 2025-06-23 DIAGNOSIS — Z96.642 S/P TOTAL LEFT HIP ARTHROPLASTY: ICD-10-CM

## 2025-06-23 DIAGNOSIS — M25.552 LEFT HIP PAIN: Primary | ICD-10-CM

## 2025-06-23 PROCEDURE — 97116 GAIT TRAINING THERAPY: CPT

## 2025-06-23 PROCEDURE — 97110 THERAPEUTIC EXERCISES: CPT

## 2025-06-23 RX ORDER — OXYCODONE HYDROCHLORIDE 5 MG/1
TABLET ORAL
Qty: 30 TABLET | Refills: 0 | Status: SHIPPED | OUTPATIENT
Start: 2025-06-23

## 2025-06-23 NOTE — PROGRESS NOTES
Daily Note     Today's date: 2025  Patient name: Shelia Miller  : 1950  MRN: [id number]  Referring provider: Kong Andino DO  Dx:   Encounter Diagnosis     ICD-10-CM    1. Left hip pain  M25.552       2. S/P total left hip arthroplasty  Z96.642             Start Time: 1100  Stop Time: 1145  Total time in clinic (min): 45 minutes    Subjective: Pt reports her LLE is getting stronger. Reports muscle soreness in her L anterior thigh today which she attributes to increased activity over the weekend. Explains she was on her feet more, attended her grandson's graduation, additionally reports going up/down her stairs twice yesterday.        Objective: requires cueing for hip flexion and push off during unsupported gait cycle but is able to correct and maintain once cued.       Assessment: Session focused on LLE strength, gait/coordination, and neural mobility, Tolerated treatment well. Patient demonstrated fatigue post treatment and would benefit from continued PT. Good tolerance to today's progressions. Good response to nerve glides, retest demonstrated intact light touch anterior lateral thigh, however still reduced compared to uninvolved side. Continues to demonstrate reduced stance time and push off LLE with ambulation without an AD, however improves with verbal cues to walk slower. Added side stepping to progress weight shifting.         Plan: Continue per plan of care. Single leg strength, leg press, unsupported gait, core work     Precautions: standard, DOS 25       Insurance Eval/ Re-eval POC expires Auth Status Total visits  Start date  Expiration date Misc   [employer]etna  25 Not req    Co-Pay $10                 Date 25   Visit Number 1 2 3 4 5 6   Auth         TUG  TBA at next visit                 Manual         DTM and TPR                                Heel raises x20             TherEx         Active w/u         PROM X4-5 mins  X8 mins CP   "X6 mins CP X6-7 mins CP X6 mins CP X6 mins CM   AAROM      Prone quad stretch with strap (hip at 0 degrees extension)    Prone femoral nerve glide, hip in neutral, knee flexed, ankle PF/DF x 30   Lower quarter mobility   LTR x10-15 X10-15 X20 total LTR x20-30 total LTR x20   QS  SLR  Hip abd QS and GS x 20 each X20-30 each X10 each X20 total X20-30 x20      Ad sq x 20 X20-30 x20 x20      LAQ x20 x20 rev        Leg press 65# 3x8-10 total NV NV   Neuro Re-Ed         TrA progressions Isos x10  Isos w/ ad sq x10 2x10, march attempted, add sq x20 Isos and march x20 Rev x 20 Isos and ad sq x20 sos and ad sq x20   Bridge X10 total NV Ad sq w/ bridge 2x5, w/o sq x5 Reg x20, ad sq x20 3x10 3x10   Hip hinge    Standing hip march x20 x20    Squat Sit<>stand x 5 total From 2 airex 2x5  Mini squat at bar 2x7  rev    Standing WS Med/lat in walker x 2-3 mins  X2 mins  X2 mins X2 mins throughout  rev    Gait  Assessed x 2-3 mins  In walker x 2-3 mins, SPC x 100' Throughout session x 3-4 mins w/ focus on WB through L LE Focus on step through and equal WB x 2-3 mins Single point cane x 3-4 laps, no AD 2x2 laps (40') Single point cane x 3-4 laps, no AD x 6 40' CS assist     4\" step taps x20, full case x 5 rounds  4\" ant taps x10, step ups full x 5 ounds    6\" step ups 2x7    8\" self ROM x 1 min 4\" up/down  x6 full sets  4\" full set x 5 rounds    6\" x 3 rounds    6\" ant step ups x20    6\" lat step ups x20 6\" ant and lat step ups 2 x 10ea    6\" x 3 rounds    8\" x 1 round     Side stepping 10ft x 5 R/L   HEP and POC discussion X5 mins  X 5 mins X2-3 mins X2 mins X2 mins X2 mins   TherAct         Stairs         Functional transfers                                    Gait Training                                    Modalities         CP                                                                                                                "

## 2025-06-26 ENCOUNTER — OFFICE VISIT (OUTPATIENT)
Dept: PHYSICAL THERAPY | Facility: CLINIC | Age: 75
End: 2025-06-26
Payer: COMMERCIAL

## 2025-06-26 DIAGNOSIS — M25.552 LEFT HIP PAIN: Primary | ICD-10-CM

## 2025-06-26 DIAGNOSIS — Z96.642 S/P TOTAL LEFT HIP ARTHROPLASTY: ICD-10-CM

## 2025-06-26 PROCEDURE — 97116 GAIT TRAINING THERAPY: CPT

## 2025-06-26 PROCEDURE — 97110 THERAPEUTIC EXERCISES: CPT

## 2025-06-26 NOTE — PROGRESS NOTES
Daily Note     Today's date: 2025  Patient name: Shelia Miller  : 1950  MRN: [id number]  Referring provider: Kong Andino DO  Dx:   Encounter Diagnosis     ICD-10-CM    1. Left hip pain  M25.552       2. S/P total left hip arthroplasty  Z96.642                      Subjective: Patient reported a click in her hip last night when rolling in bed. She reports mild discomfort following.       Objective: Hip ROM -5-105 deg. Ambulates well with SPC with improved hip flexion. She is able to ambulate with no AD, but gait marked by inc trendelenburg.       Assessment: Tolerated treatment well. Included standing quad stretch with posterior pelvic tilt to stretch proximal hip flexors, which she tolerated very well. Continued strengthening adding clamshells to TrA contraction in supine. Finished session with gait training with SPC. She demonstrates safe and independent gait while utilizing SPC with no instances of LoB. At this time, patient is ready to transition to SPC for ambulation in the home. Plan to work on curbs and outdoor surfaces with SPC to ensure maximum safety with community ambulation with SPC. Patient demonstrated fatigue post treatment, exhibited good technique with therapeutic exercises, and would benefit from continued PT      Plan: Continue POC established by primary PT and progress as tolerated. SPC curbs, grass, etc.     Precautions: standard, DOS 25       Insurance Eval/ Re-eval POC expires Auth Status Total visits  Start date  Expiration date Misc   [employer]  25 Not req    Co-Pay $10                 Date 25   Visit Number 2 3 4 5 6 7   Auth         TUG                   Manual         DTM and TPR                               Heel raises x20              TherEx         Active w/u         PROM X8 mins CP  X6 mins CP X6-7 mins CP X6 mins CP X6 mins CM X 5 min MR HERNANDEZ     Prone quad stretch with strap (hip at 0 degrees  "extension)    Prone femoral nerve glide, hip in neutral, knee flexed, ankle PF/DF x 30 Prone quad stretch with strap (hip at 0 degrees extension)  10\"x10     Standing quad lunge in 10x 5-10\"    Lower quarter mobility  LTR x10-15 X10-15 X20 total LTR x20-30 total LTR x20 LTR x20   QS  SLR  Hip abd X20-30 each X10 each X20 total X20-30 x20      Ad sq x 20 X20-30 x20 x20      LAQ x20 x20 rev        Leg press 65# 3x8-10 total NV NV    Neuro Re-Ed         TrA progressions 2x10, march attempted, add sq x20 Isos and march x20 Rev x 20 Isos and ad sq x20 sos and ad sq x20 BKFO RTB 2x10      Bridge NV Ad sq w/ bridge 2x5, w/o sq x5 Reg x20, ad sq x20 3x10 3x10 3x10    Hip hinge   Standing hip march x20 x20     Squat From 2 airex 2x5  Mini squat at bar 2x7  rev     Standing WS X2 mins  X2 mins X2 mins throughout  rev     Gait  In walker x 2-3 mins, SPC x 100' Throughout session x 3-4 mins w/ focus on WB through L LE Focus on step through and equal WB x 2-3 mins Single point cane x 3-4 laps, no AD 2x2 laps (40') Single point cane x 3-4 laps, no AD x 6 40' CS assist     4\" step taps x20, full case x 5 rounds  4\" ant taps x10, step ups full x 5 ounds    6\" step ups 2x7    8\" self ROM x 1 min 4\" up/down  x6 full sets  4\" full set x 5 rounds    6\" x 3 rounds    6\" ant step ups x20    6\" lat step ups x20 6\" ant and lat step ups 2 x 10ea    6\" x 3 rounds    8\" x 1 round     Side stepping 10ft x 5 R/L 6\" ant and lat step ups 2 x 10ea    6\" x 3 rounds    8\" x 1 round     Side stepping 10ft x 5 R/L   HEP and POC discussion X 5 mins X2-3 mins X2 mins X2 mins X2 mins    TherAct         Stairs         Functional transfers                                    Gait Training                                    Modalities         CP                                                                                                                  "

## 2025-06-30 ENCOUNTER — OFFICE VISIT (OUTPATIENT)
Dept: PHYSICAL THERAPY | Facility: CLINIC | Age: 75
End: 2025-06-30
Payer: COMMERCIAL

## 2025-06-30 DIAGNOSIS — Z96.642 S/P TOTAL LEFT HIP ARTHROPLASTY: ICD-10-CM

## 2025-06-30 DIAGNOSIS — M25.552 LEFT HIP PAIN: Primary | ICD-10-CM

## 2025-06-30 PROCEDURE — 97112 NEUROMUSCULAR REEDUCATION: CPT

## 2025-06-30 PROCEDURE — 97110 THERAPEUTIC EXERCISES: CPT

## 2025-06-30 NOTE — PROGRESS NOTES
"Daily Note     Today's date: 2025  Patient name: Shelia Miller  : 1950  MRN: [id number]  Referring provider: Kong Andino DO  Dx:   Encounter Diagnosis     ICD-10-CM    1. Left hip pain  M25.552       2. S/P total left hip arthroplasty  Z96.642                      Subjective: Pt reports continued progress. \"Good days and bad days, more good lately.\" Feels PT is helping.      Objective: requires cuing, less total, to help prevent lateral trunk lean towards L side w/ independent ambulation - corrects intermittently, form improves w/ reps.       Assessment: Tolerated treatment well. Patient demonstrated fatigue post treatment and would benefit from continued PT. Best tolerance to date w/ loaded exercise. Will look to increase intensity of strength program in coming weeks as mobility and pain improve. Plan to re-assess at next visit, pt motivated by progress.      Plan: Continue per plan of care. Re-assess, leg press, single leg strength, self mobility      Precautions: standard, DOS 25       Insurance Eval/ Re-eval POC expires Auth Status Total visits  Start date  Expiration date Misc   [employer]  25 Not req    Co-Pay $10                 Date 25    Visit Number 2 3 4 5 6 7 8 9 - PN    Auth           TUG                       Manual           DTM and TPR                                     Heel raises x20                  TherEx           Active w/u           PROM X8 mins CP  X6 mins CP X6-7 mins CP X6 mins CP X6 mins CM X 5 min MR CP x 4-5 mins     AAROM     Prone quad stretch with strap (hip at 0 degrees extension)    Prone femoral nerve glide, hip in neutral, knee flexed, ankle PF/DF x 30 Prone quad stretch with strap (hip at 0 degrees extension)  10\"x10     Standing quad lunge in 10x 5-10\"  3-4 x15-20 sec              3 x15 sec    Lower quarter mobility  LTR x10-15 X10-15 X20 total LTR x20-30 total LTR x20 LTR x20 X20     QS  SLR  Hip " "abd X20-30 each X10 each X20 total X20-30 x20        Ad sq x 20 X20-30 x20 x20        LAQ x20 x20 rev          Leg press 65# 3x8-10 total NV NV      Neuro Re-Ed           TrA progressions 2x10, march attempted, add sq x20 Isos and march x20 Rev x 20 Isos and ad sq x20 sos and ad sq x20 BKFO RTB 2x10    Blue x15 B     Bridge NV Ad sq w/ bridge 2x5, w/o sq x5 Reg x20, ad sq x20 3x10 3x10 3x10  X10 reg, blue tband 2x10     Hip hinge   Standing hip march x20 x20   Standing hip march x20    Squat From 2 airex 2x5  Mini squat at bar 2x7  rev       Standing WS X2 mins  X2 mins X2 mins throughout  rev       Gait  In walker x 2-3 mins, SPC x 100' Throughout session x 3-4 mins w/ focus on WB through L LE Focus on step through and equal WB x 2-3 mins Single point cane x 3-4 laps, no AD 2x2 laps (40') Single point cane x 3-4 laps, no AD x 6 40' CS assist  Gait no 'x5 total, side step 40'x6, backpedal 40'x4     4\" step taps x20, full case x 5 rounds  4\" ant taps x10, step ups full x 5 ounds    6\" step ups 2x7    8\" self ROM x 1 min 4\" up/down  x6 full sets  4\" full set x 5 rounds    6\" x 3 rounds    6\" ant step ups x20    6\" lat step ups x20 6\" ant and lat step ups 2 x 10ea    6\" x 3 rounds    8\" x 1 round     Side stepping 10ft x 5 R/L 6\" ant and lat step ups 2 x 10ea    6\" x 3 rounds    8\" x 1 round     Side stepping 10ft x 5 R/L 6\" ant full set x 6 rounds      6\" lat step x 20     HEP and POC discussion X 5 mins X2-3 mins X2 mins X2 mins X2 mins  X2-3 mins    TherAct           Stairs           Functional transfers                                            Gait Training                                            Modalities           CP                                                                                                                      "

## 2025-07-03 ENCOUNTER — EVALUATION (OUTPATIENT)
Dept: PHYSICAL THERAPY | Facility: CLINIC | Age: 75
End: 2025-07-03
Attending: ORTHOPAEDIC SURGERY
Payer: COMMERCIAL

## 2025-07-03 DIAGNOSIS — Z96.642 S/P TOTAL LEFT HIP ARTHROPLASTY: ICD-10-CM

## 2025-07-03 DIAGNOSIS — M25.552 LEFT HIP PAIN: Primary | ICD-10-CM

## 2025-07-03 PROCEDURE — 97112 NEUROMUSCULAR REEDUCATION: CPT

## 2025-07-03 PROCEDURE — 97110 THERAPEUTIC EXERCISES: CPT

## 2025-07-03 NOTE — PROGRESS NOTES
Daily Note     Today's date: 7/3/2025  Patient name: Shelia Miller  : 1950  MRN: [id number]  Referring provider: Kong Andino DO  Dx:   Encounter Diagnosis     ICD-10-CM    1. Left hip pain  M25.552       2. S/P total left hip arthroplasty  Z96.642                      Subjective: Pt reports 50% improvement since surgery. Feels that strength and mobility are improving, feels more secure w/ walking and stairs, encouraged by progress. Sleeping at night is still difficult s/t pain. Uses at least a cane when ambulating outside of home. Feels a bit further along than she initially expected to be. Functional update below:         Goals  Short term goals (4 weeks):  1) Pt will improve L hip AROM by 25% pain free.  2) Pt will improve B LE and core strength deficits by 1/3 grade MMT.   3) Pt will reports pain at worse <5/10.  4) Pt will initiate and progress HEP w/ special emphasis on functional L hip strength and mobility.     Long term goals (8 weeks)  1) Pt will improve FOTO to at least 62.   2) Pt will sleep through the night in positioning of choosing 6/7 nights a week w/o waking due to pain.  3) Pt will ambulate community distances w/o deficit related to L hip.  4) Pt will be independent and compliant w/ HEP in order to maximize functional benefit of skilled PT following d/c.       Plan  Patient would benefit from: skilled PT  Planned modality interventions: cryotherapy and thermotherapy: hydrocollator packs    Planned therapy interventions: abdominal trunk stabilization, activity modification, joint mobilization, manual therapy, massage, motor coordination training, neuromuscular re-education, patient education, postural training, stretching, strengthening, therapeutic activities, therapeutic exercise, therapeutic training, transfer training, home exercise program, gait training, graded activity, functional ROM exercises, graded exercise, flexibility, body mechanics training, balance and balance/weight  bearing training    Frequency: 2-3x week  Duration in weeks: 8  Treatment plan discussed with: patient  Plan details: HEP to start: ad sq, quad and glute set, bridges, weight shift, gait work        Subjective Evaluation    History of Present Illness  Date of surgery: 2025  Mechanism of injury: surgery  Mechanism of injury: Pt underwent elective L CARLOS on 25. Pain prior to surgery was severe, she wasn't able to do much in the way of exercise in the 2-3 weeks prior to surgery. 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 Deterioration accelerated, OA increased. Pt opted for surgery as soon as she could after injection. Notes that she has been improving since surgery. Pain was worse last night, took medication prior to coming in today. Main goal is to get function of L leg back so she can return to normal life activities. Functional status below:   Quality of life: good    Patient Goals  Patient goals for therapy: increased strength, decreased pain, increased motion and return to sport/leisure activities  Patient goal: get back to regular activity, cooking, housework, socializing w/ friends and family  Pain  Current pain ratin-3  At best pain ratin  At worst pain ratin-6  Location: L lateral and anterior hip  Quality: burning and dull ache  Relieving factors: rest, medications, change in position and ice (oxy prescribed per surgeon)  Aggravating factors: standing, walking, stair climbing, lifting and running  Progression: improved since eval      Objective     Palpation     Additional Palpation Details  Generally TTP about L anterior and lateral hip    Neurological Testing     Sensation     Hip   Left Hip   Intact: light touch    Right Hip   Intact: light touch    Additional Neurological Details  Dull through L lateral and medial thigh and distal lateral shin    Active Range of Motion   Left Hip   Flexion: 100 degrees   Extension: 15 degrees   Abduction: 30 degrees     Right  "Hip   Normal active range of motion    Passive Range of Motion   Left Hip   Flexion: 110 degrees   Extension: 18 degrees   Abduction: 40 degrees   External rotation (90/90): 20 degrees   Internal rotation (90/90): 12 degrees     Right Hip   Normal passive range of motion    Strength/Myotome Testing     Left Hip   Planes of Motion   Flexion: 4-  Extension: 4-  Abduction: 4-  Adduction: 4-  External rotation: 4-  Internal rotation: 4-    Right Hip   Normal muscle strength    Additional Strength Details  Core - at least 2/5     Tests     Additional Tests Details  Deferred s/t knowledge of diagnosis       Ambulation     Ambulation: Level Surfaces     Additional Level Surfaces Ambulation Details  Walker, decreased WB through L LE w/ min decrease in step length and pace, not specifically antalgic   -7/3/25: no AD in clinic, step through w/ min R side trunk lean, improves w/ reps and cueing.    General Comments:      Hip Comments   Functional Testing:   Sit<>stand: UE support on LE w/ R side WS, quick fatigue    -7/3/25: UE support on LE from level chair height, fair glute drive    BW squat: not past 25% in walker today   -7/3/25: through 50% w/ UE support and quick fatigue    Stairs: TBA    -7/3/25: 6\" step up/down w/ at least one hand UE support, fair control     SLS: unable on L    -7/3/25: no tested    7/3/25:   TUG:15.01 sec, no AD, level chair height   5x sit<>stand: 13.78 sec, UE support on LE from airex pad      Assessment: Tolerated treatment well. Patient demonstrated fatigue post treatment and would benefit from continued PT. Pt has been re-assessed after 9 total skilled PT visits. She continues to make excellent progress. She has improved in all areas of care, to include strength, functional ROM, self reports of pain, and quality of motion. FOTO score has improved as well. She is appropriate for continued sessions on 2x/week basis w/ emphasis on functional WB and strength. Ktape added to R knee today to help " "address c/o pain in patellar region. Will continue to monitor.       Plan: Continue per plan of care. Functional WB, check R knee again, strength work     Precautions: standard, DOS 6/2/25       Insurance Eval/ Re-eval POC expires Auth Status Total visits  Start date  Expiration date Misc   [employer]  6/5/25 9/25/25 Not req    Co-Pay $10                 Date 6/9/25 6/11/25 6/16/25 6/19/25 06/23/25 6/26/25 6/30/25 7/3/25   Visit Number 2 3 4 5 6 7 8 9 - PN    Auth           TUG                       Manual           DTM and TPR                                     Heel raises x20                  TherEx           Active w/u           PROM X8 mins CP  X6 mins CP X6-7 mins CP X6 mins CP X6 mins CM X 5 min MR CP x 4-5 mins  X5 mins CP   AAROM     Prone quad stretch with strap (hip at 0 degrees extension)    Prone femoral nerve glide, hip in neutral, knee flexed, ankle PF/DF x 30 Prone quad stretch with strap (hip at 0 degrees extension)  10\"x10     Standing quad lunge in 10x 5-10\"  3-4 x15-20 sec              3 x15 sec Rev    Ktape to R knee, crossing c's x 3 mins   Lower quarter mobility  LTR x10-15 X10-15 X20 total LTR x20-30 total LTR x20 LTR x20 X20  X20 LTR and s/l open books   QS  SLR  Hip abd X20-30 each X10 each X20 total X20-30 x20        Ad sq x 20 X20-30 x20 x20        LAQ x20 x20 rev          Leg press 65# 3x8-10 total NV NV      Neuro Re-Ed           TrA progressions 2x10, march attempted, add sq x20 Isos and march x20 Rev x 20 Isos and ad sq x20 sos and ad sq x20 BKFO RTB 2x10    Blue x15 B  Belt hip abd x10   Bridge NV Ad sq w/ bridge 2x5, w/o sq x5 Reg x20, ad sq x20 3x10 3x10 3x10  X10 reg, blue tband 2x10  X10 belt abd w/ bridge, ad sq w/ bridge 2x10   Hip hinge   Standing hip march x20 x20   Standing hip march x20 rev   Squat From 2 airex 2x5  Mini squat at bar 2x7  rev    Heel raises 2x10-15   Standing WS X2 mins  X2 mins X2 mins throughout  rev    TUG and 5x sit<>stand testing x 5 mins   Gait  In " "walker x 2-3 mins, SPC x 100' Throughout session x 3-4 mins w/ focus on WB through L LE Focus on step through and equal WB x 2-3 mins Single point cane x 3-4 laps, no AD 2x2 laps (40') Single point cane x 3-4 laps, no AD x 6 40' CS assist  Gait no 'x5 total, side step 40'x6, backpedal 40'x4 No ' x 6 total    Side step at bar x 3 laps    4\" step taps x20, full case x 5 rounds  4\" ant taps x10, step ups full x 5 ounds    6\" step ups 2x7    8\" self ROM x 1 min 4\" up/down  x6 full sets  4\" full set x 5 rounds    6\" x 3 rounds    6\" ant step ups x20    6\" lat step ups x20 6\" ant and lat step ups 2 x 10ea    6\" x 3 rounds    8\" x 1 round     Side stepping 10ft x 5 R/L 6\" ant and lat step ups 2 x 10ea    6\" x 3 rounds    8\" x 1 round     Side stepping 10ft x 5 R/L 6\" ant full set x 6 rounds      6\" lat step x 20  6\" ant step ups x20 total, full set x 5    HEP and POC discussion X 5 mins X2-3 mins X2 mins X2 mins X2 mins  X2-3 mins X2-3 mins   TherAct           Stairs           Functional transfers                                            Gait Training                                            Modalities           CP                                                                                                                        "

## 2025-07-07 ENCOUNTER — OFFICE VISIT (OUTPATIENT)
Dept: PHYSICAL THERAPY | Facility: CLINIC | Age: 75
End: 2025-07-07
Payer: COMMERCIAL

## 2025-07-07 DIAGNOSIS — Z96.642 S/P TOTAL LEFT HIP ARTHROPLASTY: ICD-10-CM

## 2025-07-07 DIAGNOSIS — M25.552 LEFT HIP PAIN: Primary | ICD-10-CM

## 2025-07-07 PROCEDURE — 97112 NEUROMUSCULAR REEDUCATION: CPT

## 2025-07-07 PROCEDURE — 97110 THERAPEUTIC EXERCISES: CPT

## 2025-07-07 NOTE — PROGRESS NOTES
Daily Note     Today's date: 2025  Patient name: Shleia Miller  : 1950  MRN: [id number]  Referring provider: Kong Andino DO  Dx:   Encounter Diagnosis     ICD-10-CM    1. Left hip pain  M25.552       2. S/P total left hip arthroplasty  Z96.642           Start Time: 1103  Stop Time: 1145  Total time in clinic (min): 42 minutes    Subjective: Pt reports some soreness in R knee, along w/ L quad. Feels she is improving, slow and steady. Ktape helped R knee, feels discomfort more in the back of R knee now.       Objective: Multiple small LOB w/ TRX side step/mini lunge and retro lunge - self corrected. Form improves w/ reps.      Assessment: Tolerated treatment well. Patient demonstrated fatigue post treatment and would benefit from continued PT. Does well w/ exercise progressions today. Introduced gentle dynamic progressions w/ UE support to good effect. Will look to increase intensity of this in coming sessions barring setback. Good tolerance to gait work, showing even and more symmetrical pattern w/ this. Increase intensity as able.      Plan: Continue per plan of care. Stairs, sliders, gentle dynamic progressions     Precautions: standard, DOS 25       Insurance Eval/ Re-eval POC expires Auth Status Total visits  Start date  Expiration date Misc   [employer]  25 Not req    Co-Pay $10                 Date 6/9/25 6/11/25 6/16/25 6/19/25 06/23/25 6/26/25 6/30/25 7/3/25 7/7/25   Visit Number 2 3 4 5 6 7 8 9 - PN  10   Auth            TUG                         Manual            DTM and TPR                                        Heel raises x20                    TherEx            Active w/u            PROM X8 mins CP  X6 mins CP X6-7 mins CP X6 mins CP X6 mins CM X 5 min MR CP x 4-5 mins  X5 mins CP X5 mins    AAROM     Prone quad stretch with strap (hip at 0 degrees extension)    Prone femoral nerve glide, hip in neutral, knee flexed, ankle PF/DF x 30 Prone quad stretch with strap (hip at 0  "degrees extension)  10\"x10     Standing quad lunge in 10x 5-10\"  3-4 x15-20 sec              3 x15 sec Rev    Ktape to R knee, crossing c's x 3 mins     No - rev   Lower quarter mobility  LTR x10-15 X10-15 X20 total LTR x20-30 total LTR x20 LTR x20 X20  X20 LTR and s/l open books X20 LTR    QS  SLR  Hip abd X20-30 each X10 each X20 total X20-30 x20    TKE x 20 B      Ad sq x 20 X20-30 x20 x20    Rolling stick to L quad x 3-4 min     LAQ x20 x20 rev           Leg press 65# 3x8-10 total NV NV       Neuro Re-Ed            TrA progressions 2x10, march attempted, add sq x20 Isos and march x20 Rev x 20 Isos and ad sq x20 sos and ad sq x20 BKFO RTB 2x10    Blue x15 B  Belt hip abd x10 No       Bridge NV Ad sq w/ bridge 2x5, w/o sq x5 Reg x20, ad sq x20 3x10 3x10 3x10  X10 reg, blue tband 2x10  X10 belt abd w/ bridge, ad sq w/ bridge 2x10 Ad sq 2x10   Hip hinge   Standing hip march x20 x20   Standing hip march x20 rev    Squat From 2 airex 2x5  Mini squat at bar 2x7  rev    Heel raises 2x10-15 x20   Standing WS X2 mins  X2 mins X2 mins throughout  rev    TUG and 5x sit<>stand testing x 5 mins TRX squat 2x10, lat step lunge 2x10 to L, step back lunge 2x10 on L   Gait  In walker x 2-3 mins, SPC x 100' Throughout session x 3-4 mins w/ focus on WB through L LE Focus on step through and equal WB x 2-3 mins Single point cane x 3-4 laps, no AD 2x2 laps (40') Single point cane x 3-4 laps, no AD x 6 40' CS assist  Gait no 'x5 total, side step 40'x6, backpedal 40'x4 No ' x 6 total    Side step at bar x 3 laps Gait w/o cane 50'x6 continuous     4\" step taps x20, full case x 5 rounds  4\" ant taps x10, step ups full x 5 ounds    6\" step ups 2x7    8\" self ROM x 1 min 4\" up/down  x6 full sets  4\" full set x 5 rounds    6\" x 3 rounds    6\" ant step ups x20    6\" lat step ups x20 6\" ant and lat step ups 2 x 10ea    6\" x 3 rounds    8\" x 1 round     Side stepping 10ft x 5 R/L 6\" ant and lat step ups 2 x 10ea    6\" x 3 " "rounds    8\" x 1 round     Side stepping 10ft x 5 R/L 6\" ant full set x 6 rounds      6\" lat step x 20  6\" ant step ups x20 total, full set x 5  rev   HEP and POC discussion X 5 mins X2-3 mins X2 mins X2 mins X2 mins  X2-3 mins X2-3 mins X2-3 mins   TherAct            Stairs            Functional transfers                                                Gait Training                                                Modalities            CP                                                                                                                           "

## 2025-07-10 ENCOUNTER — OFFICE VISIT (OUTPATIENT)
Dept: PHYSICAL THERAPY | Facility: CLINIC | Age: 75
End: 2025-07-10
Payer: COMMERCIAL

## 2025-07-10 DIAGNOSIS — M25.552 LEFT HIP PAIN: Primary | ICD-10-CM

## 2025-07-10 DIAGNOSIS — Z96.642 S/P TOTAL LEFT HIP ARTHROPLASTY: ICD-10-CM

## 2025-07-10 PROCEDURE — 97112 NEUROMUSCULAR REEDUCATION: CPT

## 2025-07-10 PROCEDURE — 97110 THERAPEUTIC EXERCISES: CPT

## 2025-07-10 NOTE — PROGRESS NOTES
"Daily Note     Today's date: 7/10/2025  Patient name: Shelia Miller  : 1950  MRN: [id number]  Referring provider: Kong Andino DO  Dx:   Encounter Diagnosis     ICD-10-CM    1. Left hip pain  M25.552       2. S/P total left hip arthroplasty  Z96.642                      Subjective: Pt reports slow and steady progress. She notes that she is slowly gaining confidence in her ability to bear weight on the L side w/o pain or fear of falling.      Objective: requires cueing for forward WS and leg drive to engage glutes/quads on L w/ 6\" step ups vs use of momentum and UE. Corrects, fatigues quickly but is able to maintain through limited reps.       Assessment: Tolerated treatment well. Patient demonstrated fatigue post treatment and would benefit from continued PT. Does well w/ exercise progressions today. Good tolerance to increase in intensity for stairs, gait, and hip flexion work. Introduced gentle lateral motions w/ step lunge to good effect, shows better push off and less reliance on UE support w/ reps. Will continue to increase barring setback.       Plan: Continue per plan of care. Lateral steps (small), sliders, TRX work, leg press, linking functional combos     Precautions: standard, DOS 25       Insurance Eval/ Re-eval POC expires Auth Status Total visits  Start date  Expiration date Misc   [employer]  25 Not req    Co-Pay $10                 Date 6/9/25 6/11/25 6/16/25 6/19/25 06/23/25 6/26/25 6/30/25 7/3/25 7/7/25 7/10/25   Visit Number 2 3 4 5 6 7 8 9 - PN  10 11   Auth             TUG                           Manual             DTM and TPR                                           Heel raises x20                      TherEx             Active w/u             PROM X8 mins CP  X6 mins CP X6-7 mins CP X6 mins CP X6 mins CM X 5 min MR CP x 4-5 mins  X5 mins CP X5 mins  X7-8 mins    AAROM     Prone quad stretch with strap (hip at 0 degrees extension)    Prone femoral nerve glide, hip in " "neutral, knee flexed, ankle PF/DF x 30 Prone quad stretch with strap (hip at 0 degrees extension)  10\"x10     Standing quad lunge in 10x 5-10\"  3-4 x15-20 sec              3 x15 sec Rev    Ktape to R knee, crossing c's x 3 mins     No - rev    Lower quarter mobility  LTR x10-15 X10-15 X20 total LTR x20-30 total LTR x20 LTR x20 X20  X20 LTR and s/l open books X20 LTR  X20 LTR    QS  SLR  Hip abd X20-30 each X10 each X20 total X20-30 x20    TKE x 20 B       Ad sq x 20 X20-30 x20 x20    Rolling stick to L quad x 3-4 min      LAQ x20 x20 rev            Leg press 65# 3x8-10 total NV NV        Neuro Re-Ed             TrA progressions 2x10, march attempted, add sq x20 Isos and march x20 Rev x 20 Isos and ad sq x20 sos and ad sq x20 BKFO RTB 2x10    Blue x15 B  Belt hip abd x10 No        Bridge NV Ad sq w/ bridge 2x5, w/o sq x5 Reg x20, ad sq x20 3x10 3x10 3x10  X10 reg, blue tband 2x10  X10 belt abd w/ bridge, ad sq w/ bridge 2x10 Ad sq 2x10 Ad sq bridge 2x10-12   Hip hinge   Standing hip march x20 x20   Standing hip march x20 rev     Squat From 2 airex 2x5  Mini squat at bar 2x7  rev    Heel raises 2x10-15 x20 X20-25 total   Standing WS X2 mins  X2 mins X2 mins throughout  rev    TUG and 5x sit<>stand testing x 5 mins TRX squat 2x10, lat step lunge 2x10 to L, step back lunge 2x10 on L rev   Gait  In walker x 2-3 mins, SPC x 100' Throughout session x 3-4 mins w/ focus on WB through L LE Focus on step through and equal WB x 2-3 mins Single point cane x 3-4 laps, no AD 2x2 laps (40') Single point cane x 3-4 laps, no AD x 6 40' CS assist  Gait no 'x5 total, side step 40'x6, backpedal 40'x4 No ' x 6 total    Side step at bar x 3 laps Gait w/o cane 50'x6 continuous  Gait - no AD  50'x8 total  Side step 40'x4    4\" step taps x20, full case x 5 rounds  4\" ant taps x10, step ups full x 5 ounds    6\" step ups 2x7    8\" self ROM x 1 min 4\" up/down  x6 full sets  4\" full set x 5 rounds    6\" x 3 rounds    6\" ant step ups " "x20    6\" lat step ups x20 6\" ant and lat step ups 2 x 10ea    6\" x 3 rounds    8\" x 1 round     Side stepping 10ft x 5 R/L 6\" ant and lat step ups 2 x 10ea    6\" x 3 rounds    8\" x 1 round     Side stepping 10ft x 5 R/L 6\" ant full set x 6 rounds      6\" lat step x 20  6\" ant step ups x20 total, full set x 5  rev 6\" ant steps x20, less UE support    4-6\" up/down x 4-5 sets             Lateral step lunge 2x8-10 each B    High march x20 B   HEP and POC discussion X 5 mins X2-3 mins X2 mins X2 mins X2 mins  X2-3 mins X2-3 mins X2-3 mins X2-3 mins   TherAct             Stairs             Functional transfers                                                    Gait Training                                                    Modalities             CP                                                                                                                              "

## 2025-07-13 NOTE — PROGRESS NOTES
"Daily Note     Today's date: 2025  Patient name: Shelia Miller  : 1950  MRN: [id number]  Referring provider: Kong Andino DO  Dx: No diagnosis found.               Subjective: Pt reports no new complaints in L hip. Notes that R knee continues to be very sore. Felt good after last visit.      Objective: requires cueing for pacing w/ 6\" lat step up, tends to lead through trunk when not cued. Able to correct and maintain.       Assessment: Tolerated treatment well. Patient demonstrated fatigue post treatment and would benefit from continued PT. Does well w/ exercise progressions. Fatigue but no increase in pain by end of session. Good tolerance to loaded progressions. Reviewed isometric work and NWB progressions for R leg to help w/ R knee pain. Good understanding. Will continue to monitor. Will look to increase intensity as sx allow.       Plan: Continue per POC - add gait progressions, TRX work, sliders, CoD work     Precautions: standard, DOS 25       Insurance Eval/ Re-eval POC expires Auth Status Total visits  Start date  Expiration date Misc   [employer]  25 Not req    Co-Pay $10                 Date 06/23/25 6/26/25 6/30/25 7/3/25 7/7/25 7/10/25 7/14/25    Visit Number 6 7 8 9 - PN  10 11 12    Auth           TUG                       Manual           DTM and TPR                                                       TherEx           Active w/u           PROM X6 mins CM X 5 min MR CP x 4-5 mins  X5 mins CP X5 mins  X7-8 mins  X7-8 mins    AAROM Prone quad stretch with strap (hip at 0 degrees extension)    Prone femoral nerve glide, hip in neutral, knee flexed, ankle PF/DF x 30 Prone quad stretch with strap (hip at 0 degrees extension)  10\"x10     Standing quad lunge in 10x 5-10\"  3-4 x15-20 sec              3 x15 sec Rev    Ktape to R knee, crossing c's x 3 mins     No - rev  no    Lower quarter mobility  LTR x20 LTR x20 X20  X20 LTR and s/l open books X20 LTR  X20 LTR  x20  " "  QS  SLR  Hip abd x20    TKE x 20 B   QS, LAQ B legs x10-15 each     x20    Rolling stick to L quad x 3-4 min  Wall sit - 10 sec x 3           Leg press 65# 3x6    Iso holds 35# 5 sec x 5 B     NV          Neuro Re-Ed           TrA progressions sos and ad sq x20 BKFO RTB 2x10    Blue x15 B  Belt hip abd x10 No          Bridge 3x10 3x10  X10 reg, blue tband 2x10  X10 belt abd w/ bridge, ad sq w/ bridge 2x10 Ad sq 2x10 Ad sq bridge 2x10-12 NV    Hip hinge   Standing hip march x20 rev       Squat    Heel raises 2x10-15 x20 X20-25 total rev    Standing WS    TUG and 5x sit<>stand testing x 5 mins TRX squat 2x10, lat step lunge 2x10 to L, step back lunge 2x10 on L rev     Gait  Single point cane x 3-4 laps, no AD x 6 40' CS assist  Gait no 'x5 total, side step 40'x6, backpedal 40'x4 No ' x 6 total    Side step at bar x 3 laps Gait w/o cane 50'x6 continuous  Gait - no AD  50'x8 total  Side step 40'x4 X3-4 mins throughotu no AD      6\" ant and lat step ups 2 x 10ea    6\" x 3 rounds    8\" x 1 round     Side stepping 10ft x 5 R/L 6\" ant and lat step ups 2 x 10ea    6\" x 3 rounds    8\" x 1 round     Side stepping 10ft x 5 R/L 6\" ant full set x 6 rounds      6\" lat step x 20  6\" ant step ups x20 total, full set x 5  rev 6\" ant steps x20, less UE support    4-6\" up/down x 4-5 sets 6\" ant and lat 2x10-15 each          Lateral step lunge 2x8-10 each B    High march x20 B rev    HEP and POC discussion X2 mins  X2-3 mins X2-3 mins X2-3 mins X2-3 mins X2-3 mins    TherAct           Stairs           Functional transfers                                            Gait Training                                            Modalities           CP                                                                                                                              "

## 2025-07-14 ENCOUNTER — OFFICE VISIT (OUTPATIENT)
Dept: PHYSICAL THERAPY | Facility: CLINIC | Age: 75
End: 2025-07-14
Payer: COMMERCIAL

## 2025-07-14 DIAGNOSIS — Z96.642 S/P TOTAL LEFT HIP ARTHROPLASTY: ICD-10-CM

## 2025-07-14 DIAGNOSIS — M25.552 LEFT HIP PAIN: Primary | ICD-10-CM

## 2025-07-14 PROCEDURE — 97112 NEUROMUSCULAR REEDUCATION: CPT

## 2025-07-14 PROCEDURE — 97110 THERAPEUTIC EXERCISES: CPT

## 2025-07-15 ENCOUNTER — OFFICE VISIT (OUTPATIENT)
Dept: PULMONOLOGY | Facility: MEDICAL CENTER | Age: 75
End: 2025-07-15
Payer: COMMERCIAL

## 2025-07-15 VITALS
BODY MASS INDEX: 27.29 KG/M2 | HEART RATE: 74 BPM | WEIGHT: 154 LBS | RESPIRATION RATE: 12 BRPM | SYSTOLIC BLOOD PRESSURE: 136 MMHG | HEIGHT: 63 IN | OXYGEN SATURATION: 97 % | TEMPERATURE: 98.5 F | DIASTOLIC BLOOD PRESSURE: 72 MMHG

## 2025-07-15 DIAGNOSIS — J45.20 MILD INTERMITTENT ASTHMA WITHOUT COMPLICATION: Primary | ICD-10-CM

## 2025-07-15 PROCEDURE — 99213 OFFICE O/P EST LOW 20 MIN: CPT | Performed by: STUDENT IN AN ORGANIZED HEALTH CARE EDUCATION/TRAINING PROGRAM

## 2025-07-15 RX ORDER — CELECOXIB 200 MG/1
200 CAPSULE ORAL 2 TIMES DAILY
COMMUNITY
Start: 2025-07-11 | End: 2026-01-07

## 2025-07-17 ENCOUNTER — OFFICE VISIT (OUTPATIENT)
Dept: PHYSICAL THERAPY | Facility: CLINIC | Age: 75
End: 2025-07-17
Payer: COMMERCIAL

## 2025-07-17 DIAGNOSIS — M25.552 LEFT HIP PAIN: Primary | ICD-10-CM

## 2025-07-17 DIAGNOSIS — Z96.642 S/P TOTAL LEFT HIP ARTHROPLASTY: ICD-10-CM

## 2025-07-17 PROCEDURE — 97112 NEUROMUSCULAR REEDUCATION: CPT

## 2025-07-17 PROCEDURE — 97110 THERAPEUTIC EXERCISES: CPT

## 2025-07-17 NOTE — PROGRESS NOTES
"Daily Note     Today's date: 2025  Patient name: Shelia Miller  : 1950  MRN: [id number]  Referring provider: Kong Andino DO  Dx:   Encounter Diagnosis     ICD-10-CM    1. Left hip pain  M25.552       2. S/P total left hip arthroplasty  Z96.642                      Subjective: Pt reports no new complaints, slow and steady improvement. Feels she is improving.       Objective: requires cueing w/ offset squat for WB through entirety of L foot, corrects and is able to maintain.       Assessment: Tolerated treatment well. Patient demonstrated fatigue post treatment and would benefit from continued PT. Does well w/ strength and gait progressions today. Continues to show greater control throughout functional range, will look to increase intensity of WB program at next visit. Add functional combos as able.      Plan: Continue per plan of care. Stair progressions, leg press, bike to start, HF mobility work     Precautions: standard, DOS 25       Insurance Eval/ Re-eval POC expires Auth Status Total visits  Start date  Expiration date Misc   [employer]  25 Not req    Co-Pay $10                 Date 06/23/25 6/26/25 6/30/25 7/3/25 7/7/25 7/10/25 7/14/25 7/17/25   Visit Number 6 7 8 9 - PN  10 11 12 13   Auth           TUG                       Manual           DTM and TPR                                                       TherEx           Active w/u        NU step post 5 min lvl 1 UE and LE   PROM X6 mins CM X 5 min MR CP x 4-5 mins  X5 mins CP X5 mins  X7-8 mins  X7-8 mins X6 mins    L HF release w/ mobility work x 2-3 mins   AAROM Prone quad stretch with strap (hip at 0 degrees extension)    Prone femoral nerve glide, hip in neutral, knee flexed, ankle PF/DF x 30 Prone quad stretch with strap (hip at 0 degrees extension)  10\"x10     Standing quad lunge in 10x 5-10\"  3-4 x15-20 sec              3 x15 sec Rev    Ktape to R knee, crossing c's x 3 mins     No - rev  no    Lower quarter mobility  LTR " "x20 LTR x20 X20  X20 LTR and s/l open books X20 LTR  X20 LTR  x20 x20   QS  SLR  Hip abd x20    TKE x 20 B   QS, LAQ B legs x10-15 each     x20    Rolling stick to L quad x 3-4 min  Wall sit - 10 sec x 3 rev          Leg press 65# 3x6    Iso holds 35# 5 sec x 5 B     NV          Neuro Re-Ed           TrA progressions sos and ad sq x20 BKFO RTB 2x10    Blue x15 B  Belt hip abd x10 No          Bridge 3x10 3x10  X10 reg, blue tband 2x10  X10 belt abd w/ bridge, ad sq w/ bridge 2x10 Ad sq 2x10 Ad sq bridge 2x10-12 NV Ad sq x20, w/ bridge x20, belt abd x 20, w/ bridge x2   Hip hinge   Standing hip march x20 rev    12\" step tap 2x10   Squat    Heel raises 2x10-15 x20 X20-25 total rev x30   Standing WS    TUG and 5x sit<>stand testing x 5 mins TRX squat 2x10, lat step lunge 2x10 to L, step back lunge 2x10 on L rev     Gait  Single point cane x 3-4 laps, no AD x 6 40' CS assist  Gait no 'x5 total, side step 40'x6, backpedal 40'x4 No ' x 6 total    Side step at bar x 3 laps Gait w/o cane 50'x6 continuous  Gait - no AD  50'x8 total  Side step 40'x4 X3-4 mins throughotu no AD  X3-4 mins w/o AD, focus on heel strike and push off    6\" ant and lat step ups 2 x 10ea    6\" x 3 rounds    8\" x 1 round     Side stepping 10ft x 5 R/L 6\" ant and lat step ups 2 x 10ea    6\" x 3 rounds    8\" x 1 round     Side stepping 10ft x 5 R/L 6\" ant full set x 6 rounds      6\" lat step x 20  6\" ant step ups x20 total, full set x 5  rev 6\" ant steps x20, less UE support    4-6\" up/down x 4-5 sets 6\" ant and lat 2x10-15 each 6\" ant and lat 2x10-12 each         Lateral step lunge 2x8-10 each B    High march x20 B rev TRX offset (L back) squat 2x10   HEP and POC discussion X2 mins  X2-3 mins X2-3 mins X2-3 mins X2-3 mins X2-3 mins X2-3 mins   TherAct           Stairs           Functional transfers                                            Gait Training                                            Modalities           CP         "

## 2025-07-21 ENCOUNTER — OFFICE VISIT (OUTPATIENT)
Dept: PHYSICAL THERAPY | Facility: CLINIC | Age: 75
End: 2025-07-21
Attending: ORTHOPAEDIC SURGERY
Payer: COMMERCIAL

## 2025-07-21 DIAGNOSIS — Z96.642 S/P TOTAL LEFT HIP ARTHROPLASTY: ICD-10-CM

## 2025-07-21 DIAGNOSIS — M25.552 LEFT HIP PAIN: Primary | ICD-10-CM

## 2025-07-21 PROCEDURE — 97110 THERAPEUTIC EXERCISES: CPT

## 2025-07-21 PROCEDURE — 97112 NEUROMUSCULAR REEDUCATION: CPT

## 2025-07-21 NOTE — PROGRESS NOTES
Daily Note     Today's date: 2025  Patient name: Shelia Miller  : 1950  MRN: [id number]  Referring provider: Kong Andino DO  Dx:   Encounter Diagnosis     ICD-10-CM    1. Left hip pain  M25.552       2. S/P total left hip arthroplasty  Z96.642                      Subjective: Pt reports that L hip feels pretty good. Notes that R knee has been more painful than L hip recently, feels pain in back of knee but sometimes wraps to front of shin. Was able to do lots of household cleaning yesterday, very motivated by this.      Objective: requires cueing for WS w/ lateral step ups, tends to utilize trunk when not cued - able to correct and maintain.       Assessment: Tolerated treatment well. Patient demonstrated fatigue post treatment and would benefit from continued PT. Does well w/ exercise progressions. Fatigue but no increase in pain by end of session. Leg press done to finish session, helps to ease R knee pain by end of reps. Will continue w/ focus on strengthening through hips/core and dynamic stability w/ gait and functional motion. Pt motivated by progress.      Plan: Continue per POC     Precautions: standard, DOS 25       Insurance Eval/ Re-eval POC expires Auth Status Total visits  Start date  Expiration date Misc   [employer]  25 Not req    Co-Pay $10                 Date 06/23/25 6/26/25 6/30/25 7/3/25 7/7/25 7/10/25 7/14/25 7/17/25 7/21/25   Visit Number [phone] 9 - PN  10 11 12 13 14   Auth            TUG                         Manual            DTM and TPR                                                            TherEx            Active w/u        NU step post 5 min lvl 1 UE and LE    PROM X6 mins CM X 5 min MR CP x 4-5 mins  X5 mins CP X5 mins  X7-8 mins  X7-8 mins X6 mins    L HF release w/ mobility work x 2-3 mins X8 mins w/ R knee check     rev   AAROM Prone quad stretch with strap (hip at 0 degrees extension)    Prone femoral nerve glide, hip in neutral, knee flexed, ankle  "PF/DF x 30 Prone quad stretch with strap (hip at 0 degrees extension)  10\"x10     Standing quad lunge in 10x 5-10\"  3-4 x15-20 sec              3 x15 sec Rev    Ktape to R knee, crossing c's x 3 mins     No - rev  no     Lower quarter mobility  LTR x20 LTR x20 X20  X20 LTR and s/l open books X20 LTR  X20 LTR  x20 x20 2x20 total   QS  SLR  Hip abd x20    TKE x 20 B   QS, LAQ B legs x10-15 each      x20    Rolling stick to L quad x 3-4 min  Wall sit - 10 sec x 3 rev           Leg press 65# 3x6    Iso holds 35# 5 sec x 5 B  Leg press 75# 2x10    55# 2x15 (R knee pain improves    NV           Neuro Re-Ed            TrA progressions sos and ad sq x20 BKFO RTB 2x10    Blue x15 B  Belt hip abd x10 No           Bridge 3x10 3x10  X10 reg, blue tband 2x10  X10 belt abd w/ bridge, ad sq w/ bridge 2x10 Ad sq 2x10 Ad sq bridge 2x10-12 NV Ad sq x20, w/ bridge x20, belt abd x 20, w/ bridge x2 X20 reg   Hip hinge   Standing hip march x20 rev    12\" step tap 2x10 WS w/ high march x20 each B   Squat    Heel raises 2x10-15 x20 X20-25 total rev x30    Standing WS    TUG and 5x sit<>stand testing x 5 mins TRX squat 2x10, lat step lunge 2x10 to L, step back lunge 2x10 on L rev      Gait  Single point cane x 3-4 laps, no AD x 6 40' CS assist  Gait no 'x5 total, side step 40'x6, backpedal 40'x4 No ' x 6 total    Side step at bar x 3 laps Gait w/o cane 50'x6 continuous  Gait - no AD  50'x8 total  Side step 40'x4 X3-4 mins throughotu no AD  X3-4 mins w/o AD, focus on heel strike and push off X4 mins total throughout no AD    6\" ant and lat step ups 2 x 10ea    6\" x 3 rounds    8\" x 1 round     Side stepping 10ft x 5 R/L 6\" ant and lat step ups 2 x 10ea    6\" x 3 rounds    8\" x 1 round     Side stepping 10ft x 5 R/L 6\" ant full set x 6 rounds      6\" lat step x 20  6\" ant step ups x20 total, full set x 5  rev 6\" ant steps x20, less UE support    4-6\" up/down x 4-5 sets 6\" ant and lat 2x10-15 each 6\" ant and lat 2x10-12 each 4\" lat " "on L only 2x8    6\" ant step ups 2x10          Lateral step lunge 2x8-10 each B    High march x20 B rev TRX offset (L back) squat 2x10    HEP and POC discussion X2 mins  X2-3 mins X2-3 mins X2-3 mins X2-3 mins X2-3 mins X2-3 mins X2-3 mins   TherAct            Stairs            Functional transfers                                                Gait Training                                                Modalities            CP                                                                                                                                   "

## 2025-07-23 ENCOUNTER — OFFICE VISIT (OUTPATIENT)
Dept: OBGYN CLINIC | Facility: CLINIC | Age: 75
End: 2025-07-23

## 2025-07-23 VITALS — BODY MASS INDEX: 27.29 KG/M2 | WEIGHT: 154 LBS | HEIGHT: 63 IN

## 2025-07-23 DIAGNOSIS — Z47.1 AFTERCARE FOLLOWING LEFT HIP JOINT REPLACEMENT SURGERY: ICD-10-CM

## 2025-07-23 DIAGNOSIS — Z96.642 AFTERCARE FOLLOWING LEFT HIP JOINT REPLACEMENT SURGERY: ICD-10-CM

## 2025-07-23 DIAGNOSIS — Z96.642 STATUS POST TOTAL REPLACEMENT OF LEFT HIP: Primary | ICD-10-CM

## 2025-07-23 PROCEDURE — 99024 POSTOP FOLLOW-UP VISIT: CPT | Performed by: ORTHOPAEDIC SURGERY

## 2025-07-23 NOTE — PROGRESS NOTES
"Name: Shelia Miller      : 1950      MRN: [id number]  Encounter Provider: Kong Andino DO  Encounter Date: 2025   Encounter department: [facility]  :  Assessment & Plan  Status post total replacement of left hip  Aftercare following left hip joint replacement surgery              Shelia Miller is a pleasant 74 y.o. female presenting today for follow-up 7 weeks after a direct anterior left total hip arthroplasty.  She is doing very well at this time.   Her incision is healing nicely, noted to have 2 spitting sutures within the distal third of her incision which were removed aseptically today. She is planning a lake trip in 2 weeks, advised her to wait till complete healing of her incision prior to swimming in the lake. She will continue her efforts at physical therapy and at home, and we will plan to see her back in 5 weeks for a clinical reevaluation.  All questions addressed.     Return in about 5 weeks (around 2025).    I have personally reviewed pertinent imaging.  No new imaging obtained today.    History: Shelia Miller is a 74 y.o. female presenting today for follow-up of surgery 7 weeks post op from a direct anterior hip arthroplasty.  She denies any new concerns. She is very happy with her hip mobility. She reports some burning and nerve pain in her anterior thigh, but no significant groin pain; improved since last visit.  She continues to work with physical therapy.  She denies any new injuries or falls.  She has been compliant with her aspirin for DVT prophylaxis       Estimated body mass index is 27.28 kg/m² as calculated from the following:    Height as of this encounter: 5' 3\" (1.6 m).    Weight as of this encounter: 69.9 kg (154 lb).    Lab Results   Component Value Date    HGBA1C 5.7 (H) 05/15/2025       Social History     Occupational History    Not on file   Tobacco Use    Smoking status: Never     Passive exposure: Past    Smokeless tobacco: Never "   Vaping Use    Vaping status: Never Used   Substance and Sexual Activity    Alcohol use: Yes     Comment: occasional    Drug use: Never    Sexual activity: Not on file       Objective:  Left Hip Exam      Tenderness   The patient is experiencing no tenderness     Range of Motion   Abduction:  45 normal   Adduction:  25 normal   Extension:  0 normal   Flexion:  120 normal   External rotation:  normal Left hip external rotation: 45.  Internal rotation: 25 normal      Muscle Strength   Abduction: 5/5   Adduction: 5/5   Flexion: 5/5      Tests   RAUDEL: negative  Elian: negative     Other   Erythema: absent  Scars: present  Sensation: normal  Pulse: present     Comments:    Anterior incision well-approximated without erythema, warmth, drainage, or dehiscence. No sign of infection. Noted to have 2 sutures spitting from the distal third aspect of the incision  Mobilizes well to table  Grossly distally neurovascularly unchanged  Negative Stinchfield, logroll          There were no vitals filed for this visit.    Subjective:  Past Medical History[1]    Past Surgical History[2]    Family History[3]    Current Medications[4]    Allergies[5]    Review of Systems   Constitutional:  Positive for activity change. Negative for chills, fever and unexpected weight change.   HENT:  Negative for hearing loss, nosebleeds and sore throat.    Eyes:  Negative for pain, redness and visual disturbance.   Respiratory:  Negative for cough, shortness of breath and wheezing.    Cardiovascular:  Negative for chest pain, palpitations and leg swelling.   Gastrointestinal:  Negative for abdominal pain, nausea and vomiting.   Endocrine: Negative for polydipsia and polyuria.   Genitourinary:  Negative for dysuria and hematuria.   Musculoskeletal:  See HPI  Skin:  Negative for rash and wound.   Neurological:  Negative for dizziness, numbness and headaches.   Psychiatric/Behavioral:  Negative for decreased concentration and suicidal ideas. The patient  is not nervous/anxious.      Physical Exam  Vitals and nursing note reviewed.   Constitutional:       Appearance: Normal appearance. She is well-developed.   HENT:      Head: Normocephalic and atraumatic.      Right Ear: External ear normal.      Left Ear: External ear normal.   Eyes:      General: No scleral icterus.     Extraocular Movements: Extraocular movements intact.      Conjunctiva/sclera: Conjunctivae normal.   Cardiovascular:      Rate and Rhythm: Normal rate.   Pulmonary:      Effort: Pulmonary effort is normal. No respiratory distress.   Musculoskeletal:      Cervical back: Normal range of motion and neck supple.      Comments: See Ortho exam   Skin:     General: Skin is warm and dry.   Neurological:      General: No focal deficit present.      Mental Status: She is alert and oriented to person, place, and time.   Psychiatric:         Behavior: Behavior normal.     Scribe Attestation      I,:  Tracy Kumari MD am acting as a scribe while in the presence of the attending physician.:       I,:  Kong Andino DO personally performed the services described in this documentation    as scribed in my presence.:           This document was created using speech voice recognition software.   Grammatical errors, random word insertions, pronoun errors, and incomplete sentences are an occasional consequence of this system due to software limitations, ambient noise, and hardware issues.   Any formal questions or concerns about content, text, or information contained within the body of this dictation should be directly addressed to the provider for clarification.         [1]   Past Medical History:  Diagnosis Date    Arthritis 2015    Asthma     Back pain with radiculopathy 03/01/2023    BRCA1 negative     BRCA2 negative     Breast cancer (HCC) 2020    Left  breast, triple negative    Cancer (HCC) 7/2019    Colon polyp     Elevated liver enzymes 06/06/2024    GERD (gastroesophageal reflux disease) 2016    History  of chemotherapy 10/01/2020    breast cancer - left    History of radiation therapy 04/01/2021    left breast cancer    History of transfusion 1979    post partum    Hypertension     Lyme disease     OA (osteoarthritis)     Rheumatoid arthritis (HCC)     Sepsis (HCC)    [2]   Past Surgical History:  Procedure Laterality Date    ANKLE SURGERY Right 2016    2 plates and screws    BREAST BIOPSY Left 07/02/2020    BREAST LUMPECTOMY Left 08/12/2020    left breast wire localized lumpectomy    BREAST MASS EXCISION Left 09/02/2020    short superior and long lateral left breast resection, left axillary sentinel lymph node biopsy    COLONOSCOPY      ELBOW FRACTURE REPAIR Right 1996    EPIDURAL BLOCK INJECTION N/A 07/19/2024    Procedure: L5-S1 LUMBAR EPIDURAL STEROID INJECTION;  Surgeon: Giuliano Romano MD;  Location: [facility] MAIN OR;  Service: Pain Management     FRACTURE SURGERY Right     elbow    GUM SURGERY      HYSTERECTOMY  2018    JOINT REPLACEMENT Right 6/2021    hip    MT ARTHROCENTESIS ASPIR&/INJ MAJOR JT/BURSA W/O US Left 02/28/2025    Procedure: LEFT INTRA-ARTICULAR HIP INJECTION;  Surgeon: Giuliano Romano MD;  Location: [facility] MAIN OR;  Service: Pain Management     MT ARTHRP ACETBLR/PROX FEM PROSTC AGRFT/ALGRFT Left 6/2/2025    Procedure: ARTHROPLASTY HIP TOTAL ANTERIOR,NAVIGATED - overnight, left;  Surgeon: Kong Andino DO;  Location: [facility] MAIN OR;  Service: Orthopedics    REPLACEMENT TOTAL HIP W/  RESURFACING IMPLANTS  06/18/2021   [3]   Family History  Problem Relation Name Age of Onset    Breast cancer Mother Shelia BOWMAN'Ember 59    Cancer Mother Shelia BOWMAN'Ember         Breast Cancer    Lung cancer Father Brody BOWMAN’Ember     Cancer Father Brody BOWMAN’Ember         Lung cancer    COPD Father Brody BOWMAN’Ember     Hypertension Father Brody BOWMAN’Ember     Breast cancer Paternal Grandmother      Thyroid cancer Son Juan Pablo Miller     Cancer Son Juan Pablo Miller         Thyroid Cancer   [4]   Current Outpatient  Medications:     acetaminophen (TYLENOL) 325 mg tablet, Take 3 tablets (975 mg total) by mouth every 8 (eight) hours, Disp: , Rfl:     Calcium Carbonate-Vitamin D3 600-400 MG-UNIT TABS, Take by mouth, Disp: , Rfl:     celecoxib (CeleBREX) 200 mg capsule, Take 200 mg by mouth 2 (two) times a day, Disp: , Rfl:     gabapentin (Neurontin) 300 mg capsule, Take 1 capsule (300 mg total) by mouth 2 (two) times a day, Disp: 60 capsule, Rfl: 6    losartan (COZAAR) 50 mg tablet, 50 mg in the morning., Disp: , Rfl:     Multiple Vitamin (MULTIVITAMIN ADULT PO), Take 1 tablet by mouth in the morning., Disp: , Rfl:     omeprazole (PriLOSEC) 20 mg delayed release capsule, TAKE TWO CAPSULES BY MOUTH EVERY DAY IN THE MORNING (GENERIC PRILOSEC), Disp: 180 capsule, Rfl: 1    oxyCODONE (Roxicodone) 5 immediate release tablet, May take 0.5 tablets (2.5 mg total) by mouth every 4 (four) hours as needed for moderate pain or severe pain. May also take 1 tablet (5 mg total) every 6 (six) hours as needed for moderate pain or severe pain. Max Daily Amount: 30 mg., Disp: 30 tablet, Rfl: 0    PreviDent 5000 Booster Plus 1.1 % PSTE, , Disp: , Rfl:     rosuvastatin (CRESTOR) 5 mg tablet, Take 5 mg by mouth in the morning., Disp: , Rfl:     albuterol (PROVENTIL HFA,VENTOLIN HFA) 90 mcg/act inhaler, as needed (Patient not taking: Reported on 7/15/2025), Disp: , Rfl:     aspirin 325 mg tablet, Take 1 tablet (325 mg total) by mouth in the morning and 1 tablet (325 mg total) before bedtime. Do all this for 82 doses., Disp: 60 tablet, Rfl: 0    docusate sodium (COLACE) 100 mg capsule, Take 1 capsule (100 mg total) by mouth 2 (two) times a day, Disp: 60 capsule, Rfl: 0    Fluticasone-Salmeterol (Advair Diskus) 250-50 mcg/dose inhaler, Inhale 1 puff 2 (two) times a day Rinse mouth after use. (Patient not taking: Reported on 7/15/2025), Disp: 60 blister, Rfl: 11    naloxone (NARCAN) 4 mg/0.1 mL nasal spray, Administer 1 spray into a nostril. If no response  after 2-3 minutes, give another dose in the other nostril using a new spray., Disp: 1 each, Rfl: 0    [Paused] Orencia ClickJect 125 MG/ML SOAJ, once a week Stopped currently due to infection (Patient not taking: Reported on 7/23/2025), Disp: , Rfl:     raloxifene (EVISTA) 60 mg tablet, Take 60 mg by mouth in the morning., Disp: , Rfl:   [5]   Allergies  Allergen Reactions    Fruit Extracts Itching     Cantelope melon peaches    Nuts - Food Allergy GI Intolerance    Pollen Extract Other (See Comments)     Pollen , Trees And Flowers    Tree Extract Cough and Nasal Congestion    Avelox [Moxifloxacin] Itching     Per pt, also facial swelling

## 2025-07-24 ENCOUNTER — OFFICE VISIT (OUTPATIENT)
Dept: PHYSICAL THERAPY | Facility: CLINIC | Age: 75
End: 2025-07-24
Attending: ORTHOPAEDIC SURGERY
Payer: COMMERCIAL

## 2025-07-24 DIAGNOSIS — Z96.642 S/P TOTAL LEFT HIP ARTHROPLASTY: ICD-10-CM

## 2025-07-24 DIAGNOSIS — M25.552 LEFT HIP PAIN: Primary | ICD-10-CM

## 2025-07-24 PROCEDURE — 97110 THERAPEUTIC EXERCISES: CPT

## 2025-07-24 PROCEDURE — 97112 NEUROMUSCULAR REEDUCATION: CPT

## 2025-07-24 NOTE — PROGRESS NOTES
Daily Note     Today's date: 2025  Patient name: Shelia Miller  : 1950  MRN: [id number]  Referring provider: Kong Andino DO  Dx:   Encounter Diagnosis     ICD-10-CM    1. Left hip pain  M25.552       2. S/P total left hip arthroplasty  Z96.642                      Subjective: Pt reports that her L anterior hip is a bit stiff today. R knee was really sore after last visit.       Objective: requires cueing for pacing and muscle activation w/ HF lifts, corrects and is able to maintain but fatigues quickly.       Assessment: Tolerated treatment well. Patient demonstrated fatigue post treatment and would benefit from continued PT. Added laser to R distal HS and posterior knee. Risks and benefits explained, pt consented. She had no contraindications to care. Parameters listed below. Kept session focus on L hip strength and mobility work as able, will attempt to modify work to account for R knee pain.       Plan: Continue per plan of care. Check on laser, functional strength progressions for B hips.      Precautions: standard, DOS 25       Insurance Eval/ Re-eval POC expires Auth Status Total visits  Start date  Expiration date Misc   [employer]  25 Not req    Co-Pay $10                 Date 7/10/25 7/14/25 7/17/25 7/21/25 7/24/25    Visit Number [phone] 15    Auth         TUG                   Manual         DTM and TPR                                Cold laser  Power: 25  Depth: 15  Area: 200 cm2  Skin tone: light             TherEx         Active w/u   NU step post 5 min lvl 1 UE and LE  Pre 5 mins lvl 1-2    PROM X7-8 mins  X7-8 mins X6 mins    L HF release w/ mobility work x 2-3 mins X8 mins w/ R knee check     rev X3-4 mins    AAROM  no       Lower quarter mobility  X20 LTR  x20 x20 2x20 total x30    QS  SLR  Hip abd  QS, LAQ B legs x10-15 each         Wall sit - 10 sec x 3 rev        Leg press 65# 3x6    Iso holds 35# 5 sec x 5 B  Leg press 75# 2x10    55# 2x15 (R knee pain improves  "no             Neuro Re-Ed         TrA progressions         Bridge Ad sq bridge 2x10-12 NV Ad sq x20, w/ bridge x20, belt abd x 20, w/ bridge x2 X20 reg Reg x20    Hip hinge   12\" step tap 2x10 WS w/ high march x20 each B High march w/ UE support and core brace 2x10    Squat X20-25 total rev x30      Standing WS rev        Gait  Gait - no AD  50'x8 total  Side step 40'x4 X3-4 mins throughotu no AD  X3-4 mins w/o AD, focus on heel strike and push off X4 mins total throughout no AD X3-4 mins total     6\" ant steps x20, less UE support    4-6\" up/down x 4-5 sets 6\" ant and lat 2x10-15 each 6\" ant and lat 2x10-12 each 4\" lat on L only 2x8    6\" ant step ups 2x10  6\" ant and lat 2x10 each     Lateral step lunge 2x8-10 each B    High march x20 B rev TRX offset (L back) squat 2x10      HEP and POC discussion X2-3 mins X2-3 mins X2-3 mins X2-3 mins X2-3 mins    TherAct         Stairs         Functional transfers                                    Gait Training                                    Modalities         CP                                                                                                                                  "

## 2025-07-28 ENCOUNTER — OFFICE VISIT (OUTPATIENT)
Dept: PHYSICAL THERAPY | Facility: CLINIC | Age: 75
End: 2025-07-28
Attending: ORTHOPAEDIC SURGERY
Payer: COMMERCIAL

## 2025-07-28 DIAGNOSIS — Z96.642 S/P TOTAL LEFT HIP ARTHROPLASTY: ICD-10-CM

## 2025-07-28 DIAGNOSIS — M25.552 LEFT HIP PAIN: Primary | ICD-10-CM

## 2025-07-28 PROCEDURE — 97112 NEUROMUSCULAR REEDUCATION: CPT

## 2025-07-28 PROCEDURE — 97110 THERAPEUTIC EXERCISES: CPT

## 2025-07-31 ENCOUNTER — EVALUATION (OUTPATIENT)
Dept: PHYSICAL THERAPY | Facility: CLINIC | Age: 75
End: 2025-07-31
Attending: ORTHOPAEDIC SURGERY
Payer: COMMERCIAL

## 2025-07-31 DIAGNOSIS — Z96.642 S/P TOTAL LEFT HIP ARTHROPLASTY: ICD-10-CM

## 2025-07-31 DIAGNOSIS — M25.552 LEFT HIP PAIN: Primary | ICD-10-CM

## 2025-07-31 PROCEDURE — 97110 THERAPEUTIC EXERCISES: CPT

## 2025-07-31 PROCEDURE — 97112 NEUROMUSCULAR REEDUCATION: CPT

## 2025-08-05 ENCOUNTER — OFFICE VISIT (OUTPATIENT)
Dept: PHYSICAL THERAPY | Facility: CLINIC | Age: 75
End: 2025-08-05
Attending: ORTHOPAEDIC SURGERY
Payer: COMMERCIAL

## 2025-08-05 DIAGNOSIS — Z96.642 S/P TOTAL LEFT HIP ARTHROPLASTY: ICD-10-CM

## 2025-08-05 DIAGNOSIS — M25.552 LEFT HIP PAIN: Primary | ICD-10-CM

## 2025-08-05 PROCEDURE — 97112 NEUROMUSCULAR REEDUCATION: CPT

## 2025-08-05 PROCEDURE — 97110 THERAPEUTIC EXERCISES: CPT

## 2025-08-08 ENCOUNTER — OFFICE VISIT (OUTPATIENT)
Dept: FAMILY MEDICINE CLINIC | Facility: CLINIC | Age: 75
End: 2025-08-08
Payer: COMMERCIAL

## 2025-08-08 VITALS
DIASTOLIC BLOOD PRESSURE: 70 MMHG | OXYGEN SATURATION: 98 % | SYSTOLIC BLOOD PRESSURE: 132 MMHG | BODY MASS INDEX: 27.46 KG/M2 | RESPIRATION RATE: 18 BRPM | HEIGHT: 63 IN | TEMPERATURE: 97.4 F | WEIGHT: 155 LBS | HEART RATE: 81 BPM

## 2025-08-08 DIAGNOSIS — M06.9 RHEUMATOID ARTHRITIS, INVOLVING UNSPECIFIED SITE, UNSPECIFIED WHETHER RHEUMATOID FACTOR PRESENT (HCC): Chronic | ICD-10-CM

## 2025-08-08 DIAGNOSIS — I10 HYPERTENSION, UNSPECIFIED TYPE: Primary | ICD-10-CM

## 2025-08-08 DIAGNOSIS — I50.30 HEART FAILURE WITH PRESERVED EJECTION FRACTION, UNSPECIFIED HF CHRONICITY (HCC): ICD-10-CM

## 2025-08-08 DIAGNOSIS — J45.30 MILD PERSISTENT ASTHMA, UNSPECIFIED WHETHER COMPLICATED: Chronic | ICD-10-CM

## 2025-08-08 DIAGNOSIS — Z96.642 STATUS POST TOTAL REPLACEMENT OF LEFT HIP: ICD-10-CM

## 2025-08-08 DIAGNOSIS — M81.0 OSTEOPOROSIS, UNSPECIFIED OSTEOPOROSIS TYPE, UNSPECIFIED PATHOLOGICAL FRACTURE PRESENCE: Chronic | ICD-10-CM

## 2025-08-08 DIAGNOSIS — C50.912 MALIGNANT NEOPLASM OF LEFT FEMALE BREAST, UNSPECIFIED ESTROGEN RECEPTOR STATUS, UNSPECIFIED SITE OF BREAST (HCC): Chronic | ICD-10-CM

## 2025-08-08 DIAGNOSIS — Z96.641 STATUS POST TOTAL REPLACEMENT OF RIGHT HIP: ICD-10-CM

## 2025-08-08 DIAGNOSIS — E78.5 HYPERLIPIDEMIA, UNSPECIFIED HYPERLIPIDEMIA TYPE: ICD-10-CM

## 2025-08-08 PROBLEM — I50.31 DIASTOLIC CHF, ACUTE (HCC): Status: RESOLVED | Noted: 2022-07-29 | Resolved: 2025-08-08

## 2025-08-08 PROCEDURE — 99214 OFFICE O/P EST MOD 30 MIN: CPT | Performed by: FAMILY MEDICINE

## 2025-08-08 PROCEDURE — G2211 COMPLEX E/M VISIT ADD ON: HCPCS | Performed by: FAMILY MEDICINE

## (undated) DEVICE — TIBURON SPLIT SHEET: Brand: CONVERTORS

## (undated) DEVICE — RADIOLOGY STERILE LABELS: Brand: CENTURION

## (undated) DEVICE — FLEXIBLE ADHESIVE BANDAGE,X-LARGE: Brand: CURITY

## (undated) DEVICE — PLASTIC ADHESIVE BANDAGE: Brand: CURITY

## (undated) DEVICE — GLOVE SRG BIOGEL ORTHOPEDIC 8.5

## (undated) DEVICE — OSCILLATING TIP SAW CARTRIDGE: Brand: PRECISION FALCON

## (undated) DEVICE — MAT ABSORBANT ARTHROSCOPY FLOOR 46 X 40 IN

## (undated) DEVICE — SUT STRATFIX SPIRAL PDS PLUS 1 CT-1/CT-1 12IN SXPP2B403

## (undated) DEVICE — PAD CAST 4 IN COTTON NON STERILE

## (undated) DEVICE — INSTRUMENT POUCH: Brand: CONVERTORS

## (undated) DEVICE — CHLORAPREP APPLICATOR TINTED 10.5ML ONE-STEP

## (undated) DEVICE — POSITIONER HANA TABLE PACK

## (undated) DEVICE — TRAY FOLEY 16FR URIMETER SURESTEP

## (undated) DEVICE — GLOVE INDICATOR PI UNDERGLOVE SZ 7.5 BLUE

## (undated) DEVICE — GLOVE SRG LF STRL BGL SKNSNS 7.5 PF

## (undated) DEVICE — SUT VICRYL 0 CP-1 27 IN J267H

## (undated) DEVICE — ANTIBACTERIAL UNDYED BRAIDED (POLYGLACTIN 910), SYNTHETIC ABSORBABLE SUTURE: Brand: COATED VICRYL

## (undated) DEVICE — HOOD: Brand: FLYTE, SURGICOOL

## (undated) DEVICE — INTENDED FOR TISSUE SEPARATION, AND OTHER PROCEDURES THAT REQUIRE A SHARP SURGICAL BLADE TO PUNCTURE OR CUT.: Brand: BARD-PARKER ® CARBON RIB-BACK BLADES

## (undated) DEVICE — GLOVE INDICATOR PI UNDERGLOVE SZ 8.5 BLUE

## (undated) DEVICE — HANDPIECE SET WITH HIGH FLOW TIP AND SUCTION TUBE: Brand: INTERPULSE

## (undated) DEVICE — TRAY PAIN SUPPORT

## (undated) DEVICE — SKIN MARKER DUAL TIP WITH RULER CAP, FLEXIBLE RULER AND LABELS: Brand: DEVON

## (undated) DEVICE — NEEDLE SPINAL 22G X 3.5 IN PLST HUB

## (undated) DEVICE — TUBE MINI KAMVAC SUCTION 7310 7 LATEX FREE

## (undated) DEVICE — C-ARM: Brand: UNBRANDED

## (undated) DEVICE — NEPTUNE E-SEP SMOKE EVACUATION PENCIL, COATED, 70MM BLADE, PUSH BUTTON SWITCH: Brand: NEPTUNE E-SEP

## (undated) DEVICE — PACK MAJOR ORTHO W/SPLITS PBDS

## (undated) DEVICE — SYRINGE EPI 8ML LUER SLIP LOSS OF RESISTANCE PLASTIC PERFIX

## (undated) DEVICE — 3M™ IOBAN™ 2 ANTIMICROBIAL INCISE DRAPE 6650EZ: Brand: IOBAN™ 2

## (undated) DEVICE — ASTOUND SURGICAL GOWN, XXX LARGE, X-LONG: Brand: CONVERTORS

## (undated) DEVICE — WIPES BABY PAMPERS SENSITIVE 36/PK

## (undated) DEVICE — 3M™ STERI-DRAPE™ U-DRAPE 1015: Brand: STERI-DRAPE™

## (undated) DEVICE — Device

## (undated) DEVICE — TOWEL SET X-RAY

## (undated) DEVICE — IV SET EXT SM BORE CARESITE 8IN

## (undated) DEVICE — DRESSING MEPILEX AG BORDER 4 X 8 IN

## (undated) DEVICE — STERILE DOUBLE BASIN SET PACK: Brand: CARDINAL HEALTH

## (undated) DEVICE — VEST SURGEON DISPOSABLE

## (undated) DEVICE — GLOVE SRG BIOGEL 8

## (undated) DEVICE — BLADE ELECTRODE: Brand: EDGE

## (undated) DEVICE — DRAPE SHEET X-LG

## (undated) DEVICE — TRAY EPIDURAL PERIFIX 20GA X 3.5IN TUOHY 8ML

## (undated) DEVICE — SYRINGE 5ML LL

## (undated) DEVICE — CHLORAPREP HI-LITE 26ML ORANGE

## (undated) DEVICE — BAG WOUND LAVAGE 1L BIASURGE ADV

## (undated) DEVICE — FOOT SWITCH DRAPE: Brand: UNBRANDED

## (undated) DEVICE — ADHESIVE SKIN HIGH VISCOSITY EXOFIN 1ML

## (undated) DEVICE — SUT STRATAFIX SPIRAL 3-0 PGA/PCL 30 X 30 CM SXMD2B410

## (undated) DEVICE — WEBRIL 6 IN UNSTERILE

## (undated) DEVICE — SUT ETHIBOND 2 V-37 30 IN MX69GA